# Patient Record
Sex: FEMALE | Race: ASIAN | NOT HISPANIC OR LATINO | URBAN - METROPOLITAN AREA
[De-identification: names, ages, dates, MRNs, and addresses within clinical notes are randomized per-mention and may not be internally consistent; named-entity substitution may affect disease eponyms.]

---

## 2018-11-26 ENCOUNTER — EMERGENCY (EMERGENCY)
Facility: HOSPITAL | Age: 48
LOS: 1 days | Discharge: HOME | End: 2018-11-26
Attending: EMERGENCY MEDICINE | Admitting: EMERGENCY MEDICINE
Payer: MEDICAID

## 2018-11-26 VITALS
HEART RATE: 92 BPM | RESPIRATION RATE: 16 BRPM | DIASTOLIC BLOOD PRESSURE: 87 MMHG | TEMPERATURE: 98 F | OXYGEN SATURATION: 100 % | SYSTOLIC BLOOD PRESSURE: 137 MMHG

## 2018-11-26 PROCEDURE — 99284 EMERGENCY DEPT VISIT MOD MDM: CPT

## 2018-11-26 PROCEDURE — 93970 EXTREMITY STUDY: CPT | Mod: 26

## 2018-11-26 RX ORDER — ACETAMINOPHEN 500 MG
650 TABLET ORAL ONCE
Qty: 0 | Refills: 0 | Status: COMPLETED | OUTPATIENT
Start: 2018-11-26 | End: 2018-11-26

## 2018-11-26 RX ADMIN — Medication 650 MILLIGRAM(S): at 09:11

## 2018-11-26 NOTE — ED ADULT TRIAGE NOTE - CHIEF COMPLAINT QUOTE
pt comes to ED for leg pain. 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 pt leg is black and blue and swollen. pt is a HD pt MWF with a R av fistula pt last HD was friday. pt appears comfortable NAD

## 2018-11-26 NOTE — ED PROVIDER NOTE - OBJECTIVE STATEMENT
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  co inc pain, swelling, ecchymoses  ESRD, r ue shunt, HD m/w/f evenings.  denies sob, no paresthesias.  pt states concerned about dvt

## 2018-11-26 NOTE — ED PROVIDER NOTE - PMH
Clotted Renal Dialysis AV Graft    Clotted Renal Dialysis AV Graft    ESRD on Dialysis    Fibroid Tumor    HTN - Hypertension    Infection of AV Graft for Dialysis    Infection of AV Graft for Dialysis

## 2018-11-26 NOTE — ED PROVIDER NOTE - PSH
History of Hysterectomy  patient states she is still "menstruating every few months " last one was 2 months ago.

## 2018-11-26 NOTE — ED PROVIDER NOTE - LOWER EXTREMITY EXAM, LEFT
left calf moderate swelling, ecchymoses, significant tender to palpation, mild pretibial edema, distal nvi, foot from, strength 5/5

## 2019-06-11 ENCOUNTER — APPOINTMENT (OUTPATIENT)
Dept: TRANSPLANT | Facility: CLINIC | Age: 49
End: 2019-06-11
Payer: COMMERCIAL

## 2019-06-11 ENCOUNTER — APPOINTMENT (OUTPATIENT)
Dept: NEPHROLOGY | Facility: CLINIC | Age: 49
End: 2019-06-11
Payer: COMMERCIAL

## 2019-06-11 ENCOUNTER — LABORATORY RESULT (OUTPATIENT)
Age: 49
End: 2019-06-11

## 2019-06-11 VITALS
WEIGHT: 153 LBS | BODY MASS INDEX: 27.1 KG/M2 | SYSTOLIC BLOOD PRESSURE: 120 MMHG | HEART RATE: 84 BPM | DIASTOLIC BLOOD PRESSURE: 78 MMHG | RESPIRATION RATE: 14 BRPM

## 2019-06-11 DIAGNOSIS — Z82.49 FAMILY HISTORY OF ISCHEMIC HEART DISEASE AND OTHER DISEASES OF THE CIRCULATORY SYSTEM: ICD-10-CM

## 2019-06-11 PROCEDURE — 99205 OFFICE O/P NEW HI 60 MIN: CPT

## 2019-06-11 PROCEDURE — 99204 OFFICE O/P NEW MOD 45 MIN: CPT

## 2019-06-11 NOTE — PHYSICAL EXAM
[General Appearance - Alert] : alert [General Appearance - In No Acute Distress] : in no acute distress [Sclera] : the sclera and conjunctiva were normal [PERRL With Normal Accommodation] : pupils were equal in size, round, and reactive to light [Extraocular Movements] : extraocular movements were intact [Outer Ear] : the ears and nose were normal in appearance [Neck Appearance] : the appearance of the neck was normal [Oropharynx] : the oropharynx was normal [Neck Cervical Mass (___cm)] : no neck mass was observed [Jugular Venous Distention Increased] : there was no jugular-venous distention [Thyroid Diffuse Enlargement] : the thyroid was not enlarged [Thyroid Nodule] : there were no palpable thyroid nodules [Auscultation Breath Sounds / Voice Sounds] : lungs were clear to auscultation bilaterally [Heart Rate And Rhythm] : heart rate was normal and rhythm regular [Heart Sounds] : normal S1 and S2 [Heart Sounds Gallop] : no gallops [Full Pulse] : the pedal pulses are present [Heart Sounds Pericardial Friction Rub] : no pericardial rub [Edema] : there was no peripheral edema [Bowel Sounds] : normal bowel sounds [Abdomen Soft] : soft [Abdomen Tenderness] : non-tender [Abdomen Mass (___ Cm)] : no abdominal mass palpated [Cervical Lymph Nodes Enlarged Posterior Bilaterally] : posterior cervical [Cervical Lymph Nodes Enlarged Anterior Bilaterally] : anterior cervical [Supraclavicular Lymph Nodes Enlarged Bilaterally] : supraclavicular [Axillary Lymph Nodes Enlarged Bilaterally] : axillary [Inguinal Lymph Nodes Enlarged Bilaterally] : inguinal [Involuntary Movements] : no involuntary movements were seen [Graft] : graft [FreeTextEntry1] : right arm [Bruit] : a bruit was present [] : no rash [No Focal Deficits] : no focal deficits [Impaired Insight] : insight and judgment were intact [Oriented To Time, Place, And Person] : oriented to person, place, and time [Affect] : the affect was normal

## 2019-06-11 NOTE — ASSESSMENT
[FreeTextEntry1] : .Ms. CORDERO 49 year She is evaluated for kidney transplantation.\par Pre transplant/ESRD: Patient will benefit from renal allotransplantation She is an acceptable  risk candidate, diabete.\par Special risks: she has had multiple failed AVF/grafts has had vascular grafts conecting left UE and LE vessels\par Medical risks: Cardiovascular, cancer screening.\par Hypertension: Discussed implications. Continue follow up with primary physicians.\par Cardiac risk:  will get further evaluation; echo, stress test; Reviewed cardiovascular risk reduction strategies\par Cancer screening: PAP/Mammo.  No known h/o neoplastic disease\par ID: Serology for acute and chronic viral infections. Screening for latent TB \par Imaging: Renal/abdominal /chest /Iliac  imaging\par Consults: Nutrition, social work, cardiology, Transplant surgery.\par Reviewed factors affecting survival and morbidity while on wait list and reviewed jose -operative and long-term risk factors affecting outcome in kidney transplantation.\par Details of transplant surgery, immunosuppression and its complications and benefits of live donor transplantation as well as variability in wait times across regions and multiple listing were discussed. KDPI >85% and PHS high risk criteria donors were discussed. Discussed factors affecting morbidity and mortality while on hemodialysis.\par Patient has potential live donor (Cousin, sister ) at present. \par Will proceed with completing/ updating work up and listing for transplant/ live donor transplant once work up is reviewed and found to be ok.\par \par

## 2019-06-11 NOTE — HISTORY OF PRESENT ILLNESS
[FreeTextEntry1] : 49 years old mariza, born in [facility], living in  since 2004, Patient has known ESRD (5/2006), on follow up with Dr. Jw Streeter is here for pre kidney transplant evaluation. \par Kidney disease from HTN. No kidney biopsy.\par She has no known DM; HTN (2006). H/o Hyperlipidemia/ Gout\par No known h/o kidney stone.\par No hematuria. Has had PRBC Transfusions 3 years ago.\par Urine out put:none for 5 years\par Has no h/o Pneumonia / UTI.\par No known h/o active CAD/CVA/PVD/DVT/neoplasia/active infections/bleeding.\par H/o clot in central vein after access, has been on Plavix and aspirin for 2 years.\par Reports no major allergies.  No known h/o tuberculosis or hepatitis.\par Most recent hospitalization/for: 2016 for AV graft placement.\par Past surgeries:\par Right arm  AVG, prior grafts on left upper extremity. (Lit Martin at [facility])\par No history of kidney/ bladder surgery.\par Non smoker.\par Fam: Parents are . Father -79 lives in [facility] Mother- 74, has HTN lives in [place] Siblings- has 1 brother and 3 sisters . 1 sister lives in [place].\par Children: One son, 28 years old, lives in [facility]; Healthy. \par She lives with her  (Joseph Jacobo, a ) and mother\par No family history of kidney disease\par Independent for ADL\par Able to walk ten blocks, can climb stairs without difficulty.\par ROS: Has no h/o shortness of breath on exertion. No h/o Sleep apnea. 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 Jw Streeter\par \par \par \par

## 2019-06-13 ENCOUNTER — LABORATORY RESULT (OUTPATIENT)
Age: 49
End: 2019-06-13

## 2019-06-13 LAB
ABO + RH PNL BLD: NORMAL
ABO + RH PNL BLD: NORMAL
ALBUMIN SERPL ELPH-MCNC: 4.8 G/DL
ALP BLD-CCNC: 287 U/L
ALT SERPL-CCNC: 8 U/L
ANION GAP SERPL CALC-SCNC: 20 MMOL/L
APTT BLD: 33.1 SEC
AST SERPL-CCNC: 14 U/L
AT III PPP CHRO-ACNC: 114 %
BILIRUB SERPL-MCNC: 0.4 MG/DL
BUN SERPL-MCNC: 32 MG/DL
C PEPTIDE SERPL-MCNC: 15.6 NG/ML
CALCIUM SERPL-MCNC: 8.9 MG/DL
CARDIOLIPIN AB SER IA-ACNC: NEGATIVE
CHLORIDE SERPL-SCNC: 93 MMOL/L
CMV IGG SERPL QL: >10 U/ML
CMV IGG SERPL-IMP: POSITIVE
CO2 SERPL-SCNC: 25 MMOL/L
CONFIRM: 28.6 SEC
CREAT SERPL-MCNC: 10.42 MG/DL
DRVVT IMM 1:2 NP PPP: NORMAL
DRVVT SCREEN TO CONFIRM RATIO: 1.04 RATIO
EBV DNA SERPL NAA+PROBE-ACNC: NOT DETECTED IU/ML
EBV EA AB SER IA-ACNC: <5 U/ML
EBV EA AB TITR SER IF: POSITIVE
EBV EA IGG SER QL IA: >600 U/ML
EBV EA IGG SER-ACNC: NEGATIVE
EBV EA IGM SER IA-ACNC: NEGATIVE
EBV PATRN SPEC IB-IMP: NORMAL
EBV VCA IGG SER IA-ACNC: >750 U/ML
EBV VCA IGM SER QL IA: 30.6 U/ML
EBVPCR LOG: NOT DETECTED LOGIU/ML
EPSTEIN-BARR VIRUS CAPSID ANTIGEN IGG: POSITIVE
FACT VIII ACT/NOR PPP: 218 %
FIBRINOGEN PPP COAG.DERIVED-MCNC: 418 MG/DL
GLUCOSE SERPL-MCNC: 101 MG/DL
HAV IGM SER QL: NONREACTIVE
HBV CORE IGG+IGM SER QL: REACTIVE
HBV SURFACE AB SER QL: REACTIVE
HBV SURFACE AG SER QL: NONREACTIVE
HCG SERPL-MCNC: 7 MIU/ML
HCV AB SER QL: NONREACTIVE
HCV S/CO RATIO: 0.16 S/CO
HIV1+2 AB SPEC QL IA.RAPID: NONREACTIVE
HSV 1+2 IGG SER IA-IMP: POSITIVE
HSV 1+2 IGG SER IA-IMP: POSITIVE
HSV1 IGG SER QL: 58.1 INDEX
HSV2 IGG SER QL: 21 INDEX
INR PPP: 0.92 RATIO
MAGNESIUM SERPL-MCNC: 2.5 MG/DL
PHOSPHATE SERPL-MCNC: 4.5 MG/DL
POTASSIUM SERPL-SCNC: 4.3 MMOL/L
PROT S AG ACT/NOR PPP IA: 86 %
PROT SERPL-MCNC: 7.6 G/DL
PT BLD: 10.5 SEC
RUBV IGG FLD-ACNC: 28.3 INDEX
RUBV IGG SER-IMP: POSITIVE
SCREEN DRVVT: 35.7 SEC
SODIUM SERPL-SCNC: 138 MMOL/L
T GONDII AB SER-IMP: POSITIVE
T GONDII IGG SER QL: 339 IU/ML
T PALLIDUM AB SER QL IA: NEGATIVE
TT CONT PPP: 20.5 SEC
URATE SERPL-MCNC: 4.2 MG/DL
VZV AB TITR SER: NEGATIVE
VZV IGG SER IF-ACNC: 15.2 INDEX

## 2019-06-18 ENCOUNTER — OUTPATIENT (OUTPATIENT)
Dept: OUTPATIENT SERVICES | Facility: HOSPITAL | Age: 49
LOS: 1 days | End: 2019-06-18
Payer: COMMERCIAL

## 2019-06-18 ENCOUNTER — APPOINTMENT (OUTPATIENT)
Dept: RADIOLOGY | Facility: CLINIC | Age: 49
End: 2019-06-18
Payer: COMMERCIAL

## 2019-06-18 DIAGNOSIS — Z01.818 ENCOUNTER FOR OTHER PREPROCEDURAL EXAMINATION: ICD-10-CM

## 2019-06-18 PROCEDURE — 71046 X-RAY EXAM CHEST 2 VIEWS: CPT | Mod: 26

## 2019-06-18 PROCEDURE — 71046 X-RAY EXAM CHEST 2 VIEWS: CPT

## 2019-06-19 ENCOUNTER — APPOINTMENT (OUTPATIENT)
Dept: CARDIOLOGY | Facility: CLINIC | Age: 49
End: 2019-06-19
Payer: COMMERCIAL

## 2019-06-19 ENCOUNTER — NON-APPOINTMENT (OUTPATIENT)
Age: 49
End: 2019-06-19

## 2019-06-19 VITALS
OXYGEN SATURATION: 96 % | BODY MASS INDEX: 27.11 KG/M2 | DIASTOLIC BLOOD PRESSURE: 83 MMHG | HEIGHT: 63 IN | WEIGHT: 153 LBS | SYSTOLIC BLOOD PRESSURE: 123 MMHG | HEART RATE: 85 BPM

## 2019-06-19 PROCEDURE — 93000 ELECTROCARDIOGRAM COMPLETE: CPT

## 2019-06-19 PROCEDURE — 99204 OFFICE O/P NEW MOD 45 MIN: CPT

## 2019-06-19 NOTE — PHYSICAL EXAM
[Normal Appearance] : normal appearance [General Appearance - Well Developed] : well developed [General Appearance - Well Nourished] : well nourished [Well Groomed] : well groomed [No Deformities] : no deformities [General Appearance - In No Acute Distress] : no acute distress [Normal] : the eyelids were normal bilaterally [PERRL] : pupils were equal in size, round, and reactive to light [Conjunctiva] : the conjunctiva were normal in both eyes [EOM Intact] : extraocular movements were intact [Normal Oral Mucosa] : normal oral mucosa [No Oral Cyanosis] : no oral cyanosis [No Oral Pallor] : no oral pallor [Normal Oropharynx] : normal oropharynx [Normal Rate] : normal [Rhythm Regular] : regular [Normal S1] : normal S1 [Normal S2] : normal S2 [II] : a grade 2 [No Gallop] : no gallop heard [III] : a grade 3 [2+] : left 2+ [] : no respiratory distress [1+] : left 1+ [Respiration, Rhythm And Depth] : normal respiratory rhythm and effort [Exaggerated Use Of Accessory Muscles For Inspiration] : no accessory muscle use [Auscultation Breath Sounds / Voice Sounds] : lungs were clear to auscultation bilaterally [Bowel Sounds] : normal bowel sounds [Abdomen Soft] : soft [Abdomen Tenderness] : non-tender [Abnormal Walk] : normal gait [Gait - Sufficient For Exercise Testing] : the gait was sufficient for exercise testing [Nail Clubbing] : no clubbing of the fingernails [Cyanosis, Localized] : no localized cyanosis [No Venous Stasis] : no venous stasis [Skin Color & Pigmentation] : normal skin color and pigmentation [Oriented To Time, Place, And Person] : oriented to person, place, and time [Impaired Insight] : insight and judgment were intact [No Xanthoma] : no  xanthoma was observed [Mood] : the mood was normal [Affect] : the affect was normal [No Anxiety] : not feeling anxious [Yellow Sclera (Icteric)] : no scleral icterus was seen [FreeTextEntry1] : no JVD [Right Carotid Bruit] : no bruit heard over the right carotid [Left Carotid Bruit] : no bruit heard over the left carotid

## 2019-06-19 NOTE — DISCUSSION/SUMMARY
[FreeTextEntry1] : Patient is a very pleasant 49 year-old with vascular history as above who does not know her cardiac history.\par Will check echocardiogram with strain imaging to evaluate patient with low voltage ECG - consider infiltrative myopathy.\par Will check exercise nuclear stress testing in patient with no symptoms but with known peripheral vascular disease.

## 2019-06-19 NOTE — HISTORY OF PRESENT ILLNESS
[FreeTextEntry1] : Patient is a 49 year-old woman with reported history of hypertensive kidney disease, ESRD on HD since 2006, currently via right brachial graft, presents today for evaluation for possible renal transplant.\par Patient is on dual antiplatelet therapy per her vascular surgeon. She is unaware if she also had any coronary bypasses at the time of her upper extremity bypasses (but she does have a sternotomy scar).\par \par At today's visit, patient denies chest pain, shortness of breath, syncope, or presyncope. \par She exercises regularly, including jumping rope and walking. She got into jumping rope because her uncles were boxers and they used to jump rope for fitness.\par \par PMD/Nephrologist: Jw Streeter MD [phone]\par Vascular: Lit Castellanos MD [phone]

## 2019-06-21 LAB
APCR PPP: 2.89 RATIO
BASOPHILS # BLD AUTO: 0.04 K/UL
BASOPHILS NFR BLD AUTO: 0.8 %
DNA PLOIDY SPEC FC-IMP: NORMAL
EOSINOPHIL # BLD AUTO: 0.91 K/UL
EOSINOPHIL NFR BLD AUTO: 17.2 %
ESTIMATED AVERAGE GLUCOSE: 105 MG/DL
FVL BLANK: 36.9
FVL TEST: 106.6
HBA1C MFR BLD HPLC: 5.3 %
HCT VFR BLD CALC: 47.5 %
HGB BLD-MCNC: 13.8 G/DL
IMM GRANULOCYTES NFR BLD AUTO: 0.2 %
LYMPHOCYTES # BLD AUTO: 1.14 K/UL
LYMPHOCYTES NFR BLD AUTO: 21.5 %
M TB IFN-G BLD-IMP: POSITIVE
MAN DIFF?: NORMAL
MCHC RBC-ENTMCNC: 25.9 PG
MCHC RBC-ENTMCNC: 29.1 GM/DL
MCV RBC AUTO: 89.3 FL
MONOCYTES # BLD AUTO: 0.43 K/UL
MONOCYTES NFR BLD AUTO: 8.1 %
NEUTROPHILS # BLD AUTO: 2.77 K/UL
NEUTROPHILS NFR BLD AUTO: 52.2 %
PLATELET # BLD AUTO: 234 K/UL
QUANTIFERON TB PLUS MITOGEN MINUS NIL: 7 IU/ML
QUANTIFERON TB PLUS NIL: 0.03 IU/ML
QUANTIFERON TB PLUS TB1 MINUS NIL: 0.65 IU/ML
QUANTIFERON TB PLUS TB2 MINUS NIL: 0.48 IU/ML
RBC # BLD: 5.32 M/UL
RBC # FLD: 19.1 %
WBC # FLD AUTO: 5.3 K/UL

## 2019-06-25 ENCOUNTER — APPOINTMENT (OUTPATIENT)
Dept: CARDIOLOGY | Facility: CLINIC | Age: 49
End: 2019-06-25
Payer: COMMERCIAL

## 2019-06-25 ENCOUNTER — APPOINTMENT (OUTPATIENT)
Dept: CT IMAGING | Facility: CLINIC | Age: 49
End: 2019-06-25
Payer: COMMERCIAL

## 2019-06-25 ENCOUNTER — OUTPATIENT (OUTPATIENT)
Dept: OUTPATIENT SERVICES | Facility: HOSPITAL | Age: 49
LOS: 1 days | End: 2019-06-25
Payer: COMMERCIAL

## 2019-06-25 DIAGNOSIS — Z00.8 ENCOUNTER FOR OTHER GENERAL EXAMINATION: ICD-10-CM

## 2019-06-25 PROCEDURE — 74177 CT ABD & PELVIS W/CONTRAST: CPT | Mod: 26

## 2019-06-25 PROCEDURE — 75635 CT ANGIO ABDOMINAL ARTERIES: CPT | Mod: 26

## 2019-06-25 PROCEDURE — 75635 CT ANGIO ABDOMINAL ARTERIES: CPT

## 2019-06-25 PROCEDURE — 93306 TTE W/DOPPLER COMPLETE: CPT

## 2019-06-25 PROCEDURE — 74177 CT ABD & PELVIS W/CONTRAST: CPT

## 2019-07-23 ENCOUNTER — APPOINTMENT (OUTPATIENT)
Dept: CARDIOLOGY | Facility: CLINIC | Age: 49
End: 2019-07-23
Payer: COMMERCIAL

## 2019-07-23 PROCEDURE — 78452 HT MUSCLE IMAGE SPECT MULT: CPT

## 2019-07-23 PROCEDURE — 93015 CV STRESS TEST SUPVJ I&R: CPT

## 2019-07-23 PROCEDURE — A9500: CPT

## 2019-07-26 ENCOUNTER — MOBILE ON CALL (OUTPATIENT)
Age: 49
End: 2019-07-26

## 2019-07-30 ENCOUNTER — APPOINTMENT (OUTPATIENT)
Dept: INFECTIOUS DISEASE | Facility: CLINIC | Age: 49
End: 2019-07-30
Payer: COMMERCIAL

## 2019-07-30 VITALS
TEMPERATURE: 97 F | DIASTOLIC BLOOD PRESSURE: 78 MMHG | BODY MASS INDEX: 26.75 KG/M2 | HEIGHT: 63 IN | HEART RATE: 78 BPM | WEIGHT: 151 LBS | OXYGEN SATURATION: 96 % | SYSTOLIC BLOOD PRESSURE: 115 MMHG

## 2019-07-30 PROCEDURE — 99203 OFFICE O/P NEW LOW 30 MIN: CPT

## 2019-07-30 NOTE — ASSESSMENT
[FreeTextEntry1] : This is a 48 yo F with pmh of ESRD on HD MWF, HTN, DM, HLD, Gout, RUE AVG, on plavix for h/o clot in central vein. \par \par Referred today for Positive Quantiferon Gold.\par \par Chest xray with 2 mm RUL nodule.\par \par CT chest pending.\par Once CT chest done, I will review.  \par Likely pt has latent TB and will need treatment with  mg po daily and Vit B6 daily x 9 months.\par \par Medication and side effects explained to pt. Side effects not limited to: GI upset, bone marrow suppression, hepatotoxicity, told to avoid alcohol, peripheral neuropathy.\par \par Informed pt once she has CT chest, to call me and I will review with her and start INH if appropriate.\par Asked pt to RTC in 6 weeks.  Hope to have her on INH in the next 2 weeks if CT chest normal.\par \par Reviewed serology for HIV, CMV, HSV, EBV, Hep B. Other care as per transplant team protocol. \par \par All questions answered.

## 2019-07-30 NOTE — CONSULT LETTER
[Dear  ___] : Dear  [unfilled], [Consult Letter:] : I had the pleasure of evaluating your patient, [unfilled]. [Consult Closing:] : Thank you very much for allowing me to participate in the care of this patient.  If you have any questions, please do not hesitate to contact me. [Sincerely,] : Sincerely, [Please see my note below.] : Please see my note below. [FreeTextEntry2] : Dr. Jose Joya [FreeTextEntry3] : \par Yazmin Olivarez MD\par  of Medicine\par Division of Infectious Diseases\par [facility] [facility]\par [address] [place] [address] DrAlec\[place] [address]\par Tel: [phone]\par Fax: [phone]\par  [DrAlec ___] : Dr. LINDO [DrAlec  ___] : Dr. LINDO

## 2019-07-30 NOTE — PHYSICAL EXAM
[General Appearance - Alert] : alert [General Appearance - In No Acute Distress] : in no acute distress [General Appearance - Well-Appearing] : healthy appearing [Sclera] : the sclera and conjunctiva were normal [PERRL With Normal Accommodation] : pupils were equal in size, round, reactive to light [Outer Ear] : the ears and nose were normal in appearance [Oropharynx] : the oropharynx was normal with no thrush [Neck Appearance] : the appearance of the neck was normal [Neck Cervical Mass (___cm)] : no neck mass was observed [Jugular Venous Distention Increased] : there was no jugular-venous distention [] : no respiratory distress [Auscultation Breath Sounds / Voice Sounds] : lungs were clear to auscultation bilaterally [Heart Rate And Rhythm] : heart rate was normal and rhythm regular [Heart Sounds] : normal S1 and S2 [Edema] : there was no peripheral edema [Bowel Sounds] : normal bowel sounds [Abdomen Soft] : soft [Cervical Lymph Nodes Enlarged Posterior Bilaterally] : posterior cervical [Cervical Lymph Nodes Enlarged Anterior Bilaterally] : anterior cervical [Supraclavicular Lymph Nodes Enlarged Bilaterally] : supraclavicular [Skin Lesions] : no skin lesions [No Focal Deficits] : no focal deficits [Oriented To Time, Place, And Person] : oriented to person, place, and time [Affect] : the affect was normal

## 2019-07-30 NOTE — HISTORY OF PRESENT ILLNESS
[FreeTextEntry1] : This is a 50 yo F with pmh of ESRD on HD MWF, HTN, DM, HLD, Gout, RUE AVG, on plavix for h/o clot in central vein. \par \par Referred today for Positive Quantiferon Gold.\par \par +Quant gold\par Had BCG as kid\par From [facility], [place]\par Lives in [place]\par Never treated for TB.\par \par No cough, hemoptysis, fevers, chills, weight loss, no LAD.\par No known exposures to TB\par \par Chest xray done 6/18/19: Revealed new RUL 2 mm nodule new since prior study compared in 2014.  \par Pt to have CT chest w/o contrast - results not available. Pt unsure if she had study b/c she did go for CT a/p and CT angiogram on 6/25/19.  Reached out to transplant team to clarify.

## 2019-08-08 ENCOUNTER — OUTPATIENT (OUTPATIENT)
Dept: OUTPATIENT SERVICES | Facility: HOSPITAL | Age: 49
LOS: 1 days | End: 2019-08-08
Payer: COMMERCIAL

## 2019-08-08 ENCOUNTER — APPOINTMENT (OUTPATIENT)
Dept: CT IMAGING | Facility: CLINIC | Age: 49
End: 2019-08-08
Payer: COMMERCIAL

## 2019-08-08 DIAGNOSIS — Z01.818 ENCOUNTER FOR OTHER PREPROCEDURAL EXAMINATION: ICD-10-CM

## 2019-08-08 PROCEDURE — 71250 CT THORAX DX C-: CPT

## 2019-08-08 PROCEDURE — 71250 CT THORAX DX C-: CPT | Mod: 26

## 2019-09-10 ENCOUNTER — APPOINTMENT (OUTPATIENT)
Dept: INFECTIOUS DISEASE | Facility: CLINIC | Age: 49
End: 2019-09-10

## 2019-11-07 ENCOUNTER — NON-APPOINTMENT (OUTPATIENT)
Age: 49
End: 2019-11-07

## 2019-11-07 ENCOUNTER — APPOINTMENT (OUTPATIENT)
Dept: INFECTIOUS DISEASE | Facility: CLINIC | Age: 49
End: 2019-11-07
Payer: COMMERCIAL

## 2019-11-07 ENCOUNTER — APPOINTMENT (OUTPATIENT)
Dept: CARDIOLOGY | Facility: CLINIC | Age: 49
End: 2019-11-07
Payer: COMMERCIAL

## 2019-11-07 VITALS
OXYGEN SATURATION: 97 % | DIASTOLIC BLOOD PRESSURE: 79 MMHG | BODY MASS INDEX: 27.11 KG/M2 | HEART RATE: 85 BPM | HEIGHT: 63 IN | SYSTOLIC BLOOD PRESSURE: 120 MMHG | WEIGHT: 153 LBS | TEMPERATURE: 97.5 F

## 2019-11-07 VITALS
HEIGHT: 63 IN | WEIGHT: 154.1 LBS | OXYGEN SATURATION: 97 % | BODY MASS INDEX: 27.3 KG/M2 | DIASTOLIC BLOOD PRESSURE: 78 MMHG | HEART RATE: 89 BPM | SYSTOLIC BLOOD PRESSURE: 104 MMHG

## 2019-11-07 DIAGNOSIS — R76.12 NONSPECIFIC REACTION TO CELL MEDIATED IMMUNITY MEASUREMENT OF GAMMA INTERFERON ANTIGEN RESPONSE W/OUT ACTIVE TUBERCULOSIS: ICD-10-CM

## 2019-11-07 LAB
ALBUMIN SERPL ELPH-MCNC: 4.7 G/DL
ALP BLD-CCNC: 170 U/L
ALT SERPL-CCNC: 6 U/L
ANION GAP SERPL CALC-SCNC: 17 MMOL/L
AST SERPL-CCNC: 15 U/L
BASOPHILS # BLD AUTO: 0.04 K/UL
BASOPHILS NFR BLD AUTO: 0.8 %
BILIRUB SERPL-MCNC: 0.3 MG/DL
BUN SERPL-MCNC: 24 MG/DL
CALCIUM SERPL-MCNC: 9.8 MG/DL
CHLORIDE SERPL-SCNC: 94 MMOL/L
CO2 SERPL-SCNC: 30 MMOL/L
CREAT SERPL-MCNC: 7.84 MG/DL
EOSINOPHIL # BLD AUTO: 0.49 K/UL
EOSINOPHIL NFR BLD AUTO: 9.3 %
GLUCOSE SERPL-MCNC: 86 MG/DL
HCT VFR BLD CALC: 47.5 %
HGB BLD-MCNC: 14.3 G/DL
IMM GRANULOCYTES NFR BLD AUTO: 0.2 %
LYMPHOCYTES # BLD AUTO: 1.13 K/UL
LYMPHOCYTES NFR BLD AUTO: 21.5 %
MAN DIFF?: NORMAL
MCHC RBC-ENTMCNC: 26.5 PG
MCHC RBC-ENTMCNC: 30.1 GM/DL
MCV RBC AUTO: 88 FL
MONOCYTES # BLD AUTO: 0.46 K/UL
MONOCYTES NFR BLD AUTO: 8.8 %
NEUTROPHILS # BLD AUTO: 3.12 K/UL
NEUTROPHILS NFR BLD AUTO: 59.4 %
PLATELET # BLD AUTO: 360 K/UL
POTASSIUM SERPL-SCNC: 4.7 MMOL/L
PROT SERPL-MCNC: 7.8 G/DL
RBC # BLD: 5.4 M/UL
RBC # FLD: 18.7 %
SODIUM SERPL-SCNC: 141 MMOL/L
WBC # FLD AUTO: 5.25 K/UL

## 2019-11-07 PROCEDURE — 93000 ELECTROCARDIOGRAM COMPLETE: CPT

## 2019-11-07 PROCEDURE — 99215 OFFICE O/P EST HI 40 MIN: CPT

## 2019-11-07 PROCEDURE — 99213 OFFICE O/P EST LOW 20 MIN: CPT

## 2019-11-07 NOTE — HISTORY OF PRESENT ILLNESS
[FreeTextEntry1] : This is a 50 yo F with pmh of ESRD on HD MWF, HTN, DM, HLD, Gout, RUE AVG, on plavix for h/o clot in central vein. \par \par Referred today for Positive Quantiferon Gold.\par \par +Quant gold\par Had BCG as kid\par From [facility], moved to  2004\par Lives in [place]\par Never treated for TB.\par \par No cough, hemoptysis, fevers, chills, weight loss, no LAD.\par No known exposures to TB\par \par Chest xray done 6/18/19: Revealed new RUL 2 mm nodule new since prior study compared in 2014.\par CT chest done.  With prior granulomatous disease. No evidence of active TB. \par Started INH/B6 8/13/19.  Due to end 5/13/2020.    \par \par Feels well today ROS negative. Has some numbness right hand with dialysis otherwise is okay. \par Not drinking alcohol.

## 2019-11-07 NOTE — CONSULT LETTER
[Dear  ___] : Dear  [unfilled], [Consult Letter:] : I had the pleasure of evaluating your patient, [unfilled]. [Please see my note below.] : Please see my note below. [Consult Closing:] : Thank you very much for allowing me to participate in the care of this patient.  If you have any questions, please do not hesitate to contact me. [Sincerely,] : Sincerely, [DrAlec  ___] : Dr. LINDO [DrAlec ___] : Dr. LINDO [FreeTextEntry2] : Dr. Jose Joya [FreeTextEntry3] : \par Yazmin Olivarez MD\par  of Medicine\par Division of Infectious Diseases\par [facility] [facility] at [facility]\par [address] [place] [address] DrAlec\[place] [address]\par Tel: [phone]\par Fax: [phone]\par

## 2019-11-07 NOTE — ASSESSMENT
[FreeTextEntry1] : This is a 50 yo F with pmh of ESRD on HD MWF, HTN, DM, HLD, Gout, RUE AVG, on plavix for h/o clot in central vein. \par \par Referred today for Positive Quantiferon Gold.\par \par Chest xray with 2 mm RUL nodule.\par \par CT chest without active TB.\par Continue  mg po daily and Vit B6 daily x 9 months.  End date planned 5/13/2020.\par \par Medication and side effects explained to pt. Side effects not limited to: GI upset, bone marrow suppression, hepatotoxicity, told to avoid alcohol, peripheral neuropathy.\par \par Reviewed serology for HIV, CMV, HSV, EBV, Hep B. Other care as per transplant team protocol. \par \par All questions answered.   RTC 2 months or sooner if needed. Also getting blood work checked at dialysis monthly.  \par Check CBC, CMP today.

## 2019-11-20 ENCOUNTER — OUTPATIENT (OUTPATIENT)
Dept: OUTPATIENT SERVICES | Facility: HOSPITAL | Age: 49
LOS: 1 days | End: 2019-11-20
Payer: COMMERCIAL

## 2019-11-20 VITALS
WEIGHT: 136.91 LBS | SYSTOLIC BLOOD PRESSURE: 130 MMHG | TEMPERATURE: 98 F | RESPIRATION RATE: 16 BRPM | HEART RATE: 87 BPM | HEIGHT: 63 IN | DIASTOLIC BLOOD PRESSURE: 78 MMHG | OXYGEN SATURATION: 97 %

## 2019-11-20 DIAGNOSIS — Z95.828 PRESENCE OF OTHER VASCULAR IMPLANTS AND GRAFTS: Chronic | ICD-10-CM

## 2019-11-20 DIAGNOSIS — R93.1 ABNORMAL FINDINGS ON DIAGNOSTIC IMAGING OF HEART AND CORONARY CIRCULATION: ICD-10-CM

## 2019-11-20 DIAGNOSIS — I77.0 ARTERIOVENOUS FISTULA, ACQUIRED: Chronic | ICD-10-CM

## 2019-11-20 LAB
ALBUMIN SERPL ELPH-MCNC: 4.8 G/DL — SIGNIFICANT CHANGE UP (ref 3.3–5)
ALP SERPL-CCNC: 154 U/L — HIGH (ref 40–120)
ALT FLD-CCNC: 10 U/L — SIGNIFICANT CHANGE UP (ref 10–45)
ANION GAP SERPL CALC-SCNC: 22 MMOL/L — HIGH (ref 5–17)
AST SERPL-CCNC: 27 U/L — SIGNIFICANT CHANGE UP (ref 10–40)
BILIRUB SERPL-MCNC: 0.4 MG/DL — SIGNIFICANT CHANGE UP (ref 0.2–1.2)
BUN SERPL-MCNC: 42 MG/DL — HIGH (ref 7–23)
CALCIUM SERPL-MCNC: 10.5 MG/DL — SIGNIFICANT CHANGE UP (ref 8.4–10.5)
CHLORIDE SERPL-SCNC: 89 MMOL/L — LOW (ref 96–108)
CO2 SERPL-SCNC: 21 MMOL/L — LOW (ref 22–31)
CREAT SERPL-MCNC: 12.45 MG/DL — HIGH (ref 0.5–1.3)
GLUCOSE SERPL-MCNC: 140 MG/DL — HIGH (ref 70–99)
HCT VFR BLD CALC: 47.2 % — HIGH (ref 34.5–45)
HGB BLD-MCNC: 14.6 G/DL — SIGNIFICANT CHANGE UP (ref 11.5–15.5)
MCHC RBC-ENTMCNC: 26.3 PG — LOW (ref 27–34)
MCHC RBC-ENTMCNC: 30.9 GM/DL — LOW (ref 32–36)
MCV RBC AUTO: 84.9 FL — SIGNIFICANT CHANGE UP (ref 80–100)
NRBC # BLD: 0 /100 WBCS — SIGNIFICANT CHANGE UP (ref 0–0)
PLATELET # BLD AUTO: 299 K/UL — SIGNIFICANT CHANGE UP (ref 150–400)
POTASSIUM SERPL-MCNC: 5.4 MMOL/L — HIGH (ref 3.5–5.3)
POTASSIUM SERPL-SCNC: 5.4 MMOL/L — HIGH (ref 3.5–5.3)
PROT SERPL-MCNC: 8.6 G/DL — HIGH (ref 6–8.3)
RBC # BLD: 5.56 M/UL — HIGH (ref 3.8–5.2)
RBC # FLD: 17.9 % — HIGH (ref 10.3–14.5)
SODIUM SERPL-SCNC: 132 MMOL/L — LOW (ref 135–145)
WBC # BLD: 4.75 K/UL — SIGNIFICANT CHANGE UP (ref 3.8–10.5)
WBC # FLD AUTO: 4.75 K/UL — SIGNIFICANT CHANGE UP (ref 3.8–10.5)

## 2019-11-20 PROCEDURE — C1769: CPT

## 2019-11-20 PROCEDURE — C1887: CPT

## 2019-11-20 PROCEDURE — 99152 MOD SED SAME PHYS/QHP 5/>YRS: CPT

## 2019-11-20 PROCEDURE — 93005 ELECTROCARDIOGRAM TRACING: CPT

## 2019-11-20 PROCEDURE — 99152 MOD SED SAME PHYS/QHP 5/>YRS: CPT | Mod: 59

## 2019-11-20 PROCEDURE — 93010 ELECTROCARDIOGRAM REPORT: CPT

## 2019-11-20 PROCEDURE — 93458 L HRT ARTERY/VENTRICLE ANGIO: CPT

## 2019-11-20 PROCEDURE — 85027 COMPLETE CBC AUTOMATED: CPT

## 2019-11-20 PROCEDURE — C1894: CPT

## 2019-11-20 PROCEDURE — 93458 L HRT ARTERY/VENTRICLE ANGIO: CPT | Mod: 26,59

## 2019-11-20 PROCEDURE — 80053 COMPREHEN METABOLIC PANEL: CPT

## 2019-11-20 NOTE — H&P CARDIOLOGY - PSH
AV fistula  B/L - failed  H/O extremity bypass graft  H/O RUE bypass and creation of right brachial graft  History of Hysterectomy  for benign disease

## 2019-11-20 NOTE — H&P CARDIOLOGY - PMH
CKD (chronic kidney disease) stage V requiring chronic dialysis    Clotted Renal Dialysis AV Graft    Fibroid Tumor    HTN - Hypertension    Infection of AV Graft for Dialysis

## 2019-11-20 NOTE — H&P CARDIOLOGY - HISTORY OF PRESENT ILLNESS
50 y/o [place] female (denies implanted cardiac devices) with PMHx of HTN and CKD V (2/2 hypertensive kidney disease) on HD M/W/F since 2006 via right brachial graft created via RUE bypass for which she is on DAPT as per Vascular surgery (s/p b/l AV fistula both failed) who is now under evaluation for Renal Transplant.  Patient is s/p attempted exercise NST which was converted to pharmacologic NST (2/2 HR suboptimal at 8 minutes of exercise) in 7/2019 with abnormal findings (comprehensive report below).  She presented to Cardiology-  Dr. Trent for evaluation on 11/7/2019 and decision was made to proceed with [facility] for which patient presents today.  Patient denies any CP/dyspnea/palpitations/syncope.  Stress Test: Date: 7/23/2019       Protocol: Pharmacologic NST, patient injected with Regadenoson       Symptoms: shortness of breath, abdominal discomfort, dizziness, vomiting       EKG Changes: None       Myocardial Imaging: The LV was a normal in size, there are small mild defects in the distal anterior and anteroapical walls that are reversible consistent with ischemia, LVEF 79%     Antianginal Therapies:          Calcium Channel Blockers: Amlodipine    **Of note patient with iodine based contrast allergy, premedicated as per [facility] policy 50 y/o [place] female (denies implanted cardiac devices) with PMHx of HTN and CKD V (2/2 hypertensive kidney disease) on HD M/W/F since 2006 via right brachial graft created via RUE bypass for which she is on DAPT as per Vascular surgery (s/p b/l AV fistula both failed) who is now under evaluation for Renal Transplant.  Patient is s/p attempted exercise NST which was converted to pharmacologic NST (2/2 HR suboptimal at 8 minutes of exercise) in 7/2019 with abnormal findings (comprehensive report below).  She presented to Cardiology-  Dr. Trent for evaluation on 11/7/2019 and decision was made to proceed with [facility] for which patient presents today.  Patient denies any CP/dyspnea/palpitations/syncope.    Stress Test: Date: 7/23/2019       Protocol: Pharmacologic NST, patient injected with Regadenoson       Symptoms: shortness of breath, abdominal discomfort, dizziness, vomiting       EKG Changes: None       Myocardial Imaging: The LV was a normal in size, there are small mild defects in the distal anterior and anteroapical walls that are reversible consistent with ischemia, LVEF 79%     Antianginal Therapies:          Calcium Channel Blockers: Amlodipine    **Of note patient with iodine based contrast allergy, premedicated as per [facility] policy

## 2019-12-02 NOTE — HISTORY OF PRESENT ILLNESS
[FreeTextEntry1] : Patient is a 49 year-old [place] woman, [place], with reported history of hypertensive kidney disease, ESRD on HD since 2006, currently [id number]-[id number] via right brachial graft, presents today for evaluation for possible renal transplant.\par Patient is on dual antiplatelet therapy per her vascular surgeon. She is unaware if she also had any coronary bypasses at the time of her upper extremity bypasses (but she does have a sternotomy scar).\par \par At today's visit, patient denies chest pain, shortness of breath, syncope, or presyncope. \par She exercises regularly, including jumping rope and walking. She got into jumping rope because her uncles were boxers and they used to jump rope for fitness.\par \par November 2019 - Patient presents for follow-up in her usual state of health. Since her initial visit ehre in June 2019, patient has had an echocardiogram and nuclear stress test. The stress test revealed mild distal anterior and apical ischemia. She has not yet had her left heart catheterization. She has no chest discomfort or shortness of breath.\par Patient tolerates dialysis well. She continues to have HD on Heasbn-Oczcohttz-Qtljvo via right brachial AV graft.\par Patient continues to take her TB medications after positive Quantiferon Gold test. She has no evidence of active TB. 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

## 2019-12-02 NOTE — ADDENDUM
[FreeTextEntry1] : On November 20, 2019, patient presented for left heart catheterization.\par She was seen to have normal coronary arteries.\par \par No further cardiovascular testing is necessary prior to consideration for renal transplant.\par Would reassess ischemic evaluation in one year or earlier if clinical status changes.\par \par Patient encouraged to diet and exercise, including both aerobic and resistance training.

## 2019-12-02 NOTE — DISCUSSION/SUMMARY
[FreeTextEntry1] : Patient is a very pleasant 49 year-old with vascular history as above who does not know her cardiac history.\par Echocardiogram (June 2019) revealed normal LV function, normal LA size, and borderline pulmonary pressures.\par Nuclear stress test showed adequate exercise capacity, but patient could not achieve target heart rate, so she was converted to pharmacologic nuclear stress testing which revealed a small area of distal anterior and apical ischemia.\par \par Patient referred for left heart catheterization.

## 2019-12-02 NOTE — PHYSICAL EXAM
[General Appearance - Well Developed] : well developed [Normal Appearance] : normal appearance [Well Groomed] : well groomed [General Appearance - Well Nourished] : well nourished [General Appearance - In No Acute Distress] : no acute distress [No Deformities] : no deformities [No Oral Pallor] : no oral pallor [Normal Oral Mucosa] : normal oral mucosa [No Oral Cyanosis] : no oral cyanosis [Normal Oropharynx] : normal oropharynx [Respiration, Rhythm And Depth] : normal respiratory rhythm and effort [] : no respiratory distress [Auscultation Breath Sounds / Voice Sounds] : lungs were clear to auscultation bilaterally [Exaggerated Use Of Accessory Muscles For Inspiration] : no accessory muscle use [Abdomen Tenderness] : non-tender [Abdomen Soft] : soft [Bowel Sounds] : normal bowel sounds [Gait - Sufficient For Exercise Testing] : the gait was sufficient for exercise testing [Abnormal Walk] : normal gait [Nail Clubbing] : no clubbing of the fingernails [Skin Color & Pigmentation] : normal skin color and pigmentation [Cyanosis, Localized] : no localized cyanosis [No Venous Stasis] : no venous stasis [Oriented To Time, Place, And Person] : oriented to person, place, and time [No Xanthoma] : no  xanthoma was observed [Impaired Insight] : insight and judgment were intact [Mood] : the mood was normal [Affect] : the affect was normal [Conjunctiva] : the conjunctiva were normal in both eyes [No Anxiety] : not feeling anxious [Normal] : the eyelids were normal bilaterally [PERRL] : pupils were equal in size, round, and reactive to light [EOM Intact] : extraocular movements were intact [Rhythm Regular] : regular [Normal Rate] : normal [Normal S1] : normal S1 [No Gallop] : no gallop heard [Normal S2] : normal S2 [II] : a grade 2 [III] : a grade 3 [2+] : Carotid: right 2+ [1+] : left 1+ [No Pitting Edema] : no pitting edema present [FreeTextEntry1] : no JVD [Yellow Sclera (Icteric)] : no scleral icterus was seen [Right Carotid Bruit] : no bruit heard over the right carotid [Left Carotid Bruit] : no bruit heard over the left carotid

## 2020-01-07 ENCOUNTER — APPOINTMENT (OUTPATIENT)
Dept: INFECTIOUS DISEASE | Facility: CLINIC | Age: 50
End: 2020-01-07

## 2020-02-25 PROBLEM — N18.6 END STAGE RENAL DISEASE: Chronic | Status: ACTIVE | Noted: 2019-11-20

## 2020-03-10 ENCOUNTER — NON-APPOINTMENT (OUTPATIENT)
Age: 50
End: 2020-03-10

## 2020-03-10 ENCOUNTER — APPOINTMENT (OUTPATIENT)
Dept: CARDIOLOGY | Facility: CLINIC | Age: 50
End: 2020-03-10
Payer: COMMERCIAL

## 2020-03-10 VITALS
SYSTOLIC BLOOD PRESSURE: 112 MMHG | DIASTOLIC BLOOD PRESSURE: 77 MMHG | TEMPERATURE: 98.1 F | HEART RATE: 88 BPM | OXYGEN SATURATION: 95 % | BODY MASS INDEX: 27.11 KG/M2 | HEIGHT: 63 IN | WEIGHT: 153 LBS | RESPIRATION RATE: 17 BRPM

## 2020-03-10 DIAGNOSIS — Z00.00 ENCOUNTER FOR GENERAL ADULT MEDICAL EXAMINATION W/OUT ABNORMAL FINDINGS: ICD-10-CM

## 2020-03-10 DIAGNOSIS — R94.39 ABNORMAL RESULT OF OTHER CARDIOVASCULAR FUNCTION STUDY: ICD-10-CM

## 2020-03-10 DIAGNOSIS — I73.9 PERIPHERAL VASCULAR DISEASE, UNSPECIFIED: ICD-10-CM

## 2020-03-10 PROCEDURE — 93000 ELECTROCARDIOGRAM COMPLETE: CPT | Mod: NC

## 2020-03-10 PROCEDURE — 99215 OFFICE O/P EST HI 40 MIN: CPT

## 2020-03-15 NOTE — REASON FOR VISIT
[Follow-Up - Clinic] : a clinic follow-up of [FreeTextEntry2] : pretransplant cardiac evaluation prior to possible renal transplant

## 2020-03-15 NOTE — PHYSICAL EXAM
[General Appearance - Well Developed] : well developed [Normal Appearance] : normal appearance [Well Groomed] : well groomed [General Appearance - Well Nourished] : well nourished [No Deformities] : no deformities [General Appearance - In No Acute Distress] : no acute distress [Normal Oral Mucosa] : normal oral mucosa [No Oral Pallor] : no oral pallor [No Oral Cyanosis] : no oral cyanosis [Normal Oropharynx] : normal oropharynx [] : no respiratory distress [Respiration, Rhythm And Depth] : normal respiratory rhythm and effort [Exaggerated Use Of Accessory Muscles For Inspiration] : no accessory muscle use [Auscultation Breath Sounds / Voice Sounds] : lungs were clear to auscultation bilaterally [Bowel Sounds] : normal bowel sounds [Abdomen Soft] : soft [Abdomen Tenderness] : non-tender [Abnormal Walk] : normal gait [Gait - Sufficient For Exercise Testing] : the gait was sufficient for exercise testing [Nail Clubbing] : no clubbing of the fingernails [Cyanosis, Localized] : no localized cyanosis [Skin Color & Pigmentation] : normal skin color and pigmentation [No Venous Stasis] : no venous stasis [No Xanthoma] : no  xanthoma was observed [Oriented To Time, Place, And Person] : oriented to person, place, and time [Impaired Insight] : insight and judgment were intact [Affect] : the affect was normal [Mood] : the mood was normal [No Anxiety] : not feeling anxious [Conjunctiva] : the conjunctiva were normal in both eyes [Normal] : the eyelids were normal bilaterally [PERRL] : pupils were equal in size, round, and reactive to light [EOM Intact] : extraocular movements were intact [Normal Rate] : normal [Rhythm Regular] : regular [Normal S1] : normal S1 [Normal S2] : normal S2 [No Gallop] : no gallop heard [II] : a grade 2 [III] : a grade 3 [2+] : left 2+ [1+] : left 1+ [No Pitting Edema] : no pitting edema present [FreeTextEntry1] : no JVD [Yellow Sclera (Icteric)] : no scleral icterus was seen [Right Carotid Bruit] : no bruit heard over the right carotid [Left Carotid Bruit] : no bruit heard over the left carotid

## 2020-03-15 NOTE — HISTORY OF PRESENT ILLNESS
[FreeTextEntry1] : Patient is a 49 year-old [place] woman, [place] the [place], with reported history of hypertensive kidney disease, ESRD on HD since 2006, currently [id number] via right brachial graft, presents today for evaluation for possible renal transplant.\par Patient is on dual antiplatelet therapy per her vascular surgeon. In September 2019, patient had revascularization of upper extremity circulation at [facility]. She reports that her breast and face were swelling, and a sternotomy was performed in order to revascularize her.\par \par At today's visit, patient denies chest pain, shortness of breath, syncope, or presyncope. \par She exercises regularly, including jumping rope and walking. She got into jumping rope because her uncles were boxers and they used to jump rope for fitness.\par \par November 2019 - Patient presents for follow-up in her usual state of health. Since her initial visit here in June 2019, patient has had an echocardiogram and nuclear stress test. The stress test revealed mild distal anterior and apical ischemia. She has not yet had her left heart catheterization. She has no chest discomfort or shortness of breath.\par Patient tolerates dialysis well. She continues to have HD on Euzatb-Gzwgswodk-Njrksl via right brachial AV graft.\par Patient continues to take her TB medications after positive Quantiferon Gold test. She has no evidence of active TB. 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  \par \par PMD/Nephrologist: Jw Streeter MD [phone]\par ID: Yazmin Olivarez MD [phone]\par Vascular: Lit Castellanos MD [phone]

## 2020-03-15 NOTE — DISCUSSION/SUMMARY
[FreeTextEntry1] : Patient is a very pleasant 49 year-old with vascular history as above who presents for cardiac evaluation prior to possible renal transplant.\par Echocardiogram (June 2019) revealed normal LV function, normal LA size, and borderline pulmonary pressures.\par Nuclear stress test showed adequate exercise capacity, but patient could not achieve target heart rate, so she was converted to pharmacologic nuclear stress testing which revealed a small area of distal anterior and apical ischemia.\par On November 20, 2019, patient presented for left heart catheterization. She was seen to have normal coronary arteries.\par Patient will send her the reports from [place] (where he had revascularization).\par \par No further cardiovascular testing is necessary prior to consideration for renal transplant.\par Would reassess ischemic evaluation in one year or earlier if clinical status changes.\par \par Patient encouraged to diet and exercise, including both aerobic and resistance training.

## 2020-06-16 ENCOUNTER — APPOINTMENT (OUTPATIENT)
Dept: TRANSPLANT | Facility: CLINIC | Age: 50
End: 2020-06-16
Payer: COMMERCIAL

## 2020-06-16 PROCEDURE — 99214 OFFICE O/P EST MOD 30 MIN: CPT | Mod: 95

## 2020-06-16 NOTE — PHYSICAL EXAM
[General Appearance - Alert] : alert [General Appearance - In No Acute Distress] : in no acute distress [Oropharynx] : the oropharynx was normal [Neck Appearance] : the appearance of the neck was normal [] : no respiratory distress [Heart Sounds Gallop] : no gallops [Involuntary Movements] : no involuntary movements were seen [Graft] : graft [No Focal Deficits] : no focal deficits [Oriented To Time, Place, And Person] : oriented to person, place, and time [Affect] : the affect was normal [Impaired Insight] : insight and judgment were intact [FreeTextEntry1] : right arm

## 2020-06-16 NOTE — REASON FOR VISIT
[Initial Evaluation] : an initial evaluation [Medical Office: ([facility])___] : at the medical office located in  [Home] : at home, [unfilled] , at the time of the visit. [Verbal consent obtained from patient] : the patient, [unfilled] [FreeTextEntry1] : Pre kidney transplant evaluation

## 2020-06-16 NOTE — HISTORY OF PRESENT ILLNESS
[FreeTextEntry1] : 1)50 years old mariza, born in [facility], living in  since , Patient has known ESRD (2006), on follow up with Dr. Jw Streeter is here for pre kidney transplant evaluation. \par Kidney disease from HTN. No kidney biopsy.\par She has no known DM; HTN (). pt reports good control. no longer on any medication. \par No H/o Hyperlipidemia/ Gout\par No known h/o kidney stone.\par No hematuria. no urine production in over 8 years.  Has had PRBC Transfusions 3 years ago.\par Urine out put:none for 5 years\par Has no h/o Pneumonia / UTI.\par No known h/o active CAD/CVA/PVD/DVT/neoplasia/active infections/bleeding.\par no leg swelling \par no rashes or open lesions anywhere on the body.\par no hospitalizations  \par No changes \par H/o clot in central vein after access, has been on Plavix and aspirin for 2 years.\par Reports no major allergies.  No known h/o tuberculosis or hepatitis.\par Most recent hospitalization/for: 2016 for AV graft placement.\par Past surgeries:\par Right arm  AVG, prior grafts on left upper extremity. (Lit Martin at [facility])\par No history of kidney/ bladder surgery.\par Non smoker.\par Fam: Parents are . Father -79 lives in [facility] Mother- 74, has HTN lives in [place] Siblings- has 1 brother and 3 sisters . 1 sister lives in [place].\par Children: One son, 28 years old, lives in [facility]; Healthy. \par She lives with her  (Joseph Jacobo, a ) and mother\par No family history of kidney disease\par Independent for ADL\par Able to walk ten blocks, can climb stairs without difficulty.\par ROS: Has no h/o shortness of breath on exertion. No h/o Sleep apnea. 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\par Dialysis history: [facility] in [facility]\par Potential Live donors: Cousin, sister\par  has had thyroid surgery and is prediabetic\par \par Last Seen 19\par Listed 12/20/19\par Dialysis 16\par ABO O\par PRA 24%\par \par Most recent testing\par Cardiac- Dr Trent 19\par EK2019, sinus 81 bpm, left atrial enlargement,low voltage ECG\par Stress Test: 2019, patient attempted to exercise, butat 8 minutes of Eliud protocol, heart rate was only 118 bpm and she was not able to proceed due to fatigue;patient converted to pharmacologic nuclear stress which revealed mild distal anterior and apical ischemia, LVEF\par 79%, LVEDV 52 mL\par Echo: 2019, borderline pulmonary pressures,minimal mitral regurgitation, normal LV function, normal\par LA size LVEF 60-65%.\par Cardiac Cath 2019  LM. LAD,CX, RCA - Normal. Medical Mgmt recommended- Ravin Charlton MD\par \par Radiology\par 2019 CT Angio of ABD Aorta with ru off Large venous collaterals along the torso likely indicating\par superior vena cava stricture or occlusion. Occluded left\par extra-anatomic bypass. No evidence of significant arterial disease from the abdominal aorta to the knees. Left posterior tibial artery occlusion as noted\par \par Cancer Screening\par Colposcopy 31-Oct-2019  assessment no lesions found. cervix not present s/p GIANFRANCO\par  Mammogram  2019 No mammographic evidence of malignancy\par Pap [id number]  + HPV

## 2020-06-16 NOTE — ASSESSMENT
[FreeTextEntry1] : 1)50 year continues to be a good candidate for transplant. no significant events since last visit\par 2)Hypertension: Discussed implications. Continue follow up with primary physicians.\par 3)Cardiac risk:  will get further evaluation; echo, stress test; Reviewed cardiovascular risk reduction strategies\par 4)Cancer screening: PAP/Mammo.  No known h/o neoplastic disease\par 5)ID: Serology for acute and chronic viral infections. Screening for latent TB \par \par Reported off to: Lolly Brink ASA\par 	\par Patient must complete: cardiac clearance, abd sono w/ dopplers, CTA abd and aorta with runoffs, mammo and PAP.\par Donor coordinator contact information given to patient	\par KDPI >85%: Yes\par CDC high risk: Yes\par Hep C Kidney: Yes Pt verbally consented to offers. WIll sign and send back hard copy of consent. in the meantime status changed on UNOS\par A2B NA\par \par \par I have personally discussed the risks and benefits of transplantation where the following was disclosed:\par  \par Reviewed factors affecting survival and morbidity while on dialysis, the transplant wait list and reviewed jose-operative and long-term risk factors affecting outcome in kidney transplantation. \par  \par One year SRTR outcomes for national and [facility] were discussed in regards to patient survival and graft survival after transplantation. \par  \par Details of transplant surgery, including complications were discussed.\par Immunosuppression and complications including infection including life threatening sepsis and opportunistic infections, malignancy and new onset diabetes were discussed. \par  \par Benefits of live donor transplantation as well as variability in wait times across regions and multiple listing were discussed. \par KDPI >85% and PHS high risk criteria donors were discussed. \par HCV kidney transplantation was discussed.\par  \par Will proceed with completing/ updating work up and listing for transplant/ live donor transplant once work up is reviewed and found to be acceptable by multidisciplinary listing committee.\par

## 2020-06-30 ENCOUNTER — LABORATORY RESULT (OUTPATIENT)
Age: 50
End: 2020-06-30

## 2020-07-07 ENCOUNTER — LABORATORY RESULT (OUTPATIENT)
Age: 50
End: 2020-07-07

## 2020-07-07 ENCOUNTER — APPOINTMENT (OUTPATIENT)
Dept: TRANSPLANT | Facility: CLINIC | Age: 50
End: 2020-07-07

## 2020-07-09 ENCOUNTER — APPOINTMENT (OUTPATIENT)
Dept: CT IMAGING | Facility: CLINIC | Age: 50
End: 2020-07-09
Payer: COMMERCIAL

## 2020-07-09 ENCOUNTER — OUTPATIENT (OUTPATIENT)
Dept: OUTPATIENT SERVICES | Facility: HOSPITAL | Age: 50
LOS: 1 days | End: 2020-07-09
Payer: COMMERCIAL

## 2020-07-09 ENCOUNTER — RESULT REVIEW (OUTPATIENT)
Age: 50
End: 2020-07-09

## 2020-07-09 DIAGNOSIS — Z95.828 PRESENCE OF OTHER VASCULAR IMPLANTS AND GRAFTS: Chronic | ICD-10-CM

## 2020-07-09 DIAGNOSIS — I77.0 ARTERIOVENOUS FISTULA, ACQUIRED: Chronic | ICD-10-CM

## 2020-07-09 DIAGNOSIS — Z01.818 ENCOUNTER FOR OTHER PREPROCEDURAL EXAMINATION: ICD-10-CM

## 2020-07-09 PROCEDURE — 75635 CT ANGIO ABDOMINAL ARTERIES: CPT | Mod: 26

## 2020-07-09 PROCEDURE — 71250 CT THORAX DX C-: CPT

## 2020-07-09 PROCEDURE — 71250 CT THORAX DX C-: CPT | Mod: 26

## 2020-07-09 PROCEDURE — 75635 CT ANGIO ABDOMINAL ARTERIES: CPT

## 2020-07-14 LAB
ABO + RH PNL BLD: NORMAL
ALBUMIN SERPL ELPH-MCNC: 4.9 G/DL
ALP BLD-CCNC: 125 U/L
ALT SERPL-CCNC: 8 U/L
ANION GAP SERPL CALC-SCNC: 20 MMOL/L
AST SERPL-CCNC: 11 U/L
BASOPHILS # BLD AUTO: 0.05 K/UL
BASOPHILS NFR BLD AUTO: 0.8 %
BILIRUB SERPL-MCNC: 0.4 MG/DL
BUN SERPL-MCNC: 35 MG/DL
C PEPTIDE SERPL-MCNC: 8.9 NG/ML
CALCIUM SERPL-MCNC: 10.3 MG/DL
CHLORIDE SERPL-SCNC: 95 MMOL/L
CHOLEST SERPL-MCNC: 210 MG/DL
CHOLEST/HDLC SERPL: 2.8 RATIO
CK SERPL-CCNC: 67 U/L
CMV DNA SPEC QL NAA+PROBE: NOT DETECTED
CMV IGG SERPL QL: >10 U/ML
CMV IGG SERPL-IMP: POSITIVE
CMV IGM SERPL QL: 9.9 AU/ML
CMV IGM SERPL QL: NEGATIVE
CMVPCR LOG: NOT DETECTED LOG10IU/ML
CO2 SERPL-SCNC: 24 MMOL/L
CREAT SERPL-MCNC: 12.22 MG/DL
CRP SERPL-MCNC: 0.67 MG/DL
EBV EA AB SER IA-ACNC: <5 U/ML
EBV EA AB TITR SER IF: POSITIVE
EBV EA IGG SER QL IA: 436 U/ML
EBV EA IGG SER-ACNC: NEGATIVE
EBV EA IGM SER IA-ACNC: POSITIVE
EBV PATRN SPEC IB-IMP: NORMAL
EBV VCA IGG SER IA-ACNC: >750 U/ML
EBV VCA IGM SER QL IA: 64.1 U/ML
EOSINOPHIL # BLD AUTO: 0.58 K/UL
EOSINOPHIL NFR BLD AUTO: 9.4 %
EPSTEIN-BARR VIRUS CAPSID ANTIGEN IGG: POSITIVE
ERYTHROCYTE [SEDIMENTATION RATE] IN BLOOD BY WESTERGREN METHOD: 107 MM/HR
GLUCOSE SERPL-MCNC: 84 MG/DL
HAV IGM SER QL: NONREACTIVE
HBV CORE IGG+IGM SER QL: REACTIVE
HBV SURFACE AB SER QL: REACTIVE
HBV SURFACE AB SERPL IA-ACNC: >1000 MIU/ML
HBV SURFACE AG SER QL: NONREACTIVE
HCG SERPL-MCNC: 4 MIU/ML
HCT VFR BLD CALC: 42 %
HCV AB SER QL: NONREACTIVE
HCV S/CO RATIO: 0.14 S/CO
HDLC SERPL-MCNC: 76 MG/DL
HEPATITIS A IGG ANTIBODY: REACTIVE
HGB BLD-MCNC: 12.8 G/DL
HIV1+2 AB SPEC QL IA.RAPID: NONREACTIVE
HSV 1+2 IGG SER IA-IMP: POSITIVE
HSV1 IGG SER QL: >62.2 INDEX
HSV1 IGG SER QL: >62.2 INDEX
HSV1 IGM SER QL: NORMAL TITER
HSV2 AB FLD-ACNC: NORMAL TITER
HSV2 IGG SER QL: >23.6 INDEX
IMM GRANULOCYTES NFR BLD AUTO: 0.2 %
LDLC SERPL CALC-MCNC: 113 MG/DL
LYMPHOCYTES # BLD AUTO: 1.38 K/UL
LYMPHOCYTES NFR BLD AUTO: 22.4 %
M TB IFN-G BLD-IMP: NEGATIVE
MAGNESIUM SERPL-MCNC: 2.5 MG/DL
MAN DIFF?: NORMAL
MCHC RBC-ENTMCNC: 26.9 PG
MCHC RBC-ENTMCNC: 30.5 GM/DL
MCV RBC AUTO: 88.2 FL
MONOCYTES # BLD AUTO: 0.46 K/UL
MONOCYTES NFR BLD AUTO: 7.5 %
NEUTROPHILS # BLD AUTO: 3.67 K/UL
NEUTROPHILS NFR BLD AUTO: 59.7 %
PHOSPHATE SERPL-MCNC: 5 MG/DL
PLATELET # BLD AUTO: 394 K/UL
POTASSIUM SERPL-SCNC: 5.2 MMOL/L
PROT SERPL-MCNC: 8.1 G/DL
QUANTIFERON TB PLUS MITOGEN MINUS NIL: 4.49 IU/ML
QUANTIFERON TB PLUS NIL: 0.02 IU/ML
QUANTIFERON TB PLUS TB1 MINUS NIL: 0.05 IU/ML
QUANTIFERON TB PLUS TB2 MINUS NIL: 0.07 IU/ML
RBC # BLD: 4.76 M/UL
RBC # FLD: 15.7 %
RUBV IGG FLD-ACNC: 29.8 INDEX
RUBV IGG SER-IMP: POSITIVE
SARS-COV-2 IGG SERPL IA-ACNC: <3.8 AU/ML
SARS-COV-2 IGG SERPL QL IA: NEGATIVE
SODIUM SERPL-SCNC: 139 MMOL/L
T GONDII AB SER-IMP: POSITIVE
T GONDII IGG SER QL: 119 IU/ML
T PALLIDUM AB SER QL IA: NEGATIVE
T3 SERPL-MCNC: 88 NG/DL
T4 FREE SERPL-MCNC: 1.2 NG/DL
TRIGL SERPL-MCNC: 107 MG/DL
TSH SERPL-ACNC: 0.53 UIU/ML
URATE SERPL-MCNC: 5.5 MG/DL
VZV AB TITR SER: NEGATIVE
VZV IGG SER IF-ACNC: 16.2 INDEX
WBC # FLD AUTO: 6.15 K/UL

## 2020-07-21 ENCOUNTER — APPOINTMENT (OUTPATIENT)
Dept: CARDIOLOGY | Facility: CLINIC | Age: 50
End: 2020-07-21
Payer: COMMERCIAL

## 2020-07-21 PROCEDURE — 93306 TTE W/DOPPLER COMPLETE: CPT

## 2020-08-13 ENCOUNTER — TRANSCRIPTION ENCOUNTER (OUTPATIENT)
Age: 50
End: 2020-08-13

## 2020-08-14 ENCOUNTER — INPATIENT (INPATIENT)
Facility: HOSPITAL | Age: 50
LOS: 4 days | Discharge: HOME | DRG: 652 | End: 2020-08-19
Attending: SURGERY | Admitting: SURGERY
Payer: MEDICARE

## 2020-08-14 VITALS
RESPIRATION RATE: 18 BRPM | OXYGEN SATURATION: 99 % | WEIGHT: 157.19 LBS | SYSTOLIC BLOOD PRESSURE: 102 MMHG | DIASTOLIC BLOOD PRESSURE: 66 MMHG | HEART RATE: 82 BPM | HEIGHT: 63 IN | TEMPERATURE: 98 F

## 2020-08-14 DIAGNOSIS — N19 UNSPECIFIED KIDNEY FAILURE: ICD-10-CM

## 2020-08-14 DIAGNOSIS — Z01.818 ENCOUNTER FOR OTHER PREPROCEDURAL EXAMINATION: ICD-10-CM

## 2020-08-14 DIAGNOSIS — I77.0 ARTERIOVENOUS FISTULA, ACQUIRED: Chronic | ICD-10-CM

## 2020-08-14 DIAGNOSIS — Z95.828 PRESENCE OF OTHER VASCULAR IMPLANTS AND GRAFTS: Chronic | ICD-10-CM

## 2020-08-14 LAB
ALBUMIN SERPL ELPH-MCNC: 4 G/DL — SIGNIFICANT CHANGE UP (ref 3.3–5)
ALBUMIN SERPL ELPH-MCNC: 5.4 G/DL — HIGH (ref 3.3–5)
ALP SERPL-CCNC: 86 U/L — SIGNIFICANT CHANGE UP (ref 40–120)
ALP SERPL-CCNC: 96 U/L — SIGNIFICANT CHANGE UP (ref 40–120)
ALT FLD-CCNC: 12 U/L — SIGNIFICANT CHANGE UP (ref 10–45)
ALT FLD-CCNC: 14 U/L — SIGNIFICANT CHANGE UP (ref 10–45)
ANION GAP SERPL CALC-SCNC: 15 MMOL/L — SIGNIFICANT CHANGE UP (ref 5–17)
ANION GAP SERPL CALC-SCNC: 21 MMOL/L — HIGH (ref 5–17)
APTT BLD: 33.5 SEC — SIGNIFICANT CHANGE UP (ref 27.5–35.5)
AST SERPL-CCNC: 25 U/L — SIGNIFICANT CHANGE UP (ref 10–40)
AST SERPL-CCNC: 27 U/L — SIGNIFICANT CHANGE UP (ref 10–40)
BASOPHILS # BLD AUTO: 0.01 K/UL — SIGNIFICANT CHANGE UP (ref 0–0.2)
BASOPHILS # BLD AUTO: 0.05 K/UL — SIGNIFICANT CHANGE UP (ref 0–0.2)
BASOPHILS NFR BLD AUTO: 0.1 % — SIGNIFICANT CHANGE UP (ref 0–2)
BASOPHILS NFR BLD AUTO: 0.8 % — SIGNIFICANT CHANGE UP (ref 0–2)
BILIRUB SERPL-MCNC: 0.7 MG/DL — SIGNIFICANT CHANGE UP (ref 0.2–1.2)
BILIRUB SERPL-MCNC: 0.9 MG/DL — SIGNIFICANT CHANGE UP (ref 0.2–1.2)
BLD GP AB SCN SERPL QL: NEGATIVE — SIGNIFICANT CHANGE UP
BUN SERPL-MCNC: 15 MG/DL — SIGNIFICANT CHANGE UP (ref 7–23)
BUN SERPL-MCNC: 17 MG/DL — SIGNIFICANT CHANGE UP (ref 7–23)
CALCIUM SERPL-MCNC: 10.1 MG/DL — SIGNIFICANT CHANGE UP (ref 8.4–10.5)
CALCIUM SERPL-MCNC: 10.4 MG/DL — SIGNIFICANT CHANGE UP (ref 8.4–10.5)
CHLORIDE SERPL-SCNC: 90 MMOL/L — LOW (ref 96–108)
CHLORIDE SERPL-SCNC: 91 MMOL/L — LOW (ref 96–108)
CO2 SERPL-SCNC: 21 MMOL/L — LOW (ref 22–31)
CO2 SERPL-SCNC: 30 MMOL/L — SIGNIFICANT CHANGE UP (ref 22–31)
CREAT SERPL-MCNC: 6.81 MG/DL — HIGH (ref 0.5–1.3)
CREAT SERPL-MCNC: 7.89 MG/DL — HIGH (ref 0.5–1.3)
EOSINOPHIL # BLD AUTO: 0 K/UL — SIGNIFICANT CHANGE UP (ref 0–0.5)
EOSINOPHIL # BLD AUTO: 0.39 K/UL — SIGNIFICANT CHANGE UP (ref 0–0.5)
EOSINOPHIL NFR BLD AUTO: 0 % — SIGNIFICANT CHANGE UP (ref 0–6)
EOSINOPHIL NFR BLD AUTO: 6 % — SIGNIFICANT CHANGE UP (ref 0–6)
GAS PNL BLDA: SIGNIFICANT CHANGE UP
GLUCOSE BLDC GLUCOMTR-MCNC: 85 MG/DL — SIGNIFICANT CHANGE UP (ref 70–99)
GLUCOSE SERPL-MCNC: 219 MG/DL — HIGH (ref 70–99)
GLUCOSE SERPL-MCNC: 98 MG/DL — SIGNIFICANT CHANGE UP (ref 70–99)
HCT VFR BLD CALC: 46.4 % — HIGH (ref 34.5–45)
HCT VFR BLD CALC: 52 % — HIGH (ref 34.5–45)
HGB BLD-MCNC: 14 G/DL — SIGNIFICANT CHANGE UP (ref 11.5–15.5)
HGB BLD-MCNC: 15.6 G/DL — HIGH (ref 11.5–15.5)
IMM GRANULOCYTES NFR BLD AUTO: 0.3 % — SIGNIFICANT CHANGE UP (ref 0–1.5)
IMM GRANULOCYTES NFR BLD AUTO: 0.3 % — SIGNIFICANT CHANGE UP (ref 0–1.5)
INR BLD: 0.95 RATIO — SIGNIFICANT CHANGE UP (ref 0.88–1.16)
LYMPHOCYTES # BLD AUTO: 0.47 K/UL — LOW (ref 1–3.3)
LYMPHOCYTES # BLD AUTO: 1.34 K/UL — SIGNIFICANT CHANGE UP (ref 1–3.3)
LYMPHOCYTES # BLD AUTO: 20.6 % — SIGNIFICANT CHANGE UP (ref 13–44)
LYMPHOCYTES # BLD AUTO: 4 % — LOW (ref 13–44)
MAGNESIUM SERPL-MCNC: 2.5 MG/DL — SIGNIFICANT CHANGE UP (ref 1.6–2.6)
MAGNESIUM SERPL-MCNC: 2.6 MG/DL — SIGNIFICANT CHANGE UP (ref 1.6–2.6)
MCHC RBC-ENTMCNC: 27.6 PG — SIGNIFICANT CHANGE UP (ref 27–34)
MCHC RBC-ENTMCNC: 27.8 PG — SIGNIFICANT CHANGE UP (ref 27–34)
MCHC RBC-ENTMCNC: 30 GM/DL — LOW (ref 32–36)
MCHC RBC-ENTMCNC: 30.2 GM/DL — LOW (ref 32–36)
MCV RBC AUTO: 92 FL — SIGNIFICANT CHANGE UP (ref 80–100)
MCV RBC AUTO: 92.2 FL — SIGNIFICANT CHANGE UP (ref 80–100)
MONOCYTES # BLD AUTO: 0.52 K/UL — SIGNIFICANT CHANGE UP (ref 0–0.9)
MONOCYTES # BLD AUTO: 0.6 K/UL — SIGNIFICANT CHANGE UP (ref 0–0.9)
MONOCYTES NFR BLD AUTO: 4.4 % — SIGNIFICANT CHANGE UP (ref 2–14)
MONOCYTES NFR BLD AUTO: 9.2 % — SIGNIFICANT CHANGE UP (ref 2–14)
NEUTROPHILS # BLD AUTO: 10.85 K/UL — HIGH (ref 1.8–7.4)
NEUTROPHILS # BLD AUTO: 4.1 K/UL — SIGNIFICANT CHANGE UP (ref 1.8–7.4)
NEUTROPHILS NFR BLD AUTO: 63.1 % — SIGNIFICANT CHANGE UP (ref 43–77)
NEUTROPHILS NFR BLD AUTO: 91.2 % — HIGH (ref 43–77)
NRBC # BLD: 0 /100 WBCS — SIGNIFICANT CHANGE UP (ref 0–0)
NRBC # BLD: 0 /100 WBCS — SIGNIFICANT CHANGE UP (ref 0–0)
PHOSPHATE SERPL-MCNC: 2.9 MG/DL — SIGNIFICANT CHANGE UP (ref 2.5–4.5)
PHOSPHATE SERPL-MCNC: 5.4 MG/DL — HIGH (ref 2.5–4.5)
PLATELET # BLD AUTO: 266 K/UL — SIGNIFICANT CHANGE UP (ref 150–400)
PLATELET # BLD AUTO: 281 K/UL — SIGNIFICANT CHANGE UP (ref 150–400)
POTASSIUM SERPL-MCNC: 4.9 MMOL/L — SIGNIFICANT CHANGE UP (ref 3.5–5.3)
POTASSIUM SERPL-MCNC: 5.1 MMOL/L — SIGNIFICANT CHANGE UP (ref 3.5–5.3)
POTASSIUM SERPL-SCNC: 4.9 MMOL/L — SIGNIFICANT CHANGE UP (ref 3.5–5.3)
POTASSIUM SERPL-SCNC: 5.1 MMOL/L — SIGNIFICANT CHANGE UP (ref 3.5–5.3)
PROT SERPL-MCNC: 6.6 G/DL — SIGNIFICANT CHANGE UP (ref 6–8.3)
PROT SERPL-MCNC: 8 G/DL — SIGNIFICANT CHANGE UP (ref 6–8.3)
PROTHROM AB SERPL-ACNC: 11.3 SEC — SIGNIFICANT CHANGE UP (ref 10.6–13.6)
RBC # BLD: 5.03 M/UL — SIGNIFICANT CHANGE UP (ref 3.8–5.2)
RBC # BLD: 5.65 M/UL — HIGH (ref 3.8–5.2)
RBC # FLD: 22.8 % — HIGH (ref 10.3–14.5)
RBC # FLD: 23 % — HIGH (ref 10.3–14.5)
RH IG SCN BLD-IMP: POSITIVE — SIGNIFICANT CHANGE UP
RH IG SCN BLD-IMP: POSITIVE — SIGNIFICANT CHANGE UP
SARS-COV-2 IGG SERPL QL IA: NEGATIVE — SIGNIFICANT CHANGE UP
SARS-COV-2 IGM SERPL IA-ACNC: <0.1 INDEX — SIGNIFICANT CHANGE UP
SARS-COV-2 RNA SPEC QL NAA+PROBE: SIGNIFICANT CHANGE UP
SODIUM SERPL-SCNC: 132 MMOL/L — LOW (ref 135–145)
SODIUM SERPL-SCNC: 136 MMOL/L — SIGNIFICANT CHANGE UP (ref 135–145)
WBC # BLD: 11.88 K/UL — HIGH (ref 3.8–10.5)
WBC # BLD: 6.5 K/UL — SIGNIFICANT CHANGE UP (ref 3.8–10.5)
WBC # FLD AUTO: 11.88 K/UL — HIGH (ref 3.8–10.5)
WBC # FLD AUTO: 6.5 K/UL — SIGNIFICANT CHANGE UP (ref 3.8–10.5)

## 2020-08-14 PROCEDURE — 50365 RNL ALTRNSPLJ W/RCP NFRCT: CPT

## 2020-08-14 PROCEDURE — 71045 X-RAY EXAM CHEST 1 VIEW: CPT | Mod: 26

## 2020-08-14 PROCEDURE — 93010 ELECTROCARDIOGRAM REPORT: CPT | Mod: 76

## 2020-08-14 PROCEDURE — 71045 X-RAY EXAM CHEST 1 VIEW: CPT | Mod: 26,77

## 2020-08-14 PROCEDURE — 76776 US EXAM K TRANSPL W/DOPPLER: CPT | Mod: 26,RT

## 2020-08-14 RX ORDER — PHENYLEPHRINE HYDROCHLORIDE 10 MG/ML
0.5 INJECTION INTRAVENOUS
Qty: 40 | Refills: 0 | Status: DISCONTINUED | OUTPATIENT
Start: 2020-08-14 | End: 2020-08-15

## 2020-08-14 RX ORDER — BASILIXIMAB 20 MG/5ML
20 INJECTION, POWDER, FOR SOLUTION INTRAVENOUS ONCE
Refills: 0 | Status: DISCONTINUED | OUTPATIENT
Start: 2020-08-14 | End: 2020-08-14

## 2020-08-14 RX ORDER — TACROLIMUS 5 MG/1
8 CAPSULE ORAL
Refills: 0 | Status: DISCONTINUED | OUTPATIENT
Start: 2020-08-14 | End: 2020-08-17

## 2020-08-14 RX ORDER — FENTANYL CITRATE 50 UG/ML
25 INJECTION INTRAVENOUS
Refills: 0 | Status: DISCONTINUED | OUTPATIENT
Start: 2020-08-14 | End: 2020-08-15

## 2020-08-14 RX ORDER — NYSTATIN 500MM UNIT
500000 POWDER (EA) MISCELLANEOUS
Refills: 0 | Status: DISCONTINUED | OUTPATIENT
Start: 2020-08-15 | End: 2020-08-19

## 2020-08-14 RX ORDER — SODIUM CHLORIDE 9 MG/ML
500 INJECTION, SOLUTION INTRAVENOUS
Refills: 0 | Status: DISCONTINUED | OUTPATIENT
Start: 2020-08-14 | End: 2020-08-16

## 2020-08-14 RX ORDER — VALGANCICLOVIR 450 MG/1
450 TABLET, FILM COATED ORAL DAILY
Refills: 0 | Status: DISCONTINUED | OUTPATIENT
Start: 2020-08-15 | End: 2020-08-19

## 2020-08-14 RX ORDER — ONDANSETRON 8 MG/1
4 TABLET, FILM COATED ORAL ONCE
Refills: 0 | Status: DISCONTINUED | OUTPATIENT
Start: 2020-08-14 | End: 2020-08-15

## 2020-08-14 RX ORDER — SODIUM CHLORIDE 9 MG/ML
1000 INJECTION INTRAMUSCULAR; INTRAVENOUS; SUBCUTANEOUS
Refills: 0 | Status: DISCONTINUED | OUTPATIENT
Start: 2020-08-14 | End: 2020-08-16

## 2020-08-14 RX ORDER — CEFAZOLIN SODIUM 1 G
2000 VIAL (EA) INJECTION ONCE
Refills: 0 | Status: DISCONTINUED | OUTPATIENT
Start: 2020-08-14 | End: 2020-08-14

## 2020-08-14 RX ORDER — BASILIXIMAB 20 MG/5ML
20 INJECTION, POWDER, FOR SOLUTION INTRAVENOUS ONCE
Refills: 0 | Status: COMPLETED | OUTPATIENT
Start: 2020-08-18 | End: 2020-08-18

## 2020-08-14 RX ORDER — CHLORHEXIDINE GLUCONATE 213 G/1000ML
1 SOLUTION TOPICAL
Refills: 0 | Status: DISCONTINUED | OUTPATIENT
Start: 2020-08-14 | End: 2020-08-15

## 2020-08-14 RX ORDER — MYCOPHENOLATE MOFETIL 250 MG/1
1 CAPSULE ORAL EVERY 12 HOURS
Refills: 0 | Status: DISCONTINUED | OUTPATIENT
Start: 2020-08-15 | End: 2020-08-15

## 2020-08-14 RX ORDER — MIDODRINE HYDROCHLORIDE 2.5 MG/1
10 TABLET ORAL ONCE
Refills: 0 | Status: COMPLETED | OUTPATIENT
Start: 2020-08-14 | End: 2020-08-14

## 2020-08-14 RX ORDER — FAMOTIDINE 10 MG/ML
20 INJECTION INTRAVENOUS DAILY
Refills: 0 | Status: DISCONTINUED | OUTPATIENT
Start: 2020-08-15 | End: 2020-08-19

## 2020-08-14 RX ADMIN — FENTANYL CITRATE 25 MICROGRAM(S): 50 INJECTION INTRAVENOUS at 21:25

## 2020-08-14 RX ADMIN — MIDODRINE HYDROCHLORIDE 10 MILLIGRAM(S): 2.5 TABLET ORAL at 23:12

## 2020-08-14 RX ADMIN — SODIUM CHLORIDE 70 MILLILITER(S): 9 INJECTION INTRAMUSCULAR; INTRAVENOUS; SUBCUTANEOUS at 23:00

## 2020-08-14 RX ADMIN — FENTANYL CITRATE 25 MICROGRAM(S): 50 INJECTION INTRAVENOUS at 21:40

## 2020-08-14 NOTE — H&P ADULT - NSHPREVIEWOFSYSTEMS_GEN_ALL_CORE
Gen: No weight changes, fatigue, fevers/chills, weakness  Skin: No rashes  Head/Eyes/Ears/Mouth: No headache; Normal hearing; Normal vision w/o blurriness; No sinus pain/discomfort, sore throat  Respiratory: No dyspnea, cough, wheezing, hemoptysis  CV: No chest pain, PND, orthopnea  GI: Mild abdominal pain at surgical incision site; denies diarrhea, constipation, nausea, vomiting, melena, hematochezia  : anuric  MSK: No joint pain/swelling; no back pain; no edema  Neuro: No dizziness/lightheadedness, weakness, seizures, numbness, tingling  Heme: No easy bruising or bleeding  Endo: No heat/cold intolerance  Psych: No significant nervousness, anxiety, stress, depression  All other systems were reviewed and are negative, except as noted.

## 2020-08-14 NOTE — H&P ADULT - NSHPSOCIALHISTORY_GEN_ALL_CORE
, lives with her  and her mother. Has one son 29 y/o, lives in [facility].   Denies ETOH use/tobacco use

## 2020-08-14 NOTE — PROGRESS NOTE ADULT - SUBJECTIVE AND OBJECTIVE BOX
Transplant Surgery Post-Op Note  --------------------------------------------------------------  DDRT   Date:   2020      POD# 0    HPI: 49 y/o F with PMHx of HTN, HLD, Gout, ESRD on HD MWF since , now via right brachial graft created via RUE bypass for which she is on Plavix/ASA - last dose  (UE vein thrombosis), s/p b/l AV fistula in the past, both failed. Anuric. Last HD this morning.  Admitted for possible DDRT. H/O Positive Quant Gold; had BCG vaccine as a child. CXR from 2019 showed RUL 2mm nodule (new since ). Treated with INH/B6 2019-2020. Is now s/p HCV+ DDRT with stent placement and simulect induction      Donor Info:  Donor (local or import? hospital ): Local  Donor ID:  [id number]  Match: [id number]  OPO: NYRT  Age:  43  ABO:  O  High Risk: Yes   KDPI: 39%  COD:  Anoxia, drug OD  X Clamp Time: 2020 08:32  Medical Hx:  Hx IVDA, crack and cocaine use, asthma,  Terminal Cr:  0.69  Covid Testin/13 Bronch lavage negative  CMV- negative EBV-positive  HepBcAb- negative  Hepatitis C-SUNITHA- POSITIVE  Hepatitis C ab-positive  MISMATCH:   Kidney Laterality: pending      Recipient Info:   Mariana Jacobo  /Age: 1970    Hx:  ESRD Dialysis since , HTN, Upper extremity vein thrombosis (on Plavix and aspirin), latent TB , previously treated  Nephrologist: Jw  CPRA:    23  ABO: O  CMV: Pos  EBV Status: Pos  Last HD:  2020, completed at 0900  NPO: 0530 2020  ALLERGIES: Contrast media  Covid Testing: Being done upon admission  Pt Location- En route to [facility]  Financially cleared- Yes    Surgeon-Dr. Monroy    OR:    Donor Renal Transplant 1A/1V/1U  Right Kidney placed in recipient right iliac fossa. Cold Ischemic time: 8.5 hours.   Ureteral anastomosis to bladder preformed over double-J stent. HEMANTH drain placed posterior to vascular anastomoses. Kim catheter in place.    -----------------------------  Interval Events:            ------------------------------  Vital Signs Last 24 Hrs  T(C): 36.2 (14 Aug 2020 19:45), Max: 37 (14 Aug 2020 12:10)  T(F): 97.2 (14 Aug 2020 19:45), Max: 98.6 (14 Aug 2020 12:10)  HR: 89 (14 Aug 2020 22:15) (69 - 107)  BP: 104/60 (14 Aug 2020 22:15) (102/66 - 136/62)  BP(mean): 78 (14 Aug 2020 22:15) (77 - 107)  RR: 14 (14 Aug 2020 22:15) (14 - 18)  SpO2: 100% (14 Aug 2020 22:15) (96% - 100%)    I&O's Summary    14 Aug 2020 07:01  -  14 Aug 2020 23:11  --------------------------------------------------------  IN: 145 mL / OUT: 225 mL / NET: -80 mL                              14.0   11.88 )-----------( 266      ( 14 Aug 2020 20:28 )             46.4     08-14    132<L>  |  90<L>  |  17  ----------------------------<  219<H>  4.9   |  21<L>  |  6.81<H>    Ca    10.1      14 Aug 2020 20:28  Phos  5.4     14  Mg     2.5     14    TPro  6.6  /  Alb  4.0  /  TBili  0.9  /  DBili  x   /  AST  25  /  ALT  14  /  AlkPhos  86  08-14        -------------------------------  PHYSICAL EXAM:  Constitutional: Well developed / well nourished  Eyes: Anicteric, PERRLA  ENMT: nc/at  Neck: central line *****************  Respiratory: CTA B/L  Cardiovascular: RRR  Gastrointestinal: Soft abdomen, mild tender to touch at surgical site, ND  Genitourinary: Urinary catheter in place*****Voiding spontaneously  Extremities: SCD's in place and working bilaterally  Vascular: Palpable dp pulses bilaterally  Neurological: A&O x3  Skin: Mild serosanguinous richie on wound dressing*******Wound open to air no erythema and evidence of infection noted  Musculoskeletal: Moving all extremities  Psychiatric: Responsive      A/P: Transplant Surgery Post-Op Note  --------------------------------------------------------------  DDRT   Date:   2020      POD# 0    HPI: 51 y/o F with PMHx of HTN, HLD, Gout, ESRD on HD MWF since , now via right brachial graft created via RUE bypass for which she is on Plavix/ASA - last dose  (UE vein thrombosis), s/p b/l AV fistula in the past, both failed. Anuric. Last HD this morning.  Admitted for possible DDRT. H/O Positive Quant Gold; had BCG vaccine as a child. CXR from 2019 showed RUL 2mm nodule (new since ). Treated with INH/B6 2019-2020. Is now s/p HCV+ DDRT with stent placement and Simulect induction      Donor Info:  Donor (local or import? hospital ): Local  Donor ID:  [id number]  Match: [id number]  OPO: NYRT  Age:  43  ABO:  O  High Risk: Yes   KDPI: 39%  COD:  Anoxia, drug OD  X Clamp Time: 2020 08:32  Medical Hx:  Hx IVDA, crack and cocaine use, asthma,  Terminal Cr:  0.69  Covid Testin/13 Bronch lavage negative  CMV- negative EBV-positive  HepBcAb- negative  Hepatitis C-SUNITHA- POSITIVE  Hepatitis C ab-positive  MISMATCH:   Kidney Laterality: pending      Recipient Info:   Mariana Jacobo  /Age: 1970    Hx:  ESRD Dialysis since , HTN, Upper extremity vein thrombosis (on Plavix and aspirin), latent TB , previously treated  Nephrologist: Jw  CPRA:    23  ABO: O  CMV: Pos  EBV Status: Pos  Last HD:  2020, completed at 0900  NPO: 0530 2020  ALLERGIES: Contrast media  Covid Testing: Being done upon admission  Pt Location- En route to [facility]  Financially cleared- Yes    Surgeon-Dr. Monroy    OR:    Donor Renal Transplant 1A/1V/1U  Right Kidney placed in recipient right iliac fossa. Cold Ischemic time: 8.5 hours.   Ureteral anastomosis to bladder preformed over double-J stent. HEMANTH drain placed posterior to vascular anastomoses. Kim catheter in place.    -----------------------------  Interval Events: Pt seen and examined in PACU, she is resting comfortably alert and orientated and hemodynamically stable. Incision w/ honeycomb dressing and mild sanguinous drainage, HEMANTH w/ sanguinous drainage. H/H is stable and she is making blood tinged Urine, denies N/V, c/o mild pain at incision site.           ------------------------------  Vital Signs Last 24 Hrs  T(C): 36.2 (14 Aug 2020 19:45), Max: 37 (14 Aug 2020 12:10)  T(F): 97.2 (14 Aug 2020 19:45), Max: 98.6 (14 Aug 2020 12:10)  HR: 89 (14 Aug 2020 22:15) (69 - 107)  BP: 104/60 (14 Aug 2020 22:15) (102/66 - 136/62)  BP(mean): 78 (14 Aug 2020 22:15) (77 - 107)  RR: 14 (14 Aug 2020 22:15) (14 - 18)  SpO2: 100% (14 Aug 2020 22:15) (96% - 100%)    I&O's Summary    14 Aug 2020 07:01  -  14 Aug 2020 23:11  --------------------------------------------------------  IN: 145 mL / OUT: 225 mL / NET: -80 mL                              14.0   11.88 )-----------( 266      ( 14 Aug 2020 20:28 )             46.4     08-14    132<L>  |  90<L>  |  17  ----------------------------<  219<H>  4.9   |  21<L>  |  6.81<H>    Ca    10.1      14 Aug 2020 20:28  Phos  5.4     -  Mg     2.5     -    TPro  6.6  /  Alb  4.0  /  TBili  0.9  /  DBili  x   /  AST  25  /  ALT  14  /  AlkPhos  86  -        -------------------------------  PHYSICAL EXAM:  Constitutional: Well developed / well nourished  Eyes: Anicteric, PERRLA  ENMT: nc/at  Neck: supple  Respiratory: CTA B/L  Cardiovascular: RRR  Gastrointestinal: Soft abdomen, mild tender to touch at surgical site, ND  Genitourinary: Urinary catheter in place w/ blood tinged urine   Extremities: SCD's in place and working bilaterally  Vascular: Palpable dp pulses bilaterally  Neurological: A&O x3, following commands and responding appropriately   Skin: Mild sanguinous richie on wound dressing  Musculoskeletal: Moving all extremities  Psychiatric: Responsive Transplant Surgery Post-Op Note  --------------------------------------------------------------  DDRT   Date:   2020      POD# 0    HPI: 49 y/o F with PMHx of HTN, HLD, Gout, ESRD on HD MWF since , now via right brachial graft created via RUE bypass for which she is on Plavix/ASA - last dose  (UE vein thrombosis), s/p b/l AV fistula in the past, both failed. Anuric. Last HD this morning.  Admitted for possible DDRT. H/O Positive Quant Gold; had BCG vaccine as a child. CXR from 2019 showed RUL 2mm nodule (new since ). Treated with INH/B6 2019-2020. Is now s/p HCV+ DDRT with stent placement and Simulect induction      Donor Info:  Donor (local or import? hospital ): Local  Donor ID:  [id number]  Match: [id number]  OPO: NYRT  Age:  43  ABO:  O  High Risk: Yes   KDPI: 39%  COD:  Anoxia, drug OD  X Clamp Time: 2020 08:32  Medical Hx:  Hx IVDA, crack and cocaine use, asthma,  Terminal Cr:  0.69  Covid Testin/13 Bronch lavage negative  CMV- negative EBV-positive  HepBcAb- negative  Hepatitis C-SUNITHA- POSITIVE  Hepatitis C ab-positive  MISMATCH:   Kidney Laterality: pending      Recipient Info:   Mariana Jacobo  /Age: 1970    Hx:  ESRD Dialysis since , HTN, Upper extremity vein thrombosis (on Plavix and aspirin), latent TB , previously treated  Nephrologist: Jw  CPRA:    23  ABO: O  CMV: Pos  EBV Status: Pos  Last HD:  2020, completed at 0900  NPO: 0530 2020  ALLERGIES: Contrast media  Covid Testing: Being done upon admission  Pt Location- En route to [facility]  Financially cleared- Yes    Surgeon-Dr. Monroy    OR:    Donor Renal Transplant 1A/1V/1U  Right Kidney placed in recipient right iliac fossa. Cold Ischemic time: 8.5 hours.   Ureteral anastomosis to bladder preformed over double-J stent. HEMANTH drain placed posterior to vascular anastomoses. Kim catheter in place.    -----------------------------  Interval Events: Pt seen and examined in PACU, she is resting comfortably alert and orientated and hemodynamically stable. Incision w/ honeycomb dressing and mild sanguinous drainage, HEMANTH w/ sanguinous drainage. H/H is stable and she is making blood tinged Urine, denies N/V, c/o mild pain at incision site.           ------------------------------  Vital Signs Last 24 Hrs  T(C): 36.2 (14 Aug 2020 19:45), Max: 37 (14 Aug 2020 12:10)  T(F): 97.2 (14 Aug 2020 19:45), Max: 98.6 (14 Aug 2020 12:10)  HR: 89 (14 Aug 2020 22:15) (69 - 107)  BP: 104/60 (14 Aug 2020 22:15) (102/66 - 136/62)  BP(mean): 78 (14 Aug 2020 22:15) (77 - 107)  RR: 14 (14 Aug 2020 22:15) (14 - 18)  SpO2: 100% (14 Aug 2020 22:15) (96% - 100%)    I&O's Summary    14 Aug 2020 07:01  -  14 Aug 2020 23:11  --------------------------------------------------------  IN: 145 mL / OUT: 225 mL / NET: -80 mL                              14.0   11.88 )-----------( 266      ( 14 Aug 2020 20:28 )             46.4     08-14    132<L>  |  90<L>  |  17  ----------------------------<  219<H>  4.9   |  21<L>  |  6.81<H>    Ca    10.1      14 Aug 2020 20:28  Phos  5.4     -  Mg     2.5     -    TPro  6.6  /  Alb  4.0  /  TBili  0.9  /  DBili  x   /  AST  25  /  ALT  14  /  AlkPhos  86  -14        -------------------------------  PHYSICAL EXAM:  Constitutional: Well developed / well nourished  Eyes: Anicteric, PERRLA  ENMT: nc/at  Neck: supple  Respiratory: CTA B/L  Cardiovascular: RRR  Gastrointestinal: Soft abdomen, mild tender to touch at surgical site, ND, HEMANTH x1 w/ sanguinous drainage   Genitourinary: Urinary catheter in place w/ blood tinged urine   Extremities: SCD's in place and working bilaterally  Vascular: Palpable dp pulses bilaterally  Neurological: A&O x3, following commands and responding appropriately   Skin: Mild sanguinous richie on wound dressing  Musculoskeletal: Moving all extremities  Psychiatric: Responsive

## 2020-08-14 NOTE — H&P ADULT - HISTORY OF PRESENT ILLNESS
51 y/o F with PMHx of HNT, HLD, Gout, ESRD on HD MWF since , now via right brachial graft created via RUE bypass for which she is on Plavix/ASA - last dose  (UE vein thrombosis), s/p b/l AV fistula in the past, both failed. Anuric. Last HD this morning.  Admitted for possible DDRT. Able to walk 10 blocks and climb stairs without difficulty. Denies SOB/CP/fevers. Cardiac testing: s/p LHC 2019: normal  H/O Positive Quant Gold; had BCG vaccine as a child. CXR from 2019 showed RUL 2mm nodule (new since ). Treated with INH/B6 2019-2020    Recipient info:   Nephrologist: Jw  CPRA:    23  ABO: O  CMV: Pos  EBV Status: Pos  ALLERGIES: Contrast media    Donor (local or import? hospital ): Local  Donor ID:  [id number]  Match: [id number]  OPO: NYRT  Age:  43  ABO:  O  High Risk: Yes   KDPI: 39%  COD:  Anoxia, drug OD  X Clamp Time: 2020 08:32  Medical Hx:  Hx IVDA, crack and cocaine use, asthma,  Terminal Cr:  0.69  Covid Testin/13 Bronch lavage negative  CMV- negative EBV-positive  HepBcAb- negative  Hepatitis C-SUNITHA- POSITIVE  Hepatitis C ab-positive  MISMATCH:   Kidney ETA to [facility]:  pending approx. 1100  Kidney Laterality: pending  X-match: negative (attached)  Organ Verification –pending

## 2020-08-14 NOTE — PRE-ANESTHESIA EVALUATION ADULT - NSANTHOSAYNRD_GEN_A_CORE
No. PAVITHRA screening performed.  STOP BANG Legend: 0-2 = LOW Risk; 3-4 = INTERMEDIATE Risk; 5-8 = HIGH Risk

## 2020-08-14 NOTE — H&P ADULT - NSICDXPASTMEDICALHX_GEN_ALL_CORE_FT
PAST MEDICAL HISTORY:  CKD (chronic kidney disease) stage V requiring chronic dialysis     Clotted Renal Dialysis AV Graft     Fibroid Tumor     HTN - Hypertension     Infection of AV Graft for Dialysis

## 2020-08-14 NOTE — BRIEF OPERATIVE NOTE - OPERATION/FINDINGS
Donor Renal Transplant    Donor Information:  Donor ID: [id number]  ABO: O  CMV: negative, EBV: Postitive  Hep C: Positive* *High Risk    Recipient:  ABO: O  CMV: Positive, EBV: Positive     Right Kidney placed in recipient right iliac fossa. Cold Ischemic time: 8.5 hours. Ureteal anastomosis to bladder preformed over double-J stent. HEMANTH drain placed posterior to vascular anastomoses. Kim catheter in place.  Donor Renal Transplant    Donor Information:  Donor ID: [id number]  ABO: O  CMV: negative, EBV: Postitive  Hep C: Positive* *High Risk    Recipient:  ABO: O  CMV: Positive, EBV: Positive     Right Kidney placed in recipient right iliac fossa. Cold Ischemic time: 8.5 hours. Ureteral anastomosis to bladder preformed over double-J stent. HEMANTH drain placed posterior to vascular anastomoses. Kim catheter in place.

## 2020-08-14 NOTE — H&P ADULT - ATTENDING COMMENTS
50F with ESRD due to HTN, on HD (MWF) for past 14 years. complicated access history, currently on ASA/plavix for repeated thrombosis, admitted for DDRT.  PE documented above. Patient has palpable DP pulses b/l; diminished PT on right, not palpable on left    UNOS ID # [id number]  Donor was 43M who  of drug overdose. +HCV  Donor ABO O  Recipient ABO O  Donor CMV (-), EBV (+)  Recipient CMV (+), EBV (+)  KDPI 39%  Terminal Cr 0.69  COVID negative  Crossmatch negative, 1,2,1 mismatch    Details of surgery discussed, including that this is a PHS high risk kidney due to hepatitis C, and that the HCV will be transmitted to the patient and she will require treatment in a few weeks. Risks of rejection, graft nonfunction, delayed graft function, bleeding, anastomotic leak, infection, and need for dialysis.

## 2020-08-14 NOTE — H&P ADULT - NSICDXPASTSURGICALHX_GEN_ALL_CORE_FT
PAST SURGICAL HISTORY:  AV fistula B/L - failed    H/O extremity bypass graft H/O RUE bypass and creation of right brachial graft    History of Hysterectomy for benign disease

## 2020-08-14 NOTE — PROGRESS NOTE ADULT - ASSESSMENT
51 y/o F with PMHx of HTN, HLD, Gout, ESRD on HD MWF since 2006, now via right brachial graft created via RUE bypass for which she is on Plavix/ASA - last dose 8/13 (UE vein thrombosis), s/p b/l AV fistula in the past, both failed. Anuric. Last HD this morning.  Admitted for possible DDRT. H/O Positive Quant Gold; had BCG vaccine as a child. CXR from 6/18/2019 showed RUL 2mm nodule (new since 2014). Treated with INH/B6 8/2019-5/13/2020. Is now s/p HCV+ DDRT with stent placement and Simulect induction          Plan:  Kidney replaced by transplant.  POD#  0   -NS @ 70cc/hr with 1:1 NS replacements  -CXR  -CBC/CMP/Mg/Phos/Coags  -Tylenol and tramadol for pain, Monitor and manage pain  -Strict I/Os   -Kim   -Clear diet advance as tollerated   -Bed rest tonight  -Renal US in PACU   -Encourage ISS  -SCD    Immuno:   -Envarsis 8 mg start tomorrow, cellcept, steroid taper,   -ppx: Nystatin S&S, bactrim, and Valcyte   -F/U Tacrolimus level daily, tacro goal 8-10  -Simulect induction next dose on day 4  -Monitor closely. 51 y/o F with PMHx of HTN, HLD, Gout, ESRD on HD MWF since 2006, now via right brachial graft created via RUE bypass for which she is on Plavix/ASA - last dose 8/13 (UE vein thrombosis), s/p b/l AV fistula in the past, both failed. Anuric. Last HD this morning.  Admitted for possible DDRT. H/O Positive Quant Gold; had BCG vaccine as a child. CXR from 6/18/2019 showed RUL 2mm nodule (new since 2014). Treated with INH/B6 8/2019-5/13/2020. Is now s/p HCV+ DDRT with stent placement and Simulect induction    Plan:  Kidney replaced by transplant.  POD#  0   -NS @ 70cc/hr with 1:1 NS replacements  -CXR  -CBC/CMP/Mg/Phos/Coags  -Tylenol and tramadol for pain, Monitor and manage pain  -Strict I/Os   -Kim   -Clear diet advance as tollerated   -Bed rest tonight  -Renal US in PACU   -Encourage ISS  -SCD    Immuno:   -Envarsis 8 mg start tomorrow, cellcept, steroid taper,   -ppx: Nystatin S&S, bactrim, and Valcyte   -F/U Tacrolimus level daily, tacro goal 8-10  -Simulect induction next dose on day 4  -Monitor closely.

## 2020-08-14 NOTE — H&P ADULT - NSHPPHYSICALEXAM_GEN_ALL_CORE
Vital Signs Last 24 Hrs  T(C): 36.8 (14 Aug 2020 09:54), Max: 36.8 (14 Aug 2020 09:54)  T(F): 98.3 (14 Aug 2020 09:54), Max: 98.3 (14 Aug 2020 09:54)  HR: 82 (14 Aug 2020 09:54) (82 - 82)  BP: 102/66 (14 Aug 2020 09:54) (102/66 - 102/66)  BP(mean): --  RR: 18 (14 Aug 2020 09:54) (18 - 18)  SpO2: 99% (14 Aug 2020 09:54) (99% - 99%)      PHYSICAL EXAM:  Constitutional: Well developed / well nourished  Eyes: Anicteric, PERRLA  ENMT: nc/at  Neck: supple  Respiratory: CTA B/L  Cardiovascular: RRR  Gastrointestinal: Soft, non distended, non distended..   Genitourinary: anuric   Extremities: R AVG   Vascular: Palpable dp pulses bilaterally - faint on the left  Neurological: A&O x3  Skin: no rashes, ulcerations or lesions;  Musculoskeletal: Moving all extremities  Psychiatric: Responsive

## 2020-08-14 NOTE — H&P ADULT - ASSESSMENT
49 y/o F with PMHx of HNT, HLD, Gout, ESRD on HD MWF since 2006, now via right brachial graft created via RUE bypass for which she is on Plavix/ASA (s/p b/l AV fistula both failed). Anuric. Last HD this morning. Admitted for possible DDRT.     Plan:   - Admit to Transplant surgery  - Keep NPO  - Pre-op labs/EKG/CXR  - COVID Swab sent   - hold asa/plavix for now   - Simulect induction

## 2020-08-15 LAB
ALBUMIN SERPL ELPH-MCNC: 3.8 G/DL — SIGNIFICANT CHANGE UP (ref 3.3–5)
ALBUMIN SERPL ELPH-MCNC: 4 G/DL — SIGNIFICANT CHANGE UP (ref 3.3–5)
ALP SERPL-CCNC: 75 U/L — SIGNIFICANT CHANGE UP (ref 40–120)
ALP SERPL-CCNC: 80 U/L — SIGNIFICANT CHANGE UP (ref 40–120)
ALT FLD-CCNC: 11 U/L — SIGNIFICANT CHANGE UP (ref 10–45)
ALT FLD-CCNC: 11 U/L — SIGNIFICANT CHANGE UP (ref 10–45)
ANION GAP SERPL CALC-SCNC: 16 MMOL/L — SIGNIFICANT CHANGE UP (ref 5–17)
ANION GAP SERPL CALC-SCNC: 19 MMOL/L — HIGH (ref 5–17)
APTT BLD: 27.7 SEC — SIGNIFICANT CHANGE UP (ref 27.5–35.5)
APTT BLD: 28.4 SEC — SIGNIFICANT CHANGE UP (ref 27.5–35.5)
AST SERPL-CCNC: 19 U/L — SIGNIFICANT CHANGE UP (ref 10–40)
AST SERPL-CCNC: 23 U/L — SIGNIFICANT CHANGE UP (ref 10–40)
BILIRUB SERPL-MCNC: 0.8 MG/DL — SIGNIFICANT CHANGE UP (ref 0.2–1.2)
BILIRUB SERPL-MCNC: 0.9 MG/DL — SIGNIFICANT CHANGE UP (ref 0.2–1.2)
BUN SERPL-MCNC: 20 MG/DL — SIGNIFICANT CHANGE UP (ref 7–23)
BUN SERPL-MCNC: 21 MG/DL — SIGNIFICANT CHANGE UP (ref 7–23)
CALCIUM SERPL-MCNC: 10.3 MG/DL — SIGNIFICANT CHANGE UP (ref 8.4–10.5)
CALCIUM SERPL-MCNC: 10.6 MG/DL — HIGH (ref 8.4–10.5)
CHLORIDE SERPL-SCNC: 94 MMOL/L — LOW (ref 96–108)
CHLORIDE SERPL-SCNC: 95 MMOL/L — LOW (ref 96–108)
CO2 SERPL-SCNC: 22 MMOL/L — SIGNIFICANT CHANGE UP (ref 22–31)
CO2 SERPL-SCNC: 23 MMOL/L — SIGNIFICANT CHANGE UP (ref 22–31)
CREAT SERPL-MCNC: 5.79 MG/DL — HIGH (ref 0.5–1.3)
CREAT SERPL-MCNC: 6.15 MG/DL — HIGH (ref 0.5–1.3)
GLUCOSE BLDC GLUCOMTR-MCNC: 125 MG/DL — HIGH (ref 70–99)
GLUCOSE BLDC GLUCOMTR-MCNC: 132 MG/DL — HIGH (ref 70–99)
GLUCOSE BLDC GLUCOMTR-MCNC: 136 MG/DL — HIGH (ref 70–99)
GLUCOSE BLDC GLUCOMTR-MCNC: 153 MG/DL — HIGH (ref 70–99)
GLUCOSE SERPL-MCNC: 148 MG/DL — HIGH (ref 70–99)
GLUCOSE SERPL-MCNC: 183 MG/DL — HIGH (ref 70–99)
HCT VFR BLD CALC: 44.7 % — SIGNIFICANT CHANGE UP (ref 34.5–45)
HCT VFR BLD CALC: 46.2 % — HIGH (ref 34.5–45)
HGB BLD-MCNC: 13.5 G/DL — SIGNIFICANT CHANGE UP (ref 11.5–15.5)
HGB BLD-MCNC: 14.1 G/DL — SIGNIFICANT CHANGE UP (ref 11.5–15.5)
INR BLD: 0.96 RATIO — SIGNIFICANT CHANGE UP (ref 0.88–1.16)
INR BLD: 1.01 RATIO — SIGNIFICANT CHANGE UP (ref 0.88–1.16)
LACTATE SERPL-SCNC: 3.6 MMOL/L — HIGH (ref 0.7–2)
LDH SERPL L TO P-CCNC: 250 U/L — HIGH (ref 50–242)
MAGNESIUM SERPL-MCNC: 2.5 MG/DL — SIGNIFICANT CHANGE UP (ref 1.6–2.6)
MAGNESIUM SERPL-MCNC: 2.7 MG/DL — HIGH (ref 1.6–2.6)
MCHC RBC-ENTMCNC: 28 PG — SIGNIFICANT CHANGE UP (ref 27–34)
MCHC RBC-ENTMCNC: 28.2 PG — SIGNIFICANT CHANGE UP (ref 27–34)
MCHC RBC-ENTMCNC: 30.2 GM/DL — LOW (ref 32–36)
MCHC RBC-ENTMCNC: 30.5 GM/DL — LOW (ref 32–36)
MCV RBC AUTO: 92.4 FL — SIGNIFICANT CHANGE UP (ref 80–100)
MCV RBC AUTO: 92.5 FL — SIGNIFICANT CHANGE UP (ref 80–100)
NRBC # BLD: 0 /100 WBCS — SIGNIFICANT CHANGE UP (ref 0–0)
NRBC # BLD: 0 /100 WBCS — SIGNIFICANT CHANGE UP (ref 0–0)
PHOSPHATE SERPL-MCNC: 5.6 MG/DL — HIGH (ref 2.5–4.5)
PHOSPHATE SERPL-MCNC: 6.5 MG/DL — HIGH (ref 2.5–4.5)
PLATELET # BLD AUTO: 236 K/UL — SIGNIFICANT CHANGE UP (ref 150–400)
PLATELET # BLD AUTO: 251 K/UL — SIGNIFICANT CHANGE UP (ref 150–400)
POTASSIUM SERPL-MCNC: 4.7 MMOL/L — SIGNIFICANT CHANGE UP (ref 3.5–5.3)
POTASSIUM SERPL-MCNC: 5.5 MMOL/L — HIGH (ref 3.5–5.3)
POTASSIUM SERPL-SCNC: 4.7 MMOL/L — SIGNIFICANT CHANGE UP (ref 3.5–5.3)
POTASSIUM SERPL-SCNC: 5.5 MMOL/L — HIGH (ref 3.5–5.3)
PROT SERPL-MCNC: 6 G/DL — SIGNIFICANT CHANGE UP (ref 6–8.3)
PROT SERPL-MCNC: 6.4 G/DL — SIGNIFICANT CHANGE UP (ref 6–8.3)
PROTHROM AB SERPL-ACNC: 11.5 SEC — SIGNIFICANT CHANGE UP (ref 10.6–13.6)
PROTHROM AB SERPL-ACNC: 12 SEC — SIGNIFICANT CHANGE UP (ref 10.6–13.6)
RBC # BLD: 4.83 M/UL — SIGNIFICANT CHANGE UP (ref 3.8–5.2)
RBC # BLD: 5 M/UL — SIGNIFICANT CHANGE UP (ref 3.8–5.2)
RBC # FLD: 23.1 % — HIGH (ref 10.3–14.5)
RBC # FLD: 23.2 % — HIGH (ref 10.3–14.5)
SODIUM SERPL-SCNC: 134 MMOL/L — LOW (ref 135–145)
SODIUM SERPL-SCNC: 135 MMOL/L — SIGNIFICANT CHANGE UP (ref 135–145)
TACROLIMUS SERPL-MCNC: <2 NG/ML — SIGNIFICANT CHANGE UP
WBC # BLD: 12.9 K/UL — HIGH (ref 3.8–10.5)
WBC # BLD: 12.95 K/UL — HIGH (ref 3.8–10.5)
WBC # FLD AUTO: 12.9 K/UL — HIGH (ref 3.8–10.5)
WBC # FLD AUTO: 12.95 K/UL — HIGH (ref 3.8–10.5)

## 2020-08-15 PROCEDURE — 99223 1ST HOSP IP/OBS HIGH 75: CPT

## 2020-08-15 RX ORDER — MYCOPHENOLATE MOFETIL 250 MG/1
1000 CAPSULE ORAL
Refills: 0 | Status: DISCONTINUED | OUTPATIENT
Start: 2020-08-15 | End: 2020-08-19

## 2020-08-15 RX ORDER — TRAMADOL HYDROCHLORIDE 50 MG/1
25 TABLET ORAL EVERY 6 HOURS
Refills: 0 | Status: DISCONTINUED | OUTPATIENT
Start: 2020-08-15 | End: 2020-08-19

## 2020-08-15 RX ORDER — INSULIN HUMAN 100 [IU]/ML
10 INJECTION, SOLUTION SUBCUTANEOUS ONCE
Refills: 0 | Status: COMPLETED | OUTPATIENT
Start: 2020-08-15 | End: 2020-08-15

## 2020-08-15 RX ORDER — SODIUM CHLORIDE 9 MG/ML
500 INJECTION INTRAMUSCULAR; INTRAVENOUS; SUBCUTANEOUS ONCE
Refills: 0 | Status: COMPLETED | OUTPATIENT
Start: 2020-08-15 | End: 2020-08-15

## 2020-08-15 RX ORDER — ASPIRIN/CALCIUM CARB/MAGNESIUM 324 MG
81 TABLET ORAL DAILY
Refills: 0 | Status: DISCONTINUED | OUTPATIENT
Start: 2020-08-15 | End: 2020-08-19

## 2020-08-15 RX ORDER — FUROSEMIDE 40 MG
80 TABLET ORAL ONCE
Refills: 0 | Status: COMPLETED | OUTPATIENT
Start: 2020-08-15 | End: 2020-08-15

## 2020-08-15 RX ORDER — GLUCAGON INJECTION, SOLUTION 0.5 MG/.1ML
1 INJECTION, SOLUTION SUBCUTANEOUS ONCE
Refills: 0 | Status: DISCONTINUED | OUTPATIENT
Start: 2020-08-15 | End: 2020-08-19

## 2020-08-15 RX ORDER — INSULIN LISPRO 100/ML
VIAL (ML) SUBCUTANEOUS EVERY 6 HOURS
Refills: 0 | Status: DISCONTINUED | OUTPATIENT
Start: 2020-08-15 | End: 2020-08-15

## 2020-08-15 RX ORDER — SODIUM CHLORIDE 9 MG/ML
1000 INJECTION, SOLUTION INTRAVENOUS
Refills: 0 | Status: DISCONTINUED | OUTPATIENT
Start: 2020-08-15 | End: 2020-08-19

## 2020-08-15 RX ORDER — LIDOCAINE 4 G/100G
1 CREAM TOPICAL THREE TIMES A DAY
Refills: 0 | Status: DISCONTINUED | OUTPATIENT
Start: 2020-08-15 | End: 2020-08-19

## 2020-08-15 RX ORDER — DEXTROSE 50 % IN WATER 50 %
12.5 SYRINGE (ML) INTRAVENOUS ONCE
Refills: 0 | Status: DISCONTINUED | OUTPATIENT
Start: 2020-08-15 | End: 2020-08-19

## 2020-08-15 RX ORDER — DEXTROSE 50 % IN WATER 50 %
25 SYRINGE (ML) INTRAVENOUS ONCE
Refills: 0 | Status: DISCONTINUED | OUTPATIENT
Start: 2020-08-15 | End: 2020-08-19

## 2020-08-15 RX ORDER — DEXTROSE 50 % IN WATER 50 %
15 SYRINGE (ML) INTRAVENOUS ONCE
Refills: 0 | Status: DISCONTINUED | OUTPATIENT
Start: 2020-08-15 | End: 2020-08-19

## 2020-08-15 RX ORDER — ACETAMINOPHEN 500 MG
650 TABLET ORAL EVERY 6 HOURS
Refills: 0 | Status: DISCONTINUED | OUTPATIENT
Start: 2020-08-15 | End: 2020-08-16

## 2020-08-15 RX ORDER — TRAMADOL HYDROCHLORIDE 50 MG/1
50 TABLET ORAL EVERY 6 HOURS
Refills: 0 | Status: DISCONTINUED | OUTPATIENT
Start: 2020-08-15 | End: 2020-08-19

## 2020-08-15 RX ORDER — DEXTROSE 50 % IN WATER 50 %
50 SYRINGE (ML) INTRAVENOUS ONCE
Refills: 0 | Status: COMPLETED | OUTPATIENT
Start: 2020-08-15 | End: 2020-08-15

## 2020-08-15 RX ORDER — OXYCODONE HYDROCHLORIDE 5 MG/1
5 TABLET ORAL ONCE
Refills: 0 | Status: DISCONTINUED | OUTPATIENT
Start: 2020-08-15 | End: 2020-08-15

## 2020-08-15 RX ORDER — CHLORHEXIDINE GLUCONATE 213 G/1000ML
1 SOLUTION TOPICAL DAILY
Refills: 0 | Status: DISCONTINUED | OUTPATIENT
Start: 2020-08-15 | End: 2020-08-19

## 2020-08-15 RX ORDER — MIDODRINE HYDROCHLORIDE 2.5 MG/1
10 TABLET ORAL
Refills: 0 | Status: DISCONTINUED | OUTPATIENT
Start: 2020-08-15 | End: 2020-08-16

## 2020-08-15 RX ORDER — INSULIN LISPRO 100/ML
VIAL (ML) SUBCUTANEOUS
Refills: 0 | Status: DISCONTINUED | OUTPATIENT
Start: 2020-08-15 | End: 2020-08-19

## 2020-08-15 RX ADMIN — TRAMADOL HYDROCHLORIDE 25 MILLIGRAM(S): 50 TABLET ORAL at 15:50

## 2020-08-15 RX ADMIN — TRAMADOL HYDROCHLORIDE 50 MILLIGRAM(S): 50 TABLET ORAL at 22:45

## 2020-08-15 RX ADMIN — LIDOCAINE 1 APPLICATION(S): 4 CREAM TOPICAL at 21:28

## 2020-08-15 RX ADMIN — MYCOPHENOLATE MOFETIL 1 GRAM(S): 250 CAPSULE ORAL at 07:29

## 2020-08-15 RX ADMIN — SODIUM CHLORIDE 500 MILLILITER(S): 9 INJECTION INTRAMUSCULAR; INTRAVENOUS; SUBCUTANEOUS at 23:30

## 2020-08-15 RX ADMIN — OXYCODONE HYDROCHLORIDE 5 MILLIGRAM(S): 5 TABLET ORAL at 16:32

## 2020-08-15 RX ADMIN — FAMOTIDINE 20 MILLIGRAM(S): 10 INJECTION INTRAVENOUS at 11:32

## 2020-08-15 RX ADMIN — VALGANCICLOVIR 450 MILLIGRAM(S): 450 TABLET, FILM COATED ORAL at 11:32

## 2020-08-15 RX ADMIN — Medication 1 TABLET(S): at 11:32

## 2020-08-15 RX ADMIN — FENTANYL CITRATE 25 MICROGRAM(S): 50 INJECTION INTRAVENOUS at 06:00

## 2020-08-15 RX ADMIN — TRAMADOL HYDROCHLORIDE 50 MILLIGRAM(S): 50 TABLET ORAL at 12:03

## 2020-08-15 RX ADMIN — Medication 500000 UNIT(S): at 00:05

## 2020-08-15 RX ADMIN — MIDODRINE HYDROCHLORIDE 10 MILLIGRAM(S): 2.5 TABLET ORAL at 17:14

## 2020-08-15 RX ADMIN — FENTANYL CITRATE 25 MICROGRAM(S): 50 INJECTION INTRAVENOUS at 05:45

## 2020-08-15 RX ADMIN — TRAMADOL HYDROCHLORIDE 50 MILLIGRAM(S): 50 TABLET ORAL at 22:07

## 2020-08-15 RX ADMIN — OXYCODONE HYDROCHLORIDE 5 MILLIGRAM(S): 5 TABLET ORAL at 17:02

## 2020-08-15 RX ADMIN — TRAMADOL HYDROCHLORIDE 25 MILLIGRAM(S): 50 TABLET ORAL at 15:20

## 2020-08-15 RX ADMIN — MYCOPHENOLATE MOFETIL 1000 MILLIGRAM(S): 250 CAPSULE ORAL at 20:05

## 2020-08-15 RX ADMIN — Medication 125 MILLIGRAM(S): at 06:00

## 2020-08-15 RX ADMIN — Medication 80 MILLIGRAM(S): at 07:28

## 2020-08-15 RX ADMIN — Medication 500000 UNIT(S): at 17:14

## 2020-08-15 RX ADMIN — CHLORHEXIDINE GLUCONATE 1 APPLICATION(S): 213 SOLUTION TOPICAL at 11:33

## 2020-08-15 RX ADMIN — TRAMADOL HYDROCHLORIDE 50 MILLIGRAM(S): 50 TABLET ORAL at 11:33

## 2020-08-15 RX ADMIN — FENTANYL CITRATE 25 MICROGRAM(S): 50 INJECTION INTRAVENOUS at 05:30

## 2020-08-15 RX ADMIN — Medication 125 MILLIGRAM(S): at 17:14

## 2020-08-15 RX ADMIN — Medication 81 MILLIGRAM(S): at 11:32

## 2020-08-15 RX ADMIN — Medication 1: at 13:17

## 2020-08-15 RX ADMIN — MIDODRINE HYDROCHLORIDE 10 MILLIGRAM(S): 2.5 TABLET ORAL at 05:52

## 2020-08-15 RX ADMIN — INSULIN HUMAN 10 UNIT(S): 100 INJECTION, SOLUTION SUBCUTANEOUS at 03:04

## 2020-08-15 RX ADMIN — TACROLIMUS 8 MILLIGRAM(S): 5 CAPSULE ORAL at 07:29

## 2020-08-15 RX ADMIN — Medication 50 MILLILITER(S): at 03:03

## 2020-08-15 RX ADMIN — Medication 500000 UNIT(S): at 05:52

## 2020-08-15 RX ADMIN — Medication 500000 UNIT(S): at 11:33

## 2020-08-15 NOTE — CONSULT NOTE ADULT - SUBJECTIVE AND OBJECTIVE BOX
Dr. Berg  Office [phone]  Cell [phone]  Ghislaine CHIN  Cell [phone]    HPI:  49 y/o F with PMHx of HNT, HLD, Gout, ESRD on HD MWF since , now via right brachial graft created via RUE bypass for which she is on Plavix/ASA - last dose  (UE vein thrombosis), s/p b/l AV fistula in the past, both failed. Last HD yesterday morning.  Admitted for DDRT. Able to walk 10 blocks and climb stairs without difficulty. Denies SOB/CP/fevers. Cardiac testing: s/p LHC 2019: normal  H/O Positive Quant Gold; had BCG vaccine as a child. CXR from 2019 showed RUL 2mm nodule (new since ). Treated with INH/B6 2019-2020    Donor (local or import? hospital ): Local  Donor ID:  [id number]  Match: [id number]  OPO: NYRT  Age:  43  ABO:  O  High Risk: Yes   KDPI: 39%  COD:  Anoxia, drug OD  X Clamp Time: 2020 08:32  Medical Hx:  Hx IVDA, crack and cocaine use, asthma,  Terminal Cr:  0.69  Covid Testin/13 Bronch lavage negative  CMV- negative EBV-positive  HepBcAb- negative  Hepatitis C-SUNITHA- POSITIVE  Hepatitis C ab-positive  MISMATCH:   Kidney ETA to [facility]:  pending approx. 1100  Kidney Laterality: pending  X-match: negative (attached)  Organ Verification –pending (14 Aug 2020 10:28)      Allergies:  contrast media (iodine-based) (Urticaria)  ibuprofen (Hives)  ibuprofen/morphine (Unknown)  latex (Swelling)  morphine (Urticaria)  shellfish (Urticaria)      PAST MEDICAL & SURGICAL HISTORY:  CKD (chronic kidney disease) stage V requiring chronic dialysis  Fibroid Tumor  Infection of AV Graft for Dialysis  Clotted Renal Dialysis AV Graft  HTN - Hypertension  H/O extremity bypass graft: H/O RUE bypass and creation of right brachial graft  AV fistula: B/L - failed  History of Hysterectomy: for benign disease      Home Medications Reviewed    Hospital Medications:   MEDICATIONS  (STANDING):  aspirin enteric coated 81 milliGRAM(s) Oral daily  chlorhexidine 2% Cloths 1 Application(s) Topical daily  dextrose 5%. 1000 milliLiter(s) (50 mL/Hr) IV Continuous <Continuous>  dextrose 50% Injectable 12.5 Gram(s) IV Push once  dextrose 50% Injectable 25 Gram(s) IV Push once  dextrose 50% Injectable 25 Gram(s) IV Push once  famotidine    Tablet 20 milliGRAM(s) Oral daily  insulin lispro (HumaLOG) corrective regimen sliding scale   SubCutaneous Before meals and at bedtime  methylPREDNISolone sodium succinate Injectable 125 milliGRAM(s) IV Push two times a day  midodrine. 10 milliGRAM(s) Oral <User Schedule>  mycophenolate mofetil 1000 milliGRAM(s) Oral <User Schedule>  nystatin    Suspension [id number] Unit(s) Swish and Swallow four times a day  sodium chloride 0.45%. 500 milliLiter(s) (50 mL/Hr) IV Continuous <Continuous>  sodium chloride 0.9%. 1000 milliLiter(s) (70 mL/Hr) IV Continuous <Continuous>  tacrolimus ER Tablet (ENVARSUS XR) 8 milliGRAM(s) Oral <User Schedule>  trimethoprim   80 mG/sulfamethoxazole 400 mG 1 Tablet(s) Oral daily  valGANciclovir 450 milliGRAM(s) Oral daily      SOCIAL HISTORY:  Denies ETOh, Smoking,     FAMILY HISTORY:  No pertinent family history in first degree relatives      REVIEW OF SYSTEMS:  CONSTITUTIONAL: No weakness, fevers or chills  EYES/ENT: No visual changes;  No vertigo or throat pain   NECK: No pain or stiffness  RESPIRATORY: No cough, wheezing, hemoptysis; No shortness of breath  CARDIOVASCULAR: No chest pain or palpitations.  GASTROINTESTINAL: No abdominal or epigastric pain. No nausea, vomiting, or hematemesis; No diarrhea or constipation. No melena or hematochezia.  GENITOURINARY: No dysuria, frequency, foamy urine, urinary urgency, incontinence or hematuria  NEUROLOGICAL: No numbness or weakness  SKIN: No itching, burning, rashes, or lesions   VASCULAR: No bilateral lower extremity edema.   All other review of systems is negative unless indicated above.    VITALS:  T(F): 98.2 (08-15-20 @ 12:02), Max: 98.5 (08-15-20 @ 06:30)  HR: 102 (08-15-20 @ 12:02)  BP: 117/71 (08-15-20 @ 12:02)  RR: 18 (08-15-20 @ 12:02)  SpO2: 94% (08-15-20 @ 12:02)  Wt(kg): --     @ 07:01  -  08-15 @ 07:00  --------------------------------------------------------  IN: 915 mL / OUT: 1085 mL / NET: -170 mL    08-15 @ 07:01  -  08-15 @ 12:52  --------------------------------------------------------  IN: 1010 mL / OUT: 285 mL / NET: 725 mL          PHYSICAL EXAM:  Constitutional: NAD  HEENT: anicteric sclera, oropharynx clear, MMM  Neck: No JVD  Respiratory: CTAB, no wheezes, rales or rhonchi  Cardiovascular: S1, S2, RRR  Gastrointestinal: BS+, soft, ND, Right LQ transplanted kidney  Extremities: No cyanosis or clubbing. No peripheral edema  Neurological: A/O x 3, no focal deficits  Psychiatric: Normal mood, normal affect  : No CVA tenderness. + ndiaye.   Skin: No rashes  Vascular Access: right UE AVF    LABS:  08-15    134<L>  |  95<L>  |  21  ----------------------------<  148<H>  4.7   |  23  |  5.79<H>    Ca    10.6<H>      15 Aug 2020 05:13  Phos  5.6     08-15  Mg     2.7     08-15    TPro  6.0  /  Alb  3.8  /  TBili  0.9  /  DBili      /  AST  19  /  ALT  11  /  AlkPhos  75  08-15    Creatinine Trend: 5.79 <--, 6.15 <--, 6.81 <--, 7.89 <--                        13.5   12.95 )-----------( 251      ( 15 Aug 2020 05:13 )             44.7     Urine Studies:        RADIOLOGY & ADDITIONAL STUDIES:

## 2020-08-15 NOTE — CONSULT NOTE ADULT - ASSESSMENT
50 year old female with ESRD was on HD , now s/p HCV+ DDRT on 8/14/2020 with stent placement and Simulect induction    1.  s/p HCV+ DDRT on 8/14/2020 - has good UOP and Cr trending down.  2. IS meds- Simulect Induction . Dosing tac by level. goal level 8-10. Continue  MMF 1gm po bid  and Steroid taper. Ppx with Bactrim, Nystatin and Valcyte  3. HTN - SBP was ~ 110, patient was on midodrine pre op which has been restarted. goal to keep SBP > 120/80 to ensure adequate perfusion to the graft.  aim to titrate off Midodrine when BP improved.   4. HCV - will check VL and start Epclusa accordingly.

## 2020-08-15 NOTE — CONSULT NOTE ADULT - ASSESSMENT
51 y/o F with PMHx of HTN, HLD, Gout, ESRD on HD MWF since 2006, admitted for DDRT.    A/P:  S/P Kidney transplant:  Transplanted on 08/14  S/P DDRT Hep C positive  Having good urine output, Scr trending down  Last HD 08/14  No current indication for HD  Consent for HD in the chart if needed  Monitor I/O  Follow up transplant Nephrologist for immunosuppression    HTN:  Taking Midodrine at present  Monitor BP    Hyperphosphatemia:  Low PO4 diet  Monitor PO4 level daily

## 2020-08-15 NOTE — PROGRESS NOTE ADULT - ASSESSMENT
49 y/o F with PMHx of HTN, HLD, Gout, ESRD on HD MWF since 2006, now via right brachial graft created via RUE bypass for which she is on Plavix/ASA - last dose 8/13 (UE vein thrombosis), s/p b/l AV fistula in the past, both failed. Anuric. H/O Positive Quant Gold; had BCG vaccine as a child. CXR from 6/18/2019 showed RUL 2mm nodule (new since 2014). Treated with INH/B6 8/2019-5/13/2020.  s/p HCV+ DDRT with stent placement and Simulect induction    [] s/p DDRT (ureter stented) - POD 1  - monitor u/o and renal function, Lasix 80mg IVP x 1 this am  - Hyperkalemia overnight, tx with I/D50  - Immuno: Envarsus 8mg daily starting today; MMF 1g bid, Pred taper, Simulect induction in OR, next dose on POD 4 (8/18)  - PPx: bactrim/valcyte/nystatin  - Diet: Start clears/advance as tolerated  - Pain: tylenol/tramadol prn  - Drains: Kim/HEMANTH  - IVF/Replacments: continue  - IS/SCD/OOB  - Start ASA 81mg daily

## 2020-08-15 NOTE — PROGRESS NOTE ADULT - SUBJECTIVE AND OBJECTIVE BOX
Transplant Surgery Multidisciplinary Progress Note  --------------------------------------------------------------  DDRT   Date:   2020      POD# 1    HPI: 49 y/o F with PMHx of HTN, HLD, Gout, ESRD on HD MWF since , now via right brachial graft created via RUE bypass for which she is on Plavix/ASA - last dose  (UE vein thrombosis), s/p b/l AV fistula in the past, both failed. Anuric. Last HD . H/O Positive Quant Gold; had BCG vaccine as a child. CXR from 2019 showed RUL 2mm nodule (new since ). Treated with INH/B6 2019-2020. Now s/p HCV+ DDRT with stent placement and Simulect induction    Donor Info:  Donor (local or import? hospital ): Local  Donor ID:  [id number]  Match: [id number]  OPO: NYRT  Age:  43  ABO:  O  High Risk: Yes   KDPI: 39%  COD:  Anoxia, drug OD  Medical Hx:  Hx IVDA, crack and cocaine use, asthma,  Terminal Cr:  0.69  Covid Testin/13 Bronch lavage negative  CMV- negative EBV-positive  HepBcAb- negative  Hepatitis C-SUNITHA- POSITIVE  Hepatitis C ab-positive  MISMATCH:     Recipient Info:   CPRA:    23  ABO: O  CMV: Pos EBV Status: Pos    OR: R kidney to Right iliac fossa; 1A/1V/1U. CIT: 8.5 hours.   Ureteral anastomosis to bladder preformed over double-J stent. HEMANTH drain placed posterior to vascular anastomoses. Kim catheter in place.    Interval Events:   - POD 1 s/p DDRT; post op with hyperkalemia (K5.5) tx with I/D50; repeat K this am 4.7.   - u/o ~50-70cc/hr; Received Lasix 80mg IVP this am, HEMANTH with 190cc SS drainage; SCr 5.79  - post op renal doppler with patent vessels; no hydro  - pain controlled, denies N/V   - restarted ASA 81g daily   - Envarsus 8mg daily starting this am      Discharge planning: pending clinical improvement    Education: Medication teaching    Plan of Care: see below    MEDICATIONS  (STANDING):  aspirin enteric coated 81 milliGRAM(s) Oral daily  chlorhexidine 2% Cloths 1 Application(s) Topical daily  dextrose 5%. 1000 milliLiter(s) (50 mL/Hr) IV Continuous <Continuous>  dextrose 50% Injectable 12.5 Gram(s) IV Push once  dextrose 50% Injectable 25 Gram(s) IV Push once  dextrose 50% Injectable 25 Gram(s) IV Push once  famotidine    Tablet 20 milliGRAM(s) Oral daily  insulin lispro (HumaLOG) corrective regimen sliding scale   SubCutaneous every 6 hours  methylPREDNISolone sodium succinate Injectable 125 milliGRAM(s) IV Push two times a day  midodrine. 10 milliGRAM(s) Oral <User Schedule>  mycophenolate mofetil 1 Gram(s) Oral every 12 hours  nystatin    Suspension 850297 Unit(s) Swish and Swallow four times a day  phenylephrine    Infusion 0.5 MICROgram(s)/kG/Min (13.4 mL/Hr) IV Continuous <Continuous>  sodium chloride 0.45%. 500 milliLiter(s) (50 mL/Hr) IV Continuous <Continuous>  sodium chloride 0.9%. 1000 milliLiter(s) (70 mL/Hr) IV Continuous <Continuous>  tacrolimus ER Tablet (ENVARSUS XR) 8 milliGRAM(s) Oral <User Schedule>  trimethoprim   80 mG/sulfamethoxazole 400 mG 1 Tablet(s) Oral daily  valGANciclovir 450 milliGRAM(s) Oral daily    MEDICATIONS  (PRN):  dextrose 40% Gel 15 Gram(s) Oral once PRN Blood Glucose LESS THAN 70 milliGRAM(s)/deciliter  glucagon  Injectable 1 milliGRAM(s) IntraMuscular once PRN Glucose LESS THAN 70 milligrams/deciliter      PAST MEDICAL & SURGICAL HISTORY:  CKD (chronic kidney disease) stage V requiring chronic dialysis  Fibroid Tumor  Infection of AV Graft for Dialysis  Clotted Renal Dialysis AV Graft  HTN - Hypertension  H/O extremity bypass graft: H/O RUE bypass and creation of right brachial graft  AV fistula: B/L - failed  History of Hysterectomy: for benign disease      Vital Signs Last 24 Hrs  T(C): 36.8 (15 Aug 2020 08:00), Max: 37 (14 Aug 2020 12:10)  T(F): 98.2 (15 Aug 2020 08:00), Max: 98.6 (14 Aug 2020 12:10)  HR: 94 (15 Aug 2020 08:00) (69 - 107)  BP: 126/70 (15 Aug 2020 08:00) (99/69 - 136/62)  BP(mean): 83 (15 Aug 2020 06:30) (75 - 107)  RR: 18 (15 Aug 2020 08:00) (14 - 18)  SpO2: 94% (15 Aug 2020 08:00) (94% - 100%)    I&O's Summary    14 Aug 2020 07:01  -  15 Aug 2020 07:00  --------------------------------------------------------  IN: 915 mL / OUT: 1085 mL / NET: -170 mL    15 Aug 2020 07:01  -  15 Aug 2020 08:26  --------------------------------------------------------  IN: 70 mL / OUT: 50 mL / NET: 20 mL                         13.5   12.95 )-----------( 251      ( 15 Aug 2020 05:13 )             44.7     08-15    134<L>  |  95<L>  |  21  ----------------------------<  148<H>  4.7   |  23  |  5.79<H>    Ca    10.6<H>      15 Aug 2020 05:13  Phos  5.6     08-15  Mg     2.7     08-15    TPro  6.0  /  Alb  3.8  /  TBili  0.9  /  DBili  x   /  AST  19  /  ALT  11  /  AlkPhos  75  08-15      Review of Symptoms:               PHYSICAL EXAM:  Constitutional: Well developed / well nourished  Eyes: Anicteric, PERRLA  ENMT: nc/at  Neck: supple  Respiratory: CTA B/L  Cardiovascular: RRR  Gastrointestinal: Soft abdomen, mild tender to touch at surgical site, ND, HEMANTH x1 w/ sanguinous drainage   Genitourinary: Urinary catheter in place w/ blood tinged urine   Extremities: SCD's in place and working bilaterally  Vascular: Palpable dp pulses bilaterally  Neurological: A&O x3, following commands and responding appropriately   Skin: Mild sanguinous richie on wound dressing  Musculoskeletal: Moving all extremities  Psychiatric: Responsive Transplant Surgery Multidisciplinary Progress Note  --------------------------------------------------------------  DDRT   Date:   2020      POD# 1    Present:  Patient seen with multidisciplinary team including Transplant Surgeon: , Dr. Monroy, Transplant Nephrologist: Dr. YANETH Zamora, Pharmacist: Domingo Blancas, , PGY 3 Dr. Mcqueen, NP LARRY Lincoln, GIUSEPPE Rider, bedside RN in am rounds and examined with Dr. Monroy. Disciplines not in attendance will be notified of the plan.     HPI: 51 y/o F with PMHx of HTN, HLD, Gout, ESRD on HD MWF since , now via right brachial graft created via RUE bypass for which she is on Plavix/ASA - last dose  (UE vein thrombosis), s/p b/l AV fistula in the past, both failed. Anuric. Last HD . H/O Positive Quant Gold; had BCG vaccine as a child. CXR from 2019 showed RUL 2mm nodule (new since ). Treated with INH/B6 2019-2020. Now s/p HCV+ DDRT with stent placement and Simulect induction    Donor Info:  Donor (local or import? hospital ): Local  Donor ID:  [id number]  Match: [id number]  OPO: NYRT  Age:  43  ABO:  O  High Risk: Yes   KDPI: 39%  COD:  Anoxia, drug OD  Medical Hx:  Hx IVDA, crack and cocaine use, asthma,  Terminal Cr:  0.69  Covid Testin/13 Bronch lavage negative  CMV- negative EBV-positive  HepBcAb- negative  Hepatitis C-SUNITHA- POSITIVE  Hepatitis C ab-positive  MISMATCH:     Recipient Info:   CPRA:    23  ABO: O  CMV: Pos EBV Status: Pos    OR: R kidney to Right iliac fossa; 1A/1V/1U. CIT: 8.5 hours.   Ureteral anastomosis to bladder preformed over double-J stent. HEMANTH drain placed posterior to vascular anastomoses. Kim catheter in place.    Interval Events:   - POD 1 s/p DDRT; post op with hyperkalemia (K5.5) tx with I/D50; repeat K this am 4.7.   - u/o ~50-70cc/hr; Received Lasix 80mg IVP this am, HEMANTH with 190cc SS drainage; SCr 5.79  - post op renal doppler with patent vessels; no hydro  - pain controlled, denies N/V   - restarted ASA 81g daily   - Envarsus 8mg daily starting this am      Discharge planning: pending clinical improvement    Education: Medication teaching    Plan of Care: see below    MEDICATIONS  (STANDING):  aspirin enteric coated 81 milliGRAM(s) Oral daily  chlorhexidine 2% Cloths 1 Application(s) Topical daily  dextrose 5%. 1000 milliLiter(s) (50 mL/Hr) IV Continuous <Continuous>  dextrose 50% Injectable 12.5 Gram(s) IV Push once  dextrose 50% Injectable 25 Gram(s) IV Push once  dextrose 50% Injectable 25 Gram(s) IV Push once  famotidine    Tablet 20 milliGRAM(s) Oral daily  insulin lispro (HumaLOG) corrective regimen sliding scale   SubCutaneous every 6 hours  methylPREDNISolone sodium succinate Injectable 125 milliGRAM(s) IV Push two times a day  midodrine. 10 milliGRAM(s) Oral <User Schedule>  mycophenolate mofetil 1 Gram(s) Oral every 12 hours  nystatin    Suspension 734410 Unit(s) Swish and Swallow four times a day  phenylephrine    Infusion 0.5 MICROgram(s)/kG/Min (13.4 mL/Hr) IV Continuous <Continuous>  sodium chloride 0.45%. 500 milliLiter(s) (50 mL/Hr) IV Continuous <Continuous>  sodium chloride 0.9%. 1000 milliLiter(s) (70 mL/Hr) IV Continuous <Continuous>  tacrolimus ER Tablet (ENVARSUS XR) 8 milliGRAM(s) Oral <User Schedule>  trimethoprim   80 mG/sulfamethoxazole 400 mG 1 Tablet(s) Oral daily  valGANciclovir 450 milliGRAM(s) Oral daily    MEDICATIONS  (PRN):  dextrose 40% Gel 15 Gram(s) Oral once PRN Blood Glucose LESS THAN 70 milliGRAM(s)/deciliter  glucagon  Injectable 1 milliGRAM(s) IntraMuscular once PRN Glucose LESS THAN 70 milligrams/deciliter      PAST MEDICAL & SURGICAL HISTORY:  CKD (chronic kidney disease) stage V requiring chronic dialysis  Fibroid Tumor  Infection of AV Graft for Dialysis  Clotted Renal Dialysis AV Graft  HTN - Hypertension  H/O extremity bypass graft: H/O RUE bypass and creation of right brachial graft  AV fistula: B/L - failed  History of Hysterectomy: for benign disease      Vital Signs Last 24 Hrs  T(C): 36.8 (15 Aug 2020 08:00), Max: 37 (14 Aug 2020 12:10)  T(F): 98.2 (15 Aug 2020 08:00), Max: 98.6 (14 Aug 2020 12:10)  HR: 94 (15 Aug 2020 08:00) (69 - 107)  BP: 126/70 (15 Aug 2020 08:00) (99/69 - 136/62)  BP(mean): 83 (15 Aug 2020 06:30) (75 - 107)  RR: 18 (15 Aug 2020 08:00) (14 - 18)  SpO2: 94% (15 Aug 2020 08:00) (94% - 100%)    I&O's Summary    14 Aug 2020 07:01  -  15 Aug 2020 07:00  --------------------------------------------------------  IN: 915 mL / OUT: 1085 mL / NET: -170 mL    15 Aug 2020 07:01  -  15 Aug 2020 08:26  --------------------------------------------------------  IN: 70 mL / OUT: 50 mL / NET: 20 mL                         13.5   12.95 )-----------( 251      ( 15 Aug 2020 05:13 )             44.7     08-15    134<L>  |  95<L>  |  21  ----------------------------<  148<H>  4.7   |  23  |  5.79<H>    Ca    10.6<H>      15 Aug 2020 05:13  Phos  5.6     08-15  Mg     2.7     08-15    TPro  6.0  /  Alb  3.8  /  TBili  0.9  /  DBili  x   /  AST  19  /  ALT  11  /  AlkPhos  75  08-15      Review of Symptoms:               PHYSICAL EXAM:  Constitutional: Well developed / well nourished  Eyes: Anicteric, PERRLA  ENMT: nc/at  Neck: supple  Respiratory: CTA B/L  Cardiovascular: RRR  Gastrointestinal: Soft abdomen, mild tender to touch at surgical site, ND, HEMANTH x1 w/ sanguinous drainage   Genitourinary: Urinary catheter in place w/ blood tinged urine   Extremities: SCD's in place and working bilaterally  Vascular: Palpable dp pulses bilaterally  Neurological: A&O x3, following commands and responding appropriately   Skin: Mild sanguinous richie on wound dressing  Musculoskeletal: Moving all extremities  Psychiatric: Responsive Transplant Surgery Multidisciplinary Progress Note  --------------------------------------------------------------  DDRT   Date:   2020      POD# 1    Present:  Patient seen with multidisciplinary team including Transplant Surgeon: , Dr. Monroy, Transplant Nephrologist: Dr. YANETH Zamora, Pharmacist: Domingo Blancas, , PGY 3 Dr. Mcqueen, NP LARRY Lincoln, GIUSEPPE Rider, bedside RN in am rounds and examined with Dr. Monroy. Disciplines not in attendance will be notified of the plan.     HPI: 49 y/o F with PMHx of HTN, HLD, Gout, ESRD on HD MWF since , now via right brachial graft created via RUE bypass for which she is on Plavix/ASA - last dose  (UE vein thrombosis), s/p b/l AV fistula in the past, both failed. Anuric. Last HD . H/O Positive Quant Gold; had BCG vaccine as a child. CXR from 2019 showed RUL 2mm nodule (new since ). Treated with INH/B6 2019-2020. Now s/p HCV+ DDRT with stent placement and Simulect induction    Donor Info:  Donor (local or import? hospital ): Local  Donor ID:  [id number]  Match: [id number]  OPO: NYRT  Age:  43  ABO:  O  High Risk: Yes   KDPI: 39%  COD:  Anoxia, drug OD  Medical Hx:  Hx IVDA, crack and cocaine use, asthma,  Terminal Cr:  0.69  Covid Testin/13 Bronch lavage negative  CMV- negative EBV-positive  HepBcAb- negative  Hepatitis C-SUNITHA- POSITIVE  Hepatitis C ab-positive  MISMATCH:     Recipient Info:   CPRA:    23  ABO: O  CMV: Pos EBV Status: Pos    OR: R kidney to Right iliac fossa; 1A/1V/1U. CIT: 8.5 hours.   Ureteral anastomosis to bladder preformed over double-J stent. HEMANTH drain placed posterior to vascular anastomoses. Kim catheter in place.    Interval Events:   - POD 1 s/p DDRT; post op with hyperkalemia (K5.5) tx with I/D50; repeat K this am 4.7.   - u/o ~50-70cc/hr; Received Lasix 80mg IVP this am, HEMANTH with 190cc SS drainage; SCr 5.79  - post op renal doppler with patent vessels; no hydro  - pain controlled, denies N/V   - Restarted Midodrine 10mg bid (SBP ~110s)  - Restarted ASA 81g daily   - Envarsus 8mg daily starting this am      Discharge planning: pending clinical improvement    Education: Medication teaching    Plan of Care: see below    MEDICATIONS  (STANDING):  aspirin enteric coated 81 milliGRAM(s) Oral daily  chlorhexidine 2% Cloths 1 Application(s) Topical daily  dextrose 5%. 1000 milliLiter(s) (50 mL/Hr) IV Continuous <Continuous>  dextrose 50% Injectable 12.5 Gram(s) IV Push once  dextrose 50% Injectable 25 Gram(s) IV Push once  dextrose 50% Injectable 25 Gram(s) IV Push once  famotidine    Tablet 20 milliGRAM(s) Oral daily  insulin lispro (HumaLOG) corrective regimen sliding scale   SubCutaneous every 6 hours  methylPREDNISolone sodium succinate Injectable 125 milliGRAM(s) IV Push two times a day  midodrine. 10 milliGRAM(s) Oral <User Schedule>  mycophenolate mofetil 1 Gram(s) Oral every 12 hours  nystatin    Suspension 816239 Unit(s) Swish and Swallow four times a day  phenylephrine    Infusion 0.5 MICROgram(s)/kG/Min (13.4 mL/Hr) IV Continuous <Continuous>  sodium chloride 0.45%. 500 milliLiter(s) (50 mL/Hr) IV Continuous <Continuous>  sodium chloride 0.9%. 1000 milliLiter(s) (70 mL/Hr) IV Continuous <Continuous>  tacrolimus ER Tablet (ENVARSUS XR) 8 milliGRAM(s) Oral <User Schedule>  trimethoprim   80 mG/sulfamethoxazole 400 mG 1 Tablet(s) Oral daily  valGANciclovir 450 milliGRAM(s) Oral daily    MEDICATIONS  (PRN):  dextrose 40% Gel 15 Gram(s) Oral once PRN Blood Glucose LESS THAN 70 milliGRAM(s)/deciliter  glucagon  Injectable 1 milliGRAM(s) IntraMuscular once PRN Glucose LESS THAN 70 milligrams/deciliter      PAST MEDICAL & SURGICAL HISTORY:  CKD (chronic kidney disease) stage V requiring chronic dialysis  Fibroid Tumor  Infection of AV Graft for Dialysis  Clotted Renal Dialysis AV Graft  HTN - Hypertension  H/O extremity bypass graft: H/O RUE bypass and creation of right brachial graft  AV fistula: B/L - failed  History of Hysterectomy: for benign disease      Vital Signs Last 24 Hrs  T(C): 36.8 (15 Aug 2020 08:00), Max: 37 (14 Aug 2020 12:10)  T(F): 98.2 (15 Aug 2020 08:00), Max: 98.6 (14 Aug 2020 12:10)  HR: 94 (15 Aug 2020 08:00) (69 - 107)  BP: 126/70 (15 Aug 2020 08:00) (99/69 - 136/62)  BP(mean): 83 (15 Aug 2020 06:30) (75 - 107)  RR: 18 (15 Aug 2020 08:00) (14 - 18)  SpO2: 94% (15 Aug 2020 08:00) (94% - 100%)    I&O's Summary    14 Aug 2020 07:01  -  15 Aug 2020 07:00  --------------------------------------------------------  IN: 915 mL / OUT: 1085 mL / NET: -170 mL    15 Aug 2020 07:01  -  15 Aug 2020 08:26  --------------------------------------------------------  IN: 70 mL / OUT: 50 mL / NET: 20 mL                         13.5   12.95 )-----------( 251      ( 15 Aug 2020 05:13 )             44.7     08-15    134<L>  |  95<L>  |  21  ----------------------------<  148<H>  4.7   |  23  |  5.79<H>    Ca    10.6<H>      15 Aug 2020 05:13  Phos  5.6     08-15  Mg     2.7     -15    TPro  6.0  /  Alb  3.8  /  TBili  0.9  /  DBili  x   /  AST  19  /  ALT  11  /  AlkPhos  75  08-15      Review of Symptoms:               PHYSICAL EXAM:  Constitutional: Well developed / well nourished  Eyes: Anicteric, PERRLA  ENMT: nc/at  Neck: supple  Respiratory: CTA B/L  Cardiovascular: RRR  Gastrointestinal: Soft abdomen, mild tender to touch at surgical site, ND, HEMANTH x1 w/ sanguinous drainage   Genitourinary: Urinary catheter in place w/ blood tinged urine   Extremities: SCD's in place and working bilaterally  Vascular: Palpable dp pulses bilaterally  Neurological: A&O x3, following commands and responding appropriately   Skin: Mild sanguinous richie on wound dressing  Musculoskeletal: Moving all extremities  Psychiatric: Responsive

## 2020-08-15 NOTE — CONSULT NOTE ADULT - SUBJECTIVE AND OBJECTIVE BOX
[facility] DIVISION OF KIDNEY DISEASES AND HYPERTENSION -- INITIAL CONSULT NOTE  --------------------------------------------------------------------------------  HPI:  50 year old F with PMHx of HTN, HLD, Gout, ESRD on HD MWF since , now via right brachial graft created via RUE bypass for which she is on Plavix/ASA - last dose  (UE vein thrombosis), s/p b/l AV fistula in the past, both failed. Anuric. Last HD . H/O Positive Quant Gold; had BCG vaccine as a child. CXR from 2019 showed RUL 2mm nodule (new since ). Treated with INH/B6 2019-2020. Now s/p HCV+ DDRT with stent placement and Simulect induction    Donor Info:  Donor (local or import? hospital ): Local  Donor ID:  [id number]  Match: [id number]  OPO: [facility]  Age:  43  ABO:  O  High Risk: Yes   KDPI: 39%  COD:  Anoxia, drug OD  Medical Hx:  Hx IVDA, crack and cocaine use, asthma,  Terminal Cr:  0.69  Covid Testin/13 Bronch lavage negative  CMV- negative EBV-positive  HepBcAb- negative  Hepatitis C-SUNITHA- POSITIVE  Hepatitis C ab-positive  MISMATCH:     Recipient Info:   CPRA:    23  ABO: O    CMV: Pos EBV Status: Pos    OR: R kidney to Right iliac fossa; V/1U. CIT: 8.5 hours.   Ureteral anastomosis to bladder preformed over double-J stent. HEMANTH drain placed posterior to vascular anastomoses. Kim catheter in place.      PAST HISTORY  --------------------------------------------------------------------------------  PAST MEDICAL & SURGICAL HISTORY:  CKD (chronic kidney disease) stage V requiring chronic dialysis  Fibroid Tumor  Infection of AV Graft for Dialysis  Clotted Renal Dialysis AV Graft  HTN - Hypertension  H/O extremity bypass graft: H/O RUE bypass and creation of right brachial graft  AV fistula: B/L - failed  History of Hysterectomy: for benign disease    FAMILY HISTORY:  No pertinent family history in first degree relatives    PAST SOCIAL HISTORY:    ALLERGIES & MEDICATIONS  --------------------------------------------------------------------------------  Allergies    contrast media (iodine-based) (Urticaria)  ibuprofen (Hives)  ibuprofen/morphine (Unknown)  latex (Swelling)  morphine (Urticaria)  shellfish (Urticaria)    Intolerances      Standing Inpatient Medications  aspirin enteric coated 81 milliGRAM(s) Oral daily  chlorhexidine 2% Cloths 1 Application(s) Topical daily  dextrose 5%. 1000 milliLiter(s) IV Continuous <Continuous>  dextrose 50% Injectable 12.5 Gram(s) IV Push once  dextrose 50% Injectable 25 Gram(s) IV Push once  dextrose 50% Injectable 25 Gram(s) IV Push once  famotidine    Tablet 20 milliGRAM(s) Oral daily  insulin lispro (HumaLOG) corrective regimen sliding scale   SubCutaneous every 6 hours  methylPREDNISolone sodium succinate Injectable 125 milliGRAM(s) IV Push two times a day  midodrine. 10 milliGRAM(s) Oral <User Schedule>  mycophenolate mofetil 1000 milliGRAM(s) Oral <User Schedule>  nystatin    Suspension 819610 Unit(s) Swish and Swallow four times a day  sodium chloride 0.45%. 500 milliLiter(s) IV Continuous <Continuous>  sodium chloride 0.9%. 1000 milliLiter(s) IV Continuous <Continuous>  tacrolimus ER Tablet (ENVARSUS XR) 8 milliGRAM(s) Oral <User Schedule>  trimethoprim   80 mG/sulfamethoxazole 400 mG 1 Tablet(s) Oral daily  valGANciclovir 450 milliGRAM(s) Oral daily    PRN Inpatient Medications  dextrose 40% Gel 15 Gram(s) Oral once PRN  glucagon  Injectable 1 milliGRAM(s) IntraMuscular once PRN      REVIEW OF SYSTEMS  --------------------------------------------------------------------------------  Gen: No weight changes, fatigue, fevers/chills, weakness  Skin: No rashes  Head/Eyes/Ears/Mouth: No headache; Normal hearing; Normal vision w/o blurriness; No sinus pain/discomfort, sore throat  Respiratory: No dyspnea, cough, wheezing, hemoptysis  CV: No chest pain, PND, orthopnea  GI: No abdominal pain, diarrhea, constipation, nausea, vomiting, melena, hematochezia  : No increased frequency, dysuria, hematuria, nocturia  MSK: No joint pain/swelling; no back pain; no edema  Neuro: No dizziness/lightheadedness, weakness, seizures, numbness, tingling  Heme: No easy bruising or bleeding  Endo: No heat/cold intolerance  Psych: No significant nervousness, anxiety, stress, depression    All other systems were reviewed and are negative, except as noted.    VITALS/PHYSICAL EXAM  --------------------------------------------------------------------------------  T(C): 36.8 (08-15-20 @ 08:00), Max: 37 (20 @ 12:10)  HR: 94 (08-15-20 @ 08:00) (69 - 107)  BP: 126/70 (08-15-20 @ 08:00) (99/69 - 136/62)  RR: 18 (08-15-20 @ 08:00) (14 - 18)  SpO2: 94% (08-15-20 @ 08:00) (94% - 100%)  Wt(kg): --  Height (cm): 160.02 (20 @ 12:38)  Weight (kg): 71.3 (20 @ 12:38)  BMI (kg/m2): 27.8 (20 @ 12:38)  BSA (m2): 1.75 (20 @ 12:38)      20 @ 07:01  -  08-15-20 @ 07:00  --------------------------------------------------------  IN: 915 mL / OUT: 1085 mL / NET: -170 mL    08-15-20 @ 07:01  -  08-15-20 @ 09:48  --------------------------------------------------------  IN: 380 mL / OUT: 110 mL / NET: 270 mL      Physical Exam:  	Gen: NAD, well-appearing  	HEENT: PERRL, supple neck, clear oropharynx  	Pulm: CTA B/L  	CV: RRR, S1S2; no rub  	Back: No spinal or CVA tenderness; no sacral edema  	Abd: +BS, soft, nontender/nondistended                      Transplant:  	: No suprapubic tenderness  	UE: Warm, FROM, no clubbing, intact strength; no edema; no asterixis  	LE: Warm, FROM, no clubbing, intact strength; no edema  	Neuro: No focal deficits, intact gait  	Psych: Normal affect and mood  	Skin: Warm, without rashes  	Vascular access:    LABS/STUDIES  --------------------------------------------------------------------------------              13.5   12.95 >-----------<  251      [08-15-20 @ 05:13]              44.7     134  |  95  |  21  ----------------------------<  148      [08-15-20 @ 05:13]  4.7   |  23  |  5.79        Ca     10.6     [08-15-20 @ 05:13]      Mg     2.7     [08-15-20 @ 05:13]      Phos  5.6     [08-15-20 @ 05:13]    TPro  6.0  /  Alb  3.8  /  TBili  0.9  /  DBili  x   /  AST  19  /  ALT  11  /  AlkPhos  75  [08-15-20 @ 05:13]    PT/INR: PT 12.0 , INR 1.01       [08-15-20 @ 05:13]  PTT: 28.4       [08-15-20 @ 05:13]          [08-15-20 @ 01:46]    Creatinine Trend:  SCr 5.79 [08-15 @ 05:13]  SCr 6.15 [08-15 @ 01:46]  SCr 6.81 [ 20:28]  SCr 7.89 [ 10:31]              TacrolimusTacrolimus (), Serum: <2.0 ng/mL (08-15 @ 07:53)    Cyclosporine  Sirolimus  Mycophenolate  BK PCR  CMV PCR  Parvo PCR  EBV PCR

## 2020-08-16 ENCOUNTER — TRANSCRIPTION ENCOUNTER (OUTPATIENT)
Age: 50
End: 2020-08-16

## 2020-08-16 DIAGNOSIS — Z94.0 KIDNEY TRANSPLANT STATUS: ICD-10-CM

## 2020-08-16 DIAGNOSIS — D89.9 DISORDER INVOLVING THE IMMUNE MECHANISM, UNSPECIFIED: ICD-10-CM

## 2020-08-16 DIAGNOSIS — I10 ESSENTIAL (PRIMARY) HYPERTENSION: ICD-10-CM

## 2020-08-16 DIAGNOSIS — K75.9 INFLAMMATORY LIVER DISEASE, UNSPECIFIED: ICD-10-CM

## 2020-08-16 LAB
A1C WITH ESTIMATED AVERAGE GLUCOSE RESULT: 4.7 % — SIGNIFICANT CHANGE UP (ref 4–5.6)
ALBUMIN SERPL ELPH-MCNC: 3.7 G/DL — SIGNIFICANT CHANGE UP (ref 3.3–5)
ALP SERPL-CCNC: 64 U/L — SIGNIFICANT CHANGE UP (ref 40–120)
ALT FLD-CCNC: 24 U/L — SIGNIFICANT CHANGE UP (ref 10–45)
ANION GAP SERPL CALC-SCNC: 12 MMOL/L — SIGNIFICANT CHANGE UP (ref 5–17)
ANION GAP SERPL CALC-SCNC: 13 MMOL/L — SIGNIFICANT CHANGE UP (ref 5–17)
APTT BLD: 25.7 SEC — LOW (ref 27.5–35.5)
AST SERPL-CCNC: 37 U/L — SIGNIFICANT CHANGE UP (ref 10–40)
BASOPHILS # BLD AUTO: 0.04 K/UL — SIGNIFICANT CHANGE UP (ref 0–0.2)
BASOPHILS NFR BLD AUTO: 0.3 % — SIGNIFICANT CHANGE UP (ref 0–2)
BILIRUB SERPL-MCNC: 0.8 MG/DL — SIGNIFICANT CHANGE UP (ref 0.2–1.2)
BUN SERPL-MCNC: 28 MG/DL — HIGH (ref 7–23)
BUN SERPL-MCNC: 33 MG/DL — HIGH (ref 7–23)
CALCIUM SERPL-MCNC: 10.7 MG/DL — HIGH (ref 8.4–10.5)
CALCIUM SERPL-MCNC: 10.9 MG/DL — HIGH (ref 8.4–10.5)
CHLORIDE SERPL-SCNC: 93 MMOL/L — LOW (ref 96–108)
CHLORIDE SERPL-SCNC: 96 MMOL/L — SIGNIFICANT CHANGE UP (ref 96–108)
CO2 SERPL-SCNC: 23 MMOL/L — SIGNIFICANT CHANGE UP (ref 22–31)
CO2 SERPL-SCNC: 24 MMOL/L — SIGNIFICANT CHANGE UP (ref 22–31)
CREAT SERPL-MCNC: 2.74 MG/DL — HIGH (ref 0.5–1.3)
CREAT SERPL-MCNC: 2.86 MG/DL — HIGH (ref 0.5–1.3)
EOSINOPHIL # BLD AUTO: 0.01 K/UL — SIGNIFICANT CHANGE UP (ref 0–0.5)
EOSINOPHIL NFR BLD AUTO: 0.1 % — SIGNIFICANT CHANGE UP (ref 0–6)
ESTIMATED AVERAGE GLUCOSE: 88 MG/DL — SIGNIFICANT CHANGE UP (ref 68–114)
GLUCOSE BLDC GLUCOMTR-MCNC: 129 MG/DL — HIGH (ref 70–99)
GLUCOSE BLDC GLUCOMTR-MCNC: 132 MG/DL — HIGH (ref 70–99)
GLUCOSE BLDC GLUCOMTR-MCNC: 136 MG/DL — HIGH (ref 70–99)
GLUCOSE BLDC GLUCOMTR-MCNC: 140 MG/DL — HIGH (ref 70–99)
GLUCOSE SERPL-MCNC: 121 MG/DL — HIGH (ref 70–99)
GLUCOSE SERPL-MCNC: 146 MG/DL — HIGH (ref 70–99)
HCT VFR BLD CALC: 41.2 % — SIGNIFICANT CHANGE UP (ref 34.5–45)
HGB BLD-MCNC: 12.5 G/DL — SIGNIFICANT CHANGE UP (ref 11.5–15.5)
IMM GRANULOCYTES NFR BLD AUTO: 0.6 % — SIGNIFICANT CHANGE UP (ref 0–1.5)
INR BLD: 1.02 RATIO — SIGNIFICANT CHANGE UP (ref 0.88–1.16)
LDH SERPL L TO P-CCNC: 485 U/L — HIGH (ref 50–242)
LYMPHOCYTES # BLD AUTO: 0.97 K/UL — LOW (ref 1–3.3)
LYMPHOCYTES # BLD AUTO: 7.7 % — LOW (ref 13–44)
MAGNESIUM SERPL-MCNC: 2.4 MG/DL — SIGNIFICANT CHANGE UP (ref 1.6–2.6)
MCHC RBC-ENTMCNC: 28.1 PG — SIGNIFICANT CHANGE UP (ref 27–34)
MCHC RBC-ENTMCNC: 30.3 GM/DL — LOW (ref 32–36)
MCV RBC AUTO: 92.6 FL — SIGNIFICANT CHANGE UP (ref 80–100)
MONOCYTES # BLD AUTO: 1.11 K/UL — HIGH (ref 0–0.9)
MONOCYTES NFR BLD AUTO: 8.8 % — SIGNIFICANT CHANGE UP (ref 2–14)
NEUTROPHILS # BLD AUTO: 10.42 K/UL — HIGH (ref 1.8–7.4)
NEUTROPHILS NFR BLD AUTO: 82.5 % — HIGH (ref 43–77)
NRBC # BLD: 0 /100 WBCS — SIGNIFICANT CHANGE UP (ref 0–0)
PHOSPHATE SERPL-MCNC: 4.2 MG/DL — SIGNIFICANT CHANGE UP (ref 2.5–4.5)
PLATELET # BLD AUTO: 225 K/UL — SIGNIFICANT CHANGE UP (ref 150–400)
POTASSIUM SERPL-MCNC: 4.4 MMOL/L — SIGNIFICANT CHANGE UP (ref 3.5–5.3)
POTASSIUM SERPL-MCNC: 4.9 MMOL/L — SIGNIFICANT CHANGE UP (ref 3.5–5.3)
POTASSIUM SERPL-SCNC: 4.4 MMOL/L — SIGNIFICANT CHANGE UP (ref 3.5–5.3)
POTASSIUM SERPL-SCNC: 4.9 MMOL/L — SIGNIFICANT CHANGE UP (ref 3.5–5.3)
PROT SERPL-MCNC: 6.3 G/DL — SIGNIFICANT CHANGE UP (ref 6–8.3)
PROTHROM AB SERPL-ACNC: 12.1 SEC — SIGNIFICANT CHANGE UP (ref 10.6–13.6)
RBC # BLD: 4.45 M/UL — SIGNIFICANT CHANGE UP (ref 3.8–5.2)
RBC # FLD: 22.5 % — HIGH (ref 10.3–14.5)
SODIUM SERPL-SCNC: 129 MMOL/L — LOW (ref 135–145)
SODIUM SERPL-SCNC: 132 MMOL/L — LOW (ref 135–145)
TACROLIMUS SERPL-MCNC: 2.9 NG/ML — SIGNIFICANT CHANGE UP
WBC # BLD: 12.62 K/UL — HIGH (ref 3.8–10.5)
WBC # FLD AUTO: 12.62 K/UL — HIGH (ref 3.8–10.5)

## 2020-08-16 PROCEDURE — 99233 SBSQ HOSP IP/OBS HIGH 50: CPT

## 2020-08-16 RX ORDER — FUROSEMIDE 40 MG
40 TABLET ORAL
Refills: 0 | Status: DISCONTINUED | OUTPATIENT
Start: 2020-08-16 | End: 2020-08-19

## 2020-08-16 RX ORDER — MIDODRINE HYDROCHLORIDE 2.5 MG/1
5 TABLET ORAL
Refills: 0 | Status: DISCONTINUED | OUTPATIENT
Start: 2020-08-16 | End: 2020-08-16

## 2020-08-16 RX ORDER — ACETAMINOPHEN 500 MG
1000 TABLET ORAL ONCE
Refills: 0 | Status: COMPLETED | OUTPATIENT
Start: 2020-08-16 | End: 2020-08-16

## 2020-08-16 RX ORDER — FUROSEMIDE 40 MG
60 TABLET ORAL ONCE
Refills: 0 | Status: COMPLETED | OUTPATIENT
Start: 2020-08-16 | End: 2020-08-16

## 2020-08-16 RX ORDER — CLOPIDOGREL BISULFATE 75 MG/1
75 TABLET, FILM COATED ORAL DAILY
Refills: 0 | Status: DISCONTINUED | OUTPATIENT
Start: 2020-08-16 | End: 2020-08-17

## 2020-08-16 RX ORDER — OXYCODONE AND ACETAMINOPHEN 5; 325 MG/1; MG/1
1 TABLET ORAL ONCE
Refills: 0 | Status: DISCONTINUED | OUTPATIENT
Start: 2020-08-16 | End: 2020-08-16

## 2020-08-16 RX ORDER — SODIUM CHLORIDE 9 MG/ML
1000 INJECTION INTRAMUSCULAR; INTRAVENOUS; SUBCUTANEOUS
Refills: 0 | Status: DISCONTINUED | OUTPATIENT
Start: 2020-08-16 | End: 2020-08-17

## 2020-08-16 RX ORDER — FUROSEMIDE 40 MG
40 TABLET ORAL ONCE
Refills: 0 | Status: COMPLETED | OUTPATIENT
Start: 2020-08-16 | End: 2020-08-16

## 2020-08-16 RX ORDER — POLYETHYLENE GLYCOL 3350 17 G/17G
17 POWDER, FOR SOLUTION ORAL EVERY 24 HOURS
Refills: 0 | Status: DISCONTINUED | OUTPATIENT
Start: 2020-08-16 | End: 2020-08-19

## 2020-08-16 RX ORDER — MIDODRINE HYDROCHLORIDE 2.5 MG/1
5 TABLET ORAL ONCE
Refills: 0 | Status: COMPLETED | OUTPATIENT
Start: 2020-08-16 | End: 2020-08-16

## 2020-08-16 RX ADMIN — TRAMADOL HYDROCHLORIDE 50 MILLIGRAM(S): 50 TABLET ORAL at 12:14

## 2020-08-16 RX ADMIN — TRAMADOL HYDROCHLORIDE 50 MILLIGRAM(S): 50 TABLET ORAL at 12:44

## 2020-08-16 RX ADMIN — Medication 60 MILLIGRAM(S): at 05:03

## 2020-08-16 RX ADMIN — MYCOPHENOLATE MOFETIL 1000 MILLIGRAM(S): 250 CAPSULE ORAL at 07:16

## 2020-08-16 RX ADMIN — Medication 500000 UNIT(S): at 11:22

## 2020-08-16 RX ADMIN — Medication 60 MILLIGRAM(S): at 17:02

## 2020-08-16 RX ADMIN — CLOPIDOGREL BISULFATE 75 MILLIGRAM(S): 75 TABLET, FILM COATED ORAL at 11:22

## 2020-08-16 RX ADMIN — Medication 60 MILLIGRAM(S): at 01:45

## 2020-08-16 RX ADMIN — OXYCODONE AND ACETAMINOPHEN 1 TABLET(S): 5; 325 TABLET ORAL at 01:45

## 2020-08-16 RX ADMIN — FAMOTIDINE 20 MILLIGRAM(S): 10 INJECTION INTRAVENOUS at 11:22

## 2020-08-16 RX ADMIN — SODIUM CHLORIDE 50 MILLILITER(S): 9 INJECTION INTRAMUSCULAR; INTRAVENOUS; SUBCUTANEOUS at 18:03

## 2020-08-16 RX ADMIN — Medication 40 MILLIGRAM(S): at 08:04

## 2020-08-16 RX ADMIN — Medication 1 TABLET(S): at 11:22

## 2020-08-16 RX ADMIN — LIDOCAINE 1 APPLICATION(S): 4 CREAM TOPICAL at 05:03

## 2020-08-16 RX ADMIN — Medication 81 MILLIGRAM(S): at 11:22

## 2020-08-16 RX ADMIN — Medication 400 MILLIGRAM(S): at 13:00

## 2020-08-16 RX ADMIN — VALGANCICLOVIR 450 MILLIGRAM(S): 450 TABLET, FILM COATED ORAL at 11:22

## 2020-08-16 RX ADMIN — MYCOPHENOLATE MOFETIL 1000 MILLIGRAM(S): 250 CAPSULE ORAL at 19:31

## 2020-08-16 RX ADMIN — Medication 500000 UNIT(S): at 00:07

## 2020-08-16 RX ADMIN — TACROLIMUS 8 MILLIGRAM(S): 5 CAPSULE ORAL at 07:16

## 2020-08-16 RX ADMIN — OXYCODONE AND ACETAMINOPHEN 1 TABLET(S): 5; 325 TABLET ORAL at 02:20

## 2020-08-16 RX ADMIN — Medication 500000 UNIT(S): at 21:16

## 2020-08-16 RX ADMIN — Medication 1000 MILLIGRAM(S): at 13:30

## 2020-08-16 RX ADMIN — CHLORHEXIDINE GLUCONATE 1 APPLICATION(S): 213 SOLUTION TOPICAL at 11:22

## 2020-08-16 RX ADMIN — MIDODRINE HYDROCHLORIDE 5 MILLIGRAM(S): 2.5 TABLET ORAL at 08:04

## 2020-08-16 RX ADMIN — Medication 500000 UNIT(S): at 17:01

## 2020-08-16 RX ADMIN — POLYETHYLENE GLYCOL 3350 17 GRAM(S): 17 POWDER, FOR SOLUTION ORAL at 09:56

## 2020-08-16 RX ADMIN — Medication 500000 UNIT(S): at 05:03

## 2020-08-16 RX ADMIN — Medication 40 MILLIGRAM(S): at 17:02

## 2020-08-16 NOTE — DISCHARGE NOTE PROVIDER - NSDCFUADDINST_GEN_ALL_CORE_FT
Showering: You are allowed to shower. Allow warm water to run over abdomen. Apply gentle soap and allow warm water to run over abdomen to wash away the soap. Do not scrub/rub wound area. Do not soak in tub as this can cause the wound to become infected. Use a clean towel to gently pat dry.

## 2020-08-16 NOTE — DISCHARGE NOTE PROVIDER - NSDCACTIVITY_GEN_ALL_CORE
13-Dec-2019 12:00 Showering allowed/Walking - Indoors allowed/No heavy lifting/straining/Walking - Outdoors allowed

## 2020-08-16 NOTE — DISCHARGE NOTE PROVIDER - CARE PROVIDER_API CALL
Leonardo Monroy  [address] [place] [address]  Phone: [phone]  Fax: [phone]  Follow Up Time:

## 2020-08-16 NOTE — PROGRESS NOTE ADULT - SUBJECTIVE AND OBJECTIVE BOX
Dr. Berg  Office [phone]  Cell [phone]  Ghislaine CHIN  Cell [phone]      Patient is a 50y old  Female who presents with a chief complaint of DDRT (16 Aug 2020 10:25)      Patient seen and examined at bedside. No chest pain/sob    VITALS:  T(F): 98.2 (08-16-20 @ 13:24), Max: 98.5 (08-15-20 @ 16:00)  HR: 94 (08-16-20 @ 13:24)  BP: 130/75 (08-16-20 @ 13:24)  RR: 18 (08-16-20 @ 13:24)  SpO2: 91% (08-16-20 @ 13:24)  Wt(kg): --    08-15 @ 07:01  -  08-16 @ 07:00  --------------------------------------------------------  IN: 3620 mL / OUT: 1325 mL / NET: 2295 mL    08-16 @ 07:01  -  08-16 @ 13:42  --------------------------------------------------------  IN: 900 mL / OUT: 215 mL / NET: 685 mL          PHYSICAL EXAM:  Constitutional: NAD  Neck: No JVD  Respiratory: CTAB, no wheezes, rales or rhonchi  Cardiovascular: S1, S2, RRR  Gastrointestinal: BS+, soft, NT/ND  Extremities: No peripheral edema    Hospital Medications:   MEDICATIONS  (STANDING):  aspirin enteric coated 81 milliGRAM(s) Oral daily  chlorhexidine 2% Cloths 1 Application(s) Topical daily  clopidogrel Tablet 75 milliGRAM(s) Oral daily  dextrose 5%. 1000 milliLiter(s) (50 mL/Hr) IV Continuous <Continuous>  dextrose 50% Injectable 12.5 Gram(s) IV Push once  dextrose 50% Injectable 25 Gram(s) IV Push once  dextrose 50% Injectable 25 Gram(s) IV Push once  famotidine    Tablet 20 milliGRAM(s) Oral daily  furosemide    Tablet 40 milliGRAM(s) Oral two times a day  insulin lispro (HumaLOG) corrective regimen sliding scale   SubCutaneous Before meals and at bedtime  methylPREDNISolone sodium succinate Injectable 60 milliGRAM(s) IV Push two times a day  methylPREDNISolone sodium succinate Injectable   IV Push   mycophenolate mofetil 1000 milliGRAM(s) Oral <User Schedule>  nystatin    Suspension 810668 Unit(s) Swish and Swallow four times a day  tacrolimus ER Tablet (ENVARSUS XR) 8 milliGRAM(s) Oral <User Schedule>  trimethoprim   80 mG/sulfamethoxazole 400 mG 1 Tablet(s) Oral daily  valGANciclovir 450 milliGRAM(s) Oral daily    Tacrolimus (), Serum: 2.9 ng/mL (08-16 @ 09:24)    LABS:  08-16    132<L>  |  96  |  28<H>  ----------------------------<  121<H>  4.9   |  23  |  2.86<H>    Ca    10.7<H>      16 Aug 2020 06:05  Phos  4.2     08-16  Mg     2.4     08-16    TPro  6.3  /  Alb  3.7  /  TBili  0.8  /  DBili      /  AST  37  /  ALT  24  /  AlkPhos  64  08-16    Creatinine Trend: 2.86 <--, 5.79 <--, 6.15 <--, 6.81 <--, 7.89 <--    Albumin, Serum: 3.7 g/dL (08-16 @ 06:05)  Phosphorus Level, Serum: 4.2 mg/dL (08-16 @ 06:05)                              12.5   12.62 )-----------( 225      ( 16 Aug 2020 06:05 )             41.2     Urine Studies:            RADIOLOGY & ADDITIONAL STUDIES:

## 2020-08-16 NOTE — PROGRESS NOTE ADULT - ASSESSMENT
50 year old female with ESRD was on HD , now s/p HCV+ DDRT on 8/14/2020 with stent placement and Simulect induction    1. s/p HCV+ DDRT on 8/14/2020 - has good allograft function. Cr trending down.   2. IS meds- Simulect Induction . Dosing tac by level. goal level 8-10. Continue  MMF 1gm po bid  and Steroid taper. Ppx with Bactrim, Nystatin and Valcyte  3. HTN - BP is more stable today. will d/c midodrine.    4. HCV - will check VL per protocol  and start Epclusa accordingly.

## 2020-08-16 NOTE — PROGRESS NOTE ADULT - SUBJECTIVE AND OBJECTIVE BOX
Transplant Surgery Multidisciplinary Progress Note  --------------------------------------------------------------  DDRT   Date:   2020      POD# 2    Present:  Patient seen with multidisciplinary team including Transplant Surgeon: , Dr. Monroy, Transplant Nephrologist: Dr. YANETH Zamora, GIUSEPPE Ray, bedside RN in am rounds and examined with Dr. Monroy. Disciplines not in attendance will be notified of the plan.     HPI: 51 y/o F with PMHx of HTN, HLD, Gout, ESRD on HD MWF since , now via right brachial graft created via RUE bypass for which she is on Plavix/ASA - last dose  (UE vein thrombosis), s/p b/l AV fistula in the past, both failed. Anuric. Last HD . H/O Positive Quant Gold; had BCG vaccine as a child. CXR from 2019 showed RUL 2mm nodule (new since ). Treated with INH/B6 2019-2020. Now s/p HCV+ DDRT with stent placement and Simulect induction    Donor Info:  Donor (local or import? hospital ): Local  Donor ID:  [id number]  Match: [id number]  OPO: [facility]  Age:  43  ABO:  O  High Risk: Yes   KDPI: 39%  COD:  Anoxia, drug OD  Medical Hx:  Hx IVDA, crack and cocaine use, asthma,  Terminal Cr:  0.69  Covid Testin/13 Bronch lavage negative  CMV- negative EBV-positive  HepBcAb- negative  Hepatitis C-SUNITHA- POSITIVE  Hepatitis C ab-positive  MISMATCH:     Recipient Info:   CPRA:    23  ABO: O  CMV: Pos EBV Status: Pos    OR: R kidney to Right iliac fossa; 1A/1V/1U. CIT: 8.5 hours.   Ureteral anastomosis to bladder preformed over double-J stent. HEMANTH drain placed posterior to vascular anastomoses. Ndiaye catheter in place.    Interval Events:   -POD 2 s/p DDRT  -afebrile, vitals stable.  -Started Env 8 yesterday.  -Started home ASA81   -Overnight UOP dropped to 25-35cc/hr; given 500 cc NS bolus w/ no response. Given lasix 60IV, UOP improved to ~50cc/hr. UOP again down to 25cc/hr this AM.   -K stable at 4.9 today.  -Cr down to 2.86 from 5.79. UOP 1.2 L. HEMANTH output 115 cc SS.    Discharge planning: pending clinical improvement    Education: Medication teaching    Plan of Care: see below      MEDICATIONS  (STANDING):  aspirin enteric coated 81 milliGRAM(s) Oral daily  chlorhexidine 2% Cloths 1 Application(s) Topical daily  clopidogrel Tablet 75 milliGRAM(s) Oral daily  dextrose 5%. 1000 milliLiter(s) (50 mL/Hr) IV Continuous <Continuous>  dextrose 50% Injectable 12.5 Gram(s) IV Push once  dextrose 50% Injectable 25 Gram(s) IV Push once  dextrose 50% Injectable 25 Gram(s) IV Push once  famotidine    Tablet 20 milliGRAM(s) Oral daily  furosemide    Tablet 40 milliGRAM(s) Oral two times a day  insulin lispro (HumaLOG) corrective regimen sliding scale   SubCutaneous Before meals and at bedtime  methylPREDNISolone sodium succinate Injectable 60 milliGRAM(s) IV Push two times a day  methylPREDNISolone sodium succinate Injectable   IV Push   midodrine. 5 milliGRAM(s) Oral <User Schedule>  mycophenolate mofetil 1000 milliGRAM(s) Oral <User Schedule>  nystatin    Suspension 630486 Unit(s) Swish and Swallow four times a day  tacrolimus ER Tablet (ENVARSUS XR) 8 milliGRAM(s) Oral <User Schedule>  trimethoprim   80 mG/sulfamethoxazole 400 mG 1 Tablet(s) Oral daily  valGANciclovir 450 milliGRAM(s) Oral daily    MEDICATIONS  (PRN):  acetaminophen   Tablet .. 650 milliGRAM(s) Oral every 6 hours PRN Mild Pain (1 - 3)  dextrose 40% Gel 15 Gram(s) Oral once PRN Blood Glucose LESS THAN 70 milliGRAM(s)/deciliter  glucagon  Injectable 1 milliGRAM(s) IntraMuscular once PRN Glucose LESS THAN 70 milligrams/deciliter  lidocaine 2% Gel 1 Application(s) Topical three times a day PRN ndiaye cath irritation  polyethylene glycol 3350 17 Gram(s) Oral every 24 hours PRN Constipation  traMADol 25 milliGRAM(s) Oral every 6 hours PRN Moderate Pain (4 - 6)  traMADol 50 milliGRAM(s) Oral every 6 hours PRN Severe Pain (7 - 10)      PAST MEDICAL & SURGICAL HISTORY:  CKD (chronic kidney disease) stage V requiring chronic dialysis  Fibroid Tumor  Infection of AV Graft for Dialysis  Clotted Renal Dialysis AV Graft  HTN - Hypertension  H/O extremity bypass graft: H/O RUE bypass and creation of right brachial graft  AV fistula: B/L - failed  History of Hysterectomy: for benign disease      Vital Signs Last 24 Hrs  T(C): 36.6 (16 Aug 2020 04:00), Max: 36.9 (15 Aug 2020 16:00)  T(F): 97.9 (16 Aug 2020 04:00), Max: 98.5 (15 Aug 2020 16:)  HR: 90 (16 Aug 2020 04:) (87 - 104)  BP: 139/81 (16 Aug 2020 04:00) (117/71 - 139/81)  BP(mean): 104 (16 Aug 2020 04:) (89 - 104)  RR: 18 (16 Aug 2020 04:) (18 - 18)  SpO2: 90% (16 Aug 2020 04:00) (90% - 96%)    I&O's Summary    15 Aug 2020 07:  -  16 Aug 2020 07:00  --------------------------------------------------------  IN: 3620 mL / OUT: 1325 mL / NET: 2295 mL    16 Aug 2020 07:  -  16 Aug 2020 08:27  --------------------------------------------------------  IN: 180 mL / OUT: 25 mL / NET: 155 mL                              12.5   12.62 )-----------( 225      ( 16 Aug 2020 06:05 )             41.2     08-16    132<L>  |  96  |  28<H>  ----------------------------<  121<H>  4.9   |  23  |  2.86<H>    Ca    10.7<H>      16 Aug 2020 06:05  Phos  4.2     08-  Mg     2.4     -    TPro  6.3  /  Alb  3.7  /  TBili  0.8  /  DBili  x   /  AST  37  /  ALT  24  /  AlkPhos  64  08-    Tacrolimus (), Serum: <2.0 ng/mL (08-15 @ 07:53)      Review of systems  Gen: No weight changes, fatigue, fevers/chills, weakness  Skin: No rashes  Head/Eyes/Ears/Mouth: No headache; Normal hearing; Normal vision w/o blurriness; No sinus pain/discomfort, sore throat  Respiratory: No dyspnea, cough, wheezing, hemoptysis  CV: No chest pain, PND, orthopnea  GI: Mild abdominal pain at surgical incision site; denies diarrhea, constipation, nausea, vomiting, melena, hematochezia  : No increased frequency, dysuria, hematuria, nocturia  MSK: No joint pain/swelling; no back pain; no edema  Neuro: No dizziness/lightheadedness, weakness, seizures, numbness, tingling  Heme: No easy bruising or bleeding  Endo: No heat/cold intolerance  Psych: No significant nervousness, anxiety, stress, depression  All other systems were reviewed and are negative, except as noted.      PHYSICAL EXAM:  Constitutional: Well developed / well nourished  Eyes: Anicteric, PERRLA  ENMT: nc/at  Neck: supple  Respiratory: CTA B/L  Cardiovascular: RRR  Gastrointestinal: Soft abdomen, mild tender to touch at surgical site, ND, HEMANTH x1 w/ sanguinous drainage   Genitourinary: Urinary catheter in place w/ blood tinged urine   Extremities: SCD's in place and working bilaterally  Vascular: Palpable dp pulses bilaterally  Neurological: A&O x3, following commands and responding appropriately   Skin: Mild sanguinous richie on wound dressing  Musculoskeletal: Moving all extremities  Psychiatric: Responsive

## 2020-08-16 NOTE — DISCHARGE NOTE PROVIDER - NSDCFUADDAPPT_GEN_ALL_CORE_FT
1. Please call to make a follow-up appointment with Dr. Monroy on ****  2. Please follow up with your primary care physician in one week regarding your hospitalization. 1. Please call to make a follow-up appointment with Dr. Monroy on  Monday 8/24  2. Please follow up with your primary care physician in one week regarding your hospitalization. 1. Please call to make a follow-up appointment with Dr. Monroy on  Monday 8/24  2. Please follow up with your primary care physician in one week regarding your hospitalization.  3- Please come to the transplant clinic this FRIDAY AM 8/21/20 FOR BLOOD WORK

## 2020-08-16 NOTE — DISCHARGE NOTE PROVIDER - NSDCMRMEDTOKEN_GEN_ALL_CORE_FT
Aleve 220 mg oral tablet: 1 tab(s) orally every 8 hours, As Needed  Aspirin Enteric Coated 81 mg oral delayed release tablet: 1 tab(s) orally once a day  clopidogrel 75 mg oral tablet: 1 tab(s) orally once a day  folic acid 1 mg oral tablet: 1 tab(s) orally once a day  loratadine 10 mg oral tablet: 1 tab(s) orally once a day, As Needed  Norvasc 10 mg oral tablet: 1 tab(s) orally once a day   pyridoxine 50 mg oral tablet: 1 tab(s) orally once a day  Renagel 800 mg oral tablet: 3 tab(s) orally 3 times a day  Sensipar 60 mg oral tablet: 1 tab(s) orally 2 times a day aspirin 81 mg oral delayed release tablet: 1 tab(s) orally once a day  clopidogrel 75 mg oral tablet: 1 tab(s) orally once a day  famotidine 20 mg oral tablet: 1 tab(s) orally once a day  mycophenolate mofetil 250 mg oral capsule: 1 gram(s) orally 2 times a day  Norvasc 10 mg oral tablet: 1 tab(s) orally once a day   nystatin 100,000 units/mL oral suspension: 5 milliliter(s) orally 4 times a day  sulfamethoxazole-trimethoprim 400 mg-80 mg oral tablet: 1 tab(s) orally once a day  valGANciclovir 450 mg oral tablet: 1 tab(s) orally once a day aspirin 81 mg oral delayed release tablet: 1 tab(s) orally once a day  famotidine 20 mg oral tablet: 1 tab(s) orally once a day  furosemide 40 mg oral tablet: 1 tab(s) orally once a day  mycophenolate mofetil 250 mg oral capsule: 1 gram(s) orally 2 times a day  nystatin 100,000 units/mL oral suspension: 5 milliliter(s) orally 4 times a day  polyethylene glycol 3350 oral powder for reconstitution: 17 gram(s) orally every 24 hours, As needed, Constipation  predniSONE 5 mg oral tablet: 1 tab(s) orally once a day  Follow prednisone taper from [facility] pro med list  senna oral tablet: 2 tab(s) orally once a day (at bedtime)  sulfamethoxazole-trimethoprim 400 mg-80 mg oral tablet: 1 tab(s) orally once a day  tacrolimus 1 mg oral tablet, extended release: 13 tab(s) orally   valGANciclovir 450 mg oral tablet: 1 tab(s) orally once a day

## 2020-08-16 NOTE — DISCHARGE NOTE PROVIDER - HOSPITAL COURSE
51 y/o F with PMHx of HTN, HLD, Gout, ESRD on HD MWF since , now via right brachial graft created via RUE bypass for which she is on Plavix/ASA - last dose  (UE vein thrombosis), s/p b/l AV fistula in the past, both failed, was anuric. H/O Positive Quant Gold; had BCG vaccine as a child. CXR from 2019 showed RUL 2mm nodule (new since ). Treated with INH/B6 2019-2020. Now s/p HCV+ DDRT with stent placement and Simulect induction on 20. She was restarted on her home ASA81/Plavix. Kim was dced and she passed TOV. Post operatively, she was hyperkalemic on POD0, requiring insulin/dex and lasix for low urine output. Cr has been downtrending and she is being discharged with a Cr of********        She is ambulating, tolerating a regular diet, has bowel function, and is voiding freely. She was evaluated by the multi-disciplinary team including surgeon, nephrologist, ACP, pharmacist, nutrition, social work, and nursing and deemed stable for discharge with the following plan:         Discharge meds:    Env**    MMF 1g BID    Pred taper    ppx: nystatin/bactrum QD/valcyte 450mg QD        Donor Info:    Donor (local or import? hospital ): Local    Donor ID:  [id number]    Match: [id number]    OPO: NYRT    Age:  43    ABO:  O    High Risk: Yes     KDPI: 39%    COD:  Anoxia, drug OD    Medical Hx:  Hx IVDA, crack and cocaine use, asthma,    Terminal Cr:  0.69    Covid Testin/13 Bronch lavage negative    CMV- negative EBV-positive    HepBcAb- negative    Hepatitis C-SUNITHA- POSITIVE    Hepatitis C ab-positive    MISMATCH:         Recipient Info:     CPRA:    23    ABO: O    CMV: Pos EBV Status: Pos        OR: R kidney to Right iliac fossa; /1V/1U. CIT: 8.5 hours.     Ureteral anastomosis to bladder preformed over double-J stent. HEMANTH drain placed posterior to vascular 51 y/o F with PMHx of HTN, HLD, Gout, ESRD on HD MWF since , now via right brachial graft created via RUE bypass for which she is on Plavix/ASA - last dose  (UE vein thrombosis), s/p b/l AV fistula in the past, both failed, was anuric. H/O Positive Quant Gold; had BCG vaccine as a child. CXR from 2019 showed RUL 2mm nodule (new since ). Treated with INH/B6 2019-2020.         Underwent HCV+ DDRT with stent placement and Simulect induction on 20, renal doppler with patent vessels. Post op course c/b hyperkalemia (tx with Insulin/dextrose) and low urine output (responded to Lasix).  Kim removed on POD 4, passed void trial, HEMANTH >>>>>>>>>>>>. Aspirin and plavix restarted post operatively; Plavix later held for hematuria. Pain well controlled, tolerated diet, ambulating with no difficulty, no PT needs.     Maintained good renal function; serum creatinine continued to downtrend; discharged with serum Cr of >>>>>>>>>>>>>>>. Hepatitis C PCR and genotype checked on POD 4 and was >>>>>>>, will be followed closely outpatient.         She was evaluated by the multi-disciplinary team including surgeon, nephrologist, ACP, pharmacist, nutrition, social work, and nursing and deemed stable for discharge with the following plan:         Discharge meds:    Env>>>>    MMF 1g BID    Pred taper    ppx: nystatin/bactrum QD/valcyte 450mg QD    ASA 81mg daily     Plavix >>>>>>>    Lasix >>>>>>>>>>        Donor Info:    Donor (local or import? hospital ): Local    Donor ID:  [id number]    Match: [id number]    OPO: NYRT    Age:  43    ABO:  O    High Risk: Yes     KDPI: 39%    COD:  Anoxia, drug OD    Medical Hx:  Hx IVDA, crack and cocaine use, asthma,    Terminal Cr:  0.69    Covid Testin/13 Bronch lavage negative    CMV- negative EBV-positive    HepBcAb- negative    Hepatitis C-SUNITHA- POSITIVE    Hepatitis C ab-positive    MISMATCH: 1/2/1        Recipient Info:     CPRA:    23    ABO: O    CMV: Pos EBV Status: Pos        OR: R kidney to Right iliac fossa; 1A/1V/1U. CIT: 8.5 hours.     Ureteral anastomosis to bladder preformed over double-J stent. 51 y/o F with PMHx of HTN, HLD, Gout, ESRD on HD MWF since , now via right brachial graft created via RUE bypass for which she is on Plavix/ASA - last dose  (UE vein thrombosis), s/p b/l AV fistula in the past, both failed, was anuric. H/O Positive Quant Gold; had BCG vaccine as a child. CXR from 2019 showed RUL 2mm nodule (new since ). Treated with INH/B6 2019-2020.         Underwent HCV+ DDRT with stent placement and Simulect induction on 20, renal doppler with patent vessels. Post op course c/b hyperkalemia (tx with Insulin/dextrose) and low urine output (responded to Lasix).  Kim removed on POD 4, passed void trial, HEMANTH with mod amount of out pt will keep the HEMANTH and DC in the clinic. Aspirin restarted post operatively; Plavix held and will continue to hold and f/u on restart in the clinic. Pain well controlled, tolerated diet, ambulating with no difficulty, no PT needs. Maintained good renal function; serum creatinine continued to downtrend; discharged with serum Cr of 1.79 on lasix 40mg PO QD. Hepatitis C PCR and genotype checked on POD 4, will be followed closely outpatient. She was evaluated by the multi-disciplinary team including surgeon, nephrologist, ACP, pharmacist, nutrition, social work, and nursing and deemed stable for discharge with the following plan:         Discharge meds:    Envarsus 13mg QD    MMF 1g BID    Pred taper    ppx: nystatin/bactrum QD/valcyte 450mg QD    ASA 81mg daily     Plavix on hold    Lasix 40mg QD        Donor Info:    Donor (local or import? hospital ): Local    Donor ID:  [id number]    Match: [id number]    OPO: NYRT    Age:  43    ABO:  O    High Risk: Yes     KDPI: 39%    COD:  Anoxia, drug OD    Medical Hx:  Hx IVDA, crack and cocaine use, asthma,    Terminal Cr:  0.69    Covid Testin/13 Bronch lavage negative    CMV- negative EBV-positive    HepBcAb- negative    Hepatitis C-SUNITHA- POSITIVE    Hepatitis C ab-positive    MISMATCH:         Recipient Info:     CPRA:    23    ABO: O    CMV: Pos EBV Status: Pos        OR: R kidney to Right iliac fossa; 1A/1V/1U. CIT: 8.5 hours.     Ureteral anastomosis to bladder preformed over double-J stent.

## 2020-08-16 NOTE — DISCHARGE NOTE PROVIDER - NSDCCPCAREPLAN_GEN_ALL_CORE_FT
PRINCIPAL DISCHARGE DIAGNOSIS  Diagnosis: Renal transplant recipient  Assessment and Plan of Treatment: - Avoid heavy lifting aything over 5lbs for six weeks following your surgery date. Do NOT drive a car or operate machinery unless cleared by your transplant surgeon during your follow up visit. Avoid straining, use stool softners as needed. Stop stool softners if diarrhea develops.   - Call transplant clinic if you develop fever, increased abdominal pain, redness/swelling or bleeding around your incision site  - Call transplant clinic if you have difficulty urinating, notice decrease in urine output or blood in urine.   - Follow FOOD SAFETY instructions provided to you in your patient education guide booklet   - Bathing: Shower is allowed, you can let soap and water flow over your incision; do NOT rub the area. Bath is NOT allowed until your incision is healed and you have been cleared by your transplant surgeon.         SECONDARY DISCHARGE DIAGNOSES  Diagnosis: Immunosuppressed status  Assessment and Plan of Treatment: - Transplant recipients are at risk for developing infection because immune system is lowered due to transplant medications.   - Avoid contact with sick people; practice good hand hygiene;   - Take medications as directed by your translant team. Never stop taking medications unless instructed by your transplant physician.  - Do NOT double up medication dose if you missed your dose; call the transplant office for instructions.

## 2020-08-16 NOTE — PROGRESS NOTE ADULT - SUBJECTIVE AND OBJECTIVE BOX
[facility] DIVISION OF KIDNEY DISEASES AND HYPERTENSION -- FOLLOW UP NOTE  --------------------------------------------------------------------------------  Chief Complaint: s/p DDRT on 8/14/20    24 hour events/subjective:  Patient was seen and examined at bedside  Cr has improved     PAST HISTORY  --------------------------------------------------------------------------------  No significant changes to PMH, PSH, FHx, SHx, unless otherwise noted    ALLERGIES & MEDICATIONS  --------------------------------------------------------------------------------  Allergies    contrast media (iodine-based) (Urticaria)  ibuprofen (Hives)  ibuprofen/morphine (Unknown)  latex (Swelling)  morphine (Urticaria)  shellfish (Urticaria)    Intolerances      Standing Inpatient Medications  aspirin enteric coated 81 milliGRAM(s) Oral daily  chlorhexidine 2% Cloths 1 Application(s) Topical daily  clopidogrel Tablet 75 milliGRAM(s) Oral daily  dextrose 5%. 1000 milliLiter(s) IV Continuous <Continuous>  dextrose 50% Injectable 12.5 Gram(s) IV Push once  dextrose 50% Injectable 25 Gram(s) IV Push once  dextrose 50% Injectable 25 Gram(s) IV Push once  famotidine    Tablet 20 milliGRAM(s) Oral daily  furosemide    Tablet 40 milliGRAM(s) Oral two times a day  insulin lispro (HumaLOG) corrective regimen sliding scale   SubCutaneous Before meals and at bedtime  methylPREDNISolone sodium succinate Injectable 60 milliGRAM(s) IV Push two times a day  methylPREDNISolone sodium succinate Injectable   IV Push   mycophenolate mofetil 1000 milliGRAM(s) Oral <User Schedule>  nystatin    Suspension 159909 Unit(s) Swish and Swallow four times a day  tacrolimus ER Tablet (ENVARSUS XR) 8 milliGRAM(s) Oral <User Schedule>  trimethoprim   80 mG/sulfamethoxazole 400 mG 1 Tablet(s) Oral daily  valGANciclovir 450 milliGRAM(s) Oral daily    PRN Inpatient Medications  acetaminophen   Tablet .. 650 milliGRAM(s) Oral every 6 hours PRN  dextrose 40% Gel 15 Gram(s) Oral once PRN  glucagon  Injectable 1 milliGRAM(s) IntraMuscular once PRN  lidocaine 2% Gel 1 Application(s) Topical three times a day PRN  polyethylene glycol 3350 17 Gram(s) Oral every 24 hours PRN  traMADol 25 milliGRAM(s) Oral every 6 hours PRN  traMADol 50 milliGRAM(s) Oral every 6 hours PRN      REVIEW OF SYSTEMS  --------------------------------------------------------------------------------  Gen: No fatigue, fevers/chills, weakness  Skin: No rashes  Head/Eyes/Ears/Mouth: No headache;No sore throat  Respiratory: No dyspnea, cough,   CV: No chest pain, PND, orthopnea  GI: No abdominal pain, diarrhea, constipation, nausea, vomiting  Transplant: No pain  : No increased frequency, dysuria, hematuria, nocturia  MSK: No joint pain/swelling; no back pain; no edema  Neuro: No dizziness/lightheadedness, weakness, seizures, numbness, tingling  Psych: No significant nervousness, anxiety, stress, depression    All other systems were reviewed and are negative, except as noted.    VITALS/PHYSICAL EXAM  --------------------------------------------------------------------------------  T(C): 36.7 (08-16-20 @ 09:00), Max: 36.9 (08-15-20 @ 16:00)  HR: 91 (08-16-20 @ 09:02) (87 - 104)  BP: 153/83 (08-16-20 @ 09:02) (117/71 - 153/83)  RR: 18 (08-16-20 @ 09:00) (18 - 18)  SpO2: 92% (08-16-20 @ 09:02) (90% - 96%)  Wt(kg): --  Height (cm): 160.02 (08-14-20 @ 12:38)  Weight (kg): 71.3 (08-14-20 @ 12:38)  BMI (kg/m2): 27.8 (08-14-20 @ 12:38)  BSA (m2): 1.75 (08-14-20 @ 12:38)      08-15-20 @ 07:01  -  08-16-20 @ 07:00  --------------------------------------------------------  IN: 3620 mL / OUT: 1325 mL / NET: 2295 mL    08-16-20 @ 07:01  -  08-16-20 @ 09:28  --------------------------------------------------------  IN: 180 mL / OUT: 75 mL / NET: 105 mL      Physical Exam:  	Gen: NAD  	HEENT: PERRL, supple neck, clear oropharynx  	Pulm: CTA B/L  	CV: RRR, S1S2; no rub  	Back: No spinal or CVA tenderness; no sacral edema  	Abd: +BS, soft, nontender/nondistended                      Transplant: No tenderness, swelling  	: No suprapubic tenderness  	UE: Warm, FROM; no edema; no asterixis  	LE: Warm, FROM; no edema  	Neuro: No focal deficits  	Psych: Normal affect and mood  	Skin: Warm, without rashes      LABS/STUDIES  --------------------------------------------------------------------------------              12.5   12.62 >-----------<  225      [08-16-20 @ 06:05]              41.2     132  |  96  |  28  ----------------------------<  121      [08-16-20 @ 06:05]  4.9   |  23  |  2.86        Ca     10.7     [08-16-20 @ 06:05]      Mg     2.4     [08-16-20 @ 06:05]      Phos  4.2     [08-16-20 @ 06:05]    TPro  6.3  /  Alb  3.7  /  TBili  0.8  /  DBili  x   /  AST  37  /  ALT  24  /  AlkPhos  64  [08-16-20 @ 06:05]    PT/INR: PT 12.1 , INR 1.02       [08-16-20 @ 06:05]  PTT: 25.7       [08-16-20 @ 06:05]          [08-16-20 @ 06:05]    Creatinine Trend:  SCr 2.86 [08-16 @ 06:05]  SCr 5.79 [08-15 @ 05:13]  SCr 6.15 [08-15 @ 01:46]  SCr 6.81 [08-14 @ 20:28]  SCr 7.89 [08-14 @ 10:31]    Tacrolimus (), Serum: <2.0 ng/mL (08-15 @ 07:53)

## 2020-08-16 NOTE — PROGRESS NOTE ADULT - ASSESSMENT
49 y/o F with PMHx of HTN, HLD, Gout, ESRD on HD MWF since 2006, admitted for DDRT.    A/P:  S/P Kidney transplant:  Transplanted on 08/14  S/P DDRT Hep C positive  Having good urine output, Scr trending down  Last HD 08/14  No current indication for HD  Consent for HD in the chart if needed  Monitor I/O  Follow up transplant Nephrologist for immunosuppression    HTN:  Better controlled, off Midodrine  Monitor BP    Hyperphosphatemia:  Improving  Low PO4 diet  Monitor PO4 level daily

## 2020-08-16 NOTE — PROGRESS NOTE ADULT - ASSESSMENT
51 y/o F with PMHx of HTN, HLD, Gout, ESRD on HD MWF since 2006, now via right brachial graft created via RUE bypass for which she is on Plavix/ASA - last dose 8/13 (UE vein thrombosis), s/p b/l AV fistula in the past, both failed. Anuric. H/O Positive Quant Gold; had BCG vaccine as a child. CXR from 6/18/2019 showed RUL 2mm nodule (new since 2014). Treated with INH/B6 8/2019-5/13/2020.  s/p HCV+ DDRT with stent placement and Simulect induction    [] s/p DDRT (ureter stented) - POD 1  - monitor u/o and renal function, Given 500cc NS bolus and Lasix 60mg IVP x 1 for low UOP. Start lasix 40 BID  - Hyperkalemia oon POD0; K now stable. Cont to monitor.  - Immuno: Envarsus 8mg; MMF 1g bid, Pred taper, Simulect induction in OR, next dose on POD 4 (8/18). F/u daily tac levels.   - PPx: bactrim/valcyte/nystatin  - Diet: Reg  - Pain: tylenol/tramadol prn  - Drains: Kim/HEMANTH  - IS/SCD/OOB  -Resume Senna, start miralax   -Decrease midodrine to 5mg BID.  - Cont ASA81, start home plavix 75mg 51 y/o F with PMHx of HTN, HLD, Gout, ESRD on HD MWF since 2006, now via right brachial graft created via RUE bypass for which she is on Plavix/ASA - last dose 8/13 (UE vein thrombosis), s/p b/l AV fistula in the past, both failed. Anuric. H/O Positive Quant Gold; had BCG vaccine as a child. CXR from 6/18/2019 showed RUL 2mm nodule (new since 2014). Treated with INH/B6 8/2019-5/13/2020.  s/p HCV+ DDRT with stent placement and Simulect induction    [] s/p DDRT (ureter stented) - POD 1  - monitor u/o and renal function, Given 500cc NS bolus and Lasix 60mg IVP x 1 for low UOP. Start lasix 40 BID  - Hyperkalemia oon POD0; K now stable. Cont to monitor.  - Immuno: Envarsus 8mg; MMF 1g bid, Pred taper, Simulect induction in OR, next dose on POD 4 (8/18). F/u daily tac levels.   - PPx: bactrim/valcyte/nystatin  - Diet: Reg  - Pain: tylenol/tramadol prn  - Drains: Kim/HEMANTH  - IS/SCD/OOB  -Resume Senna, start miralax   -Discontinue Midodrine.  - Cont ASA81, start home plavix 75mg

## 2020-08-17 LAB
ALBUMIN SERPL ELPH-MCNC: 4.1 G/DL — SIGNIFICANT CHANGE UP (ref 3.3–5)
ALP SERPL-CCNC: 64 U/L — SIGNIFICANT CHANGE UP (ref 40–120)
ALT FLD-CCNC: 22 U/L — SIGNIFICANT CHANGE UP (ref 10–45)
ANION GAP SERPL CALC-SCNC: 16 MMOL/L — SIGNIFICANT CHANGE UP (ref 5–17)
APTT BLD: 24.8 SEC — LOW (ref 27.5–35.5)
AST SERPL-CCNC: 29 U/L — SIGNIFICANT CHANGE UP (ref 10–40)
BASOPHILS # BLD AUTO: 0.02 K/UL — SIGNIFICANT CHANGE UP (ref 0–0.2)
BASOPHILS NFR BLD AUTO: 0.2 % — SIGNIFICANT CHANGE UP (ref 0–2)
BILIRUB SERPL-MCNC: 0.8 MG/DL — SIGNIFICANT CHANGE UP (ref 0.2–1.2)
BUN SERPL-MCNC: 36 MG/DL — HIGH (ref 7–23)
CALCIUM SERPL-MCNC: 11.4 MG/DL — HIGH (ref 8.4–10.5)
CHLORIDE SERPL-SCNC: 92 MMOL/L — LOW (ref 96–108)
CO2 SERPL-SCNC: 24 MMOL/L — SIGNIFICANT CHANGE UP (ref 22–31)
CREAT SERPL-MCNC: 2.63 MG/DL — HIGH (ref 0.5–1.3)
EOSINOPHIL # BLD AUTO: 0.02 K/UL — SIGNIFICANT CHANGE UP (ref 0–0.5)
EOSINOPHIL NFR BLD AUTO: 0.2 % — SIGNIFICANT CHANGE UP (ref 0–6)
GLUCOSE BLDC GLUCOMTR-MCNC: 118 MG/DL — HIGH (ref 70–99)
GLUCOSE BLDC GLUCOMTR-MCNC: 140 MG/DL — HIGH (ref 70–99)
GLUCOSE BLDC GLUCOMTR-MCNC: 143 MG/DL — HIGH (ref 70–99)
GLUCOSE BLDC GLUCOMTR-MCNC: 143 MG/DL — HIGH (ref 70–99)
GLUCOSE SERPL-MCNC: 91 MG/DL — SIGNIFICANT CHANGE UP (ref 70–99)
HCT VFR BLD CALC: 39.3 % — SIGNIFICANT CHANGE UP (ref 34.5–45)
HGB BLD-MCNC: 12 G/DL — SIGNIFICANT CHANGE UP (ref 11.5–15.5)
IMM GRANULOCYTES NFR BLD AUTO: 0.6 % — SIGNIFICANT CHANGE UP (ref 0–1.5)
INR BLD: 0.95 RATIO — SIGNIFICANT CHANGE UP (ref 0.88–1.16)
LDH SERPL L TO P-CCNC: 522 U/L — HIGH (ref 50–242)
LYMPHOCYTES # BLD AUTO: 1.01 K/UL — SIGNIFICANT CHANGE UP (ref 1–3.3)
LYMPHOCYTES # BLD AUTO: 8.3 % — LOW (ref 13–44)
MAGNESIUM SERPL-MCNC: 2.4 MG/DL — SIGNIFICANT CHANGE UP (ref 1.6–2.6)
MCHC RBC-ENTMCNC: 28.2 PG — SIGNIFICANT CHANGE UP (ref 27–34)
MCHC RBC-ENTMCNC: 30.5 GM/DL — LOW (ref 32–36)
MCV RBC AUTO: 92.3 FL — SIGNIFICANT CHANGE UP (ref 80–100)
MONOCYTES # BLD AUTO: 0.9 K/UL — SIGNIFICANT CHANGE UP (ref 0–0.9)
MONOCYTES NFR BLD AUTO: 7.4 % — SIGNIFICANT CHANGE UP (ref 2–14)
NEUTROPHILS # BLD AUTO: 10.21 K/UL — HIGH (ref 1.8–7.4)
NEUTROPHILS NFR BLD AUTO: 83.3 % — HIGH (ref 43–77)
NRBC # BLD: 0 /100 WBCS — SIGNIFICANT CHANGE UP (ref 0–0)
PHOSPHATE SERPL-MCNC: 3.5 MG/DL — SIGNIFICANT CHANGE UP (ref 2.5–4.5)
PLATELET # BLD AUTO: 227 K/UL — SIGNIFICANT CHANGE UP (ref 150–400)
POTASSIUM SERPL-MCNC: 4.5 MMOL/L — SIGNIFICANT CHANGE UP (ref 3.5–5.3)
POTASSIUM SERPL-SCNC: 4.5 MMOL/L — SIGNIFICANT CHANGE UP (ref 3.5–5.3)
PROT SERPL-MCNC: 6.8 G/DL — SIGNIFICANT CHANGE UP (ref 6–8.3)
PROTHROM AB SERPL-ACNC: 11.3 SEC — SIGNIFICANT CHANGE UP (ref 10.6–13.6)
RBC # BLD: 4.26 M/UL — SIGNIFICANT CHANGE UP (ref 3.8–5.2)
RBC # FLD: 22.3 % — HIGH (ref 10.3–14.5)
SODIUM SERPL-SCNC: 132 MMOL/L — LOW (ref 135–145)
TACROLIMUS SERPL-MCNC: 3.7 NG/ML — SIGNIFICANT CHANGE UP
WBC # BLD: 12.23 K/UL — HIGH (ref 3.8–10.5)
WBC # FLD AUTO: 12.23 K/UL — HIGH (ref 3.8–10.5)

## 2020-08-17 PROCEDURE — 99232 SBSQ HOSP IP/OBS MODERATE 35: CPT | Mod: GC

## 2020-08-17 RX ORDER — ACETAMINOPHEN 500 MG
1000 TABLET ORAL ONCE
Refills: 0 | Status: COMPLETED | OUTPATIENT
Start: 2020-08-17 | End: 2020-08-17

## 2020-08-17 RX ORDER — TACROLIMUS 5 MG/1
10 CAPSULE ORAL
Refills: 0 | Status: DISCONTINUED | OUTPATIENT
Start: 2020-08-18 | End: 2020-08-18

## 2020-08-17 RX ORDER — LIDOCAINE HCL 20 MG/ML
5 VIAL (ML) INJECTION EVERY 6 HOURS
Refills: 0 | Status: DISCONTINUED | OUTPATIENT
Start: 2020-08-17 | End: 2020-08-19

## 2020-08-17 RX ORDER — UREA 10 %
50 LOTION (ML) TOPICAL
Qty: 30 | Refills: 3 | Status: DISCONTINUED | COMMUNITY
Start: 2019-08-13 | End: 2020-08-17

## 2020-08-17 RX ORDER — ISONIAZID 300 MG/1
300 TABLET ORAL
Qty: 30 | Refills: 3 | Status: DISCONTINUED | COMMUNITY
Start: 2019-08-13 | End: 2020-08-17

## 2020-08-17 RX ORDER — OXYCODONE HYDROCHLORIDE 5 MG/1
5 TABLET ORAL ONCE
Refills: 0 | Status: DISCONTINUED | OUTPATIENT
Start: 2020-08-17 | End: 2020-08-17

## 2020-08-17 RX ORDER — TACROLIMUS 5 MG/1
2 CAPSULE ORAL ONCE
Refills: 0 | Status: COMPLETED | OUTPATIENT
Start: 2020-08-17 | End: 2020-08-17

## 2020-08-17 RX ORDER — CAMPHOR 0.45 %
25 GEL (GRAM) TOPICAL
Qty: 2 | Refills: 0 | Status: DISCONTINUED | COMMUNITY
Start: 2019-11-15 | End: 2020-08-17

## 2020-08-17 RX ORDER — CLOPIDOGREL BISULFATE 75 MG/1
75 TABLET, FILM COATED ORAL
Qty: 90 | Refills: 2 | Status: DISCONTINUED | COMMUNITY
Start: 2019-06-19 | End: 2020-08-17

## 2020-08-17 RX ORDER — PREDNISONE 20 MG/1
20 TABLET ORAL
Qty: 4 | Refills: 0 | Status: DISCONTINUED | COMMUNITY
Start: 2019-11-15 | End: 2020-08-17

## 2020-08-17 RX ADMIN — Medication 30 MILLIGRAM(S): at 05:27

## 2020-08-17 RX ADMIN — Medication 500000 UNIT(S): at 17:31

## 2020-08-17 RX ADMIN — Medication 400 MILLIGRAM(S): at 02:12

## 2020-08-17 RX ADMIN — CLOPIDOGREL BISULFATE 75 MILLIGRAM(S): 75 TABLET, FILM COATED ORAL at 11:24

## 2020-08-17 RX ADMIN — Medication 500000 UNIT(S): at 11:23

## 2020-08-17 RX ADMIN — Medication 500000 UNIT(S): at 05:26

## 2020-08-17 RX ADMIN — POLYETHYLENE GLYCOL 3350 17 GRAM(S): 17 POWDER, FOR SOLUTION ORAL at 10:34

## 2020-08-17 RX ADMIN — TACROLIMUS 8 MILLIGRAM(S): 5 CAPSULE ORAL at 07:40

## 2020-08-17 RX ADMIN — Medication 1 TABLET(S): at 11:22

## 2020-08-17 RX ADMIN — Medication 400 MILLIGRAM(S): at 14:54

## 2020-08-17 RX ADMIN — Medication 1000 MILLIGRAM(S): at 20:25

## 2020-08-17 RX ADMIN — Medication 40 MILLIGRAM(S): at 05:26

## 2020-08-17 RX ADMIN — Medication 400 MILLIGRAM(S): at 20:02

## 2020-08-17 RX ADMIN — TRAMADOL HYDROCHLORIDE 50 MILLIGRAM(S): 50 TABLET ORAL at 02:12

## 2020-08-17 RX ADMIN — Medication 30 MILLIGRAM(S): at 17:31

## 2020-08-17 RX ADMIN — TACROLIMUS 2 MILLIGRAM(S): 5 CAPSULE ORAL at 10:31

## 2020-08-17 RX ADMIN — VALGANCICLOVIR 450 MILLIGRAM(S): 450 TABLET, FILM COATED ORAL at 11:23

## 2020-08-17 RX ADMIN — MYCOPHENOLATE MOFETIL 1000 MILLIGRAM(S): 250 CAPSULE ORAL at 20:03

## 2020-08-17 RX ADMIN — FAMOTIDINE 20 MILLIGRAM(S): 10 INJECTION INTRAVENOUS at 11:24

## 2020-08-17 RX ADMIN — Medication 40 MILLIGRAM(S): at 17:31

## 2020-08-17 RX ADMIN — Medication 81 MILLIGRAM(S): at 11:24

## 2020-08-17 RX ADMIN — OXYCODONE HYDROCHLORIDE 5 MILLIGRAM(S): 5 TABLET ORAL at 23:00

## 2020-08-17 RX ADMIN — TRAMADOL HYDROCHLORIDE 50 MILLIGRAM(S): 50 TABLET ORAL at 01:34

## 2020-08-17 RX ADMIN — Medication 1000 MILLIGRAM(S): at 15:09

## 2020-08-17 RX ADMIN — MYCOPHENOLATE MOFETIL 1000 MILLIGRAM(S): 250 CAPSULE ORAL at 07:40

## 2020-08-17 RX ADMIN — Medication 500000 UNIT(S): at 22:54

## 2020-08-17 RX ADMIN — Medication 1000 MILLIGRAM(S): at 02:43

## 2020-08-17 RX ADMIN — CHLORHEXIDINE GLUCONATE 1 APPLICATION(S): 213 SOLUTION TOPICAL at 11:24

## 2020-08-17 RX ADMIN — OXYCODONE HYDROCHLORIDE 5 MILLIGRAM(S): 5 TABLET ORAL at 22:38

## 2020-08-17 NOTE — PROGRESS NOTE ADULT - PROBLEM SELECTOR PLAN 1
s/p HCV+ DDRT with stent placement and Simulect induction, good allograft function    - check HCV PCR and genotype   - continue envarsus, MMF, steroid  - continue ppx bactrim, valcyte, nystatin  - discontinue IVF, continue lasix  - continue ndiaye and HEMANTH drain for now  - monitor BMP, strict I/O  - check daily Tacrolimus trough 30 minutes before am dose (goal 8-10)

## 2020-08-17 NOTE — PROGRESS NOTE ADULT - SUBJECTIVE AND OBJECTIVE BOX
[facility] DIVISION OF KIDNEY DISEASES AND HYPERTENSION   -- FOLLOW UP NOTE --   Randall Salazar  Nephrology Fellow  Pager NS: [phone]   /  Pager LIGALI: [phone]  (after 5pm or weekend please page the on-call fellow)  --------------------------------------------------------------------------------  24 hour events/subjective:  - overnight no events reported, vitals afebrile no hypotensive episode, total UOP ndiaye 995 cc in the past 24hr  - patient seen and examined at bedside this morning without complaints  - vitals/lab/medications reviewed, noted for downtrending sCr     PAST HISTORY  --------------------------------------------------------------------------------  No significant changes to PMH, PSH, FHx, SHx, unless otherwise noted    ALLERGIES & MEDICATIONS  --------------------------------------------------------------------------------  Allergies    contrast media (iodine-based) (Urticaria)  ibuprofen (Hives)  ibuprofen/morphine (Unknown)  latex (Swelling)  morphine (Urticaria)  shellfish (Urticaria)    Intolerances    Standing Inpatient Medications  aspirin enteric coated 81 milliGRAM(s) Oral daily  chlorhexidine 2% Cloths 1 Application(s) Topical daily  clopidogrel Tablet 75 milliGRAM(s) Oral daily  dextrose 5%. 1000 milliLiter(s) IV Continuous <Continuous>  dextrose 50% Injectable 12.5 Gram(s) IV Push once  dextrose 50% Injectable 25 Gram(s) IV Push once  dextrose 50% Injectable 25 Gram(s) IV Push once  famotidine    Tablet 20 milliGRAM(s) Oral daily  furosemide    Tablet 40 milliGRAM(s) Oral two times a day  insulin lispro (HumaLOG) corrective regimen sliding scale   SubCutaneous Before meals and at bedtime  methylPREDNISolone sodium succinate Injectable 30 milliGRAM(s) IV Push two times a day  methylPREDNISolone sodium succinate Injectable   IV Push   mycophenolate mofetil 1000 milliGRAM(s) Oral <User Schedule>  nystatin    Suspension 136020 Unit(s) Swish and Swallow four times a day  trimethoprim   80 mG/sulfamethoxazole 400 mG 1 Tablet(s) Oral daily  valGANciclovir 450 milliGRAM(s) Oral daily    PRN Inpatient Medications  dextrose 40% Gel 15 Gram(s) Oral once PRN  glucagon  Injectable 1 milliGRAM(s) IntraMuscular once PRN  lidocaine 2% Gel 1 Application(s) Topical three times a day PRN  polyethylene glycol 3350 17 Gram(s) Oral every 24 hours PRN  traMADol 25 milliGRAM(s) Oral every 6 hours PRN  traMADol 50 milliGRAM(s) Oral every 6 hours PRN    REVIEW OF SYSTEMS  --------------------------------------------------------------------------------  Gen: no fever, chills, weakness  Respiratory: No dyspnea, cough  CV: No chest pain, orthopnea  GI: No abdominal pain, nausea, vomiting, diarrhea  MSK: no edema  Neuro: No dizziness, lightheadedness  Heme: No bleeding  All other systems were reviewed and are negative, except as noted.    VITALS/PHYSICAL EXAM  --------------------------------------------------------------------------------  T(C): 36.5 (08-17-20 @ 05:00), Max: 36.8 (08-16-20 @ 13:24)  HR: 111 (08-17-20 @ 09:44) (83 - 111)  BP: 150/92 (08-17-20 @ 09:44) (122/70 - 162/83)  RR: 20 (08-17-20 @ 09:44) (16 - 20)  SpO2: 94% (08-17-20 @ 09:44) (91% - 97%)  Wt(kg): --    08-16-20 @ 07:01  -  08-17-20 @ 07:00  --------------------------------------------------------  IN: 2260 mL / OUT: 1125 mL / NET: 1135 mL    08-17-20 @ 07:01  -  08-17-20 @ 11:37  --------------------------------------------------------  IN: 530 mL / OUT: 305 mL / NET: 225 mL    Physical Exam:              Gen: NAD, well-appearing on room air  	HEENT: moist mucous membrane  	Pulm: CTA B/L, no crackles  	CV: RRR, S1S2; no rub/murmur  	GI: +BS, soft, ND, HEMANTH drain c/d/i  	: zelda in place  	MSK: Warm, no edema              Neuro: AAOx3  	Psych: Normal affect and mood  	Skin: Warm    LABS/STUDIES  --------------------------------------------------------------------------------              12.0   12.23 >-----------<  227      [08-17-20 @ 05:40]              39.3     132  |  92  |  36  ----------------------------<  91      [08-17-20 @ 05:40]  4.5   |  24  |  2.63        Ca     11.4     [08-17-20 @ 05:40]      Mg     2.4     [08-17-20 @ 05:40]      Phos  3.5     [08-17-20 @ 05:40]    TPro  6.8  /  Alb  4.1  /  TBili  0.8  /  DBili  x   /  AST  29  /  ALT  22  /  AlkPhos  64  [08-17-20 @ 05:40]    PT/INR: PT 11.3 , INR 0.95       [08-17-20 @ 05:40]  PTT: 24.8       [08-17-20 @ 05:40]          [08-17-20 @ 05:40]    Creatinine Trend:  SCr 2.63 [08-17 @ 05:40]  SCr 2.74 [08-16 @ 17:25]  SCr 2.86 [08-16 @ 06:05]  SCr 5.79 [08-15 @ 05:13]  SCr 6.15 [08-15 @ 01:46]

## 2020-08-17 NOTE — PROGRESS NOTE ADULT - SUBJECTIVE AND OBJECTIVE BOX
ESTEFANIA CORDERO is a 50y Female s/p DDRT on 8/14/20. PMH is significant for HTN. Patient received Hepatitis C positive kidney    Allergies: latex, ibuprofen, morphine, contrast dye  CMV -/+    Transplant Medications  Induction  -Basiliximab 20 mg POD 0 (given in OR) and POD 4  -Methyprednisolone taper (switch to PO prednisone on POD 4)            POD 0: 500 mg IV in OR            POD 1: 125 mg IV Q12H            POD 2: 60 mg IV Q12H            POD 3: 30 mg IV Q12H        Maintenance Immunosuppression  -Tacrolimus 0.14 mg/kg daily (Adjust for goal trough: 8-10)  -Mycophenolate 1,000 mg PO Q12H  -Prednisone             POD 4: 20 mg PO Q12H            POD 5: 10 mg PO Q12H            POD 6: 5 mg PO Q12H            POD 7-: 5 mg daily     Anti-infection   -Bactrim SS tablet (frequency based on renal function)  -Valganciclovir (dose based on CMV serostatus and frequency based on renal function)  -Nystatin swish and swallow 5 mL four times daily    Surgical prophylaxis pre- and intra-operative dosing  -Cefazolin    Prophylaxis  -GI ppx: famotidine 20 mg daily  -Bowel ppx: senna  -DVT: sequential compression device  -Pain:            Mild: Acetaminophen 650 mg every 6 hours PRN           Moderate: Tramadol 25 mg every 4 hours PRN (adjust for renal function)           Severe: Tramadol 50 mg every 4 hours PRN (adjust for renal function)    Home medications  ASA 81mg daily  Plavix 75 mg daily  Amlodipine 10 mg daily  Pyridoxine 50 mg daily  Folic Acid 1 mg daily    Outpatient medication reconciliation reviewed and will be re-started appropriately.  Plan discussed with multidisciplinary team.

## 2020-08-17 NOTE — DIETITIAN INITIAL EVALUATION ADULT. - REASON INDICATOR FOR ASSESSMENT
Pt seen for post kidney transplant recipient nutrition evaluation per department protocol.   Information obtained from: medical record and pt.

## 2020-08-17 NOTE — PROGRESS NOTE ADULT - ASSESSMENT
50 year old female with ESRD was on HD , now s/p HCV+ DDRT on 8/14/2020 with stent placement and Simulect induction

## 2020-08-17 NOTE — DIETITIAN INITIAL EVALUATION ADULT. - PHYSICAL APPEARANCE
No visual signs of muscle/fat loss/other (specify)/well nourished Ht: 63 inches UBW: 140 pounds BMI: 24.8 kg/m2 IBW: 115 (+/-10%) 121.7 %IBW  Noted +1 generalized and +2 usman. arm and hand edema as per flow sheets.   Skin: no noted pressure injuries as per documentation.

## 2020-08-17 NOTE — DIETITIAN INITIAL EVALUATION ADULT. - ENERGY NEEDS
Pertinent information as per chart: Pt 51 y/o F with PMH: HTN, HLD, gout, ESRD on HD since (2006), admitted for kidney transplant, S/P DDRT (08/14) with good allograft function.

## 2020-08-17 NOTE — PROGRESS NOTE ADULT - SUBJECTIVE AND OBJECTIVE BOX
Transplant Surgery Multidisciplinary Progress Note  --------------------------------------------------------------  DDRT   Date:   8/14/2020      POD# 3    Present:  Patient seen and examined with multidisciplinary team including Transplant Surgeon: Dr. Monroy, Dr. Pennington, Transplant Nephrologist: Dr. Lake, Pharmacist: Domingo Blancas, GIUSEPPE Rider, and unit RN during am rounds. Disciplines not in attendance will be notified of the plan.     HPI: 51 y/o F with PMHx of HTN, HLD, Gout, ESRD on HD MWF since 2006, now via right brachial graft created via RUE bypass for which she is on Plavix/ASA - last dose 8/13 (UE vein thrombosis), s/p b/l AV fistula in the past, both failed. Anuric. Last HD 8/14. H/O Positive Quant Gold; had BCG vaccine as a child. CXR from 6/18/2019 showed RUL 2mm nodule (new since 2014). Treated with INH/B6 8/2019-5/13/2020. Now s/p HCV+ DDRT with stent placement and Simulect induction    Interval Events:   -POD 3 s/p DDRT; creatinine trending down 2.6 (2.7), u/o ~1L, HEMANTH with 130cc SS drainage  - On Lasix 40mg bid  - Afebrile; stable VSS. SBP ~150-160s, off midodrine   - H/H stable 12/39 (on ASA/Plavix)  - Tolerating PO intake; + flauts, no BM    Discharge planning: pending clinical improvement    Education: Medication teaching    Plan of Care: see below    MEDICATIONS  (STANDING):  aspirin enteric coated 81 milliGRAM(s) Oral daily  chlorhexidine 2% Cloths 1 Application(s) Topical daily  clopidogrel Tablet 75 milliGRAM(s) Oral daily  dextrose 5%. 1000 milliLiter(s) (50 mL/Hr) IV Continuous <Continuous>  dextrose 50% Injectable 12.5 Gram(s) IV Push once  dextrose 50% Injectable 25 Gram(s) IV Push once  dextrose 50% Injectable 25 Gram(s) IV Push once  famotidine    Tablet 20 milliGRAM(s) Oral daily  furosemide    Tablet 40 milliGRAM(s) Oral two times a day  insulin lispro (HumaLOG) corrective regimen sliding scale   SubCutaneous Before meals and at bedtime  methylPREDNISolone sodium succinate Injectable 30 milliGRAM(s) IV Push two times a day  methylPREDNISolone sodium succinate Injectable   IV Push   mycophenolate mofetil 1000 milliGRAM(s) Oral <User Schedule>  nystatin    Suspension 749813 Unit(s) Swish and Swallow four times a day  tacrolimus ER Tablet (ENVARSUS XR) 8 milliGRAM(s) Oral <User Schedule>  trimethoprim   80 mG/sulfamethoxazole 400 mG 1 Tablet(s) Oral daily  valGANciclovir 450 milliGRAM(s) Oral daily    MEDICATIONS  (PRN):  dextrose 40% Gel 15 Gram(s) Oral once PRN Blood Glucose LESS THAN 70 milliGRAM(s)/deciliter  glucagon  Injectable 1 milliGRAM(s) IntraMuscular once PRN Glucose LESS THAN 70 milligrams/deciliter  lidocaine 2% Gel 1 Application(s) Topical three times a day PRN ndiaye cath irritation  polyethylene glycol 3350 17 Gram(s) Oral every 24 hours PRN Constipation  traMADol 25 milliGRAM(s) Oral every 6 hours PRN Moderate Pain (4 - 6)  traMADol 50 milliGRAM(s) Oral every 6 hours PRN Severe Pain (7 - 10)      PAST MEDICAL & SURGICAL HISTORY:  CKD (chronic kidney disease) stage V requiring chronic dialysis  Fibroid Tumor  Infection of AV Graft for Dialysis  Clotted Renal Dialysis AV Graft  HTN - Hypertension  H/O extremity bypass graft: H/O RUE bypass and creation of right brachial graft  AV fistula: B/L - failed  History of Hysterectomy: for benign disease      Vital Signs Last 24 Hrs  T(C): 36.5 (17 Aug 2020 05:00), Max: 36.8 (16 Aug 2020 13:24)  T(F): 97.7 (17 Aug 2020 05:00), Max: 98.2 (16 Aug 2020 13:24)  HR: 83 (17 Aug 2020 07:45) (83 - 94)  BP: 154/86 (17 Aug 2020 07:45) (122/70 - 162/83)  BP(mean): 112 (17 Aug 2020 07:45) (91 - 112)  RR: 16 (17 Aug 2020 05:00) (16 - 18)  SpO2: 94% (17 Aug 2020 07:45) (91% - 97%)    I&O's Summary    16 Aug 2020 07:01  -  17 Aug 2020 07:00  --------------------------------------------------------  IN: 2260 mL / OUT: 1125 mL / NET: 1135 mL    17 Aug 2020 07:01  -  17 Aug 2020 09:30  --------------------------------------------------------  IN: 50 mL / OUT: 360 mL / NET: -310 mL                          12.0   12.23 )-----------( 227      ( 17 Aug 2020 05:40 )             39.3     08-17    132<L>  |  92<L>  |  36<H>  ----------------------------<  91  4.5   |  24  |  2.63<H>    Ca    11.4<H>      17 Aug 2020 05:40  Phos  3.5     08-17  Mg     2.4     08-17    TPro  6.8  /  Alb  4.1  /  TBili  0.8  /  DBili  x   /  AST  29  /  ALT  22  /  AlkPhos  64  08-17    Tacrolimus (), Serum: 2.9 ng/mL (08-16 @ 09:24)    Review of systems  Gen: No weight changes, fatigue, fevers/chills, weakness  Skin: No rashes  Head/Eyes/Ears/Mouth: No headache; Normal hearing; Normal vision w/o blurriness; No sinus pain/discomfort, sore throat  Respiratory: No dyspnea, cough, wheezing, hemoptysis  CV: No chest pain, PND, orthopnea  GI: Mild abdominal pain at surgical incision site; denies diarrhea, constipation, nausea, vomiting, melena, hematochezia  : No increased frequency, dysuria, hematuria, nocturia  MSK: No joint pain/swelling; no back pain; no edema  Neuro: No dizziness/lightheadedness, weakness, seizures, numbness, tingling  Heme: No easy bruising or bleeding  Endo: No heat/cold intolerance  Psych: No significant nervousness, anxiety, stress, depression  All other systems were reviewed and are negative, except as noted.    PHYSICAL EXAM:  Constitutional: Well developed / well nourished  Eyes: Anicteric, PERRLA  ENMT: nc/at  Neck: supple  Respiratory: CTA B/L  Cardiovascular: RRR  Gastrointestinal: Soft abdomen, mild tender to touch at surgical site, ND, HEMANTH x1 w/ sanguinous drainage   Genitourinary: Urinary catheter in place w/ blood tinged urine   Extremities: SCD's in place and working bilaterally  Vascular: Palpable dp pulses bilaterally  Neurological: A&O x3, following commands and responding appropriately   Skin: incision c/d/i  Musculoskeletal: Moving all extremities  Psychiatric: Responsive

## 2020-08-17 NOTE — DIETITIAN INITIAL EVALUATION ADULT. - OTHER INFO
Pt reports in general good appetite and PO intake at home, denies any changes in PO intake. Reports allergy to shellfish, states they cause itchy tongue, and lactose intolerance. Noted pt with latex allergy as per chart - pt denies having allergy to foods from the list of "latex-reactive foods". Pt reports following a diet low in potassium at home. Reports taking Vitamin B12, Ferrous Sulfate, and Folic Acid PTA; denies drinking any nutritional supplement.     Pt S/P kidney transplant recipient (08/14) - reports tolerating regular diet. Denies difficulty chewing/swallowing. Reports nausea "sometimes" without vomiting. Denies diarrhea or constipation, last BM 3 days ago (08/14). Urine output as per flow sheets (08/15) 1210 ml -> (08/16) 995 ml -> (08/17) 155 ml.    Pt denies weight changes PTA, states UBW fluctuates between 130 - 140 pounds. Weight as per flow sheets (08/14) 157 pounds -> (08/17) 170.4  pounds -?accuracy of weight fluctuations likely due to fluid shifts as pt with edema and S/P DDRT, will continue to monitor.     Provided education on post transplant nutrition therapy and food safety guidelines for transplant recipients before discharge. Discussed importance of thoroughly washing all fresh fruits/vegetables, importance of avoiding uncooked/raw/unpasteurized foods, avoiding pre-made deli/buffet/salad bar meals. Foods recommended as healthy well balanced diet and importance of adequate protein intakes for proper post-surgical healing discussed. Reviewed recommendations to avoid grapefruit, pomegranate and star fruit while taking immunosuppressant medication. Reviewed recommendations for moderate intake of sodium and carbohydrates with transplant medications. Reviewed effect of steroids on BG levels and importance of limiting concentrated sweets. Pt was receptive and expressed understanding. All questions answered. Provided nutrition handout: USDA Food Safety for Transplant Recipients booklet.

## 2020-08-17 NOTE — DIETITIAN INITIAL EVALUATION ADULT. - ETIOLOGY
Increased physiological demands for nutrients limited prior education on post-transplant nutrition therapy and food safety guidelines

## 2020-08-17 NOTE — PROGRESS NOTE ADULT - SUBJECTIVE AND OBJECTIVE BOX
[facility]   MD MEL DOBBS MD RUORU WONG, PA    TEL:  OFFICE: [phone]  DR MANCINI CELL: [phone]  TAMRA REYES CELL: [phone]  DR. ZHANG CELL: [phone]  DR. CEE CELL: [phone]    FROM 5 PM - 7 AM PLEASE CALL ANSWERING SERVICE: [phone]    RENAL FOLLOW UP NOTE  --------------------------------------------------------------------------------  HPI:      Pt seen and examined at bedside.       PAST HISTORY  --------------------------------------------------------------------------------  No significant changes to PMH, PSH, FHx, SHx, unless otherwise noted    ALLERGIES & MEDICATIONS  --------------------------------------------------------------------------------  Allergies    contrast media (iodine-based) (Urticaria)  ibuprofen (Hives)  ibuprofen/morphine (Unknown)  latex (Swelling)  morphine (Urticaria)  shellfish (Urticaria)    Intolerances      Standing Inpatient Medications  aspirin enteric coated 81 milliGRAM(s) Oral daily  chlorhexidine 2% Cloths 1 Application(s) Topical daily  clopidogrel Tablet 75 milliGRAM(s) Oral daily  dextrose 5%. 1000 milliLiter(s) IV Continuous <Continuous>  dextrose 50% Injectable 12.5 Gram(s) IV Push once  dextrose 50% Injectable 25 Gram(s) IV Push once  dextrose 50% Injectable 25 Gram(s) IV Push once  famotidine    Tablet 20 milliGRAM(s) Oral daily  furosemide    Tablet 40 milliGRAM(s) Oral two times a day  insulin lispro (HumaLOG) corrective regimen sliding scale   SubCutaneous Before meals and at bedtime  methylPREDNISolone sodium succinate Injectable 30 milliGRAM(s) IV Push two times a day  methylPREDNISolone sodium succinate Injectable   IV Push   mycophenolate mofetil 1000 milliGRAM(s) Oral <User Schedule>  nystatin    Suspension 897763 Unit(s) Swish and Swallow four times a day  sodium chloride 0.9%. 1000 milliLiter(s) IV Continuous <Continuous>  tacrolimus ER Tablet (ENVARSUS XR) 8 milliGRAM(s) Oral <User Schedule>  trimethoprim   80 mG/sulfamethoxazole 400 mG 1 Tablet(s) Oral daily  valGANciclovir 450 milliGRAM(s) Oral daily    PRN Inpatient Medications  dextrose 40% Gel 15 Gram(s) Oral once PRN  glucagon  Injectable 1 milliGRAM(s) IntraMuscular once PRN  lidocaine 2% Gel 1 Application(s) Topical three times a day PRN  polyethylene glycol 3350 17 Gram(s) Oral every 24 hours PRN  traMADol 25 milliGRAM(s) Oral every 6 hours PRN  traMADol 50 milliGRAM(s) Oral every 6 hours PRN      REVIEW OF SYSTEMS  --------------------------------------------------------------------------------  General: no fever    MSK: no edema     VITALS/PHYSICAL EXAM  --------------------------------------------------------------------------------  T(C): 36.5 (08-17-20 @ 05:00), Max: 36.8 (08-16-20 @ 13:24)  HR: 83 (08-17-20 @ 07:45) (83 - 94)  BP: 154/86 (08-17-20 @ 07:45) (122/70 - 162/83)  RR: 16 (08-17-20 @ 05:00) (16 - 18)  SpO2: 94% (08-17-20 @ 07:45) (91% - 97%)  Wt(kg): --        08-16-20 @ 07:01  -  08-17-20 @ 07:00  --------------------------------------------------------  IN: 2260 mL / OUT: 1125 mL / NET: 1135 mL      Physical Exam:  	Gen: NAD  	HEENT: MMM  	Pulm: CTA B/L  	CV: S1S2  	Abd: Soft, +BS  	Ext: No LE edema B/L                      Neuro: Awake   	Skin: Warm and Dry   	Vascular access: no hd catheter          [facility]  LABS/STUDIES  --------------------------------------------------------------------------------              12.0   12.23 >-----------<  227      [08-17-20 @ 05:40]              39.3     132  |  92  |  36  ----------------------------<  91      [08-17-20 @ 05:40]  4.5   |  24  |  2.63        Ca     11.4     [08-17-20 @ 05:40]      Mg     2.4     [08-17-20 @ 05:40]      Phos  3.5     [08-17-20 @ 05:40]    TPro  6.8  /  Alb  4.1  /  TBili  0.8  /  DBili  x   /  AST  29  /  ALT  22  /  AlkPhos  64  [08-17-20 @ 05:40]    PT/INR: PT 11.3 , INR 0.95       [08-17-20 @ 05:40]  PTT: 24.8       [08-17-20 @ 05:40]          [08-17-20 @ 05:40]    Creatinine Trend:  SCr 2.63 [08-17 @ 05:40]  SCr 2.74 [08-16 @ 17:25]  SCr 2.86 [08-16 @ 06:05]  SCr 5.79 [08-15 @ 05:13]  SCr 6.15 [08-15 @ 01:46]

## 2020-08-17 NOTE — PROGRESS NOTE ADULT - ASSESSMENT
49 y/o F with PMHx of HTN, HLD, Gout, ESRD on HD MWF since 2006, admitted for DDRT.    A/P:  S/P Kidney transplant:  Transplanted on 08/14  S/P DDRT Hep C positive  Having good urine output, Scr trending down  Last HD 08/14  No current indication for HD  Consent for HD in the chart if needed  Monitor I/O  Follow up transplant Nephrologist for immunosuppression    HTN:  BP fluctuating   Continue current meds  Monitor BP    Hypercalcemia  pt was on sensipar 30mg BID at home  Check PTH, consider starting sensipr 30mg QD    Hyperphosphatemia:  Improving  Low PO4 diet  Monitor PO4 level daily

## 2020-08-17 NOTE — DIETITIAN INITIAL EVALUATION ADULT. - ADD RECOMMEND
1. Will continue to monitor PO intake, weight, labs, skin, GI status, diet, urine output. 2. Encourage PO intake and provide food preferences. 3. Provided education on post transplant nutrition therapy and food safety guidelines for transplant recipients with handout before discharge - made aware RD remains available.

## 2020-08-17 NOTE — PROGRESS NOTE ADULT - ASSESSMENT
49 y/o F with PMHx of HTN, HLD, Gout, ESRD on HD MWF since 2006, now via right brachial graft created via RUE bypass for which she is on Plavix/ASA - last dose 8/13 (UE vein thrombosis), s/p b/l AV fistula in the past, both failed. Anuric. H/O Positive Quant Gold; had BCG vaccine as a child. CXR from 6/18/2019 showed RUL 2mm nodule (new since 2014). Treated with INH/B6 8/2019-5/13/2020.  s/p HCV+ DDRT with stent placement and Simulect induction    [] s/p DDRT (ureter stented) - POD 3  - monitor u/o and renal function  - Cont Lasix 40mg bid; d/c IVF  - Immuno: Envarsus 8mg; MMF 1g bid, Pred taper, Simulect induction; next dose 8/18  - PPx: bactrim/valcyte/nystatin  - Diet: Regurar  - Pain: tylenol/tramadol prn  - Drains: Ndiaye/HEMANTH, plan to dc ndiaye tomorrow  - IS/SCD/OOB  - Bowel regimen: senna/miralax   - Cont ASA81, plavix 75mg  - Check HCV PCR and Genotype with am labs (ordered)  - Watch BP; may need to start Nifedipine XL 30mg daily

## 2020-08-18 LAB
ALBUMIN SERPL ELPH-MCNC: 3.6 G/DL — SIGNIFICANT CHANGE UP (ref 3.3–5)
ALP SERPL-CCNC: 54 U/L — SIGNIFICANT CHANGE UP (ref 40–120)
ALT FLD-CCNC: 16 U/L — SIGNIFICANT CHANGE UP (ref 10–45)
ANION GAP SERPL CALC-SCNC: 12 MMOL/L — SIGNIFICANT CHANGE UP (ref 5–17)
ANION GAP SERPL CALC-SCNC: 13 MMOL/L — SIGNIFICANT CHANGE UP (ref 5–17)
APPEARANCE UR: ABNORMAL
APTT BLD: 25.6 SEC — LOW (ref 27.5–35.5)
AST SERPL-CCNC: 21 U/L — SIGNIFICANT CHANGE UP (ref 10–40)
BASOPHILS # BLD AUTO: 0.01 K/UL — SIGNIFICANT CHANGE UP (ref 0–0.2)
BASOPHILS NFR BLD AUTO: 0.1 % — SIGNIFICANT CHANGE UP (ref 0–2)
BILIRUB SERPL-MCNC: 0.7 MG/DL — SIGNIFICANT CHANGE UP (ref 0.2–1.2)
BUN SERPL-MCNC: 38 MG/DL — HIGH (ref 7–23)
BUN SERPL-MCNC: 38 MG/DL — HIGH (ref 7–23)
CALCIUM SERPL-MCNC: 10.9 MG/DL — HIGH (ref 8.4–10.5)
CALCIUM SERPL-MCNC: 10.9 MG/DL — HIGH (ref 8.4–10.5)
CHLORIDE SERPL-SCNC: 93 MMOL/L — LOW (ref 96–108)
CHLORIDE SERPL-SCNC: 95 MMOL/L — LOW (ref 96–108)
CO2 SERPL-SCNC: 23 MMOL/L — SIGNIFICANT CHANGE UP (ref 22–31)
CO2 SERPL-SCNC: 23 MMOL/L — SIGNIFICANT CHANGE UP (ref 22–31)
COLOR SPEC: SIGNIFICANT CHANGE UP
CREAT FLD-MCNC: 2.19 MG/DL — SIGNIFICANT CHANGE UP
CREAT SERPL-MCNC: 2.02 MG/DL — HIGH (ref 0.5–1.3)
CREAT SERPL-MCNC: 2.12 MG/DL — HIGH (ref 0.5–1.3)
EOSINOPHIL # BLD AUTO: 0.02 K/UL — SIGNIFICANT CHANGE UP (ref 0–0.5)
EOSINOPHIL NFR BLD AUTO: 0.2 % — SIGNIFICANT CHANGE UP (ref 0–6)
GLUCOSE BLDC GLUCOMTR-MCNC: 132 MG/DL — HIGH (ref 70–99)
GLUCOSE BLDC GLUCOMTR-MCNC: 141 MG/DL — HIGH (ref 70–99)
GLUCOSE BLDC GLUCOMTR-MCNC: 155 MG/DL — HIGH (ref 70–99)
GLUCOSE BLDC GLUCOMTR-MCNC: 95 MG/DL — SIGNIFICANT CHANGE UP (ref 70–99)
GLUCOSE SERPL-MCNC: 131 MG/DL — HIGH (ref 70–99)
GLUCOSE SERPL-MCNC: 95 MG/DL — SIGNIFICANT CHANGE UP (ref 70–99)
HCT VFR BLD CALC: 34.1 % — LOW (ref 34.5–45)
HGB BLD-MCNC: 10.7 G/DL — LOW (ref 11.5–15.5)
IMM GRANULOCYTES NFR BLD AUTO: 0.6 % — SIGNIFICANT CHANGE UP (ref 0–1.5)
INR BLD: 0.93 RATIO — SIGNIFICANT CHANGE UP (ref 0.88–1.16)
LDH SERPL L TO P-CCNC: 439 U/L — HIGH (ref 50–242)
LYMPHOCYTES # BLD AUTO: 0.67 K/UL — LOW (ref 1–3.3)
LYMPHOCYTES # BLD AUTO: 7.8 % — LOW (ref 13–44)
MAGNESIUM SERPL-MCNC: 2.2 MG/DL — SIGNIFICANT CHANGE UP (ref 1.6–2.6)
MCHC RBC-ENTMCNC: 28.2 PG — SIGNIFICANT CHANGE UP (ref 27–34)
MCHC RBC-ENTMCNC: 31.4 GM/DL — LOW (ref 32–36)
MCV RBC AUTO: 89.7 FL — SIGNIFICANT CHANGE UP (ref 80–100)
MONOCYTES # BLD AUTO: 0.7 K/UL — SIGNIFICANT CHANGE UP (ref 0–0.9)
MONOCYTES NFR BLD AUTO: 8.2 % — SIGNIFICANT CHANGE UP (ref 2–14)
NEUTROPHILS # BLD AUTO: 7.11 K/UL — SIGNIFICANT CHANGE UP (ref 1.8–7.4)
NEUTROPHILS NFR BLD AUTO: 83.1 % — HIGH (ref 43–77)
NRBC # BLD: 0 /100 WBCS — SIGNIFICANT CHANGE UP (ref 0–0)
PHOSPHATE SERPL-MCNC: 2.9 MG/DL — SIGNIFICANT CHANGE UP (ref 2.5–4.5)
PLATELET # BLD AUTO: 190 K/UL — SIGNIFICANT CHANGE UP (ref 150–400)
POTASSIUM SERPL-MCNC: 4.2 MMOL/L — SIGNIFICANT CHANGE UP (ref 3.5–5.3)
POTASSIUM SERPL-MCNC: 4.3 MMOL/L — SIGNIFICANT CHANGE UP (ref 3.5–5.3)
POTASSIUM SERPL-SCNC: 4.2 MMOL/L — SIGNIFICANT CHANGE UP (ref 3.5–5.3)
POTASSIUM SERPL-SCNC: 4.3 MMOL/L — SIGNIFICANT CHANGE UP (ref 3.5–5.3)
PROT SERPL-MCNC: 5.8 G/DL — LOW (ref 6–8.3)
PROTHROM AB SERPL-ACNC: 11.1 SEC — SIGNIFICANT CHANGE UP (ref 10.6–13.6)
RBC # BLD: 3.8 M/UL — SIGNIFICANT CHANGE UP (ref 3.8–5.2)
RBC # FLD: 21.9 % — HIGH (ref 10.3–14.5)
SODIUM SERPL-SCNC: 129 MMOL/L — LOW (ref 135–145)
SODIUM SERPL-SCNC: 130 MMOL/L — LOW (ref 135–145)
SP GR SPEC: 1.01 — SIGNIFICANT CHANGE UP (ref 1.01–1.02)
TACROLIMUS SERPL-MCNC: 4 NG/ML — SIGNIFICANT CHANGE UP
WBC # BLD: 8.56 K/UL — SIGNIFICANT CHANGE UP (ref 3.8–10.5)
WBC # FLD AUTO: 8.56 K/UL — SIGNIFICANT CHANGE UP (ref 3.8–10.5)

## 2020-08-18 PROCEDURE — 99232 SBSQ HOSP IP/OBS MODERATE 35: CPT | Mod: GC

## 2020-08-18 RX ORDER — ASPIRIN/CALCIUM CARB/MAGNESIUM 324 MG
1 TABLET ORAL
Qty: 0 | Refills: 0 | DISCHARGE
Start: 2020-08-18

## 2020-08-18 RX ORDER — TACROLIMUS 5 MG/1
3 CAPSULE ORAL ONCE
Refills: 0 | Status: COMPLETED | OUTPATIENT
Start: 2020-08-18 | End: 2020-08-18

## 2020-08-18 RX ORDER — ACETAMINOPHEN 500 MG
650 TABLET ORAL ONCE
Refills: 0 | Status: COMPLETED | OUTPATIENT
Start: 2020-08-18 | End: 2020-08-18

## 2020-08-18 RX ORDER — DIPHENHYDRAMINE HCL 50 MG
25 CAPSULE ORAL ONCE
Refills: 0 | Status: COMPLETED | OUTPATIENT
Start: 2020-08-18 | End: 2020-08-18

## 2020-08-18 RX ORDER — SENNA PLUS 8.6 MG/1
2 TABLET ORAL ONCE
Refills: 0 | Status: COMPLETED | OUTPATIENT
Start: 2020-08-18 | End: 2020-08-18

## 2020-08-18 RX ORDER — ASPIRIN/CALCIUM CARB/MAGNESIUM 324 MG
1 TABLET ORAL
Qty: 0 | Refills: 0 | DISCHARGE

## 2020-08-18 RX ORDER — SENNA PLUS 8.6 MG/1
2 TABLET ORAL AT BEDTIME
Refills: 0 | Status: DISCONTINUED | OUTPATIENT
Start: 2020-08-19 | End: 2020-08-19

## 2020-08-18 RX ORDER — SEVELAMER CARBONATE 2400 MG/1
3 POWDER, FOR SUSPENSION ORAL
Qty: 0 | Refills: 0 | DISCHARGE

## 2020-08-18 RX ORDER — LORATADINE 10 MG/1
1 TABLET ORAL
Qty: 0 | Refills: 0 | DISCHARGE

## 2020-08-18 RX ORDER — VALGANCICLOVIR 450 MG/1
1 TABLET, FILM COATED ORAL
Qty: 0 | Refills: 0 | DISCHARGE
Start: 2020-08-18

## 2020-08-18 RX ORDER — NYSTATIN 500MM UNIT
5 POWDER (EA) MISCELLANEOUS
Qty: 0 | Refills: 0 | DISCHARGE
Start: 2020-08-18

## 2020-08-18 RX ORDER — ACETAMINOPHEN 500 MG
1000 TABLET ORAL ONCE
Refills: 0 | Status: COMPLETED | OUTPATIENT
Start: 2020-08-18 | End: 2020-08-18

## 2020-08-18 RX ORDER — PYRIDOXINE HCL (VITAMIN B6) 100 MG
1 TABLET ORAL
Qty: 0 | Refills: 0 | DISCHARGE

## 2020-08-18 RX ORDER — MYCOPHENOLATE MOFETIL 250 MG/1
1 CAPSULE ORAL
Qty: 0 | Refills: 0 | DISCHARGE
Start: 2020-08-18

## 2020-08-18 RX ORDER — FAMOTIDINE 10 MG/ML
1 INJECTION INTRAVENOUS
Qty: 0 | Refills: 0 | DISCHARGE
Start: 2020-08-18

## 2020-08-18 RX ORDER — TACROLIMUS 5 MG/1
13 CAPSULE ORAL
Refills: 0 | Status: DISCONTINUED | OUTPATIENT
Start: 2020-08-19 | End: 2020-08-19

## 2020-08-18 RX ORDER — SENNA PLUS 8.6 MG/1
2 TABLET ORAL AT BEDTIME
Refills: 0 | Status: DISCONTINUED | OUTPATIENT
Start: 2020-08-18 | End: 2020-08-18

## 2020-08-18 RX ORDER — CINACALCET 30 MG/1
1 TABLET, FILM COATED ORAL
Qty: 0 | Refills: 0 | DISCHARGE

## 2020-08-18 RX ADMIN — SENNA PLUS 2 TABLET(S): 8.6 TABLET ORAL at 23:30

## 2020-08-18 RX ADMIN — Medication 500000 UNIT(S): at 23:30

## 2020-08-18 RX ADMIN — MYCOPHENOLATE MOFETIL 1000 MILLIGRAM(S): 250 CAPSULE ORAL at 19:58

## 2020-08-18 RX ADMIN — Medication 500000 UNIT(S): at 18:18

## 2020-08-18 RX ADMIN — Medication 20 MILLIGRAM(S): at 18:18

## 2020-08-18 RX ADMIN — Medication 20 MILLIGRAM(S): at 06:18

## 2020-08-18 RX ADMIN — VALGANCICLOVIR 450 MILLIGRAM(S): 450 TABLET, FILM COATED ORAL at 11:44

## 2020-08-18 RX ADMIN — FAMOTIDINE 20 MILLIGRAM(S): 10 INJECTION INTRAVENOUS at 11:44

## 2020-08-18 RX ADMIN — Medication 500000 UNIT(S): at 11:44

## 2020-08-18 RX ADMIN — Medication 81 MILLIGRAM(S): at 11:44

## 2020-08-18 RX ADMIN — Medication 5 MILLILITER(S): at 01:32

## 2020-08-18 RX ADMIN — Medication 500000 UNIT(S): at 06:18

## 2020-08-18 RX ADMIN — Medication 25 MILLIGRAM(S): at 03:05

## 2020-08-18 RX ADMIN — Medication 1: at 22:04

## 2020-08-18 RX ADMIN — MYCOPHENOLATE MOFETIL 1000 MILLIGRAM(S): 250 CAPSULE ORAL at 09:07

## 2020-08-18 RX ADMIN — TRAMADOL HYDROCHLORIDE 50 MILLIGRAM(S): 50 TABLET ORAL at 20:24

## 2020-08-18 RX ADMIN — Medication 1 TABLET(S): at 11:44

## 2020-08-18 RX ADMIN — Medication 40 MILLIGRAM(S): at 18:18

## 2020-08-18 RX ADMIN — TRAMADOL HYDROCHLORIDE 50 MILLIGRAM(S): 50 TABLET ORAL at 20:54

## 2020-08-18 RX ADMIN — TACROLIMUS 10 MILLIGRAM(S): 5 CAPSULE ORAL at 09:07

## 2020-08-18 RX ADMIN — Medication 40 MILLIGRAM(S): at 06:18

## 2020-08-18 RX ADMIN — TACROLIMUS 3 MILLIGRAM(S): 5 CAPSULE ORAL at 09:10

## 2020-08-18 RX ADMIN — TRAMADOL HYDROCHLORIDE 50 MILLIGRAM(S): 50 TABLET ORAL at 06:45

## 2020-08-18 RX ADMIN — BASILIXIMAB 100 MILLIGRAM(S): 20 INJECTION, POWDER, FOR SOLUTION INTRAVENOUS at 12:12

## 2020-08-18 RX ADMIN — TRAMADOL HYDROCHLORIDE 50 MILLIGRAM(S): 50 TABLET ORAL at 06:16

## 2020-08-18 RX ADMIN — POLYETHYLENE GLYCOL 3350 17 GRAM(S): 17 POWDER, FOR SOLUTION ORAL at 13:42

## 2020-08-18 RX ADMIN — Medication 400 MILLIGRAM(S): at 23:30

## 2020-08-18 NOTE — PROGRESS NOTE ADULT - SUBJECTIVE AND OBJECTIVE BOX
[facility] DIVISION OF KIDNEY DISEASES AND HYPERTENSION   -- FOLLOW UP NOTE --   Randall Salazar  Nephrology Fellow  Pager [facility]: [phone]   /  Pager [facility]: [phone]  (after 5pm or weekend please page the on-call fellow)  --------------------------------------------------------------------------------  24 hour events/subjective:  - overnight no events reported, vitals afebrile no hypotensive episode, total UOP ndiaye 1.8 liter in the past 24hr  - patient seen and examined at bedside this morning  - vitals/lab/medications reviewed, noted for downtrending sCr 2.6 to 2.1, sNa 129    PAST HISTORY  --------------------------------------------------------------------------------  No significant changes to PMH, PSH, FHx, SHx, unless otherwise noted    ALLERGIES & MEDICATIONS  --------------------------------------------------------------------------------  Allergies    contrast media (iodine-based) (Urticaria)  ibuprofen (Hives)  ibuprofen/morphine (Unknown)  latex (Swelling)  morphine (Urticaria)  shellfish (Urticaria)    Intolerances    Standing Inpatient Medications  acetaminophen   Tablet .. 650 milliGRAM(s) Oral once  aspirin enteric coated 81 milliGRAM(s) Oral daily  basiliximab  IVPB 20 milliGRAM(s) IV Intermittent once  chlorhexidine 2% Cloths 1 Application(s) Topical daily  dextrose 5%. 1000 milliLiter(s) IV Continuous <Continuous>  dextrose 50% Injectable 12.5 Gram(s) IV Push once  dextrose 50% Injectable 25 Gram(s) IV Push once  dextrose 50% Injectable 25 Gram(s) IV Push once  famotidine    Tablet 20 milliGRAM(s) Oral daily  furosemide    Tablet 40 milliGRAM(s) Oral two times a day  insulin lispro (HumaLOG) corrective regimen sliding scale   SubCutaneous Before meals and at bedtime  mycophenolate mofetil 1000 milliGRAM(s) Oral <User Schedule>  nystatin    Suspension 315370 Unit(s) Swish and Swallow four times a day  predniSONE   Tablet 20 milliGRAM(s) Oral two times a day  tacrolimus ER Tablet (ENVARSUS XR) 10 milliGRAM(s) Oral <User Schedule>  tacrolimus ER Tablet (ENVARSUS XR) 3 milliGRAM(s) Oral once  trimethoprim   80 mG/sulfamethoxazole 400 mG 1 Tablet(s) Oral daily  valGANciclovir 450 milliGRAM(s) Oral daily    PRN Inpatient Medications  dextrose 40% Gel 15 Gram(s) Oral once PRN  glucagon  Injectable 1 milliGRAM(s) IntraMuscular once PRN  lidocaine 2% Gel 1 Application(s) Topical three times a day PRN  lidocaine 2% Jelly 5 milliLiter(s) IntraUrethral every 6 hours PRN  polyethylene glycol 3350 17 Gram(s) Oral every 24 hours PRN  traMADol 25 milliGRAM(s) Oral every 6 hours PRN  traMADol 50 milliGRAM(s) Oral every 6 hours PRN    REVIEW OF SYSTEMS  --------------------------------------------------------------------------------  Gen: no fever, chills, weakness  Respiratory: No dyspnea, cough  CV: No chest pain, orthopnea  GI: No abdominal pain, nausea, vomiting, diarrhea  MSK: no edema  Neuro: No dizziness, lightheadedness  All other systems were reviewed and are negative, except as noted.    VITALS/PHYSICAL EXAM  --------------------------------------------------------------------------------  T(C): 36.4 (08-18-20 @ 09:00), Max: 36.7 (08-17-20 @ 17:00)  HR: 80 (08-18-20 @ 09:00) (78 - 97)  BP: 143/88 (08-18-20 @ 09:00) (128/80 - 145/81)  RR: 18 (08-18-20 @ 09:00) (18 - 18)  SpO2: 96% (08-18-20 @ 09:00) (96% - 98%)  Wt(kg): --    08-17-20 @ 07:01  -  08-18-20 @ 07:00  --------------------------------------------------------  IN: 1590 mL / OUT: 1930 mL / NET: -340 mL    08-18-20 @ 07:01  -  08-18-20 @ 10:07  --------------------------------------------------------  IN: 180 mL / OUT: 200 mL / NET: -20 mL    Physical Exam:              Gen: NAD, well-appearing on room air  	HEENT: moist mucous membrane  	Pulm: CTA B/L, no crackles  	CV: RRR, S1S2; no rub/murmur  	GI: +BS, soft, ND, HEMANTH drain c/d/i  	: zelda in place  	MSK: Warm, no edema              Neuro: AAOx3  	Psych: Normal affect and mood  	Skin: Warm    LABS/STUDIES  --------------------------------------------------------------------------------              10.7   8.56  >-----------<  190      [08-18-20 @ 06:23]              34.1     129  |  93  |  38  ----------------------------<  95      [08-18-20 @ 06:23]  4.2   |  23  |  2.12        Ca     10.9     [08-18-20 @ 06:23]      Mg     2.2     [08-18-20 @ 06:23]      Phos  2.9     [08-18-20 @ 06:23]    TPro  5.8  /  Alb  3.6  /  TBili  0.7  /  DBili  x   /  AST  21  /  ALT  16  /  AlkPhos  54  [08-18-20 @ 06:23]    PT/INR: PT 11.1 , INR 0.93       [08-18-20 @ 06:23]  PTT: 25.6       [08-18-20 @ 06:23]          [08-18-20 @ 06:23]    Creatinine Trend:  SCr 2.12 [08-18 @ 06:23]  SCr 2.63 [08-17 @ 05:40]  SCr 2.74 [08-16 @ 17:25]  SCr 2.86 [08-16 @ 06:05]  SCr 5.79 [08-15 @ 05:13]

## 2020-08-18 NOTE — PROGRESS NOTE ADULT - ASSESSMENT
51 y/o F with PMHx of HTN, HLD, Gout, ESRD on HD MWF since 2006, admitted for DDRT.    A/P:  S/P Kidney transplant:  Transplanted on 08/14  S/P DDRT Hep C positive  Having good urine output, Scr trending down  Last HD 08/14  No current indication for HD  Consent for HD in the chart if needed  Monitor I/O  Follow up transplant Nephrologist for immunosuppression    HTN:  BP fluctuating   Continue current meds  Monitor BP    Hypercalcemia  pt was on sensipar 30mg BID at home  Check PTH, consider starting sensipr 30mg QD    Hyperphosphatemia:  Improving  Low PO4 diet  Monitor PO4 level daily

## 2020-08-18 NOTE — PROGRESS NOTE ADULT - SUBJECTIVE AND OBJECTIVE BOX
Transplant Surgery Multidisciplinary Progress Note  --------------------------------------------------------------  DDRT   Date:   8/14/2020      POD# 3    Present:  Patient seen and examined with multidisciplinary team including Transplant Surgeon: Dr. Monroy, Dr. Pennington, Transplant Nephrologist: Dr. Lake, Pharmacist: Domingo Blancas, GIUSEPPE Rider, and unit RN during am rounds. Disciplines not in attendance will be notified of the plan.     HPI: 49 y/o F with PMHx of HTN, HLD, Gout, ESRD on HD MWF since 2006, now via right brachial graft created via RUE bypass for which she is on Plavix/ASA - last dose 8/13 (UE vein thrombosis), s/p b/l AV fistula in the past, both failed. Anuric. Last HD 8/14. H/O Positive Quant Gold; had BCG vaccine as a child. CXR from 6/18/2019 showed RUL 2mm nodule (new since 2014). Treated with INH/B6 8/2019-5/13/2020. Now s/p HCV+ DDRT with stent placement and Simulect induction    Interval Events:   -POD 3 s/p DDRT; creatinine trending down 2.6 (2.7), u/o ~1L, HEMANTH with 130cc SS drainage  - On Lasix 40mg bid  - Afebrile; stable VSS. SBP ~150-160s, off midodrine   - H/H stable 12/39 (on ASA/Plavix)  - Tolerating PO intake; + flauts, no BM    Discharge planning: pending clinical improvement    Education: Medication teaching    Plan of Care: see below    MEDICATIONS  (STANDING):  aspirin enteric coated 81 milliGRAM(s) Oral daily  chlorhexidine 2% Cloths 1 Application(s) Topical daily  clopidogrel Tablet 75 milliGRAM(s) Oral daily  dextrose 5%. 1000 milliLiter(s) (50 mL/Hr) IV Continuous <Continuous>  dextrose 50% Injectable 12.5 Gram(s) IV Push once  dextrose 50% Injectable 25 Gram(s) IV Push once  dextrose 50% Injectable 25 Gram(s) IV Push once  famotidine    Tablet 20 milliGRAM(s) Oral daily  furosemide    Tablet 40 milliGRAM(s) Oral two times a day  insulin lispro (HumaLOG) corrective regimen sliding scale   SubCutaneous Before meals and at bedtime  methylPREDNISolone sodium succinate Injectable 30 milliGRAM(s) IV Push two times a day  methylPREDNISolone sodium succinate Injectable   IV Push   mycophenolate mofetil 1000 milliGRAM(s) Oral <User Schedule>  nystatin    Suspension 886050 Unit(s) Swish and Swallow four times a day  tacrolimus ER Tablet (ENVARSUS XR) 8 milliGRAM(s) Oral <User Schedule>  trimethoprim   80 mG/sulfamethoxazole 400 mG 1 Tablet(s) Oral daily  valGANciclovir 450 milliGRAM(s) Oral daily    MEDICATIONS  (PRN):  dextrose 40% Gel 15 Gram(s) Oral once PRN Blood Glucose LESS THAN 70 milliGRAM(s)/deciliter  glucagon  Injectable 1 milliGRAM(s) IntraMuscular once PRN Glucose LESS THAN 70 milligrams/deciliter  lidocaine 2% Gel 1 Application(s) Topical three times a day PRN ndiaye cath irritation  polyethylene glycol 3350 17 Gram(s) Oral every 24 hours PRN Constipation  traMADol 25 milliGRAM(s) Oral every 6 hours PRN Moderate Pain (4 - 6)  traMADol 50 milliGRAM(s) Oral every 6 hours PRN Severe Pain (7 - 10)      PAST MEDICAL & SURGICAL HISTORY:  CKD (chronic kidney disease) stage V requiring chronic dialysis  Fibroid Tumor  Infection of AV Graft for Dialysis  Clotted Renal Dialysis AV Graft  HTN - Hypertension  H/O extremity bypass graft: H/O RUE bypass and creation of right brachial graft  AV fistula: B/L - failed  History of Hysterectomy: for benign disease      Vital Signs Last 24 Hrs  T(C): 36.5 (17 Aug 2020 05:00), Max: 36.8 (16 Aug 2020 13:24)  T(F): 97.7 (17 Aug 2020 05:00), Max: 98.2 (16 Aug 2020 13:24)  HR: 83 (17 Aug 2020 07:45) (83 - 94)  BP: 154/86 (17 Aug 2020 07:45) (122/70 - 162/83)  BP(mean): 112 (17 Aug 2020 07:45) (91 - 112)  RR: 16 (17 Aug 2020 05:00) (16 - 18)  SpO2: 94% (17 Aug 2020 07:45) (91% - 97%)    I&O's Summary    16 Aug 2020 07:01  -  17 Aug 2020 07:00  --------------------------------------------------------  IN: 2260 mL / OUT: 1125 mL / NET: 1135 mL    17 Aug 2020 07:01  -  17 Aug 2020 09:30  --------------------------------------------------------  IN: 50 mL / OUT: 360 mL / NET: -310 mL                          12.0   12.23 )-----------( 227      ( 17 Aug 2020 05:40 )             39.3     08-17    132<L>  |  92<L>  |  36<H>  ----------------------------<  91  4.5   |  24  |  2.63<H>    Ca    11.4<H>      17 Aug 2020 05:40  Phos  3.5     08-17  Mg     2.4     08-17    TPro  6.8  /  Alb  4.1  /  TBili  0.8  /  DBili  x   /  AST  29  /  ALT  22  /  AlkPhos  64  08-17    Tacrolimus (), Serum: 2.9 ng/mL (08-16 @ 09:24)    Review of systems  Gen: No weight changes, fatigue, fevers/chills, weakness  Skin: No rashes  Head/Eyes/Ears/Mouth: No headache; Normal hearing; Normal vision w/o blurriness; No sinus pain/discomfort, sore throat  Respiratory: No dyspnea, cough, wheezing, hemoptysis  CV: No chest pain, PND, orthopnea  GI: Mild abdominal pain at surgical incision site; denies diarrhea, constipation, nausea, vomiting, melena, hematochezia  : No increased frequency, dysuria, hematuria, nocturia  MSK: No joint pain/swelling; no back pain; no edema  Neuro: No dizziness/lightheadedness, weakness, seizures, numbness, tingling  Heme: No easy bruising or bleeding  Endo: No heat/cold intolerance  Psych: No significant nervousness, anxiety, stress, depression  All other systems were reviewed and are negative, except as noted.    PHYSICAL EXAM:  Constitutional: Well developed / well nourished  Eyes: Anicteric, PERRLA  ENMT: nc/at  Neck: supple  Respiratory: CTA B/L  Cardiovascular: RRR  Gastrointestinal: Soft abdomen, mild tender to touch at surgical site, ND, HEMANTH x1 w/ sanguinous drainage   Genitourinary: Urinary catheter in place w/ blood tinged urine   Extremities: SCD's in place and working bilaterally  Vascular: Palpable dp pulses bilaterally  Neurological: A&O x3, following commands and responding appropriately   Skin: incision c/d/i  Musculoskeletal: Moving all extremities  Psychiatric: Responsive Transplant Surgery Multidisciplinary Progress Note  --------------------------------------------------------------  DDRT   Date:   8/14/2020      POD# 4    Present:  Patient seen and examined with multidisciplinary team including Transplant Surgeon: Dr. li, Dr. Pennington, Transplant Nephrologist: Dr. Lake, Pharmacist: Domingo Blancas, GIUSEPPE Rider, NP Eneida Rosa and unit RN during am rounds. Disciplines not in attendance will be notified of the plan.     HPI: 51 y/o F with PMHx of HTN, HLD, Gout, ESRD on HD MWF since 2006, now via right brachial graft created via RUE bypass for which she is on Plavix/ASA - last dose 8/13 (UE vein thrombosis), s/p b/l AV fistula in the past, both failed. Anuric. Last HD 8/14. H/O Positive Quant Gold; had BCG vaccine as a child. CXR from 6/18/2019 showed RUL 2mm nodule (new since 2014). Treated with INH/B6 8/2019-5/13/2020. Now s/p HCV+ DDRT with stent placement and Simulect induction    Interval Events:   - Creatinine trending down 2.1 from 2.6    BUN 38 from 36    UOP 1.8L   on Lasix 40mg BID     Na 129      HEMANTH with 120cc SS drainage  - Plavix held for hematuria       - TMax 36.7     - Tolerating regular diet     IVF DCd      + flatus    Small BM      Discharge planning: pending clinical improvement    Education: Medication teaching    Plan of Care: see below      MEDICATIONS  (STANDING):  acetaminophen   Tablet .. 650 milliGRAM(s) Oral once  aspirin enteric coated 81 milliGRAM(s) Oral daily  basiliximab  IVPB 20 milliGRAM(s) IV Intermittent once  chlorhexidine 2% Cloths 1 Application(s) Topical daily  dextrose 5%. 1000 milliLiter(s) (50 mL/Hr) IV Continuous <Continuous>  dextrose 50% Injectable 12.5 Gram(s) IV Push once  dextrose 50% Injectable 25 Gram(s) IV Push once  dextrose 50% Injectable 25 Gram(s) IV Push once  famotidine    Tablet 20 milliGRAM(s) Oral daily  furosemide    Tablet 40 milliGRAM(s) Oral two times a day  insulin lispro (HumaLOG) corrective regimen sliding scale   SubCutaneous Before meals and at bedtime  mycophenolate mofetil 1000 milliGRAM(s) Oral <User Schedule>  nystatin    Suspension 050102 Unit(s) Swish and Swallow four times a day  predniSONE   Tablet 20 milliGRAM(s) Oral two times a day  tacrolimus ER Tablet (ENVARSUS XR) 3 milliGRAM(s) Oral once  trimethoprim   80 mG/sulfamethoxazole 400 mG 1 Tablet(s) Oral daily  valGANciclovir 450 milliGRAM(s) Oral daily    MEDICATIONS  (PRN):  dextrose 40% Gel 15 Gram(s) Oral once PRN Blood Glucose LESS THAN 70 milliGRAM(s)/deciliter  glucagon  Injectable 1 milliGRAM(s) IntraMuscular once PRN Glucose LESS THAN 70 milligrams/deciliter  lidocaine 2% Gel 1 Application(s) Topical three times a day PRN ndiaye cath irritation  lidocaine 2% Jelly 5 milliLiter(s) IntraUrethral every 6 hours PRN ndiaye discomfort  polyethylene glycol 3350 17 Gram(s) Oral every 24 hours PRN Constipation  traMADol 25 milliGRAM(s) Oral every 6 hours PRN Moderate Pain (4 - 6)  traMADol 50 milliGRAM(s) Oral every 6 hours PRN Severe Pain (7 - 10)      PAST MEDICAL & SURGICAL HISTORY:  CKD (chronic kidney disease) stage V requiring chronic dialysis  Fibroid Tumor  Infection of AV Graft for Dialysis  Clotted Renal Dialysis AV Graft  HTN - Hypertension  H/O extremity bypass graft: H/O RUE bypass and creation of right brachial graft  AV fistula: B/L - failed  History of Hysterectomy: for benign disease      Vital Signs Last 24 Hrs  T(C): 36.4 (18 Aug 2020 09:00), Max: 36.7 (17 Aug 2020 17:00)  T(F): 97.6 (18 Aug 2020 09:00), Max: 98 (17 Aug 2020 17:00)  HR: 80 (18 Aug 2020 09:00) (78 - 97)  BP: 143/88 (18 Aug 2020 09:00) (128/80 - 145/81)  BP(mean): --  RR: 18 (18 Aug 2020 09:00) (18 - 18)  SpO2: 96% (18 Aug 2020 09:00) (96% - 98%)    I&O's Summary    17 Aug 2020 07:01  -  18 Aug 2020 07:00  --------------------------------------------------------  IN: 1590 mL / OUT: 1930 mL / NET: -340 mL    18 Aug 2020 07:01  -  18 Aug 2020 11:32  --------------------------------------------------------  IN: 300 mL / OUT: 360 mL / NET: -60 mL                              10.7   8.56  )-----------( 190      ( 18 Aug 2020 06:23 )             34.1     08-18    129<L>  |  93<L>  |  38<H>  ----------------------------<  95  4.2   |  23  |  2.12<H>    Ca    10.9<H>      18 Aug 2020 06:23  Phos  2.9     08-18  Mg     2.2     08-18    TPro  5.8<L>  /  Alb  3.6  /  TBili  0.7  /  DBili  x   /  AST  21  /  ALT  16  /  AlkPhos  54  08-18    Tacrolimus (), Serum: 4.0 ng/mL (08-18 @ 07:20)      Review of systems  Gen: No weight changes, fatigue, fevers/chills, weakness  Skin: No rashes  Head/Eyes/Ears/Mouth: No headache; Normal hearing; Normal vision w/o blurriness; No sinus pain/discomfort, sore throat  Respiratory: No dyspnea, cough, wheezing, hemoptysis  CV: No chest pain, PND, orthopnea  GI: Mild abdominal pain at surgical incision site; denies diarrhea, constipation, nausea, vomiting, melena, hematochezia  : No increased frequency, dysuria, hematuria, nocturia  MSK: No joint pain/swelling; no back pain; no edema  Neuro: No dizziness/lightheadedness, weakness, seizures, numbness, tingling  Heme: No easy bruising or bleeding  Endo: No heat/cold intolerance  Psych: No significant nervousness, anxiety, stress, depression  All other systems were reviewed and are negative, except as noted.    PHYSICAL EXAM:  Constitutional: Well developed / well nourished  Eyes: Anicteric, PERRLA  ENMT: nc/at  Neck: supple  Respiratory: CTA B/L  Cardiovascular: RRR  Gastrointestinal: Soft abdomen, mild tender to touch at surgical site, ND, HEMANTH x1 w/ sanguinous drainage   Genitourinary: Urinary catheter in place w/ blood tinged urine   Extremities: SCD's in place and working bilaterally  Vascular: Palpable dp pulses bilaterally  Neurological: A&O x3, following commands and responding appropriately   Skin: incision c/d/i  Musculoskeletal: Moving all extremities  Psychiatric: Responsive

## 2020-08-18 NOTE — PROGRESS NOTE ADULT - ASSESSMENT
51 y/o F with PMHx of HTN, HLD, Gout, ESRD on HD MWF since 2006, now via right brachial graft created via RUE bypass for which she is on Plavix/ASA - last dose 8/13 (UE vein thrombosis), s/p b/l AV fistula in the past, both failed. Anuric. H/O Positive Quant Gold; had BCG vaccine as a child. CXR from 6/18/2019 showed RUL 2mm nodule (new since 2014). Treated with INH/B6 8/2019-5/13/2020.  s/p HCV+ DDRT with stent placement and Simulect induction    [] s/p DDRT (ureter stented) - POD 3  - monitor u/o and renal function  - Cont Lasix 40mg bid; d/c IVF  - Immuno: Envarsus 8mg; MMF 1g bid, Pred taper, Simulect induction; next dose 8/18  - PPx: bactrim/valcyte/nystatin  - Diet: Regurar  - Pain: tylenol/tramadol prn  - Drains: Ndiaye/HEMANTH, plan to dc ndiaye tomorrow  - IS/SCD/OOB  - Bowel regimen: senna/miralax   - Cont ASA81, plavix 75mg  - Check HCV PCR and Genotype with am labs (ordered)  - Watch BP; may need to start Nifedipine XL 30mg daily 49 y/o F with PMHx of HTN, HLD, Gout, ESRD on HD MWF since 2006, now via right brachial graft created via RUE bypass for which she is on Plavix/ASA - last dose 8/13 (UE vein thrombosis), s/p b/l AV fistula in the past, both failed. Anuric. H/O Positive Quant Gold; had BCG vaccine as a child. CXR from 6/18/2019 showed RUL 2mm nodule (new since 2014). Treated with INH/B6 8/2019-5/13/2020.  s/p HCV+ DDRT with stent placement and Simulect induction    [] s/p DDRT (ureter stented)   - Downtrending creatinine  - Hyponatremia, 129.  Reports taking in large amounts of water yesterday. Will trend  - monitor u/o and renal function  - Cont Lasix 40mg bid  - Immuno: Envarsus 10mg; MMF 1g bid, Pred taper, Simulect induction dose due today  - PPx: bactrim/valcyte/nystatin  - Diet: Regular  - Pain: tylenol/tramadol prn  - Drains:  Check HEMANTH Creatinine.  If OK, DC ndiaye today.  Continue HEMANTH to bulb suction.  - IS/SCD/OOB  - Bowel regimen: senna/miralax   - Cont [id number].  plavix 75mg on Hold for hematuria  - FU HCV PCR and Genotype sent today  - Monitor BP; Start Nifedipine XL 30mg daily

## 2020-08-18 NOTE — PROGRESS NOTE ADULT - PROBLEM SELECTOR PLAN 1
s/p HCV+ DDRT with stent placement and Simulect induction, good allograft function, last HD 8/14, on admission sCr 7.8, improving sCr 2.1    - f/u HCV PCR and genotype   - continue envarsus, MMF, steroid  - continue ppx bactrim, valcyte, nystatin  - continue lasix PO  - discontinue ndiaye, continue HEMANTH drain for now  - monitor BMP, strict I/O  - check daily Tacrolimus trough 30 minutes before am dose (goal 8-10)

## 2020-08-18 NOTE — PROGRESS NOTE ADULT - SUBJECTIVE AND OBJECTIVE BOX
[facility]   MD MEL DOBBS MD RUORU WONG, PA    TEL:  OFFICE: [phone]  DR MANCINI CELL: [phone]  TAMRA REYES CELL: [phone]  DR. ZHANG CELL: [phone]  DR. CEE CELL: [phone]    FROM 5 PM - 7 AM PLEASE CALL ANSWERING SERVICE: [phone]    RENAL FOLLOW UP NOTE  --------------------------------------------------------------------------------  HPI:      Pt seen and examined at bedside.       PAST HISTORY  --------------------------------------------------------------------------------  No significant changes to PMH, PSH, FHx, SHx, unless otherwise noted    ALLERGIES & MEDICATIONS  --------------------------------------------------------------------------------  Allergies    contrast media (iodine-based) (Urticaria)  ibuprofen (Hives)  ibuprofen/morphine (Unknown)  latex (Swelling)  morphine (Urticaria)  shellfish (Urticaria)    Intolerances      Standing Inpatient Medications  acetaminophen   Tablet .. 650 milliGRAM(s) Oral once  aspirin enteric coated 81 milliGRAM(s) Oral daily  basiliximab  IVPB 20 milliGRAM(s) IV Intermittent once  chlorhexidine 2% Cloths 1 Application(s) Topical daily  dextrose 5%. 1000 milliLiter(s) IV Continuous <Continuous>  dextrose 50% Injectable 12.5 Gram(s) IV Push once  dextrose 50% Injectable 25 Gram(s) IV Push once  dextrose 50% Injectable 25 Gram(s) IV Push once  famotidine    Tablet 20 milliGRAM(s) Oral daily  furosemide    Tablet 40 milliGRAM(s) Oral two times a day  insulin lispro (HumaLOG) corrective regimen sliding scale   SubCutaneous Before meals and at bedtime  mycophenolate mofetil 1000 milliGRAM(s) Oral <User Schedule>  nystatin    Suspension 561834 Unit(s) Swish and Swallow four times a day  predniSONE   Tablet 20 milliGRAM(s) Oral two times a day  tacrolimus ER Tablet (ENVARSUS XR) 10 milliGRAM(s) Oral <User Schedule>  trimethoprim   80 mG/sulfamethoxazole 400 mG 1 Tablet(s) Oral daily  valGANciclovir 450 milliGRAM(s) Oral daily    PRN Inpatient Medications  dextrose 40% Gel 15 Gram(s) Oral once PRN  glucagon  Injectable 1 milliGRAM(s) IntraMuscular once PRN  lidocaine 2% Gel 1 Application(s) Topical three times a day PRN  lidocaine 2% Jelly 5 milliLiter(s) IntraUrethral every 6 hours PRN  polyethylene glycol 3350 17 Gram(s) Oral every 24 hours PRN  traMADol 25 milliGRAM(s) Oral every 6 hours PRN  traMADol 50 milliGRAM(s) Oral every 6 hours PRN      REVIEW OF SYSTEMS  --------------------------------------------------------------------------------  General: no fever    MSK: no edema     VITALS/PHYSICAL EXAM  --------------------------------------------------------------------------------  T(C): 36.4 (08-18-20 @ 05:00), Max: 36.7 (08-17-20 @ 17:00)  HR: 78 (08-18-20 @ 05:00) (78 - 111)  BP: 139/92 (08-18-20 @ 05:00) (128/80 - 150/92)  RR: 18 (08-18-20 @ 05:00) (18 - 20)  SpO2: 98% (08-18-20 @ 05:00) (94% - 98%)  Wt(kg): --        08-17-20 @ 07:01  -  08-18-20 @ 07:00  --------------------------------------------------------  IN: 1590 mL / OUT: 1930 mL / NET: -340 mL      Physical Exam:  	Gen: NAD  	HEENT: MMM  	Pulm: CTA B/L  	CV: S1S2  	Abd: Soft, +BS  	Ext: No LE edema B/L                      Neuro: Awake alert  	Skin: Warm and Dry   	Vascular access: no hd catheter          [facility]  LABS/STUDIES  --------------------------------------------------------------------------------              10.7   8.56  >-----------<  190      [08-18-20 @ 06:23]              34.1     129  |  93  |  38  ----------------------------<  95      [08-18-20 @ 06:23]  4.2   |  23  |  2.12        Ca     10.9     [08-18-20 @ 06:23]      Mg     2.2     [08-18-20 @ 06:23]      Phos  2.9     [08-18-20 @ 06:23]    TPro  5.8  /  Alb  3.6  /  TBili  0.7  /  DBili  x   /  AST  21  /  ALT  16  /  AlkPhos  54  [08-18-20 @ 06:23]    PT/INR: PT 11.1 , INR 0.93       [08-18-20 @ 06:23]  PTT: 25.6       [08-18-20 @ 06:23]          [08-18-20 @ 06:23]    Creatinine Trend:  SCr 2.12 [08-18 @ 06:23]  SCr 2.63 [08-17 @ 05:40]  SCr 2.74 [08-16 @ 17:25]  SCr 2.86 [08-16 @ 06:05]  SCr 5.79 [08-15 @ 05:13]

## 2020-08-19 ENCOUNTER — TRANSCRIPTION ENCOUNTER (OUTPATIENT)
Age: 50
End: 2020-08-19

## 2020-08-19 VITALS — OXYGEN SATURATION: 99 % | HEART RATE: 107 BPM

## 2020-08-19 LAB
ALBUMIN SERPL ELPH-MCNC: 3.7 G/DL — SIGNIFICANT CHANGE UP (ref 3.3–5)
ALP SERPL-CCNC: 51 U/L — SIGNIFICANT CHANGE UP (ref 40–120)
ALT FLD-CCNC: 16 U/L — SIGNIFICANT CHANGE UP (ref 10–45)
ANION GAP SERPL CALC-SCNC: 10 MMOL/L — SIGNIFICANT CHANGE UP (ref 5–17)
APTT BLD: 24.3 SEC — LOW (ref 27.5–35.5)
AST SERPL-CCNC: 19 U/L — SIGNIFICANT CHANGE UP (ref 10–40)
BACTERIA # UR AUTO: NEGATIVE — SIGNIFICANT CHANGE UP
BASOPHILS # BLD AUTO: 0.01 K/UL — SIGNIFICANT CHANGE UP (ref 0–0.2)
BASOPHILS NFR BLD AUTO: 0.1 % — SIGNIFICANT CHANGE UP (ref 0–2)
BILIRUB SERPL-MCNC: 0.8 MG/DL — SIGNIFICANT CHANGE UP (ref 0.2–1.2)
BILIRUB UR-MCNC: NEGATIVE — SIGNIFICANT CHANGE UP
BUN SERPL-MCNC: 40 MG/DL — HIGH (ref 7–23)
CALCIUM SERPL-MCNC: 11 MG/DL — HIGH (ref 8.4–10.5)
CHLORIDE SERPL-SCNC: 97 MMOL/L — SIGNIFICANT CHANGE UP (ref 96–108)
CO2 SERPL-SCNC: 24 MMOL/L — SIGNIFICANT CHANGE UP (ref 22–31)
CREAT SERPL-MCNC: 1.79 MG/DL — HIGH (ref 0.5–1.3)
DIFF PNL FLD: ABNORMAL
EOSINOPHIL # BLD AUTO: 0.02 K/UL — SIGNIFICANT CHANGE UP (ref 0–0.5)
EOSINOPHIL NFR BLD AUTO: 0.3 % — SIGNIFICANT CHANGE UP (ref 0–6)
EPI CELLS # UR: 4 — SIGNIFICANT CHANGE UP
GLUCOSE BLDC GLUCOMTR-MCNC: 100 MG/DL — HIGH (ref 70–99)
GLUCOSE BLDC GLUCOMTR-MCNC: 111 MG/DL — HIGH (ref 70–99)
GLUCOSE SERPL-MCNC: 101 MG/DL — HIGH (ref 70–99)
GLUCOSE UR QL: NEGATIVE — SIGNIFICANT CHANGE UP
HCT VFR BLD CALC: 33.5 % — LOW (ref 34.5–45)
HCV RNA SERPL NAA DL=5-ACNC: HIGH IU/ML
HCV RNA SPEC NAA+PROBE-LOG IU: 4.68 LOG10IU/ML — HIGH
HGB BLD-MCNC: 10.5 G/DL — LOW (ref 11.5–15.5)
HYALINE CASTS # UR AUTO: 0 /LPF — SIGNIFICANT CHANGE UP (ref 0–7)
IMM GRANULOCYTES NFR BLD AUTO: 0.6 % — SIGNIFICANT CHANGE UP (ref 0–1.5)
INR BLD: 0.92 RATIO — SIGNIFICANT CHANGE UP (ref 0.88–1.16)
KETONES UR-MCNC: NEGATIVE — SIGNIFICANT CHANGE UP
LDH SERPL L TO P-CCNC: 424 U/L — HIGH (ref 50–242)
LEUKOCYTE ESTERASE UR-ACNC: ABNORMAL
LYMPHOCYTES # BLD AUTO: 0.6 K/UL — LOW (ref 1–3.3)
LYMPHOCYTES # BLD AUTO: 8.8 % — LOW (ref 13–44)
MAGNESIUM SERPL-MCNC: 2 MG/DL — SIGNIFICANT CHANGE UP (ref 1.6–2.6)
MCHC RBC-ENTMCNC: 28.3 PG — SIGNIFICANT CHANGE UP (ref 27–34)
MCHC RBC-ENTMCNC: 31.3 GM/DL — LOW (ref 32–36)
MCV RBC AUTO: 90.3 FL — SIGNIFICANT CHANGE UP (ref 80–100)
MONOCYTES # BLD AUTO: 0.5 K/UL — SIGNIFICANT CHANGE UP (ref 0–0.9)
MONOCYTES NFR BLD AUTO: 7.4 % — SIGNIFICANT CHANGE UP (ref 2–14)
NEUTROPHILS # BLD AUTO: 5.63 K/UL — SIGNIFICANT CHANGE UP (ref 1.8–7.4)
NEUTROPHILS NFR BLD AUTO: 82.8 % — HIGH (ref 43–77)
NITRITE UR-MCNC: NEGATIVE — SIGNIFICANT CHANGE UP
NRBC # BLD: 0 /100 WBCS — SIGNIFICANT CHANGE UP (ref 0–0)
PH UR: 6 — SIGNIFICANT CHANGE UP (ref 5–8)
PHOSPHATE SERPL-MCNC: 3 MG/DL — SIGNIFICANT CHANGE UP (ref 2.5–4.5)
PLATELET # BLD AUTO: 218 K/UL — SIGNIFICANT CHANGE UP (ref 150–400)
POTASSIUM SERPL-MCNC: 4.3 MMOL/L — SIGNIFICANT CHANGE UP (ref 3.5–5.3)
POTASSIUM SERPL-SCNC: 4.3 MMOL/L — SIGNIFICANT CHANGE UP (ref 3.5–5.3)
PROT SERPL-MCNC: 5.9 G/DL — LOW (ref 6–8.3)
PROT UR-MCNC: ABNORMAL
PROTHROM AB SERPL-ACNC: 11 SEC — SIGNIFICANT CHANGE UP (ref 10.6–13.6)
RBC # BLD: 3.71 M/UL — LOW (ref 3.8–5.2)
RBC # FLD: 21.9 % — HIGH (ref 10.3–14.5)
RBC CASTS # UR COMP ASSIST: >50 /HPF — HIGH (ref 0–4)
SODIUM SERPL-SCNC: 131 MMOL/L — LOW (ref 135–145)
TACROLIMUS SERPL-MCNC: 6.1 NG/ML — SIGNIFICANT CHANGE UP
UROBILINOGEN FLD QL: NEGATIVE — SIGNIFICANT CHANGE UP
WBC # BLD: 6.8 K/UL — SIGNIFICANT CHANGE UP (ref 3.8–10.5)
WBC # FLD AUTO: 6.8 K/UL — SIGNIFICANT CHANGE UP (ref 3.8–10.5)
WBC UR QL: ABNORMAL

## 2020-08-19 PROCEDURE — 80053 COMPREHEN METABOLIC PANEL: CPT

## 2020-08-19 PROCEDURE — 84295 ASSAY OF SERUM SODIUM: CPT

## 2020-08-19 PROCEDURE — 87522 HEPATITIS C REVRS TRNSCRPJ: CPT

## 2020-08-19 PROCEDURE — 93005 ELECTROCARDIOGRAM TRACING: CPT

## 2020-08-19 PROCEDURE — 87635 SARS-COV-2 COVID-19 AMP PRB: CPT

## 2020-08-19 PROCEDURE — C1751: CPT

## 2020-08-19 PROCEDURE — 76776 US EXAM K TRANSPL W/DOPPLER: CPT

## 2020-08-19 PROCEDURE — 85730 THROMBOPLASTIN TIME PARTIAL: CPT

## 2020-08-19 PROCEDURE — 80048 BASIC METABOLIC PNL TOTAL CA: CPT

## 2020-08-19 PROCEDURE — 85014 HEMATOCRIT: CPT

## 2020-08-19 PROCEDURE — 82962 GLUCOSE BLOOD TEST: CPT

## 2020-08-19 PROCEDURE — 84100 ASSAY OF PHOSPHORUS: CPT

## 2020-08-19 PROCEDURE — 87902 NFCT AGT GNTYP ALYS HEP C: CPT

## 2020-08-19 PROCEDURE — 82330 ASSAY OF CALCIUM: CPT

## 2020-08-19 PROCEDURE — 82803 BLOOD GASES ANY COMBINATION: CPT

## 2020-08-19 PROCEDURE — 71045 X-RAY EXAM CHEST 1 VIEW: CPT

## 2020-08-19 PROCEDURE — 84132 ASSAY OF SERUM POTASSIUM: CPT

## 2020-08-19 PROCEDURE — C2617: CPT

## 2020-08-19 PROCEDURE — 86901 BLOOD TYPING SEROLOGIC RH(D): CPT

## 2020-08-19 PROCEDURE — 83615 LACTATE (LD) (LDH) ENZYME: CPT

## 2020-08-19 PROCEDURE — C1894: CPT

## 2020-08-19 PROCEDURE — 86769 SARS-COV-2 COVID-19 ANTIBODY: CPT

## 2020-08-19 PROCEDURE — C1889: CPT

## 2020-08-19 PROCEDURE — 82947 ASSAY GLUCOSE BLOOD QUANT: CPT

## 2020-08-19 PROCEDURE — 82435 ASSAY OF BLOOD CHLORIDE: CPT

## 2020-08-19 PROCEDURE — 82565 ASSAY OF CREATININE: CPT

## 2020-08-19 PROCEDURE — 86900 BLOOD TYPING SEROLOGIC ABO: CPT

## 2020-08-19 PROCEDURE — 81001 URINALYSIS AUTO W/SCOPE: CPT

## 2020-08-19 PROCEDURE — 83036 HEMOGLOBIN GLYCOSYLATED A1C: CPT

## 2020-08-19 PROCEDURE — 99232 SBSQ HOSP IP/OBS MODERATE 35: CPT | Mod: GC

## 2020-08-19 PROCEDURE — 85610 PROTHROMBIN TIME: CPT

## 2020-08-19 PROCEDURE — 83605 ASSAY OF LACTIC ACID: CPT

## 2020-08-19 PROCEDURE — 85027 COMPLETE CBC AUTOMATED: CPT

## 2020-08-19 PROCEDURE — 83735 ASSAY OF MAGNESIUM: CPT

## 2020-08-19 PROCEDURE — 82570 ASSAY OF URINE CREATININE: CPT

## 2020-08-19 PROCEDURE — 80197 ASSAY OF TACROLIMUS: CPT

## 2020-08-19 PROCEDURE — 86850 RBC ANTIBODY SCREEN: CPT

## 2020-08-19 RX ORDER — PHENAZOPYRIDINE HCL 100 MG
1 TABLET ORAL
Qty: 6 | Refills: 0
Start: 2020-08-19 | End: 2020-08-20

## 2020-08-19 RX ORDER — POLYETHYLENE GLYCOL 3350 17 G/17G
17 POWDER, FOR SOLUTION ORAL
Qty: 0 | Refills: 0 | DISCHARGE
Start: 2020-08-19

## 2020-08-19 RX ORDER — CIPROFLOXACIN LACTATE 400MG/40ML
500 VIAL (ML) INTRAVENOUS EVERY 12 HOURS
Refills: 0 | Status: DISCONTINUED | OUTPATIENT
Start: 2020-08-19 | End: 2020-08-19

## 2020-08-19 RX ORDER — SENNA PLUS 8.6 MG/1
2 TABLET ORAL
Qty: 0 | Refills: 0 | DISCHARGE
Start: 2020-08-19

## 2020-08-19 RX ORDER — FUROSEMIDE 40 MG
40 TABLET ORAL DAILY
Refills: 0 | Status: DISCONTINUED | OUTPATIENT
Start: 2020-08-19 | End: 2020-08-19

## 2020-08-19 RX ORDER — CLOPIDOGREL BISULFATE 75 MG/1
1 TABLET, FILM COATED ORAL
Qty: 0 | Refills: 0 | DISCHARGE

## 2020-08-19 RX ORDER — TACROLIMUS 5 MG/1
13 CAPSULE ORAL
Qty: 0 | Refills: 0 | DISCHARGE
Start: 2020-08-19

## 2020-08-19 RX ORDER — FUROSEMIDE 40 MG
1 TABLET ORAL
Qty: 0 | Refills: 0 | DISCHARGE
Start: 2020-08-19

## 2020-08-19 RX ORDER — PHENAZOPYRIDINE HCL 100 MG
200 TABLET ORAL EVERY 8 HOURS
Refills: 0 | Status: DISCONTINUED | OUTPATIENT
Start: 2020-08-19 | End: 2020-08-19

## 2020-08-19 RX ADMIN — FAMOTIDINE 20 MILLIGRAM(S): 10 INJECTION INTRAVENOUS at 11:25

## 2020-08-19 RX ADMIN — Medication 500000 UNIT(S): at 05:52

## 2020-08-19 RX ADMIN — MYCOPHENOLATE MOFETIL 1000 MILLIGRAM(S): 250 CAPSULE ORAL at 07:34

## 2020-08-19 RX ADMIN — Medication 10 MILLIGRAM(S): at 05:52

## 2020-08-19 RX ADMIN — TRAMADOL HYDROCHLORIDE 50 MILLIGRAM(S): 50 TABLET ORAL at 06:22

## 2020-08-19 RX ADMIN — Medication 1 TABLET(S): at 11:25

## 2020-08-19 RX ADMIN — TACROLIMUS 13 MILLIGRAM(S): 5 CAPSULE ORAL at 07:34

## 2020-08-19 RX ADMIN — Medication 40 MILLIGRAM(S): at 05:51

## 2020-08-19 RX ADMIN — TRAMADOL HYDROCHLORIDE 50 MILLIGRAM(S): 50 TABLET ORAL at 05:52

## 2020-08-19 RX ADMIN — VALGANCICLOVIR 450 MILLIGRAM(S): 450 TABLET, FILM COATED ORAL at 11:25

## 2020-08-19 RX ADMIN — CHLORHEXIDINE GLUCONATE 1 APPLICATION(S): 213 SOLUTION TOPICAL at 11:27

## 2020-08-19 RX ADMIN — Medication 1000 MILLIGRAM(S): at 00:00

## 2020-08-19 RX ADMIN — Medication 81 MILLIGRAM(S): at 11:25

## 2020-08-19 RX ADMIN — Medication 500000 UNIT(S): at 11:26

## 2020-08-19 NOTE — DISCHARGE NOTE NURSING/CASE MANAGEMENT/SOCIAL WORK - PATIENT PORTAL LINK FT
You can access the FollowMyHealth Patient Portal offered by [facility] by registering at the following website: [email or web address]. By joining [employer]’s FollowMyHealth portal, you will also be able to view your health information using other applications (apps) compatible with our system.

## 2020-08-19 NOTE — PROGRESS NOTE ADULT - NSHPATTENDINGPLANDISCUSS_GEN_ALL_CORE
Patient seen on multidisciplinary rounds with Transplant surgeons, nephrologist, PA, pharmacist, and nurse.
PA/NP, nurse, nephrologist
PA/NP, nephrologist, nurse
Transplant team

## 2020-08-19 NOTE — PROGRESS NOTE ADULT - ATTENDING COMMENTS
POD#3 s/p DDRT, improving renal function  Cr down to 2.6  Immunosuppression increase Envarsus to 10mg daily, cont steroid taper, Cellcept 1gm bid. Due for second dose simulect tomorrow  Will check tacrolimus level and adjust dose accordingly.  Transplant Prophylaxis with nystatin, bactrim, valcyte  cont lasix bid  GI prophylaxis due to steroids  monitor glucose levels and adjust sliding scare as necessary   OOB, ambulate, medication teaching. regular diet as tolerated  plan to remove ndiaye tomorrow
POD#2 s/p DDRT, improving renal function  Cr down to 2.8  Cont current immunosuppression with Envarsus 8mg daily, steroid taper, Cellcept 1gm bid.   Will check tacrolimus level and adjust dose accordingly.  Transplant Prophylaxis with nystatin, bactrim, valcyte  start lasix bid  GI prophylaxis due to steroids  monitor glucose levels and adjust sliding scare as necessary   OOB, ambulate, medication teaching. regular diet as tolerated
POD#1 s/p DDRT  slowly improving renal function, with urine output ~70mL/hr and creatinine decreasing  Cont current immunosuppression with envarsus 8mg daily, steroid taper, Cellcept 1gm bid.   Will check tacrolimus level and adjust dose accordingly.  Transplant Prophylaxis with nystatin, bactrim, valcyte  GI prophylaxis due to steroids  monitor glucose levels and adjust sliding scare as necessary
POD4 DDRT  mobilizing well, tolerating diet  good pain control    Downtrending Cr  Na 129  Ur 38     abdomen soft nontender  drain 125ml h/s  mild hematuria yesterday - plavix held    Plan  d/c ndiaye once drain Cr is checked and normal (unlikely urine leak causing decreased Na - likely from increased oral fluids yesterday)  plan for d/c tomorrow morning once labs are back
 donor renal allograft recipient  DM, HTN  Non oliguric on diuretic regimen  Improving creatinine  Hyponatremia  reviewed immunosuppression, creatinine trend, regimen fr managing comorbidities  Plan:  Liberalize Na intake  If needing 20 units/day or more coverage insulin will need to be on insulin regimen at discharge  Monitor Na  Will follow
Kidney Transplant recipient with functioning renal allograft, improving creatinine  HCV pos donor organ  Improving creatinine  DM, HTN, Anemia  Reviewed immunosuppression, allograft function, creatinine trend, comorbidities and medication regimen.  Plan:  Target Tac level 8-10 ng/ml  Will follow blood pressure and decide regarding need for antihypertensives  I was present during and reviewed clinical and lab data as well as assessment and plan as documented by the house staff as noted. Please contact if any additional questions with any change in clinical condition or on availability of any additional information or reports.
Kidney recipient with functioning renal allograft  HCV pos donor kidney  Hypertension  Non oliguric, decreasing creatinine  Reviewed immunosuppression and allograft function  Plan:  Target Tacrolimus level 8-10 ng/ml  On discharge to follow up in transplant clinic  Will follow  I was present during and reviewed clinical and lab data as well as assessment and plan as documented by the house staff as noted. Please contact if any additional questions with any change in clinical condition or on availability of any additional information or reports.

## 2020-08-19 NOTE — PHARMACY EDUCATION NOTE - MEDICATION SAFETY
Adherence/Medication precautions/Signs and symptoms to report/Storage and handling/Interactions/Side effects/Herbals/Miss dose instruction/Purpose/Allergies

## 2020-08-19 NOTE — PROGRESS NOTE ADULT - PROVIDER SPECIALTY LIST ADULT
Nephrology
Nephrology
Pharmacy
Transplant Nephrology
Transplant Surgery
Nephrology
Nephrology
Transplant Surgery
Transplant Surgery

## 2020-08-19 NOTE — PROGRESS NOTE ADULT - SUBJECTIVE AND OBJECTIVE BOX
[facility] [place] [facility]   MD Te Contreras MD, D.O. Ruoru Wong, PA    From 7 AM - 5 PM:  OFFICE: [phone]  Dr. Berg cell: [phone]  Dr. Mackenzie cell: [phone]  Dr. Mars cell: [phone]  [facility] cell: [phone]    From 5 PM - 7 AM: Answering Service: [phone]      RENAL FOLLOW UP NOTE  --------------------------------------------------------------------------------  HPI:  Pt seen and examined at bedside. Admits to abdominal pain. Urinating well   Denies SOB, chest pain or LE edema     PAST HISTORY  --------------------------------------------------------------------------------  No significant changes to PMH, PSH, FHx, SHx, unless otherwise noted    ALLERGIES & MEDICATIONS  --------------------------------------------------------------------------------  Allergies    contrast media (iodine-based) (Urticaria)  ibuprofen (Hives)  ibuprofen/morphine (Unknown)  latex (Swelling)  morphine (Urticaria)  shellfish (Urticaria)    Intolerances      Standing Inpatient Medications  aspirin enteric coated 81 milliGRAM(s) Oral daily  chlorhexidine 2% Cloths 1 Application(s) Topical daily  dextrose 5%. 1000 milliLiter(s) IV Continuous <Continuous>  dextrose 50% Injectable 12.5 Gram(s) IV Push once  dextrose 50% Injectable 25 Gram(s) IV Push once  dextrose 50% Injectable 25 Gram(s) IV Push once  famotidine    Tablet 20 milliGRAM(s) Oral daily  furosemide    Tablet 40 milliGRAM(s) Oral two times a day  insulin lispro (HumaLOG) corrective regimen sliding scale   SubCutaneous Before meals and at bedtime  mycophenolate mofetil 1000 milliGRAM(s) Oral <User Schedule>  nystatin    Suspension 516253 Unit(s) Swish and Swallow four times a day  predniSONE   Tablet 10 milliGRAM(s) Oral two times a day  senna 2 Tablet(s) Oral at bedtime  tacrolimus ER Tablet (ENVARSUS XR) 13 milliGRAM(s) Oral <User Schedule>  trimethoprim   80 mG/sulfamethoxazole 400 mG 1 Tablet(s) Oral daily  valGANciclovir 450 milliGRAM(s) Oral daily    PRN Inpatient Medications  dextrose 40% Gel 15 Gram(s) Oral once PRN  glucagon  Injectable 1 milliGRAM(s) IntraMuscular once PRN  lidocaine 2% Jelly 5 milliLiter(s) IntraUrethral every 6 hours PRN  polyethylene glycol 3350 17 Gram(s) Oral every 24 hours PRN  traMADol 25 milliGRAM(s) Oral every 6 hours PRN  traMADol 50 milliGRAM(s) Oral every 6 hours PRN      REVIEW OF SYSTEMS  --------------------------------------------------------------------------------  General: no fever  CVS: no chest pain  RESP: no sob, no cough  ABD: no abdominal pain  : no dysuria  MSK: no edema     VITALS/PHYSICAL EXAM  --------------------------------------------------------------------------------  T(C): 36.6 (08-19-20 @ 05:00), Max: 36.6 (08-18-20 @ 13:00)  HR: 74 (08-19-20 @ 05:00) (74 - 88)  BP: 135/83 (08-19-20 @ 05:00) (132/83 - 160/95)  RR: 18 (08-19-20 @ 05:00) (18 - 18)  SpO2: 96% (08-19-20 @ 05:00) (96% - 100%)  Wt(kg): --        08-18-20 @ 07:01  -  08-19-20 @ 07:00  --------------------------------------------------------  IN: 1310 mL / OUT: 2755 mL / NET: -1445 mL    08-19-20 @ 07:01  -  08-19-20 @ 09:39  --------------------------------------------------------  IN: 240 mL / OUT: 300 mL / NET: -60 mL      Physical Exam:  	Gen: NAD  	HEENT: MMM  	Pulm: CTA B/L  	CV: S1S2  	Abd: Soft, +BS, incision clean, healing well. +HEMANTH  	Ext: No LE edema B/L                      Neuro: Awake   	Skin: Warm and Dry   	    LABS/STUDIES  --------------------------------------------------------------------------------              10.5   6.80  >-----------<  218      [08-19-20 @ 05:47]              33.5     131  |  97  |  40  ----------------------------<  101      [08-19-20 @ 05:47]  4.3   |  24  |  1.79        Ca     11.0     [08-19-20 @ 05:47]      Mg     2.0     [08-19-20 @ 05:47]      Phos  3.0     [08-19-20 @ 05:47]    TPro  5.9  /  Alb  3.7  /  TBili  0.8  /  DBili  x   /  AST  19  /  ALT  16  /  AlkPhos  51  [08-19-20 @ 05:47]    PT/INR: PT 11.0 , INR 0.92       [08-19-20 @ 05:47]  PTT: 24.3       [08-19-20 @ 05:47]          [08-19-20 @ 05:47]    Creatinine Trend:  SCr 1.79 [08-19 @ 05:47]  SCr 2.02 [08-18 @ 16:56]  SCr 2.12 [08-18 @ 06:23]  SCr 2.63 [08-17 @ 05:40]  SCr 2.74 [08-16 @ 17:25]    Urinalysis - [08-18-20 @ 23:10]      Color Pink / Appearance Turbid / SG 1.011 / pH 6.0      Gluc Negative / Ketone Negative  / Bili Negative / Urobili Negative       Blood Large / Protein 30 mg/dL / Leuk Est Large / Nitrite Negative      RBC >50 / WBC 11-25 / Hyaline 0 / Gran  / Sq Epi  / Non Sq Epi 4 / Bacteria Negative

## 2020-08-19 NOTE — PROGRESS NOTE ADULT - PROBLEM SELECTOR PLAN 3
controlled, currently off BP medication    - hold home med hydralazine upon discharge, can be reevaluated outpatient if need BP med

## 2020-08-19 NOTE — PROGRESS NOTE ADULT - PROBLEM SELECTOR PLAN 1
s/p HCV+ DDRT with stent placement and Simulect induction, good allograft function, last HD 8/14, on admission sCr 7.8, improving sCr 1.79     - change lasix 40 PO daily upon discharge, check daily weight outpatient  - continue envarsus (goal tacro trough 8-10), MMF, steroid  - continue ppx bactrim, valcyte, nystatin  - discharge with HEMANTH drain, follow outpatient transplant surgery clinic  - monitor BMP, strict I/O

## 2020-08-19 NOTE — PROGRESS NOTE ADULT - ASSESSMENT
49 y/o F with PMHx of HTN, HLD, Gout, ESRD on HD MWF since 2006, now via right brachial graft created via RUE bypass for which she is on Plavix/ASA - last dose 8/13 (UE vein thrombosis), s/p b/l AV fistula in the past, both failed. Anuric. H/O Positive Quant Gold; had BCG vaccine as a child. CXR from 6/18/2019 showed RUL 2mm nodule (new since 2014). Treated with INH/B6 8/2019-5/13/2020.  s/p HCV+ DDRT with stent placement and Simulect induction    [] s/p DDRT (ureter stented)   - Downtrending creatinine  - monitor u/o and renal function  - Change Lasix 40mg to QD  - Immuno: Envarsus 13mg; MMF 1g bid, Pred taper, received second dose of Simulect yesterday  - PPx: bactrim/valcyte/nystatin  - Diet: Regular  - Pain: tylenol/tramadol prn  - Drains: HEMANTH Creatinine done normal, keep HEMANTH to bulb suction and will DC as outpt in the clinic.  - IS/SCD/OOB  - Bowel regimen: senna/miralax   - Cont [id number].  keep plavix 75mg on hold will reassess in outpt at clinic  - FU HCV PCR and Genotype sent today  - Monitor BP; Nifedipine XL 30mg daily     DISPO:  Plan for DC home today with close outpt F/U

## 2020-08-19 NOTE — PROGRESS NOTE ADULT - ASSESSMENT
49 y/o F with PMHx of HTN, HLD, Gout, ESRD on HD MWF since 2006, admitted for DDRT.    A/P:  S/P Kidney transplant:  Transplanted on 08/14  S/P DDRT Hep C positive  Pt with good urine output overnight, Scr trending down  Last HD 08/14  Consent for HD in the chart if needed  Monitor I/O  Follow up transplant Nephrologist for immunosuppression: envarsus, MMF, steroid taper with ppx bactrim, valcyte, nystatin    HTN:  BP fluctuating   Continue current meds  Monitor BP    Hypercalcemia  pt was on sensipar 30mg BID at home  Check PTH, consider starting sensipar 30mg QD    Hyperphosphatemia:  Improving  Low PO4 diet  Monitor PO4 level daily

## 2020-08-19 NOTE — DISCHARGE NOTE NURSING/CASE MANAGEMENT/SOCIAL WORK - NSDCFUADDAPPT_GEN_ALL_CORE_FT
1. Please call to make a follow-up appointment with Dr. Monroy on  Monday 8/24  2. Please follow up with your primary care physician in one week regarding your hospitalization.  3- Please come to the transplant clinic this FRIDAY AM 8/21/20 FOR BLOOD WORK

## 2020-08-19 NOTE — PROGRESS NOTE ADULT - SUBJECTIVE AND OBJECTIVE BOX
Transplant Surgery Multidisciplinary Progress Note  --------------------------------------------------------------  DDRT   Date:   8/14/2020      POD# 5    Present:  Patient seen and examined with multidisciplinary team including Transplant Surgeon: Dr. li, Transplant Nephrologist: Dr. Lake, Pharmacist: Domingo Blancas, Eneida Robins, PGY 3 Dr. Mcqueen, and unit RN during am rounds. Disciplines not in attendance will be notified of the plan.     HPI: 49 y/o F with PMHx of HTN, HLD, Gout, ESRD on HD MWF since 2006, now via right brachial graft created via RUE bypass for which she is on Plavix/ASA - last dose 8/13 (UE vein thrombosis), s/p b/l AV fistula in the past, both failed. Anuric. Last HD 8/14. H/O Positive Quant Gold; had BCG vaccine as a child. CXR from 6/18/2019 showed RUL 2mm nodule (new since 2014). Treated with INH/B6 8/2019-5/13/2020. Now s/p HCV+ DDRT with stent placement and Simulect induction    Interval Events:   - Creatinine trending down to 1.79 from 2.1, good UOP 2.5L on Lasix 40mg BID, HEMANTH with 1255ml SS drainage  - Plavix held for hematuria       - TMax 36.6     - Tolerating regular diet + flatus + BM  - Ndiaye OH ed continue to have dysurea, UA send + leuks negative nitrates, C&S pending reuslts  - Tac level 4 increased to 13 mg Envarsus      Discharge planning: pending clinical improvement    Education: Medication teaching    Plan of Care: see below         MEDICATIONS  (STANDING):  aspirin enteric coated 81 milliGRAM(s) Oral daily  chlorhexidine 2% Cloths 1 Application(s) Topical daily  dextrose 5%. 1000 milliLiter(s) (50 mL/Hr) IV Continuous <Continuous>  dextrose 50% Injectable 12.5 Gram(s) IV Push once  dextrose 50% Injectable 25 Gram(s) IV Push once  dextrose 50% Injectable 25 Gram(s) IV Push once  famotidine    Tablet 20 milliGRAM(s) Oral daily  furosemide    Tablet 40 milliGRAM(s) Oral daily  insulin lispro (HumaLOG) corrective regimen sliding scale   SubCutaneous Before meals and at bedtime  mycophenolate mofetil 1000 milliGRAM(s) Oral <User Schedule>  nystatin    Suspension 759859 Unit(s) Swish and Swallow four times a day  predniSONE   Tablet 10 milliGRAM(s) Oral two times a day  senna 2 Tablet(s) Oral at bedtime  tacrolimus ER Tablet (ENVARSUS XR) 13 milliGRAM(s) Oral <User Schedule>  trimethoprim   80 mG/sulfamethoxazole 400 mG 1 Tablet(s) Oral daily  valGANciclovir 450 milliGRAM(s) Oral daily    MEDICATIONS  (PRN):  dextrose 40% Gel 15 Gram(s) Oral once PRN Blood Glucose LESS THAN 70 milliGRAM(s)/deciliter  glucagon  Injectable 1 milliGRAM(s) IntraMuscular once PRN Glucose LESS THAN 70 milligrams/deciliter  lidocaine 2% Jelly 5 milliLiter(s) IntraUrethral every 6 hours PRN ndiaye discomfort  polyethylene glycol 3350 17 Gram(s) Oral every 24 hours PRN Constipation  traMADol 25 milliGRAM(s) Oral every 6 hours PRN Moderate Pain (4 - 6)  traMADol 50 milliGRAM(s) Oral every 6 hours PRN Severe Pain (7 - 10)      PAST MEDICAL & SURGICAL HISTORY:  CKD (chronic kidney disease) stage V requiring chronic dialysis  Fibroid Tumor  Infection of AV Graft for Dialysis  Clotted Renal Dialysis AV Graft  HTN - Hypertension  H/O extremity bypass graft: H/O RUE bypass and creation of right brachial graft  AV fistula: B/L - failed  History of Hysterectomy: for benign disease      Vital Signs Last 24 Hrs  T(C): 36.6 (19 Aug 2020 09:00), Max: 36.6 (18 Aug 2020 13:00)  T(F): 97.8 (19 Aug 2020 09:00), Max: 97.9 (18 Aug 2020 17:00)  HR: 84 (19 Aug 2020 09:00) (74 - 88)  BP: 134/76 (19 Aug 2020 09:00) (132/83 - 160/95)  BP(mean): --  RR: 18 (19 Aug 2020 09:00) (18 - 18)  SpO2: 96% (19 Aug 2020 09:00) (96% - 100%)    I&O's Summary    18 Aug 2020 07:01  -  19 Aug 2020 07:00  --------------------------------------------------------  IN: 1310 mL / OUT: 2755 mL / NET: -1445 mL    19 Aug 2020 07:01  -  19 Aug 2020 10:52  --------------------------------------------------------  IN: 480 mL / OUT: 300 mL / NET: 180 mL                              10.5   6.80  )-----------( 218      ( 19 Aug 2020 05:47 )             33.5     08-19    131<L>  |  97  |  40<H>  ----------------------------<  101<H>  4.3   |  24  |  1.79<H>    Ca    11.0<H>      19 Aug 2020 05:47  Phos  3.0     08-19  Mg     2.0     08-19    TPro  5.9<L>  /  Alb  3.7  /  TBili  0.8  /  DBili  x   /  AST  19  /  ALT  16  /  AlkPhos  51  08-19    Tacrolimus (), Serum: 6.1 ng/mL (08-19 @ 07:27)      Review of systems  Gen: No weight changes, fatigue, fevers/chills, weakness  Skin: No rashes  Head/Eyes/Ears/Mouth: No headache; Normal hearing; Normal vision w/o blurriness; No sinus pain/discomfort, sore throat  Respiratory: No dyspnea, cough, wheezing, hemoptysis  CV: No chest pain, PND, orthopnea  GI: Mild abdominal pain at surgical incision site; denies diarrhea, constipation, nausea, vomiting, melena, hematochezia  : No increased frequency, dysuria, hematuria, nocturia  MSK: No joint pain/swelling; no back pain; no edema  Neuro: No dizziness/lightheadedness, weakness, seizures, numbness, tingling  Heme: No easy bruising or bleeding  Endo: No heat/cold intolerance  Psych: No significant nervousness, anxiety, stress, depression  All other systems were reviewed and are negative, except as noted.    PHYSICAL EXAM:  Constitutional: Well developed / well nourished  Eyes: Anicteric, PERRLA  ENMT: nc/at  Neck: supple  Respiratory: CTA B/L  Cardiovascular: RRR  Gastrointestinal: Soft abdomen, mild tender to touch at surgical site, ND, HEMANTH x1 w/ sanguinous drainage   Genitourinary: Voiding spontaneously   Extremities: SCD's in place and working bilaterally  Vascular: Palpable dp pulses bilaterally  Neurological: A&O x3, following commands and responding appropriately   Skin: incision c/d/i  Musculoskeletal: Moving all extremities  Psychiatric: Responsive

## 2020-08-19 NOTE — PROGRESS NOTE ADULT - SUBJECTIVE AND OBJECTIVE BOX
[facility] DIVISION OF KIDNEY DISEASES AND HYPERTENSION   -- FOLLOW UP NOTE --   Randall Salazar  Nephrology Fellow  Pager NS: [phone]   /  Pager LIGALI: [phone]  (after 5pm or weekend please page the on-call fellow)  --------------------------------------------------------------------------------  24 hour events/subjective:  - overnight no events reported, vitals afebrile, hypertensive sBP 160s, total UOP 2.5 liters in the past 24hr (on lasix)  - patient seen and examined at bedside this morning sitting comfortably in chair on room air who endorse mild dysuria  - vitals/lab/medications reviewed, noted for downtrending sCr 2.1 to 1.7    PAST HISTORY  --------------------------------------------------------------------------------  No significant changes to PMH, PSH, FHx, SHx, unless otherwise noted    ALLERGIES & MEDICATIONS  --------------------------------------------------------------------------------  Allergies    contrast media (iodine-based) (Urticaria)  ibuprofen (Hives)  ibuprofen/morphine (Unknown)  latex (Swelling)  morphine (Urticaria)  shellfish (Urticaria)    Intolerances    Standing Inpatient Medications  aspirin enteric coated 81 milliGRAM(s) Oral daily  chlorhexidine 2% Cloths 1 Application(s) Topical daily  dextrose 5%. 1000 milliLiter(s) IV Continuous <Continuous>  dextrose 50% Injectable 12.5 Gram(s) IV Push once  dextrose 50% Injectable 25 Gram(s) IV Push once  dextrose 50% Injectable 25 Gram(s) IV Push once  famotidine    Tablet 20 milliGRAM(s) Oral daily  furosemide    Tablet 40 milliGRAM(s) Oral daily  insulin lispro (HumaLOG) corrective regimen sliding scale   SubCutaneous Before meals and at bedtime  mycophenolate mofetil 1000 milliGRAM(s) Oral <User Schedule>  nystatin    Suspension 010174 Unit(s) Swish and Swallow four times a day  predniSONE   Tablet 10 milliGRAM(s) Oral two times a day  senna 2 Tablet(s) Oral at bedtime  tacrolimus ER Tablet (ENVARSUS XR) 13 milliGRAM(s) Oral <User Schedule>  trimethoprim   80 mG/sulfamethoxazole 400 mG 1 Tablet(s) Oral daily  valGANciclovir 450 milliGRAM(s) Oral daily    PRN Inpatient Medications  dextrose 40% Gel 15 Gram(s) Oral once PRN  glucagon  Injectable 1 milliGRAM(s) IntraMuscular once PRN  lidocaine 2% Jelly 5 milliLiter(s) IntraUrethral every 6 hours PRN  polyethylene glycol 3350 17 Gram(s) Oral every 24 hours PRN  traMADol 25 milliGRAM(s) Oral every 6 hours PRN  traMADol 50 milliGRAM(s) Oral every 6 hours PRN    REVIEW OF SYSTEMS  --------------------------------------------------------------------------------  Gen: no fever, chills, weakness  Respiratory: No dyspnea, cough  CV: No chest pain, orthopnea  GI: No abdominal pain, nausea, vomiting, diarrhea  : + mild dysuria  MSK: no edema  Neuro: No dizziness, lightheadedness  All other systems were reviewed and are negative, except as noted.    VITALS/PHYSICAL EXAM  --------------------------------------------------------------------------------  T(C): 36.6 (08-19-20 @ 09:00), Max: 36.6 (08-18-20 @ 13:00)  HR: 84 (08-19-20 @ 09:00) (74 - 88)  BP: 134/76 (08-19-20 @ 09:00) (132/83 - 160/95)  RR: 18 (08-19-20 @ 09:00) (18 - 18)  SpO2: 96% (08-19-20 @ 09:00) (96% - 100%)  Wt(kg): --    08-18-20 @ 07:01  -  08-19-20 @ 07:00  --------------------------------------------------------  IN: 1310 mL / OUT: 2755 mL / NET: -1445 mL    08-19-20 @ 07:01  -  08-19-20 @ 09:58  --------------------------------------------------------  IN: 240 mL / OUT: 300 mL / NET: -60 mL    Physical Exam:              Gen: NAD, well-appearing on room air  	HEENT: moist mucous membrane  	Pulm: CTA B/L, no crackles  	CV: RRR, S1S2; no rub/murmur  	GI: +BS, soft, ND, HEMANTH drain c/d/i  	: ndiaye in place  	MSK: Warm, no edema              Neuro: AAOx3  	Psych: Normal affect and mood  	Skin: Warm    LABS/STUDIES  --------------------------------------------------------------------------------              10.5   6.80  >-----------<  218      [08-19-20 @ 05:47]              33.5     131  |  97  |  40  ----------------------------<  101      [08-19-20 @ 05:47]  4.3   |  24  |  1.79        Ca     11.0     [08-19-20 @ 05:47]      Mg     2.0     [08-19-20 @ 05:47]      Phos  3.0     [08-19-20 @ 05:47]    TPro  5.9  /  Alb  3.7  /  TBili  0.8  /  DBili  x   /  AST  19  /  ALT  16  /  AlkPhos  51  [08-19-20 @ 05:47]    PT/INR: PT 11.0 , INR 0.92       [08-19-20 @ 05:47]  PTT: 24.3       [08-19-20 @ 05:47]          [08-19-20 @ 05:47]    Creatinine Trend:  SCr 1.79 [08-19 @ 05:47]  SCr 2.02 [08-18 @ 16:56]  SCr 2.12 [08-18 @ 06:23]  SCr 2.63 [08-17 @ 05:40]  SCr 2.74 [08-16 @ 17:25]    Urinalysis - [08-18-20 @ 23:10]      Color Pink / Appearance Turbid / SG 1.011 / pH 6.0      Gluc Negative / Ketone Negative  / Bili Negative / Urobili Negative       Blood Large / Protein 30 mg/dL / Leuk Est Large / Nitrite Negative      RBC >50 / WBC 11-25 / Hyaline 0 / Gran  / Sq Epi  / Non Sq Epi 4 / Bacteria Negative

## 2020-08-21 ENCOUNTER — LABORATORY RESULT (OUTPATIENT)
Age: 50
End: 2020-08-21

## 2020-08-21 ENCOUNTER — APPOINTMENT (OUTPATIENT)
Dept: TRANSPLANT | Facility: CLINIC | Age: 50
End: 2020-08-21

## 2020-08-21 LAB
ALBUMIN SERPL ELPH-MCNC: 4.1 G/DL
ALP BLD-CCNC: 52 U/L
ALT SERPL-CCNC: 21 U/L
ANION GAP SERPL CALC-SCNC: 12 MMOL/L
APPEARANCE: ABNORMAL
AST SERPL-CCNC: 19 U/L
BACTERIA: NEGATIVE
BASOPHILS # BLD AUTO: 0.05 K/UL
BASOPHILS NFR BLD AUTO: 0.7 %
BILIRUB SERPL-MCNC: 1 MG/DL
BILIRUBIN URINE: NEGATIVE
BLOOD URINE: ABNORMAL
BUN SERPL-MCNC: 29 MG/DL
CALCIUM SERPL-MCNC: 10.7 MG/DL
CALCIUM SERPL-MCNC: 10.7 MG/DL
CHLORIDE SERPL-SCNC: 102 MMOL/L
CO2 SERPL-SCNC: 26 MMOL/L
COLOR: ABNORMAL
CREAT SERPL-MCNC: 1.49 MG/DL
CREAT SPEC-SCNC: 128 MG/DL
CREAT/PROT UR: 2 RATIO
EOSINOPHIL # BLD AUTO: 0.29 K/UL
EOSINOPHIL NFR BLD AUTO: 4 %
GLUCOSE QUALITATIVE U: NEGATIVE
GLUCOSE SERPL-MCNC: 89 MG/DL
HCT VFR BLD CALC: 36.6 %
HGB BLD-MCNC: 11.3 G/DL
HYALINE CASTS: 2 /LPF
IMM GRANULOCYTES NFR BLD AUTO: 1.7 %
KETONES URINE: NORMAL
LDH SERPL-CCNC: 421 U/L
LEUKOCYTE ESTERASE URINE: ABNORMAL
LYMPHOCYTES # BLD AUTO: 0.99 K/UL
LYMPHOCYTES NFR BLD AUTO: 13.7 %
MAGNESIUM SERPL-MCNC: 1.9 MG/DL
MAN DIFF?: NORMAL
MCHC RBC-ENTMCNC: 28.3 PG
MCHC RBC-ENTMCNC: 30.9 GM/DL
MCV RBC AUTO: 91.7 FL
MICROSCOPIC-UA: NORMAL
MONOCYTES # BLD AUTO: 0.63 K/UL
MONOCYTES NFR BLD AUTO: 8.7 %
NEUTROPHILS # BLD AUTO: 5.15 K/UL
NEUTROPHILS NFR BLD AUTO: 71.2 %
NITRITE URINE: NEGATIVE
PARATHYROID HORMONE INTACT: 279 PG/ML
PH URINE: 6
PHOSPHATE SERPL-MCNC: 2.5 MG/DL
PLATELET # BLD AUTO: 285 K/UL
POTASSIUM SERPL-SCNC: 4.5 MMOL/L
PROT SERPL-MCNC: 6 G/DL
PROT UR-MCNC: 260 MG/DL
PROTEIN URINE: ABNORMAL
RBC # BLD: 3.99 M/UL
RBC # FLD: 22.7 %
RED BLOOD CELLS URINE: >720 /HPF
SODIUM SERPL-SCNC: 140 MMOL/L
SPECIFIC GRAVITY URINE: 1.02
SQUAMOUS EPITHELIAL CELLS: 21 /HPF
TACROLIMUS SERPL-MCNC: 9.7 NG/ML
URATE SERPL-MCNC: 7.5 MG/DL
UROBILINOGEN URINE: NORMAL
WBC # FLD AUTO: 7.23 K/UL
WHITE BLOOD CELLS URINE: 89 /HPF

## 2020-08-24 ENCOUNTER — APPOINTMENT (OUTPATIENT)
Dept: TRANSPLANT | Facility: CLINIC | Age: 50
End: 2020-08-24
Payer: MEDICAID

## 2020-08-24 ENCOUNTER — LABORATORY RESULT (OUTPATIENT)
Age: 50
End: 2020-08-24

## 2020-08-24 VITALS
SYSTOLIC BLOOD PRESSURE: 114 MMHG | WEIGHT: 166 LBS | HEIGHT: 63 IN | HEART RATE: 88 BPM | BODY MASS INDEX: 29.41 KG/M2 | OXYGEN SATURATION: 100 % | RESPIRATION RATE: 12 BRPM | DIASTOLIC BLOOD PRESSURE: 76 MMHG | TEMPERATURE: 97 F

## 2020-08-24 LAB
ALBUMIN SERPL ELPH-MCNC: 4.1 G/DL
ALP BLD-CCNC: 52 U/L
ALT SERPL-CCNC: 28 U/L
ANION GAP SERPL CALC-SCNC: 12 MMOL/L
APPEARANCE: CLEAR
AST SERPL-CCNC: 23 U/L
BACTERIA: NEGATIVE
BASOPHILS # BLD AUTO: 0.05 K/UL
BASOPHILS NFR BLD AUTO: 0.8 %
BILIRUB SERPL-MCNC: 0.6 MG/DL
BILIRUBIN URINE: NEGATIVE
BLOOD URINE: ABNORMAL
BUN SERPL-MCNC: 10 MG/DL
CALCIUM SERPL-MCNC: 10.7 MG/DL
CALCIUM SERPL-MCNC: 10.7 MG/DL
CHLORIDE SERPL-SCNC: 103 MMOL/L
CO2 SERPL-SCNC: 23 MMOL/L
COLOR: NORMAL
CREAT SERPL-MCNC: 1.2 MG/DL
CREAT SPEC-SCNC: 45 MG/DL
CREAT/PROT UR: 0.5 RATIO
EOSINOPHIL # BLD AUTO: 0.3 K/UL
EOSINOPHIL NFR BLD AUTO: 5 %
GLUCOSE QUALITATIVE U: NEGATIVE
GLUCOSE SERPL-MCNC: 86 MG/DL
HCT VFR BLD CALC: 36.5 %
HGB BLD-MCNC: 11.1 G/DL
HYALINE CASTS: 1 /LPF
IMM GRANULOCYTES NFR BLD AUTO: 0.7 %
KETONES URINE: NEGATIVE
LDH SERPL-CCNC: 367 U/L
LEUKOCYTE ESTERASE URINE: ABNORMAL
LYMPHOCYTES # BLD AUTO: 0.91 K/UL
LYMPHOCYTES NFR BLD AUTO: 15.2 %
MAGNESIUM SERPL-MCNC: 1.6 MG/DL
MAN DIFF?: NORMAL
MCHC RBC-ENTMCNC: 27.8 PG
MCHC RBC-ENTMCNC: 30.4 GM/DL
MCV RBC AUTO: 91.5 FL
MICROSCOPIC-UA: NORMAL
MONOCYTES # BLD AUTO: 0.4 K/UL
MONOCYTES NFR BLD AUTO: 6.7 %
NEUTROPHILS # BLD AUTO: 4.27 K/UL
NEUTROPHILS NFR BLD AUTO: 71.6 %
NITRITE URINE: NEGATIVE
PARATHYROID HORMONE INTACT: 259 PG/ML
PH URINE: 6.5
PHOSPHATE SERPL-MCNC: 2.6 MG/DL
PLATELET # BLD AUTO: 363 K/UL
POTASSIUM SERPL-SCNC: 4.5 MMOL/L
PROT SERPL-MCNC: 6.1 G/DL
PROT UR-MCNC: 21 MG/DL
PROTEIN URINE: ABNORMAL
RBC # BLD: 3.99 M/UL
RBC # FLD: 21.3 %
RED BLOOD CELLS URINE: 24 /HPF
SODIUM SERPL-SCNC: 137 MMOL/L
SPECIFIC GRAVITY URINE: 1.01
SQUAMOUS EPITHELIAL CELLS: 3 /HPF
TACROLIMUS SERPL-MCNC: 10 NG/ML
URATE SERPL-MCNC: 6.1 MG/DL
UROBILINOGEN URINE: NORMAL
WBC # FLD AUTO: 5.97 K/UL
WHITE BLOOD CELLS URINE: 11 /HPF

## 2020-08-24 PROCEDURE — 99024 POSTOP FOLLOW-UP VISIT: CPT

## 2020-08-24 NOTE — END OF VISIT
[Time Spent: ___ minutes] : I have spent [unfilled] minutes of time on the encounter. [>50% of the face to face encounter time was spent on counseling and/or coordination of care for ___] : Greater than 50% of the face to face encounter time was spent on counseling and/or coordination of care for [unfilled] [] : I personally saw and examined this patient.  My history and physical is based on my own examination and interaction with the patient and those present with ~him/her~, on available medical records, as well as on the reports and observations of other members of the team.

## 2020-08-24 NOTE — HISTORY OF PRESENT ILLNESS
[ Donor] :  donor [Basiliximab] : basiliximab [Negative/Positive] : Donor Negative/Recipient Positive [Steroids] : steroids [] : Yes [PHS Increased Risk] : [facility] increased risk [Hepatitis C] : hepatitis c [Terminal Creatinine: ____] : Terminal Creatinine: [unfilled] [KDPI: ____] : Kidney Donor Profile Index: [unfilled] [Cold Ischemic Time: ____] : Cold Ischemic Time: [unfilled] [de-identified] : 50F on HD x14 years, ESRD reportedly due to malignant HTN, s/p DDRT 10 days ago\par Donor was 41M who  of drug overdose. +HCV\par CIT 8.5 hrs, kidney made urine immediately and she did not require any dialysis post-op.\par Cr 3 days ago 1.49\par \par Patient had diarrhea beginning Friday. Cellcept has been held.\par She denies any fevers, nausea, vomiting, dysuria, hematuria, or abdominal pain.\par HEMANTH has been draining ~150-200ml daily\par \par She is currently taking Envarsus 13mg daily [TextBox_7] : 8/14/2020

## 2020-08-24 NOTE — PHYSICAL EXAM
[No Acute Distress] : no acute distress [Sclera Anicteric] : sclera anicteric [Clear to Auscultation] : clear to auscultation [Breathing Comfortably on RA] : breathing comfortably on room air [Normal Rate] : normal rate [In Right Arm] : fistula/graft in right arm [] : left dorsalis pedis palpable [Appropriate] : appropriate [Oriented] : oriented [Normal] : normal [Site: ___] : Site: [unfilled] [Clean] : clean [Dry] : dry [Healing Well] : healing well [TextBox_25] : Soft, nondistended, appropriately tender at RLQ incision. HEMANTH with serous fluid [FreeTextEntry1] : No oral thrush [Bleeding] : no active bleeding [Foul Odor] : no foul smell [Erythema] : not erythematous [Serosanguinous Drainage] : no serosanguinous drainage [Purulent Drainage] : no purulent drainage [Warm] : not warm [Tender] : not tender

## 2020-08-24 NOTE — PLAN
[FreeTextEntry1] : 50F POD#10 s/p DDRT with improving renal function\par +HCV donor - will check serologies and start antiviral therapy over next few weeks\par Immunosuppression - Cont Envarsus 13mg daily, prednisone 5mg daily, Cont to hold cellcept due to diarrhea.\par Will check tacrolimus level and adjust dose accordingly.\par Cont HEMANTH to bulb suction\par Prophylactic valcyte, bactrim, nystatin\par Patient to f/u with nephrology and surgery, alternating weekly visits\par Resume plavix, which was held pre-op\par Will plan for ureteral stent removal in a few weeks

## 2020-08-25 LAB
BACTERIA UR CULT: NORMAL
BKV DNA SPEC QL NAA+PROBE: NOT DETECTED COPIES/ML

## 2020-09-01 ENCOUNTER — APPOINTMENT (OUTPATIENT)
Dept: NEPHROLOGY | Facility: CLINIC | Age: 50
End: 2020-09-01
Payer: MEDICARE

## 2020-09-01 ENCOUNTER — LABORATORY RESULT (OUTPATIENT)
Age: 50
End: 2020-09-01

## 2020-09-01 VITALS
SYSTOLIC BLOOD PRESSURE: 110 MMHG | HEART RATE: 82 BPM | OXYGEN SATURATION: 100 % | RESPIRATION RATE: 12 BRPM | HEIGHT: 63 IN | BODY MASS INDEX: 28 KG/M2 | TEMPERATURE: 97.2 F | WEIGHT: 158 LBS | DIASTOLIC BLOOD PRESSURE: 74 MMHG

## 2020-09-01 DIAGNOSIS — R19.7 DIARRHEA, UNSPECIFIED: ICD-10-CM

## 2020-09-01 LAB
ALBUMIN SERPL ELPH-MCNC: 4.5 G/DL
ALP BLD-CCNC: 70 U/L
ALT SERPL-CCNC: 82 U/L
ANION GAP SERPL CALC-SCNC: 12 MMOL/L
APPEARANCE: CLEAR
AST SERPL-CCNC: 55 U/L
BACTERIA: ABNORMAL
BASOPHILS # BLD AUTO: 0.15 K/UL
BASOPHILS NFR BLD AUTO: 3.4 %
BILIRUB SERPL-MCNC: 0.5 MG/DL
BILIRUBIN URINE: NEGATIVE
BLOOD URINE: NEGATIVE
BUN SERPL-MCNC: 9 MG/DL
CALCIUM SERPL-MCNC: 11.5 MG/DL
CALCIUM SERPL-MCNC: 11.5 MG/DL
CHLORIDE SERPL-SCNC: 104 MMOL/L
CO2 SERPL-SCNC: 24 MMOL/L
COLOR: NORMAL
CREAT SERPL-MCNC: 1.06 MG/DL
EOSINOPHIL # BLD AUTO: 0.04 K/UL
EOSINOPHIL NFR BLD AUTO: 0.9 %
GLUCOSE QUALITATIVE U: NEGATIVE
GLUCOSE SERPL-MCNC: 93 MG/DL
HCT VFR BLD CALC: 40.8 %
HGB BLD-MCNC: 12.1 G/DL
HYALINE CASTS: 0 /LPF
KETONES URINE: NEGATIVE
LDH SERPL-CCNC: 308 U/L
LEUKOCYTE ESTERASE URINE: ABNORMAL
LYMPHOCYTES # BLD AUTO: 0.58 K/UL
LYMPHOCYTES NFR BLD AUTO: 12.9 %
MAGNESIUM SERPL-MCNC: 1.7 MG/DL
MAN DIFF?: NORMAL
MCHC RBC-ENTMCNC: 27.7 PG
MCHC RBC-ENTMCNC: 29.7 GM/DL
MCV RBC AUTO: 93.4 FL
MICROSCOPIC-UA: NORMAL
MONOCYTES # BLD AUTO: 0.19 K/UL
MONOCYTES NFR BLD AUTO: 4.3 %
NEUTROPHILS # BLD AUTO: 3.48 K/UL
NEUTROPHILS NFR BLD AUTO: 77.6 %
NITRITE URINE: NEGATIVE
PARATHYROID HORMONE INTACT: 202 PG/ML
PH URINE: 6
PHOSPHATE SERPL-MCNC: 2.9 MG/DL
PLATELET # BLD AUTO: 406 K/UL
POTASSIUM SERPL-SCNC: 4.7 MMOL/L
PROT SERPL-MCNC: 6.5 G/DL
PROTEIN URINE: NORMAL
RBC # BLD: 4.37 M/UL
RBC # FLD: 21.2 %
RED BLOOD CELLS URINE: 3 /HPF
SODIUM SERPL-SCNC: 139 MMOL/L
SPECIFIC GRAVITY URINE: 1.02
SQUAMOUS EPITHELIAL CELLS: 4 /HPF
TACROLIMUS SERPL-MCNC: 4.6 NG/ML
URATE SERPL-MCNC: 6.2 MG/DL
UROBILINOGEN URINE: NORMAL
WBC # FLD AUTO: 4.49 K/UL
WHITE BLOOD CELLS URINE: 21 /HPF

## 2020-09-01 PROCEDURE — 99215 OFFICE O/P EST HI 40 MIN: CPT

## 2020-09-01 NOTE — HISTORY OF PRESENT ILLNESS
[FreeTextEntry1] : 51 y/o F with PMHx of HTN, HLD, Gout, ESRD on HD MWF since , now via right\par brachial graft created via RUE bypass for which she is on Plavix/ASA - last\par dose  (UE vein thrombosis), s/p b/l AV fistula in the past, both failed,\par was anuric. H/O Positive Quant Gold; had BCG vaccine as a child. CXR from\par 2019 showed RUL 2mm nodule (new since ). Treated with INH/B6\par 2019-2020.\par \par Underwent HCV+ DDRT with stent placement and Simulect induction on 20,\par renal doppler with patent vessels. Post op course c/b hyperkalemia (tx with\par Insulin/dextrose) and low urine output (responded to Lasix). Kim removed on\par POD 4, passed void trial, HEMANTH with mod amount of out pt will keep the HEMANTH and DC\par in the clinic. Aspirin restarted post operatively; Plavix held and will\par continue to hold and f/u on restart in the clinic. Pain well controlled,\par tolerated diet, ambulating with no difficulty, no PT needs. Maintained good\par renal function; serum creatinine continued to downtrend; discharged with serum\par Cr of 1.79 on lasix 40mg PO QD. Hepatitis C PCR and genotype checked on POD 4,\par will be followed closely outpatient. \par \par Discharge meds:\par Envarsus 13mg QD\par MMF 1g BID\par Pred taper\par ppx: nystatin/bactrum QD/valcyte 450mg QD\par ASA 81mg daily\par Plavix on hold\par Lasix 40mg QD\par \par Sicne discharge lasix and MMF (severe diarrhea, resolved now) have been stopped.\par \par Donor Info:\par Donor (local or import? hospital ): Local\par Donor ID: [id number]\par Match: [id number]\par OPO: NYRT\par Age: 43\par ABO: O\par High Risk: Yes\par KDPI: 39%\par COD: Anoxia, drug OD\par Medical Hx: Hx IVDA, crack and cocaine use, asthma,\par Terminal Cr: 0.69\par Covid Testin/13 Bronch lavage negative\par CMV- negative EBV-positive\par HepBcAb- negative\par Hepatitis C-SUNITHA- POSITIVE\par Hepatitis C ab-positive\par MISMATCH: \par \par Recipient Info:\par CPRA: 23\par ABO: O\par CMV: Pos EBV Status: Pos\par \par OR: R kidney to Right iliac fossa; 1A/1V/1U. CIT: 8.5 hours.\par Ureteral anastomosis to bladder preformed over double-J stent.\par \par \par Patient is here today for post discharge follow up. She has been off MMF for severe diarrhea and diarrhea has completely resolved. Has no acute symptoms. Still has drain, draining clear fluid.\par She is having nocturia, multiple times. Pain is controlled. She says she does not mind the urine since she did not for many years (14 years on dialysis).\par Lives with her  who is a .

## 2020-09-01 NOTE — ASSESSMENT
[FreeTextEntry1] : Renal Transplant recipient: Had immediate allograft function, improved creatinine at discharge. Has urinary frequency and nocturia. No dysuria/hematuria. No fever/chills. Tolerating medications.\par Immunosuppression: reviewed; simulect induction, on tac/MMF/prednisone, last Tac level noted; will recheck. Currently on Envarsus 13 mg daily.; Not on MMF and prednisone at 5 mg daily. Will restart on MMF at 500 mg twice daily.\par Diarrhea: resolved, will restart MMF at 500 mg twice daily. If diarrhea recurs will reduce to 250 mg/dose.\par Hep C pos donor kidney: Will follow up with antiviral regimen, will discuss with team.\par Hypertension: controlled; Not on medications. \par Hyperlipidemia: Will follow. \par Cardiovascular risk reduction discussed. On aspirin.\par Infection prophylaxis: On Bactrim, Valcyte and nystatin (twice daily) as well as GI prophylaxis.\par Discussed ambulation, using incentive spirometer, optimal  blood pressure readings, adherence with medications and follow ups, follow up clinic visit schedule, avoiding dehydration, mosquito bites; prevention of DVT as well as food safety.\par She has met with transplant surgeon; post op wound care, staples removal, drain removal  and ureteral stent removal were discussed.\par

## 2020-09-01 NOTE — PHYSICAL EXAM
[General Appearance - Alert] : alert [General Appearance - In No Acute Distress] : in no acute distress [Sclera] : the sclera and conjunctiva were normal [PERRL With Normal Accommodation] : pupils were equal in size, round, and reactive to light [Extraocular Movements] : extraocular movements were intact [Outer Ear] : the ears and nose were normal in appearance [Oropharynx] : the oropharynx was normal [Neck Appearance] : the appearance of the neck was normal [Neck Cervical Mass (___cm)] : no neck mass was observed [Jugular Venous Distention Increased] : there was no jugular-venous distention [Thyroid Diffuse Enlargement] : the thyroid was not enlarged [Thyroid Nodule] : there were no palpable thyroid nodules [Auscultation Breath Sounds / Voice Sounds] : lungs were clear to auscultation bilaterally [Heart Rate And Rhythm] : heart rate was normal and rhythm regular [Heart Sounds] : normal S1 and S2 [Heart Sounds Gallop] : no gallops [Heart Sounds Pericardial Friction Rub] : no pericardial rub [Edema] : there was no peripheral edema [Abdomen Soft] : soft [Abdomen Tenderness] : non-tender [Cervical Lymph Nodes Enlarged Posterior Bilaterally] : posterior cervical [Cervical Lymph Nodes Enlarged Anterior Bilaterally] : anterior cervical [FreeTextEntry1] : right UE [] : no rash [No Focal Deficits] : no focal deficits [Oriented To Time, Place, And Person] : oriented to person, place, and time [Impaired Insight] : insight and judgment were intact [Affect] : the affect was normal

## 2020-09-01 NOTE — REASON FOR VISIT
[Consultation] : a consultation visit [FreeTextEntry1] : Post kidney transplant follow up- No new acute symptoms

## 2020-09-02 LAB — HCV GENTYP BLD NAA+PROBE: 3 — SIGNIFICANT CHANGE UP

## 2020-09-03 LAB
BKV DNA SPEC QL NAA+PROBE: NOT DETECTED COPIES/ML
CREAT SPEC-SCNC: 164 MG/DL
CREAT/PROT UR: 0.1 RATIO
PROT UR-MCNC: 21 MG/DL

## 2020-09-08 ENCOUNTER — APPOINTMENT (OUTPATIENT)
Dept: TRANSPLANT | Facility: CLINIC | Age: 50
End: 2020-09-08

## 2020-09-08 RX ORDER — PREDNISONE 5 MG/1
5 TABLET ORAL
Qty: 15 | Refills: 0 | Status: DISCONTINUED | COMMUNITY
Start: 2020-08-17 | End: 2020-09-08

## 2020-09-09 ENCOUNTER — APPOINTMENT (OUTPATIENT)
Dept: NEPHROLOGY | Facility: CLINIC | Age: 50
End: 2020-09-09
Payer: MEDICARE

## 2020-09-09 VITALS
RESPIRATION RATE: 12 BRPM | HEIGHT: 62 IN | BODY MASS INDEX: 29.08 KG/M2 | TEMPERATURE: 98 F | OXYGEN SATURATION: 99 % | WEIGHT: 158 LBS | DIASTOLIC BLOOD PRESSURE: 79 MMHG | SYSTOLIC BLOOD PRESSURE: 109 MMHG | HEART RATE: 80 BPM

## 2020-09-09 PROCEDURE — 99214 OFFICE O/P EST MOD 30 MIN: CPT

## 2020-09-09 NOTE — ASSESSMENT
[FreeTextEntry1] : Renal Transplant recipient: Had immediate allograft function, improved creatinine at discharge. Has urinary frequency and nocturia. No dysuria/hematuria. No fever/chills. Tolerating medications.\par Immunosuppression: reviewed; simulect induction, on tac/MMF/prednisone, last Tac level noted; will recheck. Currently taking Envarsus 13 mg daily.; Not on MMF and prednisone at 5 mg daily. Will restart on MMF at 500 mg twice daily.\par Diarrhea: resolved, will increase  MMF to 500 mg three times daily after checking labs. If diarrhea recurs will reduce to 500 mg/dose.\par Hep C pos donor kidney: Will follow up with antiviral regimen, will discuss with team. She is awaiting Epclusa.\par Hypertension: controlled; Not on medications. On \par Hyperlipidemia: Will follow. \par Cardiovascular risk reduction discussed. On aspirin.\par Infection prophylaxis: On Bactrim, Valcyte and nystatin (twice daily) as well as GI prophylaxis.\par Discussed ambulation, using incentive spirometer, optimal  blood pressure readings, adherence with medications and follow ups, follow up clinic visit schedule, avoiding dehydration, mosquito bites; prevention of DVT as well as food safety.\par She has already met with transplant surgeon; post op wound care, staples removal, drain removal  and ureteral stent removal were discussed.\par

## 2020-09-09 NOTE — PHYSICAL EXAM
[General Appearance - Alert] : alert [General Appearance - In No Acute Distress] : in no acute distress [PERRL With Normal Accommodation] : pupils were equal in size, round, and reactive to light [Sclera] : the sclera and conjunctiva were normal [Oropharynx] : the oropharynx was normal [Extraocular Movements] : extraocular movements were intact [Outer Ear] : the ears and nose were normal in appearance [Neck Appearance] : the appearance of the neck was normal [Neck Cervical Mass (___cm)] : no neck mass was observed [Thyroid Diffuse Enlargement] : the thyroid was not enlarged [Jugular Venous Distention Increased] : there was no jugular-venous distention [Thyroid Nodule] : there were no palpable thyroid nodules [Auscultation Breath Sounds / Voice Sounds] : lungs were clear to auscultation bilaterally [Heart Rate And Rhythm] : heart rate was normal and rhythm regular [Heart Sounds Pericardial Friction Rub] : no pericardial rub [Heart Sounds] : normal S1 and S2 [Heart Sounds Gallop] : no gallops [Abdomen Soft] : soft [Edema] : there was no peripheral edema [Abdomen Tenderness] : non-tender [Cervical Lymph Nodes Enlarged Anterior Bilaterally] : anterior cervical [Cervical Lymph Nodes Enlarged Posterior Bilaterally] : posterior cervical [] : no rash [No Focal Deficits] : no focal deficits [Oriented To Time, Place, And Person] : oriented to person, place, and time [Impaired Insight] : insight and judgment were intact [Affect] : the affect was normal [FreeTextEntry1] : right UE

## 2020-09-09 NOTE — HISTORY OF PRESENT ILLNESS
[FreeTextEntry1] : 49 y/o F with PMHx of HTN, HLD, Gout, ESRD on HD MWF since , now via right\par brachial graft created via RUE bypass for which she is on Plavix/ASA - last\par dose  (UE vein thrombosis), s/p b/l AV fistula in the past, both failed,\par was anuric. H/O Positive Quant Gold; had BCG vaccine as a child. CXR from\par 2019 showed RUL 2mm nodule (new since ). Treated with INH/B6\par 2019-2020.\par \par Underwent HCV+ DDRT with stent placement and Simulect induction on 20,\par renal doppler with patent vessels. Post op course c/b hyperkalemia (tx with\par Insulin/dextrose) and low urine output (responded to Lasix). Kim removed on\par POD 4, passed void trial, HEMANTH with mod amount of out pt will keep the HEMANTH and DC\par in the clinic. Aspirin restarted post operatively; Plavix held and will\par continue to hold and f/u on restart in the clinic. Pain well controlled,\par tolerated diet, ambulating with no difficulty, no PT needs. Maintained good\par renal function; serum creatinine continued to downtrend; discharged with serum\par Cr of 1.79 on lasix 40mg PO QD. Hepatitis C PCR and genotype checked on POD 4,\par will be followed closely outpatient. \par \par Discharge meds:\par Envarsus 13mg QD\par MMF 1g BID\par Pred taper\par ppx: nystatin/bactrum QD/valcyte 450mg QD\par ASA 81mg daily\par Plavix on hold\par Lasix 40mg QD\par \par Sicne discharge lasix and MMF (severe diarrhea, resolved now) have been stopped.\par \par Donor Info:\par Donor (local or import? hospital ): Local\par Donor ID: [id number]\par Match: [id number]\par OPO: NYRT\par Age: 43\par ABO: O\par High Risk: Yes\par KDPI: 39%\par COD: Anoxia, drug OD\par Medical Hx: Hx IVDA, crack and cocaine use, asthma,\par Terminal Cr: 0.69\par Covid Testin/13 Bronch lavage negative\par CMV- negative EBV-positive\par HepBcAb- negative\par Hepatitis C-SUNITHA- POSITIVE\par Hepatitis C ab-positive\par MISMATCH: \par \par Recipient Info:\par CPRA: 23\par ABO: O\par CMV: Pos EBV Status: Pos\par \par OR: R kidney to Right iliac fossa; 1A/1V/1U. CIT: 8.5 hours.\par Ureteral anastomosis to bladder preformed over double-J stent.\par \par \par Patient is here today for post discharge follow up. She has restarted  MMF  and diarrhea has completely resolved. Has no acute symptoms. Still has drain, draining clear fluid. Approx 30 ml ion the bulb today\par She is having nocturia, multiple times. Pain is controlled. She says she does not mind the urine since she did not for many years (14 years on dialysis).\par Lives with her  who is a .\par Sang has not increased Envarsus to 15 as per advise since last visit. Still taking 13 mg.

## 2020-09-09 NOTE — REASON FOR VISIT
[Follow-Up] : a follow-up visit [FreeTextEntry1] : Post kidney transplant follow up- No new acute symptoms. now tolerating low dose cellcept without diarrhea

## 2020-09-10 LAB
ALBUMIN SERPL ELPH-MCNC: 4.3 G/DL
ALP BLD-CCNC: 76 U/L
ALT SERPL-CCNC: 52 U/L
ANION GAP SERPL CALC-SCNC: 12 MMOL/L
APPEARANCE: CLEAR
AST SERPL-CCNC: 25 U/L
BACTERIA: NEGATIVE
BASOPHILS # BLD AUTO: 0.04 K/UL
BASOPHILS NFR BLD AUTO: 0.8 %
BILIRUB SERPL-MCNC: 0.5 MG/DL
BILIRUBIN URINE: NEGATIVE
BLOOD URINE: NORMAL
BUN SERPL-MCNC: 13 MG/DL
CALCIUM SERPL-MCNC: 11.3 MG/DL
CHLORIDE SERPL-SCNC: 101 MMOL/L
CO2 SERPL-SCNC: 25 MMOL/L
COLOR: NORMAL
CREAT SERPL-MCNC: 1.15 MG/DL
CREAT SPEC-SCNC: 46 MG/DL
CREAT/PROT UR: 0.1 RATIO
EOSINOPHIL # BLD AUTO: 0.18 K/UL
EOSINOPHIL NFR BLD AUTO: 3.7 %
GLUCOSE QUALITATIVE U: NEGATIVE
GLUCOSE SERPL-MCNC: 89 MG/DL
HCT VFR BLD CALC: 41.2 %
HGB BLD-MCNC: 12.6 G/DL
HYALINE CASTS: 0 /LPF
IMM GRANULOCYTES NFR BLD AUTO: 0.4 %
KETONES URINE: NEGATIVE
LDH SERPL-CCNC: 196 U/L
LEUKOCYTE ESTERASE URINE: ABNORMAL
LYMPHOCYTES # BLD AUTO: 0.91 K/UL
LYMPHOCYTES NFR BLD AUTO: 18.7 %
MAGNESIUM SERPL-MCNC: 1.6 MG/DL
MAN DIFF?: NORMAL
MCHC RBC-ENTMCNC: 27.8 PG
MCHC RBC-ENTMCNC: 30.6 GM/DL
MCV RBC AUTO: 90.9 FL
MICROSCOPIC-UA: NORMAL
MONOCYTES # BLD AUTO: 0.3 K/UL
MONOCYTES NFR BLD AUTO: 6.2 %
NEUTROPHILS # BLD AUTO: 3.41 K/UL
NEUTROPHILS NFR BLD AUTO: 70.2 %
NITRITE URINE: NEGATIVE
PH URINE: 6
PHOSPHATE SERPL-MCNC: 2.9 MG/DL
PLATELET # BLD AUTO: 297 K/UL
POTASSIUM SERPL-SCNC: 4.5 MMOL/L
PROT SERPL-MCNC: 6.2 G/DL
PROT UR-MCNC: 5 MG/DL
PROTEIN URINE: NEGATIVE
RBC # BLD: 4.53 M/UL
RBC # FLD: 19.7 %
RED BLOOD CELLS URINE: 6 /HPF
SODIUM SERPL-SCNC: 137 MMOL/L
SPECIFIC GRAVITY URINE: 1.01
SQUAMOUS EPITHELIAL CELLS: 2 /HPF
TACROLIMUS SERPL-MCNC: 9.5 NG/ML
URATE SERPL-MCNC: 6 MG/DL
UROBILINOGEN URINE: NORMAL
WBC # FLD AUTO: 4.86 K/UL
WHITE BLOOD CELLS URINE: 27 /HPF

## 2020-09-11 LAB
HCV RNA SERPL NAA DL=5-ACNC: ABNORMAL IU/ML
HCV RNA SERPL NAA+PROBE-LOG IU: 7.27 LOG10IU/ML

## 2020-09-13 LAB — BKV DNA SPEC QL NAA+PROBE: NOT DETECTED COPIES/ML

## 2020-09-14 ENCOUNTER — LABORATORY RESULT (OUTPATIENT)
Age: 50
End: 2020-09-14

## 2020-09-14 ENCOUNTER — APPOINTMENT (OUTPATIENT)
Dept: NEPHROLOGY | Facility: CLINIC | Age: 50
End: 2020-09-14

## 2020-09-14 ENCOUNTER — APPOINTMENT (OUTPATIENT)
Dept: TRANSPLANT | Facility: CLINIC | Age: 50
End: 2020-09-14
Payer: MEDICAID

## 2020-09-14 VITALS
SYSTOLIC BLOOD PRESSURE: 130 MMHG | BODY MASS INDEX: 28.71 KG/M2 | HEIGHT: 62 IN | WEIGHT: 156 LBS | OXYGEN SATURATION: 100 % | TEMPERATURE: 98.1 F | HEART RATE: 80 BPM | DIASTOLIC BLOOD PRESSURE: 82 MMHG | RESPIRATION RATE: 12 BRPM

## 2020-09-14 PROCEDURE — 99024 POSTOP FOLLOW-UP VISIT: CPT

## 2020-09-14 RX ORDER — FUROSEMIDE 40 MG/1
40 TABLET ORAL
Qty: 270 | Refills: 0 | Status: DISCONTINUED | COMMUNITY
Start: 2020-08-17 | End: 2020-09-14

## 2020-09-14 NOTE — PHYSICAL EXAM
[No Acute Distress] : no acute distress [Sclera Anicteric] : sclera anicteric [Clear to Auscultation] : clear to auscultation [Breathing Comfortably on RA] : breathing comfortably on room air [Normal Rate] : normal rate [In Right Arm] : fistula/graft in right arm [] : right dorsalis pedis palpable [Normal] : normal [Oriented] : oriented [Appropriate] : appropriate [Site: ___] : Site: [unfilled] [Clean] : clean [Dry] : dry [Healing Well] : healing well [FreeTextEntry1] : No oral thrush [TextBox_25] : Soft, nondistended, appropriately tender at RLQ incision. HEMANTH with serous fluid. Staples removed and steristrips placed. HEMANTH removed [Bleeding] : no active bleeding [Foul Odor] : no foul smell [Purulent Drainage] : no purulent drainage [Serosanguinous Drainage] : no serosanguinous drainage [Warm] : not warm [Erythema] : not erythematous [Tender] : not tender

## 2020-09-14 NOTE — HISTORY OF PRESENT ILLNESS
[Basiliximab] : basiliximab [ Donor] :  donor [Steroids] : steroids [Negative/Positive] : Donor Negative/Recipient Positive [] : Yes [PHS Increased Risk] : Public Health Service increased risk [Hepatitis C] : hepatitis c [KDPI: ____] : Kidney Donor Profile Index: [unfilled] [Terminal Creatinine: ____] : Terminal Creatinine: [unfilled] [Cold Ischemic Time: ____] : Cold Ischemic Time: [unfilled] [de-identified] : 50F on HD x14 years, ESRD reportedly due to malignant HTN, s/p DDRT 1 month ago. Excellent renal function, last Cr 1.15\par Donor was 41M who  of drug overdose. +HCV\par CIT 8.5 hrs, kidney made urine immediately and she did not require any dialysis post-op.\par \par Patient had diarrhea couple of weeks ago, which has resolved. She is now on cellcept 500mg tid\par She denies any fevers, nausea, vomiting, dysuria, hematuria, or abdominal pain.\par HEMANTH has been draining ~30ml daily\par \par She is currently taking Envarsus 15mg daily. \par She started Epclusa last week for HCV donor\par

## 2020-09-14 NOTE — PLAN
[FreeTextEntry1] : 50F 1 month s/p DDRT with excellent renal function\par +HCV donor - started Epclusa antiviral therapy last week, doing well.\par Immunosuppression - Cont Envarsus 15mg daily, prednisone 5mg daily, Cellcept 500mg tid\par Will check tacrolimus level and adjust dose accordingly.\par Prophylactic valcyte, bactrim, nystatin\par Patient to f/u with nephrology every other week\par Will schedule for ureteral stent removal, with ASA/plavix to be held 2 days prior

## 2020-09-14 NOTE — END OF VISIT
[>50% of the face to face encounter time was spent on counseling and/or coordination of care for ___] : Greater than 50% of the face to face encounter time was spent on counseling and/or coordination of care for [unfilled] [Time Spent: ___ minutes] : I have spent [unfilled] minutes of time on the encounter. [] : I personally saw and examined this patient.  My history and physical is based on my own examination and interaction with the patient and those present with ~him/her~, on available medical records, as well as on the reports and observations of other members of the team.

## 2020-09-15 LAB
HBV SURFACE AG SER QL: NONREACTIVE
HCV AB SER QL: ABNORMAL
HCV RNA SERPL NAA DL=5-ACNC: ABNORMAL IU/ML
HCV RNA SERPL NAA+PROBE-LOG IU: 5 LOG10IU/ML
HCV S/CO RATIO: 4.7 S/CO
HIV1+2 AB SPEC QL IA.RAPID: NONREACTIVE

## 2020-09-16 LAB
HBV DNA # SERPL NAA+PROBE: NOT DETECTED
HCV GENTYP BLD NAA+PROBE: 3
HEPB DNA PCR LOG: NOT DETECTED LOG10IU/ML
HIV1 RNA # SERPL NAA+PROBE: NORMAL
HIV1 RNA # SERPL NAA+PROBE: NORMAL COPIES/ML
VIRAL LOAD INTERP: NORMAL
VIRAL LOAD LOG: NORMAL LG COP/ML

## 2020-09-21 ENCOUNTER — APPOINTMENT (OUTPATIENT)
Dept: TRANSPLANT | Facility: CLINIC | Age: 50
End: 2020-09-21

## 2020-09-21 DIAGNOSIS — N19 UNSPECIFIED KIDNEY FAILURE: ICD-10-CM

## 2020-09-22 ENCOUNTER — APPOINTMENT (OUTPATIENT)
Dept: NEPHROLOGY | Facility: CLINIC | Age: 50
End: 2020-09-22
Payer: MEDICARE

## 2020-09-22 VITALS
HEIGHT: 62 IN | WEIGHT: 154 LBS | RESPIRATION RATE: 16 BRPM | DIASTOLIC BLOOD PRESSURE: 87 MMHG | OXYGEN SATURATION: 98 % | BODY MASS INDEX: 28.34 KG/M2 | SYSTOLIC BLOOD PRESSURE: 131 MMHG | HEART RATE: 80 BPM | TEMPERATURE: 97.1 F

## 2020-09-22 PROCEDURE — 99214 OFFICE O/P EST MOD 30 MIN: CPT

## 2020-09-22 NOTE — PHYSICAL EXAM
[General Appearance - Alert] : alert [General Appearance - In No Acute Distress] : in no acute distress [Sclera] : the sclera and conjunctiva were normal [PERRL With Normal Accommodation] : pupils were equal in size, round, and reactive to light [Extraocular Movements] : extraocular movements were intact [Outer Ear] : the ears and nose were normal in appearance [Oropharynx] : the oropharynx was normal [Neck Appearance] : the appearance of the neck was normal [Neck Cervical Mass (___cm)] : no neck mass was observed [Jugular Venous Distention Increased] : there was no jugular-venous distention [Thyroid Diffuse Enlargement] : the thyroid was not enlarged [Thyroid Nodule] : there were no palpable thyroid nodules [Auscultation Breath Sounds / Voice Sounds] : lungs were clear to auscultation bilaterally [Heart Rate And Rhythm] : heart rate was normal and rhythm regular [Heart Sounds] : normal S1 and S2 [Heart Sounds Gallop] : no gallops [Heart Sounds Pericardial Friction Rub] : no pericardial rub [Edema] : there was no peripheral edema [Abdomen Soft] : soft [Abdomen Tenderness] : non-tender [Cervical Lymph Nodes Enlarged Posterior Bilaterally] : posterior cervical [Cervical Lymph Nodes Enlarged Anterior Bilaterally] : anterior cervical [Involuntary Movements] : no involuntary movements were seen [FreeTextEntry1] : right UE [] : no rash [No Focal Deficits] : no focal deficits [Oriented To Time, Place, And Person] : oriented to person, place, and time [Impaired Insight] : insight and judgment were intact [Affect] : the affect was normal

## 2020-09-22 NOTE — REASON FOR VISIT
[Follow-Up] : a follow-up visit [FreeTextEntry1] : Post kidney transplant follow up- No new acute symptoms.

## 2020-09-22 NOTE — HISTORY OF PRESENT ILLNESS
[FreeTextEntry1] : 49 y/o F with PMHx of HTN, HLD, Gout, ESRD on HD MWF since , now via right\par brachial graft created via RUE bypass for which she is on Plavix/ASA - last\par dose  (UE vein thrombosis), s/p b/l AV fistula in the past, both failed,\par was anuric. H/O Positive Quant Gold; had BCG vaccine as a child. CXR from\par 2019 showed RUL 2mm nodule (new since ). Treated with INH/B6\par 2019-2020.\par \par Underwent HCV+ DDRT with stent placement and Simulect induction on 20,\par renal doppler with patent vessels. Post op course c/b hyperkalemia (tx with\par Insulin/dextrose) and low urine output (responded to Lasix). Kim removed on\par POD 4, passed void trial, HEMANTH with mod amount of out pt will keep the HEMANTH and DC\par in the clinic. Aspirin restarted post operatively; Plavix held and will\par continue to hold and f/u on restart in the clinic. Pain well controlled,\par tolerated diet, ambulating with no difficulty, no PT needs. Maintained good\par renal function; serum creatinine continued to downtrend; discharged with serum\par Cr of 1.79 on lasix 40mg PO QD. Hepatitis C PCR and genotype checked on POD 4,\par will be followed closely outpatient. \par \par Discharge meds:\par Envarsus 13mg QD\par MMF 1g BID\par Pred taper\par ppx: nystatin/bactrum QD/valcyte 450mg QD\par ASA 81mg daily\par Plavix on hold\par Lasix 40mg QD\par \par Sicne discharge lasix and MMF (severe diarrhea, resolved now) have been stopped.\par \par Donor Info:\par Donor (local or import? hospital ): Local\par Donor ID: [id number]\par Match: [id number]\par OPO: NYRT\par Age: 43\par ABO: O\par High Risk: Yes\par KDPI: 39%\par COD: Anoxia, drug OD\par Medical Hx: Hx IVDA, crack and cocaine use, asthma,\par Terminal Cr: 0.69\par Covid Testin/13 Bronch lavage negative\par CMV- negative EBV-positive\par HepBcAb- negative\par Hepatitis C-SUNITHA- POSITIVE\par Hepatitis C ab-positive\par MISMATCH: \par \par Recipient Info:\par CPRA: 23\par ABO: O\par CMV: Pos EBV Status: Pos\par \par OR: R kidney to Right iliac fossa; 1A/1V/1U. CIT: 8.5 hours.\par Ureteral anastomosis to bladder preformed over double-J stent.\par \par \par Patient is here today for follow up. \par She had vomiting with sensipar which improved after each dose adjusted to 30 mg

## 2020-09-22 NOTE — ASSESSMENT
[FreeTextEntry1] : Renal Transplant recipient: Had immediate allograft function, improved creatinine at discharge. Has urinary frequency and nocturia. No dysuria/hematuria. No fever/chills. Tolerating medications.\par Immunosuppression: reviewed; simulect induction, on tac/MMF/prednisone, last Tac level noted; will recheck. Currently taking Envarsus 15 mg daily.; full dose MMF and prednisone at 5 mg daily. Will restart on MMF at 500 mg twice daily.\par Diarrhea: resolved,On  MMF to 1000 mg three times daily after checking labs. If diarrhea recurs will reduce to 500 mg/dose.\par Hep C pos donor kidney: Will follow up with antiviral regimen, will discuss with team. She is awaiting Epclusa.\par Hypertension: controlled; Not on medications. On \par Hyperlipidemia: Will follow. \par Cardiovascular risk reduction discussed. On aspirin.\par Infection prophylaxis: On Bactrim, Valcyte and nystatin (twice daily) as well as GI prophylaxis.\par Discussed ambulation, using incentive spirometer, optimal  blood pressure readings, adherence with medications and follow ups, follow up clinic visit schedule, avoiding dehydration, mosquito bites; prevention of DVT as well as food safety.\par She has already met with transplant surgeon;and ureteral stent removal was discussed.\par

## 2020-09-23 LAB
ALBUMIN SERPL ELPH-MCNC: 4.7 G/DL
ALP BLD-CCNC: 87 U/L
ALT SERPL-CCNC: 30 U/L
ANION GAP SERPL CALC-SCNC: 11 MMOL/L
APPEARANCE: CLEAR
AST SERPL-CCNC: 17 U/L
BACTERIA: ABNORMAL
BASOPHILS # BLD AUTO: 0.05 K/UL
BASOPHILS NFR BLD AUTO: 0.9 %
BILIRUB SERPL-MCNC: 0.4 MG/DL
BILIRUBIN URINE: NEGATIVE
BLOOD URINE: ABNORMAL
BUN SERPL-MCNC: 12 MG/DL
CALCIUM SERPL-MCNC: 11 MG/DL
CHLORIDE SERPL-SCNC: 103 MMOL/L
CO2 SERPL-SCNC: 25 MMOL/L
COLOR: NORMAL
CREAT SERPL-MCNC: 1.46 MG/DL
CREAT SPEC-SCNC: 106 MG/DL
CREAT/PROT UR: 0.1 RATIO
EOSINOPHIL # BLD AUTO: 0.07 K/UL
EOSINOPHIL NFR BLD AUTO: 1.3 %
GLUCOSE QUALITATIVE U: NEGATIVE
GLUCOSE SERPL-MCNC: 87 MG/DL
HCT VFR BLD CALC: 44.1 %
HGB BLD-MCNC: 13.6 G/DL
HYALINE CASTS: 2 /LPF
IMM GRANULOCYTES NFR BLD AUTO: 0.2 %
KETONES URINE: NEGATIVE
LDH SERPL-CCNC: 195 U/L
LEUKOCYTE ESTERASE URINE: ABNORMAL
LYMPHOCYTES # BLD AUTO: 1.53 K/UL
LYMPHOCYTES NFR BLD AUTO: 28.4 %
MAGNESIUM SERPL-MCNC: 1.5 MG/DL
MAN DIFF?: NORMAL
MCHC RBC-ENTMCNC: 27.9 PG
MCHC RBC-ENTMCNC: 30.8 GM/DL
MCV RBC AUTO: 90.4 FL
MICROSCOPIC-UA: NORMAL
MONOCYTES # BLD AUTO: 0.33 K/UL
MONOCYTES NFR BLD AUTO: 6.1 %
NEUTROPHILS # BLD AUTO: 3.4 K/UL
NEUTROPHILS NFR BLD AUTO: 63.1 %
NITRITE URINE: NEGATIVE
PH URINE: 6
PHOSPHATE SERPL-MCNC: 3.1 MG/DL
PLATELET # BLD AUTO: 319 K/UL
POTASSIUM SERPL-SCNC: 4.9 MMOL/L
PROT SERPL-MCNC: 6.7 G/DL
PROT UR-MCNC: 12 MG/DL
PROTEIN URINE: NORMAL
RBC # BLD: 4.88 M/UL
RBC # FLD: 17.9 %
RED BLOOD CELLS URINE: 47 /HPF
SODIUM SERPL-SCNC: 139 MMOL/L
SPECIFIC GRAVITY URINE: 1.01
SQUAMOUS EPITHELIAL CELLS: 27 /HPF
TACROLIMUS SERPL-MCNC: 10.4 NG/ML
URATE SERPL-MCNC: 7 MG/DL
UROBILINOGEN URINE: NORMAL
WBC # FLD AUTO: 5.39 K/UL
WHITE BLOOD CELLS URINE: 13 /HPF

## 2020-09-24 LAB — BKV DNA SPEC QL NAA+PROBE: NOT DETECTED COPIES/ML

## 2020-09-27 DIAGNOSIS — Z01.818 ENCOUNTER FOR OTHER PREPROCEDURAL EXAMINATION: ICD-10-CM

## 2020-10-02 ENCOUNTER — APPOINTMENT (OUTPATIENT)
Dept: DISASTER EMERGENCY | Facility: CLINIC | Age: 50
End: 2020-10-02

## 2020-10-03 LAB — SARS-COV-2 N GENE NPH QL NAA+PROBE: NOT DETECTED

## 2020-10-04 ENCOUNTER — TRANSCRIPTION ENCOUNTER (OUTPATIENT)
Age: 50
End: 2020-10-04

## 2020-10-05 ENCOUNTER — APPOINTMENT (OUTPATIENT)
Dept: NEPHROLOGY | Facility: CLINIC | Age: 50
End: 2020-10-05

## 2020-10-05 ENCOUNTER — OUTPATIENT (OUTPATIENT)
Dept: OUTPATIENT SERVICES | Facility: HOSPITAL | Age: 50
LOS: 1 days | End: 2020-10-05
Payer: MEDICARE

## 2020-10-05 ENCOUNTER — APPOINTMENT (OUTPATIENT)
Dept: TRANSPLANT | Facility: HOSPITAL | Age: 50
End: 2020-10-05

## 2020-10-05 VITALS
RESPIRATION RATE: 16 BRPM | SYSTOLIC BLOOD PRESSURE: 131 MMHG | HEART RATE: 69 BPM | OXYGEN SATURATION: 98 % | DIASTOLIC BLOOD PRESSURE: 69 MMHG

## 2020-10-05 VITALS
SYSTOLIC BLOOD PRESSURE: 137 MMHG | DIASTOLIC BLOOD PRESSURE: 99 MMHG | HEART RATE: 84 BPM | TEMPERATURE: 98 F | OXYGEN SATURATION: 99 % | RESPIRATION RATE: 16 BRPM

## 2020-10-05 DIAGNOSIS — I77.0 ARTERIOVENOUS FISTULA, ACQUIRED: Chronic | ICD-10-CM

## 2020-10-05 DIAGNOSIS — Z94.0 KIDNEY TRANSPLANT STATUS: ICD-10-CM

## 2020-10-05 DIAGNOSIS — Z94.0 KIDNEY TRANSPLANT STATUS: Chronic | ICD-10-CM

## 2020-10-05 DIAGNOSIS — Z95.828 PRESENCE OF OTHER VASCULAR IMPLANTS AND GRAFTS: Chronic | ICD-10-CM

## 2020-10-05 PROCEDURE — 52310 CYSTOSCOPY AND TREATMENT: CPT

## 2020-10-05 PROCEDURE — 52310 CYSTOSCOPY AND TREATMENT: CPT | Mod: 58

## 2020-10-05 RX ORDER — ONDANSETRON 8 MG/1
4 TABLET, FILM COATED ORAL ONCE
Refills: 0 | Status: DISCONTINUED | OUTPATIENT
Start: 2020-10-05 | End: 2020-10-05

## 2020-10-05 RX ORDER — ACETAMINOPHEN 500 MG
650 TABLET ORAL ONCE
Refills: 0 | Status: DISCONTINUED | OUTPATIENT
Start: 2020-10-05 | End: 2020-10-19

## 2020-10-05 RX ORDER — FENTANYL CITRATE 50 UG/ML
50 INJECTION INTRAVENOUS
Refills: 0 | Status: DISCONTINUED | OUTPATIENT
Start: 2020-10-05 | End: 2020-10-05

## 2020-10-05 NOTE — H&P ADULT - ASSESSMENT
51 y/o F with PMHx of HTN, HLD, Gout, ESRD on HD MWF since 2006, via right brachial graft s/p b/l AV fistula in the past, both failed, was anuric. H/O Positive Quant Gold; had BCG vaccine as a child. CXR from 6/18/2019 showed RUL 2mm nodule (new since 2014). Treated with INH/B6 8/2019-5/13/2020.  Underwent HCV+ DDRT with stent placement and Simulect induction on 8/14/20, Presents today for transplanted ureter stent removal.

## 2020-10-05 NOTE — H&P ADULT - HISTORY OF PRESENT ILLNESS
51 y/o F with PMHx of HTN, HLD, Gout, ESRD on HD MWF since 2006, now via right brachial graft created via RUE bypass for which she is on Plavix/ASA - last dose 8/13 (UE vein thrombosis), s/p b/l AV fistula in the past, both failed, was anuric. H/O Positive Quant Gold; had BCG vaccine as a child. CXR from 6/18/2019 showed RUL 2mm nodule (new since 2014). Treated with INH/B6 8/2019-5/13/2020.   Underwent HCV+ DDRT with stent placement and Simulect induction on 8/14/20, Presents today for transplanted ureter stent removal.  Feels well denies fever, chills, SOB/CP/ palpitations, recent travel or recent sick contacts 51 y/o F with PMHx of HTN, HLD, Gout, ESRD on HD MWF since 2006, now via right brachial graft created via RUE bypass for which she is on Plavix/ASA - last dose 10/3/20, s/p b/l AV fistula in the past, both failed, H/O Positive Quant Gold; had BCG vaccine as a child. CXR from 6/18/2019 showed RUL 2mm nodule (new since 2014). Treated with INH/B6 8/2019-5/13/2020. Underwent HCV+ DDRT with stent placement and Simulect induction on 8/14/20, Presents today for transplanted ureter stent removal.  Feels well denies fever, chills, SOB/CP/ palpitations, recent travel or recent sick contacts 49 y/o F with PMHx of HTN, HLD, Gout, ESRD on HD MWF since 2006, now via right brachial graft created via RUE bypass for which she is on Plavix/ASA - last dose 10/3/20, s/p b/l AV fistula in the past, both failed, H/O Positive Quant Gold; had BCG vaccine as a child. CXR from 6/18/2019 showed RUL 2mm nodule (new since 2014). Treated with INH/B6 8/2019-5/13/2020. Underwent HCV+ DDRT with stent placement and Simulect induction on 8/14/20, Presents today for transplanted ureter stent removal.  Feels well denies fever, chills, SOB/CP/ palpitations, recent travel or  sick contacts. Neg Covid swab 10/3/20

## 2020-10-05 NOTE — ASU DISCHARGE PLAN (ADULT/PEDIATRIC) - CARE PROVIDER_API CALL
[place] [address]  Phone: [phone]  Fax: [phone]  Follow Up Time: 2 weeks

## 2020-10-05 NOTE — H&P ADULT - NSHPREVIEWOFSYSTEMS_GEN_ALL_CORE
Review of systems  Gen: No weight changes, fatigue, fevers/chills, weakness  Skin: No rashes  Head/Eyes/Ears/Mouth: No headache; Normal hearing; Normal vision w/o blurriness; No sinus pain/discomfort, sore throat  Respiratory: No dyspnea, cough, wheezing, hemoptysis  CV: No chest pain, PND, orthopnea  GI: no  abdominal pain, No diarrhea, constipation, nausea, vomiting, melena, hematochezia  : No increased frequency, dysuria, hematuria, nocturia  MSK: No joint pain/swelling; no back pain; no edema  Neuro: No dizziness/lightheadedness, weakness, seizures, numbness, tingling  Heme: No easy bruising or bleeding  Endo: No heat/cold intolerance  Psych: No significant nervousness, anxiety, stress, depression  All other systems were reviewed and are negative, except as noted.

## 2020-10-05 NOTE — BRIEF OPERATIVE NOTE - NSICDXBRIEFPROCEDURE_GEN_ALL_CORE_FT
PROCEDURES:  Cystoscopic removal of ureteral stent 05-Oct-2020 08:17:04 for transplanted kidney Agustín Barnes

## 2020-10-05 NOTE — H&P ADULT - ATTENDING COMMENTS
s/p DDRT with good renal function, for cystoscopy and removal of ureteral stent from right transplant kidney.  Details of surgery discussed, including risks of dysuria, hematuria, UTI, SAM, and rejection

## 2020-10-05 NOTE — H&P ADULT - NSICDXPASTSURGICALHX_GEN_ALL_CORE_FT
PAST SURGICAL HISTORY:  AV fistula B/L - failed    H/O extremity bypass graft H/O RUE bypass and creation of right brachial graft    History of Hysterectomy for benign disease    Kidney transplant recipient

## 2020-10-19 ENCOUNTER — APPOINTMENT (OUTPATIENT)
Dept: TRANSPLANT | Facility: CLINIC | Age: 50
End: 2020-10-19
Payer: MEDICAID

## 2020-10-19 VITALS
BODY MASS INDEX: 28.71 KG/M2 | HEIGHT: 62 IN | OXYGEN SATURATION: 95 % | WEIGHT: 156 LBS | SYSTOLIC BLOOD PRESSURE: 130 MMHG | DIASTOLIC BLOOD PRESSURE: 88 MMHG | RESPIRATION RATE: 17 BRPM | TEMPERATURE: 97.8 F | HEART RATE: 79 BPM

## 2020-10-19 PROCEDURE — 99024 POSTOP FOLLOW-UP VISIT: CPT

## 2020-10-19 NOTE — PHYSICAL EXAM
[Sclera Anicteric] : sclera anicteric [No Acute Distress] : no acute distress [Clear to Auscultation] : clear to auscultation [Normal Rate] : normal rate [Breathing Comfortably on RA] : breathing comfortably on room air [In Right Arm] : fistula/graft in right arm [] : right dorsalis pedis palpable [Normal] : normal [Oriented] : oriented [Appropriate] : appropriate [Site: ___] : Site: [unfilled] [Clean] : clean [Dry] : dry [Healing Well] : healing well [TextBox_25] : Soft, nondistended, nontender at RLQ incision. bedside u/s showing good flow in kidney, no fluid collection [FreeTextEntry1] : No oral thrush [Bleeding] : no active bleeding [Foul Odor] : no foul smell [Purulent Drainage] : no purulent drainage [Serosanguinous Drainage] : no serosanguinous drainage [Erythema] : not erythematous [Tender] : not tender [Warm] : not warm

## 2020-10-19 NOTE — PLAN
[FreeTextEntry1] : 50F 2 months s/p DDRT with good renal function\par +HCV donor - started Epclusa antiviral therapy, doing well.\par Immunosuppression - Cont Envarsus 13mg daily, prednisone 5mg daily, Cellcept 500mg tid\par Will check tacrolimus level and adjust dose accordingly.\par Prophylactic valcyte, bactrim, nystatin\par Patient to f/u with nephrology every other week\par

## 2020-10-19 NOTE — REASON FOR VISIT
[Follow-Up] : a follow-up visit  [FreeTextEntry3] : DDRT, cystoscopy with stent removal [FreeTextEntry5] : 8/14/2020

## 2020-10-19 NOTE — HISTORY OF PRESENT ILLNESS
[ Donor] :  donor [Basiliximab] : basiliximab [Negative/Positive] : Donor Negative/Recipient Positive [Steroids] : steroids [Hepatitis C] : hepatitis c [PHS Increased Risk] : Public Health Service increased risk [Terminal Creatinine: ____] : Terminal Creatinine: [unfilled] [KDPI: ____] : Kidney Donor Profile Index: [unfilled] [Cold Ischemic Time: ____] : Cold Ischemic Time: [unfilled] [de-identified] : 50F on HD x14 years, ESRD reportedly due to malignant HTN, s/p DDRT 2 months ago. Good renal function, Cr between 1.1-1.5\par Donor was 41M who  of drug overdose. +HCV\par CIT 8.5 hrs, kidney made urine immediately and she did not require any dialysis post-op.\par \par Patient s/p cystoscopy with ureteral stent removal 2 weeks ago. \par She denies any fevers, nausea, vomiting, dysuria, hematuria, or abdominal pain.\par \par She is currently taking Envarsus 13mg daily. \par She is taking Epclusa for HCV donor

## 2020-10-20 LAB
ALBUMIN SERPL ELPH-MCNC: 4.8 G/DL
ALP BLD-CCNC: 73 U/L
ALT SERPL-CCNC: 22 U/L
ANION GAP SERPL CALC-SCNC: 15 MMOL/L
APPEARANCE: CLEAR
AST SERPL-CCNC: 14 U/L
BACTERIA: NEGATIVE
BASOPHILS # BLD AUTO: 0.03 K/UL
BASOPHILS NFR BLD AUTO: 0.5 %
BILIRUB SERPL-MCNC: 0.7 MG/DL
BILIRUBIN URINE: NEGATIVE
BLOOD URINE: NEGATIVE
BUN SERPL-MCNC: 13 MG/DL
CALCIUM SERPL-MCNC: 10.9 MG/DL
CALCIUM SERPL-MCNC: 10.9 MG/DL
CHLORIDE SERPL-SCNC: 103 MMOL/L
CO2 SERPL-SCNC: 21 MMOL/L
COLOR: NORMAL
CREAT SERPL-MCNC: 1.52 MG/DL
CREAT SPEC-SCNC: 120 MG/DL
CREAT/PROT UR: 0.2 RATIO
EOSINOPHIL # BLD AUTO: 0.06 K/UL
EOSINOPHIL NFR BLD AUTO: 1.1 %
GLUCOSE QUALITATIVE U: NEGATIVE
GLUCOSE SERPL-MCNC: 94 MG/DL
HCT VFR BLD CALC: 45.8 %
HGB BLD-MCNC: 14.7 G/DL
HYALINE CASTS: 1 /LPF
IMM GRANULOCYTES NFR BLD AUTO: 0.4 %
KETONES URINE: NEGATIVE
LDH SERPL-CCNC: 219 U/L
LEUKOCYTE ESTERASE URINE: NEGATIVE
LYMPHOCYTES # BLD AUTO: 1.73 K/UL
LYMPHOCYTES NFR BLD AUTO: 30.6 %
MAGNESIUM SERPL-MCNC: 1.3 MG/DL
MAN DIFF?: NORMAL
MCHC RBC-ENTMCNC: 28.5 PG
MCHC RBC-ENTMCNC: 32.1 GM/DL
MCV RBC AUTO: 88.8 FL
MICROSCOPIC-UA: NORMAL
MONOCYTES # BLD AUTO: 0.32 K/UL
MONOCYTES NFR BLD AUTO: 5.7 %
NEUTROPHILS # BLD AUTO: 3.5 K/UL
NEUTROPHILS NFR BLD AUTO: 61.7 %
NITRITE URINE: NEGATIVE
PARATHYROID HORMONE INTACT: 246 PG/ML
PH URINE: 6
PHOSPHATE SERPL-MCNC: 3.9 MG/DL
PLATELET # BLD AUTO: 248 K/UL
POTASSIUM SERPL-SCNC: 4.5 MMOL/L
PROT SERPL-MCNC: 6.8 G/DL
PROT UR-MCNC: 18 MG/DL
PROTEIN URINE: NORMAL
RBC # BLD: 5.16 M/UL
RBC # FLD: 14.8 %
RED BLOOD CELLS URINE: 2 /HPF
SODIUM SERPL-SCNC: 139 MMOL/L
SPECIFIC GRAVITY URINE: 1.01
SQUAMOUS EPITHELIAL CELLS: 3 /HPF
TACROLIMUS SERPL-MCNC: 12.2 NG/ML
URATE SERPL-MCNC: 7 MG/DL
UROBILINOGEN URINE: NORMAL
WBC # FLD AUTO: 5.66 K/UL
WHITE BLOOD CELLS URINE: 5 /HPF

## 2020-10-20 RX ORDER — TACROLIMUS 1 MG/1
1 TABLET, EXTENDED RELEASE ORAL
Qty: 90 | Refills: 11 | Status: DISCONTINUED | COMMUNITY
Start: 2020-08-17 | End: 2020-10-20

## 2020-10-22 ENCOUNTER — APPOINTMENT (OUTPATIENT)
Dept: ULTRASOUND IMAGING | Facility: IMAGING CENTER | Age: 50
End: 2020-10-22
Payer: MEDICARE

## 2020-10-22 ENCOUNTER — OUTPATIENT (OUTPATIENT)
Dept: OUTPATIENT SERVICES | Facility: HOSPITAL | Age: 50
LOS: 1 days | End: 2020-10-22
Payer: MEDICARE

## 2020-10-22 DIAGNOSIS — I77.0 ARTERIOVENOUS FISTULA, ACQUIRED: Chronic | ICD-10-CM

## 2020-10-22 DIAGNOSIS — Z94.0 KIDNEY TRANSPLANT STATUS: ICD-10-CM

## 2020-10-22 DIAGNOSIS — Z94.0 KIDNEY TRANSPLANT STATUS: Chronic | ICD-10-CM

## 2020-10-22 DIAGNOSIS — Z95.828 PRESENCE OF OTHER VASCULAR IMPLANTS AND GRAFTS: Chronic | ICD-10-CM

## 2020-10-22 LAB — BKV DNA SPEC QL NAA+PROBE: NOT DETECTED COPIES/ML

## 2020-10-22 PROCEDURE — 76776 US EXAM K TRANSPL W/DOPPLER: CPT | Mod: 26,RT

## 2020-10-22 PROCEDURE — 76776 US EXAM K TRANSPL W/DOPPLER: CPT

## 2020-10-27 ENCOUNTER — APPOINTMENT (OUTPATIENT)
Dept: TRANSPLANT | Facility: CLINIC | Age: 50
End: 2020-10-27

## 2020-10-27 ENCOUNTER — NON-APPOINTMENT (OUTPATIENT)
Age: 50
End: 2020-10-27

## 2020-10-27 LAB
ALBUMIN SERPL ELPH-MCNC: 4.8 G/DL
ALP BLD-CCNC: 74 U/L
ALT SERPL-CCNC: 17 U/L
ANION GAP SERPL CALC-SCNC: 11 MMOL/L
APPEARANCE: CLEAR
AST SERPL-CCNC: 13 U/L
BACTERIA: NEGATIVE
BASOPHILS # BLD AUTO: 0.02 K/UL
BASOPHILS NFR BLD AUTO: 0.3 %
BILIRUB SERPL-MCNC: 0.5 MG/DL
BILIRUBIN URINE: NEGATIVE
BLOOD URINE: NEGATIVE
BUN SERPL-MCNC: 12 MG/DL
CALCIUM SERPL-MCNC: 10.7 MG/DL
CALCIUM SERPL-MCNC: 10.7 MG/DL
CHLORIDE SERPL-SCNC: 98 MMOL/L
CO2 SERPL-SCNC: 27 MMOL/L
COLOR: NORMAL
CREAT SERPL-MCNC: 1.34 MG/DL
CREAT SPEC-SCNC: 76 MG/DL
CREAT/PROT UR: 0.1 RATIO
EOSINOPHIL # BLD AUTO: 0.1 K/UL
EOSINOPHIL NFR BLD AUTO: 1.7 %
GLUCOSE QUALITATIVE U: NEGATIVE
GLUCOSE SERPL-MCNC: 87 MG/DL
HCT VFR BLD CALC: 49 %
HGB BLD-MCNC: 15.1 G/DL
HYALINE CASTS: 0 /LPF
IMM GRANULOCYTES NFR BLD AUTO: 0.5 %
KETONES URINE: NEGATIVE
LDH SERPL-CCNC: 221 U/L
LEUKOCYTE ESTERASE URINE: NEGATIVE
LYMPHOCYTES # BLD AUTO: 1.41 K/UL
LYMPHOCYTES NFR BLD AUTO: 24.1 %
MAGNESIUM SERPL-MCNC: 1.4 MG/DL
MAN DIFF?: NORMAL
MCHC RBC-ENTMCNC: 28 PG
MCHC RBC-ENTMCNC: 30.8 GM/DL
MCV RBC AUTO: 90.7 FL
MICROSCOPIC-UA: NORMAL
MONOCYTES # BLD AUTO: 0.37 K/UL
MONOCYTES NFR BLD AUTO: 6.3 %
NEUTROPHILS # BLD AUTO: 3.93 K/UL
NEUTROPHILS NFR BLD AUTO: 67.1 %
NITRITE URINE: NEGATIVE
PARATHYROID HORMONE INTACT: 228 PG/ML
PH URINE: 6.5
PHOSPHATE SERPL-MCNC: 3.9 MG/DL
PLATELET # BLD AUTO: 248 K/UL
POTASSIUM SERPL-SCNC: 4.5 MMOL/L
PROT SERPL-MCNC: 6.8 G/DL
PROT UR-MCNC: 11 MG/DL
PROTEIN URINE: NEGATIVE
RBC # BLD: 5.4 M/UL
RBC # FLD: 14.3 %
RED BLOOD CELLS URINE: 1 /HPF
SODIUM SERPL-SCNC: 136 MMOL/L
SPECIFIC GRAVITY URINE: 1.01
SQUAMOUS EPITHELIAL CELLS: 1 /HPF
TACROLIMUS SERPL-MCNC: 9.7 NG/ML
URATE SERPL-MCNC: 6.4 MG/DL
UROBILINOGEN URINE: NORMAL
WBC # FLD AUTO: 5.86 K/UL
WHITE BLOOD CELLS URINE: 1 /HPF

## 2020-10-29 LAB
HCV RNA SERPL NAA DL=5-ACNC: 38 IU/ML
HCV RNA SERPL NAA+PROBE-LOG IU: 1.58 LOG10IU/ML

## 2020-10-30 LAB — BKV DNA SPEC QL NAA+PROBE: ABNORMAL COPIES/ML

## 2020-11-02 ENCOUNTER — APPOINTMENT (OUTPATIENT)
Dept: NEPHROLOGY | Facility: CLINIC | Age: 50
End: 2020-11-02
Payer: MEDICARE

## 2020-11-02 PROCEDURE — 99214 OFFICE O/P EST MOD 30 MIN: CPT | Mod: 95

## 2020-11-02 NOTE — HISTORY OF PRESENT ILLNESS
[Home] : at home, [unfilled] , at the time of the visit. [Medical Office: ([facility])___] : at the medical office located in  [Verbal consent obtained from patient] : the patient, [unfilled] [FreeTextEntry1] : This visit is provided by telehealth using real time 2 way audio visual technology. This patient is located at home and the provider is located at the [facility] medical office at [address] [place] [address]. Patient and family member participated in this visit.\par Verbal consent for this visit was given by patient family member\par Total duration of this visit which included conversation, lab review, medication review and clinical assessment and advice lasted approximately 25-30 minutes.\par \par \par \par Currently:\par \par Patient feels well\par Reviewed for acute symptoms-currently has none.\par Taking precautions to prevent COVID exposure\par I reviewed current home  blood pressure  l and medications.\par \par Weight: 152 Lbs Blood Pressure: 125-118/81 HR 85/min Temp 97.5 deg F\par On Telehealth visit:\par Patient appears comfortable\par No distress, not dyspneic\par Conscious, alert, oriented X 3\par Speech: Normal\par Other observations as noted\par Reviewed last lab data \par

## 2020-11-02 NOTE — ASSESSMENT
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 On reduced dose of  mg twice daily\par HTN controlled\par Plan:\par Immunosuppression reviewed, on modified regimen due to BK viremia\par Continue current medications\par additional changes as noted\par Labs and follow up visit to be scheduled as discussed.\par Discussed COVID infection prevention strategies including handwashing, social distancing and monitoring for any signs or symptoms.\par \par Follow up: 2 weeks, albs and follow up in view of BK viremia\par

## 2020-11-02 NOTE — REASON FOR VISIT
[Follow-Up] : a follow-up visit [FreeTextEntry1] : Post kidney transplant follow up- No acute symptoms

## 2020-11-16 ENCOUNTER — APPOINTMENT (OUTPATIENT)
Dept: TRANSPLANT | Facility: CLINIC | Age: 50
End: 2020-11-16

## 2020-11-17 LAB
ALBUMIN SERPL ELPH-MCNC: 5.2 G/DL
ALP BLD-CCNC: 124 U/L
ALT SERPL-CCNC: 23 U/L
ANION GAP SERPL CALC-SCNC: 16 MMOL/L
APPEARANCE: CLEAR
AST SERPL-CCNC: 19 U/L
BACTERIA: NEGATIVE
BASOPHILS # BLD AUTO: 0.04 K/UL
BASOPHILS NFR BLD AUTO: 0.7 %
BILIRUB SERPL-MCNC: 0.6 MG/DL
BILIRUBIN URINE: NEGATIVE
BLOOD URINE: NEGATIVE
BUN SERPL-MCNC: 15 MG/DL
CALCIUM SERPL-MCNC: 11 MG/DL
CALCIUM SERPL-MCNC: 11 MG/DL
CHLORIDE SERPL-SCNC: 97 MMOL/L
CO2 SERPL-SCNC: 24 MMOL/L
COLOR: COLORLESS
CREAT SERPL-MCNC: 1.46 MG/DL
CREAT SPEC-SCNC: 43 MG/DL
CREAT/PROT UR: 0.1 RATIO
EOSINOPHIL # BLD AUTO: 0.16 K/UL
EOSINOPHIL NFR BLD AUTO: 2.7 %
GLUCOSE QUALITATIVE U: NEGATIVE
GLUCOSE SERPL-MCNC: 90 MG/DL
HCT VFR BLD CALC: 49.7 %
HGB BLD-MCNC: 15.2 G/DL
HYALINE CASTS: 0 /LPF
IMM GRANULOCYTES NFR BLD AUTO: 1.4 %
KETONES URINE: NEGATIVE
LDH SERPL-CCNC: 273 U/L
LEUKOCYTE ESTERASE URINE: NEGATIVE
LYMPHOCYTES # BLD AUTO: 1.57 K/UL
LYMPHOCYTES NFR BLD AUTO: 27 %
MAGNESIUM SERPL-MCNC: 1.6 MG/DL
MAN DIFF?: NORMAL
MCHC RBC-ENTMCNC: 27.2 PG
MCHC RBC-ENTMCNC: 30.6 GM/DL
MCV RBC AUTO: 89.1 FL
MICROSCOPIC-UA: NORMAL
MONOCYTES # BLD AUTO: 0.38 K/UL
MONOCYTES NFR BLD AUTO: 6.5 %
NEUTROPHILS # BLD AUTO: 3.59 K/UL
NEUTROPHILS NFR BLD AUTO: 61.7 %
NITRITE URINE: NEGATIVE
PARATHYROID HORMONE INTACT: 208 PG/ML
PH URINE: 6
PHOSPHATE SERPL-MCNC: 3.8 MG/DL
PLATELET # BLD AUTO: 273 K/UL
POTASSIUM SERPL-SCNC: 4.5 MMOL/L
PROT SERPL-MCNC: 7.2 G/DL
PROT UR-MCNC: 5 MG/DL
PROTEIN URINE: NEGATIVE
RBC # BLD: 5.58 M/UL
RBC # FLD: 14 %
RED BLOOD CELLS URINE: 3 /HPF
SODIUM SERPL-SCNC: 137 MMOL/L
SPECIFIC GRAVITY URINE: 1.01
SQUAMOUS EPITHELIAL CELLS: 0 /HPF
TACROLIMUS SERPL-MCNC: 10.2 NG/ML
URATE SERPL-MCNC: 6.4 MG/DL
UROBILINOGEN URINE: NORMAL
WBC # FLD AUTO: 5.82 K/UL
WHITE BLOOD CELLS URINE: 1 /HPF

## 2020-11-18 LAB — BKV DNA SPEC QL NAA+PROBE: ABNORMAL COPIES/ML

## 2020-11-18 RX ORDER — MYCOPHENOLATE MOFETIL 500 MG/1
500 TABLET ORAL
Qty: 120 | Refills: 11 | Status: DISCONTINUED | COMMUNITY
Start: 2020-08-17 | End: 2020-11-18

## 2020-11-19 ENCOUNTER — LABORATORY RESULT (OUTPATIENT)
Age: 50
End: 2020-11-19

## 2020-11-19 ENCOUNTER — APPOINTMENT (OUTPATIENT)
Dept: TRANSPLANT | Facility: CLINIC | Age: 50
End: 2020-11-19

## 2020-11-19 LAB
HBV CORE IGG+IGM SER QL: REACTIVE
HBV SURFACE AG SER QL: NONREACTIVE
HCV AB SER QL: ABNORMAL
HCV S/CO RATIO: 1.08 S/CO
HIV1+2 AB SPEC QL IA.RAPID: NONREACTIVE

## 2020-11-20 LAB
HIV1 RNA # SERPL NAA+PROBE: NORMAL
HIV1 RNA # SERPL NAA+PROBE: NORMAL COPIES/ML
VIRAL LOAD INTERP: NORMAL
VIRAL LOAD LOG: NORMAL LG COP/ML

## 2020-11-22 LAB
HCV RNA SERPL NAA DL=5-ACNC: NOT DETECTED IU/ML
HCV RNA SERPL NAA+PROBE-LOG IU: NOT DETECTED LOG10IU/ML

## 2020-11-23 LAB
HBV DNA # SERPL NAA+PROBE: NOT DETECTED
HEPB DNA PCR LOG: NOT DETECTED LOG10IU/ML

## 2020-11-30 ENCOUNTER — APPOINTMENT (OUTPATIENT)
Dept: NEPHROLOGY | Facility: CLINIC | Age: 50
End: 2020-11-30
Payer: MEDICARE

## 2020-11-30 ENCOUNTER — APPOINTMENT (OUTPATIENT)
Dept: TRANSPLANT | Facility: CLINIC | Age: 50
End: 2020-11-30

## 2020-11-30 ENCOUNTER — NON-APPOINTMENT (OUTPATIENT)
Age: 50
End: 2020-11-30

## 2020-11-30 ENCOUNTER — LABORATORY RESULT (OUTPATIENT)
Age: 50
End: 2020-11-30

## 2020-11-30 VITALS
HEART RATE: 82 BPM | HEIGHT: 62 IN | WEIGHT: 159 LBS | TEMPERATURE: 97.2 F | OXYGEN SATURATION: 98 % | SYSTOLIC BLOOD PRESSURE: 146 MMHG | DIASTOLIC BLOOD PRESSURE: 82 MMHG | BODY MASS INDEX: 29.26 KG/M2 | RESPIRATION RATE: 16 BRPM

## 2020-11-30 LAB
ALBUMIN SERPL ELPH-MCNC: 4.7 G/DL
ALP BLD-CCNC: 242 U/L
ALT SERPL-CCNC: 38 U/L
ANION GAP SERPL CALC-SCNC: 13 MMOL/L
APPEARANCE: CLEAR
AST SERPL-CCNC: 23 U/L
BACTERIA: NEGATIVE
BILIRUB SERPL-MCNC: 0.9 MG/DL
BILIRUBIN URINE: NEGATIVE
BLOOD URINE: NEGATIVE
BUN SERPL-MCNC: 14 MG/DL
CALCIUM SERPL-MCNC: 10.5 MG/DL
CALCIUM SERPL-MCNC: 10.5 MG/DL
CHLORIDE SERPL-SCNC: 101 MMOL/L
CO2 SERPL-SCNC: 22 MMOL/L
COLOR: NORMAL
CREAT SERPL-MCNC: 1.09 MG/DL
CREAT SPEC-SCNC: 55 MG/DL
CREAT/PROT UR: 0.1 RATIO
GLUCOSE QUALITATIVE U: NEGATIVE
GLUCOSE SERPL-MCNC: 81 MG/DL
HYALINE CASTS: 0 /LPF
KETONES URINE: NEGATIVE
LDH SERPL-CCNC: 299 U/L
LEUKOCYTE ESTERASE URINE: NEGATIVE
MAGNESIUM SERPL-MCNC: 1.5 MG/DL
MICROSCOPIC-UA: NORMAL
NITRITE URINE: NEGATIVE
PARATHYROID HORMONE INTACT: 384 PG/ML
PH URINE: 6
PHOSPHATE SERPL-MCNC: 3.1 MG/DL
POTASSIUM SERPL-SCNC: 4.2 MMOL/L
PROT SERPL-MCNC: 7.2 G/DL
PROT UR-MCNC: 6 MG/DL
PROTEIN URINE: NEGATIVE
RED BLOOD CELLS URINE: 1 /HPF
SODIUM SERPL-SCNC: 135 MMOL/L
SPECIFIC GRAVITY URINE: 1.01
SQUAMOUS EPITHELIAL CELLS: 1 /HPF
TACROLIMUS SERPL-MCNC: 5.5 NG/ML
URATE SERPL-MCNC: 4.1 MG/DL
UROBILINOGEN URINE: NORMAL
WHITE BLOOD CELLS URINE: 0 /HPF

## 2020-11-30 PROCEDURE — 99215 OFFICE O/P EST HI 40 MIN: CPT

## 2020-11-30 NOTE — PHYSICAL EXAM
[General Appearance - Alert] : alert [General Appearance - In No Acute Distress] : in no acute distress [Sclera] : the sclera and conjunctiva were normal [PERRL With Normal Accommodation] : pupils were equal in size, round, and reactive to light [Extraocular Movements] : extraocular movements were intact [Outer Ear] : the ears and nose were normal in appearance [Oropharynx] : the oropharynx was normal [Neck Appearance] : the appearance of the neck was normal [Neck Cervical Mass (___cm)] : no neck mass was observed [Jugular Venous Distention Increased] : there was no jugular-venous distention [Thyroid Diffuse Enlargement] : the thyroid was not enlarged [Thyroid Nodule] : there were no palpable thyroid nodules [Auscultation Breath Sounds / Voice Sounds] : lungs were clear to auscultation bilaterally [Heart Rate And Rhythm] : heart rate was normal and rhythm regular [Heart Sounds] : normal S1 and S2 [Heart Sounds Gallop] : no gallops [Heart Sounds Pericardial Friction Rub] : no pericardial rub [Edema] : there was no peripheral edema [Abdomen Soft] : soft [Abdomen Tenderness] : non-tender [Cervical Lymph Nodes Enlarged Posterior Bilaterally] : posterior cervical [Cervical Lymph Nodes Enlarged Anterior Bilaterally] : anterior cervical [Involuntary Movements] : no involuntary movements were seen [] : no rash [No Focal Deficits] : no focal deficits [Oriented To Time, Place, And Person] : oriented to person, place, and time [Impaired Insight] : insight and judgment were intact [Affect] : the affect was normal [FreeTextEntry1] : right UE

## 2020-11-30 NOTE — HISTORY OF PRESENT ILLNESS
[FreeTextEntry1] : 51 y/o F (Previous name Mariana Kumar) with PMHx of HTN, HLD, Gout, ESRD was on HD MWF since 2006, now via right\par brachial graft created via RUE bypass for which she is on Plavix/ASA. (Done by cardiac surgeon Dr. lopez  and Dr. Trujillo at [facility], [facility])  H/O Positive Quant Gold; had BCG vaccine as a child. CXR from\par 6/18/2019 showed RUL 2mm nodule (new since 2014). Treated with INH/B6\par 8/2019-5/13/2020.\par \par Underwent HCV+ DDRT with stent placement and Simulect induction on 8/14/20,\par renal doppler with patent vessels. Post op course c/b hyperkalemia (tx with\par Insulin/dextrose) and low urine output (responded to Lasix). Kim removed on\par POD 4, passed void trial, HEMANTH with mod amount of out pt will keep the HEMANTH and DC\par in the clinic. Aspirin restarted post operatively; Plavix held and will\par continue to hold and f/u on restart in the clinic. Pain well controlled,\par tolerated diet, ambulating with no difficulty, no PT needs. Maintained good\par renal function; serum creatinine continued to downtrend; discharged with serum\par Cr of 1.79. \par POst discharge issues:\par Hypercalcemia, elevated PTH- on sensipar\par BK viremia: Switched to Lefluonomide and is off MMF now.\par Hypomagnesemia, now normalized.\par Has no acute symptoms\par HCV PCR has become negative\par Not requiring antihypertensives\par Ureteral Stent has been removed.\par \par \par Patient is here today for follow up. \par Has no acute symptoms.\par Feels she gets hot flushes at times. Plans to see Gyn. She has been amenorrheic since hysterectomy. Ovaries still in place.

## 2020-11-30 NOTE — ASSESSMENT
[FreeTextEntry1] : Renal Transplant recipient: Had immediate allograft function, improved creatinine at discharge. Has urinary frequency and nocturia. No dysuria/hematuria. No fever/chills. Tolerating medications.\par Immunosuppression: reviewed; simulect induction, on tac/Lefluonomide/prednisone, last Tac level noted; will recheck. Currently taking Envarsus 10 mg daily.; Off MMF and prednisone at 5 mg daily. \par Hep C pos donor kidney: PCR negative. 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 Will follow.\par Hypertension: controlled; Not on medications.  \par Cardiovascular risk reduction discussed. On aspirin. On plavix. Has venous bypass by thoracic surgeon done at [facility] by Ronnie and Dr. Torres\par Infection prophylaxis: On Bactrim, Valcyte and nystatin (twice daily) as well as GI prophylaxis.\par Discussed ambulation, using incentive spirometer, optimal  blood pressure readings, adherence with medications and follow ups, follow up clinic visit schedule, avoiding dehydration, mosquito bites; prevention of DVT as well as food safety.\par She has already met with transplant surgeon;and ureteral stent has been removed.\par \par Labs every 2 weeks including BKV PCR.\par

## 2020-12-01 LAB
BASOPHILS # BLD AUTO: 0.04 K/UL
BASOPHILS NFR BLD AUTO: 1 %
EOSINOPHIL # BLD AUTO: 0.14 K/UL
EOSINOPHIL NFR BLD AUTO: 3.5 %
HCT VFR BLD CALC: 49.8 %
HGB BLD-MCNC: 15.1 G/DL
IMM GRANULOCYTES NFR BLD AUTO: 5.5 %
LYMPHOCYTES # BLD AUTO: 1.2 K/UL
LYMPHOCYTES NFR BLD AUTO: 29.9 %
MAN DIFF?: NORMAL
MCHC RBC-ENTMCNC: 27.1 PG
MCHC RBC-ENTMCNC: 30.3 GM/DL
MCV RBC AUTO: 89.4 FL
MONOCYTES # BLD AUTO: 0.25 K/UL
MONOCYTES NFR BLD AUTO: 6.2 %
NEUTROPHILS # BLD AUTO: 2.17 K/UL
NEUTROPHILS NFR BLD AUTO: 53.9 %
PLATELET # BLD AUTO: 245 K/UL
RBC # BLD: 5.57 M/UL
RBC # FLD: 14.2 %
WBC # FLD AUTO: 4.02 K/UL

## 2020-12-03 ENCOUNTER — NON-APPOINTMENT (OUTPATIENT)
Age: 50
End: 2020-12-03

## 2020-12-03 LAB
BKV DNA SPEC QL NAA+PROBE: ABNORMAL COPIES/ML
HCV RNA SERPL NAA DL=5-ACNC: NOT DETECTED IU/ML
HCV RNA SERPL NAA+PROBE-LOG IU: NOT DETECTED LOG10IU/ML

## 2020-12-03 NOTE — ED PROVIDER NOTE - PRINCIPAL DIAGNOSIS
Pain of left calf Bakers cyst, left Double O-Z Plasty Text: The defect edges were debeveled with a #15 scalpel blade.  Given the location of the defect, shape of the defect and the proximity to free margins a Double O-Z plasty (double transposition flap) was deemed most appropriate.  Using a sterile surgical marker, the appropriate transposition flaps were drawn incorporating the defect and placing the expected incisions within the relaxed skin tension lines where possible. The area thus outlined was incised deep to adipose tissue with a #15 scalpel blade.  The skin margins were undermined to an appropriate distance in all directions utilizing iris scissors.  Hemostasis was achieved with electrocautery.  The flaps were then transposed into place, one clockwise and the other counterclockwise, and anchored with interrupted buried subcutaneous sutures.

## 2020-12-14 ENCOUNTER — LABORATORY RESULT (OUTPATIENT)
Age: 50
End: 2020-12-14

## 2020-12-14 ENCOUNTER — APPOINTMENT (OUTPATIENT)
Dept: NEPHROLOGY | Facility: CLINIC | Age: 50
End: 2020-12-14

## 2020-12-14 ENCOUNTER — APPOINTMENT (OUTPATIENT)
Dept: TRANSPLANT | Facility: CLINIC | Age: 50
End: 2020-12-14

## 2020-12-15 LAB
ALBUMIN SERPL ELPH-MCNC: 4.8 G/DL
ALP BLD-CCNC: 298 U/L
ALT SERPL-CCNC: 62 U/L
ANION GAP SERPL CALC-SCNC: 12 MMOL/L
APPEARANCE: CLEAR
AST SERPL-CCNC: 31 U/L
BACTERIA: NEGATIVE
BASOPHILS # BLD AUTO: 0.06 K/UL
BASOPHILS NFR BLD AUTO: 2.2 %
BILIRUB SERPL-MCNC: 1.2 MG/DL
BILIRUBIN URINE: NEGATIVE
BLOOD URINE: NEGATIVE
BUN SERPL-MCNC: 10 MG/DL
CALCIUM SERPL-MCNC: 10.6 MG/DL
CHLORIDE SERPL-SCNC: 102 MMOL/L
CO2 SERPL-SCNC: 26 MMOL/L
COLOR: NORMAL
CREAT SERPL-MCNC: 1.42 MG/DL
CREAT SPEC-SCNC: 113 MG/DL
CREAT/PROT UR: 0.2 RATIO
EOSINOPHIL # BLD AUTO: 0.13 K/UL
EOSINOPHIL NFR BLD AUTO: 4.8 %
GLUCOSE QUALITATIVE U: NEGATIVE
GLUCOSE SERPL-MCNC: 92 MG/DL
HCT VFR BLD CALC: 53.8 %
HGB BLD-MCNC: 16.1 G/DL
HYALINE CASTS: 0 /LPF
IMM GRANULOCYTES NFR BLD AUTO: 1.5 %
KETONES URINE: NEGATIVE
LDH SERPL-CCNC: 311 U/L
LEUKOCYTE ESTERASE URINE: NEGATIVE
LYMPHOCYTES # BLD AUTO: 1.13 K/UL
LYMPHOCYTES NFR BLD AUTO: 41.5 %
MAGNESIUM SERPL-MCNC: 1.6 MG/DL
MAN DIFF?: NORMAL
MCHC RBC-ENTMCNC: 26.9 PG
MCHC RBC-ENTMCNC: 29.9 GM/DL
MCV RBC AUTO: 90 FL
MICROSCOPIC-UA: NORMAL
MONOCYTES # BLD AUTO: 0.27 K/UL
MONOCYTES NFR BLD AUTO: 9.9 %
NEUTROPHILS # BLD AUTO: 1.09 K/UL
NEUTROPHILS NFR BLD AUTO: 40.1 %
NITRITE URINE: NEGATIVE
PH URINE: 6.5
PHOSPHATE SERPL-MCNC: 2.6 MG/DL
PLATELET # BLD AUTO: 237 K/UL
POTASSIUM SERPL-SCNC: 4.2 MMOL/L
PROT SERPL-MCNC: 7.2 G/DL
PROT UR-MCNC: 23 MG/DL
PROTEIN URINE: NORMAL
RBC # BLD: 5.98 M/UL
RBC # FLD: 14.8 %
RED BLOOD CELLS URINE: 1 /HPF
SODIUM SERPL-SCNC: 140 MMOL/L
SPECIFIC GRAVITY URINE: 1.01
SQUAMOUS EPITHELIAL CELLS: 1 /HPF
TACROLIMUS SERPL-MCNC: 8.4 NG/ML
URATE SERPL-MCNC: 4.9 MG/DL
UROBILINOGEN URINE: NORMAL
WBC # FLD AUTO: 2.72 K/UL
WHITE BLOOD CELLS URINE: 1 /HPF

## 2020-12-18 LAB
BKV DNA SPEC QL NAA+PROBE: ABNORMAL COPIES/ML
CMV DNA SPEC QL NAA+PROBE: NOT DETECTED IU/ML

## 2020-12-23 ENCOUNTER — APPOINTMENT (OUTPATIENT)
Dept: TRANSPLANT | Facility: CLINIC | Age: 50
End: 2020-12-23

## 2020-12-24 LAB
25(OH)D3 SERPL-MCNC: 17.2 NG/ML
ALBUMIN SERPL ELPH-MCNC: 4.5 G/DL
ALP BLD-CCNC: 285 U/L
ALT SERPL-CCNC: 64 U/L
ANION GAP SERPL CALC-SCNC: 13 MMOL/L
APPEARANCE: CLEAR
AST SERPL-CCNC: 28 U/L
BACTERIA: NEGATIVE
BASOPHILS # BLD AUTO: 0.04 K/UL
BASOPHILS NFR BLD AUTO: 1.6 %
BILIRUB SERPL-MCNC: 0.9 MG/DL
BILIRUBIN URINE: NEGATIVE
BLOOD URINE: NEGATIVE
BUN SERPL-MCNC: 11 MG/DL
CALCIUM SERPL-MCNC: 10.8 MG/DL
CALCIUM SERPL-MCNC: 10.8 MG/DL
CHLORIDE SERPL-SCNC: 103 MMOL/L
CHOLEST SERPL-MCNC: 216 MG/DL
CO2 SERPL-SCNC: 21 MMOL/L
COLOR: NORMAL
CREAT SERPL-MCNC: 1.45 MG/DL
CREAT SPEC-SCNC: 87 MG/DL
CREAT/PROT UR: 0.2 RATIO
EOSINOPHIL # BLD AUTO: 0.11 K/UL
EOSINOPHIL NFR BLD AUTO: 4.5 %
ESTIMATED AVERAGE GLUCOSE: 120 MG/DL
GLUCOSE QUALITATIVE U: NEGATIVE
GLUCOSE SERPL-MCNC: 87 MG/DL
HBA1C MFR BLD HPLC: 5.8 %
HCT VFR BLD CALC: 52 %
HDLC SERPL-MCNC: 95 MG/DL
HGB BLD-MCNC: 15.3 G/DL
HYALINE CASTS: 0 /LPF
IMM GRANULOCYTES NFR BLD AUTO: 1.6 %
KETONES URINE: NEGATIVE
LDH SERPL-CCNC: 320 U/L
LDLC SERPL CALC-MCNC: 107 MG/DL
LEUKOCYTE ESTERASE URINE: NEGATIVE
LYMPHOCYTES # BLD AUTO: 1.04 K/UL
LYMPHOCYTES NFR BLD AUTO: 42.1 %
MAGNESIUM SERPL-MCNC: 1.6 MG/DL
MAN DIFF?: NORMAL
MCHC RBC-ENTMCNC: 26.4 PG
MCHC RBC-ENTMCNC: 29.4 GM/DL
MCV RBC AUTO: 89.8 FL
MICROSCOPIC-UA: NORMAL
MONOCYTES # BLD AUTO: 0.22 K/UL
MONOCYTES NFR BLD AUTO: 8.9 %
NEUTROPHILS # BLD AUTO: 1.02 K/UL
NEUTROPHILS NFR BLD AUTO: 41.3 %
NITRITE URINE: NEGATIVE
NONHDLC SERPL-MCNC: 121 MG/DL
PARATHYROID HORMONE INTACT: 362 PG/ML
PH URINE: 6.5
PHOSPHATE SERPL-MCNC: 2.9 MG/DL
PLATELET # BLD AUTO: 232 K/UL
POTASSIUM SERPL-SCNC: 4.4 MMOL/L
PROT SERPL-MCNC: 7 G/DL
PROT UR-MCNC: 19 MG/DL
PROTEIN URINE: NORMAL
RBC # BLD: 5.79 M/UL
RBC # FLD: 15.7 %
RED BLOOD CELLS URINE: 4 /HPF
SARS-COV-2 IGG SERPL IA-ACNC: 0.01 INDEX
SARS-COV-2 IGG SERPL QL IA: NEGATIVE
SODIUM SERPL-SCNC: 138 MMOL/L
SPECIFIC GRAVITY URINE: 1.01
SQUAMOUS EPITHELIAL CELLS: 1 /HPF
TACROLIMUS SERPL-MCNC: 8 NG/ML
TRIGL SERPL-MCNC: 71 MG/DL
URATE SERPL-MCNC: 5.2 MG/DL
UROBILINOGEN URINE: NORMAL
WBC # FLD AUTO: 2.47 K/UL
WHITE BLOOD CELLS URINE: 1 /HPF

## 2020-12-28 ENCOUNTER — APPOINTMENT (OUTPATIENT)
Dept: TRANSPLANT | Facility: CLINIC | Age: 50
End: 2020-12-28

## 2020-12-31 LAB — BKV DNA SPEC QL NAA+PROBE: ABNORMAL COPIES/ML

## 2021-01-05 LAB — CMV DNA SPEC QL NAA+PROBE: ABNORMAL IU/ML

## 2021-01-07 ENCOUNTER — LABORATORY RESULT (OUTPATIENT)
Age: 51
End: 2021-01-07

## 2021-01-07 ENCOUNTER — APPOINTMENT (OUTPATIENT)
Dept: TRANSPLANT | Facility: CLINIC | Age: 51
End: 2021-01-07

## 2021-01-08 LAB
ALBUMIN SERPL ELPH-MCNC: 4.7 G/DL
ALP BLD-CCNC: 284 U/L
ALT SERPL-CCNC: 82 U/L
ANION GAP SERPL CALC-SCNC: 14 MMOL/L
APPEARANCE: CLEAR
AST SERPL-CCNC: 41 U/L
BACTERIA: NEGATIVE
BASOPHILS # BLD AUTO: 0.06 K/UL
BASOPHILS NFR BLD AUTO: 2 %
BILIRUB SERPL-MCNC: 0.7 MG/DL
BILIRUBIN URINE: NEGATIVE
BLOOD URINE: NEGATIVE
BUN SERPL-MCNC: 10 MG/DL
CALCIUM OXALATE CRYSTALS: ABNORMAL
CALCIUM SERPL-MCNC: 10.8 MG/DL
CHLORIDE SERPL-SCNC: 104 MMOL/L
CO2 SERPL-SCNC: 22 MMOL/L
COLOR: YELLOW
CREAT SERPL-MCNC: 1.25 MG/DL
CREAT SPEC-SCNC: 137 MG/DL
CREAT/PROT UR: 0.2 RATIO
EOSINOPHIL # BLD AUTO: 0.2 K/UL
EOSINOPHIL NFR BLD AUTO: 6.8 %
GLUCOSE QUALITATIVE U: NEGATIVE
GLUCOSE SERPL-MCNC: 77 MG/DL
HCT VFR BLD CALC: 53.2 %
HGB BLD-MCNC: 15.8 G/DL
HYALINE CASTS: 0 /LPF
IMM GRANULOCYTES NFR BLD AUTO: 2.4 %
KETONES URINE: NEGATIVE
LDH SERPL-CCNC: 343 U/L
LEUKOCYTE ESTERASE URINE: NEGATIVE
LYMPHOCYTES # BLD AUTO: 1.16 K/UL
LYMPHOCYTES NFR BLD AUTO: 39.2 %
MAGNESIUM SERPL-MCNC: 1.7 MG/DL
MAN DIFF?: NORMAL
MCHC RBC-ENTMCNC: 27 PG
MCHC RBC-ENTMCNC: 29.7 GM/DL
MCV RBC AUTO: 90.9 FL
MICROSCOPIC-UA: NORMAL
MONOCYTES # BLD AUTO: 0.27 K/UL
MONOCYTES NFR BLD AUTO: 9.1 %
NEUTROPHILS # BLD AUTO: 1.2 K/UL
NEUTROPHILS NFR BLD AUTO: 40.5 %
NITRITE URINE: NEGATIVE
PH URINE: 6
PHOSPHATE SERPL-MCNC: 3.2 MG/DL
PLATELET # BLD AUTO: 193 K/UL
POTASSIUM SERPL-SCNC: 4.4 MMOL/L
PROT SERPL-MCNC: 6.9 G/DL
PROT UR-MCNC: 23 MG/DL
PROTEIN URINE: NORMAL
RBC # BLD: 5.85 M/UL
RBC # FLD: 17.6 %
RED BLOOD CELLS URINE: 10 /HPF
SODIUM SERPL-SCNC: 140 MMOL/L
SPECIFIC GRAVITY URINE: 1.01
SQUAMOUS EPITHELIAL CELLS: 3 /HPF
TACROLIMUS SERPL-MCNC: 4.9 NG/ML
URATE SERPL-MCNC: 3.9 MG/DL
UROBILINOGEN URINE: NORMAL
WBC # FLD AUTO: 2.96 K/UL
WHITE BLOOD CELLS URINE: 1 /HPF

## 2021-01-11 ENCOUNTER — APPOINTMENT (OUTPATIENT)
Dept: NEPHROLOGY | Facility: CLINIC | Age: 51
End: 2021-01-11
Payer: MEDICARE

## 2021-01-11 PROCEDURE — 99214 OFFICE O/P EST MOD 30 MIN: CPT | Mod: 95

## 2021-01-11 RX ORDER — VELPATASVIR AND SOFOSBUVIR 100; 400 MG/1; MG/1
400-100 TABLET, FILM COATED ORAL DAILY
Qty: 28 | Refills: 2 | Status: DISCONTINUED | COMMUNITY
Start: 2020-09-02 | End: 2021-01-11

## 2021-01-11 RX ORDER — LEFLUNOMIDE 20 MG/1
20 TABLET, FILM COATED ORAL
Qty: 60 | Refills: 0 | Status: DISCONTINUED | COMMUNITY
Start: 2020-11-18 | End: 2021-01-11

## 2021-01-11 NOTE — HISTORY OF PRESENT ILLNESS
[Home] : at home, [unfilled] , at the time of the visit. [Medical Office: ([facility])___] : at the medical office located in  [Verbal consent obtained from patient] : the patient, [unfilled] [FreeTextEntry1] : This visit is provided by telehealth using real time 2 way audio visual technology. This patient is located at home and the provider is located at the [facility] medical office at [address] [place] [address]. Patient  participated in this visit.\par Verbal consent for this visit was given by patient/accompanying family member\par Total duration of this visit which included conversation, lab review, medication review and clinical assessment and advice lasted approximately 25-30 minutes.\par \par \par Currently:\par \par Patient feels well. No acute symptoms\par Reviewed for acute symptoms-currently has none.\par Taking precautions to prevent COVID exposure\par I reviewed current home  blood pressure and  medications.\par VS: Blood Pr 134/86 mm Hg. Pulse 77/min Weight 162 Lbs Temp 97 deg F\par \par On Telehealth visit:\par Patient appears comfortable\par No distress, not dyspneic\par Conscious, alert, oriented X 3\par Speech: Normal\par Other observations as noted\par Reviewed last lab data . BKV pending.\par

## 2021-01-11 NOTE — ASSESSMENT
[FreeTextEntry1] : Clinical Impression:\par Kidney Transplant recipient, functioning allograft, normal creatinine\par Immunosuppression- Reviewed.\par BK Viremia  Controlled.\par HCV controlled, completed Epclusa in December 2020. Last PCR negative.\par Plan:\par Immunosuppression reviewed. On low therapeutic Tac level due to BK viremia\par Continue current medications\par additional changes as noted\par Labs and follow up visit to be scheduled as discussed.\par Discussed COVID infection prevention strategies including handwashing, social distancing and monitoring for any signs or symptoms.\par \par

## 2021-01-15 LAB — BKV DNA SPEC QL NAA+PROBE: ABNORMAL COPIES/ML

## 2021-01-25 ENCOUNTER — APPOINTMENT (OUTPATIENT)
Dept: TRANSPLANT | Facility: CLINIC | Age: 51
End: 2021-01-25

## 2021-02-05 ENCOUNTER — APPOINTMENT (OUTPATIENT)
Dept: TRANSPLANT | Facility: CLINIC | Age: 51
End: 2021-02-05

## 2021-02-08 ENCOUNTER — RX RENEWAL (OUTPATIENT)
Age: 51
End: 2021-02-08

## 2021-02-08 ENCOUNTER — APPOINTMENT (OUTPATIENT)
Dept: NEPHROLOGY | Facility: CLINIC | Age: 51
End: 2021-02-08
Payer: MEDICARE

## 2021-02-08 VITALS
HEART RATE: 72 BPM | BODY MASS INDEX: 30.69 KG/M2 | DIASTOLIC BLOOD PRESSURE: 80 MMHG | RESPIRATION RATE: 16 BRPM | SYSTOLIC BLOOD PRESSURE: 120 MMHG | WEIGHT: 167.8 LBS

## 2021-02-08 DIAGNOSIS — B19.20 UNSPECIFIED VIRAL HEPATITIS C W/OUT HEPATIC COMA: ICD-10-CM

## 2021-02-08 LAB
ALBUMIN SERPL ELPH-MCNC: 4.7 G/DL
ALP BLD-CCNC: 386 U/L
ALT SERPL-CCNC: 120 U/L
ANION GAP SERPL CALC-SCNC: 16 MMOL/L
APPEARANCE: CLEAR
AST SERPL-CCNC: 56 U/L
BACTERIA: NEGATIVE
BASOPHILS # BLD AUTO: 0.04 K/UL
BASOPHILS NFR BLD AUTO: 1.2 %
BILIRUB SERPL-MCNC: 0.8 MG/DL
BILIRUBIN URINE: NEGATIVE
BKV DNA SPEC QL NAA+PROBE: ABNORMAL COPIES/ML
BLOOD URINE: NEGATIVE
BUN SERPL-MCNC: 11 MG/DL
CALCIUM SERPL-MCNC: 10.7 MG/DL
CALCIUM SERPL-MCNC: 10.7 MG/DL
CHLORIDE SERPL-SCNC: 101 MMOL/L
CO2 SERPL-SCNC: 21 MMOL/L
COLOR: NORMAL
CREAT SERPL-MCNC: 1.17 MG/DL
CREAT SPEC-SCNC: 44 MG/DL
CREAT/PROT UR: 0.1 RATIO
EOSINOPHIL # BLD AUTO: 0.15 K/UL
EOSINOPHIL NFR BLD AUTO: 4.4 %
GLUCOSE QUALITATIVE U: NEGATIVE
GLUCOSE SERPL-MCNC: 79 MG/DL
HCT VFR BLD CALC: 53.3 %
HCV RNA SERPL NAA DL=5-ACNC: NOT DETECTED IU/ML
HCV RNA SERPL NAA+PROBE-LOG IU: NOT DETECTED LOG10IU/ML
HGB BLD-MCNC: 16.7 G/DL
HYALINE CASTS: 0 /LPF
IMM GRANULOCYTES NFR BLD AUTO: 1.8 %
KETONES URINE: NEGATIVE
LDH SERPL-CCNC: 365 U/L
LEUKOCYTE ESTERASE URINE: NEGATIVE
LYMPHOCYTES # BLD AUTO: 1.39 K/UL
LYMPHOCYTES NFR BLD AUTO: 40.8 %
MAGNESIUM SERPL-MCNC: 1.7 MG/DL
MAN DIFF?: NORMAL
MCHC RBC-ENTMCNC: 27.6 PG
MCHC RBC-ENTMCNC: 31.3 GM/DL
MCV RBC AUTO: 88.2 FL
MICROSCOPIC-UA: NORMAL
MONOCYTES # BLD AUTO: 0.35 K/UL
MONOCYTES NFR BLD AUTO: 10.3 %
NEUTROPHILS # BLD AUTO: 1.42 K/UL
NEUTROPHILS NFR BLD AUTO: 41.5 %
NITRITE URINE: NEGATIVE
PARATHYROID HORMONE INTACT: 251 PG/ML
PH URINE: 7
PHOSPHATE SERPL-MCNC: 2.8 MG/DL
PLATELET # BLD AUTO: 206 K/UL
POTASSIUM SERPL-SCNC: 4.4 MMOL/L
PROT SERPL-MCNC: 7.1 G/DL
PROT UR-MCNC: 6 MG/DL
PROTEIN URINE: NEGATIVE
RBC # BLD: 6.04 M/UL
RBC # FLD: 17.4 %
RED BLOOD CELLS URINE: 0 /HPF
SODIUM SERPL-SCNC: 137 MMOL/L
SPECIFIC GRAVITY URINE: 1.01
SQUAMOUS EPITHELIAL CELLS: 0 /HPF
TACROLIMUS SERPL-MCNC: 4.7 NG/ML
URATE SERPL-MCNC: 4.1 MG/DL
UROBILINOGEN URINE: NORMAL
WBC # FLD AUTO: 3.41 K/UL
WHITE BLOOD CELLS URINE: 0 /HPF

## 2021-02-08 PROCEDURE — 99214 OFFICE O/P EST MOD 30 MIN: CPT

## 2021-02-08 RX ORDER — NYSTATIN 100000 [USP'U]/ML
100000 SUSPENSION ORAL 4 TIMES DAILY
Qty: 2 | Refills: 1 | Status: DISCONTINUED | COMMUNITY
Start: 2020-08-17 | End: 2021-02-08

## 2021-02-08 NOTE — HISTORY OF PRESENT ILLNESS
[FreeTextEntry1] : 49 y/o F (Previous name Mariana Kumar) with PMHx of HTN, HLD, Gout, ESRD was on HD MWF since 2006, now via right\par brachial graft created via RUE bypass for which she is on Plavix/ASA. (Done by cardiac surgeon Dr. lopez  and Dr. Trujillo at [facility], [facility])  H/O Positive Quant Gold; had BCG vaccine as a child. CXR from\par 6/18/2019 showed RUL 2mm nodule (new since 2014). Treated with INH/B6\par 8/2019-5/13/2020.\par \par Underwent HCV+ DDRT with stent placement and Simulect induction on 8/14/20,\par renal doppler with patent vessels. Post op course c/b hyperkalemia (tx with\par Insulin/dextrose) and low urine output (responded to Lasix). Kim removed on\par POD 4, passed void trial, HEMANTH with mod amount of out pt will keep the HEMANTH and DC\par in the clinic. Aspirin restarted post operatively; Plavix held and will\par continue to hold and f/u on restart in the clinic. Pain well controlled,\par tolerated diet, ambulating with no difficulty, no PT needs. Maintained good\par renal function; serum creatinine continued to downtrend; discharged with serum\par Cr of 1.79. \par POst discharge issues:\par Hypercalcemia, elevated PTH- on sensipar\par BK viremia: Switched to Lefluonomide and is off MMF now.\par Hypomagnesemia, now normalized.\par Has no acute symptoms\par HCV PCR has become negative\par Not requiring antihypertensives\par Ureteral Stent has been removed.\par \par \par Patient is here today for follow up. \par Has no acute symptoms.\par Gaining weight. Starting to exercise.

## 2021-02-08 NOTE — PHYSICAL EXAM
[General Appearance - Alert] : alert [General Appearance - In No Acute Distress] : in no acute distress [PERRL With Normal Accommodation] : pupils were equal in size, round, and reactive to light [Sclera] : the sclera and conjunctiva were normal [Extraocular Movements] : extraocular movements were intact [Outer Ear] : the ears and nose were normal in appearance [Oropharynx] : the oropharynx was normal [Neck Appearance] : the appearance of the neck was normal [Neck Cervical Mass (___cm)] : no neck mass was observed [Jugular Venous Distention Increased] : there was no jugular-venous distention [Thyroid Diffuse Enlargement] : the thyroid was not enlarged [Thyroid Nodule] : there were no palpable thyroid nodules [Auscultation Breath Sounds / Voice Sounds] : lungs were clear to auscultation bilaterally [Heart Rate And Rhythm] : heart rate was normal and rhythm regular [Heart Sounds] : normal S1 and S2 [Heart Sounds Gallop] : no gallops [Heart Sounds Pericardial Friction Rub] : no pericardial rub [Edema] : there was no peripheral edema [Abdomen Soft] : soft [Abdomen Tenderness] : non-tender [Cervical Lymph Nodes Enlarged Posterior Bilaterally] : posterior cervical [Cervical Lymph Nodes Enlarged Anterior Bilaterally] : anterior cervical [Involuntary Movements] : no involuntary movements were seen [FreeTextEntry1] : right UE [] : no rash [No Focal Deficits] : no focal deficits [Oriented To Time, Place, And Person] : oriented to person, place, and time [Impaired Insight] : insight and judgment were intact [Affect] : the affect was normal

## 2021-02-08 NOTE — ASSESSMENT
[FreeTextEntry1] : Renal Transplant recipient: Had immediate allograft function, improved creatinine at discharge. Has urinary frequency and nocturia. No dysuria/hematuria. No fever/chills. Tolerating medications.\par Immunosuppression: reviewed; simulect induction, on tac/Lefluonomide/prednisone, last Tac level noted; will recheck. Currently taking Envarsus 10 mg daily.; Off MMF and prednisone at 5 mg daily. \par Hep C pos donor kidney: PCR negative. 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 Will follow.\par Hypertension: controlled; Not on medications.  \par Cardiovascular risk reduction discussed. On aspirin. On plavix. Has venous bypass by thoracic surgeon done at [facility] by Ronnie and Dr. Torres\par Infection prophylaxis: On Bactrim, Valcyte and nystatin (twice daily) as well as GI prophylaxis.\par Discussed ambulation, using incentive spirometer, optimal  blood pressure readings, adherence with medications and follow ups, follow up clinic visit schedule, avoiding dehydration, mosquito bites; prevention of DVT as well as food safety.\par She has   met with transplant surgeon today again;and ureteral stent has been removed.\par \par Labs every 2 weeks including BKV PCR.\par

## 2021-02-10 ENCOUNTER — RX RENEWAL (OUTPATIENT)
Age: 51
End: 2021-02-10

## 2021-02-17 RX ORDER — CHOLECALCIFEROL (VITAMIN D3) 1250 MCG
1.25 MG CAPSULE ORAL
Qty: 8 | Refills: 0 | Status: ACTIVE | COMMUNITY
Start: 2020-12-24 | End: 1900-01-01

## 2021-03-01 ENCOUNTER — APPOINTMENT (OUTPATIENT)
Dept: NEPHROLOGY | Facility: CLINIC | Age: 51
End: 2021-03-01
Payer: MEDICARE

## 2021-03-01 ENCOUNTER — APPOINTMENT (OUTPATIENT)
Dept: TRANSPLANT | Facility: CLINIC | Age: 51
End: 2021-03-01

## 2021-03-01 ENCOUNTER — LABORATORY RESULT (OUTPATIENT)
Age: 51
End: 2021-03-01

## 2021-03-01 PROCEDURE — 99214 OFFICE O/P EST MOD 30 MIN: CPT | Mod: 95

## 2021-03-01 NOTE — HISTORY OF PRESENT ILLNESS
[FreeTextEntry1] : This visit is provided by telehealth using real time 2 way audio visual technology. This patient is located at home and the provider is located at the [facility] medical office at [address] [place] [address]. Patient  participated in this visit.\par Verbal consent for this visit was given by patient/accompanying family member\par Total duration of this visit which included conversation, lab review, medication review and clinical assessment and advice lasted approximately 25-30 minutes.\par \par \par Currently:She reports her  had a surgery last week and she had elevated blood pressure, from anxiety. Now is back to normal. Lab data from this AM reviewed.\par Patient feels well. No acute symptoms\par Reviewed for acute symptoms-currently has none.\par Taking precautions to prevent COVID exposure\par I reviewed current home  blood pressure and  medications.\par VS: Blood Pr 120/76 mm Hg. Pulse 77/min Weight 165 Lbs \par \par On Telehealth visit:\par Patient appears comfortable\par No distress, not dyspneic\par Conscious, alert, oriented X 3\par Speech: Normal\par Other observations as noted\par Reviewed last lab data . No ankle swelling\par  [Home] : at home, [unfilled] , at the time of the visit. [Medical Office: ([facility])___] : at the medical office located in  [Verbal consent obtained from patient] : the patient, [unfilled]

## 2021-03-01 NOTE — ASSESSMENT
[FreeTextEntry1] : Clinical Impression:\par Kidney Transplant recipient, functioning allograft, normal creatinine\par Immunosuppression- Reviewed.\par BK Viremia  Improving\par HCV controlled, completed Epclusa in December 2020. Last PCR negative.\par Plan:\par Immunosuppression reviewed. On low therapeutic Tac level due to BK viremia\par Continue current medications\par additional changes as noted\par Labs and follow up visit to be scheduled as discussed.\par Discussed COVID infection prevention strategies including handwashing, social distancing and monitoring for any signs or symptoms.\par \par

## 2021-03-02 LAB
ALBUMIN SERPL ELPH-MCNC: 4.7 G/DL
ALP BLD-CCNC: 439 U/L
ALT SERPL-CCNC: 71 U/L
ANION GAP SERPL CALC-SCNC: 15 MMOL/L
APPEARANCE: CLEAR
AST SERPL-CCNC: 38 U/L
BACTERIA: NEGATIVE
BASOPHILS # BLD AUTO: 0.07 K/UL
BASOPHILS NFR BLD AUTO: 1.3 %
BILIRUB SERPL-MCNC: 0.8 MG/DL
BILIRUBIN URINE: NEGATIVE
BLOOD URINE: NEGATIVE
BUN SERPL-MCNC: 12 MG/DL
CALCIUM SERPL-MCNC: 11.2 MG/DL
CALCIUM SERPL-MCNC: 11.2 MG/DL
CHLORIDE SERPL-SCNC: 106 MMOL/L
CO2 SERPL-SCNC: 20 MMOL/L
COLOR: NORMAL
CREAT SERPL-MCNC: 1.32 MG/DL
CREAT SPEC-SCNC: 97 MG/DL
CREAT/PROT UR: 0.2 RATIO
EOSINOPHIL # BLD AUTO: 0.15 K/UL
EOSINOPHIL NFR BLD AUTO: 2.8 %
GLUCOSE QUALITATIVE U: NEGATIVE
GLUCOSE SERPL-MCNC: 86 MG/DL
HCT VFR BLD CALC: 54.9 %
HGB BLD-MCNC: 16.9 G/DL
HYALINE CASTS: 0 /LPF
IMM GRANULOCYTES NFR BLD AUTO: 1.5 %
KETONES URINE: NEGATIVE
LDH SERPL-CCNC: 373 U/L
LEUKOCYTE ESTERASE URINE: ABNORMAL
LYMPHOCYTES # BLD AUTO: 1.77 K/UL
LYMPHOCYTES NFR BLD AUTO: 32.9 %
MAGNESIUM SERPL-MCNC: 1.4 MG/DL
MAN DIFF?: NORMAL
MCHC RBC-ENTMCNC: 27.2 PG
MCHC RBC-ENTMCNC: 30.8 GM/DL
MCV RBC AUTO: 88.4 FL
MICROSCOPIC-UA: NORMAL
MONOCYTES # BLD AUTO: 0.91 K/UL
MONOCYTES NFR BLD AUTO: 16.9 %
NEUTROPHILS # BLD AUTO: 2.4 K/UL
NEUTROPHILS NFR BLD AUTO: 44.6 %
NITRITE URINE: NEGATIVE
PARATHYROID HORMONE INTACT: 220 PG/ML
PH URINE: 6.5
PHOSPHATE SERPL-MCNC: 3 MG/DL
PLATELET # BLD AUTO: 203 K/UL
POTASSIUM SERPL-SCNC: 4.6 MMOL/L
PROT SERPL-MCNC: 6.9 G/DL
PROT UR-MCNC: 17 MG/DL
PROTEIN URINE: NORMAL
RBC # BLD: 6.21 M/UL
RBC # FLD: 17.2 %
RED BLOOD CELLS URINE: 2 /HPF
SODIUM SERPL-SCNC: 141 MMOL/L
SPECIFIC GRAVITY URINE: 1.01
SQUAMOUS EPITHELIAL CELLS: 3 /HPF
TACROLIMUS SERPL-MCNC: 8.4 NG/ML
URATE SERPL-MCNC: 4.6 MG/DL
UROBILINOGEN URINE: NORMAL
WBC # FLD AUTO: 5.38 K/UL
WHITE BLOOD CELLS URINE: 11 /HPF

## 2021-03-03 LAB — BKV DNA SPEC QL NAA+PROBE: ABNORMAL COPIES/ML

## 2021-03-16 ENCOUNTER — RX RENEWAL (OUTPATIENT)
Age: 51
End: 2021-03-16

## 2021-03-24 ENCOUNTER — APPOINTMENT (OUTPATIENT)
Dept: TRANSPLANT | Facility: CLINIC | Age: 51
End: 2021-03-24

## 2021-03-26 LAB
25(OH)D3 SERPL-MCNC: 50 NG/ML
ALBUMIN SERPL ELPH-MCNC: 4.6 G/DL
ALP BLD-CCNC: 314 U/L
ALT SERPL-CCNC: 41 U/L
ANION GAP SERPL CALC-SCNC: 12 MMOL/L
APPEARANCE: CLEAR
AST SERPL-CCNC: 33 U/L
BACTERIA: NEGATIVE
BASOPHILS # BLD AUTO: 0.06 K/UL
BASOPHILS NFR BLD AUTO: 1 %
BILIRUB SERPL-MCNC: 1.1 MG/DL
BILIRUBIN URINE: NEGATIVE
BLOOD URINE: NEGATIVE
BUN SERPL-MCNC: 11 MG/DL
CALCIUM OXALATE CRYSTALS: ABNORMAL
CALCIUM SERPL-MCNC: 10.3 MG/DL
CALCIUM SERPL-MCNC: 10.3 MG/DL
CHLORIDE SERPL-SCNC: 103 MMOL/L
CHOLEST SERPL-MCNC: 212 MG/DL
CO2 SERPL-SCNC: 24 MMOL/L
COLOR: NORMAL
CREAT SERPL-MCNC: 1.12 MG/DL
EOSINOPHIL # BLD AUTO: 0.1 K/UL
EOSINOPHIL NFR BLD AUTO: 1.7 %
GLUCOSE QUALITATIVE U: NEGATIVE
GLUCOSE SERPL-MCNC: 82 MG/DL
HCT VFR BLD CALC: 53.7 %
HDLC SERPL-MCNC: 86 MG/DL
HGB BLD-MCNC: 16.4 G/DL
HYALINE CASTS: 1 /LPF
IMM GRANULOCYTES NFR BLD AUTO: 0.5 %
KETONES URINE: NEGATIVE
LDH SERPL-CCNC: 507 U/L
LDLC SERPL CALC-MCNC: 116 MG/DL
LEUKOCYTE ESTERASE URINE: NEGATIVE
LYMPHOCYTES # BLD AUTO: 1.37 K/UL
LYMPHOCYTES NFR BLD AUTO: 23.6 %
MAGNESIUM SERPL-MCNC: 1.6 MG/DL
MAN DIFF?: NORMAL
MCHC RBC-ENTMCNC: 27.7 PG
MCHC RBC-ENTMCNC: 30.5 GM/DL
MCV RBC AUTO: 90.6 FL
MICROSCOPIC-UA: NORMAL
MONOCYTES # BLD AUTO: 0.88 K/UL
MONOCYTES NFR BLD AUTO: 15.2 %
NEUTROPHILS # BLD AUTO: 3.36 K/UL
NEUTROPHILS NFR BLD AUTO: 58 %
NITRITE URINE: NEGATIVE
NONHDLC SERPL-MCNC: 127 MG/DL
PARATHYROID HORMONE INTACT: 293 PG/ML
PH URINE: 6.5
PHOSPHATE SERPL-MCNC: 2.7 MG/DL
PLATELET # BLD AUTO: 167 K/UL
POTASSIUM SERPL-SCNC: 4.7 MMOL/L
PROT SERPL-MCNC: 7 G/DL
PROTEIN URINE: NORMAL
RBC # BLD: 5.93 M/UL
RBC # FLD: 18 %
RED BLOOD CELLS URINE: 2 /HPF
SODIUM SERPL-SCNC: 139 MMOL/L
SPECIFIC GRAVITY URINE: 1.02
SQUAMOUS EPITHELIAL CELLS: 3 /HPF
TACROLIMUS SERPL-MCNC: 6.1 NG/ML
TRIGL SERPL-MCNC: 55 MG/DL
URATE SERPL-MCNC: 4.7 MG/DL
UROBILINOGEN URINE: NORMAL
WBC # FLD AUTO: 5.8 K/UL
WHITE BLOOD CELLS URINE: 1 /HPF

## 2021-03-28 LAB
CMV DNA SPEC QL NAA+PROBE: NOT DETECTED IU/ML
CREAT SPEC-SCNC: 116 MG/DL
CREAT/PROT UR: 0.2 RATIO
ESTIMATED AVERAGE GLUCOSE: 117 MG/DL
HBA1C MFR BLD HPLC: 5.7 %
PROT UR-MCNC: 21 MG/DL

## 2021-03-30 LAB
BKV DNA SPEC QL NAA+PROBE: 1450 COPIES/ML
LOG 10 BK QUANTITATION PCR: 3.16

## 2021-04-09 ENCOUNTER — RX RENEWAL (OUTPATIENT)
Age: 51
End: 2021-04-09

## 2021-04-21 ENCOUNTER — RX RENEWAL (OUTPATIENT)
Age: 51
End: 2021-04-21

## 2021-04-21 ENCOUNTER — APPOINTMENT (OUTPATIENT)
Dept: TRANSPLANT | Facility: CLINIC | Age: 51
End: 2021-04-21

## 2021-04-22 ENCOUNTER — NON-APPOINTMENT (OUTPATIENT)
Age: 51
End: 2021-04-22

## 2021-04-22 LAB
ALBUMIN SERPL ELPH-MCNC: 4.6 G/DL
ALP BLD-CCNC: 99 U/L
ALT SERPL-CCNC: 32 U/L
ANION GAP SERPL CALC-SCNC: 15 MMOL/L
APPEARANCE: ABNORMAL
AST SERPL-CCNC: 27 U/L
BACTERIA: NEGATIVE
BASOPHILS # BLD AUTO: 0.04 K/UL
BASOPHILS NFR BLD AUTO: 0.8 %
BILIRUB SERPL-MCNC: 0.9 MG/DL
BILIRUBIN URINE: NEGATIVE
BLOOD URINE: NEGATIVE
BUN SERPL-MCNC: 20 MG/DL
CALCIUM OXALATE CRYSTALS: ABNORMAL
CALCIUM SERPL-MCNC: 10.8 MG/DL
CHLORIDE SERPL-SCNC: 105 MMOL/L
CO2 SERPL-SCNC: 22 MMOL/L
COLOR: NORMAL
CREAT SERPL-MCNC: 1.55 MG/DL
CREAT SPEC-SCNC: 178 MG/DL
CREAT/PROT UR: 0.3 RATIO
EOSINOPHIL # BLD AUTO: 0.14 K/UL
EOSINOPHIL NFR BLD AUTO: 2.6 %
GLUCOSE QUALITATIVE U: NEGATIVE
GLUCOSE SERPL-MCNC: 67 MG/DL
HCT VFR BLD CALC: 55.1 %
HGB BLD-MCNC: 16.8 G/DL
HYALINE CASTS: 0 /LPF
IMM GRANULOCYTES NFR BLD AUTO: 0.2 %
KETONES URINE: NEGATIVE
LDH SERPL-CCNC: 416 U/L
LEUKOCYTE ESTERASE URINE: NEGATIVE
LYMPHOCYTES # BLD AUTO: 1.18 K/UL
LYMPHOCYTES NFR BLD AUTO: 22.2 %
MAGNESIUM SERPL-MCNC: 1.6 MG/DL
MAN DIFF?: NORMAL
MCHC RBC-ENTMCNC: 27.8 PG
MCHC RBC-ENTMCNC: 30.5 GM/DL
MCV RBC AUTO: 91.1 FL
MICROSCOPIC-UA: NORMAL
MONOCYTES # BLD AUTO: 0.75 K/UL
MONOCYTES NFR BLD AUTO: 14.1 %
NEUTROPHILS # BLD AUTO: 3.19 K/UL
NEUTROPHILS NFR BLD AUTO: 60.1 %
NITRITE URINE: NEGATIVE
PH URINE: 6.5
PHOSPHATE SERPL-MCNC: 2.9 MG/DL
PLATELET # BLD AUTO: 138 K/UL
POTASSIUM SERPL-SCNC: 4.7 MMOL/L
PROT SERPL-MCNC: 7.1 G/DL
PROT UR-MCNC: 48 MG/DL
PROTEIN URINE: ABNORMAL
RBC # BLD: 6.05 M/UL
RBC # FLD: 17.4 %
RED BLOOD CELLS URINE: 1 /HPF
SODIUM SERPL-SCNC: 142 MMOL/L
SPECIFIC GRAVITY URINE: 1.02
SQUAMOUS EPITHELIAL CELLS: 1 /HPF
TACROLIMUS SERPL-MCNC: 10.9 NG/ML
URATE SERPL-MCNC: 5.7 MG/DL
URINE COMMENTS: NORMAL
UROBILINOGEN URINE: NORMAL
WBC # FLD AUTO: 5.31 K/UL
WHITE BLOOD CELLS URINE: 1 /HPF

## 2021-04-23 LAB
BKV DNA SPEC QL NAA+PROBE: 1380 COPIES/ML
CMV DNA SPEC QL NAA+PROBE: NOT DETECTED IU/ML
LOG 10 BK QUANTITATION PCR: 3.14

## 2021-05-12 ENCOUNTER — APPOINTMENT (OUTPATIENT)
Dept: TRANSPLANT | Facility: CLINIC | Age: 51
End: 2021-05-12

## 2021-05-12 ENCOUNTER — NON-APPOINTMENT (OUTPATIENT)
Age: 51
End: 2021-05-12

## 2021-05-13 ENCOUNTER — APPOINTMENT (OUTPATIENT)
Dept: TRANSPLANT | Facility: CLINIC | Age: 51
End: 2021-05-13

## 2021-05-13 LAB
ALBUMIN SERPL ELPH-MCNC: 4.7 G/DL
ALP BLD-CCNC: 82 U/L
ALT SERPL-CCNC: 40 U/L
ANION GAP SERPL CALC-SCNC: 15 MMOL/L
APPEARANCE: ABNORMAL
AST SERPL-CCNC: 50 U/L
BACTERIA: NEGATIVE
BASOPHILS # BLD AUTO: 0.04 K/UL
BASOPHILS NFR BLD AUTO: 0.8 %
BILIRUB SERPL-MCNC: 1.1 MG/DL
BILIRUBIN URINE: NEGATIVE
BLOOD URINE: NEGATIVE
BUN SERPL-MCNC: 14 MG/DL
CALCIUM OXALATE CRYSTALS: ABNORMAL
CALCIUM SERPL-MCNC: 10.8 MG/DL
CALCIUM SERPL-MCNC: 10.8 MG/DL
CHLORIDE SERPL-SCNC: 103 MMOL/L
CO2 SERPL-SCNC: 20 MMOL/L
COLOR: ABNORMAL
CREAT SERPL-MCNC: 1.17 MG/DL
CREAT SPEC-SCNC: 186 MG/DL
CREAT/PROT UR: 0.2 RATIO
EOSINOPHIL # BLD AUTO: 0.17 K/UL
EOSINOPHIL NFR BLD AUTO: 3.5 %
GLUCOSE QUALITATIVE U: NEGATIVE
GLUCOSE SERPL-MCNC: 76 MG/DL
HCT VFR BLD CALC: 56.9 %
HGB BLD-MCNC: 17.4 G/DL
IMM GRANULOCYTES NFR BLD AUTO: 0.2 %
KETONES URINE: NEGATIVE
LDH SERPL-CCNC: 639 U/L
LEUKOCYTE ESTERASE URINE: NEGATIVE
LYMPHOCYTES # BLD AUTO: 0.89 K/UL
LYMPHOCYTES NFR BLD AUTO: 18.3 %
MAGNESIUM SERPL-MCNC: 1.7 MG/DL
MAN DIFF?: NORMAL
MCHC RBC-ENTMCNC: 27.4 PG
MCHC RBC-ENTMCNC: 30.6 GM/DL
MCV RBC AUTO: 89.6 FL
MICROSCOPIC-UA: NORMAL
MONOCYTES # BLD AUTO: 0.55 K/UL
MONOCYTES NFR BLD AUTO: 11.3 %
NEUTROPHILS # BLD AUTO: 3.21 K/UL
NEUTROPHILS NFR BLD AUTO: 65.9 %
NITRITE URINE: NEGATIVE
PARATHYROID HORMONE INTACT: 273 PG/ML
PH URINE: 6
PHOSPHATE SERPL-MCNC: 2.8 MG/DL
PLATELET # BLD AUTO: 126 K/UL
POTASSIUM SERPL-SCNC: 7 MMOL/L
PROT SERPL-MCNC: 7.3 G/DL
PROT UR-MCNC: 45 MG/DL
PROTEIN URINE: ABNORMAL
RBC # BLD: 6.35 M/UL
RBC # FLD: 18 %
RED BLOOD CELLS URINE: 0 /HPF
SODIUM SERPL-SCNC: 138 MMOL/L
SPECIFIC GRAVITY URINE: 1.02
SQUAMOUS EPITHELIAL CELLS: 6 /HPF
TACROLIMUS SERPL-MCNC: 4.6 NG/ML
URATE SERPL-MCNC: 5.2 MG/DL
URINE COMMENTS: NORMAL
UROBILINOGEN URINE: NORMAL
WBC # FLD AUTO: 4.87 K/UL
WHITE BLOOD CELLS URINE: 8 /HPF

## 2021-05-14 ENCOUNTER — NON-APPOINTMENT (OUTPATIENT)
Age: 51
End: 2021-05-14

## 2021-05-14 LAB
ALBUMIN SERPL ELPH-MCNC: 4.7 G/DL
ALP BLD-CCNC: 83 U/L
ALT SERPL-CCNC: 36 U/L
ANION GAP SERPL CALC-SCNC: 14 MMOL/L
AST SERPL-CCNC: 28 U/L
BILIRUB SERPL-MCNC: 1.2 MG/DL
BUN SERPL-MCNC: 14 MG/DL
CALCIUM SERPL-MCNC: 10.8 MG/DL
CHLORIDE SERPL-SCNC: 104 MMOL/L
CO2 SERPL-SCNC: 22 MMOL/L
CREAT SERPL-MCNC: 1.31 MG/DL
GLUCOSE SERPL-MCNC: 84 MG/DL
POTASSIUM SERPL-SCNC: 4.8 MMOL/L
PROT SERPL-MCNC: 7.2 G/DL
SODIUM SERPL-SCNC: 141 MMOL/L
TACROLIMUS SERPL-MCNC: 4.8 NG/ML

## 2021-05-21 LAB
BKV DNA SPEC QL NAA+PROBE: 550 COPIES/ML
LOG 10 BK QUANTITATION PCR: 2.74

## 2021-06-10 ENCOUNTER — RX RENEWAL (OUTPATIENT)
Age: 51
End: 2021-06-10

## 2021-06-12 ENCOUNTER — RX RENEWAL (OUTPATIENT)
Age: 51
End: 2021-06-12

## 2021-06-14 ENCOUNTER — APPOINTMENT (OUTPATIENT)
Dept: NEPHROLOGY | Facility: CLINIC | Age: 51
End: 2021-06-14

## 2021-06-22 ENCOUNTER — APPOINTMENT (OUTPATIENT)
Dept: NEPHROLOGY | Facility: CLINIC | Age: 51
End: 2021-06-22

## 2021-06-24 ENCOUNTER — APPOINTMENT (OUTPATIENT)
Dept: TRANSPLANT | Facility: CLINIC | Age: 51
End: 2021-06-24

## 2021-06-24 LAB
25(OH)D3 SERPL-MCNC: 53.8 NG/ML
ALBUMIN SERPL ELPH-MCNC: 4.9 G/DL
ALP BLD-CCNC: 82 U/L
ALT SERPL-CCNC: 38 U/L
ANION GAP SERPL CALC-SCNC: 16 MMOL/L
APPEARANCE: CLEAR
AST SERPL-CCNC: 27 U/L
BACTERIA: NEGATIVE
BASOPHILS # BLD AUTO: 0.04 K/UL
BASOPHILS NFR BLD AUTO: 0.6 %
BILIRUB SERPL-MCNC: 0.9 MG/DL
BILIRUBIN URINE: NEGATIVE
BLOOD URINE: NEGATIVE
BUN SERPL-MCNC: 27 MG/DL
CALCIUM OXALATE CRYSTALS: ABNORMAL
CALCIUM SERPL-MCNC: 11 MG/DL
CALCIUM SERPL-MCNC: 11 MG/DL
CHLORIDE SERPL-SCNC: 106 MMOL/L
CHOLEST SERPL-MCNC: 204 MG/DL
CO2 SERPL-SCNC: 20 MMOL/L
COLOR: YELLOW
CREAT SERPL-MCNC: 1.51 MG/DL
CREAT SPEC-SCNC: 290 MG/DL
CREAT/PROT UR: 0.2 RATIO
EOSINOPHIL # BLD AUTO: 0.14 K/UL
EOSINOPHIL NFR BLD AUTO: 2.1 %
ESTIMATED AVERAGE GLUCOSE: 114 MG/DL
GLUCOSE QUALITATIVE U: NEGATIVE
GLUCOSE SERPL-MCNC: 72 MG/DL
HBA1C MFR BLD HPLC: 5.6 %
HCT VFR BLD CALC: 53.4 %
HDLC SERPL-MCNC: 82 MG/DL
HGB BLD-MCNC: 16.4 G/DL
HYALINE CASTS: 1 /LPF
IMM GRANULOCYTES NFR BLD AUTO: 0.3 %
KETONES URINE: NORMAL
LDH SERPL-CCNC: 380 U/L
LDLC SERPL CALC-MCNC: 105 MG/DL
LEUKOCYTE ESTERASE URINE: ABNORMAL
LYMPHOCYTES # BLD AUTO: 1.38 K/UL
LYMPHOCYTES NFR BLD AUTO: 21 %
MAGNESIUM SERPL-MCNC: 1.5 MG/DL
MAN DIFF?: NORMAL
MCHC RBC-ENTMCNC: 27 PG
MCHC RBC-ENTMCNC: 30.7 GM/DL
MCV RBC AUTO: 88 FL
MICROSCOPIC-UA: NORMAL
MONOCYTES # BLD AUTO: 0.77 K/UL
MONOCYTES NFR BLD AUTO: 11.7 %
NEUTROPHILS # BLD AUTO: 4.22 K/UL
NEUTROPHILS NFR BLD AUTO: 64.3 %
NITRITE URINE: NEGATIVE
NONHDLC SERPL-MCNC: 122 MG/DL
PARATHYROID HORMONE INTACT: 278 PG/ML
PH URINE: 6
PHOSPHATE SERPL-MCNC: 2.6 MG/DL
PLATELET # BLD AUTO: 181 K/UL
POTASSIUM SERPL-SCNC: 4.4 MMOL/L
PROT SERPL-MCNC: 7.1 G/DL
PROT UR-MCNC: 51 MG/DL
PROTEIN URINE: ABNORMAL
RBC # BLD: 6.07 M/UL
RBC # FLD: 18.1 %
RED BLOOD CELLS URINE: 1 /HPF
SODIUM SERPL-SCNC: 141 MMOL/L
SPECIFIC GRAVITY URINE: 1.03
SQUAMOUS EPITHELIAL CELLS: 9 /HPF
TACROLIMUS SERPL-MCNC: 5.3 NG/ML
TRIGL SERPL-MCNC: 84 MG/DL
URATE SERPL-MCNC: 6.3 MG/DL
UROBILINOGEN URINE: NORMAL
WBC # FLD AUTO: 6.57 K/UL
WHITE BLOOD CELLS URINE: 5 /HPF

## 2021-06-28 ENCOUNTER — APPOINTMENT (OUTPATIENT)
Dept: NEPHROLOGY | Facility: CLINIC | Age: 51
End: 2021-06-28
Payer: MEDICARE

## 2021-06-28 VITALS — SYSTOLIC BLOOD PRESSURE: 112 MMHG | DIASTOLIC BLOOD PRESSURE: 78 MMHG

## 2021-06-28 VITALS
TEMPERATURE: 97.6 F | OXYGEN SATURATION: 97 % | WEIGHT: 158 LBS | RESPIRATION RATE: 16 BRPM | SYSTOLIC BLOOD PRESSURE: 149 MMHG | DIASTOLIC BLOOD PRESSURE: 90 MMHG | HEART RATE: 76 BPM | HEIGHT: 62 IN | BODY MASS INDEX: 29.08 KG/M2

## 2021-06-28 LAB
BKV DNA SPEC QL NAA+PROBE: 930 COPIES/ML
CMV DNA SPEC QL NAA+PROBE: NOT DETECTED IU/ML
LOG 10 BK QUANTITATION PCR: 2.97

## 2021-06-28 PROCEDURE — 99214 OFFICE O/P EST MOD 30 MIN: CPT

## 2021-06-28 RX ORDER — AZITHROMYCIN 250 MG/1
250 TABLET, FILM COATED ORAL
Qty: 1 | Refills: 0 | Status: DISCONTINUED | COMMUNITY
Start: 2021-06-07 | End: 2021-06-28

## 2021-06-28 RX ORDER — VALGANCICLOVIR HYDROCHLORIDE 450 MG/1
450 TABLET ORAL
Qty: 30 | Refills: 0 | Status: DISCONTINUED | COMMUNITY
Start: 2020-08-17 | End: 2021-06-28

## 2021-06-28 NOTE — ASSESSMENT
[FreeTextEntry1] : Clinical Impression:\par Kidney Transplant recipient, functioning allograft, noted creatinine 1.5\par Immunosuppression- Reviewed.n On Tac/Leflunomide and prednisone\par BK Viremia  low grade, follow up BKV PVR. Once resolved will restart MMF\par HCV treated., completed Epclusa in December 2020. Last PCR negative.\par HTN: Controlled, not taking amlodipine.\par Hypercholesterolemia: Discussed levels, will follow. Has been eating fried foods and will adjust diet.\par Plan:\par Immunosuppression reviewed. On low therapeutic Tac level due to BK viremia\par Continue current medications\par additional changes as noted\par Labs and follow up visit to be scheduled as discussed.\par Discussed COVID infection prevention strategies including handwashing, social distancing and monitoring for any signs or symptoms.\par She has completed vaccination, Pfizer. Completed on 5th of  May.\par \par Discussed monthly follow up schedule.\par \par

## 2021-06-28 NOTE — HISTORY OF PRESENT ILLNESS
[FreeTextEntry1] : Patient had missed appointments. 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 No acute symptoms\par Reviewed for acute symptoms-currently has none.\par Taking precautions to prevent COVID exposure\par I reviewed current home  blood pressure and  medications.\par \par Reviewed labs, creatinine 1.5 fluctuating. Noted elevated cholesterol, therapeutic Tacrolimus.

## 2021-07-27 ENCOUNTER — APPOINTMENT (OUTPATIENT)
Dept: NEPHROLOGY | Facility: CLINIC | Age: 51
End: 2021-07-27
Payer: MEDICARE

## 2021-07-27 VITALS
HEART RATE: 70 BPM | WEIGHT: 150 LBS | DIASTOLIC BLOOD PRESSURE: 86 MMHG | RESPIRATION RATE: 12 BRPM | SYSTOLIC BLOOD PRESSURE: 153 MMHG | HEIGHT: 62 IN | TEMPERATURE: 97 F | OXYGEN SATURATION: 100 % | BODY MASS INDEX: 27.6 KG/M2

## 2021-07-27 VITALS — DIASTOLIC BLOOD PRESSURE: 80 MMHG | SYSTOLIC BLOOD PRESSURE: 128 MMHG

## 2021-07-27 PROCEDURE — 99214 OFFICE O/P EST MOD 30 MIN: CPT

## 2021-07-27 NOTE — HISTORY OF PRESENT ILLNESS
[FreeTextEntry1] : Patient had missed appointments. 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 No acute symptoms\par Reviewed for acute symptoms-currently has none.\par Taking precautions to prevent COVID exposure\par Had Pfizer vaccines\par I reviewed current home  blood pressure and  medications.\par \par Reviewed labs, creatinine 1.5 fluctuating. Noted elevated cholesterol, therapeutic Tacrolimus.

## 2021-07-27 NOTE — PHYSICAL EXAM
[General Appearance - Alert] : alert [General Appearance - In No Acute Distress] : in no acute distress [Sclera] : the sclera and conjunctiva were normal [PERRL With Normal Accommodation] : pupils were equal in size, round, and reactive to light [Extraocular Movements] : extraocular movements were intact [Outer Ear] : the ears and nose were normal in appearance [Oropharynx] : the oropharynx was normal [Neck Appearance] : the appearance of the neck was normal [Neck Cervical Mass (___cm)] : no neck mass was observed [Jugular Venous Distention Increased] : there was no jugular-venous distention [Thyroid Diffuse Enlargement] : the thyroid was not enlarged [Thyroid Nodule] : there were no palpable thyroid nodules [Auscultation Breath Sounds / Voice Sounds] : lungs were clear to auscultation bilaterally [Heart Rate And Rhythm] : heart rate was normal and rhythm regular [Heart Sounds] : normal S1 and S2 [Heart Sounds Gallop] : no gallops [Heart Sounds Pericardial Friction Rub] : no pericardial rub [Edema] : there was no peripheral edema [Abdomen Soft] : soft [Abdomen Tenderness] : non-tender [Cervical Lymph Nodes Enlarged Posterior Bilaterally] : posterior cervical [Cervical Lymph Nodes Enlarged Anterior Bilaterally] : anterior cervical [Involuntary Movements] : no involuntary movements were seen [] : no rash [FreeTextEntry1] : Nail discolorations noted. [No Focal Deficits] : no focal deficits [Oriented To Time, Place, And Person] : oriented to person, place, and time [Impaired Insight] : insight and judgment were intact [Affect] : the affect was normal

## 2021-07-27 NOTE — ASSESSMENT
[FreeTextEntry1] : Clinical Impression:\par Kidney Transplant recipient, functioning allograft, noted creatinine 1.5\par Immunosuppression- Reviewed.n On Tac/Leflunomide and prednisone\par BK Viremia  low grade, follow up BKV PVR. Once resolved will restart MMF\par HCV treated., completed Epclusa in December 2020. Last PCR negative.\par HTN: Controlled, not on meds.\par Nail lesions- Referred to Dermatology for eye check\par Also advised eye check\par \par Plan:\par Immunosuppression reviewed. On low therapeutic Tac level due to BK viremia\par Continue current medications\par additional changes as noted\par Labs and follow up visit to be scheduled as discussed.\par Discussed COVID infection prevention strategies including handwashing, social distancing and monitoring for any signs or symptoms.\par She has completed vaccination, Pfizer. Completed on 5th of  May.\par \par Discussed monthly follow up schedule.\par \par

## 2021-07-29 LAB
25(OH)D3 SERPL-MCNC: 39.2 NG/ML
ALBUMIN SERPL ELPH-MCNC: 4.5 G/DL
ALP BLD-CCNC: 80 U/L
ALT SERPL-CCNC: 33 U/L
ANION GAP SERPL CALC-SCNC: 12 MMOL/L
APPEARANCE: CLEAR
AST SERPL-CCNC: 27 U/L
BACTERIA: NEGATIVE
BASOPHILS # BLD AUTO: 0.04 K/UL
BASOPHILS NFR BLD AUTO: 0.9 %
BILIRUB SERPL-MCNC: 1 MG/DL
BILIRUBIN URINE: NEGATIVE
BLOOD URINE: NEGATIVE
BUN SERPL-MCNC: 15 MG/DL
CALCIUM SERPL-MCNC: 11 MG/DL
CALCIUM SERPL-MCNC: 11 MG/DL
CHLORIDE SERPL-SCNC: 102 MMOL/L
CHOLEST SERPL-MCNC: 215 MG/DL
CO2 SERPL-SCNC: 23 MMOL/L
COLOR: NORMAL
COVID-19 SPIKE DOMAIN ANTIBODY INTERPRETATION: NEGATIVE
CREAT SERPL-MCNC: 1.28 MG/DL
CREAT SPEC-SCNC: 105 MG/DL
CREAT/PROT UR: 0.1 RATIO
EOSINOPHIL # BLD AUTO: 0.14 K/UL
EOSINOPHIL NFR BLD AUTO: 3.3 %
ESTIMATED AVERAGE GLUCOSE: 111 MG/DL
GLUCOSE QUALITATIVE U: NEGATIVE
GLUCOSE SERPL-MCNC: 73 MG/DL
HBA1C MFR BLD HPLC: 5.5 %
HCT VFR BLD CALC: 53.3 %
HDLC SERPL-MCNC: 87 MG/DL
HGB BLD-MCNC: 16.3 G/DL
HYALINE CASTS: 0 /LPF
IMM GRANULOCYTES NFR BLD AUTO: 0.2 %
KETONES URINE: NEGATIVE
LDH SERPL-CCNC: 315 U/L
LDLC SERPL CALC-MCNC: 112 MG/DL
LEUKOCYTE ESTERASE URINE: NEGATIVE
LYMPHOCYTES # BLD AUTO: 0.86 K/UL
LYMPHOCYTES NFR BLD AUTO: 20.3 %
MAGNESIUM SERPL-MCNC: 1.7 MG/DL
MAN DIFF?: NORMAL
MCHC RBC-ENTMCNC: 27.3 PG
MCHC RBC-ENTMCNC: 30.6 GM/DL
MCV RBC AUTO: 89.4 FL
MICROSCOPIC-UA: NORMAL
MONOCYTES # BLD AUTO: 0.72 K/UL
MONOCYTES NFR BLD AUTO: 17 %
NEUTROPHILS # BLD AUTO: 2.47 K/UL
NEUTROPHILS NFR BLD AUTO: 58.3 %
NITRITE URINE: NEGATIVE
NONHDLC SERPL-MCNC: 128 MG/DL
PARATHYROID HORMONE INTACT: 170 PG/ML
PH URINE: 6
PHOSPHATE SERPL-MCNC: 2.8 MG/DL
PLATELET # BLD AUTO: 144 K/UL
POTASSIUM SERPL-SCNC: 4.1 MMOL/L
PROT SERPL-MCNC: 6.5 G/DL
PROT UR-MCNC: 14 MG/DL
PROTEIN URINE: NORMAL
RBC # BLD: 5.96 M/UL
RBC # FLD: 17.4 %
RED BLOOD CELLS URINE: 3 /HPF
SARS-COV-2 AB SERPL IA-ACNC: 0.4 U/ML
SODIUM SERPL-SCNC: 137 MMOL/L
SPECIFIC GRAVITY URINE: 1.01
SQUAMOUS EPITHELIAL CELLS: 0 /HPF
TACROLIMUS SERPL-MCNC: 2.5 NG/ML
TRIGL SERPL-MCNC: 81 MG/DL
URATE SERPL-MCNC: 5.1 MG/DL
UROBILINOGEN URINE: NORMAL
WBC # FLD AUTO: 4.24 K/UL
WHITE BLOOD CELLS URINE: 0 /HPF

## 2021-07-30 LAB
BKV DNA SPEC QL NAA+PROBE: 250 COPIES/ML
CMV DNA SPEC QL NAA+PROBE: NOT DETECTED IU/ML
LOG 10 BK QUANTITATION PCR: 2.4

## 2021-08-26 ENCOUNTER — APPOINTMENT (OUTPATIENT)
Dept: DERMATOLOGY | Facility: CLINIC | Age: 51
End: 2021-08-26
Payer: MEDICARE

## 2021-08-26 VITALS — WEIGHT: 145 LBS | HEIGHT: 63 IN | BODY MASS INDEX: 25.69 KG/M2

## 2021-08-26 DIAGNOSIS — Z78.9 OTHER SPECIFIED HEALTH STATUS: ICD-10-CM

## 2021-08-26 DIAGNOSIS — L91.0 HYPERTROPHIC SCAR: ICD-10-CM

## 2021-08-26 DIAGNOSIS — Z84.0 FAMILY HISTORY OF DISEASES OF THE SKIN AND SUBCUTANEOUS TISSUE: ICD-10-CM

## 2021-08-26 DIAGNOSIS — Z87.2 PERSONAL HISTORY OF DISEASES OF THE SKIN AND SUBCUTANEOUS TISSUE: ICD-10-CM

## 2021-08-26 DIAGNOSIS — L84 CORNS AND CALLOSITIES: ICD-10-CM

## 2021-08-26 PROCEDURE — 99204 OFFICE O/P NEW MOD 45 MIN: CPT

## 2021-08-26 NOTE — PHYSICAL EXAM
[FreeTextEntry3] : hyperpigmented macules, papules on face\par hypertrophic scars arms\par feet brown plaques lateral

## 2021-08-26 NOTE — HISTORY OF PRESENT ILLNESS
[FreeTextEntry1] : acne [de-identified] : acne whiteheads squeezes them material comes out\par scars on arms\par hyperpig and thickening on feet

## 2021-08-26 NOTE — ASSESSMENT
[FreeTextEntry1] : acne\par partially due to steroid use?\par tretinoin and BP wash\par \par callus rx amlactin\par \par hypertrophic scars no intervention

## 2021-09-22 ENCOUNTER — APPOINTMENT (OUTPATIENT)
Dept: NEPHROLOGY | Facility: CLINIC | Age: 51
End: 2021-09-22
Payer: MEDICARE

## 2021-09-22 VITALS
SYSTOLIC BLOOD PRESSURE: 120 MMHG | RESPIRATION RATE: 14 BRPM | BODY MASS INDEX: 26.57 KG/M2 | DIASTOLIC BLOOD PRESSURE: 78 MMHG | HEART RATE: 72 BPM | WEIGHT: 150 LBS

## 2021-09-22 PROCEDURE — 99215 OFFICE O/P EST HI 40 MIN: CPT

## 2021-09-22 NOTE — PHYSICAL EXAM
[General Appearance - Alert] : alert [General Appearance - In No Acute Distress] : in no acute distress [Sclera] : the sclera and conjunctiva were normal [PERRL With Normal Accommodation] : pupils were equal in size, round, and reactive to light [Extraocular Movements] : extraocular movements were intact [Outer Ear] : the ears and nose were normal in appearance [Oropharynx] : the oropharynx was normal [Neck Appearance] : the appearance of the neck was normal [Neck Cervical Mass (___cm)] : no neck mass was observed [Jugular Venous Distention Increased] : there was no jugular-venous distention [Thyroid Diffuse Enlargement] : the thyroid was not enlarged [Thyroid Nodule] : there were no palpable thyroid nodules [Auscultation Breath Sounds / Voice Sounds] : lungs were clear to auscultation bilaterally [Heart Rate And Rhythm] : heart rate was normal and rhythm regular [Heart Sounds] : normal S1 and S2 [Heart Sounds Gallop] : no gallops [Heart Sounds Pericardial Friction Rub] : no pericardial rub [Edema] : there was no peripheral edema [Abdomen Soft] : soft [Abdomen Tenderness] : non-tender [Cervical Lymph Nodes Enlarged Posterior Bilaterally] : posterior cervical [Cervical Lymph Nodes Enlarged Anterior Bilaterally] : anterior cervical [Involuntary Movements] : no involuntary movements were seen [] : no rash [No Focal Deficits] : no focal deficits [Oriented To Time, Place, And Person] : oriented to person, place, and time [Impaired Insight] : insight and judgment were intact [Affect] : the affect was normal [FreeTextEntry1] : Nail discolorations noted.

## 2021-09-22 NOTE — HISTORY OF PRESENT ILLNESS
[FreeTextEntry1] : Patient has completed one year post transplant. 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 No acute symptoms\par Reviewed for acute symptoms-currently has none.\par Taking precautions to prevent COVID exposure\par Had Pfizer vaccines\par I reviewed current home  blood pressure and  medications.\par \par Reviewed last labs

## 2021-09-22 NOTE — ASSESSMENT
[FreeTextEntry1] : Clinical Impression:\par Kidney Transplant recipient, functioning allograft, noted creatinine 1.5\par Immunosuppression- Reviewed.n On Tac/Leflunomide and prednisone\par BK Viremia  low grade, follow up BKV PVR. Once resolved will restart MMF\par HCV treated., completed Epclusa in December 2020. 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 On low therapeutic Tac level due to BK viremia\par Continue current medications\par additional changes- started statin\par Labs and follow up visit to be scheduled as discussed.\par Discussed COVID infection prevention strategies including handwashing, social distancing and monitoring for any signs or symptoms.\par She has completed vaccination, Pfizer. Completed on 5th of  May.Advised 3rd dose booster.\par \par Discussed monthly follow up schedule.\par \par

## 2021-09-23 LAB
ALBUMIN SERPL ELPH-MCNC: 4.8 G/DL
ALP BLD-CCNC: 85 U/L
ALT SERPL-CCNC: 44 U/L
ANION GAP SERPL CALC-SCNC: 11 MMOL/L
APPEARANCE: CLEAR
AST SERPL-CCNC: 31 U/L
BACTERIA: NEGATIVE
BASOPHILS # BLD AUTO: 0.04 K/UL
BASOPHILS NFR BLD AUTO: 0.8 %
BILIRUB SERPL-MCNC: 1 MG/DL
BILIRUBIN URINE: NEGATIVE
BLOOD URINE: NEGATIVE
BUN SERPL-MCNC: 9 MG/DL
CALCIUM SERPL-MCNC: 11.3 MG/DL
CALCIUM SERPL-MCNC: 11.3 MG/DL
CHLORIDE SERPL-SCNC: 102 MMOL/L
CO2 SERPL-SCNC: 27 MMOL/L
COLOR: NORMAL
CREAT SERPL-MCNC: 1.15 MG/DL
CREAT SPEC-SCNC: 53 MG/DL
CREAT/PROT UR: 0.2 RATIO
EOSINOPHIL # BLD AUTO: 0.21 K/UL
EOSINOPHIL NFR BLD AUTO: 4.2 %
GLUCOSE QUALITATIVE U: NEGATIVE
GLUCOSE SERPL-MCNC: 69 MG/DL
HCT VFR BLD CALC: 54.6 %
HGB BLD-MCNC: 17.3 G/DL
HYALINE CASTS: 0 /LPF
IMM GRANULOCYTES NFR BLD AUTO: 0.4 %
KETONES URINE: NEGATIVE
LDH SERPL-CCNC: 350 U/L
LEUKOCYTE ESTERASE URINE: NEGATIVE
LYMPHOCYTES # BLD AUTO: 1.04 K/UL
LYMPHOCYTES NFR BLD AUTO: 20.7 %
MAGNESIUM SERPL-MCNC: 1.8 MG/DL
MAN DIFF?: NORMAL
MCHC RBC-ENTMCNC: 27.8 PG
MCHC RBC-ENTMCNC: 31.7 GM/DL
MCV RBC AUTO: 87.6 FL
MICROSCOPIC-UA: NORMAL
MONOCYTES # BLD AUTO: 0.69 K/UL
MONOCYTES NFR BLD AUTO: 13.7 %
NEUTROPHILS # BLD AUTO: 3.03 K/UL
NEUTROPHILS NFR BLD AUTO: 60.2 %
NITRITE URINE: NEGATIVE
PARATHYROID HORMONE INTACT: 231 PG/ML
PH URINE: 6.5
PHOSPHATE SERPL-MCNC: 2.9 MG/DL
PLATELET # BLD AUTO: 148 K/UL
POTASSIUM SERPL-SCNC: 4.3 MMOL/L
PROT SERPL-MCNC: 7.1 G/DL
PROT UR-MCNC: 8 MG/DL
PROTEIN URINE: NEGATIVE
RBC # BLD: 6.23 M/UL
RBC # FLD: 18 %
RED BLOOD CELLS URINE: 0 /HPF
SODIUM SERPL-SCNC: 140 MMOL/L
SPECIFIC GRAVITY URINE: 1.01
SQUAMOUS EPITHELIAL CELLS: 0 /HPF
TACROLIMUS SERPL-MCNC: 4 NG/ML
URATE SERPL-MCNC: 5 MG/DL
UROBILINOGEN URINE: NORMAL
WBC # FLD AUTO: 5.03 K/UL
WHITE BLOOD CELLS URINE: 1 /HPF

## 2021-10-07 LAB
BKV DNA SPEC QL NAA+PROBE: NEGATIVE COPIES/ML
CMV DNA SPEC QL NAA+PROBE: NOT DETECTED IU/ML
LOG 10 BK QUANTITATION PCR: NORMAL

## 2021-10-27 ENCOUNTER — APPOINTMENT (OUTPATIENT)
Dept: NEPHROLOGY | Facility: CLINIC | Age: 51
End: 2021-10-27
Payer: MEDICARE

## 2021-10-27 VITALS
OXYGEN SATURATION: 96 % | WEIGHT: 150 LBS | HEART RATE: 72 BPM | HEIGHT: 63 IN | DIASTOLIC BLOOD PRESSURE: 94 MMHG | RESPIRATION RATE: 14 BRPM | SYSTOLIC BLOOD PRESSURE: 150 MMHG | BODY MASS INDEX: 26.58 KG/M2 | TEMPERATURE: 98 F

## 2021-10-27 PROCEDURE — 99215 OFFICE O/P EST HI 40 MIN: CPT

## 2021-10-27 NOTE — HISTORY OF PRESENT ILLNESS
[FreeTextEntry1] : Patient has completed one year post transplant. 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 No acute symptoms\par Reviewed for acute symptoms-currently has none.\par Taking precautions to prevent COVID exposure\par Had Pfizer vaccines\par I reviewed current home  blood pressure and  medications.\par \par Reviewed last labs

## 2021-10-27 NOTE — ASSESSMENT
[FreeTextEntry1] : Clinical Impression:\par Kidney Transplant recipient, functioning allograft, noted last creatinine normal.\par Immunosuppression- Reviewed.n On Tac/Leflunomide and prednisone\par BK Viremia  low grade, last one negative; follow up BKV PVR. If remains negative will restart MMF\par HCV treated., completed Epclusa in December 2020. Last PCR negative.\par HTN: Suboptimally Controlled, not on meds. 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 On low therapeutic Tac level due to BK viremia\par Continue current medications\par additional changes- started statin\par Labs and follow up visit to be scheduled as discussed.\par Discussed COVID infection prevention strategies including handwashing, social distancing and monitoring for any signs or symptoms.\par She has completed vaccination, Pfizer. Completed on 5th of  May.Already took 3rd dose booster.\par She will do Flu vaccine at her work.\par Discussed   monthly follow up schedule. Home blood pressure check, target 130/80 or below.\par Once restarted MMf will f/u in 1 month.\par Suggest to see Dr. Streeter, primary nephrologist.\par \par

## 2021-10-28 LAB
25(OH)D3 SERPL-MCNC: 30.2 NG/ML
ALBUMIN SERPL ELPH-MCNC: 4.8 G/DL
ALP BLD-CCNC: 82 U/L
ALT SERPL-CCNC: 40 U/L
ANION GAP SERPL CALC-SCNC: 14 MMOL/L
APPEARANCE: CLEAR
AST SERPL-CCNC: 25 U/L
BACTERIA: NEGATIVE
BASOPHILS # BLD AUTO: 0.04 K/UL
BASOPHILS NFR BLD AUTO: 0.8 %
BILIRUB SERPL-MCNC: 1.1 MG/DL
BILIRUBIN URINE: NEGATIVE
BLOOD URINE: NEGATIVE
BUN SERPL-MCNC: 14 MG/DL
CALCIUM SERPL-MCNC: 10.9 MG/DL
CALCIUM SERPL-MCNC: 10.9 MG/DL
CHLORIDE SERPL-SCNC: 104 MMOL/L
CHOLEST SERPL-MCNC: 205 MG/DL
CMV DNA SPEC QL NAA+PROBE: NOT DETECTED IU/ML
CO2 SERPL-SCNC: 22 MMOL/L
COLOR: YELLOW
CREAT SERPL-MCNC: 1.16 MG/DL
CREAT SPEC-SCNC: 201 MG/DL
CREAT/PROT UR: 0.1 RATIO
EOSINOPHIL # BLD AUTO: 0.22 K/UL
EOSINOPHIL NFR BLD AUTO: 4.2 %
ESTIMATED AVERAGE GLUCOSE: 108 MG/DL
GLUCOSE QUALITATIVE U: NEGATIVE
GLUCOSE SERPL-MCNC: 79 MG/DL
HBA1C MFR BLD HPLC: 5.4 %
HCT VFR BLD CALC: 52.4 %
HDLC SERPL-MCNC: 87 MG/DL
HGB BLD-MCNC: 16.5 G/DL
HYALINE CASTS: 1 /LPF
IMM GRANULOCYTES NFR BLD AUTO: 0.2 %
KETONES URINE: NEGATIVE
LDH SERPL-CCNC: 316 U/L
LDLC SERPL CALC-MCNC: 98 MG/DL
LEUKOCYTE ESTERASE URINE: ABNORMAL
LYMPHOCYTES # BLD AUTO: 1.18 K/UL
LYMPHOCYTES NFR BLD AUTO: 22.6 %
MAGNESIUM SERPL-MCNC: 1.7 MG/DL
MAN DIFF?: NORMAL
MCHC RBC-ENTMCNC: 27.6 PG
MCHC RBC-ENTMCNC: 31.5 GM/DL
MCV RBC AUTO: 87.8 FL
MICROSCOPIC-UA: NORMAL
MONOCYTES # BLD AUTO: 0.62 K/UL
MONOCYTES NFR BLD AUTO: 11.9 %
NEUTROPHILS # BLD AUTO: 3.16 K/UL
NEUTROPHILS NFR BLD AUTO: 60.3 %
NITRITE URINE: NEGATIVE
NONHDLC SERPL-MCNC: 119 MG/DL
PARATHYROID HORMONE INTACT: 252 PG/ML
PH URINE: 6
PHOSPHATE SERPL-MCNC: 2.6 MG/DL
PLATELET # BLD AUTO: 159 K/UL
POTASSIUM SERPL-SCNC: 4.3 MMOL/L
PROT SERPL-MCNC: 7 G/DL
PROT UR-MCNC: 24 MG/DL
PROTEIN URINE: ABNORMAL
RBC # BLD: 5.97 M/UL
RBC # FLD: 17 %
RED BLOOD CELLS URINE: 1 /HPF
SODIUM SERPL-SCNC: 139 MMOL/L
SPECIFIC GRAVITY URINE: 1.02
SQUAMOUS EPITHELIAL CELLS: 2 /HPF
TACROLIMUS SERPL-MCNC: 3.9 NG/ML
TRIGL SERPL-MCNC: 105 MG/DL
URATE SERPL-MCNC: 5.2 MG/DL
UROBILINOGEN URINE: NORMAL
WBC # FLD AUTO: 5.23 K/UL
WHITE BLOOD CELLS URINE: 1 /HPF

## 2021-11-08 LAB
BKV DNA SPEC QL NAA+PROBE: NEGATIVE COPIES/ML
LOG 10 BK QUANTITATION PCR: NORMAL

## 2021-11-10 NOTE — PATIENT PROFILE ADULT - MEDICATIONS/VISITS
[AB Spont ___] : miscarriages: [unfilled]  [Total Preg ___] : G[unfilled] [Live Births ___] : P[unfilled]  [Postmenopausal] : postmenopausal no

## 2021-12-16 ENCOUNTER — RX RENEWAL (OUTPATIENT)
Age: 51
End: 2021-12-16

## 2021-12-17 ENCOUNTER — RX RENEWAL (OUTPATIENT)
Age: 51
End: 2021-12-17

## 2021-12-28 ENCOUNTER — NON-APPOINTMENT (OUTPATIENT)
Age: 51
End: 2021-12-28

## 2021-12-28 NOTE — H&P ADULT - NSHPPHYSICALEXAM_GEN_ALL_CORE
PHYSICAL EXAM:  Constitutional: Well developed / well nourished  Eyes: Anicteric, PERRLA  ENMT: nc/at  Neck: supple  Respiratory: CTA B/L  Cardiovascular: RRR  Gastrointestinal: Soft abdomen, mild tender to touch at surgical site, ND  Genitourinary: Voiding spontaneously  Extremities: no periph edema   Vascular: Palpable dp pulses bilaterally  Neurological: A&O x3  Skin: intact  Musculoskeletal: Moving all extremities  Psychiatric: Responsive PHYSICAL EXAM:  Constitutional: Well developed / well nourished  Eyes: Anicteric, PERRLA  ENMT: nc/at  Neck: supple  Respiratory: CTA B/L  Cardiovascular: RRR  Gastrointestinal: Soft abdomen, non tender non distended  Genitourinary: Voiding spontaneously  Extremities: no periph edema   Vascular: Palpable dp pulses bilaterally  Neurological: A&O x3  Skin: intact  Musculoskeletal: Moving all extremities  Psychiatric: Responsive

## 2021-12-28 NOTE — ASU PATIENT PROFILE, ADULT - SURGICAL SITE INCISION
"no

## 2022-01-01 ENCOUNTER — TRANSCRIPTION ENCOUNTER (OUTPATIENT)
Age: 52
End: 2022-01-01

## 2022-01-01 LAB — SARS-COV-2 N GENE NPH QL NAA+PROBE: DETECTED

## 2022-01-03 ENCOUNTER — TRANSCRIPTION ENCOUNTER (OUTPATIENT)
Age: 52
End: 2022-01-03

## 2022-01-04 ENCOUNTER — APPOINTMENT (OUTPATIENT)
Age: 52
End: 2022-01-04

## 2022-01-04 ENCOUNTER — APPOINTMENT (OUTPATIENT)
Dept: NEPHROLOGY | Facility: CLINIC | Age: 52
End: 2022-01-04

## 2022-01-04 ENCOUNTER — OUTPATIENT (OUTPATIENT)
Dept: INPATIENT UNIT | Facility: HOSPITAL | Age: 52
LOS: 1 days | Discharge: HOME | End: 2022-01-04

## 2022-01-04 VITALS
OXYGEN SATURATION: 95 % | RESPIRATION RATE: 16 BRPM | DIASTOLIC BLOOD PRESSURE: 87 MMHG | HEART RATE: 70 BPM | TEMPERATURE: 97 F | SYSTOLIC BLOOD PRESSURE: 147 MMHG

## 2022-01-04 VITALS
DIASTOLIC BLOOD PRESSURE: 83 MMHG | TEMPERATURE: 98 F | SYSTOLIC BLOOD PRESSURE: 117 MMHG | RESPIRATION RATE: 16 BRPM | HEART RATE: 67 BPM | OXYGEN SATURATION: 98 %

## 2022-01-04 DIAGNOSIS — I77.0 ARTERIOVENOUS FISTULA, ACQUIRED: Chronic | ICD-10-CM

## 2022-01-04 DIAGNOSIS — Z94.0 KIDNEY TRANSPLANT STATUS: Chronic | ICD-10-CM

## 2022-01-04 DIAGNOSIS — U07.1 COVID-19: ICD-10-CM

## 2022-01-04 DIAGNOSIS — Z95.828 PRESENCE OF OTHER VASCULAR IMPLANTS AND GRAFTS: Chronic | ICD-10-CM

## 2022-01-04 RX ORDER — SOTROVIMAB 62.5 MG/ML
500 INJECTION, SOLUTION, CONCENTRATE INTRAVENOUS ONCE
Refills: 0 | Status: COMPLETED | OUTPATIENT
Start: 2022-01-04 | End: 2022-01-04

## 2022-01-04 RX ORDER — SODIUM CHLORIDE 9 MG/ML
250 INJECTION INTRAMUSCULAR; INTRAVENOUS; SUBCUTANEOUS
Refills: 0 | Status: DISCONTINUED | OUTPATIENT
Start: 2022-01-04 | End: 2022-01-04

## 2022-01-04 RX ADMIN — SODIUM CHLORIDE 25 MILLILITER(S): 9 INJECTION INTRAMUSCULAR; INTRAVENOUS; SUBCUTANEOUS at 13:55

## 2022-01-04 RX ADMIN — SOTROVIMAB 116 MILLIGRAM(S): 62.5 INJECTION, SOLUTION, CONCENTRATE INTRAVENOUS at 12:25

## 2022-01-25 ENCOUNTER — APPOINTMENT (OUTPATIENT)
Dept: NEPHROLOGY | Facility: CLINIC | Age: 52
End: 2022-01-25
Payer: MEDICARE

## 2022-01-25 ENCOUNTER — NON-APPOINTMENT (OUTPATIENT)
Age: 52
End: 2022-01-25

## 2022-01-25 VITALS
BODY MASS INDEX: 29.45 KG/M2 | OXYGEN SATURATION: 96 % | HEART RATE: 76 BPM | SYSTOLIC BLOOD PRESSURE: 143 MMHG | TEMPERATURE: 98 F | DIASTOLIC BLOOD PRESSURE: 87 MMHG | HEIGHT: 60 IN | RESPIRATION RATE: 14 BRPM | WEIGHT: 150 LBS

## 2022-01-25 PROCEDURE — 99214 OFFICE O/P EST MOD 30 MIN: CPT

## 2022-01-25 NOTE — HISTORY OF PRESENT ILLNESS
[FreeTextEntry1] : Patient has completed one year post transplant. 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 No acute symptoms\par Reviewed for acute symptoms-currently has none.\par Had Pfizer vaccines, 3 doses\par Had labs today.\par

## 2022-01-25 NOTE — PHYSICAL EXAM
[General Appearance - Alert] : alert [General Appearance - In No Acute Distress] : in no acute distress [Sclera] : the sclera and conjunctiva were normal [PERRL With Normal Accommodation] : pupils were equal in size, round, and reactive to light [Extraocular Movements] : extraocular movements were intact [Outer Ear] : the ears and nose were normal in appearance [Oropharynx] : the oropharynx was normal [Neck Appearance] : the appearance of the neck was normal [Neck Cervical Mass (___cm)] : no neck mass was observed [Jugular Venous Distention Increased] : there was no jugular-venous distention [Thyroid Diffuse Enlargement] : the thyroid was not enlarged [Thyroid Nodule] : there were no palpable thyroid nodules [Auscultation Breath Sounds / Voice Sounds] : lungs were clear to auscultation bilaterally [Heart Rate And Rhythm] : heart rate was normal and rhythm regular [Heart Sounds] : normal S1 and S2 [Heart Sounds Gallop] : no gallops [Heart Sounds Pericardial Friction Rub] : no pericardial rub [Edema] : there was no peripheral edema [Abdomen Soft] : soft [Abdomen Tenderness] : non-tender [Cervical Lymph Nodes Enlarged Posterior Bilaterally] : posterior cervical [Cervical Lymph Nodes Enlarged Anterior Bilaterally] : anterior cervical [Involuntary Movements] : no involuntary movements were seen [] : no rash [No Focal Deficits] : no focal deficits [Oriented To Time, Place, And Person] : oriented to person, place, and time [Impaired Insight] : insight and judgment were intact [Affect] : the affect was normal [FreeTextEntry1] : Nail discolorations noted.Hyperpigmentation on both ankles

## 2022-01-25 NOTE — ASSESSMENT
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 Completed on 5th of  May.Already took 3rd dose booster.\par Discussed   monthly follow up schedule. Home blood pressure check, target 130/80 or below.\par Once restarted on MMF will f/u in 1 month.\par Suggested to see Dr. Streeter, primary nephrologist.\par \par

## 2022-01-26 LAB
25(OH)D3 SERPL-MCNC: 22.7 NG/ML
ALBUMIN SERPL ELPH-MCNC: 4.8 G/DL
ALP BLD-CCNC: 77 U/L
ALT SERPL-CCNC: 28 U/L
ANION GAP SERPL CALC-SCNC: 14 MMOL/L
APPEARANCE: CLEAR
AST SERPL-CCNC: 23 U/L
BACTERIA: NEGATIVE
BASOPHILS # BLD AUTO: 0.03 K/UL
BASOPHILS NFR BLD AUTO: 0.6 %
BILIRUB SERPL-MCNC: 1.6 MG/DL
BILIRUBIN URINE: NEGATIVE
BLOOD URINE: NEGATIVE
BUN SERPL-MCNC: 15 MG/DL
CALCIUM SERPL-MCNC: 10.8 MG/DL
CALCIUM SERPL-MCNC: 10.8 MG/DL
CHLORIDE SERPL-SCNC: 101 MMOL/L
CHOLEST SERPL-MCNC: 196 MG/DL
CO2 SERPL-SCNC: 24 MMOL/L
COLOR: YELLOW
COVID-19 SPIKE DOMAIN ANTIBODY INTERPRETATION: POSITIVE
CREAT SERPL-MCNC: 1.01 MG/DL
CREAT SPEC-SCNC: 118 MG/DL
CREAT/PROT UR: 0.1 RATIO
EOSINOPHIL # BLD AUTO: 0.18 K/UL
EOSINOPHIL NFR BLD AUTO: 3.6 %
ESTIMATED AVERAGE GLUCOSE: 114 MG/DL
GLUCOSE QUALITATIVE U: NEGATIVE
GLUCOSE SERPL-MCNC: 87 MG/DL
HBA1C MFR BLD HPLC: 5.6 %
HCT VFR BLD CALC: 57.9 %
HDLC SERPL-MCNC: 99 MG/DL
HGB BLD-MCNC: 17.7 G/DL
HYALINE CASTS: 0 /LPF
IMM GRANULOCYTES NFR BLD AUTO: 0.2 %
KETONES URINE: NEGATIVE
LDLC SERPL CALC-MCNC: 78 MG/DL
LEUKOCYTE ESTERASE URINE: ABNORMAL
LYMPHOCYTES # BLD AUTO: 1.03 K/UL
LYMPHOCYTES NFR BLD AUTO: 20.8 %
MAGNESIUM SERPL-MCNC: 1.5 MG/DL
MAN DIFF?: NORMAL
MCHC RBC-ENTMCNC: 27.3 PG
MCHC RBC-ENTMCNC: 30.6 GM/DL
MCV RBC AUTO: 89.4 FL
MICROSCOPIC-UA: NORMAL
MONOCYTES # BLD AUTO: 0.72 K/UL
MONOCYTES NFR BLD AUTO: 14.5 %
NEUTROPHILS # BLD AUTO: 2.98 K/UL
NEUTROPHILS NFR BLD AUTO: 60.3 %
NITRITE URINE: NEGATIVE
NONHDLC SERPL-MCNC: 97 MG/DL
PARATHYROID HORMONE INTACT: 277 PG/ML
PH URINE: 6
PHOSPHATE SERPL-MCNC: 2.3 MG/DL
PLATELET # BLD AUTO: 118 K/UL
POTASSIUM SERPL-SCNC: 4.1 MMOL/L
PROT SERPL-MCNC: 6.9 G/DL
PROT UR-MCNC: 13 MG/DL
PROTEIN URINE: NEGATIVE
RBC # BLD: 6.48 M/UL
RBC # FLD: 17.2 %
RED BLOOD CELLS URINE: 1 /HPF
SARS-COV-2 AB SERPL IA-ACNC: >250 U/ML
SODIUM SERPL-SCNC: 139 MMOL/L
SPECIFIC GRAVITY URINE: 1.01
SQUAMOUS EPITHELIAL CELLS: 1 /HPF
TACROLIMUS SERPL-MCNC: 3.4 NG/ML
TRIGL SERPL-MCNC: 93 MG/DL
URATE SERPL-MCNC: 4.5 MG/DL
UROBILINOGEN URINE: NORMAL
WBC # FLD AUTO: 4.95 K/UL
WHITE BLOOD CELLS URINE: 0 /HPF

## 2022-01-26 RX ORDER — TACROLIMUS 1 MG/1
1 TABLET, EXTENDED RELEASE ORAL DAILY
Qty: 60 | Refills: 11 | Status: DISCONTINUED | COMMUNITY
Start: 2020-11-17 | End: 2022-01-26

## 2022-01-28 LAB
BKV DNA SPEC QL NAA+PROBE: NOT DETECTED COPIES/ML
CMV DNA SPEC QL NAA+PROBE: NOT DETECTED IU/ML

## 2022-02-09 ENCOUNTER — APPOINTMENT (OUTPATIENT)
Dept: TRANSPLANT | Facility: CLINIC | Age: 52
End: 2022-02-09

## 2022-02-09 ENCOUNTER — NON-APPOINTMENT (OUTPATIENT)
Age: 52
End: 2022-02-09

## 2022-02-15 ENCOUNTER — OUTPATIENT (OUTPATIENT)
Dept: OUTPATIENT SERVICES | Facility: HOSPITAL | Age: 52
LOS: 1 days | End: 2022-02-15
Payer: MEDICARE

## 2022-02-15 DIAGNOSIS — D75.0 FAMILIAL ERYTHROCYTOSIS: ICD-10-CM

## 2022-02-15 DIAGNOSIS — Z94.0 KIDNEY TRANSPLANT STATUS: Chronic | ICD-10-CM

## 2022-02-15 DIAGNOSIS — Z95.828 PRESENCE OF OTHER VASCULAR IMPLANTS AND GRAFTS: Chronic | ICD-10-CM

## 2022-02-15 DIAGNOSIS — I77.0 ARTERIOVENOUS FISTULA, ACQUIRED: Chronic | ICD-10-CM

## 2022-02-15 LAB
25(OH)D3 SERPL-MCNC: 25.2 NG/ML
ALBUMIN SERPL ELPH-MCNC: 4.5 G/DL
ALP BLD-CCNC: 73 U/L
ALT SERPL-CCNC: 23 U/L
ANION GAP SERPL CALC-SCNC: 12 MMOL/L
APPEARANCE: CLEAR
AST SERPL-CCNC: 20 U/L
BACTERIA: NEGATIVE
BASOPHILS # BLD AUTO: 0.02 K/UL
BASOPHILS NFR BLD AUTO: 0.4 %
BILIRUB SERPL-MCNC: 1 MG/DL
BILIRUBIN URINE: NEGATIVE
BKV DNA SPEC QL NAA+PROBE: 44 COPIES/ML
BLOOD URINE: NEGATIVE
BUN SERPL-MCNC: 12 MG/DL
CALCIUM SERPL-MCNC: 11 MG/DL
CALCIUM SERPL-MCNC: 11 MG/DL
CHLORIDE SERPL-SCNC: 102 MMOL/L
CHOLEST SERPL-MCNC: 205 MG/DL
CMV DNA SPEC QL NAA+PROBE: NOT DETECTED IU/ML
CO2 SERPL-SCNC: 26 MMOL/L
COLOR: COLORLESS
COVID-19 SPIKE DOMAIN ANTIBODY INTERPRETATION: POSITIVE
CREAT SERPL-MCNC: 0.96 MG/DL
CREAT SPEC-SCNC: 29 MG/DL
CREAT/PROT UR: NORMAL RATIO
EOSINOPHIL # BLD AUTO: 0.16 K/UL
EOSINOPHIL NFR BLD AUTO: 3.5 %
ESTIMATED AVERAGE GLUCOSE: 117 MG/DL
GLUCOSE QUALITATIVE U: NEGATIVE
GLUCOSE SERPL-MCNC: 76 MG/DL
HBA1C MFR BLD HPLC: 5.7 %
HCT VFR BLD CALC: 54.5 %
HDLC SERPL-MCNC: 92 MG/DL
HGB BLD-MCNC: 16.4 G/DL
HYALINE CASTS: 0 /LPF
IMM GRANULOCYTES NFR BLD AUTO: 0.2 %
KETONES URINE: NEGATIVE
LDLC SERPL CALC-MCNC: 98 MG/DL
LEUKOCYTE ESTERASE URINE: NEGATIVE
LYMPHOCYTES # BLD AUTO: 1.04 K/UL
LYMPHOCYTES NFR BLD AUTO: 22.7 %
MAGNESIUM SERPL-MCNC: 1.5 MG/DL
MAN DIFF?: NORMAL
MCHC RBC-ENTMCNC: 27.7 PG
MCHC RBC-ENTMCNC: 30.1 GM/DL
MCV RBC AUTO: 92.1 FL
MICROSCOPIC-UA: NORMAL
MONOCYTES # BLD AUTO: 0.57 K/UL
MONOCYTES NFR BLD AUTO: 12.4 %
NEUTROPHILS # BLD AUTO: 2.78 K/UL
NEUTROPHILS NFR BLD AUTO: 60.8 %
NITRITE URINE: NEGATIVE
NONHDLC SERPL-MCNC: 112 MG/DL
PARATHYROID HORMONE INTACT: 202 PG/ML
PH URINE: 6.5
PHOSPHATE SERPL-MCNC: 2.8 MG/DL
PLATELET # BLD AUTO: 201 K/UL
POTASSIUM SERPL-SCNC: 4.3 MMOL/L
PROT SERPL-MCNC: 6.6 G/DL
PROT UR-MCNC: <4 MG/DL
PROTEIN URINE: NEGATIVE
RBC # BLD: 5.92 M/UL
RBC # FLD: 16.3 %
RED BLOOD CELLS URINE: 0 /HPF
SARS-COV-2 AB SERPL IA-ACNC: >250 U/ML
SODIUM SERPL-SCNC: 140 MMOL/L
SPECIFIC GRAVITY URINE: 1
SQUAMOUS EPITHELIAL CELLS: 0 /HPF
TACROLIMUS SERPL-MCNC: 6.2 NG/ML
TRIGL SERPL-MCNC: 73 MG/DL
URATE SERPL-MCNC: 4.2 MG/DL
UROBILINOGEN URINE: NORMAL
WBC # FLD AUTO: 4.58 K/UL
WHITE BLOOD CELLS URINE: 0 /HPF

## 2022-02-15 PROCEDURE — 99195 PHLEBOTOMY: CPT

## 2022-03-10 ENCOUNTER — APPOINTMENT (OUTPATIENT)
Dept: NEPHROLOGY | Facility: CLINIC | Age: 52
End: 2022-03-10
Payer: MEDICARE

## 2022-03-10 VITALS
TEMPERATURE: 97.35 F | BODY MASS INDEX: 29.84 KG/M2 | HEART RATE: 73 BPM | WEIGHT: 152 LBS | RESPIRATION RATE: 14 BRPM | OXYGEN SATURATION: 96 % | SYSTOLIC BLOOD PRESSURE: 130 MMHG | HEIGHT: 60 IN | DIASTOLIC BLOOD PRESSURE: 81 MMHG

## 2022-03-10 PROCEDURE — 99214 OFFICE O/P EST MOD 30 MIN: CPT

## 2022-03-10 NOTE — PHYSICAL EXAM
[General Appearance - Alert] : alert [General Appearance - In No Acute Distress] : in no acute distress [Sclera] : the sclera and conjunctiva were normal [PERRL With Normal Accommodation] : pupils were equal in size, round, and reactive to light [Extraocular Movements] : extraocular movements were intact [Outer Ear] : the ears and nose were normal in appearance [Oropharynx] : the oropharynx was normal [Neck Appearance] : the appearance of the neck was normal [Neck Cervical Mass (___cm)] : no neck mass was observed [Jugular Venous Distention Increased] : there was no jugular-venous distention [Thyroid Diffuse Enlargement] : the thyroid was not enlarged [Thyroid Nodule] : there were no palpable thyroid nodules [Auscultation Breath Sounds / Voice Sounds] : lungs were clear to auscultation bilaterally [Heart Rate And Rhythm] : heart rate was normal and rhythm regular [Heart Sounds] : normal S1 and S2 [Heart Sounds Gallop] : no gallops [Heart Sounds Pericardial Friction Rub] : no pericardial rub [Edema] : there was no peripheral edema [Abdomen Soft] : soft [Abdomen Tenderness] : non-tender [Cervical Lymph Nodes Enlarged Posterior Bilaterally] : posterior cervical [Cervical Lymph Nodes Enlarged Anterior Bilaterally] : anterior cervical [Involuntary Movements] : no involuntary movements were seen [] : no rash [FreeTextEntry1] : Nail discolorations noted.Hyperpigmentation on both ankles [No Focal Deficits] : no focal deficits [Oriented To Time, Place, And Person] : oriented to person, place, and time [Impaired Insight] : insight and judgment were intact [Affect] : the affect was normal

## 2022-03-10 NOTE — ASSESSMENT
[FreeTextEntry1] : Clinical Impression:\par Kidney Transplant recipient, functioning allograft, noted last creatinine normal.\par Immunosuppression- Reviewed.n On Tac/Leflunomide and prednisone\par BK Viremia  ;  Low grade; follow up BKV PCR. 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 Oliva, had blood donation last month.\par COVID: recovered, back at work now.No symptoms\par Skin hyperpigmentation both ankles- will see Dermatologist\par Plan:\par Continue current medications\par additional changes- none made, will restart low dose MMF if BKV remains negative\par Labs and follow up visit to be scheduled as discussed.\par Discussed continued COVID infection prevention strategies including handwashing, social distancing and monitoring for any signs or symptoms.\par She has completed vaccination, Pfizer. Completed on 5th of  May.Already took 3rd dose booster.\par Discussed   monthly follow up schedule. Home blood pressure check, target 130/80 or below.\par Will  restart  on MMF if BK negative and will f/u in 1 month.\par Suggested to see Dr. Streeter, primary nephrologist.\par Follow up monthly.\par

## 2022-03-10 NOTE — HISTORY OF PRESENT ILLNESS
[FreeTextEntry1] : Patient has completed one year post transplant. 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 No acute symptoms\par Reviewed for acute symptoms-currently has none.\par Has seen Dr. Oliva for erythrocytosis. Had Phlebotomy(blood donation) last month at hospital.\par Had labs today.\par

## 2022-03-14 ENCOUNTER — OUTPATIENT (OUTPATIENT)
Dept: OUTPATIENT SERVICES | Facility: HOSPITAL | Age: 52
LOS: 1 days | End: 2022-03-14
Payer: MEDICARE

## 2022-03-14 ENCOUNTER — NON-APPOINTMENT (OUTPATIENT)
Age: 52
End: 2022-03-14

## 2022-03-14 DIAGNOSIS — Z94.0 KIDNEY TRANSPLANT STATUS: Chronic | ICD-10-CM

## 2022-03-14 DIAGNOSIS — Z00.00 ENCOUNTER FOR GENERAL ADULT MEDICAL EXAMINATION WITHOUT ABNORMAL FINDINGS: ICD-10-CM

## 2022-03-14 DIAGNOSIS — I77.0 ARTERIOVENOUS FISTULA, ACQUIRED: Chronic | ICD-10-CM

## 2022-03-14 DIAGNOSIS — Z95.828 PRESENCE OF OTHER VASCULAR IMPLANTS AND GRAFTS: Chronic | ICD-10-CM

## 2022-03-14 DIAGNOSIS — D75.0 FAMILIAL ERYTHROCYTOSIS: ICD-10-CM

## 2022-03-14 LAB
ALBUMIN SERPL ELPH-MCNC: 4.6 G/DL
ALP BLD-CCNC: 73 U/L
ALT SERPL-CCNC: 23 U/L
ANION GAP SERPL CALC-SCNC: 12 MMOL/L
APPEARANCE: CLEAR
AST SERPL-CCNC: 24 U/L
BACTERIA: NEGATIVE
BASOPHILS # BLD AUTO: 0.04 K/UL
BASOPHILS NFR BLD AUTO: 0.9 %
BILIRUB SERPL-MCNC: 0.8 MG/DL
BILIRUBIN URINE: NEGATIVE
BLOOD URINE: NEGATIVE
BUN SERPL-MCNC: 15 MG/DL
CALCIUM SERPL-MCNC: 10.5 MG/DL
CALCIUM SERPL-MCNC: 10.5 MG/DL
CHLORIDE SERPL-SCNC: 103 MMOL/L
CO2 SERPL-SCNC: 25 MMOL/L
COLOR: COLORLESS
CREAT SERPL-MCNC: 1.05 MG/DL
CREAT SPEC-SCNC: 19 MG/DL
CREAT/PROT UR: NORMAL RATIO
EGFR: 65 ML/MIN/1.73M2
EOSINOPHIL # BLD AUTO: 0.17 K/UL
EOSINOPHIL NFR BLD AUTO: 3.8 %
GLUCOSE QUALITATIVE U: NEGATIVE
GLUCOSE SERPL-MCNC: 61 MG/DL
HCT VFR BLD CALC: 49.5 %
HGB BLD-MCNC: 15.2 G/DL
HYALINE CASTS: 0 /LPF
IMM GRANULOCYTES NFR BLD AUTO: 0 %
KETONES URINE: NEGATIVE
LDH SERPL-CCNC: 284 U/L
LEUKOCYTE ESTERASE URINE: NEGATIVE
LYMPHOCYTES # BLD AUTO: 0.96 K/UL
LYMPHOCYTES NFR BLD AUTO: 21.6 %
MAGNESIUM SERPL-MCNC: 1.7 MG/DL
MAN DIFF?: NORMAL
MCHC RBC-ENTMCNC: 27.9 PG
MCHC RBC-ENTMCNC: 30.7 GM/DL
MCV RBC AUTO: 90.8 FL
MICROSCOPIC-UA: NORMAL
MONOCYTES # BLD AUTO: 0.56 K/UL
MONOCYTES NFR BLD AUTO: 12.6 %
NEUTROPHILS # BLD AUTO: 2.72 K/UL
NEUTROPHILS NFR BLD AUTO: 61.1 %
NITRITE URINE: NEGATIVE
PARATHYROID HORMONE INTACT: 184 PG/ML
PH URINE: 6.5
PHOSPHATE SERPL-MCNC: 3.2 MG/DL
PLATELET # BLD AUTO: 196 K/UL
POTASSIUM SERPL-SCNC: 4.1 MMOL/L
PROT SERPL-MCNC: 6.6 G/DL
PROT UR-MCNC: <4 MG/DL
PROTEIN URINE: NEGATIVE
RBC # BLD: 5.45 M/UL
RBC # FLD: 16.3 %
RED BLOOD CELLS URINE: 0 /HPF
SODIUM SERPL-SCNC: 141 MMOL/L
SPECIFIC GRAVITY URINE: 1
SQUAMOUS EPITHELIAL CELLS: 0 /HPF
TACROLIMUS SERPL-MCNC: 4.2 NG/ML
URATE SERPL-MCNC: 4.8 MG/DL
UROBILINOGEN URINE: NORMAL
WBC # FLD AUTO: 4.45 K/UL
WHITE BLOOD CELLS URINE: 0 /HPF

## 2022-03-14 PROCEDURE — 99195 PHLEBOTOMY: CPT

## 2022-03-15 LAB
BKV DNA SPEC QL NAA+PROBE: 69 COPIES/ML
CMV DNA SPEC QL NAA+PROBE: NOT DETECTED IU/ML

## 2022-04-11 ENCOUNTER — NON-APPOINTMENT (OUTPATIENT)
Age: 52
End: 2022-04-11

## 2022-04-11 ENCOUNTER — APPOINTMENT (OUTPATIENT)
Dept: NEPHROLOGY | Facility: CLINIC | Age: 52
End: 2022-04-11
Payer: MEDICARE

## 2022-04-11 VITALS
HEIGHT: 60 IN | HEART RATE: 74 BPM | RESPIRATION RATE: 14 BRPM | DIASTOLIC BLOOD PRESSURE: 91 MMHG | SYSTOLIC BLOOD PRESSURE: 153 MMHG | TEMPERATURE: 97.1 F | BODY MASS INDEX: 29.25 KG/M2 | OXYGEN SATURATION: 97 % | WEIGHT: 149 LBS

## 2022-04-11 VITALS — DIASTOLIC BLOOD PRESSURE: 80 MMHG | SYSTOLIC BLOOD PRESSURE: 140 MMHG

## 2022-04-11 PROCEDURE — 99214 OFFICE O/P EST MOD 30 MIN: CPT

## 2022-04-11 RX ORDER — CIPROFLOXACIN HYDROCHLORIDE 500 MG/1
500 TABLET, FILM COATED ORAL
Qty: 14 | Refills: 0 | Status: DISCONTINUED | COMMUNITY
Start: 2021-12-01 | End: 2022-04-11

## 2022-04-11 NOTE — ASSESSMENT
[FreeTextEntry1] : Kidney Transplant recipient, functioning allograft, noted last creatinine normal. Labs done today. Will follow.\par Immunosuppression- Reviewed.n On Tac/Leflunomide and prednisone\par BK Viremia  ;  Very low grade; follow up BKV PCR. If remains negative X 2 will restart MMF\par HTN: Improving Control. 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 Oliva, had blood donation twice.\par Skin hyperpigmentation both ankles- will see Dermatologist\par Plan:\par Continue current medications\par additional changes- none made\par Labs and follow up visit to be scheduled as discussed.\par Discussed continued COVID infection prevention strategies including handwashing, social distancing and monitoring for any signs or symptoms.\par She has completed vaccination, Pfizer. Completed on 5th of  May.Already took 3rd dose booster.\par Discussed   monthly follow up schedule. Home blood pressure check, target 130/80 or below.\par  \par Follow up monthly.\par

## 2022-04-11 NOTE — PHYSICAL EXAM
[General Appearance - Alert] : alert [General Appearance - In No Acute Distress] : in no acute distress [Sclera] : the sclera and conjunctiva were normal [PERRL With Normal Accommodation] : pupils were equal in size, round, and reactive to light [Extraocular Movements] : extraocular movements were intact [Outer Ear] : the ears and nose were normal in appearance [Oropharynx] : the oropharynx was normal [Neck Appearance] : the appearance of the neck was normal [Neck Cervical Mass (___cm)] : no neck mass was observed [Jugular Venous Distention Increased] : there was no jugular-venous distention [Thyroid Diffuse Enlargement] : the thyroid was not enlarged [Thyroid Nodule] : there were no palpable thyroid nodules [Auscultation Breath Sounds / Voice Sounds] : lungs were clear to auscultation bilaterally [Heart Rate And Rhythm] : heart rate was normal and rhythm regular [Heart Sounds] : normal S1 and S2 [Heart Sounds Gallop] : no gallops [Heart Sounds Pericardial Friction Rub] : no pericardial rub [Edema] : there was no peripheral edema [Abdomen Soft] : soft [Abdomen Tenderness] : non-tender [Cervical Lymph Nodes Enlarged Posterior Bilaterally] : posterior cervical [Cervical Lymph Nodes Enlarged Anterior Bilaterally] : anterior cervical [Involuntary Movements] : no involuntary movements were seen [] : no rash [No Focal Deficits] : no focal deficits [FreeTextEntry1] : Nail discolorations noted.Hyperpigmentation on both ankles [Oriented To Time, Place, And Person] : oriented to person, place, and time [Impaired Insight] : insight and judgment were intact [Affect] : the affect was normal

## 2022-04-11 NOTE — HISTORY OF PRESENT ILLNESS
[FreeTextEntry1] : Patient has completed one year post transplant. 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 Her mother stays with her.\par  is doing well after bladder cancer treatment. 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 No acute symptoms\par Reviewed for acute symptoms-currently has none.\par Has seen Dr. Oliva for erythrocytosis. Had Phlebotomy(blood donation) last month at hospital.\par Had labs today.\par

## 2022-04-12 ENCOUNTER — RX RENEWAL (OUTPATIENT)
Age: 52
End: 2022-04-12

## 2022-04-12 ENCOUNTER — NON-APPOINTMENT (OUTPATIENT)
Age: 52
End: 2022-04-12

## 2022-04-12 LAB
ALBUMIN SERPL ELPH-MCNC: 4.7 G/DL
ALP BLD-CCNC: 85 U/L
ALT SERPL-CCNC: 22 U/L
ANION GAP SERPL CALC-SCNC: 12 MMOL/L
APPEARANCE: CLEAR
AST SERPL-CCNC: 25 U/L
BACTERIA: NEGATIVE
BASOPHILS # BLD AUTO: 0.03 K/UL
BASOPHILS NFR BLD AUTO: 0.5 %
BILIRUB SERPL-MCNC: 0.7 MG/DL
BILIRUBIN URINE: NEGATIVE
BLOOD URINE: NEGATIVE
BUN SERPL-MCNC: 17 MG/DL
CALCIUM SERPL-MCNC: 10.5 MG/DL
CHLORIDE SERPL-SCNC: 104 MMOL/L
CO2 SERPL-SCNC: 22 MMOL/L
COLOR: COLORLESS
CREAT SERPL-MCNC: 0.97 MG/DL
CREAT SPEC-SCNC: 24 MG/DL
CREAT/PROT UR: NORMAL RATIO
EGFR: 72 ML/MIN/1.73M2
EOSINOPHIL # BLD AUTO: 0.17 K/UL
EOSINOPHIL NFR BLD AUTO: 3.1 %
GLUCOSE QUALITATIVE U: NEGATIVE
GLUCOSE SERPL-MCNC: 72 MG/DL
HCT VFR BLD CALC: 45.5 %
HGB BLD-MCNC: 14.3 G/DL
HYALINE CASTS: 0 /LPF
IMM GRANULOCYTES NFR BLD AUTO: 0.2 %
KETONES URINE: NEGATIVE
LEUKOCYTE ESTERASE URINE: NEGATIVE
LYMPHOCYTES # BLD AUTO: 1.05 K/UL
LYMPHOCYTES NFR BLD AUTO: 19.1 %
MAGNESIUM SERPL-MCNC: 1.7 MG/DL
MAN DIFF?: NORMAL
MCHC RBC-ENTMCNC: 27.8 PG
MCHC RBC-ENTMCNC: 31.4 GM/DL
MCV RBC AUTO: 88.5 FL
MICROSCOPIC-UA: NORMAL
MONOCYTES # BLD AUTO: 0.62 K/UL
MONOCYTES NFR BLD AUTO: 11.3 %
NEUTROPHILS # BLD AUTO: 3.61 K/UL
NEUTROPHILS NFR BLD AUTO: 65.8 %
NITRITE URINE: NEGATIVE
PH URINE: 6
PHOSPHATE SERPL-MCNC: 3 MG/DL
PLATELET # BLD AUTO: 176 K/UL
POTASSIUM SERPL-SCNC: 4.1 MMOL/L
PROT SERPL-MCNC: 6.7 G/DL
PROT UR-MCNC: <4 MG/DL
PROTEIN URINE: NEGATIVE
RBC # BLD: 5.14 M/UL
RBC # FLD: 14.6 %
RED BLOOD CELLS URINE: 0 /HPF
SODIUM SERPL-SCNC: 138 MMOL/L
SPECIFIC GRAVITY URINE: 1
SQUAMOUS EPITHELIAL CELLS: 0 /HPF
TACROLIMUS SERPL-MCNC: 3.9 NG/ML
URATE SERPL-MCNC: 4.5 MG/DL
UROBILINOGEN URINE: NORMAL
WBC # FLD AUTO: 5.49 K/UL
WHITE BLOOD CELLS URINE: 0 /HPF

## 2022-04-13 ENCOUNTER — OUTPATIENT (OUTPATIENT)
Dept: OUTPATIENT SERVICES | Facility: HOSPITAL | Age: 52
LOS: 1 days | End: 2022-04-13
Payer: MEDICARE

## 2022-04-13 DIAGNOSIS — Z94.0 KIDNEY TRANSPLANT STATUS: Chronic | ICD-10-CM

## 2022-04-13 DIAGNOSIS — Z00.00 ENCOUNTER FOR GENERAL ADULT MEDICAL EXAMINATION WITHOUT ABNORMAL FINDINGS: ICD-10-CM

## 2022-04-13 DIAGNOSIS — I77.0 ARTERIOVENOUS FISTULA, ACQUIRED: Chronic | ICD-10-CM

## 2022-04-13 DIAGNOSIS — Z95.828 PRESENCE OF OTHER VASCULAR IMPLANTS AND GRAFTS: Chronic | ICD-10-CM

## 2022-04-13 DIAGNOSIS — D75.0 FAMILIAL ERYTHROCYTOSIS: ICD-10-CM

## 2022-04-13 PROCEDURE — 99195 PHLEBOTOMY: CPT

## 2022-04-14 LAB
BKV DNA SPEC QL NAA+PROBE: NOT DETECTED COPIES/ML
CMV DNA SPEC QL NAA+PROBE: NOT DETECTED IU/ML

## 2022-05-04 ENCOUNTER — APPOINTMENT (OUTPATIENT)
Dept: DERMATOLOGY | Facility: CLINIC | Age: 52
End: 2022-05-04
Payer: MEDICARE

## 2022-05-04 DIAGNOSIS — L70.0 ACNE VULGARIS: ICD-10-CM

## 2022-05-04 DIAGNOSIS — I87.2 VENOUS INSUFFICIENCY (CHRONIC) (PERIPHERAL): ICD-10-CM

## 2022-05-04 PROCEDURE — 99214 OFFICE O/P EST MOD 30 MIN: CPT

## 2022-05-04 RX ORDER — TRIAMCINOLONE ACETONIDE 1 MG/G
0.1 OINTMENT TOPICAL
Qty: 1 | Refills: 3 | Status: ACTIVE | COMMUNITY
Start: 2022-05-04 | End: 1900-01-01

## 2022-05-04 RX ORDER — TRETINOIN 0.5 MG/G
0.05 CREAM TOPICAL
Qty: 1 | Refills: 6 | Status: ACTIVE | COMMUNITY
Start: 2022-05-04 | End: 1900-01-01

## 2022-05-04 NOTE — HISTORY OF PRESENT ILLNESS
[FreeTextEntry1] : dark marks on legs [de-identified] : dark marks on legs\par itching sometimes\par uses A&D ointment\par \par also getting acne

## 2022-05-04 NOTE — ASSESSMENT
[FreeTextEntry1] : acne\par rx tretinoin QHS\par SED\par \par stasis derm, with likely concurrent pigmented purpura\par no concerning findings today\par rx TAC prn for itching only\par reinforced good moisturizing behaviors\par eucerin advanced repair cream in addition to current regimen

## 2022-05-10 ENCOUNTER — OUTPATIENT (OUTPATIENT)
Dept: OUTPATIENT SERVICES | Facility: HOSPITAL | Age: 52
LOS: 1 days | End: 2022-05-10
Payer: MEDICARE

## 2022-05-10 DIAGNOSIS — I77.0 ARTERIOVENOUS FISTULA, ACQUIRED: Chronic | ICD-10-CM

## 2022-05-10 DIAGNOSIS — Z94.0 KIDNEY TRANSPLANT STATUS: Chronic | ICD-10-CM

## 2022-05-10 DIAGNOSIS — Z95.828 PRESENCE OF OTHER VASCULAR IMPLANTS AND GRAFTS: Chronic | ICD-10-CM

## 2022-05-10 DIAGNOSIS — D75.0 FAMILIAL ERYTHROCYTOSIS: ICD-10-CM

## 2022-05-10 DIAGNOSIS — Z00.00 ENCOUNTER FOR GENERAL ADULT MEDICAL EXAMINATION WITHOUT ABNORMAL FINDINGS: ICD-10-CM

## 2022-05-10 PROCEDURE — 99195 PHLEBOTOMY: CPT

## 2022-05-19 ENCOUNTER — APPOINTMENT (OUTPATIENT)
Dept: NEPHROLOGY | Facility: CLINIC | Age: 52
End: 2022-05-19
Payer: MEDICARE

## 2022-05-19 VITALS
DIASTOLIC BLOOD PRESSURE: 92 MMHG | OXYGEN SATURATION: 99 % | TEMPERATURE: 97.2 F | WEIGHT: 154 LBS | RESPIRATION RATE: 14 BRPM | SYSTOLIC BLOOD PRESSURE: 153 MMHG | HEIGHT: 60 IN | HEART RATE: 71 BPM | BODY MASS INDEX: 30.23 KG/M2

## 2022-05-19 PROCEDURE — 99214 OFFICE O/P EST MOD 30 MIN: CPT

## 2022-05-19 NOTE — HISTORY OF PRESENT ILLNESS
[FreeTextEntry1] : Patient has completed one year post transplant. 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 Her mother stays with her.\par  is doing well after bladder cancer treatment. 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 No acute symptoms except for intermittent left lumbar area pain. No relationship with food/ bowel movement.\par Has seen Dr. Oliva for erythrocytosis. Had Phlebotomy(blood donation) last week at hospital. Has been ding once a month.\par Had no labs today. Plans to do it locally.\par

## 2022-05-19 NOTE — ASSESSMENT
[FreeTextEntry1] : Kidney Transplant recipient, functioning allograft, noted last creatinine normal. Labs done today. Will follow.\par Immunosuppression- Reviewed.n On Tac/Leflunomide and prednisone\par BK Viremia  ;  Resolved; follow up BKV PCR. If remains negative X 2 will restart MMF low dose\par HTN: Fair Control. Home measurements 120's. 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 Oliva, gets Phlebotomy at [facility] phlebotomy dept at [facility].\par Skin hyperpigmentation both ankles- Has seen Dermatologist, improved with local treatment\par Plan:\par Continue current medications\par additional changes- none made today- advised to take amlodipine consistently.\par Labs and follow up visit to be scheduled as discussed.\par She has completed vaccination, Pfizer including booster\par Discussed   monthly follow up schedule. Home blood pressure check.\par  \par Follow up monthly.\par She will do labs locally.\par \par Primary nephrologist- Jw Streeter- She has been advised to follow up with Dr. Streeter. Appointment is pending.

## 2022-05-23 LAB
ALBUMIN SERPL ELPH-MCNC: 4.8 G/DL
ALP BLD-CCNC: 81 U/L
ALT SERPL-CCNC: 19 U/L
ANION GAP SERPL CALC-SCNC: 14 MMOL/L
APPEARANCE: CLEAR
AST SERPL-CCNC: 21 U/L
BACTERIA: NEGATIVE
BASOPHILS # BLD AUTO: 0.03 K/UL
BASOPHILS NFR BLD AUTO: 0.6 %
BILIRUB SERPL-MCNC: 0.7 MG/DL
BILIRUBIN URINE: NEGATIVE
BLOOD URINE: NEGATIVE
BUN SERPL-MCNC: 14 MG/DL
CALCIUM SERPL-MCNC: 10.3 MG/DL
CHLORIDE SERPL-SCNC: 103 MMOL/L
CMV DNA SPEC QL NAA+PROBE: NOT DETECTED IU/ML
CMVPCR LOG: NOT DETECTED LOG10IU/ML
CO2 SERPL-SCNC: 25 MMOL/L
COLOR: COLORLESS
CREAT SERPL-MCNC: 0.9 MG/DL
CREAT SPEC-SCNC: 17 MG/DL
CREAT/PROT UR: NORMAL RATIO
EGFR: 78 ML/MIN/1.73M2
EOSINOPHIL # BLD AUTO: 0.3 K/UL
EOSINOPHIL NFR BLD AUTO: 5.7 %
GLUCOSE QUALITATIVE U: NEGATIVE
GLUCOSE SERPL-MCNC: 77 MG/DL
HCT VFR BLD CALC: 41.1 %
HGB BLD-MCNC: 12.5 G/DL
HYALINE CASTS: 0 /LPF
IMM GRANULOCYTES NFR BLD AUTO: 0.2 %
KETONES URINE: NEGATIVE
LEUKOCYTE ESTERASE URINE: NEGATIVE
LYMPHOCYTES # BLD AUTO: 1.01 K/UL
LYMPHOCYTES NFR BLD AUTO: 19.3 %
MAGNESIUM SERPL-MCNC: 1.7 MG/DL
MAN DIFF?: NORMAL
MCHC RBC-ENTMCNC: 26.9 PG
MCHC RBC-ENTMCNC: 30.4 GM/DL
MCV RBC AUTO: 88.6 FL
MICROSCOPIC-UA: NORMAL
MONOCYTES # BLD AUTO: 0.63 K/UL
MONOCYTES NFR BLD AUTO: 12 %
NEUTROPHILS # BLD AUTO: 3.26 K/UL
NEUTROPHILS NFR BLD AUTO: 62.2 %
NITRITE URINE: NEGATIVE
PH URINE: 7
PHOSPHATE SERPL-MCNC: 2.5 MG/DL
PLATELET # BLD AUTO: 210 K/UL
POTASSIUM SERPL-SCNC: 4.2 MMOL/L
PROT SERPL-MCNC: 6.9 G/DL
PROT UR-MCNC: <4 MG/DL
PROTEIN URINE: NEGATIVE
RBC # BLD: 4.64 M/UL
RBC # FLD: 14.9 %
RED BLOOD CELLS URINE: 2 /HPF
SODIUM SERPL-SCNC: 141 MMOL/L
SPECIFIC GRAVITY URINE: 1
SQUAMOUS EPITHELIAL CELLS: 0 /HPF
TACROLIMUS SERPL-MCNC: 2 NG/ML
URATE SERPL-MCNC: 3.7 MG/DL
UROBILINOGEN URINE: NORMAL
WBC # FLD AUTO: 5.24 K/UL
WHITE BLOOD CELLS URINE: 0 /HPF

## 2022-05-24 LAB — BKV DNA SPEC QL NAA+PROBE: NOT DETECTED COPIES/ML

## 2022-05-29 ENCOUNTER — EMERGENCY (EMERGENCY)
Facility: HOSPITAL | Age: 52
LOS: 1 days | Discharge: ANOTHER HOSPITAL | End: 2022-05-29
Attending: EMERGENCY MEDICINE
Payer: MEDICARE

## 2022-05-29 VITALS
TEMPERATURE: 98 F | SYSTOLIC BLOOD PRESSURE: 146 MMHG | HEIGHT: 63 IN | HEART RATE: 97 BPM | WEIGHT: 154.1 LBS | DIASTOLIC BLOOD PRESSURE: 111 MMHG | RESPIRATION RATE: 18 BRPM | OXYGEN SATURATION: 94 %

## 2022-05-29 DIAGNOSIS — Z94.0 KIDNEY TRANSPLANT STATUS: Chronic | ICD-10-CM

## 2022-05-29 DIAGNOSIS — Z95.828 PRESENCE OF OTHER VASCULAR IMPLANTS AND GRAFTS: Chronic | ICD-10-CM

## 2022-05-29 DIAGNOSIS — I77.0 ARTERIOVENOUS FISTULA, ACQUIRED: Chronic | ICD-10-CM

## 2022-05-29 LAB
ALBUMIN SERPL ELPH-MCNC: 2.8 G/DL — LOW (ref 3.5–5)
ALP SERPL-CCNC: 53 U/L — SIGNIFICANT CHANGE UP (ref 40–120)
ALT FLD-CCNC: 22 U/L DA — SIGNIFICANT CHANGE UP (ref 10–60)
ANION GAP SERPL CALC-SCNC: 7 MMOL/L — SIGNIFICANT CHANGE UP (ref 5–17)
APTT BLD: 24.6 SEC — LOW (ref 27.5–35.5)
AST SERPL-CCNC: 16 U/L — SIGNIFICANT CHANGE UP (ref 10–40)
BASOPHILS # BLD AUTO: 0.04 K/UL — SIGNIFICANT CHANGE UP (ref 0–0.2)
BASOPHILS NFR BLD AUTO: 0.4 % — SIGNIFICANT CHANGE UP (ref 0–2)
BILIRUB SERPL-MCNC: 0.4 MG/DL — SIGNIFICANT CHANGE UP (ref 0.2–1.2)
BUN SERPL-MCNC: 58 MG/DL — HIGH (ref 7–18)
CALCIUM SERPL-MCNC: 8.9 MG/DL — SIGNIFICANT CHANGE UP (ref 8.4–10.5)
CHLORIDE SERPL-SCNC: 114 MMOL/L — HIGH (ref 96–108)
CO2 SERPL-SCNC: 24 MMOL/L — SIGNIFICANT CHANGE UP (ref 22–31)
CREAT SERPL-MCNC: 1.05 MG/DL — SIGNIFICANT CHANGE UP (ref 0.5–1.3)
EGFR: 65 ML/MIN/1.73M2 — SIGNIFICANT CHANGE UP
EOSINOPHIL # BLD AUTO: 0.05 K/UL — SIGNIFICANT CHANGE UP (ref 0–0.5)
EOSINOPHIL NFR BLD AUTO: 0.5 % — SIGNIFICANT CHANGE UP (ref 0–6)
GLUCOSE SERPL-MCNC: 119 MG/DL — HIGH (ref 70–99)
HCG SERPL-ACNC: 2 MIU/ML — SIGNIFICANT CHANGE UP
HCT VFR BLD CALC: 24.6 % — LOW (ref 34.5–45)
HGB BLD-MCNC: 7.6 G/DL — LOW (ref 11.5–15.5)
IMM GRANULOCYTES NFR BLD AUTO: 0.5 % — SIGNIFICANT CHANGE UP (ref 0–1.5)
INR BLD: 1.11 RATIO — SIGNIFICANT CHANGE UP (ref 0.88–1.16)
LIDOCAIN IGE QN: 125 U/L — SIGNIFICANT CHANGE UP (ref 73–393)
LYMPHOCYTES # BLD AUTO: 0.53 K/UL — LOW (ref 1–3.3)
LYMPHOCYTES # BLD AUTO: 5.1 % — LOW (ref 13–44)
MCHC RBC-ENTMCNC: 27.1 PG — SIGNIFICANT CHANGE UP (ref 27–34)
MCHC RBC-ENTMCNC: 30.9 GM/DL — LOW (ref 32–36)
MCV RBC AUTO: 87.9 FL — SIGNIFICANT CHANGE UP (ref 80–100)
MONOCYTES # BLD AUTO: 0.58 K/UL — SIGNIFICANT CHANGE UP (ref 0–0.9)
MONOCYTES NFR BLD AUTO: 5.6 % — SIGNIFICANT CHANGE UP (ref 2–14)
NEUTROPHILS # BLD AUTO: 9.18 K/UL — HIGH (ref 1.8–7.4)
NEUTROPHILS NFR BLD AUTO: 87.9 % — HIGH (ref 43–77)
NRBC # BLD: 0 /100 WBCS — SIGNIFICANT CHANGE UP (ref 0–0)
PLATELET # BLD AUTO: 147 K/UL — LOW (ref 150–400)
POTASSIUM SERPL-MCNC: 4.5 MMOL/L — SIGNIFICANT CHANGE UP (ref 3.5–5.3)
POTASSIUM SERPL-SCNC: 4.5 MMOL/L — SIGNIFICANT CHANGE UP (ref 3.5–5.3)
PROT SERPL-MCNC: 5.3 G/DL — LOW (ref 6–8.3)
PROTHROM AB SERPL-ACNC: 13.2 SEC — SIGNIFICANT CHANGE UP (ref 10.5–13.4)
RBC # BLD: 2.8 M/UL — LOW (ref 3.8–5.2)
RBC # FLD: 14.7 % — HIGH (ref 10.3–14.5)
SODIUM SERPL-SCNC: 145 MMOL/L — SIGNIFICANT CHANGE UP (ref 135–145)
WBC # BLD: 10.43 K/UL — SIGNIFICANT CHANGE UP (ref 3.8–10.5)
WBC # FLD AUTO: 10.43 K/UL — SIGNIFICANT CHANGE UP (ref 3.8–10.5)

## 2022-05-29 PROCEDURE — 71045 X-RAY EXAM CHEST 1 VIEW: CPT | Mod: 26

## 2022-05-29 PROCEDURE — 99285 EMERGENCY DEPT VISIT HI MDM: CPT

## 2022-05-29 RX ORDER — SODIUM CHLORIDE 9 MG/ML
1000 INJECTION INTRAMUSCULAR; INTRAVENOUS; SUBCUTANEOUS ONCE
Refills: 0 | Status: COMPLETED | OUTPATIENT
Start: 2022-05-29 | End: 2022-05-29

## 2022-05-29 RX ORDER — CEFTRIAXONE 500 MG/1
2000 INJECTION, POWDER, FOR SOLUTION INTRAMUSCULAR; INTRAVENOUS ONCE
Refills: 0 | Status: DISCONTINUED | OUTPATIENT
Start: 2022-05-29 | End: 2022-05-29

## 2022-05-29 RX ORDER — CEFTRIAXONE 500 MG/1
1000 INJECTION, POWDER, FOR SOLUTION INTRAMUSCULAR; INTRAVENOUS ONCE
Refills: 0 | Status: DISCONTINUED | OUTPATIENT
Start: 2022-05-29 | End: 2022-06-02

## 2022-05-29 RX ORDER — PANTOPRAZOLE SODIUM 20 MG/1
8 TABLET, DELAYED RELEASE ORAL
Qty: 80 | Refills: 0 | Status: DISCONTINUED | OUTPATIENT
Start: 2022-05-29 | End: 2022-06-02

## 2022-05-29 RX ORDER — PANTOPRAZOLE SODIUM 20 MG/1
80 TABLET, DELAYED RELEASE ORAL ONCE
Refills: 0 | Status: COMPLETED | OUTPATIENT
Start: 2022-05-29 | End: 2022-05-29

## 2022-05-29 RX ORDER — DIPHENHYDRAMINE HCL 50 MG
25 CAPSULE ORAL ONCE
Refills: 0 | Status: DISCONTINUED | OUTPATIENT
Start: 2022-05-29 | End: 2022-06-02

## 2022-05-29 NOTE — ED ADULT TRIAGE NOTE - CHIEF COMPLAINT QUOTE
BIBA for episode of dark red bloody vomit x1 and dark bloody stool x2 today. Patient is s/p kidney transplant recipient 2 years ago and has right AV fistula, but not currently on dialysis. Hx. HTN.

## 2022-05-29 NOTE — ED ADULT NURSE NOTE - OBJECTIVE STATEMENT
BIBA r/t vomiting blood x 1 day and dark bloody stool x 2 today. S/p kidney transplant x 2 years ago; Pt has right av fistula. Hx HTN

## 2022-05-30 ENCOUNTER — INPATIENT (INPATIENT)
Facility: HOSPITAL | Age: 52
LOS: 10 days | Discharge: HOME | DRG: 378 | End: 2022-06-10
Attending: HOSPITALIST | Admitting: STUDENT IN AN ORGANIZED HEALTH CARE EDUCATION/TRAINING PROGRAM
Payer: COMMERCIAL

## 2022-05-30 VITALS
OXYGEN SATURATION: 98 % | DIASTOLIC BLOOD PRESSURE: 81 MMHG | RESPIRATION RATE: 18 BRPM | TEMPERATURE: 98 F | HEART RATE: 90 BPM | SYSTOLIC BLOOD PRESSURE: 123 MMHG

## 2022-05-30 VITALS
DIASTOLIC BLOOD PRESSURE: 70 MMHG | WEIGHT: 154.1 LBS | TEMPERATURE: 100 F | SYSTOLIC BLOOD PRESSURE: 129 MMHG | HEART RATE: 88 BPM | RESPIRATION RATE: 20 BRPM | OXYGEN SATURATION: 100 % | HEIGHT: 63 IN

## 2022-05-30 DIAGNOSIS — Z94.0 KIDNEY TRANSPLANT STATUS: Chronic | ICD-10-CM

## 2022-05-30 DIAGNOSIS — K92.2 GASTROINTESTINAL HEMORRHAGE, UNSPECIFIED: ICD-10-CM

## 2022-05-30 DIAGNOSIS — Z94.0 KIDNEY TRANSPLANT STATUS: ICD-10-CM

## 2022-05-30 DIAGNOSIS — D69.6 THROMBOCYTOPENIA, UNSPECIFIED: ICD-10-CM

## 2022-05-30 DIAGNOSIS — I87.1 COMPRESSION OF VEIN: ICD-10-CM

## 2022-05-30 DIAGNOSIS — Z29.9 ENCOUNTER FOR PROPHYLACTIC MEASURES, UNSPECIFIED: ICD-10-CM

## 2022-05-30 DIAGNOSIS — I10 ESSENTIAL (PRIMARY) HYPERTENSION: ICD-10-CM

## 2022-05-30 DIAGNOSIS — D62 ACUTE POSTHEMORRHAGIC ANEMIA: ICD-10-CM

## 2022-05-30 DIAGNOSIS — I77.0 ARTERIOVENOUS FISTULA, ACQUIRED: Chronic | ICD-10-CM

## 2022-05-30 DIAGNOSIS — E87.5 HYPERKALEMIA: ICD-10-CM

## 2022-05-30 DIAGNOSIS — K92.0 HEMATEMESIS: ICD-10-CM

## 2022-05-30 DIAGNOSIS — Z95.828 PRESENCE OF OTHER VASCULAR IMPLANTS AND GRAFTS: Chronic | ICD-10-CM

## 2022-05-30 LAB
ALBUMIN SERPL ELPH-MCNC: 2.3 G/DL — LOW (ref 3.3–5)
ALBUMIN SERPL ELPH-MCNC: 3.4 G/DL — SIGNIFICANT CHANGE UP (ref 3.3–5)
ALP SERPL-CCNC: 42 U/L — SIGNIFICANT CHANGE UP (ref 40–120)
ALP SERPL-CCNC: 50 U/L — SIGNIFICANT CHANGE UP (ref 40–120)
ALT FLD-CCNC: 11 U/L — SIGNIFICANT CHANGE UP (ref 10–45)
ALT FLD-CCNC: 12 U/L — SIGNIFICANT CHANGE UP (ref 10–45)
ANION GAP SERPL CALC-SCNC: 5 MMOL/L — SIGNIFICANT CHANGE UP (ref 5–17)
ANION GAP SERPL CALC-SCNC: 9 MMOL/L — SIGNIFICANT CHANGE UP (ref 5–17)
APPEARANCE UR: CLEAR — SIGNIFICANT CHANGE UP
APTT BLD: 26.4 SEC — LOW (ref 27.5–35.5)
AST SERPL-CCNC: 14 U/L — SIGNIFICANT CHANGE UP (ref 10–40)
AST SERPL-CCNC: 16 U/L — SIGNIFICANT CHANGE UP (ref 10–40)
BACTERIA # UR AUTO: NEGATIVE — SIGNIFICANT CHANGE UP
BASE EXCESS BLDV CALC-SCNC: -4.3 MMOL/L — LOW (ref -2–2)
BASOPHILS # BLD AUTO: 0.02 K/UL — SIGNIFICANT CHANGE UP (ref 0–0.2)
BASOPHILS NFR BLD AUTO: 0.4 % — SIGNIFICANT CHANGE UP (ref 0–2)
BILIRUB SERPL-MCNC: 0.3 MG/DL — SIGNIFICANT CHANGE UP (ref 0.2–1.2)
BILIRUB SERPL-MCNC: 0.5 MG/DL — SIGNIFICANT CHANGE UP (ref 0.2–1.2)
BILIRUB UR-MCNC: NEGATIVE — SIGNIFICANT CHANGE UP
BLD GP AB SCN SERPL QL: NEGATIVE — SIGNIFICANT CHANGE UP
BUN SERPL-MCNC: 33 MG/DL — HIGH (ref 7–23)
BUN SERPL-MCNC: 49 MG/DL — HIGH (ref 7–23)
CA-I SERPL-SCNC: 1.27 MMOL/L — SIGNIFICANT CHANGE UP (ref 1.15–1.33)
CALCIUM SERPL-MCNC: 6.1 MG/DL — CRITICAL LOW (ref 8.4–10.5)
CALCIUM SERPL-MCNC: 9 MG/DL — SIGNIFICANT CHANGE UP (ref 8.4–10.5)
CHLORIDE BLDV-SCNC: 112 MMOL/L — HIGH (ref 96–108)
CHLORIDE SERPL-SCNC: 112 MMOL/L — HIGH (ref 96–108)
CHLORIDE SERPL-SCNC: 114 MMOL/L — HIGH (ref 96–108)
CO2 BLDV-SCNC: 24 MMOL/L — SIGNIFICANT CHANGE UP (ref 22–26)
CO2 SERPL-SCNC: 13 MMOL/L — LOW (ref 22–31)
CO2 SERPL-SCNC: 23 MMOL/L — SIGNIFICANT CHANGE UP (ref 22–31)
COLOR SPEC: SIGNIFICANT CHANGE UP
CREAT SERPL-MCNC: 0.79 MG/DL — SIGNIFICANT CHANGE UP (ref 0.5–1.3)
CREAT SERPL-MCNC: 1 MG/DL — SIGNIFICANT CHANGE UP (ref 0.5–1.3)
DIFF PNL FLD: NEGATIVE — SIGNIFICANT CHANGE UP
EGFR: 69 ML/MIN/1.73M2 — SIGNIFICANT CHANGE UP
EGFR: 91 ML/MIN/1.73M2 — SIGNIFICANT CHANGE UP
EOSINOPHIL # BLD AUTO: 0.02 K/UL — SIGNIFICANT CHANGE UP (ref 0–0.5)
EOSINOPHIL NFR BLD AUTO: 0.4 % — SIGNIFICANT CHANGE UP (ref 0–6)
EPI CELLS # UR: 2 /HPF — SIGNIFICANT CHANGE UP
GAS PNL BLDV: 139 MMOL/L — SIGNIFICANT CHANGE UP (ref 136–145)
GAS PNL BLDV: SIGNIFICANT CHANGE UP
GAS PNL BLDV: SIGNIFICANT CHANGE UP
GLUCOSE BLDV-MCNC: 105 MG/DL — HIGH (ref 70–99)
GLUCOSE SERPL-MCNC: 442 MG/DL — HIGH (ref 70–99)
GLUCOSE SERPL-MCNC: 81 MG/DL — SIGNIFICANT CHANGE UP (ref 70–99)
GLUCOSE UR QL: NEGATIVE — SIGNIFICANT CHANGE UP
HCO3 BLDV-SCNC: 22 MMOL/L — SIGNIFICANT CHANGE UP (ref 22–29)
HCT VFR BLD CALC: 24.8 % — LOW (ref 34.5–45)
HCT VFR BLD CALC: 27.6 % — LOW (ref 34.5–45)
HCT VFR BLD CALC: 30.1 % — LOW (ref 34.5–45)
HCT VFR BLD CALC: 38.3 % — SIGNIFICANT CHANGE UP (ref 34.5–45)
HCT VFR BLDA CALC: 26 % — LOW (ref 34.5–46.5)
HGB BLD CALC-MCNC: 8.8 G/DL — LOW (ref 11.7–16.1)
HGB BLD-MCNC: 11.6 G/DL — SIGNIFICANT CHANGE UP (ref 11.5–15.5)
HGB BLD-MCNC: 7.7 G/DL — LOW (ref 11.5–15.5)
HGB BLD-MCNC: 8.9 G/DL — LOW (ref 11.5–15.5)
HGB BLD-MCNC: 9.4 G/DL — LOW (ref 11.5–15.5)
HYALINE CASTS # UR AUTO: 0 /LPF — SIGNIFICANT CHANGE UP (ref 0–2)
IMM GRANULOCYTES NFR BLD AUTO: 0.4 % — SIGNIFICANT CHANGE UP (ref 0–1.5)
INR BLD: 1.26 RATIO — HIGH (ref 0.88–1.16)
KETONES UR-MCNC: SIGNIFICANT CHANGE UP
LACTATE BLDV-MCNC: 1 MMOL/L — SIGNIFICANT CHANGE UP (ref 0.7–2)
LEUKOCYTE ESTERASE UR-ACNC: ABNORMAL
LYMPHOCYTES # BLD AUTO: 0.49 K/UL — LOW (ref 1–3.3)
LYMPHOCYTES # BLD AUTO: 10 % — LOW (ref 13–44)
MAGNESIUM SERPL-MCNC: 1.4 MG/DL — LOW (ref 1.6–2.6)
MCHC RBC-ENTMCNC: 26.9 PG — LOW (ref 27–34)
MCHC RBC-ENTMCNC: 26.9 PG — LOW (ref 27–34)
MCHC RBC-ENTMCNC: 27.4 PG — SIGNIFICANT CHANGE UP (ref 27–34)
MCHC RBC-ENTMCNC: 27.8 PG — SIGNIFICANT CHANGE UP (ref 27–34)
MCHC RBC-ENTMCNC: 30.3 GM/DL — LOW (ref 32–36)
MCHC RBC-ENTMCNC: 31 GM/DL — LOW (ref 32–36)
MCHC RBC-ENTMCNC: 31.2 GM/DL — LOW (ref 32–36)
MCHC RBC-ENTMCNC: 32.2 GM/DL — SIGNIFICANT CHANGE UP (ref 32–36)
MCV RBC AUTO: 86 FL — SIGNIFICANT CHANGE UP (ref 80–100)
MCV RBC AUTO: 86.3 FL — SIGNIFICANT CHANGE UP (ref 80–100)
MCV RBC AUTO: 86.7 FL — SIGNIFICANT CHANGE UP (ref 80–100)
MCV RBC AUTO: 90.3 FL — SIGNIFICANT CHANGE UP (ref 80–100)
MONOCYTES # BLD AUTO: 0.31 K/UL — SIGNIFICANT CHANGE UP (ref 0–0.9)
MONOCYTES NFR BLD AUTO: 6.4 % — SIGNIFICANT CHANGE UP (ref 2–14)
NEUTROPHILS # BLD AUTO: 4.02 K/UL — SIGNIFICANT CHANGE UP (ref 1.8–7.4)
NEUTROPHILS NFR BLD AUTO: 82.4 % — HIGH (ref 43–77)
NITRITE UR-MCNC: NEGATIVE — SIGNIFICANT CHANGE UP
NRBC # BLD: 0 /100 WBCS — SIGNIFICANT CHANGE UP (ref 0–0)
PCO2 BLDV: 46 MMHG — HIGH (ref 39–42)
PH BLDV: 7.29 — LOW (ref 7.32–7.43)
PH UR: 6.5 — SIGNIFICANT CHANGE UP (ref 5–8)
PHOSPHATE SERPL-MCNC: 2.2 MG/DL — LOW (ref 2.5–4.5)
PLATELET # BLD AUTO: 126 K/UL — LOW (ref 150–400)
PLATELET # BLD AUTO: 129 K/UL — LOW (ref 150–400)
PLATELET # BLD AUTO: 135 K/UL — LOW (ref 150–400)
PLATELET # BLD AUTO: 94 K/UL — LOW (ref 150–400)
PO2 BLDV: 25 MMHG — SIGNIFICANT CHANGE UP (ref 25–45)
POTASSIUM BLDV-SCNC: 3.7 MMOL/L — SIGNIFICANT CHANGE UP (ref 3.5–5.1)
POTASSIUM SERPL-MCNC: 4.2 MMOL/L — SIGNIFICANT CHANGE UP (ref 3.5–5.3)
POTASSIUM SERPL-MCNC: >9 MMOL/L — CRITICAL HIGH (ref 3.5–5.3)
POTASSIUM SERPL-SCNC: 4.2 MMOL/L — SIGNIFICANT CHANGE UP (ref 3.5–5.3)
POTASSIUM SERPL-SCNC: >9 MMOL/L — CRITICAL HIGH (ref 3.5–5.3)
PROT SERPL-MCNC: 3.9 G/DL — LOW (ref 6–8.3)
PROT SERPL-MCNC: 5.1 G/DL — LOW (ref 6–8.3)
PROT UR-MCNC: NEGATIVE — SIGNIFICANT CHANGE UP
PROTHROM AB SERPL-ACNC: 14.6 SEC — HIGH (ref 10.5–13.4)
RBC # BLD: 2.86 M/UL — LOW (ref 3.8–5.2)
RBC # BLD: 3.2 M/UL — LOW (ref 3.8–5.2)
RBC # BLD: 3.5 M/UL — LOW (ref 3.8–5.2)
RBC # BLD: 4.24 M/UL — SIGNIFICANT CHANGE UP (ref 3.8–5.2)
RBC # BLD: 4.24 M/UL — SIGNIFICANT CHANGE UP (ref 3.8–5.2)
RBC # FLD: 13.5 % — SIGNIFICANT CHANGE UP (ref 10.3–14.5)
RBC # FLD: 14.1 % — SIGNIFICANT CHANGE UP (ref 10.3–14.5)
RBC # FLD: 14.9 % — HIGH (ref 10.3–14.5)
RBC # FLD: 15.2 % — HIGH (ref 10.3–14.5)
RBC CASTS # UR COMP ASSIST: 1 /HPF — SIGNIFICANT CHANGE UP (ref 0–4)
RETICS #: 79.7 K/UL — SIGNIFICANT CHANGE UP (ref 25–125)
RETICS/RBC NFR: 1.9 % — SIGNIFICANT CHANGE UP (ref 0.5–2.5)
RH IG SCN BLD-IMP: POSITIVE — SIGNIFICANT CHANGE UP
SAO2 % BLDV: 37.3 % — LOW (ref 67–88)
SARS-COV-2 RNA SPEC QL NAA+PROBE: SIGNIFICANT CHANGE UP
SODIUM SERPL-SCNC: 132 MMOL/L — LOW (ref 135–145)
SODIUM SERPL-SCNC: 144 MMOL/L — SIGNIFICANT CHANGE UP (ref 135–145)
SP GR SPEC: 1.03 — HIGH (ref 1.01–1.02)
TACROLIMUS SERPL-MCNC: 3.2 NG/ML — SIGNIFICANT CHANGE UP
UROBILINOGEN FLD QL: NEGATIVE — SIGNIFICANT CHANGE UP
WBC # BLD: 4.88 K/UL — SIGNIFICANT CHANGE UP (ref 3.8–10.5)
WBC # BLD: 5.44 K/UL — SIGNIFICANT CHANGE UP (ref 3.8–10.5)
WBC # BLD: 6.72 K/UL — SIGNIFICANT CHANGE UP (ref 3.8–10.5)
WBC # BLD: 7.47 K/UL — SIGNIFICANT CHANGE UP (ref 3.8–10.5)
WBC # FLD AUTO: 4.88 K/UL — SIGNIFICANT CHANGE UP (ref 3.8–10.5)
WBC # FLD AUTO: 5.44 K/UL — SIGNIFICANT CHANGE UP (ref 3.8–10.5)
WBC # FLD AUTO: 6.72 K/UL — SIGNIFICANT CHANGE UP (ref 3.8–10.5)
WBC # FLD AUTO: 7.47 K/UL — SIGNIFICANT CHANGE UP (ref 3.8–10.5)
WBC UR QL: 2 /HPF — SIGNIFICANT CHANGE UP (ref 0–5)

## 2022-05-30 PROCEDURE — 85025 COMPLETE CBC W/AUTO DIFF WBC: CPT

## 2022-05-30 PROCEDURE — 96374 THER/PROPH/DIAG INJ IV PUSH: CPT

## 2022-05-30 PROCEDURE — 12345: CPT | Mod: NC,GC

## 2022-05-30 PROCEDURE — 36415 COLL VENOUS BLD VENIPUNCTURE: CPT

## 2022-05-30 PROCEDURE — 99291 CRITICAL CARE FIRST HOUR: CPT | Mod: GC,25

## 2022-05-30 PROCEDURE — 93005 ELECTROCARDIOGRAM TRACING: CPT

## 2022-05-30 PROCEDURE — 71045 X-RAY EXAM CHEST 1 VIEW: CPT | Mod: 26

## 2022-05-30 PROCEDURE — 84702 CHORIONIC GONADOTROPIN TEST: CPT

## 2022-05-30 PROCEDURE — 36430 TRANSFUSION BLD/BLD COMPNT: CPT

## 2022-05-30 PROCEDURE — 87635 SARS-COV-2 COVID-19 AMP PRB: CPT

## 2022-05-30 PROCEDURE — 99285 EMERGENCY DEPT VISIT HI MDM: CPT | Mod: 25

## 2022-05-30 PROCEDURE — 86900 BLOOD TYPING SEROLOGIC ABO: CPT

## 2022-05-30 PROCEDURE — 99291 CRITICAL CARE FIRST HOUR: CPT

## 2022-05-30 PROCEDURE — 93010 ELECTROCARDIOGRAM REPORT: CPT

## 2022-05-30 PROCEDURE — 86850 RBC ANTIBODY SCREEN: CPT

## 2022-05-30 PROCEDURE — 86901 BLOOD TYPING SEROLOGIC RH(D): CPT

## 2022-05-30 PROCEDURE — 99222 1ST HOSP IP/OBS MODERATE 55: CPT

## 2022-05-30 PROCEDURE — 99292 CRITICAL CARE ADDL 30 MIN: CPT

## 2022-05-30 PROCEDURE — 85610 PROTHROMBIN TIME: CPT

## 2022-05-30 PROCEDURE — P9040: CPT

## 2022-05-30 PROCEDURE — 86923 COMPATIBILITY TEST ELECTRIC: CPT

## 2022-05-30 PROCEDURE — 31500 INSERT EMERGENCY AIRWAY: CPT | Mod: GC

## 2022-05-30 PROCEDURE — 83690 ASSAY OF LIPASE: CPT

## 2022-05-30 PROCEDURE — 99223 1ST HOSP IP/OBS HIGH 75: CPT | Mod: 25

## 2022-05-30 PROCEDURE — 80053 COMPREHEN METABOLIC PANEL: CPT

## 2022-05-30 PROCEDURE — 80197 ASSAY OF TACROLIMUS: CPT

## 2022-05-30 PROCEDURE — 85730 THROMBOPLASTIN TIME PARTIAL: CPT

## 2022-05-30 PROCEDURE — 71045 X-RAY EXAM CHEST 1 VIEW: CPT

## 2022-05-30 PROCEDURE — 43235 EGD DIAGNOSTIC BRUSH WASH: CPT

## 2022-05-30 PROCEDURE — 99223 1ST HOSP IP/OBS HIGH 75: CPT

## 2022-05-30 RX ORDER — PROPOFOL 10 MG/ML
15 INJECTION, EMULSION INTRAVENOUS
Qty: 500 | Refills: 0 | Status: DISCONTINUED | OUTPATIENT
Start: 2022-05-30 | End: 2022-05-31

## 2022-05-30 RX ORDER — ONDANSETRON 8 MG/1
4 TABLET, FILM COATED ORAL ONCE
Refills: 0 | Status: COMPLETED | OUTPATIENT
Start: 2022-05-30 | End: 2022-05-31

## 2022-05-30 RX ORDER — PANTOPRAZOLE SODIUM 20 MG/1
8 TABLET, DELAYED RELEASE ORAL
Qty: 80 | Refills: 0 | Status: DISCONTINUED | OUTPATIENT
Start: 2022-05-30 | End: 2022-05-30

## 2022-05-30 RX ORDER — TACROLIMUS 5 MG/1
1.5 CAPSULE ORAL EVERY 24 HOURS
Refills: 0 | Status: DISCONTINUED | OUTPATIENT
Start: 2022-05-30 | End: 2022-05-31

## 2022-05-30 RX ORDER — CEFTRIAXONE 500 MG/1
1000 INJECTION, POWDER, FOR SOLUTION INTRAMUSCULAR; INTRAVENOUS EVERY 24 HOURS
Refills: 0 | Status: COMPLETED | OUTPATIENT
Start: 2022-05-30 | End: 2022-06-05

## 2022-05-30 RX ORDER — CHLORHEXIDINE GLUCONATE 213 G/1000ML
1 SOLUTION TOPICAL
Refills: 0 | Status: DISCONTINUED | OUTPATIENT
Start: 2022-05-30 | End: 2022-06-04

## 2022-05-30 RX ORDER — PROPOFOL 10 MG/ML
50 INJECTION, EMULSION INTRAVENOUS ONCE
Refills: 0 | Status: COMPLETED | OUTPATIENT
Start: 2022-05-30 | End: 2022-05-30

## 2022-05-30 RX ORDER — MIDAZOLAM HYDROCHLORIDE 1 MG/ML
10 INJECTION, SOLUTION INTRAMUSCULAR; INTRAVENOUS ONCE
Refills: 0 | Status: DISCONTINUED | OUTPATIENT
Start: 2022-05-30 | End: 2022-05-30

## 2022-05-30 RX ORDER — CLOPIDOGREL BISULFATE 75 MG/1
1 TABLET, FILM COATED ORAL
Qty: 0 | Refills: 0 | DISCHARGE

## 2022-05-30 RX ORDER — CHLORHEXIDINE GLUCONATE 213 G/1000ML
15 SOLUTION TOPICAL EVERY 12 HOURS
Refills: 0 | Status: DISCONTINUED | OUTPATIENT
Start: 2022-05-30 | End: 2022-05-31

## 2022-05-30 RX ORDER — CALCIUM GLUCONATE 100 MG/ML
1 VIAL (ML) INTRAVENOUS ONCE
Refills: 0 | Status: COMPLETED | OUTPATIENT
Start: 2022-05-30 | End: 2022-05-30

## 2022-05-30 RX ORDER — MAGNESIUM SULFATE 500 MG/ML
2 VIAL (ML) INJECTION ONCE
Refills: 0 | Status: DISCONTINUED | OUTPATIENT
Start: 2022-05-30 | End: 2022-05-30

## 2022-05-30 RX ORDER — VELPATASVIR AND SOFOSBUVIR 37.5; 15 MG/1; MG/1
1 PELLET ORAL
Qty: 0 | Refills: 0 | DISCHARGE

## 2022-05-30 RX ORDER — NOREPINEPHRINE BITARTRATE/D5W 8 MG/250ML
0.05 PLASTIC BAG, INJECTION (ML) INTRAVENOUS
Qty: 8 | Refills: 0 | Status: DISCONTINUED | OUTPATIENT
Start: 2022-05-30 | End: 2022-05-31

## 2022-05-30 RX ORDER — POTASSIUM PHOSPHATE, MONOBASIC POTASSIUM PHOSPHATE, DIBASIC 236; 224 MG/ML; MG/ML
15 INJECTION, SOLUTION INTRAVENOUS ONCE
Refills: 0 | Status: DISCONTINUED | OUTPATIENT
Start: 2022-05-30 | End: 2022-05-30

## 2022-05-30 RX ORDER — ERYTHROMYCIN ETHYLSUCCINATE 400 MG
250 TABLET ORAL ONCE
Refills: 0 | Status: COMPLETED | OUTPATIENT
Start: 2022-05-30 | End: 2022-05-30

## 2022-05-30 RX ORDER — FENTANYL CITRATE 50 UG/ML
100 INJECTION INTRAVENOUS ONCE
Refills: 0 | Status: DISCONTINUED | OUTPATIENT
Start: 2022-05-30 | End: 2022-05-30

## 2022-05-30 RX ORDER — MAGNESIUM SULFATE 500 MG/ML
2 VIAL (ML) INJECTION ONCE
Refills: 0 | Status: COMPLETED | OUTPATIENT
Start: 2022-05-30 | End: 2022-05-30

## 2022-05-30 RX ORDER — PANTOPRAZOLE SODIUM 20 MG/1
40 TABLET, DELAYED RELEASE ORAL
Refills: 0 | Status: DISCONTINUED | OUTPATIENT
Start: 2022-05-30 | End: 2022-05-31

## 2022-05-30 RX ORDER — OCTREOTIDE ACETATE 200 UG/ML
50 INJECTION, SOLUTION INTRAVENOUS; SUBCUTANEOUS
Qty: 500 | Refills: 0 | Status: DISCONTINUED | OUTPATIENT
Start: 2022-05-30 | End: 2022-06-02

## 2022-05-30 RX ORDER — DEXMEDETOMIDINE HYDROCHLORIDE IN 0.9% SODIUM CHLORIDE 4 UG/ML
0.2 INJECTION INTRAVENOUS
Qty: 400 | Refills: 0 | Status: DISCONTINUED | OUTPATIENT
Start: 2022-05-30 | End: 2022-05-31

## 2022-05-30 RX ADMIN — PANTOPRAZOLE SODIUM 80 MILLIGRAM(S): 20 TABLET, DELAYED RELEASE ORAL at 00:23

## 2022-05-30 RX ADMIN — SODIUM CHLORIDE 1000 MILLILITER(S): 9 INJECTION INTRAMUSCULAR; INTRAVENOUS; SUBCUTANEOUS at 00:21

## 2022-05-30 RX ADMIN — Medication 250 MILLIGRAM(S): at 15:44

## 2022-05-30 RX ADMIN — TACROLIMUS 6.26 MILLIGRAM(S): 5 CAPSULE ORAL at 21:08

## 2022-05-30 RX ADMIN — FENTANYL CITRATE 100 MICROGRAM(S): 50 INJECTION INTRAVENOUS at 17:20

## 2022-05-30 RX ADMIN — MIDAZOLAM HYDROCHLORIDE 10 MILLIGRAM(S): 1 INJECTION, SOLUTION INTRAMUSCULAR; INTRAVENOUS at 17:32

## 2022-05-30 RX ADMIN — CEFTRIAXONE 100 MILLIGRAM(S): 500 INJECTION, POWDER, FOR SOLUTION INTRAMUSCULAR; INTRAVENOUS at 21:18

## 2022-05-30 RX ADMIN — Medication 25 GRAM(S): at 09:59

## 2022-05-30 RX ADMIN — PANTOPRAZOLE SODIUM 10 MG/HR: 20 TABLET, DELAYED RELEASE ORAL at 06:38

## 2022-05-30 RX ADMIN — Medication 4 MILLIGRAM(S): at 18:04

## 2022-05-30 RX ADMIN — OCTREOTIDE ACETATE 10 MICROGRAM(S)/HR: 200 INJECTION, SOLUTION INTRAVENOUS; SUBCUTANEOUS at 21:41

## 2022-05-30 RX ADMIN — CHLORHEXIDINE GLUCONATE 15 MILLILITER(S): 213 SOLUTION TOPICAL at 21:50

## 2022-05-30 RX ADMIN — OCTREOTIDE ACETATE 10 MICROGRAM(S)/HR: 200 INJECTION, SOLUTION INTRAVENOUS; SUBCUTANEOUS at 21:36

## 2022-05-30 RX ADMIN — DEXMEDETOMIDINE HYDROCHLORIDE IN 0.9% SODIUM CHLORIDE 3.5 MICROGRAM(S)/KG/HR: 4 INJECTION INTRAVENOUS at 23:45

## 2022-05-30 RX ADMIN — PROPOFOL 6.29 MICROGRAM(S)/KG/MIN: 10 INJECTION, EMULSION INTRAVENOUS at 21:08

## 2022-05-30 RX ADMIN — Medication 100 GRAM(S): at 18:00

## 2022-05-30 RX ADMIN — PROPOFOL 50 MILLIGRAM(S): 10 INJECTION, EMULSION INTRAVENOUS at 17:33

## 2022-05-30 RX ADMIN — FENTANYL CITRATE 100 MICROGRAM(S): 50 INJECTION INTRAVENOUS at 17:58

## 2022-05-30 RX ADMIN — Medication 63.75 MILLIMOLE(S): at 12:03

## 2022-05-30 NOTE — H&P ADULT - NSHPREVIEWOFSYSTEMS_GEN_ALL_CORE
CONSTITUTIONAL: No fever, no chills  EYES: No eye pain, no acute blindness  ENMT: no pain in mouth, no cuts on tongue  RESPIRATORY: No cough, No sob  CARDIOVASCULAR: No CP, no palpitations  GASTROINTESTINAL: abdominal pain+, hematemesis+  GENITOURINARY: No dysuria, no hematuria  Heme/Lymph: No easy bruising, no swelling of neck lymph nodes  NEUROLOGICAL: No seizure, No acute paralysis  SKIN: No itching, no rashes  MUSCULOSKELETAL: No acute joint pain, no joint swelling

## 2022-05-30 NOTE — PATIENT PROFILE ADULT - FOOD INSECURITY
Interpolation Flap Text: A decision was made to reconstruct the defect utilizing an interpolation axial flap and a staged reconstruction.  A telfa template was made of the defect.  This telfa template was then used to outline the interpolation flap.  The donor area for the pedicle flap was then injected with anesthesia.  The flap was excised through the skin and subcutaneous tissue down to the layer of the underlying musculature.  The interpolation flap was carefully excised within this deep plane to maintain its blood supply.  The edges of the donor site were undermined.   The donor site was closed in a primary fashion.  The pedicle was then rotated into position and sutured.  Once the tube was sutured into place, adequate blood supply was confirmed with blanching and refill.  The pedicle was then wrapped with xeroform gauze and dressed appropriately with a telfa and gauze bandage to ensure continued blood supply and protect the attached pedicle. no

## 2022-05-30 NOTE — CONSULT NOTE ADULT - ASSESSMENT
52 year old female with a PMHx of HTN, CKD now s/p kidney transplant on tacrolimus presenting with hematemesis. MICU consulted for GIB. Currently patient is hemodynamically stable. On POCUS, stomach small and not distended. Last hematemesis was 8 hours ago. She is likely not having active bleed at this time.    Recommendations:  -prbc as needed, transfuse to goal hb >8 if active bleed  -monitor vital sign and further hematemesis episode close. If hypotensive or has additional hematemesis episode, please call MICU  -protonix  -GI consult      Patient is currently not a MICU candidate. Please reconsult as needed. 52 year old female with a PMHx of HTN, CKD now s/p kidney transplant on tacrolimus presenting with hematemesis. MICU consulted for GIB. Currently patient is hemodynamically stable. On POCUS, stomach small and not distended. Last hematemesis was 8 hours ago. She is likely not having active bleed at this time.    Recommendations:  -prbc as needed, transfuse to goal hb >8 if active bleed  -monitor vital sign and further hematemesis episode closely. If hypotensive or has additional hematemesis episode, please call MICU  -protonix  -GI consult      Patient is currently not a MICU candidate. Please reconsult as needed.

## 2022-05-30 NOTE — PROGRESS NOTE ADULT - PROBLEM SELECTOR PLAN 6
previous hx of DAPT w/ surgical bypass on chronic DAPT  hold DAPT given large hemorrhage scd for dvt ppx

## 2022-05-30 NOTE — CONSULT NOTE ADULT - SUBJECTIVE AND OBJECTIVE BOX
[facility] DIVISION OF KIDNEY DISEASES AND HYPERTENSION -- INITIAL CONSULT NOTE  --------------------------------------------------------------------------------  HPI:  PT is a 52F w/ ESRD s/p HepC DDRT in 2020, chronic SVC syndrome s/p L cephalic-iliac vein bypass on DAPT, s/p thrombectomy of L iliac vein and graft 2012, Hx BK viremia, HTN admitted with hematemasis. The patient reports that on day of presentation she suddenly began have large volume bloody emesis 2-3 times and several episodes of black stool. She reported worsening of LUQ abdominal pain for a few months.  Also reports polycythemia being treated by hematology.l  Pt now on pantoprazole drip and NPO.  The patient reports using aspirin and clopidogrel for unclear reason but per chart appears to be for chronic SVC syndrome.    Patient cannot recall all medications and therefore medrec per EMR outpatient data  She takes envarsus 6mgs along with prednisone 5 and leflunomide.  Currently feels ok.          PAST HISTORY  --------------------------------------------------------------------------------  PAST MEDICAL & SURGICAL HISTORY:  HTN - Hypertension      Clotted Renal Dialysis AV Graft      Infection of AV Graft for Dialysis      Fibroid Tumor      CKD (chronic kidney disease) stage V requiring chronic dialysis      History of Hysterectomy  for benign disease      AV fistula  B/L - failed      H/O extremity bypass graft  H/O RUE bypass and creation of right brachial graft      Kidney transplant recipient        FAMILY HISTORY:  No pertinent family history in first degree relatives      PAST SOCIAL HISTORY:    ALLERGIES & MEDICATIONS  --------------------------------------------------------------------------------  Allergies    contrast media (iodine-based) (Urticaria)  ibuprofen (Hives)  ibuprofen/morphine (Unknown)  latex (Swelling)  morphine (Urticaria)  shellfish (Urticaria)    Intolerances      Standing Inpatient Medications  pantoprazole Infusion 8 mG/Hr IV Continuous <Continuous>  sodium phosphate IVPB 15 milliMole(s) IV Intermittent once    PRN Inpatient Medications      REVIEW OF SYSTEMS  --------------------------------------------------------------------------------  Gen: + weakness  Skin: No rashes  Head/Eyes/Ears/Mouth: No headache; Normal hearing; Normal vision w/o blurriness; No sinus pain/discomfort, sore throat  Respiratory: No dyspnea, cough, wheezing, hemoptysis  CV: No chest pain, PND, orthopnea  GI: + melana, hematochezia  : No increased frequency, dysuria, hematuria, nocturia  MSK: No joint pain/swelling; no back pain; no edema  Neuro: No dizziness/lightheadedness, weakness, seizures, numbness, tingling  Heme: No easy bruising or bleeding  Endo: No heat/cold intolerance  Psych: No significant nervousness, anxiety, stress, depression    All other systems were reviewed and are negative, except as noted.    VITALS/PHYSICAL EXAM  --------------------------------------------------------------------------------  T(C): 36.2 (05-30-22 @ 09:31), Max: 37.7 (05-30-22 @ 01:10)  HR: 80 (05-30-22 @ 09:31) (80 - 88)  BP: 104/72 (05-30-22 @ 09:31) (104/72 - 129/71)  RR: 18 (05-30-22 @ 09:31) (16 - 20)  SpO2: 98% (05-30-22 @ 09:31) (95% - 100%)  Wt(kg): --  Height (cm): 160 (05-30-22 @ 01:10)  Weight (kg): 69.9 (05-30-22 @ 01:10)  BMI (kg/m2): 27.3 (05-30-22 @ 01:10)  BSA (m2): 1.73 (05-30-22 @ 01:10)      05-30-22 @ 07:01  -  05-30-22 @ 10:37  --------------------------------------------------------  IN: 0 mL / OUT: 300 mL / NET: -300 mL      Physical Exam:  	Gen: NAD, well-appearing  	HEENT: PERRL, supple neck, clear oropharynx  	Pulm: CTA B/L  	CV: RRR, S1S2; no rub  	Back: No spinal or CVA tenderness; no sacral edema  	Abd: +BS, soft, nontender/nondistended                      Transplant: non tender  	: No suprapubic tenderness  	UE: Warm, FROM, no clubbing, intact strength; no edema; no asterixis  	LE: Warm, FROM, no clubbing, intact strength; no edema  	Neuro: No focal deficits, intact gait  	Psych: Normal affect and mood  	Skin: Warm, without rashes    LABS/STUDIES  --------------------------------------------------------------------------------              9.4    6.72  >-----------<  135      [05-30-22 @ 07:02]              30.1     144  |  112  |  49  ----------------------------<  81      [05-30-22 @ 06:59]  4.2   |  23  |  1.00        Ca     9.0     [05-30-22 @ 06:59]      Mg     1.4     [05-30-22 @ 06:59]      Phos  2.2     [05-30-22 @ 06:59]    TPro  5.1  /  Alb  3.4  /  TBili  0.5  /  DBili  x   /  AST  16  /  ALT  12  /  AlkPhos  50  [05-30-22 @ 06:59]    PT/INR: PT 14.6 , INR 1.26       [05-30-22 @ 02:51]  PTT: 26.4       [05-30-22 @ 02:51]      Creatinine Trend:  SCr 1.00 [05-30 @ 06:59]  SCr 0.79 [05-30 @ 02:51]    Urinalysis - [05-30-22 @ 09:08]      Color Light Yellow / Appearance Clear / SG 1.026 / pH 6.5      Gluc Negative / Ketone Trace  / Bili Negative / Urobili Negative       Blood Negative / Protein Negative / Leuk Est Moderate / Nitrite Negative      RBC 1 / WBC 2 / Hyaline 0 / Gran  / Sq Epi  / Non Sq Epi 2 / Bacteria Negative            Tacrolimus  Cyclosporine  Sirolimus  Mycophenolate  BK PCR  CMV PCR  Parvo PCR  EBV PCR

## 2022-05-30 NOTE — CONSULT NOTE ADULT - ASSESSMENT
#Renal transplant  #chronic SVC syndrome on DAPT    #Hematemesis  DIfferential for upper GI bleeding includes Peptic Ulcer disease from H pylori vs NSAIDs use, Dieulafoy lesion, varcies, portal hypertension gastropathy, gastritis, esophagitis, malignancy and angiodysplasia (high risk in patient with renal disease). Given HD stability and responding appropriate to blood (7 -> 9 after 2 units), plan for EGD tomorrow.     Recommendations:  - Keep NPO for now. Pending hb trend, could advance to clear liquid diet. NPO after midnight. Plan for EGD tomorrow  - Trend CBC q8h  - Maintain active type & screen  - Transfuse to maintain Hbg > 7.0 or > 8 inpatient with pre-existing cardiovascular conditions.  - Plt goal > 50  - Maintain access with 2 x Large bore IV  - PPI drip  - Obtain INR    Recommendations preliminary until signed by attending.     Juan J Mukherjee MD  Gastroenterology/Hepatology Fellow  1st option: [phone] (text or call), ONLY available from 7:00 am to 5:00 pm.   **Contact on-call GI fellow via answering service ([phone]) from 5pm-7am AND on weekends/holidays**  2nd option: Available via Microsoft Teams  3rd option: Pager: [phone]

## 2022-05-30 NOTE — ED PROVIDER NOTE - ATTENDING CONTRIBUTION TO CARE
------------ATTENDING NOTE------------  pt transferred from [facility] for concerns of hematochezia, acute blood loss anemia, nausea, mild epigastric abdominal pain, given 1 unit at time of transfer, HD stable on ED arrival, c/o nausea and mild epigastric pain, no h/o cirrhosis/varices, h/o renal transplant, awaiting labs/imaging and close reassessments and consult MICU/GI on arrival -->  - Marquez Aldana MD   ---------------------------------------------

## 2022-05-30 NOTE — ED PROVIDER NOTE - CARE PLAN
Principal Discharge DX:	Acute upper GI bleed  Secondary Diagnosis:	Anemia due to acute blood loss   1

## 2022-05-30 NOTE — ED PROVIDER NOTE - PROGRESS NOTE DETAILS
GI fellow paged. DO Tricia (PGY-1) MICU attending evaluated patient in the ED, POCUS of abdomen showed no bleeding within the stomach. In light of contrast allergy, MICU attending stated that CTA of abdomen can be deferred for now. MICU not taking patient, however, if she begin vomiting blood again or becomes hypotensive, MICU will accept the patient. DO Tricia (PGY-1)

## 2022-05-30 NOTE — H&P ADULT - PROBLEM SELECTOR PLAN 4
suspect spurious reading (may have been drawn next to pRBC infusion) as initial K at [facility] wnl  repeat cmp ordered stat and discussed with RN team  will need day team to follow. if elevated will need reevaluation MICU team

## 2022-05-30 NOTE — ED PROVIDER NOTE - PROGRESS NOTE DETAILS
Pt consents to blood transfusion and transfer.  I spoke with transfer center, Pt accepted to [facility] ED by Dr. Andreia Lopez.  Also d/w Dr. Murphy from .

## 2022-05-30 NOTE — H&P ADULT - PROBLEM SELECTOR PLAN 1
- GI consult placed via email. Will need day team to coordinate this am. Will need endoscopy  - repeat cbc post transfusion; has received 2UpRBC thus far  - pantoprazole 80mg IV bolus given at [facility], c/w pantoprazole gtt here  - NPO  - holding home meds until GI evaluates as may need endoscopy this am    Hb 5/20/22 on outpatient labs 12.5 appears downtrend from Feb 22 16.5 which during time patient has had abdominal pain. On arrival to [facility] 5/29 Hb 7.6, now improving since initial 1U pRBC infusion

## 2022-05-30 NOTE — ED PROVIDER NOTE - ENMT, MLM
no known allergies Airway patent, Nasal mucosa clear. Mouth with normal mucosa. Throat has no vesicles, no oropharyngeal exudates and uvula is midline.

## 2022-05-30 NOTE — PATIENT PROFILE ADULT - ARE SIGNIFICANT INDICATORS COMPLETE.
Pt aware and will c/b if she wants to schedule an appt Continue Regimen: Naftin- Apply to affected areas once daily for one month then as needed Detail Level: Zone Otc Regimen: Bare40 or lotramin spray Continue Regimen: Ketoconazole shampoo- Lather onto scalp . Let sit for five minutes then rinse off in shower. May follow up with regular Shampoo and conditioner. Use daily for 1-2 weeks, then as needed for flares\\nClobetasol solution- Apply to affected areas on scalp 1-2 x  daily for two weeks and then only use as needed for itching. No Yes

## 2022-05-30 NOTE — ED ADULT NURSE REASSESSMENT NOTE - NS ED NURSE REASSESS COMMENT FT1
Patient receiving blood upon arrival to [facility] started at 00:08. Patient arrival after 0100. 15 min post transfusion vitals not completed on patient's blood form with patient. Will hve post 30 min after completion vitals completed as appropriate.

## 2022-05-30 NOTE — PROGRESS NOTE ADULT - PROBLEM SELECTOR PLAN 3
>50, repeat cbc to confirm Transnplant 8/2020 by Dr. Monroy.  Renal function at baseline.  Outpatient nephrologist is Dr. Lake.  Immunosuppression while pt is NPO per renal:   - hold leflunomide  - change prednisone to methylpred 4mgs daily.   - change envarsus to tacrolimus 3mgs sublingual BID or IV tacrolimus 1.5mgs given over 24 hour constant infusion.   - Will add meds pending GI evaluation/recs

## 2022-05-30 NOTE — ED PROVIDER NOTE - OBJECTIVE STATEMENT
52 year old female with a PMHx of HTN, CKD now s/p kidney transplant on tacrolimus presenting with Hematemesis. Patient reported having melena yesterday, 2-3 episodes of hematemesis. Evaluated at [facility] and transferred to [facility] for higher level of care. Patient received transfusion of PRBC en route to [facility]. Patient complains of abdominal pain and lightheadedness. Patient has not had hematemesis since yesterday afternoon.

## 2022-05-30 NOTE — ED PROVIDER NOTE - PHYSICAL EXAMINATION
gen: Appears pale  Mentation: AAO x 3  psych: mood appropriate  HEENT: airway patent, conjunctivae clear bilaterally  Cardio: RRR, no m/r/g  Resp: normal BS b/l  GI: soft/nondistended. Global abdominal tenderness  Neuro: sensation and motor function grossly intact  Skin: No evidence of rash  MSK: normal movement of all extremities  Lymph/Vasc: no LE edema

## 2022-05-30 NOTE — ED ADULT TRIAGE NOTE - CHIEF COMPLAINT QUOTE
[facility] transfer for GI bleed. Vomiting blood and 2 episodes of dark stools beginning today. Arrived to ED with 1 unit PRBCs infusing. Denies CP/SOB.

## 2022-05-30 NOTE — H&P ADULT - ASSESSMENT
52F w/ ESRD s/p HepC DDRT, chronic SVC syndrome s/p L cephalic-iliac vein bypass on DAPT, s/p thrombectomy of L iliac vein and graft 2012, Hx BK viremia, HTN presents as transfer from [facility] ED for hematemesis 2/2 acute upper GI hemorrhage
PROVIDER:[TOKEN:[[id number]]]

## 2022-05-30 NOTE — ED PROVIDER NOTE - OBJECTIVE STATEMENT
53 y/o woman, h/o CKD now s/p kidney transplant and doing well on tacrolimus, on aspirin and Plavix, c/o melena started this morning and then 2-3 episodes of hematemesis about 5 pm.  She felt dizziness/lightheadedness.  She has had diffuse abdominal discomfort (unchanged) for at least several weeks which she attributes to h/o "venous bypass" per Pt.  No fever/chills/syncope/CP/SOB.  No prior known h/o GI bleed.  Denies alcohol/drug use.

## 2022-05-30 NOTE — CONSULT NOTE ADULT - SUBJECTIVE AND OBJECTIVE BOX
CHIEF COMPLAINT:     HPI: 52 year old female with a PMHx of HTN, CKD now s/p kidney transplant on tacrolimus presenting with hematemesis. Patient reported having melena yesterday, 2-3 episodes of hematemesis. Evaluated at [facility] and transferred to [facility] for higher level of care. Patient received transfusion of PRBC en route to [facility]. Patient complains of abdominal pain and lightheadedness. Patient has not had hematemesis for the past 8 hours.    MICU      FAMILY HISTORY:      SOCIAL HISTORY:  Smoking: __ packs x ___ years  EtOH Use:  Marital Status:  Occupation:  Recent Travel:  Country of Birth:  Advance Directives:    Allergies    contrast media (iodine-based) (Urticaria)  ibuprofen (Hives)  ibuprofen/morphine (Unknown)  latex (Swelling)  morphine (Urticaria)  shellfish (Urticaria)    Intolerances        HOME MEDICATIONS:    REVIEW OF SYSTEMS:  Constitutional:   Eyes:  ENT:  CV:  Resp:  GI:  :  MSK:  Integumentary:  Neurological:  Psychiatric:  Endocrine:  Hematologic/Lymphatic:  Allergic/Immunologic:  [ ] All other systems negative  [ ] Unable to assess ROS because ________    OBJECTIVE:  ICU Vital Signs Last 24 Hrs  T(C): 36.9 (30 May 2022 03:00), Max: 37.7 (30 May 2022 01:10)  T(F): 98.5 (30 May 2022 03:00), Max: 99.9 (30 May 2022 01:10)  HR: 87 (30 May 2022 03:00) (87 - 88)  BP: 122/63 (30 May 2022 03:00) (115/67 - 129/70)  BP(mean): --  ABP: --  ABP(mean): --  RR: 16 (30 May 2022 03:00) (16 - 20)  SpO2: 99% (30 May 2022 03:00) (97% - 100%)        CAPILLARY BLOOD GLUCOSE          PHYSICAL EXAM:  GENERAL: NAD, well-developed  HEAD:  Atraumatic, Normocephalic  EYES: EOMI, PERRLA, conjunctiva and sclera clear  NECK: Supple, No JVD  CHEST/LUNG: Clear to auscultation bilaterally; No wheeze  HEART: Regular rate and rhythm; No murmurs, rubs, or gallops  ABDOMEN: Soft, Nontender, Nondistended; Bowel sounds present  EXTREMITIES:  2+ Peripheral Pulses, No clubbing, cyanosis, or edema  PSYCH: AAOx3  NEUROLOGY: non-focal  SKIN: No rashes or lesions      HOSPITAL MEDICATIONS:  MEDICATIONS  (STANDING):    MEDICATIONS  (PRN):      LABS:                        11.6   4.88  )-----------( 94       ( 30 May 2022 02:51 )             38.3     05-30    132<L>  |  114<H>  |  33<H>  ----------------------------<  442<H>  >9.0<HH>   |  13<L>  |  0.79    Ca    6.1<LL>      30 May 2022 02:51    TPro  3.9<L>  /  Alb  2.3<L>  /  TBili  0.3  /  DBili  x   /  AST  14  /  ALT  11  /  AlkPhos  42  05-30    PT/INR - ( 30 May 2022 02:51 )   PT: 14.6 sec;   INR: 1.26 ratio         PTT - ( 30 May 2022 02:51 )  PTT:26.4 sec          MICROBIOLOGY:     RADIOLOGY:  [ ] Reviewed and interpreted by me    EKG:   CHIEF COMPLAINT:     HPI: 52 year old female with a PMHx of HTN, CKD now s/p kidney transplant on tacrolimus presenting with hematemesis. MICU consulted for GIB. Patient reported having melena yesterday, 2-3 episodes of hematemesis. Evaluated at [facility] and transferred to [facility] for higher level of care. Patient received transfusion of PRBC en route to [facility]. Patient complains of abdominal pain and lightheadedness at home. Denies prior history of vomiting blood, GI issue. Patient has not had hematemesis for the past 8 hours. Currently feeling okay except for groin pain. Denies fever, chills, dizziness.      FAMILY HISTORY:      SOCIAL HISTORY:  Denies tobacco, alcohol and drug use.    Allergies    contrast media (iodine-based) (Urticaria)  ibuprofen (Hives)  ibuprofen/morphine (Unknown)  latex (Swelling)  morphine (Urticaria)  shellfish (Urticaria)    Intolerances        HOME MEDICATIONS:    REVIEW OF SYSTEMS:  Constitutional:   Eyes:  ENT:  CV:  Resp:  GI:  :  MSK:  Integumentary:  Neurological:  Psychiatric:  Endocrine:  Hematologic/Lymphatic:  Allergic/Immunologic:  [X] Please see hpi  [ ] Unable to assess ROS because ________    OBJECTIVE:  ICU Vital Signs Last 24 Hrs  T(C): 36.9 (30 May 2022 03:00), Max: 37.7 (30 May 2022 01:10)  T(F): 98.5 (30 May 2022 03:00), Max: 99.9 (30 May 2022 01:10)  HR: 87 (30 May 2022 03:00) (87 - 88)  BP: 122/63 (30 May 2022 03:00) (115/67 - 129/70)  BP(mean): --  ABP: --  ABP(mean): --  RR: 16 (30 May 2022 03:00) (16 - 20)  SpO2: 99% (30 May 2022 03:00) (97% - 100%)        CAPILLARY BLOOD GLUCOSE          PHYSICAL EXAM:  GENERAL: NAD, well-developed  HEAD:  Atraumatic, Normocephalic  EYES: conjunctiva and sclera clear  NECK: Supple, No JVD  CHEST/LUNG: nonlabored breathing, no accessory muscle use  HEART: Regular rate and rhythm. On POCUS, heart looks hyperdynamic   ABDOMEN: Soft, Nontender, Nondistended. On POCUS, stomach looks small and not distended  EXTREMITIES:  2+ Peripheral Pulses, No clubbing, cyanosis, or edema  PSYCH: AAOx3  NEUROLOGY: non-focal        HOSPITAL MEDICATIONS:  MEDICATIONS  (STANDING):    MEDICATIONS  (PRN):      LABS:                        11.6   4.88  )-----------( 94       ( 30 May 2022 02:51 )             38.3     05-30    132<L>  |  114<H>  |  33<H>  ----------------------------<  442<H>  >9.0<HH>   |  13<L>  |  0.79    Ca    6.1<LL>      30 May 2022 02:51    TPro  3.9<L>  /  Alb  2.3<L>  /  TBili  0.3  /  DBili  x   /  AST  14  /  ALT  11  /  AlkPhos  42  05-30    PT/INR - ( 30 May 2022 02:51 )   PT: 14.6 sec;   INR: 1.26 ratio         PTT - ( 30 May 2022 02:51 )  PTT:26.4 sec

## 2022-05-30 NOTE — PATIENT PROFILE ADULT - FALL HARM RISK - HARM RISK INTERVENTIONS

## 2022-05-30 NOTE — ED ADULT NURSE NOTE - CHIEF COMPLAINT QUOTE
[facility] transfer for GI bleed. Vomiting blood and 2 episodes of dark stools beginning today. Arrived to ED with 1 unit PRBCs infusing. Denies CP/SOB.

## 2022-05-30 NOTE — CONSULT NOTE ADULT - SUBJECTIVE AND OBJECTIVE BOX
HPI:  52F w/ ESRD s/p HepC DDRT, chronic SVC syndrome s/p L cephalic-iliac vein bypass on DAPT, s/p thrombectomy of L iliac vein and graft , Hx BK viremia, HTN presents as transfer from [facility] ED for hematemesis. The patient reports that on day of presentation she suddenly began have large volume bloody emesis 2-3 times and several episodes of black stool. She reported worsening of chronic LUQ abdominal pain, moderate severity, vague in character, no reported radiation, not worsened with eating, no clear intervention to improve. The patient denies history of stomach ulcer or NSAID use. Chronic abdominal pain reported for approximately 2mo but not as severe. The patient at [facility] received pantoprazole 80mg IVx1 and was transported via ambulance which during that time received 1uPRBC. The patient reports using aspirin and clopidogrel for unclear reason but per chart appears to be for chronic SVC syndrome.    GI called for above findings     Allergies:  contrast media (iodine-based) (Urticaria)  ibuprofen (Hives)  ibuprofen/morphine (Unknown)  latex (Swelling)  morphine (Urticaria)  shellfish (Urticaria)        Hospital Medications:  methylPREDNISolone sodium succinate Injectable 4 milliGRAM(s) IV Push daily  pantoprazole Infusion 8 mG/Hr IV Continuous <Continuous>      PMHX/PSHX:  ESRD on Dialysis    HTN - Hypertension    Clotted Renal Dialysis AV Graft    Infection of AV Graft for Dialysis    Clotted Renal Dialysis AV Graft    Infection of AV Graft for Dialysis    Fibroid Tumor    CKD (chronic kidney disease) stage V requiring chronic dialysis    History of Hysterectomy    AV fistula    H/O extremity bypass graft    Kidney transplant recipient        Family history:  No pertinent family history in first degree relatives    No pertinent family history in first degree relatives        Social History: no smoking    ROS:   General:  No fevers, chills or night sweats  ENT:  No sore throat or dysphagia  CV:  No pain or palpitations  Resp:  No dyspnea, cough or  wheezing  GI:  as above  Skin:  No rash or edema  Neuro: no weakness   Hematologic: no bleeding  Musculoskeletal: no muscle pain or join pain  Psych: no agitation     : no dysuria      PHYSICAL EXAM:   GENERAL:  NAD, Appears stated age  HEENT:  NC/AT,  conjunctivae clear and pink, sclera -anicteric  CHEST:  CTA B/L, Normal effort  HEART:  RRR S1/S2,  ABDOMEN:  Soft, non-tender, non-distended,  no masses, LISSET with black stool  EXTREMITIES:  No cyanosis or Edema  SKIN:  Warm & Dry. No rash or erythema  NEURO:  Alert, oriented, no focal deficit    Vital Signs:  Vital Signs Last 24 Hrs  T(C): 37 (30 May 2022 13:23), Max: 37.7 (30 May 2022 01:10)  T(F): 98.6 (30 May 2022 13:23), Max: 99.9 (30 May 2022 01:10)  HR: 85 (30 May 2022 13:23) (80 - 88)  BP: 99/57 (30 May 2022 13:23) (99/57 - 129/71)  BP(mean): --  RR: 18 (30 May 2022 13:23) (16 - 20)  SpO2: 98% (30 May 2022 13:23) (95% - 100%)  Daily Height in cm: 160.02 (30 May 2022 01:10)    Daily     LABS:                        8.9    5.44  )-----------( 129      ( 30 May 2022 12:35 )             27.6     Mean Cell Volume: 86.3 fl (-22 @ 12:35)    05-    144  |  112<H>  |  49<H>  ----------------------------<  81  4.2   |  23  |  1.00    Ca    9.0      30 May 2022 06:59  Phos  2.2     05-30  Mg     1.4     05-30    TPro  5.1<L>  /  Alb  3.4  /  TBili  0.5  /  DBili  x   /  AST  16  /  ALT  12  /  AlkPhos  50  05-30    LIVER FUNCTIONS - ( 30 May 2022 06:59 )  Alb: 3.4 g/dL / Pro: 5.1 g/dL / ALK PHOS: 50 U/L / ALT: 12 U/L / AST: 16 U/L / GGT: x           PT/INR - ( 30 May 2022 02:51 )   PT: 14.6 sec;   INR: 1.26 ratio         PTT - ( 30 May 2022 02:51 )  PTT:26.4 sec  Urinalysis Basic - ( 30 May 2022 09:08 )    Color: Light Yellow / Appearance: Clear / S.026 / pH: x  Gluc: x / Ketone: Trace  / Bili: Negative / Urobili: Negative   Blood: x / Protein: Negative / Nitrite: Negative   Leuk Esterase: Moderate / RBC: 1 /hpf / WBC 2 /HPF   Sq Epi: x / Non Sq Epi: 2 /hpf / Bacteria: Negative      Amylase Serum--      Lipase serum16       Ammonia--                          8.9    5.44  )-----------( 129      ( 30 May 2022 12:35 )             27.6                         9.4    6.72  )-----------( 135      ( 30 May 2022 07:02 )             30.1                         11.6   4.88  )-----------( 94       ( 30 May 2022 02:51 )             38.3     Imaging:  < from: Xray Chest 1 View- PORTABLE-Urgent (Xray Chest 1 View- PORTABLE-Urgent .) (22 @ 01:47) >    IMPRESSION:  Clear lungs.    < end of copied text >

## 2022-05-30 NOTE — PROGRESS NOTE ADULT - PROBLEM SELECTOR PLAN 5
nephrology consult in the am  hold transplant medication this am until nephrology evaluates hold antihypertensives given report of large volume hematemesis

## 2022-05-30 NOTE — PROGRESS NOTE ADULT - ATTENDING COMMENTS
above plans discussed with Dr. Lerma    # acute blood loss anemia   # hematemesis  # SVC syndrome on DAPT  # s/p renal transplant  # immunocompromised    - episodes of hematemesis resulting in acute blood loss anemia s/p 3 units of pRBC  - pt currently remains stable without further episode at high risk  - continue IV PPI for now  - appreciate GI consult: would need EGD for source control; tentatively scheduled for 5/31 unless pt acute decompensates  - will need to discuss with vascular re: further need for DAPT   - appreciate transplant nephrology: continue immunosuppression drugs in IV form for now; monitor BMP closely  - SCDs for DVT ppx    Raisa Poole MD  Division of Hospital Medicine  Contact via Microsoft Teams  Office: [phone]

## 2022-05-30 NOTE — CONSULT NOTE ADULT - ASSESSMENT
52F w/ ESRD s/p HepC DDRT in 2020, chronic SVC syndrome s/p L cephalic-iliac vein bypass on DAPT, s/p thrombectomy of L iliac vein and graft 2012, Hx BK viremia, HTN admitted with hematemasis.      1.  REnal transplant - performed in 2020 by Dr. Monroy.  REnal function at baseline.  Outpatient nephrologist is Dr. Lake.  2.  Immunosuppression - PT is NPO.  Can hold leflunomide, can change prednisone to methylpred 4mgs daily.  Best option for envarsus is to change to tacrolimus 3mgs sublingual BID, next best option is IV tacrolimus 1.5mgs given over 24 hour constant infusion.   3.  GI bleed - To be seen by GI.  History suggests upper GI bleed, pt on protonix drip.

## 2022-05-30 NOTE — ED ADULT NURSE NOTE - OBJECTIVE STATEMENT
52 year old Female PMH: HTN, kidney transplant 8/2020. Patient transferred from [facility] for x2 episodes of bloody stools and vomiting today.  Patient transferred with one unit of packed red blood cells running- blood sheet in chart, 15 min vitals not completed as appropriate on sheet. Patient reports abdominal pain, dizziness and tired. Patient A&Ox4, breathing spontaneous and unlabored. Denies chest pain, shortness of breath, numbness, tingling. Bed locked and in lowest position for safety. 52 year old Female PMH: HTN, kidney transplant 8/2020. Patient transferred from [facility] for x2 episodes of bloody stools and vomiting today.  Patient transferred with one unit of packed red blood cells running- blood sheet in chart, 15 min vitals not completed as appropriate on sheet. Patient reports abdominal pain, dizziness and tired. Patient A&Ox4, breathing spontaneous and unlabored. Denies chest pain, shortness of breath, numbness, tingling. Bed locked and in lowest position for safety.  *unable to use right arm due to fistula in place- pink band intact

## 2022-05-30 NOTE — PROGRESS NOTE ADULT - ASSESSMENT
52F w/ ESRD s/p HepC DDRT, chronic SVC syndrome s/p L cephalic-iliac vein bypass on DAPT, s/p thrombectomy of L iliac vein and graft 2012, Hx BK viremia, HTN presents as transfer from [facility] ED for hematemesis 2/2 acute upper GI hemorrhage

## 2022-05-30 NOTE — PROGRESS NOTE ADULT - PROBLEM SELECTOR PLAN 1
- GI consult placed via email. Will need day team to coordinate this am. Will need endoscopy  - repeat cbc post transfusion; has received 2UpRBC thus far  - pantoprazole 80mg IV bolus given at [facility], c/w pantoprazole gtt here  - NPO  - holding home meds until GI evaluates as may need endoscopy this am    Hb 5/20/22 on outpatient labs 12.5 appears downtrend from Feb 22 16.5 which during time patient has had abdominal pain. On arrival to [facility] 5/29 Hb 7.6, now improving since initial 1U pRBC infusion Likely secondary to active upper GI Hemorrhage given pts episodes of hematemesis and melena  - May be secondary to SVC syndrome (If there is compression of vessels causing varices)  Pt received 2 units PRBCs outside ED, 1 unit at NS  - H/H dropping 11.6 --> 9.4  - CBC q8  - pantoprazole 80 bolus given at [facility], c/w gtt here  - NPO, pending potential endoscopic evaluation by GI (will see what their recs are)_  - holding home meds for now Likely secondary to active upper GI Hemorrhage given pts episodes of hematemesis and melena  - May be secondary to SVC syndrome (If there is compression of vessels causing varices)  Pt received 2 units PRBCs  - H/H dropping 11.6 --> 9.4  - CBC q8  - pantoprazole 80 bolus given at [facility], c/w gtt here  - NPO, pending repeat CBC, if stable will get scope 5/31, if not then today  - holding home meds for now

## 2022-05-30 NOTE — CHART NOTE - NSCHARTNOTEFT_GEN_A_CORE
MICU Accept Note    CHIEF COMPLAINT: hematemesis     HPI / INTERVAL HISTORY:    52F w/ ESRD s/p HepC DDRT, chronic SVC syndrome s/p L cephalic-iliac vein bypass on DAPT (s/p thrombectomy of L iliac vein and graft , Hx BK viremia), HTN [facility]. The patient reports that on day of presentation she suddenly began have large volume bloody emesis 2-3 times and several episodes of black stool. She reported worsening of chronic LUQ abdominal pain x2 month, moderate severity, vague in character, no reported radiation, not worsened with eating, no clear intervention to improve. The patient denies history of stomach ulcer or NSAID use. [facility] 80mg IVx1 and was transported via ambulance which during that time received 1uPRBC. The patient reports using aspirin and clopidogrel for unclear reason but per chart appears to be for chronic SVC syndrome. s/p 1 UPRBC     On  pt had 2 further episodes of hematemesis with diann blood (~2L) with borderline BP (99/57). Admitted to MICU for urgent endoscopy with GI       PAST MEDICAL & SURGICAL HISTORY:  HTN - Hypertension      Clotted Renal Dialysis AV Graft      Infection of AV Graft for Dialysis      Fibroid Tumor      CKD (chronic kidney disease) stage V requiring chronic dialysis      History of Hysterectomy  for benign disease      AV fistula  B/L - failed      H/O extremity bypass graft  H/O RUE bypass and creation of right brachial graft      Kidney transplant recipient          FAMILY HISTORY:  No pertinent family history in first degree relatives        SOCIAL HISTORY: No tobacco use     HOME MEDICATIONS:      Allergies    contrast media (iodine-based) (Urticaria)  ibuprofen (Hives)  ibuprofen/morphine (Unknown)  latex (Swelling)  morphine (Urticaria)  shellfish (Urticaria)    Intolerances          REVIEW OF SYSTEMS:  CONSTITUTIONAL: No weakness, fevers or chills  EYES/ENT: No visual changes;  No vertigo or throat pain   NECK: No pain or stiffness  RESPIRATORY: No cough, wheezing, hemoptysis; No shortness of breath  CARDIOVASCULAR: No chest pain or palpitations  GASTROINTESTINAL: No abdominal or epigastric pain. No nausea, vomiting, or hematemesis; No diarrhea or constipation. No melena or hematochezia.  GENITOURINARY: No dysuria, frequency or hematuria  NEUROLOGICAL: No numbness or weakness  SKIN: No itching, burning, rashes, or lesions   PSYCH: no hx depression, no hx anxiety      OBJECTIVE:  ICU Vital Signs Last 24 Hrs  T(C): 37 (30 May 2022 13:23), Max: 37.7 (30 May 2022 01:10)  T(F): 98.6 (30 May 2022 13:23), Max: 99.9 (30 May 2022 01:10)  HR: 85 (30 May 2022 13:23) (80 - 88)  BP: 99/57 (30 May 2022 13:23) (99/57 - 129/71)  BP(mean): --  ABP: --  ABP(mean): --  RR: 18 (30 May 2022 13:23) (16 - 20)  SpO2: 98% (30 May 2022 13:23) (95% - 100%)         @ 07:01  -   @ 15:02  --------------------------------------------------------  IN: 0 mL / OUT: 300 mL / NET: -300 mL      CAPILLARY BLOOD GLUCOSE          PHYSICAL EXAM:     GENERAL APPEARANCE: Somnolent, lying in bed  HEENT:  EOMI. hearing grossly intact. icteric sclera  NECK: Neck supple, non-tender no lymphadenopathy, masses or thyromegaly.  CARDIAC: Normal S1 and S2. flow murmur in Aortic and pulmonic region   LUNGS: Clear to auscultation B/L, no rales, rhonchi, or wheezing  ABDOMEN: Diffuse mild tenderness to palpation, no distension  EXTREMITIES: No edema. Peripheral pulses intact, R. brachial fistula with + thrills   NEUROLOGICAL: Non focal. Strength and sensation symmetric and intact throughout.   SKIN: Warm and dry , Well perfused  PSYCHIATRIC: AOx3 , Normal mood and affect      HOSPITAL MEDICATIONS:  MEDICATIONS  (STANDING):  calcium gluconate IVPB 1 Gram(s) IV Intermittent once  methylPREDNISolone sodium succinate Injectable 4 milliGRAM(s) IV Push daily  pantoprazole Infusion 8 mG/Hr (10 mL/Hr) IV Continuous <Continuous>  tacrolimus  IVPB 1.5 milliGRAM(s) IV Intermittent every 24 hours    MEDICATIONS  (PRN):  ondansetron Injectable 4 milliGRAM(s) IV Push once PRN Nausea and/or Vomiting      LABS:                        7.7    7.47  )-----------( 126      ( 30 May 2022 14:38 )             24.8     Hgb Trend: 7.7<--, 8.9<--, 9.4<--, 11.6<--  05-30    144  |  112<H>  |  49<H>  ----------------------------<  81  4.2   |  23  |  1.00    Ca    9.0      30 May 2022 06:59  Phos  2.2     05-30  Mg     1.4     05-30    TPro  5.1<L>  /  Alb  3.4  /  TBili  0.5  /  DBili  x   /  AST  16  /  ALT  12  /  AlkPhos  50  05-30    Creatinine Trend: 1.00<--, 0.79<--  PT/INR - ( 30 May 2022 02:51 )   PT: 14.6 sec;   INR: 1.26 ratio         PTT - ( 30 May 2022 02:51 )  PTT:26.4 sec  Urinalysis Basic - ( 30 May 2022 09:08 )    Color: Light Yellow / Appearance: Clear / S.026 / pH: x  Gluc: x / Ketone: Trace  / Bili: Negative / Urobili: Negative   Blood: x / Protein: Negative / Nitrite: Negative   Leuk Esterase: Moderate / RBC: 1 /hpf / WBC 2 /HPF   Sq Epi: x / Non Sq Epi: 2 /hpf / Bacteria: Negative            MICROBIOLOGY:     RADIOLOGY & ADDITIONAL TESTS:    Assessment and Plan:    52F w/ ESRD s/p HepC DDRT, chronic SVC syndrome s/p L cephalic-iliac vein bypass on DAPT, s/p thrombectomy of L iliac vein and graft , Hx BK viremia, HTN [facility] 2/2 acute upper GI hemorrhage    Neuro:   AAOx3  No acute issues     Resp:     # Intubation for endoscopy       CV:     #Borderline low BP   Monitor BP   Transfuse to >7U       #HTN (hypertension)  hold antihypertensives given large volume hematemesis  Monitor BP       GI:     #Hematemesis   s/p 3 units PRBCs    - Monitor CBC q8  - c/w pantoprazole 80 bolus gtt  - Scope with GI   -     Renal/:     #Kidney transplant recipient.   ·  Plan: Transnplant 2020 by Dr. Monroy.  Renal function at baseline.  Outpatient nephrologist is Dr. Lake.  Immunosuppression while pt is NPO per renal:   - hold leflunomide  - change prednisone to methylpred 4mgs daily.   - change envarsus to tacrolimus 3mgs sublingual BID or IV tacrolimus 1.5mgs given over 24 hour constant infusion.       ID:     No acute issues  Monitor fever curve, WBC       Endo:    No acute issues      Heme:     #SVC syndrome.   Previous hx of DAPT w/ surgical bypass on chronic DAPT  hold DAPT given large hemorrhage.    # Anemia due to acute blood loss.   Likely secondary to active upper GI Hemorrhage given pts episodes of hematemesis and melena  May be secondary to SVC syndrome (If there is compression of vessels causing varices)  s/p 3 units PRBCs    - Monitor CBC q8  - c/w pantoprazole 80 bolus gtt  - Scope with GI   - holding home meds for now.     # Thrombocytopenia.   Will transfuse for <10, <15 w/ fever, <50 iso of active bleeding (hematemesis)  CTM plts     Ethics: to be discussed    Diet: NPO pending scope MICU Accept Note    CHIEF COMPLAINT: hematemesis     HPI / INTERVAL HISTORY:    52F w/ ESRD s/p HepC DDRT, chronic SVC syndrome s/p L cephalic-iliac vein bypass on DAPT (s/p thrombectomy of L iliac vein and graft , Hx BK viremia), HTN presents as transfer from [facility] ED for hematemesis. The patient reports that on day of presentation she suddenly began have large volume bloody emesis 2-3 times and several episodes of black stool. She reported worsening of chronic LUQ abdominal pain x2 month, moderate severity, vague in character, no reported radiation, not worsened with eating, no clear intervention to improve. The patient denies history of stomach ulcer or NSAID use. The patient at [facility] received pantoprazole 80mg IVx1 and was transported via ambulance which during that time received 1uPRBC. The patient reports using aspirin and clopidogrel for unclear reason but per chart appears to be for chronic SVC syndrome. s/p 1 UPRBC     On  pt had 2 further episodes of hematemesis with diann blood (~2L) with borderline BP (99/57). Admitted to MICU for urgent endoscopy with GI       PAST MEDICAL & SURGICAL HISTORY:  HTN - Hypertension      Clotted Renal Dialysis AV Graft      Infection of AV Graft for Dialysis      Fibroid Tumor      CKD (chronic kidney disease) stage V requiring chronic dialysis      History of Hysterectomy  for benign disease      AV fistula  B/L - failed      H/O extremity bypass graft  H/O RUE bypass and creation of right brachial graft      Kidney transplant recipient          FAMILY HISTORY:  No pertinent family history in first degree relatives        SOCIAL HISTORY: No tobacco use     HOME MEDICATIONS:      Allergies    contrast media (iodine-based) (Urticaria)  ibuprofen (Hives)  ibuprofen/morphine (Unknown)  latex (Swelling)  morphine (Urticaria)  shellfish (Urticaria)    Intolerances          REVIEW OF SYSTEMS:  CONSTITUTIONAL: No weakness, fevers or chills  EYES/ENT: No visual changes;  No vertigo or throat pain   NECK: No pain or stiffness  RESPIRATORY: No cough, wheezing, hemoptysis; No shortness of breath  CARDIOVASCULAR: No chest pain or palpitations  GASTROINTESTINAL: No abdominal or epigastric pain. No nausea, vomiting, or hematemesis; No diarrhea or constipation. No melena or hematochezia.  GENITOURINARY: No dysuria, frequency or hematuria  NEUROLOGICAL: No numbness or weakness  SKIN: No itching, burning, rashes, or lesions   PSYCH: no hx depression, no hx anxiety      OBJECTIVE:  ICU Vital Signs Last 24 Hrs  T(C): 37 (30 May 2022 13:23), Max: 37.7 (30 May 2022 01:10)  T(F): 98.6 (30 May 2022 13:23), Max: 99.9 (30 May 2022 01:10)  HR: 85 (30 May 2022 13:23) (80 - 88)  BP: 99/57 (30 May 2022 13:23) (99/57 - 129/71)  BP(mean): --  ABP: --  ABP(mean): --  RR: 18 (30 May 2022 13:23) (16 - 20)  SpO2: 98% (30 May 2022 13:23) (95% - 100%)         @ 07:01  -   @ 15:02  --------------------------------------------------------  IN: 0 mL / OUT: 300 mL / NET: -300 mL      CAPILLARY BLOOD GLUCOSE          PHYSICAL EXAM:     GENERAL APPEARANCE: Somnolent, lying in bed  HEENT:  EOMI. hearing grossly intact. icteric sclera  NECK: Neck supple, non-tender no lymphadenopathy, masses or thyromegaly.  CARDIAC: Normal S1 and S2. flow murmur in Aortic and pulmonic region   LUNGS: Clear to auscultation B/L, no rales, rhonchi, or wheezing  ABDOMEN: Diffuse mild tenderness to palpation, no distension  EXTREMITIES: No edema. Peripheral pulses intact, R. brachial fistula with + thrills   NEUROLOGICAL: Non focal. Strength and sensation symmetric and intact throughout.   SKIN: Warm and dry , Well perfused  PSYCHIATRIC: AOx3 , Normal mood and affect      HOSPITAL MEDICATIONS:  MEDICATIONS  (STANDING):  calcium gluconate IVPB 1 Gram(s) IV Intermittent once  methylPREDNISolone sodium succinate Injectable 4 milliGRAM(s) IV Push daily  pantoprazole Infusion 8 mG/Hr (10 mL/Hr) IV Continuous <Continuous>  tacrolimus  IVPB 1.5 milliGRAM(s) IV Intermittent every 24 hours    MEDICATIONS  (PRN):  ondansetron Injectable 4 milliGRAM(s) IV Push once PRN Nausea and/or Vomiting      LABS:                        7.7    7.47  )-----------( 126      ( 30 May 2022 14:38 )             24.8     Hgb Trend: 7.7<--, 8.9<--, 9.4<--, 11.6<--  05-30    144  |  112<H>  |  49<H>  ----------------------------<  81  4.2   |  23  |  1.00    Ca    9.0      30 May 2022 06:59  Phos  2.2     05-30  Mg     1.4     05-30    TPro  5.1<L>  /  Alb  3.4  /  TBili  0.5  /  DBili  x   /  AST  16  /  ALT  12  /  AlkPhos  50  05-30    Creatinine Trend: 1.00<--, 0.79<--  PT/INR - ( 30 May 2022 02:51 )   PT: 14.6 sec;   INR: 1.26 ratio         PTT - ( 30 May 2022 02:51 )  PTT:26.4 sec  Urinalysis Basic - ( 30 May 2022 09:08 )    Color: Light Yellow / Appearance: Clear / S.026 / pH: x  Gluc: x / Ketone: Trace  / Bili: Negative / Urobili: Negative   Blood: x / Protein: Negative / Nitrite: Negative   Leuk Esterase: Moderate / RBC: 1 /hpf / WBC 2 /HPF   Sq Epi: x / Non Sq Epi: 2 /hpf / Bacteria: Negative            MICROBIOLOGY:     RADIOLOGY & ADDITIONAL TESTS:    Assessment and Plan:    52F w/ ESRD s/p HepC DDRT, chronic SVC syndrome s/p L cephalic-iliac vein bypass on DAPT, s/p thrombectomy of L iliac vein and graft , Hx BK viremia, HTN presents as transfer from [facility] ED for hematemesis 2/2 acute upper GI hemorrhage    Neuro:   AAOx3  No acute issues     Resp:   # Intubation for endoscopy       CV:   #Borderline low BP   Monitor BP   Transfuse to >7U     #HTN (hypertension)  hold antihypertensives given large volume hematemesis  Monitor BP     GI:   #Hematemesis   s/p 3 units PRBCs but recent hgb: 7.8 <-8.9   - Monitor CBC q8  - c/w pantoprazole 80 bolus gtt  - Scope with GI     Renal/:   #Kidney transplant recipient.   ·  Plan: Transplant 2020 by Dr. Monroy.  Renal function at baseline.  Outpatient nephrologist is Dr. Lake.  Immunosuppression while pt is NPO per renal:   - hold leflunomide  - change prednisone to methylpred 4mgs daily.   - change envarsus to tacrolimus 3mgs sublingual BID or IV tacrolimus 1.5mgs given over 24 hour constant infusion, check tacrolimus level qd       ID:   No acute issues  Monitor fever curve, WBC       Endo:  No acute issues      Heme:   #SVC syndrome.   Previous hx of DAPT w/ surgical bypass on chronic DAPT  hold DAPT given large hemorrhage.    # Anemia due to acute blood loss.   Likely secondary to active upper GI Hemorrhage given pts episodes of hematemesis and melena  May be secondary to SVC syndrome (If there is compression of vessels causing varices), no noted recent endoscopy   s/p 3 units PRBCs  - Monitor CBC q8  - c/w pantoprazole 80 bolus gtt  - Scope with GI   - holding home meds for now.     # Thrombocytopenia.   Will transfuse for <10, <15 w/ fever, <50 iso of active bleeding (hematemesis)  CTM plts     Ethics: to be discussed    Diet: NPO pending scope MICU Accept Note    CHIEF COMPLAINT: hematemesis     HPI / INTERVAL HISTORY:    52F w/ ESRD s/p HepC DDRT, chronic SVC syndrome s/p L cephalic-iliac vein bypass on DAPT (s/p thrombectomy of L iliac vein and graft , Hx BK viremia, HTN presents as transfer from [facility] ED for hematemesis. The patient reports that on day of presentation she suddenly began have large volume bloody emesis 2-3 times and several episodes of black stool. She reported worsening of chronic LUQ abdominal pain x2 month, moderate severity, vague in character, no reported radiation, not worsened with eating, no clear intervention to improve. The patient denies history of stomach ulcer or NSAID use. The patient at [facility] received pantoprazole 80mg IVx1 and was transported via ambulance which during that time received 1uPRBC. The patient reports using aspirin and clopidogrel for unclear reason but per chart appears to be for chronic SVC syndrome. s/p 1 UPRBC     On  pt had 2 further episodes of hematemesis with diann blood (~2L) with borderline BP (99/57). Admitted to MICU for urgent endoscopy with GI       PAST MEDICAL & SURGICAL HISTORY:  HTN - Hypertension      Clotted Renal Dialysis AV Graft      Infection of AV Graft for Dialysis      Fibroid Tumor      CKD (chronic kidney disease) stage V requiring chronic dialysis      History of Hysterectomy  for benign disease      AV fistula  B/L - failed      H/O extremity bypass graft  H/O RUE bypass and creation of right brachial graft      Kidney transplant recipient          FAMILY HISTORY:  No pertinent family history in first degree relatives        SOCIAL HISTORY: No tobacco use     HOME MEDICATIONS:      Allergies    contrast media (iodine-based) (Urticaria)  ibuprofen (Hives)  ibuprofen/morphine (Unknown)  latex (Swelling)  morphine (Urticaria)  shellfish (Urticaria)    Intolerances          REVIEW OF SYSTEMS:  CONSTITUTIONAL: No weakness, fevers or chills  EYES/ENT: No visual changes;  No vertigo or throat pain   NECK: No pain or stiffness  RESPIRATORY: No cough, wheezing, hemoptysis; No shortness of breath  CARDIOVASCULAR: No chest pain or palpitations  GASTROINTESTINAL: + chronic abdominal pain. + hematemesis   GENITOURINARY: No dysuria, frequency or hematuria  NEUROLOGICAL: No numbness or weakness  SKIN: No itching, burning, rashes, or lesions   PSYCH: no hx depression, no hx anxiety      OBJECTIVE:  ICU Vital Signs Last 24 Hrs  T(C): 37 (30 May 2022 13:23), Max: 37.7 (30 May 2022 01:10)  T(F): 98.6 (30 May 2022 13:23), Max: 99.9 (30 May 2022 01:10)  HR: 85 (30 May 2022 13:23) (80 - 88)  BP: 99/57 (30 May 2022 13:23) (99/57 - 129/71)  BP(mean): --  ABP: --  ABP(mean): --  RR: 18 (30 May 2022 13:23) (16 - 20)  SpO2: 98% (30 May 2022 13:23) (95% - 100%)         @ 07:01  -   @ 15:02  --------------------------------------------------------  IN: 0 mL / OUT: 300 mL / NET: -300 mL      CAPILLARY BLOOD GLUCOSE          PHYSICAL EXAM:     GENERAL APPEARANCE: Somnolent, lying in bed  HEENT:  EOMI. hearing grossly intact. icteric sclera  NECK: Neck supple, non-tender no lymphadenopathy, masses or thyromegaly.  CARDIAC: Normal S1 and S2. flow murmur in Aortic and pulmonic region   LUNGS: Clear to auscultation B/L, no rales, rhonchi, or wheezing  ABDOMEN: Diffuse mild tenderness to palpation, no distension  EXTREMITIES: No edema. Peripheral pulses intact, R. brachial fistula with + thrills   NEUROLOGICAL: Non focal. Strength and sensation symmetric and intact throughout.   SKIN: Warm and dry , Well perfused  PSYCHIATRIC: AOx3 , Normal mood and affect      HOSPITAL MEDICATIONS:  MEDICATIONS  (STANDING):  calcium gluconate IVPB 1 Gram(s) IV Intermittent once  methylPREDNISolone sodium succinate Injectable 4 milliGRAM(s) IV Push daily  pantoprazole Infusion 8 mG/Hr (10 mL/Hr) IV Continuous <Continuous>  tacrolimus  IVPB 1.5 milliGRAM(s) IV Intermittent every 24 hours    MEDICATIONS  (PRN):  ondansetron Injectable 4 milliGRAM(s) IV Push once PRN Nausea and/or Vomiting      LABS:                        7.7    7.47  )-----------( 126      ( 30 May 2022 14:38 )             24.8     Hgb Trend: 7.7<--, 8.9<--, 9.4<--, 11.6<--  05-30    144  |  112<H>  |  49<H>  ----------------------------<  81  4.2   |  23  |  1.00    Ca    9.0      30 May 2022 06:59  Phos  2.2     05-30  Mg     1.4     05-30    TPro  5.1<L>  /  Alb  3.4  /  TBili  0.5  /  DBili  x   /  AST  16  /  ALT  12  /  AlkPhos  50  05-30    Creatinine Trend: 1.00<--, 0.79<--  PT/INR - ( 30 May 2022 02:51 )   PT: 14.6 sec;   INR: 1.26 ratio         PTT - ( 30 May 2022 02:51 )  PTT:26.4 sec  Urinalysis Basic - ( 30 May 2022 09:08 )    Color: Light Yellow / Appearance: Clear / S.026 / pH: x  Gluc: x / Ketone: Trace  / Bili: Negative / Urobili: Negative   Blood: x / Protein: Negative / Nitrite: Negative   Leuk Esterase: Moderate / RBC: 1 /hpf / WBC 2 /HPF   Sq Epi: x / Non Sq Epi: 2 /hpf / Bacteria: Negative            MICROBIOLOGY:     RADIOLOGY & ADDITIONAL TESTS:    Assessment and Plan:    52F w/ ESRD s/p HepC DDRT, chronic SVC syndrome s/p L cephalic-iliac vein bypass on DAPT, s/p thrombectomy of L iliac vein and graft , Hx BK viremia, HTN presents as transfer from [facility] ED for hematemesis 2/2 acute upper GI hemorrhage    Neuro:   AAOx3 currently  No acute issues     Resp:     # Intubation for endoscopy   Monitor resp status       CV:   #Borderline low BP   Likely i/s/o volume loss from hematemesis   Monitor BP   Transfuse to >7U     #HTN (hypertension)  hold antihypertensives given large volume hematemesis  Monitor BP     GI:   #Hematemesis   s/p 2 units PRBCs but recent hgb: 7.8 <-8.9   - Monitor CBC q8 and transfuse to >7  - maintain active type and screen   - c/w pantoprazole 80 bolus gtt  - GI for urgent endoscopy  - erythromycin for GI motility   - 2 large bore IV's     Renal/:   #Kidney transplant recipient.   s/p DDRT 2020 by Dr. Monroy.  Renal function at baseline.  Outpatient nephrologist is Dr. Lake.  Immunosuppression while pt is NPO per renal:   - hold leflunomide  - c/w methylpred 4mgs daily   - c/w IV tacrolimus 1.5mgs given over 24 hour constant infusion  - monitor tacrolimus level qd     ID:   No acute issues  Monitor fever curve, WBC       Endo:  No acute issues      Heme:   #SVC syndrome.   Previous hx of DAPT w/ surgical bypass on chronic DAPT  hold DAPT given large hemorrhage.    # Anemia due to acute blood loss.   Likely secondary to active upper GI Hemorrhage given pts episodes of hematemesis and melena  May be secondary to SVC syndrome (If there is compression of vessels causing varices), no noted recent endoscopy   s/p 2 units PRBCs  - Monitor CBC q8  - Transfuse to >7   - 2 large bore IV   - c/w pantoprazole 80 bolus gtt  - Urgent endoscopy with GI   - Maintain active type and screen    # Thrombocytopenia.   Will transfuse for <10, <15 w/ fever, <50 iso of active bleeding (hematemesis)  CTM plts     Ethics: to be discussed    Diet: NPO pending scope MICU Accept Note    CHIEF COMPLAINT: hematemesis     HPI / INTERVAL HISTORY:    52F w/ ESRD s/p HepC DDRT, chronic SVC syndrome s/p L cephalic-iliac vein bypass on DAPT (s/p thrombectomy of L iliac vein and graft , Hx BK viremia, HTN presents as transfer from [facility] ED for hematemesis. The patient reports that on day of presentation she suddenly began have large volume bloody emesis 2-3 times and several episodes of black stool. She reported worsening of chronic LUQ abdominal pain x2 month, moderate severity, vague in character, no reported radiation, not worsened with eating, no clear intervention to improve. The patient denies history of stomach ulcer or NSAID use. The patient at [facility] received pantoprazole 80mg IVx1 and was transported via ambulance which during that time received 1uPRBC. The patient reports using aspirin and clopidogrel for unclear reason but per chart appears to be for chronic SVC syndrome. s/p 2 total U PRBC     On  pt had 2 further episodes of hematemesis with diann blood (~2L) with borderline BP (99/57). Admitted to MICU for urgent endoscopy with GI       PAST MEDICAL & SURGICAL HISTORY:  HTN - Hypertension      Clotted Renal Dialysis AV Graft      Infection of AV Graft for Dialysis      Fibroid Tumor      CKD (chronic kidney disease) stage V requiring chronic dialysis      History of Hysterectomy  for benign disease      AV fistula  B/L - failed      H/O extremity bypass graft  H/O RUE bypass and creation of right brachial graft      Kidney transplant recipient          FAMILY HISTORY:  No pertinent family history in first degree relatives        SOCIAL HISTORY: No tobacco use     HOME MEDICATIONS:      Allergies    contrast media (iodine-based) (Urticaria)  ibuprofen (Hives)  ibuprofen/morphine (Unknown)  latex (Swelling)  morphine (Urticaria)  shellfish (Urticaria)    Intolerances          REVIEW OF SYSTEMS:  CONSTITUTIONAL: No weakness, fevers or chills  EYES/ENT: No visual changes;  No vertigo or throat pain   NECK: No pain or stiffness  RESPIRATORY: No cough, wheezing, hemoptysis; No shortness of breath  CARDIOVASCULAR: No chest pain or palpitations  GASTROINTESTINAL: + chronic abdominal pain. + hematemesis   GENITOURINARY: No dysuria, frequency or hematuria  NEUROLOGICAL: No numbness or weakness  SKIN: No itching, burning, rashes, or lesions   PSYCH: no hx depression, no hx anxiety      OBJECTIVE:  ICU Vital Signs Last 24 Hrs  T(C): 37 (30 May 2022 13:23), Max: 37.7 (30 May 2022 01:10)  T(F): 98.6 (30 May 2022 13:23), Max: 99.9 (30 May 2022 01:10)  HR: 85 (30 May 2022 13:23) (80 - 88)  BP: 99/57 (30 May 2022 13:23) (99/57 - 129/71)  BP(mean): --  ABP: --  ABP(mean): --  RR: 18 (30 May 2022 13:23) (16 - 20)  SpO2: 98% (30 May 2022 13:23) (95% - 100%)         @ 07:01  -   @ 15:02  --------------------------------------------------------  IN: 0 mL / OUT: 300 mL / NET: -300 mL      CAPILLARY BLOOD GLUCOSE          PHYSICAL EXAM:     GENERAL APPEARANCE: Somnolent, lying in bed  HEENT:  EOMI. hearing grossly intact. icteric sclera  NECK: Neck supple, non-tender no lymphadenopathy, masses or thyromegaly.  CARDIAC: Normal S1 and S2. flow murmur in Aortic and pulmonic region   LUNGS: Clear to auscultation B/L, no rales, rhonchi, or wheezing  ABDOMEN: Diffuse mild tenderness to palpation, no distension  EXTREMITIES: No edema. Peripheral pulses intact, R. brachial fistula with + thrills   NEUROLOGICAL: Non focal. Strength and sensation symmetric and intact throughout.   SKIN: Warm and dry , Well perfused  PSYCHIATRIC: AOx3 , Normal mood and affect      HOSPITAL MEDICATIONS:  MEDICATIONS  (STANDING):  calcium gluconate IVPB 1 Gram(s) IV Intermittent once  methylPREDNISolone sodium succinate Injectable 4 milliGRAM(s) IV Push daily  pantoprazole Infusion 8 mG/Hr (10 mL/Hr) IV Continuous <Continuous>  tacrolimus  IVPB 1.5 milliGRAM(s) IV Intermittent every 24 hours    MEDICATIONS  (PRN):  ondansetron Injectable 4 milliGRAM(s) IV Push once PRN Nausea and/or Vomiting      LABS:                        7.7    7.47  )-----------( 126      ( 30 May 2022 14:38 )             24.8     Hgb Trend: 7.7<--, 8.9<--, 9.4<--, 11.6<--  05-30    144  |  112<H>  |  49<H>  ----------------------------<  81  4.2   |  23  |  1.00    Ca    9.0      30 May 2022 06:59  Phos  2.2     05-30  Mg     1.4     05-30    TPro  5.1<L>  /  Alb  3.4  /  TBili  0.5  /  DBili  x   /  AST  16  /  ALT  12  /  AlkPhos  50  05-30    Creatinine Trend: 1.00<--, 0.79<--  PT/INR - ( 30 May 2022 02:51 )   PT: 14.6 sec;   INR: 1.26 ratio         PTT - ( 30 May 2022 02:51 )  PTT:26.4 sec  Urinalysis Basic - ( 30 May 2022 09:08 )    Color: Light Yellow / Appearance: Clear / S.026 / pH: x  Gluc: x / Ketone: Trace  / Bili: Negative / Urobili: Negative   Blood: x / Protein: Negative / Nitrite: Negative   Leuk Esterase: Moderate / RBC: 1 /hpf / WBC 2 /HPF   Sq Epi: x / Non Sq Epi: 2 /hpf / Bacteria: Negative            MICROBIOLOGY:     RADIOLOGY & ADDITIONAL TESTS:    Assessment and Plan:    52F w/ ESRD s/p HepC DDRT, chronic SVC syndrome s/p L cephalic-iliac vein bypass on DAPT, s/p thrombectomy of L iliac vein and graft , Hx BK viremia, HTN presents as transfer from [facility] ED for hematemesis 2/2 acute upper GI hemorrhage admitted to MICU for urgent endoscopy with GI     Neuro:   AAOx3 currently  No acute issues     Resp:     # Intubation for endoscopy   Monitor resp status       CV:   #Borderline low BP   Likely i/s/o volume loss from hematemesis   Monitor BP   Transfuse to >7U     #HTN (hypertension)  hold antihypertensives given large volume hematemesis  Monitor BP     GI:   #Hematemesis   s/p 2 units PRBCs but recent hgb: 7.8 <-8.9   - Monitor CBC q8 and transfuse to >7  - maintain active type and screen   - c/w pantoprazole 80 bolus gtt  - GI for urgent endoscopy  - erythromycin for GI motility   - 2 large bore IV's     Renal/:   #Kidney transplant recipient.   s/p DDRT 2020 by Dr. Monroy.  Renal function at baseline.  Outpatient nephrologist is Dr. Lake.  Immunosuppression while pt is NPO per renal:   - hold leflunomide  - c/w methylpred 4mgs daily   - c/w IV tacrolimus 1.5mgs given over 24 hour constant infusion  - monitor tacrolimus level qd     ID:   No acute issues  Monitor fever curve, WBC       Endo:  No acute issues      Heme:   #SVC syndrome.   Previous hx of DAPT w/ surgical bypass on chronic DAPT  hold DAPT given large hemorrhage.    # Anemia due to acute blood loss.   Likely secondary to active upper GI Hemorrhage given pts episodes of hematemesis and melena  May be secondary to SVC syndrome (If there is compression of vessels causing varices), no noted recent endoscopy   s/p 2 units PRBCs  - Monitor CBC q8  - Transfuse to >7   - 2 large bore IV   - c/w pantoprazole 80 bolus gtt  - Urgent endoscopy with GI   - Maintain active type and screen    # Thrombocytopenia.   Will transfuse for <10, <15 w/ fever, <50 iso of active bleeding (hematemesis)  CTM plts     Ethics: to be discussed    Diet: NPO pending scope MICU Accept Note    CHIEF COMPLAINT: hematemesis     HPI / INTERVAL HISTORY:    52F w/ ESRD s/p HepC DDRT, chronic SVC syndrome s/p L cephalic-iliac vein bypass on DAPT (s/p thrombectomy of L iliac vein and graft , Hx BK viremia, HTN [facility]. The patient reports that on day of presentation she suddenly began have large volume bloody emesis 2-3 times and several episodes of black stool. She reported worsening of chronic LUQ abdominal pain x2 month, moderate severity, vague in character, no reported radiation, not worsened with eating, no clear intervention to improve. The patient denies history of stomach ulcer or NSAID use. [facility] pantoprazole 80mg IVx1 and was transported via ambulance which during that time received 1uPRBC. The patient reports using aspirin and clopidogrel for unclear reason but per chart appears to be for chronic SVC syndrome. s/p 2 total U PRBC     On  pt had 2 further episodes of hematemesis with diann blood (~2L) with borderline BP (99/57). Admitted to MICU for urgent endoscopy with GI       PAST MEDICAL & SURGICAL HISTORY:  HTN - Hypertension      Clotted Renal Dialysis AV Graft      Infection of AV Graft for Dialysis      Fibroid Tumor      CKD (chronic kidney disease) stage V requiring chronic dialysis      History of Hysterectomy  for benign disease      AV fistula  B/L - failed      H/O extremity bypass graft  H/O RUE bypass and creation of right brachial graft      Kidney transplant recipient          FAMILY HISTORY:  No pertinent family history in first degree relatives        SOCIAL HISTORY: No tobacco use     HOME MEDICATIONS:      Allergies    contrast media (iodine-based) (Urticaria)  ibuprofen (Hives)  ibuprofen/morphine (Unknown)  latex (Swelling)  morphine (Urticaria)  shellfish (Urticaria)    Intolerances          REVIEW OF SYSTEMS:  CONSTITUTIONAL: No weakness, fevers or chills  EYES/ENT: No visual changes;  No vertigo or throat pain   NECK: No pain or stiffness  RESPIRATORY: No cough, wheezing, hemoptysis; No shortness of breath  CARDIOVASCULAR: No chest pain or palpitations  GASTROINTESTINAL: + chronic abdominal pain. + hematemesis   GENITOURINARY: No dysuria, frequency or hematuria  NEUROLOGICAL: No numbness or weakness  SKIN: No itching, burning, rashes, or lesions   PSYCH: no hx depression, no hx anxiety      OBJECTIVE:  ICU Vital Signs Last 24 Hrs  T(C): 37 (30 May 2022 13:23), Max: 37.7 (30 May 2022 01:10)  T(F): 98.6 (30 May 2022 13:23), Max: 99.9 (30 May 2022 01:10)  HR: 85 (30 May 2022 13:23) (80 - 88)  BP: 99/57 (30 May 2022 13:23) (99/57 - 129/71)  BP(mean): --  ABP: --  ABP(mean): --  RR: 18 (30 May 2022 13:23) (16 - 20)  SpO2: 98% (30 May 2022 13:23) (95% - 100%)         @ 07:01  -   @ 15:02  --------------------------------------------------------  IN: 0 mL / OUT: 300 mL / NET: -300 mL      CAPILLARY BLOOD GLUCOSE          PHYSICAL EXAM:     GENERAL APPEARANCE: Somnolent, lying in bed  HEENT:  EOMI. hearing grossly intact. icteric sclera  NECK: Neck supple, non-tender no lymphadenopathy, masses or thyromegaly.  CARDIAC: Normal S1 and S2. flow murmur in Aortic and pulmonic region   LUNGS: Clear to auscultation B/L, no rales, rhonchi, or wheezing  ABDOMEN: Diffuse mild tenderness to palpation, no distension  EXTREMITIES: No edema. Peripheral pulses intact, R. brachial fistula with + thrills   NEUROLOGICAL: Non focal. Strength and sensation symmetric and intact throughout.   SKIN: Warm and dry , Well perfused  PSYCHIATRIC: AOx3 , Normal mood and affect      HOSPITAL MEDICATIONS:  MEDICATIONS  (STANDING):  calcium gluconate IVPB 1 Gram(s) IV Intermittent once  methylPREDNISolone sodium succinate Injectable 4 milliGRAM(s) IV Push daily  pantoprazole Infusion 8 mG/Hr (10 mL/Hr) IV Continuous <Continuous>  tacrolimus  IVPB 1.5 milliGRAM(s) IV Intermittent every 24 hours    MEDICATIONS  (PRN):  ondansetron Injectable 4 milliGRAM(s) IV Push once PRN Nausea and/or Vomiting      LABS:                        7.7    7.47  )-----------( 126      ( 30 May 2022 14:38 )             24.8     Hgb Trend: 7.7<--, 8.9<--, 9.4<--, 11.6<--  05-30    144  |  112<H>  |  49<H>  ----------------------------<  81  4.2   |  23  |  1.00    Ca    9.0      30 May 2022 06:59  Phos  2.2     05-30  Mg     1.4     05-30    TPro  5.1<L>  /  Alb  3.4  /  TBili  0.5  /  DBili  x   /  AST  16  /  ALT  12  /  AlkPhos  50  05-30    Creatinine Trend: 1.00<--, 0.79<--  PT/INR - ( 30 May 2022 02:51 )   PT: 14.6 sec;   INR: 1.26 ratio         PTT - ( 30 May 2022 02:51 )  PTT:26.4 sec  Urinalysis Basic - ( 30 May 2022 09:08 )    Color: Light Yellow / Appearance: Clear / S.026 / pH: x  Gluc: x / Ketone: Trace  / Bili: Negative / Urobili: Negative   Blood: x / Protein: Negative / Nitrite: Negative   Leuk Esterase: Moderate / RBC: 1 /hpf / WBC 2 /HPF   Sq Epi: x / Non Sq Epi: 2 /hpf / Bacteria: Negative            MICROBIOLOGY:     RADIOLOGY & ADDITIONAL TESTS:    Assessment and Plan:    52F w/ ESRD s/p HepC DDRT, chronic SVC syndrome s/p L cephalic-iliac vein bypass on DAPT, s/p thrombectomy of L iliac vein and graft , Hx BK viremia, HTN [facility] 2/2 acute upper GI hemorrhage admitted to MICU for urgent endoscopy with GI     Neuro:   AAOx3 currently  No acute issues     Resp:     # Intubation for endoscopy   Monitor resp status       CV:   #Borderline low BP   Likely i/s/o volume loss from hematemesis   Monitor BP   Transfuse to >7U     #HTN (hypertension)  hold antihypertensives given large volume hematemesis  Monitor BP     GI:   #Hematemesis   s/p 2 units PRBCs but recent hgb: 7.8 <-8.9   - Monitor CBC q8 and transfuse to >7  - maintain active type and screen   - c/w pantoprazole 80 bolus gtt  - GI for urgent endoscopy  - erythromycin for GI motility   - 2 large bore IV's     Renal/:   #Kidney transplant recipient.   s/p DDRT 2020 by Dr. Monroy.  Renal function at baseline.  Outpatient nephrologist is Dr. Lake.  Immunosuppression while pt is NPO per renal:   - hold leflunomide  - c/w methylpred 4mgs daily   - c/w IV tacrolimus 1.5mgs given over 24 hour constant infusion  - monitor tacrolimus level qd     ID:   No acute issues  Monitor fever curve, WBC       Endo:  No acute issues      Heme:   #SVC syndrome.   Previous hx of DAPT w/ surgical bypass on chronic DAPT  hold DAPT given large hemorrhage.    # Anemia due to acute blood loss.   Likely secondary to active upper GI Hemorrhage given pts episodes of hematemesis and melena  May be secondary to SVC syndrome (If there is compression of vessels causing varices), no noted recent endoscopy   s/p 2 units PRBCs  - Monitor CBC q8  - Transfuse to >7   - 2 large bore IV   - c/w pantoprazole 80 bolus gtt  - Urgent endoscopy with GI   - Maintain active type and screen    # Thrombocytopenia.   Will transfuse for <10, <15 w/ fever, <50 iso of active bleeding (hematemesis)  CTM plts     Ethics: to be discussed    Diet: NPO pending scope           Critical Care Attending Attestation:   52F Hx ESRD s/p HepC DDRT, Chronic SVC Syndrome s/p L Cephalic-Iliac Vein Bypass on DAPT, Thrombectomy of L Iliac Vein and Graft , Hx BK Viremia transferred [facility] for Active UGI Bleed with Hematemesis pending Urgent Endoscopy with GI.  - Electively intubation for Upper GI Endoscopy upon arrival to MICU   - Hemodynamically stable without pressor support   - NPO, Type and Cross, Serial CBCs, CMPs, PT/INR and IV Protonix Gtt   - Transfusion PRBC, FFP, IV Erythromycin   - ESRD s/p DDRT on Transplant Medications MICU Accept Note    CHIEF COMPLAINT: hematemesis     HPI / INTERVAL HISTORY:    52F w/ ESRD s/p HepC DDRT, chronic SVC syndrome s/p L cephalic-iliac vein bypass on DAPT (s/p thrombectomy of L iliac vein and graft , Hx BK viremia, HTN presents as transfer from [facility] ED for hematemesis. The patient reports that on day of presentation she suddenly began have large volume bloody emesis 2-3 times and several episodes of black stool. She reported worsening of chronic LUQ abdominal pain x2 month, moderate severity, vague in character, no reported radiation, not worsened with eating, no clear intervention to improve. The patient denies history of stomach ulcer or NSAID use. The patient at [facility] received pantoprazole 80mg IVx1 and was transported via ambulance which during that time received 1uPRBC. The patient reports using aspirin and clopidogrel for unclear reason but per chart appears to be for chronic SVC syndrome. s/p 2 total U PRBC     On  pt had 2 further episodes of hematemesis with diann blood (~2L) with borderline BP (99/57). Admitted to MICU for urgent endoscopy with GI       PAST MEDICAL & SURGICAL HISTORY:  HTN - Hypertension      Clotted Renal Dialysis AV Graft      Infection of AV Graft for Dialysis      Fibroid Tumor      CKD (chronic kidney disease) stage V requiring chronic dialysis      History of Hysterectomy  for benign disease      AV fistula  B/L - failed      H/O extremity bypass graft  H/O RUE bypass and creation of right brachial graft      Kidney transplant recipient          FAMILY HISTORY:  No pertinent family history in first degree relatives        SOCIAL HISTORY: No tobacco use     HOME MEDICATIONS:      Allergies    contrast media (iodine-based) (Urticaria)  ibuprofen (Hives)  ibuprofen/morphine (Unknown)  latex (Swelling)  morphine (Urticaria)  shellfish (Urticaria)    Intolerances          REVIEW OF SYSTEMS:  CONSTITUTIONAL: No weakness, fevers or chills  EYES/ENT: No visual changes;  No vertigo or throat pain   NECK: No pain or stiffness  RESPIRATORY: No cough, wheezing, hemoptysis; No shortness of breath  CARDIOVASCULAR: No chest pain or palpitations  GASTROINTESTINAL: + chronic abdominal pain. + hematemesis   GENITOURINARY: No dysuria, frequency or hematuria  NEUROLOGICAL: No numbness or weakness  SKIN: No itching, burning, rashes, or lesions   PSYCH: no hx depression, no hx anxiety      OBJECTIVE:  ICU Vital Signs Last 24 Hrs  T(C): 37 (30 May 2022 13:23), Max: 37.7 (30 May 2022 01:10)  T(F): 98.6 (30 May 2022 13:23), Max: 99.9 (30 May 2022 01:10)  HR: 85 (30 May 2022 13:23) (80 - 88)  BP: 99/57 (30 May 2022 13:23) (99/57 - 129/71)  BP(mean): --  ABP: --  ABP(mean): --  RR: 18 (30 May 2022 13:23) (16 - 20)  SpO2: 98% (30 May 2022 13:23) (95% - 100%)         @ 07:01  -   @ 15:02  --------------------------------------------------------  IN: 0 mL / OUT: 300 mL / NET: -300 mL      CAPILLARY BLOOD GLUCOSE          PHYSICAL EXAM:     GENERAL APPEARANCE: Somnolent, lying in bed  HEENT:  EOMI. hearing grossly intact. icteric sclera  NECK: Neck supple, non-tender no lymphadenopathy, masses or thyromegaly.  CARDIAC: Normal S1 and S2. flow murmur in Aortic and pulmonic region   LUNGS: Clear to auscultation B/L, no rales, rhonchi, or wheezing  ABDOMEN: Diffuse mild tenderness to palpation, no distension  EXTREMITIES: No edema. Peripheral pulses intact, R. brachial fistula with + thrills   NEUROLOGICAL: Non focal. Strength and sensation symmetric and intact throughout.   SKIN: Warm and dry , Well perfused  PSYCHIATRIC: AOx3 , Normal mood and affect      HOSPITAL MEDICATIONS:  MEDICATIONS  (STANDING):  calcium gluconate IVPB 1 Gram(s) IV Intermittent once  methylPREDNISolone sodium succinate Injectable 4 milliGRAM(s) IV Push daily  pantoprazole Infusion 8 mG/Hr (10 mL/Hr) IV Continuous <Continuous>  tacrolimus  IVPB 1.5 milliGRAM(s) IV Intermittent every 24 hours    MEDICATIONS  (PRN):  ondansetron Injectable 4 milliGRAM(s) IV Push once PRN Nausea and/or Vomiting      LABS:                        7.7    7.47  )-----------( 126      ( 30 May 2022 14:38 )             24.8     Hgb Trend: 7.7<--, 8.9<--, 9.4<--, 11.6<--  05-30    144  |  112<H>  |  49<H>  ----------------------------<  81  4.2   |  23  |  1.00    Ca    9.0      30 May 2022 06:59  Phos  2.2     05-30  Mg     1.4     05-30    TPro  5.1<L>  /  Alb  3.4  /  TBili  0.5  /  DBili  x   /  AST  16  /  ALT  12  /  AlkPhos  50  05-30    Creatinine Trend: 1.00<--, 0.79<--  PT/INR - ( 30 May 2022 02:51 )   PT: 14.6 sec;   INR: 1.26 ratio         PTT - ( 30 May 2022 02:51 )  PTT:26.4 sec  Urinalysis Basic - ( 30 May 2022 09:08 )    Color: Light Yellow / Appearance: Clear / S.026 / pH: x  Gluc: x / Ketone: Trace  / Bili: Negative / Urobili: Negative   Blood: x / Protein: Negative / Nitrite: Negative   Leuk Esterase: Moderate / RBC: 1 /hpf / WBC 2 /HPF   Sq Epi: x / Non Sq Epi: 2 /hpf / Bacteria: Negative            MICROBIOLOGY:     RADIOLOGY & ADDITIONAL TESTS:    Assessment and Plan:    52F w/ ESRD s/p HepC DDRT, chronic SVC syndrome s/p L cephalic-iliac vein bypass on DAPT, s/p thrombectomy of L iliac vein and graft , Hx BK viremia, HTN presents as transfer from [facility] ED for hematemesis 2/2 acute upper GI hemorrhage admitted to MICU for urgent endoscopy with GI     Neuro:   AAOx3 currently  No acute issues     Resp:     # Intubation for endoscopy   Monitor resp status       CV:   #Borderline low BP   Likely i/s/o volume loss from hematemesis   Monitor BP   Transfuse to >7U     #HTN (hypertension)  hold antihypertensives given large volume hematemesis  Monitor BP     GI:   #Hematemesis   s/p 2 units PRBCs but recent hgb: 7.8 <-8.9   - Monitor CBC q8 and transfuse to >7  - maintain active type and screen   - c/w pantoprazole 80 bolus gtt  - GI for urgent endoscopy  - erythromycin for GI motility   - 2 large bore IV's     Renal/:   #Kidney transplant recipient.   s/p DDRT 2020 by Dr. Monroy.  Renal function at baseline.  Outpatient nephrologist is Dr. Lake.  Immunosuppression while pt is NPO per renal:   - hold leflunomide  - c/w methylpred 4mgs daily   - c/w IV tacrolimus 1.5mgs given over 24 hour constant infusion  - monitor tacrolimus level qd     ID:   No acute issues  Monitor fever curve, WBC       Endo:  No acute issues      Heme:   #SVC syndrome.   Previous hx of DAPT w/ surgical bypass on chronic DAPT  hold DAPT given large hemorrhage.    # Anemia due to acute blood loss.   Likely secondary to active upper GI Hemorrhage given pts episodes of hematemesis and melena  May be secondary to SVC syndrome (If there is compression of vessels causing varices), no noted recent endoscopy   s/p 2 units PRBCs  - Monitor CBC q8  - Transfuse to >7   - 2 large bore IV   - c/w pantoprazole 80 bolus gtt  - Urgent endoscopy with GI   - Maintain active type and screen    # Thrombocytopenia.   Will transfuse for <10, <15 w/ fever, <50 iso of active bleeding (hematemesis)  CTM plts     Ethics: to be discussed    Diet: NPO pending scope           Critical Care Attending Attestation:   52F Hx ESRD s/p HepC DDRT, Chronic SVC Syndrome s/p L Cephalic-Iliac Vein Bypass on DAPT, Thrombectomy of L Iliac Vein and Graft , Hx BK Viremia transferred to [facility]-MICU from [facility]-ED for Active UGI Bleed with Hematemesis pending Urgent Endoscopy with GI.  - Electively intubation for Upper GI Endoscopy upon arrival to MICU   - Hemodynamically stable without pressor support   - NPO, Type and Cross, Serial CBCs, CMPs, PT/INR and IV Protonix Gtt   - Hold DAPT tonight and monitor Thrombocytopenia   - Transfusion PRBC, FFP, IV Erythromycin   - ESRD s/p DDRT on Transplant Medications   - DVT Prophylaxis with SCD given active GI Bleed   - GOC - Full Code       Patient seen and examined with ICU Resident/Fellow at bedside after lab data, medical records and radiology reports reviewed. I have read and agreeable in general with resident's Documented Note, Assessment and Management Plan which reflected my opinions from bedside round and discussion.   Total Critical Care Time = 45 Min excluding teaching and procedure activity.

## 2022-05-30 NOTE — PROGRESS NOTE ADULT - PROBLEM SELECTOR PLAN 4
suspect spurious reading (may have been drawn next to pRBC infusion) as initial K at [facility] wnl  repeat cmp ordered stat and discussed with RN team  will need day team to follow. if elevated will need reevaluation MICU team Previous hx of DAPT w/ surgical bypass on chronic DAPT  hold DAPT given large hemorrhage

## 2022-05-30 NOTE — ED PROVIDER NOTE - CLINICAL SUMMARY MEDICAL DECISION MAKING FREE TEXT BOX
51 y/o woman, h/o CKD now s/p kidney transplant and doing well on tacrolimus, on aspirin and Plavix, c/o melena started this morning and then 2-3 episodes of hematemesis about 5 pm--labs, IVF, reassess.

## 2022-05-30 NOTE — H&P ADULT - NSHPLABSRESULTS_GEN_ALL_CORE
Personally reviewed available labs, imaging and ekg  [1]  CBC Full  -  ( 30 May 2022 02:51 )  WBC Count : 4.88 K/uL  RBC Count : 4.24 M/uL  Hemoglobin : 11.6 g/dL  Hematocrit : 38.3 %  Platelet Count - Automated : 94 K/uL  Mean Cell Volume : 90.3 fl  Mean Cell Hemoglobin : 27.4 pg  Mean Cell Hemoglobin Concentration : 30.3 gm/dL  Auto Neutrophil # : 4.02 K/uL  Auto Lymphocyte # : 0.49 K/uL  Auto Monocyte # : 0.31 K/uL  Auto Eosinophil # : 0.02 K/uL  Auto Basophil # : 0.02 K/uL  Auto Neutrophil % : 82.4 %  Auto Lymphocyte % : 10.0 %  Auto Monocyte % : 6.4 %  Auto Eosinophil % : 0.4 %  Auto Basophil % : 0.4 %    05-30    132<L>  |  114<H>  |  33<H>  ----------------------------<  442<H>  >9.0<HH>   |  13<L>  |  0.79    Ca    6.1<LL>      30 May 2022 02:51    TPro  3.9<L>  /  Alb  2.3<L>  /  TBili  0.3  /  DBili  x   /  AST  14  /  ALT  11  /  AlkPhos  42  05-30    PT/INR - ( 30 May 2022 02:51 )   PT: 14.6 sec;   INR: 1.26 ratio         PTT - ( 30 May 2022 02:51 )  PTT:26.4 sec  Imaging:  [ 2] I independently reviewed CXR and no lobar opacification appreciated, sternotomy wire present  EKG:  [2] I independently reviewed EKG/cardiac tracing and sinus rhythm present, no peaked t wave    Review of old records:  [2] I personally reviewed previous records which identified history of kidney transplant

## 2022-05-30 NOTE — PROGRESS NOTE ADULT - SUBJECTIVE AND OBJECTIVE BOX
Patient is a 52y old  Female who presents with a chief complaint of 52F transferred from Replaced by [facility] for hematemesis (30 May 2022 06:02)      SUBJECTIVE / OVERNIGHT EVENTS: Patient seen and examined at bedside.    MEDICATIONS  (STANDING):  pantoprazole Infusion 8 mG/Hr (10 mL/Hr) IV Continuous <Continuous>    MEDICATIONS  (PRN):      Vital Signs Last 24 Hrs  T(C): 36.7 (30 May 2022 06:18), Max: 37.7 (30 May 2022 01:10)  T(F): 98.1 (30 May 2022 06:18), Max: 99.9 (30 May 2022 01:10)  HR: 85 (30 May 2022 06:18) (85 - 88)  BP: 114/77 (30 May 2022 06:18) (114/77 - 129/71)  BP(mean): --  RR: 18 (30 May 2022 06:18) (16 - 20)  SpO2: 100% (30 May 2022 06:18) (95% - 100%)  CAPILLARY BLOOD GLUCOSE        I&O's Summary      PHYSICAL EXAM:  GENERAL APPEARANCE: Well developed, NAD  HEENT:  PERRL, EOMI. hearing grossly intact.  NECK: Neck supple, non-tender no lymphadenopathy, masses or thyromegaly.  CARDIAC: Normal S1 and S2. no mrg. RRR  LUNGS: Clear to auscultation B/L, no rales, rhonchi, or wheezing  ABDOMEN: Soft , NTND, bowel sounds normal. No guarding or rebound.   MUSCULOSKELETAL: ROM intact.  No joint erythema or tenderness.   EXTREMITIES: No edema. Peripheral pulses intact.   NEUROLOGICAL: Non focal. Strength and sensation symmetric and intact throughout.   SKIN: Warm and dry , Well perfused  PSYCHIATRIC: AOx3 , Normal mood and affect      LABS:                        11.6   4.88  )-----------( 94       ( 30 May 2022 02:51 )             38.3     05-30    132<L>  |  114<H>  |  33<H>  ----------------------------<  442<H>  >9.0<HH>   |  13<L>  |  0.79    Ca    6.1<LL>      30 May 2022 02:51    TPro  3.9<L>  /  Alb  2.3<L>  /  TBili  0.3  /  DBili  x   /  AST  14  /  ALT  11  /  AlkPhos  42  05-30    PT/INR - ( 30 May 2022 02:51 )   PT: 14.6 sec;   INR: 1.26 ratio         PTT - ( 30 May 2022 02:51 )  PTT:26.4 sec              Deb Lerma, PGY-1; [facility] Pager: [phone]; [facility] Pager: [phone]   Patient is a 52y old  Female who presents with a chief complaint of 52F transferred from [facility] for hematemesis (30 May 2022 06:02)      SUBJECTIVE / OVERNIGHT EVENTS: Patient seen and examined at bedside. Patient resting in bed, states she is exhuasted    MEDICATIONS  (STANDING):  pantoprazole Infusion 8 mG/Hr (10 mL/Hr) IV Continuous <Continuous>    MEDICATIONS  (PRN):      Vital Signs Last 24 Hrs  T(C): 36.7 (30 May 2022 06:18), Max: 37.7 (30 May 2022 01:10)  T(F): 98.1 (30 May 2022 06:18), Max: 99.9 (30 May 2022 01:10)  HR: 85 (30 May 2022 06:18) (85 - 88)  BP: 114/77 (30 May 2022 06:18) (114/77 - 129/71)  BP(mean): --  RR: 18 (30 May 2022 06:18) (16 - 20)  SpO2: 100% (30 May 2022 06:18) (95% - 100%)  CAPILLARY BLOOD GLUCOSE        I&O's Summary      PHYSICAL EXAM:  GENERAL APPEARANCE: Well developed, NAD  HEENT:  PERRL, EOMI. hearing grossly intact.  NECK: Neck supple, non-tender no lymphadenopathy, masses or thyromegaly.  CARDIAC: Normal S1 and S2. no mrg. RRR  LUNGS: Clear to auscultation B/L, no rales, rhonchi, or wheezing  ABDOMEN: Soft , NTND, bowel sounds normal. No guarding or rebound.   MUSCULOSKELETAL: ROM intact.  No joint erythema or tenderness.   EXTREMITIES: No edema. Peripheral pulses intact.   NEUROLOGICAL: Non focal. Strength and sensation symmetric and intact throughout.   SKIN: Warm and dry , Well perfused  PSYCHIATRIC: AOx3 , Normal mood and affect      LABS:                        11.6   4.88  )-----------( 94       ( 30 May 2022 02:51 )             38.3     05-30    132<L>  |  114<H>  |  33<H>  ----------------------------<  442<H>  >9.0<HH>   |  13<L>  |  0.79    Ca    6.1<LL>      30 May 2022 02:51    TPro  3.9<L>  /  Alb  2.3<L>  /  TBili  0.3  /  DBili  x   /  AST  14  /  ALT  11  /  AlkPhos  42  05-30    PT/INR - ( 30 May 2022 02:51 )   PT: 14.6 sec;   INR: 1.26 ratio         PTT - ( 30 May 2022 02:51 )  PTT:26.4 sec              Deb Lerma, PGY-1; [facility] Pager: [phone]; [facility] Pager: [phone]   Patient is a 52y old  Female who presents with a chief complaint of 52F transferred from [facility] for hematemesis (30 May 2022 06:02)      SUBJECTIVE / OVERNIGHT EVENTS: Patient seen and examined at bedside. Patient resting in bed, states she is exhuasted. She denies any CP, SOB, dysuria/hematuria at this time. She states she has diffuse abdominal pain.     MEDICATIONS  (STANDING):  pantoprazole Infusion 8 mG/Hr (10 mL/Hr) IV Continuous <Continuous>    MEDICATIONS  (PRN):      Vital Signs Last 24 Hrs  T(C): 36.7 (30 May 2022 06:18), Max: 37.7 (30 May 2022 01:10)  T(F): 98.1 (30 May 2022 06:18), Max: 99.9 (30 May 2022 01:10)  HR: 85 (30 May 2022 06:18) (85 - 88)  BP: 114/77 (30 May 2022 06:18) (114/77 - 129/71)  BP(mean): --  RR: 18 (30 May 2022 06:18) (16 - 20)  SpO2: 100% (30 May 2022 06:18) (95% - 100%)  CAPILLARY BLOOD GLUCOSE        I&O's Summary      PHYSICAL EXAM:  GENERAL APPEARANCE: Somnolent, laying in bed  HEENT:  PERRL, EOMI. hearing grossly intact.  NECK: Neck supple, non-tender no lymphadenopathy, masses or thyromegaly.  CARDIAC: Normal S1 and S2. no mrg. RRR  LUNGS: Clear to auscultation B/L, no rales, rhonchi, or wheezing  ABDOMEN: Diffuse mild tenderness to palpation, no distension  EXTREMITIES: No edema. Peripheral pulses intact.   NEUROLOGICAL: Non focal. Strength and sensation symmetric and intact throughout.   SKIN: Warm and dry , Well perfused  PSYCHIATRIC: AOx3 , Normal mood and affect      LABS:                        11.6   4.88  )-----------( 94       ( 30 May 2022 02:51 )             38.3     05-30    132<L>  |  114<H>  |  33<H>  ----------------------------<  442<H>  >9.0<HH>   |  13<L>  |  0.79    Ca    6.1<LL>      30 May 2022 02:51    TPro  3.9<L>  /  Alb  2.3<L>  /  TBili  0.3  /  DBili  x   /  AST  14  /  ALT  11  /  AlkPhos  42  05-30    PT/INR - ( 30 May 2022 02:51 )   PT: 14.6 sec;   INR: 1.26 ratio         PTT - ( 30 May 2022 02:51 )  PTT:26.4 sec              Deb Lerma, PGY-1; [facility] Pager: [phone]; [facility] Pager: [phone]

## 2022-05-30 NOTE — H&P ADULT - NSHPPHYSICALEXAM_GEN_ALL_CORE
Vital Signs Last 24 Hrs  T(C): 37 (30 May 2022 05:04), Max: 37.7 (30 May 2022 01:10)  T(F): 98.6 (30 May 2022 05:04), Max: 99.9 (30 May 2022 01:10)  HR: 86 (30 May 2022 05:04) (86 - 88)  BP: 119/86 (30 May 2022 05:04) (115/67 - 129/71)  RR: 18 (30 May 2022 05:04) (16 - 20)  SpO2: 99% (30 May 2022 05:04) (95% - 100%) on RA    GENERAL: NAD, well-developed  HEAD:  Atraumatic, Normocephalic  EYES: EOMI, PERRL, conjunctiva and sclera clear  ENMT: MMM, good dentition  NECK: Supple, trachea midline  Lung: normal work of breathing, cta b/l  Cardiovascular: S1&S2+, rrr, no m/r/g appreciated; no pitting edema appreciated in b/l LE  ABDOMEN: bs+, soft, mild diffuse abdominal pain to palpation, NO rebound  : No ndiaye catheter, no CVA tenderness  Neuro: A&Ox3, no flaccid paralysis in extremities appreciated  SKIN: warm and dry, no visible purulence in exposed areas, normal skin turgor

## 2022-05-30 NOTE — CONSULT NOTE ADULT - ATTENDING COMMENTS
52 F pmh ESRD s/p xplant (HCV + donor), chronic SVC syndrome s/p left cephalic-iliac vein bypass on DAPT, s/p thrombectomy of L iliac vein and graft 2012, HTN presents to [facility] for hematemesis. Initially presented to OSH with hematemesis.  Responded to initial xfusion (suspect hgb of 11 was artificially high) and started on IV PPI drip and NPO.  However, had repeat episodes of hematemesis with drop in Hgb.  Ddx: PUD, EV, other causes Upper GIB.  Does have low plt, unclear if related to underlying liver disease; no imaging but normal LAEs/albumin.    - Transfer to MICU and plan for EGD today  - IV PPI Drip  - NPO    Rest of plan pending above
52 year old female with a PMHx of HTN, CKD now s/p kidney transplant on tacrolimus presenting with hematemesis. MICU consulted for GIB. Currently patient is hemodynamically stable. On POCUS, stomach small and not distended. Last hematemesis was 8 hours ago. She is likely not having active bleed at this time.   Initial presentation highly concerning for active bleed but has seemed to stop.   Low threshold for reconsult!  GI for scope, No need for CTA right now as would be low yield but would consider if starts bleeding again.   Clarify  with her vascular surgeon the need for dual anti platelet therapy.   Not in need of ICU at this time.

## 2022-05-30 NOTE — H&P ADULT - NSHPADDITIONALINFOADULT_GEN_ALL_CORE
Cuate Phillips MD  Medicine Attending  Department of Hospital Medicine  pager: [phone] (available from 20:00 to 08:00)

## 2022-05-31 DIAGNOSIS — D84.9 IMMUNODEFICIENCY, UNSPECIFIED: ICD-10-CM

## 2022-05-31 LAB
ALBUMIN SERPL ELPH-MCNC: 2.7 G/DL — LOW (ref 3.3–5)
ALP SERPL-CCNC: 40 U/L — SIGNIFICANT CHANGE UP (ref 40–120)
ALT FLD-CCNC: 11 U/L — SIGNIFICANT CHANGE UP (ref 10–45)
ANION GAP SERPL CALC-SCNC: 11 MMOL/L — SIGNIFICANT CHANGE UP (ref 5–17)
APTT BLD: 25.5 SEC — LOW (ref 27.5–35.5)
AST SERPL-CCNC: 12 U/L — SIGNIFICANT CHANGE UP (ref 10–40)
BASE EXCESS BLDV CALC-SCNC: -3.9 MMOL/L — LOW (ref -2–2)
BASOPHILS # BLD AUTO: 0.03 K/UL — SIGNIFICANT CHANGE UP (ref 0–0.2)
BASOPHILS # BLD AUTO: 0.03 K/UL — SIGNIFICANT CHANGE UP (ref 0–0.2)
BASOPHILS # BLD AUTO: 0.04 K/UL — SIGNIFICANT CHANGE UP (ref 0–0.2)
BASOPHILS NFR BLD AUTO: 0.3 % — SIGNIFICANT CHANGE UP (ref 0–2)
BASOPHILS NFR BLD AUTO: 0.4 % — SIGNIFICANT CHANGE UP (ref 0–2)
BASOPHILS NFR BLD AUTO: 0.6 % — SIGNIFICANT CHANGE UP (ref 0–2)
BILIRUB SERPL-MCNC: 0.4 MG/DL — SIGNIFICANT CHANGE UP (ref 0.2–1.2)
BLOOD GAS VENOUS - CREATININE: SIGNIFICANT CHANGE UP MG/DL (ref 0.5–1.3)
BUN SERPL-MCNC: 45 MG/DL — HIGH (ref 7–23)
CA-I SERPL-SCNC: 1.3 MMOL/L — SIGNIFICANT CHANGE UP (ref 1.15–1.33)
CALCIUM SERPL-MCNC: 8.7 MG/DL — SIGNIFICANT CHANGE UP (ref 8.4–10.5)
CHLORIDE BLDV-SCNC: 111 MMOL/L — HIGH (ref 96–108)
CHLORIDE SERPL-SCNC: 113 MMOL/L — HIGH (ref 96–108)
CO2 BLDV-SCNC: 22 MMOL/L — SIGNIFICANT CHANGE UP (ref 22–26)
CO2 SERPL-SCNC: 17 MMOL/L — LOW (ref 22–31)
CREAT SERPL-MCNC: 1.15 MG/DL — SIGNIFICANT CHANGE UP (ref 0.5–1.3)
EGFR: 58 ML/MIN/1.73M2 — LOW
EOSINOPHIL # BLD AUTO: 0.01 K/UL — SIGNIFICANT CHANGE UP (ref 0–0.5)
EOSINOPHIL # BLD AUTO: 0.1 K/UL — SIGNIFICANT CHANGE UP (ref 0–0.5)
EOSINOPHIL # BLD AUTO: 0.16 K/UL — SIGNIFICANT CHANGE UP (ref 0–0.5)
EOSINOPHIL NFR BLD AUTO: 0.1 % — SIGNIFICANT CHANGE UP (ref 0–6)
EOSINOPHIL NFR BLD AUTO: 1.6 % — SIGNIFICANT CHANGE UP (ref 0–6)
EOSINOPHIL NFR BLD AUTO: 2.2 % — SIGNIFICANT CHANGE UP (ref 0–6)
GAS PNL BLDA: SIGNIFICANT CHANGE UP
GAS PNL BLDV: 136 MMOL/L — SIGNIFICANT CHANGE UP (ref 136–145)
GAS PNL BLDV: SIGNIFICANT CHANGE UP
GAS PNL BLDV: SIGNIFICANT CHANGE UP
GLUCOSE BLDV-MCNC: 161 MG/DL — HIGH (ref 70–99)
GLUCOSE SERPL-MCNC: 117 MG/DL — HIGH (ref 70–99)
H PYLORI AB SER-ACNC: 8.6 UNITS — SIGNIFICANT CHANGE UP
HBV CORE AB SER-ACNC: REACTIVE
HBV SURFACE AB SER-ACNC: REACTIVE
HBV SURFACE AG SER-ACNC: SIGNIFICANT CHANGE UP
HCO3 BLDV-SCNC: 21 MMOL/L — LOW (ref 22–29)
HCT VFR BLD CALC: 23.4 % — LOW (ref 34.5–45)
HCT VFR BLD CALC: 23.7 % — LOW (ref 34.5–45)
HCT VFR BLD CALC: 23.9 % — LOW (ref 34.5–45)
HCT VFR BLDA CALC: 25 % — LOW (ref 34.5–46.5)
HCV AB S/CO SERPL IA: 0.06 S/CO — SIGNIFICANT CHANGE UP (ref 0–0.99)
HCV AB SERPL-IMP: SIGNIFICANT CHANGE UP
HGB BLD CALC-MCNC: 8.2 G/DL — LOW (ref 11.7–16.1)
HGB BLD-MCNC: 7.7 G/DL — LOW (ref 11.5–15.5)
HGB BLD-MCNC: 7.7 G/DL — LOW (ref 11.5–15.5)
HGB BLD-MCNC: 7.8 G/DL — LOW (ref 11.5–15.5)
IGA FLD-MCNC: 73 MG/DL — LOW (ref 84–499)
IGG FLD-MCNC: 331 MG/DL — LOW (ref 610–1660)
IGM SERPL-MCNC: 26 MG/DL — LOW (ref 35–242)
IMM GRANULOCYTES NFR BLD AUTO: 0.3 % — SIGNIFICANT CHANGE UP (ref 0–1.5)
IMM GRANULOCYTES NFR BLD AUTO: 0.6 % — SIGNIFICANT CHANGE UP (ref 0–1.5)
IMM GRANULOCYTES NFR BLD AUTO: 0.9 % — SIGNIFICANT CHANGE UP (ref 0–1.5)
INR BLD: 1.15 RATIO — SIGNIFICANT CHANGE UP (ref 0.88–1.16)
KAPPA LC SER QL IFE: 1.63 MG/DL — SIGNIFICANT CHANGE UP (ref 0.33–1.94)
KAPPA/LAMBDA FREE LIGHT CHAIN RATIO, SERUM: 1.5 RATIO — SIGNIFICANT CHANGE UP (ref 0.26–1.65)
LACTATE BLDV-MCNC: 1.5 MMOL/L — SIGNIFICANT CHANGE UP (ref 0.7–2)
LAMBDA LC SER QL IFE: 1.09 MG/DL — SIGNIFICANT CHANGE UP (ref 0.57–2.63)
LYMPHOCYTES # BLD AUTO: 0.63 K/UL — LOW (ref 1–3.3)
LYMPHOCYTES # BLD AUTO: 0.79 K/UL — LOW (ref 1–3.3)
LYMPHOCYTES # BLD AUTO: 1.21 K/UL — SIGNIFICANT CHANGE UP (ref 1–3.3)
LYMPHOCYTES # BLD AUTO: 12.5 % — LOW (ref 13–44)
LYMPHOCYTES # BLD AUTO: 16.4 % — SIGNIFICANT CHANGE UP (ref 13–44)
LYMPHOCYTES # BLD AUTO: 6.2 % — LOW (ref 13–44)
MAGNESIUM SERPL-MCNC: 1.9 MG/DL — SIGNIFICANT CHANGE UP (ref 1.6–2.6)
MCHC RBC-ENTMCNC: 27.5 PG — SIGNIFICANT CHANGE UP (ref 27–34)
MCHC RBC-ENTMCNC: 27.6 PG — SIGNIFICANT CHANGE UP (ref 27–34)
MCHC RBC-ENTMCNC: 28.2 PG — SIGNIFICANT CHANGE UP (ref 27–34)
MCHC RBC-ENTMCNC: 32.5 GM/DL — SIGNIFICANT CHANGE UP (ref 32–36)
MCHC RBC-ENTMCNC: 32.6 GM/DL — SIGNIFICANT CHANGE UP (ref 32–36)
MCHC RBC-ENTMCNC: 32.9 GM/DL — SIGNIFICANT CHANGE UP (ref 32–36)
MCV RBC AUTO: 83.6 FL — SIGNIFICANT CHANGE UP (ref 80–100)
MCV RBC AUTO: 84.9 FL — SIGNIFICANT CHANGE UP (ref 80–100)
MCV RBC AUTO: 86.3 FL — SIGNIFICANT CHANGE UP (ref 80–100)
MONOCYTES # BLD AUTO: 0.51 K/UL — SIGNIFICANT CHANGE UP (ref 0–0.9)
MONOCYTES # BLD AUTO: 0.56 K/UL — SIGNIFICANT CHANGE UP (ref 0–0.9)
MONOCYTES # BLD AUTO: 0.7 K/UL — SIGNIFICANT CHANGE UP (ref 0–0.9)
MONOCYTES NFR BLD AUTO: 5.5 % — SIGNIFICANT CHANGE UP (ref 2–14)
MONOCYTES NFR BLD AUTO: 8.1 % — SIGNIFICANT CHANGE UP (ref 2–14)
MONOCYTES NFR BLD AUTO: 9.5 % — SIGNIFICANT CHANGE UP (ref 2–14)
NEUTROPHILS # BLD AUTO: 4.83 K/UL — SIGNIFICANT CHANGE UP (ref 1.8–7.4)
NEUTROPHILS # BLD AUTO: 5.27 K/UL — SIGNIFICANT CHANGE UP (ref 1.8–7.4)
NEUTROPHILS # BLD AUTO: 8.83 K/UL — HIGH (ref 1.8–7.4)
NEUTROPHILS NFR BLD AUTO: 71.2 % — SIGNIFICANT CHANGE UP (ref 43–77)
NEUTROPHILS NFR BLD AUTO: 76.6 % — SIGNIFICANT CHANGE UP (ref 43–77)
NEUTROPHILS NFR BLD AUTO: 87 % — HIGH (ref 43–77)
NRBC # BLD: 0 /100 WBCS — SIGNIFICANT CHANGE UP (ref 0–0)
PCO2 BLDV: 34 MMHG — LOW (ref 39–42)
PH BLDV: 7.39 — SIGNIFICANT CHANGE UP (ref 7.32–7.43)
PHOSPHATE SERPL-MCNC: 2.3 MG/DL — LOW (ref 2.5–4.5)
PLATELET # BLD AUTO: 104 K/UL — LOW (ref 150–400)
PLATELET # BLD AUTO: 104 K/UL — LOW (ref 150–400)
PLATELET # BLD AUTO: 105 K/UL — LOW (ref 150–400)
PO2 BLDV: 26 MMHG — SIGNIFICANT CHANGE UP (ref 25–45)
POTASSIUM BLDV-SCNC: 4.1 MMOL/L — SIGNIFICANT CHANGE UP (ref 3.5–5.1)
POTASSIUM SERPL-MCNC: 3.9 MMOL/L — SIGNIFICANT CHANGE UP (ref 3.5–5.3)
POTASSIUM SERPL-SCNC: 3.9 MMOL/L — SIGNIFICANT CHANGE UP (ref 3.5–5.3)
PROT SERPL-MCNC: 4 G/DL — LOW (ref 6–8.3)
PROTHROM AB SERPL-ACNC: 13.3 SEC — SIGNIFICANT CHANGE UP (ref 10.5–13.4)
RBC # BLD: 2.77 M/UL — LOW (ref 3.8–5.2)
RBC # BLD: 2.79 M/UL — LOW (ref 3.8–5.2)
RBC # BLD: 2.8 M/UL — LOW (ref 3.8–5.2)
RBC # FLD: 14.9 % — HIGH (ref 10.3–14.5)
RBC # FLD: 15 % — HIGH (ref 10.3–14.5)
RBC # FLD: 15.5 % — HIGH (ref 10.3–14.5)
SAO2 % BLDV: 43.9 % — LOW (ref 67–88)
SODIUM SERPL-SCNC: 141 MMOL/L — SIGNIFICANT CHANGE UP (ref 135–145)
TACROLIMUS SERPL-MCNC: 15.8 NG/ML — SIGNIFICANT CHANGE UP
WBC # BLD: 10.15 K/UL — SIGNIFICANT CHANGE UP (ref 3.8–10.5)
WBC # BLD: 6.31 K/UL — SIGNIFICANT CHANGE UP (ref 3.8–10.5)
WBC # BLD: 7.39 K/UL — SIGNIFICANT CHANGE UP (ref 3.8–10.5)
WBC # FLD AUTO: 10.15 K/UL — SIGNIFICANT CHANGE UP (ref 3.8–10.5)
WBC # FLD AUTO: 6.31 K/UL — SIGNIFICANT CHANGE UP (ref 3.8–10.5)
WBC # FLD AUTO: 7.39 K/UL — SIGNIFICANT CHANGE UP (ref 3.8–10.5)

## 2022-05-31 PROCEDURE — 93975 VASCULAR STUDY: CPT | Mod: 26

## 2022-05-31 PROCEDURE — 99233 SBSQ HOSP IP/OBS HIGH 50: CPT

## 2022-05-31 PROCEDURE — 99232 SBSQ HOSP IP/OBS MODERATE 35: CPT | Mod: GC

## 2022-05-31 PROCEDURE — 76705 ECHO EXAM OF ABDOMEN: CPT | Mod: 26,59

## 2022-05-31 PROCEDURE — 99291 CRITICAL CARE FIRST HOUR: CPT | Mod: GC

## 2022-05-31 RX ORDER — POTASSIUM PHOSPHATE, MONOBASIC POTASSIUM PHOSPHATE, DIBASIC 236; 224 MG/ML; MG/ML
15 INJECTION, SOLUTION INTRAVENOUS ONCE
Refills: 0 | Status: COMPLETED | OUTPATIENT
Start: 2022-05-31 | End: 2022-05-31

## 2022-05-31 RX ORDER — TACROLIMUS 5 MG/1
8 CAPSULE ORAL DAILY
Refills: 0 | Status: DISCONTINUED | OUTPATIENT
Start: 2022-06-01 | End: 2022-06-10

## 2022-05-31 RX ORDER — CHLORHEXIDINE GLUCONATE 213 G/1000ML
15 SOLUTION TOPICAL EVERY 12 HOURS
Refills: 0 | Status: DISCONTINUED | OUTPATIENT
Start: 2022-05-31 | End: 2022-05-31

## 2022-05-31 RX ORDER — PANTOPRAZOLE SODIUM 20 MG/1
8 TABLET, DELAYED RELEASE ORAL
Qty: 80 | Refills: 0 | Status: DISCONTINUED | OUTPATIENT
Start: 2022-05-31 | End: 2022-06-02

## 2022-05-31 RX ORDER — ACETAMINOPHEN 500 MG
650 TABLET ORAL ONCE
Refills: 0 | Status: COMPLETED | OUTPATIENT
Start: 2022-05-31 | End: 2022-05-31

## 2022-05-31 RX ADMIN — PANTOPRAZOLE SODIUM 10 MG/HR: 20 TABLET, DELAYED RELEASE ORAL at 10:51

## 2022-05-31 RX ADMIN — CHLORHEXIDINE GLUCONATE 1 APPLICATION(S): 213 SOLUTION TOPICAL at 05:13

## 2022-05-31 RX ADMIN — CHLORHEXIDINE GLUCONATE 15 MILLILITER(S): 213 SOLUTION TOPICAL at 07:58

## 2022-05-31 RX ADMIN — PANTOPRAZOLE SODIUM 40 MILLIGRAM(S): 20 TABLET, DELAYED RELEASE ORAL at 05:12

## 2022-05-31 RX ADMIN — OCTREOTIDE ACETATE 10 MICROGRAM(S)/HR: 200 INJECTION, SOLUTION INTRAVENOUS; SUBCUTANEOUS at 18:02

## 2022-05-31 RX ADMIN — ONDANSETRON 4 MILLIGRAM(S): 8 TABLET, FILM COATED ORAL at 19:04

## 2022-05-31 RX ADMIN — Medication 650 MILLIGRAM(S): at 23:20

## 2022-05-31 RX ADMIN — OCTREOTIDE ACETATE 10 MICROGRAM(S)/HR: 200 INJECTION, SOLUTION INTRAVENOUS; SUBCUTANEOUS at 23:20

## 2022-05-31 RX ADMIN — Medication 4 MILLIGRAM(S): at 05:12

## 2022-05-31 RX ADMIN — PANTOPRAZOLE SODIUM 10 MG/HR: 20 TABLET, DELAYED RELEASE ORAL at 18:02

## 2022-05-31 RX ADMIN — POTASSIUM PHOSPHATE, MONOBASIC POTASSIUM PHOSPHATE, DIBASIC 62.5 MILLIMOLE(S): 236; 224 INJECTION, SOLUTION INTRAVENOUS at 10:07

## 2022-05-31 RX ADMIN — TACROLIMUS 6.26 MILLIGRAM(S): 5 CAPSULE ORAL at 17:25

## 2022-05-31 RX ADMIN — CHLORHEXIDINE GLUCONATE 15 MILLILITER(S): 213 SOLUTION TOPICAL at 05:12

## 2022-05-31 RX ADMIN — PANTOPRAZOLE SODIUM 10 MG/HR: 20 TABLET, DELAYED RELEASE ORAL at 23:21

## 2022-05-31 RX ADMIN — CEFTRIAXONE 100 MILLIGRAM(S): 500 INJECTION, POWDER, FOR SOLUTION INTRAMUSCULAR; INTRAVENOUS at 18:02

## 2022-05-31 NOTE — PROGRESS NOTE ADULT - ATTENDING COMMENTS
52F h/o ESRD s/p DDRT, chronic SVC syndrome s/p L cephalic-iliac vein bypass on DAPT, thrombectomy of L iliac vein and graft (2012), previous BK viremia initially presenting to OSH for melena and hematemesis, subsequently transferred to [facility] for active UGI bleed s/p elective intubation for urgent endoscopy showing small varices, esophagitis and gastric ulcers with no active bleeding.  - wean sedation for hopeful extubation today   - tolerating 5/5/30% PST - plan for extubation once more awake  - continue octreotide, protonix  - cirrhosis w/u per GI  - continue emperic ceftriaxone until cirrhosis r/o per GI  - continue to hold DAPT for now given bleed and thrombocytopenia   - ESRD s/p DDRT - continue transplant medications; can change back to PO once extubated   - trend CBC q12hr today, if remains stable can transition to daily tomorrow  - DVT Prophylaxis with SCD given active GIB

## 2022-05-31 NOTE — PROGRESS NOTE ADULT - ATTENDING COMMENTS
s/p EGD yesterday showing multiple gastric ulcers (one with flat pigmented spot) that were likely the cause of her bleeding and esophageal varices that were found unexpectedly with no stigmata of recent bleed.  On IV PPI, IV Octreotide, daily Iv abx for ppx currently    Plan for RUQ u/s, dopplers  c/w with above meds  Liver serology workup as above  GI to follow at this time  No objection to extubation from GI standpoint  f/u HP serology

## 2022-05-31 NOTE — CHART NOTE - NSCHARTNOTEFT_GEN_A_CORE
MAR Accept Note  Dr. Caterina Gaxiola, PGY3  Transfer to: [address]  Accepting Attending Physician:  Dr Ayers  Assigned Room:  621D    Patient seen and examined.   Labs and data reviewed.   No findings precluding transfer of service.       HPI/MICU COURSE:   Please refer to MICU transfer note for full details. Briefly, this is a   51 yo F with PMH of ESRD s/p HepC DDRT, chronic SVC syndrome (taking ASA and plavix) s/p L cephalic-iliac vein bypass on DAPT (s/p thrombectomy of L iliac vein and graft 2012), h/o BK viremia, HTN presenting as a transfer from [facility] ED on 5/30 for which she originally presented with hematemesis x2-3 times and melena. At OSH she received protonix 80mg IVP, 1u PRBC and transferred to [facility] for GI intervention. On arrival to [facility] pt with another 2 episodes of hematemesis with borderline BP transferred to MICU for elective intubation for urgent endoscopy by GI.     Pt underwent EGD 5/30 revealing multiple gastric ulcers likely the cause of her bleed and esophageal varices with no stigmata of recent bleeding. She in total received 3U PRBC, remains on PPI and Octreotide gtt and was successfully extubated 5/31 AM weaned to room air. She is tolerating a PO diet, restarted on her home prednisone and envarsus, and is stable for transfer to the medical floors. She is pending a liver serology workup as per GI to r/o cirrhosis and remains on Ceftriaxone empirically.      FOR FOLLOW-UP:  FOR FOLLOW UP:  [ ] GI recommendations, liver serology w/u   [ ] monitor CBC q12 hrs.    Caterina Gaxiola, PGY3

## 2022-05-31 NOTE — PROGRESS NOTE ADULT - TIME BILLING
Kidney Transplant recipient with functioning allograft  Admitted for evaluation of GI bleed. EGD findings noted  Comorbidities reviewed.  Patient seen, examined and reviewed available clinical and lab data including history,  progress notes and consult notes.  Reviewed immunosuppression and allograft function   Reviewed medication regimen for comorbidities. 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  Suggestions:  Continue immunosuppression as noted above, will switch to PO regimen when possible  I was present during and reviewed clinical and lab data as well as assessment and plan as documented by the house staff as noted. Please contact if any additional questions with any change in clinical condition or on availability of any additional information or reports.

## 2022-05-31 NOTE — PROGRESS NOTE ADULT - SUBJECTIVE AND OBJECTIVE BOX
INTERVAL HPI/OVERNIGHT EVENTS:    SUBJECTIVE: Patient seen and examined at bedside.         OBJECTIVE:    VITAL SIGNS:  ICU Vital Signs Last 24 Hrs  T(C): 36.5 (31 May 2022 04:00), Max: 37.3 (31 May 2022 00:00)  T(F): 97.7 (31 May 2022 04:00), Max: 99.1 (31 May 2022 00:00)  HR: 63 (31 May 2022 06:20) (57 - 96)  BP: 123/62 (31 May 2022 06:00) (87/54 - 158/79)  BP(mean): 87 (31 May 2022 06:00) (68 - 110)  ABP: --  ABP(mean): --  RR: 16 (31 May 2022 06:00) (10 - 24)  SpO2: 100% (31 May 2022 06:20) (90% - 100%)    Mode: AC/ CMV (Assist Control/ Continuous Mandatory Ventilation), RR (machine): 16, TV (machine): 500, FiO2: 50, PEEP: 5, ITime: 1, MAP: 6, PIP: 17    05-30 @ 07:01  -  05-31 @ 06:51  --------------------------------------------------------  IN: 992.3 mL / OUT: 1100 mL / NET: -107.7 mL      CAPILLARY BLOOD GLUCOSE          PHYSICAL EXAM:    GENERAL APPEARANCE: + intubated   HEENT:  PERRL, hearing grossly intact.  NECK: Neck supple, non-tender no lymphadenopathy, masses or thyromegaly.  CARDIAC: Normal S1 and S2. no mrg. RRR  LUNGS: Clear to auscultation B/L, no rales, rhonchi, or wheezing  ABDOMEN: Nontender to palpation, no distension  EXTREMITIES: No edema. R. brachial fistula with + thrills   NEUROLOGICAL: Non focal. Strength and sensation symmetric and intact throughout.   SKIN: Warm and dry , Well perfused    MEDICATIONS:  MEDICATIONS  (STANDING):  cefTRIAXone   IVPB 1000 milliGRAM(s) IV Intermittent every 24 hours  chlorhexidine 0.12% Liquid 15 milliLiter(s) Oral Mucosa every 12 hours  chlorhexidine 4% Liquid 1 Application(s) Topical <User Schedule>  dexMEDEtomidine Infusion 0.2 MICROgram(s)/kG/Hr (3.5 mL/Hr) IV Continuous <Continuous>  methylPREDNISolone sodium succinate Injectable 4 milliGRAM(s) IV Push daily  norepinephrine Infusion 0.05 MICROgram(s)/kG/Min (6.55 mL/Hr) IV Continuous <Continuous>  octreotide  Infusion 50 MICROgram(s)/Hr (10 mL/Hr) IV Continuous <Continuous>  pantoprazole  Injectable 40 milliGRAM(s) IV Push two times a day  propofol Infusion 15 MICROgram(s)/kG/Min (6.29 mL/Hr) IV Continuous <Continuous>  tacrolimus  IVPB 1.5 milliGRAM(s) IV Intermittent every 24 hours    MEDICATIONS  (PRN):  ondansetron Injectable 4 milliGRAM(s) IV Push once PRN Nausea and/or Vomiting      ALLERGIES:  Allergies    contrast media (iodine-based) (Urticaria)  ibuprofen (Hives)  ibuprofen/morphine (Unknown)  latex (Swelling)  morphine (Urticaria)  shellfish (Urticaria)    Intolerances        LABS:                        7.7    7.39  )-----------( 105      ( 31 May 2022 05:57 )             23.7         141  |  113<H>  |  45<H>  ----------------------------<  117<H>  3.9   |  17<L>  |  1.15    Ca    8.7      31 May 2022 01:18  Phos  2.3       Mg     1.9         TPro  4.0<L>  /  Alb  2.7<L>  /  TBili  0.4  /  DBili  x   /  AST  12  /  ALT  11  /  AlkPhos  40      PT/INR - ( 31 May 2022 01:18 )   PT: 13.3 sec;   INR: 1.15 ratio         PTT - ( 31 May 2022 01:18 )  PTT:25.5 sec  Urinalysis Basic - ( 30 May 2022 09:08 )    Color: Light Yellow / Appearance: Clear / S.026 / pH: x  Gluc: x / Ketone: Trace  / Bili: Negative / Urobili: Negative   Blood: x / Protein: Negative / Nitrite: Negative   Leuk Esterase: Moderate / RBC: 1 /hpf / WBC 2 /HPF   Sq Epi: x / Non Sq Epi: 2 /hpf / Bacteria: Negative        RADIOLOGY & ADDITIONAL TESTS: Reviewed. INTERVAL HPI/OVERNIGHT EVENTS: s/p endoscopy --> non-bleeding ulcers, esophageal ulcers. Was on precedex ON due to agitation.     SUBJECTIVE: Patient seen and examined at bedside. Arouses with verbal stimulation; intermittently follows commands. Does not follow commands. ROS unable to be obtained due to mental status.     OBJECTIVE:    VITAL SIGNS:  ICU Vital Signs Last 24 Hrs  T(C): 36.5 (31 May 2022 04:00), Max: 37.3 (31 May 2022 00:00)  T(F): 97.7 (31 May 2022 04:00), Max: 99.1 (31 May 2022 00:00)  HR: 63 (31 May 2022 06:20) (57 - 96)  BP: 123/62 (31 May 2022 06:00) (87/54 - 158/79)  BP(mean): 87 (31 May 2022 06:00) (68 - 110)  ABP: --  ABP(mean): --  RR: 16 (31 May 2022 06:00) (10 - 24)  SpO2: 100% (31 May 2022 06:20) (90% - 100%)    Mode: AC/ CMV (Assist Control/ Continuous Mandatory Ventilation), RR (machine): 16, TV (machine): 500, FiO2: 50, PEEP: 5, ITime: 1, MAP: 6, PIP: 17    05-30 @ 07:01  -  05-31 @ 06:51  --------------------------------------------------------  IN: 992.3 mL / OUT: 1100 mL / NET: -107.7 mL      CAPILLARY BLOOD GLUCOSE          PHYSICAL EXAM:    GENERAL APPEARANCE: + intubated   HEENT:  PERRL, hearing grossly intact.  NECK: Neck supple, non-tender no lymphadenopathy, masses or thyromegaly.  CARDIAC: Normal S1 and S2. no mrg. RRR  LUNGS: Clear to auscultation B/L, no rales, rhonchi, or wheezing  ABDOMEN: Nontender to palpation, no distension  EXTREMITIES: No edema. R. brachial fistula with + thrills   NEUROLOGICAL: Non focal. Strength and sensation symmetric and intact throughout.   SKIN: Warm and dry , Well perfused    MEDICATIONS:  MEDICATIONS  (STANDING):  cefTRIAXone   IVPB 1000 milliGRAM(s) IV Intermittent every 24 hours  chlorhexidine 0.12% Liquid 15 milliLiter(s) Oral Mucosa every 12 hours  chlorhexidine 4% Liquid 1 Application(s) Topical <User Schedule>  dexMEDEtomidine Infusion 0.2 MICROgram(s)/kG/Hr (3.5 mL/Hr) IV Continuous <Continuous>  methylPREDNISolone sodium succinate Injectable 4 milliGRAM(s) IV Push daily  norepinephrine Infusion 0.05 MICROgram(s)/kG/Min (6.55 mL/Hr) IV Continuous <Continuous>  octreotide  Infusion 50 MICROgram(s)/Hr (10 mL/Hr) IV Continuous <Continuous>  pantoprazole  Injectable 40 milliGRAM(s) IV Push two times a day  propofol Infusion 15 MICROgram(s)/kG/Min (6.29 mL/Hr) IV Continuous <Continuous>  tacrolimus  IVPB 1.5 milliGRAM(s) IV Intermittent every 24 hours    MEDICATIONS  (PRN):  ondansetron Injectable 4 milliGRAM(s) IV Push once PRN Nausea and/or Vomiting      ALLERGIES:  Allergies    contrast media (iodine-based) (Urticaria)  ibuprofen (Hives)  ibuprofen/morphine (Unknown)  latex (Swelling)  morphine (Urticaria)  shellfish (Urticaria)    Intolerances        LABS:                        7.7    7.39  )-----------( 105      ( 31 May 2022 05:57 )             23.7         141  |  113<H>  |  45<H>  ----------------------------<  117<H>  3.9   |  17<L>  |  1.15    Ca    8.7      31 May 2022 01:18  Phos  2.3       Mg     1.9         TPro  4.0<L>  /  Alb  2.7<L>  /  TBili  0.4  /  DBili  x   /  AST  12  /  ALT  11  /  AlkPhos  40      PT/INR - ( 31 May 2022 01:18 )   PT: 13.3 sec;   INR: 1.15 ratio         PTT - ( 31 May 2022 01:18 )  PTT:25.5 sec  Urinalysis Basic - ( 30 May 2022 09:08 )    Color: Light Yellow / Appearance: Clear / S.026 / pH: x  Gluc: x / Ketone: Trace  / Bili: Negative / Urobili: Negative   Blood: x / Protein: Negative / Nitrite: Negative   Leuk Esterase: Moderate / RBC: 1 /hpf / WBC 2 /HPF   Sq Epi: x / Non Sq Epi: 2 /hpf / Bacteria: Negative        RADIOLOGY & ADDITIONAL TESTS: Reviewed.

## 2022-05-31 NOTE — PROGRESS NOTE ADULT - PROBLEM SELECTOR PLAN 1
s/p DDRT performed in 2020 by Dr. Monroy. Allograft function at baseline.  Outpatient nephrologist is Dr. Lake.

## 2022-05-31 NOTE — PROGRESS NOTE ADULT - ASSESSMENT
52F w/ ESRD s/p HepC DDRT, chronic SVC syndrome s/p L cephalic-iliac vein bypass on DAPT, s/p thrombectomy of L iliac vein and graft 2012, Hx BK viremia, HTN presents as transfer from [facility] ED for hematemesis 2/2 acute upper GI hemorrhage admitted to MICU for urgent endoscopy with GI     Neuro:   AAOx3 currently  No acute issues     Resp:     # Intubation for endoscopy   Monitor resp status       CV:   #Borderline low BP   Likely i/s/o volume loss from hematemesis   Monitor BP   Transfuse to >7U     #HTN (hypertension)  hold antihypertensives given large volume hematemesis  Monitor BP     GI:   #Hematemesis   s/p 2 units PRBCs but recent hgb: 7.8 <-8.9   - Monitor CBC q8 and transfuse to >7  - maintain active type and screen   - s/p erythromycinx1 for GI motility   - 2 large bore IV's   - s/p endoscopy on 5/30:  - Grade II esophageal varices. LA Grade A esophagitis.Non-bleeding gastric ulcers  - c/w octreotide for varices   - c/w ceftriaxone   - c/w protonix BID     Renal/:   #Kidney transplant recipient.   s/p DDRT 8/2020 by Dr. Monroy.  Renal function at baseline.  Outpatient nephrologist is Dr. Lake.  Immunosuppression while pt is NPO per renal:   - hold leflunomide  - c/w methylpred 4mgs daily   - c/w IV tacrolimus 1.5mgs given over 24 hour constant infusion  - monitor tacrolimus level qd     ID:   No acute issues  Monitor fever curve, WBC       Endo:  No acute issues      Heme:   #SVC syndrome.   Previous hx of DAPT w/ surgical bypass on chronic DAPT  hold DAPT given large hemorrhage.    # Anemia due to acute blood loss.   Likely secondary to active upper GI Hemorrhage given pts episodes of hematemesis and melena  May be secondary to SVC syndrome (If there is compression of vessels causing varices), no noted recent endoscopy   s/p 2 units PRBCs  - Monitor CBC q8  - Transfuse to >7   - 2 large bore IV   - c/w pantoprazole 40 BID   - s/p endoscopy on 5/30:  - Grade II esophageal varices. LA Grade A esophagitis.Non-bleeding gastric ulcers  - c/w octreotide for varices   - c/w ceftriaxone   - c/w protonix BID       # Thrombocytopenia.   Will transfuse for <10, <15 w/ fever, <50 iso of active bleeding (hematemesis)  CTM plts     Ethics: to be discussed    Diet: NPO pending scope      52F w/ ESRD s/p HepC DDRT, chronic SVC syndrome s/p L cephalic-iliac vein bypass on DAPT, s/p thrombectomy of L iliac vein and graft 2012, Hx BK viremia, HTN presents as transfer from [facility] ED for hematemesis 2/2 acute upper GI hemorrhage admitted to MICU for elective intubation and urgent endoscopy with GI s/p endoscopy with evidence of non-bleeding ulcers & esophageal varices     Neuro:   was on precedex ON on 5/31 for agitation   Awakens with verbal stimulation   No acute issues     Resp:     # s/p Intubation for endoscopy   CPAP trial as tolerated  Plan for extubation later today once pt more awake   Monitor resp status     CV:   #Borderline low BP   Likely i/s/o volume loss from hematemesis   Monitor BP - not requiring pressors at this time   Transfuse to >7U     #HTN (hypertension)  hold antihypertensives given large volume hematemesis  Monitor BP     GI:   #Hematemesis   s/p 2 units PRBCs but recent hgb: 7.8 <-8.9   - Monitor CBC q8 and transfuse to >7  - maintain active type and screen   - s/p erythromycinx1 for GI motility   - 2 large bore IV's   - s/p endoscopy on 5/30:  - Grade II esophageal varices. LA Grade A esophagitis. Non-bleeding gastric ulcers  - regarding esophageal varices, f/u cirrhosis workup, f/u RUQ US for PVT   - c/w octreotide for varices   - c/w ceftriaxone   - c/w protonix gtt     #Esophageal Varices  - s/p endoscopy on 5/30:  - Grade II esophageal varices. LA Grade A esophagitis. Non-bleeding gastric ulcers  - regarding esophageal varices, f/u cirrhosis workup, f/u RUQ US for PVT   - c/w octreotide for varices   - c/w ceftriaxone   - c/w protonix gtt     Renal/:   #Kidney transplant recipient.   s/p DDRT 8/2020 by Dr. Monroy.  Renal function at baseline.  Outpatient nephrologist is Dr. Lake.  Immunosuppression while pt is NPO per renal:   - hold leflunomide  - c/w methylpred 4mgs daily   - c/w IV tacrolimus 1.5mgs given over 24 hour constant infusion  - monitor tacrolimus level qd   - will change IV immunosuppression meds to PO after PO access established     ID:   No acute issues  Monitor fever curve, WBC       Endo:  No acute issues      Heme:   #SVC syndrome.   Previous hx of DAPT w/ surgical bypass on chronic DAPT  hold DAPT given large hemorrhage.    # Anemia due to acute blood loss.   Likely secondary to active upper GI Hemorrhage given pts episodes of hematemesis and melena  May be secondary to SVC syndrome (If there is compression of vessels causing varices), no noted recent endoscopy   s/p 2 units PRBCs  - Monitor CBC q8  - Transfuse to >7   - 2 large bore IV   - c/w pantoprazole 40 BID   - s/p endoscopy on 5/30:  - Grade II esophageal varices. LA Grade A esophagitis.Non-bleeding gastric ulcers  - c/w octreotide for varices   - c/w protonix BID       # Thrombocytopenia.   Will transfuse for <10, <15 w/ fever, <50 iso of active bleeding (hematemesis)  CTM plts     Ethics: FULL CODE

## 2022-05-31 NOTE — PROGRESS NOTE ADULT - PROBLEM SELECTOR PLAN 3
UGI bleed 2/2 esophageal varices, esophagitis & gastric ulcers  Esophageal varices likely 2/2 central vein occlusion as seen on prior imaing  Continue PPI per GI UGI bleed , esophageal varices, esophagitis & gastric ulcers  Esophageal varices likely 2/2 central vein occlusion as seen on prior imaging  Continue PPI per GI

## 2022-05-31 NOTE — CHART NOTE - NSCHARTNOTEFT_GEN_A_CORE
MICU Transfer Note    Transfer from: MICU    Transfer to: ( X ) Medicine    (  ) Telemetry     (   ) RCU        (    ) Palliative         (   ) Stroke Unit          (   ) __________________    Accepting Physician:   Signout given to:     MICU COURSE:  51 yo F with PMH of ESRD s/p HepC DDRT, chronic SVC syndrome (taking ASA and plavix) s/p L cephalic-iliac vein bypass on DAPT (s/p thrombectomy of L iliac vein and graft 2012), h/o BK viremia, HTN presenting as a transfer from [facility] ED on 5/30 for which she originally presented with hematemesis x2-3 times and melena. At OSH she received protonix 80mg IVP, 1u PRBC and transferred to [facility] for GI intervention. On arrival to [facility] pt with another 2 episodes of hematemesis with borderline BP transferred to MICU for elective intubation for urgent endoscopy by GI.     Pt underwent EGD 5/30 revealing multiple gastric ulcers likely the cause of her bleed and esophageal varices with no stigmata of recent bleeding. She in total received 3U PRBC, remains on PPI and Octreotide gtt and was successfully extubated 5/31 AM weaned to room air. She is tolerating a PO diet, restarted on her home prednisone and envarsus, and is stable for transfer to the medical floors. She is pending a liver serology workup as per GI to r/o cirrhosis and remains on Ceftriaxone empirically.      ASSESSMENT & PLAN:             FOR FOLLOW UP:  [ ] GI recommendations, liver serology w/u   [ ] monitor CBC q12 hrs MICU Transfer Note    Transfer from: MICU    Transfer to: ( X ) Medicine    (  ) Telemetry     (   ) RCU        (    ) Palliative         (   ) Stroke Unit          (   ) __________________    Accepting Physician: Dr. Ayers  Signout given to:     MICU COURSE:  51 yo F with PMH of ESRD s/p HepC DDRT, chronic SVC syndrome (taking ASA and plavix) s/p L cephalic-iliac vein bypass on DAPT (s/p thrombectomy of L iliac vein and graft 2012), h/o BK viremia, HTN presenting as a transfer from [facility] ED on 5/30 for which she originally presented with hematemesis x2-3 times and melena. At OSH she received protonix 80mg IVP, 1u PRBC and transferred to [facility] for GI intervention. On arrival to [facility] pt with another 2 episodes of hematemesis with borderline BP transferred to MICU for elective intubation for urgent endoscopy by GI.     Pt underwent EGD 5/30 revealing multiple gastric ulcers likely the cause of her bleed and esophageal varices with no stigmata of recent bleeding. She in total received 3U PRBC, remains on PPI and Octreotide gtt and was successfully extubated 5/31 AM weaned to room air. She is tolerating a PO diet, restarted on her home prednisone and envarsus, and is stable for transfer to the medical floors. She is pending a liver serology workup as per GI to r/o cirrhosis and remains on Ceftriaxone empirically.      ASSESSMENT & PLAN:             FOR FOLLOW UP:  [ ] GI recommendations, liver serology w/u   [ ] monitor CBC q12 hrs MICU Transfer Note    Transfer from: MICU    Transfer to: ( X ) Medicine    (  ) Telemetry     (   ) RCU        (    ) Palliative         (   ) Stroke Unit          (   ) __________________    Accepting Physician: Dr. Ayers  Signout given to:     MICU COURSE:  53 yo F with PMH of ESRD s/p HepC DDRT, chronic SVC syndrome (taking ASA and plavix) s/p L cephalic-iliac vein bypass on DAPT (s/p thrombectomy of L iliac vein and graft 2012), h/o BK viremia, HTN presenting as a transfer from [facility] ED on 5/30 for which she originally presented with hematemesis x2-3 times and melena. At OSH she received protonix 80mg IVP, 1u PRBC and transferred to [facility] for GI intervention. On arrival to [facility] pt with another 2 episodes of hematemesis with borderline BP transferred to MICU for elective intubation for urgent endoscopy by GI.     Pt underwent EGD 5/30 revealing multiple gastric ulcers likely the cause of her bleed and esophageal varices with no stigmata of recent bleeding. She in total received 3U PRBC, remains on PPI and Octreotide gtt and was successfully extubated 5/31 AM weaned to room air. She is tolerating a PO diet, restarted on her home prednisone and envarsus, and is stable for transfer to the medical floors. She is pending a liver serology workup as per GI to r/o cirrhosis and remains on Ceftriaxone empirically.      ASSESSMENT & PLAN:   52F w/ ESRD s/p HepC DDRT, chronic SVC syndrome s/p L cephalic-iliac vein bypass on DAPT, s/p thrombectomy of L iliac vein and graft 2012, Hx BK viremia, HTN presents as transfer from [facility] ED for hematemesis 2/2 acute upper GI hemorrhage admitted to MICU for elective intubation and urgent endoscopy with GI s/p endoscopy with evidence of non-bleeding ulcers & esophageal varices     Neuro:   no active issues, at baseline mental status     Resp:     # s/p Intubation for endoscopy   extubated 5/31 AM to room air, no active issues     CV:   #Borderline low BP on admission  Likely i/s/o volume loss from hematemesis   Monitor BP - not requiring pressors at this time   Transfuse to >7U     #HTN (hypertension)  hold antihypertensives given recent GIB, restart as tolerated   Monitor BP     GI:   #Hematemesis  s/p total of 3 units PRBCs  - Monitor CBC q12 and transfuse to >7  - maintain active type and screen   - s/p erythromycinx1 for GI motility   - 2 large bore IV's   - s/p endoscopy on 5/30:  - Grade II esophageal varices. LA Grade A esophagitis. Non-bleeding gastric ulcers  - regarding esophageal varices, f/u cirrhosis workup, f/u RUQ US for PVT   - c/w octreotide for varices   - c/w ceftriaxone   - c/w protonix gtt   - f/u GI recommendations     #Esophageal Varices  - s/p endoscopy on 5/30:  - Grade II esophageal varices. LA Grade A esophagitis. Non-bleeding gastric ulcers  - regarding esophageal varices, f/u cirrhosis workup, f/u RUQ US for PVT   - c/w octreotide for varices   - c/w ceftriaxone   - c/w protonix gtt     Renal/:   #Kidney transplant recipient.   s/p DDRT 8/2020 by Dr. Monroy.  Renal function at baseline.  Outpatient nephrologist is Dr. Lake.  - restarted on home Immunosuppression as pt now tolerating PO     ID:   No acute issues  Monitor fever curve, WBC     Endo:  No acute issues    Heme:   #SVC syndrome.   Previous hx of DAPT w/ surgical bypass on chronic DAPT  hold DAPT given large hemorrhage.    # Anemia due to acute blood loss.   Likely secondary to active upper GI Hemorrhage given pts episodes of hematemesis and melena  May be secondary to SVC syndrome (If there is compression of vessels causing varices), no noted recent endoscopy   s/p 3 units PRBCs  - Monitor CBC q12 hrs   - Transfuse to >7   - 2 large bore IV   - c/w pantoprazole  - s/p endoscopy on 5/30:  - Grade II esophageal varices. LA Grade A esophagitis.Non-bleeding gastric ulcers  - c/w octreotide for varices    # Thrombocytopenia.   Will transfuse for <10, <15 w/ fever, <50 iso of active bleeding (hematemesis)  CTM plts     Ethics: FULL CODE       FOR FOLLOW UP:  [ ] GI recommendations, liver serology w/u   [ ] monitor CBC q12 hrs

## 2022-05-31 NOTE — PROGRESS NOTE ADULT - ASSESSMENT
#Renal transplant  #chronic SVC syndrome on DAPT    #Hematemesis  EGD with EV grade 2, PUD in the antrum (clean based ulcer and one Chidi Class II C.     #EV grade 2    Recommendations:  -c/w w octreotide for now  -c/w PPI drip for additional 48 hrs -> BID afterwards  -c/w Ceftriaxone daily (pending cirrhosis work up)  -Obtain CT abd with IV contrast to assess liver contour  -No CI for DAPT therapy  -send auto immune serologies: anti-mitochondrial Ab, anti-smooth muscle Ab, anti-LKM, anti-liver cytosol antibody, KAYLA, Immunoglobulin panel  - send viral hepatitis serologies: Hep B SAg  and ab, Hep B C ab, Hep C Ab  -daily CBC, CMP and INR    Recommendations preliminary until signed by attending.     Juan J Mukherjee MD  Gastroenterology/Hepatology Fellow  1st option: [phone] (text or call), ONLY available from 7:00 am to 5:00 pm.   **Contact on-call GI fellow via answering service ([phone]) from 5pm-7am AND on weekends/holidays**  2nd option: Available via Microsoft Teams  3rd option: Pager: [phone]

## 2022-05-31 NOTE — PROGRESS NOTE ADULT - SUBJECTIVE AND OBJECTIVE BOX
Interval Events:   still intubated  Hb stable with no overt Bleeding per nursing team    Hospital Medications:  cefTRIAXone   IVPB 1000 milliGRAM(s) IV Intermittent every 24 hours  chlorhexidine 0.12% Liquid 15 milliLiter(s) Oral Mucosa every 12 hours  chlorhexidine 4% Liquid 1 Application(s) Topical <User Schedule>  dexMEDEtomidine Infusion 0.2 MICROgram(s)/kG/Hr IV Continuous <Continuous>  methylPREDNISolone sodium succinate Injectable 4 milliGRAM(s) IV Push daily  norepinephrine Infusion 0.05 MICROgram(s)/kG/Min IV Continuous <Continuous>  octreotide  Infusion 50 MICROgram(s)/Hr IV Continuous <Continuous>  ondansetron Injectable 4 milliGRAM(s) IV Push once PRN  pantoprazole Infusion 8 mG/Hr IV Continuous <Continuous>  potassium phosphate IVPB 15 milliMole(s) IV Intermittent once  propofol Infusion 15 MICROgram(s)/kG/Min IV Continuous <Continuous>  tacrolimus  IVPB 1.5 milliGRAM(s) IV Intermittent every 24 hours      ROS: limited    PHYSICAL EXAM:   Vital Signs:  Vital Signs Last 24 Hrs  T(C): 36.5 (31 May 2022 04:00), Max: 37.3 (31 May 2022 00:00)  T(F): 97.7 (31 May 2022 04:00), Max: 99.1 (31 May 2022 00:00)  HR: 63 (31 May 2022 06:20) (57 - 96)  BP: 123/62 (31 May 2022 06:00) (87/54 - 158/79)  BP(mean): 87 (31 May 2022 06:00) (68 - 110)  RR: 16 (31 May 2022 06:00) (10 - 24)  SpO2: 100% (31 May 2022 06:20) (90% - 100%)  Daily     Daily Weight in k.6 (31 May 2022 04:00)    GENERAL:  NAD, intubated  HEENT:  NC/AT,  conjunctivae clear and pink, sclera -anicteric  CHEST:  Normal Effort, mechanical Breath sounds   HEART:  RRR, S1 + S2, no murmurs  ABDOMEN:  Soft, non-tender, non-distended, normoactive bowel sounds,  no masses  EXTREMITIES:  no cyanosis or edema  SKIN:  Warm & Dry. No rash or erythema  NEURO:  alert, following commands    LABS:                        7.7    7.39  )-----------( 105      ( 31 May 2022 05:57 )             23.7     Mean Cell Volume: 84.9 fl (-22 @ 05:57)        141  |  113<H>  |  45<H>  ----------------------------<  117<H>  3.9   |  17<L>  |  1.15    Ca    8.7      31 May 2022 01:18  Phos  2.3       Mg     1.9         TPro  4.0<L>  /  Alb  2.7<L>  /  TBili  0.4  /  DBili  x   /  AST  12  /  ALT  11  /  AlkPhos  40      LIVER FUNCTIONS - ( 31 May 2022 01:18 )  Alb: 2.7 g/dL / Pro: 4.0 g/dL / ALK PHOS: 40 U/L / ALT: 11 U/L / AST: 12 U/L / GGT: x           PT/INR - ( 31 May 2022 01:18 )   PT: 13.3 sec;   INR: 1.15 ratio         PTT - ( 31 May 2022 01:18 )  PTT:25.5 sec  Urinalysis Basic - ( 30 May 2022 09:08 )    Color: Light Yellow / Appearance: Clear / S.026 / pH: x  Gluc: x / Ketone: Trace  / Bili: Negative / Urobili: Negative   Blood: x / Protein: Negative / Nitrite: Negative   Leuk Esterase: Moderate / RBC: 1 /hpf / WBC 2 /HPF   Sq Epi: x / Non Sq Epi: 2 /hpf / Bacteria: Negative                              7.7    7.39  )-----------( 105      ( 31 May 2022 05:57 )             23.7                         7.7    6.31  )-----------( 104      ( 31 May 2022 01:18 )             23.4                         7.7    7.47  )-----------( 126      ( 30 May 2022 14:38 )             24.8                         8.9    5.44  )-----------( 129      ( 30 May 2022 12:35 )             27.6                         9.4    6.72  )-----------( 135      ( 30 May 2022 07:02 )             30.1       Imaging: Images reviewed.  < from: Upper Endoscopy (22 @ 17:55) >  Findings:       Suspected Grade II varices were found in the middle third of the esophagus and in the lower        third of the esophagus with no recent stigmata of recent bleeding. Varcies did not appear        downstream in nature.       LA Grade A (one or more mucosal breaks less than 5 mm, not extending between tops of 2        mucosal folds) esophagitis with no bleeding was found at the gastroesophageal junction.       The exam of the esophagus was otherwise normal.       Many non-bleeding cratered gastric ulcers with a clean ulcer base (Chidi Class III) were        found in the gastric antrum. The largest lesion was ten mm by twelve mm in largest dimension.        Once non/bleeding cratered ulcer (Murdo Class IIC) was seen in the antrum.       Diffuse moderate mucosal changes characterized by erythema and erosion were found in the        entire examined stomach.       The exam of the stomach was otherwise normal.       The firstportion of the duodenum and second portion of the duodenum were normal.       Otherwise normal endoscopy with no active bleeding. Decision made not to band varices given        no stigmata of bleeding on varcies and suspected source of GI Bleeding warren ulcers at        pylorus.                                                                                                        Impression:          - Grade II esophageal varices.                       - LA Grade A esophagitis.     - Non-bleeding gastric ulcers with a clean ulcer base (Chidi Class III)                        with one gastric ulcer Murdo Class IIC in antrum                       - Erythematous and eroded mucosa in the stomach.                       - No active bleeding appreciated throughout study  Recommendation:      - Return patient to ICU for ongoing care.                       - Advance diet as tolerated once extubated.                       - PPI drip x 72 hrs, can transition to BID afterwards                     - Start Octreotide drip and Ceftriaxone 1 gm daily x 7 days                       - Please get RUQ u/s with dopplers vs. CT scan abdomen pelvis with IV contrast                       - Check HP serology -> would recommend empiric treatment if positive                       - No CI to restart DAPT from a GI perspective                       - GI to follow    < end of copied text >

## 2022-05-31 NOTE — PROGRESS NOTE ADULT - ASSESSMENT
52F w/ ESRD s/p HepC DDRT in 2020, chronic SVC syndrome s/p L cephalic-iliac vein bypass on DAPT, s/p thrombectomy of L iliac vein and graft 2012, Hx BK viremia, HTN admitted with hematemesis found to have UGI bleed 2/2 esophageal varices, esophagitis & gastric ulcers

## 2022-05-31 NOTE — AIRWAY REMOVAL NOTE  ADULT & PEDS - ARTIFICAL AIRWAY REMOVAL COMMENTS
Written order for extubation verified. The patient was identified by full name and birth date compared to the identification band. Present during the procedure was  RN Shemar

## 2022-05-31 NOTE — PROGRESS NOTE ADULT - SUBJECTIVE AND OBJECTIVE BOX
Health system DIVISION OF KIDNEY DISEASES AND HYPERTENSION -- FOLLOW UP NOTE  --------------------------------------------------------------------------------      24 hour events/subjective: Pateint seen & examined. Vitals/ labs/ medications reviewed. Still intubated with FiO2 30%. No overt Bleeding per nursing team. UOP 1.1L in 24 hrs.         PAST HISTORY  --------------------------------------------------------------------------------  No significant changes to PMH, PSH, FHx, SHx, unless otherwise noted    ALLERGIES & MEDICATIONS  --------------------------------------------------------------------------------  Allergies    contrast media (iodine-based) (Urticaria)  ibuprofen (Hives)  ibuprofen/morphine (Unknown)  latex (Swelling)  morphine (Urticaria)  shellfish (Urticaria)    Intolerances      Standing Inpatient Medications  cefTRIAXone   IVPB 1000 milliGRAM(s) IV Intermittent every 24 hours  chlorhexidine 0.12% Liquid 15 milliLiter(s) Oral Mucosa every 12 hours  chlorhexidine 4% Liquid 1 Application(s) Topical <User Schedule>  dexMEDEtomidine Infusion 0.2 MICROgram(s)/kG/Hr IV Continuous <Continuous>  methylPREDNISolone sodium succinate Injectable 4 milliGRAM(s) IV Push daily  octreotide  Infusion 50 MICROgram(s)/Hr IV Continuous <Continuous>  pantoprazole Infusion 8 mG/Hr IV Continuous <Continuous>  tacrolimus  IVPB 1.5 milliGRAM(s) IV Intermittent every 24 hours    PRN Inpatient Medications  ondansetron Injectable 4 milliGRAM(s) IV Push once PRN      REVIEW OF SYSTEMS  --------------------------------------------------------------------------------  unable to obtain due to lethargy    VITALS/PHYSICAL EXAM  --------------------------------------------------------------------------------  T(C): 36.8 (05-31-22 @ 08:00), Max: 37.3 (05-31-22 @ 00:00)  HR: 55 (05-31-22 @ 11:00) (55 - 96)  BP: 100/48 (05-31-22 @ 11:00) (87/54 - 158/79)  RR: 21 (05-31-22 @ 11:00) (10 - 24)  SpO2: 100% (05-31-22 @ 11:00) (90% - 100%)  Wt(kg): --  Height (cm): 160 (05-30-22 @ 01:10)  Weight (kg): 69.9 (05-30-22 @ 01:10)  BMI (kg/m2): 27.3 (05-30-22 @ 01:10)  BSA (m2): 1.73 (05-30-22 @ 01:10)      05-30-22 @ 07:01  -  05-31-22 @ 07:00  --------------------------------------------------------  IN: 992.3 mL / OUT: 1100 mL / NET: -107.7 mL    05-31-22 @ 07:01  -  05-31-22 @ 11:32  --------------------------------------------------------  IN: 62.5 mL / OUT: 0 mL / NET: 62.5 mL      Physical Exam:  	Gen: intubated  	HEENT: supple neck  	Pulm: CTA B/L  	CV: RRR, S1S2; no rub  	Back:  no sacral edema  	Abd: +BS, soft, nontender/nondistended          Transplant: No tenderness, swelling  	Ext: no edema b/l  	Neuro: drowsy, off sedation  	Skin: Warm, without rashes      LABS/STUDIES  --------------------------------------------------------------------------------              7.7    7.39  >-----------<  105      [05-31-22 @ 05:57]              23.7     141  |  113  |  45  ----------------------------<  117      [05-31-22 @ 01:18]  3.9   |  17  |  1.15        Ca     8.7     [05-31-22 @ 01:18]      Mg     1.9     [05-31-22 @ 01:18]      Phos  2.3     [05-31-22 @ 01:18]    TPro  4.0  /  Alb  2.7  /  TBili  0.4  /  DBili  x   /  AST  12  /  ALT  11  /  AlkPhos  40  [05-31-22 @ 01:18]    PT/INR: PT 13.3 , INR 1.15       [05-31-22 @ 01:18]  PTT: 25.5       [05-31-22 @ 01:18]      Creatinine Trend:  SCr 1.15 [05-31 @ 01:18]  SCr 1.00 [05-30 @ 06:59]  SCr 0.79 [05-30 @ 02:51]              Urinalysis - [05-30-22 @ 09:08]      Color Light Yellow / Appearance Clear / SG 1.026 / pH 6.5      Gluc Negative / Ketone Trace  / Bili Negative / Urobili Negative       Blood Negative / Protein Negative / Leuk Est Moderate / Nitrite Negative      RBC 1 / WBC 2 / Hyaline 0 / Gran  / Sq Epi  / Non Sq Epi 2 / Bacteria Negative

## 2022-05-31 NOTE — PROGRESS NOTE ADULT - PROBLEM SELECTOR PLAN 2
PT is NPO.  Plans for extubation today.    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 Last BK PCR as outpatient was in 2021. Repeat BK PCR  Can change methylpred 4mgs daily to prednisone 5 when able to take PO.    Change IV tacrolimus 1.5mgs given over 24 hour to envarsus home dose when able to take PO

## 2022-06-01 ENCOUNTER — APPOINTMENT (OUTPATIENT)
Dept: CARDIOLOGY | Facility: CLINIC | Age: 52
End: 2022-06-01

## 2022-06-01 LAB
ALBUMIN SERPL ELPH-MCNC: 2.8 G/DL — LOW (ref 3.3–5)
ALP SERPL-CCNC: 42 U/L — SIGNIFICANT CHANGE UP (ref 40–120)
ALT FLD-CCNC: 12 U/L — SIGNIFICANT CHANGE UP (ref 10–45)
ANION GAP SERPL CALC-SCNC: 11 MMOL/L — SIGNIFICANT CHANGE UP (ref 5–17)
ANISOCYTOSIS BLD QL: SLIGHT — SIGNIFICANT CHANGE UP
APTT BLD: 25.7 SEC — LOW (ref 27.5–35.5)
AST SERPL-CCNC: 11 U/L — SIGNIFICANT CHANGE UP (ref 10–40)
BASOPHILS # BLD AUTO: 0 K/UL — SIGNIFICANT CHANGE UP (ref 0–0.2)
BASOPHILS # BLD AUTO: 0.03 K/UL — SIGNIFICANT CHANGE UP (ref 0–0.2)
BASOPHILS NFR BLD AUTO: 0 % — SIGNIFICANT CHANGE UP (ref 0–2)
BASOPHILS NFR BLD AUTO: 0.3 % — SIGNIFICANT CHANGE UP (ref 0–2)
BILIRUB SERPL-MCNC: 0.5 MG/DL — SIGNIFICANT CHANGE UP (ref 0.2–1.2)
BUN SERPL-MCNC: 46 MG/DL — HIGH (ref 7–23)
CALCIUM SERPL-MCNC: 8.5 MG/DL — SIGNIFICANT CHANGE UP (ref 8.4–10.5)
CHLORIDE SERPL-SCNC: 110 MMOL/L — HIGH (ref 96–108)
CO2 SERPL-SCNC: 20 MMOL/L — LOW (ref 22–31)
CREAT SERPL-MCNC: 1.78 MG/DL — HIGH (ref 0.5–1.3)
DACRYOCYTES BLD QL SMEAR: SLIGHT — SIGNIFICANT CHANGE UP
EGFR: 34 ML/MIN/1.73M2 — LOW
EOSINOPHIL # BLD AUTO: 0.07 K/UL — SIGNIFICANT CHANGE UP (ref 0–0.5)
EOSINOPHIL # BLD AUTO: 0.07 K/UL — SIGNIFICANT CHANGE UP (ref 0–0.5)
EOSINOPHIL NFR BLD AUTO: 0.8 % — SIGNIFICANT CHANGE UP (ref 0–6)
EOSINOPHIL NFR BLD AUTO: 0.9 % — SIGNIFICANT CHANGE UP (ref 0–6)
GLUCOSE SERPL-MCNC: 144 MG/DL — HIGH (ref 70–99)
HCT VFR BLD CALC: 21.4 % — LOW (ref 34.5–45)
HCT VFR BLD CALC: 23.7 % — LOW (ref 34.5–45)
HGB BLD-MCNC: 6.6 G/DL — CRITICAL LOW (ref 11.5–15.5)
HGB BLD-MCNC: 7.5 G/DL — LOW (ref 11.5–15.5)
HYPOCHROMIA BLD QL: SLIGHT — SIGNIFICANT CHANGE UP
IMM GRANULOCYTES NFR BLD AUTO: 0.6 % — SIGNIFICANT CHANGE UP (ref 0–1.5)
INR BLD: 1.11 RATIO — SIGNIFICANT CHANGE UP (ref 0.88–1.16)
LYMPHOCYTES # BLD AUTO: 0.21 K/UL — LOW (ref 1–3.3)
LYMPHOCYTES # BLD AUTO: 0.69 K/UL — LOW (ref 1–3.3)
LYMPHOCYTES # BLD AUTO: 2.5 % — LOW (ref 13–44)
LYMPHOCYTES # BLD AUTO: 7.9 % — LOW (ref 13–44)
MAGNESIUM SERPL-MCNC: 1.8 MG/DL — SIGNIFICANT CHANGE UP (ref 1.6–2.6)
MANUAL SMEAR VERIFICATION: SIGNIFICANT CHANGE UP
MCHC RBC-ENTMCNC: 27.7 PG — SIGNIFICANT CHANGE UP (ref 27–34)
MCHC RBC-ENTMCNC: 28.3 PG — SIGNIFICANT CHANGE UP (ref 27–34)
MCHC RBC-ENTMCNC: 30.8 GM/DL — LOW (ref 32–36)
MCHC RBC-ENTMCNC: 31.6 GM/DL — LOW (ref 32–36)
MCV RBC AUTO: 89.4 FL — SIGNIFICANT CHANGE UP (ref 80–100)
MCV RBC AUTO: 89.9 FL — SIGNIFICANT CHANGE UP (ref 80–100)
MICROCYTES BLD QL: SLIGHT — SIGNIFICANT CHANGE UP
MITOCHONDRIA AB SER-ACNC: SIGNIFICANT CHANGE UP
MONOCYTES # BLD AUTO: 0.21 K/UL — SIGNIFICANT CHANGE UP (ref 0–0.9)
MONOCYTES # BLD AUTO: 0.69 K/UL — SIGNIFICANT CHANGE UP (ref 0–0.9)
MONOCYTES NFR BLD AUTO: 2.5 % — SIGNIFICANT CHANGE UP (ref 2–14)
MONOCYTES NFR BLD AUTO: 7.9 % — SIGNIFICANT CHANGE UP (ref 2–14)
NEUTROPHILS # BLD AUTO: 7.25 K/UL — SIGNIFICANT CHANGE UP (ref 1.8–7.4)
NEUTROPHILS # BLD AUTO: 7.83 K/UL — HIGH (ref 1.8–7.4)
NEUTROPHILS NFR BLD AUTO: 82.5 % — HIGH (ref 43–77)
NEUTROPHILS NFR BLD AUTO: 94.1 % — HIGH (ref 43–77)
NRBC # BLD: 0 /100 WBCS — SIGNIFICANT CHANGE UP (ref 0–0)
OVALOCYTES BLD QL SMEAR: SLIGHT — SIGNIFICANT CHANGE UP
PHOSPHATE SERPL-MCNC: 3.4 MG/DL — SIGNIFICANT CHANGE UP (ref 2.5–4.5)
PLAT MORPH BLD: NORMAL — SIGNIFICANT CHANGE UP
PLATELET # BLD AUTO: 101 K/UL — LOW (ref 150–400)
PLATELET # BLD AUTO: 112 K/UL — LOW (ref 150–400)
POIKILOCYTOSIS BLD QL AUTO: SLIGHT — SIGNIFICANT CHANGE UP
POLYCHROMASIA BLD QL SMEAR: SLIGHT — SIGNIFICANT CHANGE UP
POTASSIUM SERPL-MCNC: 4.4 MMOL/L — SIGNIFICANT CHANGE UP (ref 3.5–5.3)
POTASSIUM SERPL-SCNC: 4.4 MMOL/L — SIGNIFICANT CHANGE UP (ref 3.5–5.3)
PROT SERPL-MCNC: 4.9 G/DL — LOW (ref 6–8.3)
PROTHROM AB SERPL-ACNC: 12.9 SEC — SIGNIFICANT CHANGE UP (ref 10.5–13.4)
RBC # BLD: 2.38 M/UL — LOW (ref 3.8–5.2)
RBC # BLD: 2.65 M/UL — LOW (ref 3.8–5.2)
RBC # FLD: 15.3 % — HIGH (ref 10.3–14.5)
RBC # FLD: 16.1 % — HIGH (ref 10.3–14.5)
RBC BLD AUTO: ABNORMAL
SCHISTOCYTES BLD QL AUTO: SLIGHT — SIGNIFICANT CHANGE UP
SMOOTH MUSCLE AB SER-ACNC: SIGNIFICANT CHANGE UP
SODIUM SERPL-SCNC: 141 MMOL/L — SIGNIFICANT CHANGE UP (ref 135–145)
TACROLIMUS SERPL-MCNC: 3.4 NG/ML — SIGNIFICANT CHANGE UP
TARGETS BLD QL SMEAR: SLIGHT — SIGNIFICANT CHANGE UP
WBC # BLD: 8.32 K/UL — SIGNIFICANT CHANGE UP (ref 3.8–10.5)
WBC # BLD: 8.78 K/UL — SIGNIFICANT CHANGE UP (ref 3.8–10.5)
WBC # FLD AUTO: 8.32 K/UL — SIGNIFICANT CHANGE UP (ref 3.8–10.5)
WBC # FLD AUTO: 8.78 K/UL — SIGNIFICANT CHANGE UP (ref 3.8–10.5)

## 2022-06-01 PROCEDURE — 99232 SBSQ HOSP IP/OBS MODERATE 35: CPT | Mod: GC

## 2022-06-01 PROCEDURE — 99232 SBSQ HOSP IP/OBS MODERATE 35: CPT

## 2022-06-01 PROCEDURE — 99233 SBSQ HOSP IP/OBS HIGH 50: CPT

## 2022-06-01 RX ORDER — PANTOPRAZOLE SODIUM 20 MG/1
40 TABLET, DELAYED RELEASE ORAL ONCE
Refills: 0 | Status: COMPLETED | OUTPATIENT
Start: 2022-06-02 | End: 2022-06-02

## 2022-06-01 RX ORDER — ACETAMINOPHEN 500 MG
650 TABLET ORAL EVERY 6 HOURS
Refills: 0 | Status: DISCONTINUED | OUTPATIENT
Start: 2022-06-01 | End: 2022-06-10

## 2022-06-01 RX ORDER — PANTOPRAZOLE SODIUM 20 MG/1
40 TABLET, DELAYED RELEASE ORAL
Refills: 0 | Status: DISCONTINUED | OUTPATIENT
Start: 2022-06-03 | End: 2022-06-08

## 2022-06-01 RX ADMIN — OCTREOTIDE ACETATE 10 MICROGRAM(S)/HR: 200 INJECTION, SOLUTION INTRAVENOUS; SUBCUTANEOUS at 06:30

## 2022-06-01 RX ADMIN — PANTOPRAZOLE SODIUM 10 MG/HR: 20 TABLET, DELAYED RELEASE ORAL at 06:30

## 2022-06-01 RX ADMIN — Medication 650 MILLIGRAM(S): at 00:44

## 2022-06-01 RX ADMIN — Medication 650 MILLIGRAM(S): at 22:40

## 2022-06-01 RX ADMIN — Medication 650 MILLIGRAM(S): at 12:11

## 2022-06-01 RX ADMIN — Medication 650 MILLIGRAM(S): at 21:47

## 2022-06-01 RX ADMIN — TACROLIMUS 8 MILLIGRAM(S): 5 CAPSULE ORAL at 06:28

## 2022-06-01 RX ADMIN — Medication 650 MILLIGRAM(S): at 13:11

## 2022-06-01 RX ADMIN — CEFTRIAXONE 100 MILLIGRAM(S): 500 INJECTION, POWDER, FOR SOLUTION INTRAMUSCULAR; INTRAVENOUS at 17:17

## 2022-06-01 RX ADMIN — Medication 5 MILLIGRAM(S): at 06:28

## 2022-06-01 NOTE — PROGRESS NOTE ADULT - ATTENDING COMMENTS
Extubated, transferred out of MICU  Tolerating clears, though some sore throat post intubation  No melena or Bm, but drop in Hgb to 6.6  HBV with prior exposure, HCV negative, HP serology negative  Imaging reviewed      Plan:  c/w iv ppi, octreotide, abx  xfuse 1 unit prbc  consideration for repeat endoscopy depending on response to xfusion  Would get CT abd pelvis with iv contrast to better assess causes of varix formation and/or portal HTN  consideration for liver elastography  f/u rest of liver labs pending

## 2022-06-01 NOTE — PROGRESS NOTE ADULT - PROBLEM SELECTOR PLAN 2
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 Last BK PCR as outpatient was in 2021. Repeat BK PCR  Continue with prednisone 5 mg  Continue envarsus 8mg Daily     Check Tacro level Daily 30min before AM dose.   Last level 15.8, please repeat today and will adjust dose accordingly.

## 2022-06-01 NOTE — PROGRESS NOTE ADULT - PROBLEM SELECTOR PROBLEM 2
Physical Therapy      Patient Name:  Yen Falcon   MRN:  [id number]    Patient not seen today secondary to Patient unwilling to participate. Will follow-up as lebert .    Kp Garza, PT     Kidney transplant recipient

## 2022-06-01 NOTE — PROGRESS NOTE ADULT - SUBJECTIVE AND OBJECTIVE BOX
Interval Events:   Having a little of nausea but no vomiting  Denies having any blood in the stool      Hospital Medications:  cefTRIAXone   IVPB 1000 milliGRAM(s) IV Intermittent every 24 hours  chlorhexidine 4% Liquid 1 Application(s) Topical <User Schedule>  octreotide  Infusion 50 MICROgram(s)/Hr IV Continuous <Continuous>  pantoprazole Infusion 8 mG/Hr IV Continuous <Continuous>  predniSONE   Tablet 5 milliGRAM(s) Oral daily  tacrolimus ER Tablet (ENVARSUS XR) 8 milliGRAM(s) Oral daily      ROS: All system reviewed and negative except as mentioned above.    PHYSICAL EXAM:   Vital Signs:  Vital Signs Last 24 Hrs  T(C): 36.7 (2022 08:14), Max: 37.4 (31 May 2022 20:00)  T(F): 98 (2022 08:14), Max: 99.3 (31 May 2022 20:00)  HR: 84 (2022 08:14) (55 - 88)  BP: 112/68 (2022 08:14) (94/47 - 139/64)  BP(mean): 75 (31 May 2022 21:00) (66 - 82)  RR: 18 (2022 08:14) (6 - 37)  SpO2: 100% (2022 08:14) (95% - 100%)  Daily     Daily Weight in k.8 (2022 05:00)    GENERAL:  NAD, Appears stated age  HEENT:  NC/AT,  conjunctivae clear and pink, sclera -anicteric  CHEST:  Normal Effort, Breath sounds clear  HEART:  RRR, S1 + S2, no murmurs  ABDOMEN:  Soft, non-tender, non-distended, normoactive bowel sounds,  no masses  EXTREMITIES:  no cyanosis or edema  SKIN:  Warm & Dry. No rash or erythema  NEURO:  Alert, oriented, no focal deficit    LABS:                        7.8    10.15 )-----------( 104      ( 31 May 2022 14:03 )             23.9     Mean Cell Volume: 86.3 fl (22 @ 14:03)        141  |  113<H>  |  45<H>  ----------------------------<  117<H>  3.9   |  17<L>  |  1.15    Ca    8.7      31 May 2022 01:18  Phos  2.3     05-  Mg     1.9     -    TPro  4.0<L>  /  Alb  2.7<L>  /  TBili  0.4  /  DBili  x   /  AST  12  /  ALT  11  /  AlkPhos  40  05-31    LIVER FUNCTIONS - ( 31 May 2022 01:18 )  Alb: 2.7 g/dL / Pro: 4.0 g/dL / ALK PHOS: 40 U/L / ALT: 11 U/L / AST: 12 U/L / GGT: x           PT/INR - ( 31 May 2022 01:18 )   PT: 13.3 sec;   INR: 1.15 ratio         PTT - ( 31 May 2022 01:18 )  PTT:25.5 sec                            7.8    10.15 )-----------( 104      ( 31 May 2022 14:03 )             23.9                         7.7    7.39  )-----------( 105      ( 31 May 2022 05:57 )             23.7                         7.7    6.31  )-----------( 104      ( 31 May 2022 01:18 )             23.4                         7.7    7.47  )-----------( 126      ( 30 May 2022 14:38 )             24.8                         8.9    5.44  )-----------( 129      ( 30 May 2022 12:35 )             27.6       Imaging: Images reviewed.

## 2022-06-01 NOTE — PROGRESS NOTE ADULT - PROBLEM SELECTOR PLAN 3
UGI bleed , esophageal varices, esophagitis & gastric ulcers  Esophageal varices likely 2/2 central vein occlusion as seen on prior imaging  Continue PPI per GI    If you have any questions, please feel free to contact me  Marquez Merida  Nephrology Fellow  [phone]; Prefer [employer] TEAMS  (After 5pm or on weekends please page the on-call fellow)

## 2022-06-01 NOTE — PROGRESS NOTE ADULT - ASSESSMENT
52F w/ ESRD s/p HepC DDRT, chronic SVC syndrome s/p L cephalic-iliac vein bypass on DAPT, s/p thrombectomy of L iliac vein and graft 2012, Hx BK viremia, HTN presents as transfer from [facility] ED for hematemesis 2/2 acute upper GI hemorrhage admitted to MICU for elective intubation and urgent endoscopy with GI s/p endoscopy with evidence of non-bleeding ulcers & esophageal varices

## 2022-06-01 NOTE — PROGRESS NOTE ADULT - PROBLEM SELECTOR PLAN 3
h/o bypass surgeries on DAPT asa and plavix at home   Will cont to hold for now given downtredning h/h still

## 2022-06-01 NOTE — PROGRESS NOTE ADULT - TIME BILLING
Kidney Transplant recipient with functioning allograft  Admitted for evaluation of GI bleed. EGD findings noted  Comorbidities reviewed.  I reviewed available clinical and lab data    Reviewed immunosuppression and allograft function   Reviewed medication regimen for comorbidities. 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  Suggestions:  Continue immunosuppression  PO regimen and recheck Tac level  Will follow  I was present during and reviewed clinical and lab data as well as assessment and plan as documented by the house staff as noted. Please contact if any additional questions with any change in clinical condition or on availability of any additional information or reports.

## 2022-06-01 NOTE — PROGRESS NOTE ADULT - PROBLEM SELECTOR PLAN 2
Cr increased today now to 1.78.   relative hypotension from bleeding? now BP seems stable.   back on prednisone 5mg, tacrolimus 8mg daily. repeat tacro level now 3's    holding  leflunomide now. repeat BK PCR  renal transplant follow up

## 2022-06-01 NOTE — PROGRESS NOTE ADULT - SUBJECTIVE AND OBJECTIVE BOX
[facility] Division of Hospital Medicine  Pham Harrington MD  Pager (M-F, 1A-5P): [phone]  Other Times:  [phone]    Patient is a 52y old  Female who presents with a chief complaint of 52F transferred from [facility] for hematemesis (01 Jun 2022 09:11)      SUBJECTIVE / OVERNIGHT EVENTS:      ADDITIONAL REVIEW OF SYSTEMS:    MEDICATIONS  (STANDING):  cefTRIAXone   IVPB 1000 milliGRAM(s) IV Intermittent every 24 hours  chlorhexidine 4% Liquid 1 Application(s) Topical <User Schedule>  octreotide  Infusion 50 MICROgram(s)/Hr (10 mL/Hr) IV Continuous <Continuous>  pantoprazole Infusion 8 mG/Hr (10 mL/Hr) IV Continuous <Continuous>  predniSONE   Tablet 5 milliGRAM(s) Oral daily  tacrolimus ER Tablet (ENVARSUS XR) 8 milliGRAM(s) Oral daily    MEDICATIONS  (PRN):  acetaminophen     Tablet .. 650 milliGRAM(s) Oral every 6 hours PRN Mild Pain (1 - 3), Moderate Pain (4 - 6), Severe Pain (7 - 10)      CAPILLARY BLOOD GLUCOSE        I&O's Summary    31 May 2022 07:01  -  01 Jun 2022 07:00  --------------------------------------------------------  IN: 773 mL / OUT: 0 mL / NET: 773 mL        PHYSICAL EXAM:  Vital Signs Last 24 Hrs  T(C): 36.7 (01 Jun 2022 12:37), Max: 37.4 (31 May 2022 20:00)  T(F): 98 (01 Jun 2022 12:37), Max: 99.3 (31 May 2022 20:00)  HR: 89 (01 Jun 2022 12:37) (64 - 89)  BP: 115/64 (01 Jun 2022 12:37) (94/47 - 139/64)  BP(mean): 75 (31 May 2022 21:00) (66 - 82)  RR: 18 (01 Jun 2022 12:37) (6 - 37)  SpO2: 95% (01 Jun 2022 12:37) (95% - 100%)    CONSTITUTIONAL: NAD, well-groomed  EYES:  conjunctiva and sclera clear  ENMT: Moist oral mucosa  NECK: Supple, no palpable masses; no JVD  RESPIRATORY: Normal respiratory effort; lungs are clear to auscultation bilaterally  CARDIOVASCULAR: Regular rate and rhythm, normal S1 and S2, no murmur/rub/gallop; No lower extremity edema  ABDOMEN: Nontender to palpation, normoactive bowel sounds, no rebound/guarding  MUSCULOSKELETAL:  no clubbing or cyanosis of digits; no joint swelling or tenderness to palpation  PSYCH: A+O to person, place, and time; affect appropriate  SKIN: No rashes; no palpable lesions    LABS:                        6.6    8.32  )-----------( 112      ( 01 Jun 2022 11:15 )             21.4     06-01    141  |  110<H>  |  46<H>  ----------------------------<  144<H>  4.4   |  20<L>  |  1.78<H>    Ca    8.5      01 Jun 2022 11:15  Phos  3.4     06-01  Mg     1.8     06-01    TPro  4.9<L>  /  Alb  2.8<L>  /  TBili  0.5  /  DBili  x   /  AST  11  /  ALT  12  /  AlkPhos  42  06-01    PT/INR - ( 01 Jun 2022 11:15 )   PT: 12.9 sec;   INR: 1.11 ratio         PTT - ( 01 Jun 2022 11:15 )  PTT:25.7 sec            RADIOLOGY & ADDITIONAL TESTS:  Results Reviewed:   Imaging Personally Reviewed:  Electrocardiogram Personally Reviewed:    COORDINATION OF CARE:  Care Discussed with Consultants/Other Providers [Y/N]:  Prior or Outpatient Records Reviewed [Y/N]:   [facility] Division of Hospital Medicine  Pham Harrington MD  Pager (M-F, 3A-3F): [phone]  Other Times:  [phone]    Patient is a 52y old  Female who presents with a chief complaint of 52F transferred from [facility] for hematemesis (01 Jun 2022 09:11)      SUBJECTIVE / OVERNIGHT EVENTS:  alert and awake. c/o dizziness and weakness unchanged. afebrile   denies any BM since yesterday. some nausea but no vomiting.       ADDITIONAL REVIEW OF SYSTEMS: otherwise neg    MEDICATIONS  (STANDING):  cefTRIAXone   IVPB 1000 milliGRAM(s) IV Intermittent every 24 hours  chlorhexidine 4% Liquid 1 Application(s) Topical <User Schedule>  octreotide  Infusion 50 MICROgram(s)/Hr (10 mL/Hr) IV Continuous <Continuous>  pantoprazole Infusion 8 mG/Hr (10 mL/Hr) IV Continuous <Continuous>  predniSONE   Tablet 5 milliGRAM(s) Oral daily  tacrolimus ER Tablet (ENVARSUS XR) 8 milliGRAM(s) Oral daily    MEDICATIONS  (PRN):  acetaminophen     Tablet .. 650 milliGRAM(s) Oral every 6 hours PRN Mild Pain (1 - 3), Moderate Pain (4 - 6), Severe Pain (7 - 10)      CAPILLARY BLOOD GLUCOSE        I&O's Summary    31 May 2022 07:01  -  01 Jun 2022 07:00  --------------------------------------------------------  IN: 773 mL / OUT: 0 mL / NET: 773 mL        PHYSICAL EXAM:  Vital Signs Last 24 Hrs  T(C): 36.7 (01 Jun 2022 12:37), Max: 37.4 (31 May 2022 20:00)  T(F): 98 (01 Jun 2022 12:37), Max: 99.3 (31 May 2022 20:00)  HR: 89 (01 Jun 2022 12:37) (64 - 89)  BP: 115/64 (01 Jun 2022 12:37) (94/47 - 139/64)  BP(mean): 75 (31 May 2022 21:00) (66 - 82)  RR: 18 (01 Jun 2022 12:37) (6 - 37)  SpO2: 95% (01 Jun 2022 12:37) (95% - 100%)    CONSTITUTIONAL: NAD, well-groomed  EYES:  conjunctiva and sclera clear  ENMT: Moist oral mucosa  NECK: Supple, no palpable masses; no JVD  RESPIRATORY: Normal respiratory effort; lungs are clear to auscultation bilaterally  CARDIOVASCULAR: Regular rate and rhythm, normal S1 and S2, no murmur/rub/gallop; No lower extremity edema  ABDOMEN: mild tenderness epigastric , normoactive bowel sounds, no rebound/guarding  MUSCULOSKELETAL:  no clubbing or cyanosis of digits; no joint swelling or tenderness to palpation  PSYCH: A+O to person, place, and time; affect appropriate  SKIN: No rashes; no palpable lesions    LABS:                        6.6    8.32  )-----------( 112      ( 01 Jun 2022 11:15 )             21.4     06-01    141  |  110<H>  |  46<H>  ----------------------------<  144<H>  4.4   |  20<L>  |  1.78<H>    Ca    8.5      01 Jun 2022 11:15  Phos  3.4     06-01  Mg     1.8     06-01    TPro  4.9<L>  /  Alb  2.8<L>  /  TBili  0.5  /  DBili  x   /  AST  11  /  ALT  12  /  AlkPhos  42  06-01    PT/INR - ( 01 Jun 2022 11:15 )   PT: 12.9 sec;   INR: 1.11 ratio         PTT - ( 01 Jun 2022 11:15 )  PTT:25.7 sec            RADIOLOGY & ADDITIONAL TESTS:  Results Reviewed:   Imaging Personally Reviewed:  Electrocardiogram Personally Reviewed:    COORDINATION OF CARE:  Care Discussed with Consultants/Other Providers [Y/N]:  Prior or Outpatient Records Reviewed [Y/N]:

## 2022-06-01 NOTE — PROGRESS NOTE ADULT - ASSESSMENT
#Renal transplant  #chronic SVC syndrome on DAPT    #Hematemesis  EGD with EV grade 2, PUD in the antrum (clean based ulcer and one Chidi Class II C.     #EV grade 2  #Concern for cirrhosis  US with normal appearing liver  Hep B and C negative    Recommendations:  -c/w w octreotide x 3 days, can stop tomorrow  -c/w PPI drip for additional 24 hrs -> BID afterwards  -c/w Ceftriaxone daily (pending cirrhosis work up)  -Obtain inpatient elastography   -No CI for DAPT therapy  -f/up auto immune serologies: anti-mitochondrial Ab, anti-smooth muscle Ab, anti-LKM, anti-liver cytosol antibody, KAYLA, Immunoglobulin panel  -daily CBC, CMP and INR    Recommendations preliminary until signed by attending.     Juan J Mukherjee MD  Gastroenterology/Hepatology Fellow  1st option: [phone] (text or call), ONLY available from 7:00 am to 5:00 pm.   **Contact on-call GI fellow via answering service ([phone]) from 5pm-7am AND on weekends/holidays**  2nd option: Available via [employer] Teams  3rd option: Pager: [phone]           #Renal transplant  #chronic SVC syndrome on DAPT    #Hematemesis  EGD with EV grade 2, PUD in the antrum (clean based ulcer and one Chidi Class II C.     #EV grade 2  #Concern for cirrhosis  US with normal appearing liver  Hep B and C negative    Recommendations:  -c/w w octreotide x 3 days, can stop tomorrow  -c/w PPI drip for additional 24 hrs -> BID afterwards  -c/w Ceftriaxone daily (pending cirrhosis work up)  -Obtain inpatient elastography and CT with IV contrast of abd/pelvis  -No CI for DAPT therapy  -f/up auto immune serologies: anti-mitochondrial Ab, anti-smooth muscle Ab, anti-LKM, anti-liver cytosol antibody, KAYLA, Immunoglobulin panel  -daily CBC, CMP and INR    Recommendations preliminary until signed by attending.     Juan J Mukherjee MD  Gastroenterology/Hepatology Fellow  1st option: [phone] (text or call), ONLY available from 7:00 am to 5:00 pm.   **Contact on-call GI fellow via answering service ([phone]) from 5pm-7am AND on weekends/holidays**  2nd option: Available via [employer] Teams  3rd option: Pager: [phone]

## 2022-06-01 NOTE — PROGRESS NOTE ADULT - PROBLEM SELECTOR PLAN 1
s/p EGD with esophageal varices and nonbleeding gastric ulcers   H/H still downtrended today to 6.6 from 7.8 yesterday.   No overt signs of bleeding s/p EGD with esophageal varices and nonbleeding gastric ulcers   H/H still downtrended today to 6.6 from 7.8 yesterday.   No overt signs of bleeding but has not had additional BM overnight or today.,  No vomiting. still feeling dizzy and weak unchanged  GI eval appreciated. cont with octreotide and PPI gtt for additional 24 hours then PPI IV BID  cont with ceftriaxone 7 days for prophylaxis   cont to monitor CBC q6hrs for another 24 hours given low h/h, symptoms.   Would cont to hold DAPT for now given lower h/h.   hold BP meds. s/p FFP, PRBC, PPI, erythromycin in MICU  US abd without evidence of cirrhosis. viral hepatitis panel pending.

## 2022-06-01 NOTE — PROGRESS NOTE ADULT - SUBJECTIVE AND OBJECTIVE BOX
[facility] DIVISION OF KIDNEY DISEASES AND HYPERTENSION -- FOLLOW UP NOTE  --------------------------------------------------------------------------------  24 hour events/subjective: Patient seen & examined. Vitals/ labs/ medications reviewed. Pt. extubated and downgraded to floors. Now tolerating PO intake. Pt. endorses some intermittent nausea but no emesis. No issues with urination. Pt. with some improving epigastric discomfort.     PAST HISTORY  --------------------------------------------------------------------------------  No significant changes to PMH, PSH, FHx, SHx, unless otherwise noted    ALLERGIES & MEDICATIONS  --------------------------------------------------------------------------------  Allergies    contrast media (iodine-based) (Urticaria)  ibuprofen (Hives)  ibuprofen/morphine (Unknown)  latex (Swelling)  morphine (Anaphylaxis)  morphine (Urticaria)  penicillin (Anaphylaxis)  shellfish (Urticaria)    Intolerances      Standing Inpatient Medications  cefTRIAXone   IVPB 1000 milliGRAM(s) IV Intermittent every 24 hours  chlorhexidine 4% Liquid 1 Application(s) Topical <User Schedule>  octreotide  Infusion 50 MICROgram(s)/Hr IV Continuous <Continuous>  pantoprazole Infusion 8 mG/Hr IV Continuous <Continuous>  predniSONE   Tablet 5 milliGRAM(s) Oral daily  tacrolimus ER Tablet (ENVARSUS XR) 8 milliGRAM(s) Oral daily    PRN Inpatient Medications      REVIEW OF SYSTEMS  --------------------------------------------------------------------------------  Gen: No fevers/chills  Respiratory: No dyspnea, cough,   CV: No chest pain, PND, orthopnea  GI: Epigastric discomfort, nausea+   Transplant: No pain  : No increased frequency, dysuria, hematuria   MSK: No edema  Neuro: No dizziness/lightheadedness    All other systems were reviewed and are negative, except as noted.    VITALS/PHYSICAL EXAM  --------------------------------------------------------------------------------  T(C): 36.7 (06-01-22 @ 08:14), Max: 37.4 (05-31-22 @ 20:00)  HR: 84 (06-01-22 @ 08:14) (55 - 88)  BP: 112/68 (06-01-22 @ 08:14) (94/47 - 139/64)  RR: 18 (06-01-22 @ 08:14) (6 - 37)  SpO2: 100% (06-01-22 @ 08:14) (95% - 100%)  Wt(kg): --        05-31-22 @ 07:01  -  06-01-22 @ 07:00  --------------------------------------------------------  IN: 773 mL / OUT: 0 mL / NET: 773 mL      Physical Exam:  	Gen: NAD  	HEENT: supple neck  	Pulm: CTA B/L  	CV: RRR, S1S2; no rub  	Back:  no sacral edema  	Abd: +BS, soft, epigastric tenderness           Transplant: No tenderness, swelling  	Ext: no edema b/l  	Neuro: Alert, awake   	Skin: Warm, without rashes    LABS/STUDIES  --------------------------------------------------------------------------------              7.8    10.15 >-----------<  104      [05-31-22 @ 14:03]              23.9     141  |  113  |  45  ----------------------------<  117      [05-31-22 @ 01:18]  3.9   |  17  |  1.15        Ca     8.7     [05-31-22 @ 01:18]      Mg     1.9     [05-31-22 @ 01:18]      Phos  2.3     [05-31-22 @ 01:18]    TPro  4.0  /  Alb  2.7  /  TBili  0.4  /  DBili  x   /  AST  12  /  ALT  11  /  AlkPhos  40  [05-31-22 @ 01:18]    PT/INR: PT 13.3 , INR 1.15       [05-31-22 @ 01:18]  PTT: 25.5       [05-31-22 @ 01:18]      Creatinine Trend:  SCr 1.15 [05-31 @ 01:18]  SCr 1.00 [05-30 @ 06:59]  SCr 0.79 [05-30 @ 02:51]  SCr 1.05 [05-29 @ 22:26]    Tacrolimus (), Serum: 15.8 ng/mL (05-31 @ 05:57)  Tacrolimus (), Serum: 3.2 ng/mL (05-30 @ 06:43)        Urinalysis - [05-30-22 @ 09:08]      Color Light Yellow / Appearance Clear / SG 1.026 / pH 6.5      Gluc Negative / Ketone Trace  / Bili Negative / Urobili Negative       Blood Negative / Protein Negative / Leuk Est Moderate / Nitrite Negative      RBC 1 / WBC 2 / Hyaline 0 / Gran  / Sq Epi  / Non Sq Epi 2 / Bacteria Negative      HBsAb Reactive      [05-31-22 @ 14:03]  HBsAg Nonreact      [05-31-22 @ 14:03]  HBcAb Reactive      [05-31-22 @ 14:03]  HCV 0.06, Nonreact      [05-31-22 @ 14:03]    Free Light Chains: kappa 1.63, lambda 1.09, ratio = 1.50      [05-31 @ 14:03]

## 2022-06-02 LAB
ALBUMIN SERPL ELPH-MCNC: 2.8 G/DL — LOW (ref 3.3–5)
ALP SERPL-CCNC: 40 U/L — SIGNIFICANT CHANGE UP (ref 40–120)
ALT FLD-CCNC: 10 U/L — SIGNIFICANT CHANGE UP (ref 10–45)
ANA TITR SER: NEGATIVE — SIGNIFICANT CHANGE UP
ANION GAP SERPL CALC-SCNC: 8 MMOL/L — SIGNIFICANT CHANGE UP (ref 5–17)
AST SERPL-CCNC: 16 U/L — SIGNIFICANT CHANGE UP (ref 10–40)
BASOPHILS # BLD AUTO: 0.03 K/UL — SIGNIFICANT CHANGE UP (ref 0–0.2)
BASOPHILS NFR BLD AUTO: 0.4 % — SIGNIFICANT CHANGE UP (ref 0–2)
BILIRUB SERPL-MCNC: 0.4 MG/DL — SIGNIFICANT CHANGE UP (ref 0.2–1.2)
BUN SERPL-MCNC: 26 MG/DL — HIGH (ref 7–23)
CALCIUM SERPL-MCNC: 8.9 MG/DL — SIGNIFICANT CHANGE UP (ref 8.4–10.5)
CHLORIDE SERPL-SCNC: 109 MMOL/L — HIGH (ref 96–108)
CO2 SERPL-SCNC: 20 MMOL/L — LOW (ref 22–31)
CREAT SERPL-MCNC: 1.12 MG/DL — SIGNIFICANT CHANGE UP (ref 0.5–1.3)
EGFR: 60 ML/MIN/1.73M2 — SIGNIFICANT CHANGE UP
EOSINOPHIL # BLD AUTO: 0.11 K/UL — SIGNIFICANT CHANGE UP (ref 0–0.5)
EOSINOPHIL NFR BLD AUTO: 1.3 % — SIGNIFICANT CHANGE UP (ref 0–6)
GAS PNL BLDA: SIGNIFICANT CHANGE UP
GLUCOSE SERPL-MCNC: 139 MG/DL — HIGH (ref 70–99)
HCT VFR BLD CALC: 24 % — LOW (ref 34.5–45)
HGB BLD-MCNC: 7.6 G/DL — LOW (ref 11.5–15.5)
IMM GRANULOCYTES NFR BLD AUTO: 0.7 % — SIGNIFICANT CHANGE UP (ref 0–1.5)
LKM AB SER-ACNC: <20.1 UNITS — SIGNIFICANT CHANGE UP (ref 0–20)
LYMPHOCYTES # BLD AUTO: 0.69 K/UL — LOW (ref 1–3.3)
LYMPHOCYTES # BLD AUTO: 8.2 % — LOW (ref 13–44)
MAGNESIUM SERPL-MCNC: 1.7 MG/DL — SIGNIFICANT CHANGE UP (ref 1.6–2.6)
MCHC RBC-ENTMCNC: 28.4 PG — SIGNIFICANT CHANGE UP (ref 27–34)
MCHC RBC-ENTMCNC: 31.7 GM/DL — LOW (ref 32–36)
MCV RBC AUTO: 89.6 FL — SIGNIFICANT CHANGE UP (ref 80–100)
MONOCYTES # BLD AUTO: 0.85 K/UL — SIGNIFICANT CHANGE UP (ref 0–0.9)
MONOCYTES NFR BLD AUTO: 10.1 % — SIGNIFICANT CHANGE UP (ref 2–14)
NEUTROPHILS # BLD AUTO: 6.68 K/UL — SIGNIFICANT CHANGE UP (ref 1.8–7.4)
NEUTROPHILS NFR BLD AUTO: 79.3 % — HIGH (ref 43–77)
NRBC # BLD: 0 /100 WBCS — SIGNIFICANT CHANGE UP (ref 0–0)
PHOSPHATE SERPL-MCNC: 2.3 MG/DL — LOW (ref 2.5–4.5)
PLATELET # BLD AUTO: 105 K/UL — LOW (ref 150–400)
POTASSIUM SERPL-MCNC: 4.3 MMOL/L — SIGNIFICANT CHANGE UP (ref 3.5–5.3)
POTASSIUM SERPL-SCNC: 4.3 MMOL/L — SIGNIFICANT CHANGE UP (ref 3.5–5.3)
PROT SERPL-MCNC: 4.7 G/DL — LOW (ref 6–8.3)
RBC # BLD: 2.68 M/UL — LOW (ref 3.8–5.2)
RBC # FLD: 15.8 % — HIGH (ref 10.3–14.5)
SODIUM SERPL-SCNC: 137 MMOL/L — SIGNIFICANT CHANGE UP (ref 135–145)
WBC # BLD: 8.42 K/UL — SIGNIFICANT CHANGE UP (ref 3.8–10.5)
WBC # FLD AUTO: 8.42 K/UL — SIGNIFICANT CHANGE UP (ref 3.8–10.5)

## 2022-06-02 PROCEDURE — 74177 CT ABD & PELVIS W/CONTRAST: CPT | Mod: 26

## 2022-06-02 PROCEDURE — 99232 SBSQ HOSP IP/OBS MODERATE 35: CPT

## 2022-06-02 PROCEDURE — 99232 SBSQ HOSP IP/OBS MODERATE 35: CPT | Mod: GC

## 2022-06-02 PROCEDURE — 99233 SBSQ HOSP IP/OBS HIGH 50: CPT

## 2022-06-02 PROCEDURE — 76981 USE PARENCHYMA: CPT | Mod: 26

## 2022-06-02 RX ORDER — DIPHENHYDRAMINE HCL 50 MG
50 CAPSULE ORAL ONCE
Refills: 0 | Status: COMPLETED | OUTPATIENT
Start: 2022-06-02 | End: 2022-06-02

## 2022-06-02 RX ADMIN — TACROLIMUS 8 MILLIGRAM(S): 5 CAPSULE ORAL at 10:09

## 2022-06-02 RX ADMIN — Medication 5 MILLIGRAM(S): at 06:47

## 2022-06-02 RX ADMIN — PANTOPRAZOLE SODIUM 40 MILLIGRAM(S): 20 TABLET, DELAYED RELEASE ORAL at 20:13

## 2022-06-02 RX ADMIN — Medication 40 MILLIGRAM(S): at 11:51

## 2022-06-02 RX ADMIN — Medication 650 MILLIGRAM(S): at 09:30

## 2022-06-02 RX ADMIN — CEFTRIAXONE 100 MILLIGRAM(S): 500 INJECTION, POWDER, FOR SOLUTION INTRAMUSCULAR; INTRAVENOUS at 17:45

## 2022-06-02 RX ADMIN — Medication 50 MILLIGRAM(S): at 15:53

## 2022-06-02 RX ADMIN — Medication 650 MILLIGRAM(S): at 08:39

## 2022-06-02 NOTE — PROGRESS NOTE ADULT - PROBLEM SELECTOR PLAN 2
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 Last BK PCR as outpatient was in 2021. Repeat BK PCR  Continue with prednisone 5 mg  Continue envarsus 8mg Daily     Check Tacro level Daily 30min before AM dose.   Last level 3.4

## 2022-06-02 NOTE — PHYSICAL THERAPY INITIAL EVALUATION ADULT - ADDITIONAL COMMENTS
Pt. lives in a first story apartment with her 77 y.o mother. She has a 32 y.o son who is available to help her if needed. The apt has 3-4 steps to enter with bilateral handrails. Shower has a large step that she needs to step over to get into the shower. Pt. lives in a first story apartment with her 77 y.o mother. She has a 32 y.o son who is available to help her if needed. The apt has 3-4 steps to enter with bilateral handrails. Shower has a large step that she needs to step over to get into the shower. PTA pt was ambulating (I) without an AD and was (I) with all ADLS

## 2022-06-02 NOTE — PROGRESS NOTE ADULT - PROBLEM SELECTOR PLAN 3
UGI bleed , esophageal varices, esophagitis & gastric ulcers  Esophageal varices likely 2/2 central vein occlusion as seen on prior imaging  Continue PPI per GI  Transfusion as per primary team.     If you have any questions, please feel free to contact me  Marquez Merida  Nephrology Fellow  [phone]; Prefer [employer] TEAMS  (After 5pm or on weekends please page the on-call fellow)

## 2022-06-02 NOTE — PHYSICAL THERAPY INITIAL EVALUATION ADULT - DISCHARGE DISPOSITION, PT EVAL
Anticipating home d/c w no PT needs. PT to f/u to assess further ambulation once dizziness resolves./no skilled PT needs

## 2022-06-02 NOTE — PROGRESS NOTE ADULT - PROBLEM SELECTOR PLAN 2
SCr now back to baseline 1.12  -continue on prednisone 5mg, tacrolimus 8mg daily. repeat tacro level now 3's    -holding leflunomide now. repeat BK PCR  -renal transplant follow up

## 2022-06-02 NOTE — PHYSICAL THERAPY INITIAL EVALUATION ADULT - PRECAUTIONS/LIMITATIONS, REHAB EVAL
She reported worsening of chronic LUQ abdominal pain, moderate severity, vague in character, no reported radiation, not worsened with eating, no clear intervention to improve. The patient denies history of stomach ulcer or NSAID use. Chronic abdominal pain reported for approximately 2mo but not as severe. Pt reports using aspirin and clopidogrel for unclear reason but per chart appears to be for chronic SVC syndrome. Chest XRay (5/30): Endotracheal tube tip in the midtrachea. Clear lungs. US Abdomen RUQ (5/30): No evidence of cirrhosis. Patent portal and hepatic veins. Chronic abdominal pain reported for approximately 2mo but not as severe. Pt reports using aspirin and clopidogrel for unclear reason but per chart appears to be for chronic SVC syndrome. s/p 2 total U PRBC, on 5/30 pt had 2 further episodes of hematemesis with diann blood (~2L) with borderline BP (99/57). Admitted to MICU for urgent endoscopy with GI, Intubated for urgent Endoscopy (5/30). Extubated 5/31 AM to room air, no active issues. Chest XRay (5/30): Endotracheal tube tip in the midtrachea. Clear lungs. US Abdomen RUQ (5/30): No evidence of cirrhosis. Patent portal and hepatic veins. Chronic abdominal pain reported for approximately 2mo but not as severe. Pt reports using aspirin and clopidogrel for unclear reason but per chart appears to be for chronic SVC syndrome. s/p 2 total U PRBC, on 5/30 pt had 2 further episodes of hematemesis with diann blood (~2L) with borderline BP (99/57). Admitted to MICU for urgent endoscopy with GI, Intubated for urgent Endoscopy (5/30). Extubated 5/31 AM to room air, no active issues. Chest XRay (5/30): Endotracheal tube tip in the midtrachea. Clear lungs. US Abdomen RUQ (5/30): No evidence of cirrhosis. Patent portal and hepatic veins./no known precautions/limitations

## 2022-06-02 NOTE — PROGRESS NOTE ADULT - TIME BILLING
Kidney Transplant recipient with functioning allograft  Admitted for evaluation of GI bleed;  post EGD    Stable/normal creatinine  Comorbidities reviewed.  I reviewed available clinical and lab data    Reviewed immunosuppression and allograft function   Reviewed medication regimen for comorbidities. Continue on modified immunosuppression due to prior BK viremia.  Suggestions:  Continue current immunosuppression    Monitor Hb/Hct   Will follow  I was present during and reviewed clinical and lab data as well as assessment and plan as documented by the house staff as noted. Please contact if any additional questions with any change in clinical condition or on availability of any additional information or reports.

## 2022-06-02 NOTE — PROGRESS NOTE ADULT - NSPROGADDITIONALINFOA_GEN_ALL_CORE
above plans discussed with GIUSEPPE Poole MD  Division of Hospital Medicine  Contact via Microsoft Teams  Office: [phone]

## 2022-06-02 NOTE — PROGRESS NOTE ADULT - ASSESSMENT
52F w/ ESRD s/p HepC DDRT, chronic SVC syndrome s/p L cephalic-iliac vein bypass on DAPT, s/p thrombectomy of L iliac vein and graft 2012, Hx BK viremia, HTN presents as transfer from [facility] ED for hematemesis 2/2 acute upper GI hemorrhage admitted to MICU for elective intubation and urgent endoscopy with GI s/p endoscopy with evidence of non-bleeding ulcers & esophageal varices

## 2022-06-02 NOTE — PROGRESS NOTE ADULT - SUBJECTIVE AND OBJECTIVE BOX
[facility] DIVISION OF KIDNEY DISEASES AND HYPERTENSION -- FOLLOW UP NOTE  --------------------------------------------------------------------------------  24 hour events/subjective: Patient seen & examined. Vitals/ labs/ medications reviewed. Pt. extubated and downgraded to floors. Now tolerating PO intake. No acute events overnight. No acute complaints. SCr. improving. Received pRBC yesterday.      PAST HISTORY  --------------------------------------------------------------------------------  No significant changes to PMH, PSH, FHx, SHx, unless otherwise noted    ALLERGIES & MEDICATIONS  --------------------------------------------------------------------------------  Allergies    contrast media (iodine-based) (Urticaria)  ibuprofen (Hives)  ibuprofen/morphine (Unknown)  latex (Swelling)  morphine (Anaphylaxis)  morphine (Urticaria)  penicillin (Anaphylaxis)  shellfish (Urticaria)    Intolerances      Standing Inpatient Medications  cefTRIAXone   IVPB 1000 milliGRAM(s) IV Intermittent every 24 hours  chlorhexidine 4% Liquid 1 Application(s) Topical <User Schedule>  octreotide  Infusion 50 MICROgram(s)/Hr IV Continuous <Continuous>  pantoprazole  Injectable 40 milliGRAM(s) IV Push once  pantoprazole Infusion 8 mG/Hr IV Continuous <Continuous>  predniSONE   Tablet 5 milliGRAM(s) Oral daily  tacrolimus ER Tablet (ENVARSUS XR) 8 milliGRAM(s) Oral daily    PRN Inpatient Medications  acetaminophen     Tablet .. 650 milliGRAM(s) Oral every 6 hours PRN      REVIEW OF SYSTEMS  --------------------------------------------------------------------------------  Gen: No fevers/chills  Respiratory: No dyspnea, cough,   CV: No chest pain, PND, orthopnea  GI: No abdominal pain, diarrhea, constipation, nausea, vomiting  Transplant: No pain  : No increased frequency, dysuria, hematuria   MSK: No edema  Neuro: No dizziness/lightheadedness    All other systems were reviewed and are negative, except as noted.    VITALS/PHYSICAL EXAM  --------------------------------------------------------------------------------  T(C): 36.7 (06-02-22 @ 08:38), Max: 37.7 (06-01-22 @ 17:00)  HR: 96 (06-02-22 @ 10:12) (84 - 96)  BP: 141/78 (06-02-22 @ 10:12) (115/64 - 152/76)  RR: 20 (06-02-22 @ 08:38) (17 - 20)  SpO2: 96% (06-02-22 @ 10:12) (95% - 97%)  Wt(kg): --        06-01-22 @ 07:01  -  06-02-22 @ 07:00  --------------------------------------------------------  IN: 1060 mL / OUT: 0 mL / NET: 1060 mL      Physical Exam:  	Gen: NAD  	HEENT: supple neck  	Pulm: CTA B/L  	CV: RRR, S1S2; no rub  	Back:  no sacral edema  	Abd: +BS, soft, epigastric tenderness           Transplant: No tenderness, swelling  	Ext: no edema b/l  	Neuro: Alert, awake   	Skin: Warm, without rashes    LABS/STUDIES  --------------------------------------------------------------------------------              7.6    8.42  >-----------<  105      [06-02-22 @ 00:30]              24.0     137  |  109  |  26  ----------------------------<  139      [06-02-22 @ 09:41]  4.3   |  20  |  1.12        Ca     8.9     [06-02-22 @ 09:41]      Mg     1.7     [06-02-22 @ 09:41]      Phos  2.3     [06-02-22 @ 09:41]    TPro  4.7  /  Alb  2.8  /  TBili  0.4  /  DBili  x   /  AST  16  /  ALT  10  /  AlkPhos  40  [06-02-22 @ 09:41]    PT/INR: PT 12.9 , INR 1.11       [06-01-22 @ 11:15]  PTT: 25.7       [06-01-22 @ 11:15]      Creatinine Trend:  SCr 1.12 [06-02 @ 09:41]  SCr 1.78 [06-01 @ 11:15]  SCr 1.15 [05-31 @ 01:18]  SCr 1.00 [05-30 @ 06:59]  SCr 0.79 [05-30 @ 02:51]    Tacrolimus (), Serum: 3.4 ng/mL (06-01 @ 11:15)  Tacrolimus (), Serum: 15.8 ng/mL (05-31 @ 05:57)  Tacrolimus (), Serum: 3.2 ng/mL (05-30 @ 06:43)      Urinalysis - [05-30-22 @ 09:08]      Color Light Yellow / Appearance Clear / SG 1.026 / pH 6.5      Gluc Negative / Ketone Trace  / Bili Negative / Urobili Negative       Blood Negative / Protein Negative / Leuk Est Moderate / Nitrite Negative      RBC 1 / WBC 2 / Hyaline 0 / Gran  / Sq Epi  / Non Sq Epi 2 / Bacteria Negative        HBsAb Reactive      [05-31-22 @ 14:03]  HBsAg Nonreact      [05-31-22 @ 14:03]  HBcAb Reactive      [05-31-22 @ 14:03]  HCV 0.06, Nonreact      [05-31-22 @ 14:03]    KAYLA: titer Negative, pattern --      [05-31-22 @ 14:03]  Free Light Chains: kappa 1.63, lambda 1.09, ratio = 1.50      [05-31 @ 14:03]

## 2022-06-02 NOTE — PROGRESS NOTE ADULT - PROBLEM SELECTOR PLAN 1
s/p EGD with esophageal varices and nonbleeding gastric ulcers   -s/p 1 unit of pRBC transfusion yesterday, H/H responded appropriately and remains stable  -one episode of melena this morning but likely old blood  -GI eval appreciated: continue with IV octreotide (last day today) and IV PPI  -cont with ceftriaxone 7 days for prophylaxis   -cont to monitor CBC q2hrs  -Would cont to hold DAPT for now given lower h/h.   -hold BP meds. s/p FFP, PRBC, PPI, erythromycin in MICU  -US abd without evidence of cirrhosis. viral hepatitis panel pending  -CTAP ordered to look for etiologies for portal HTN

## 2022-06-02 NOTE — PHYSICAL THERAPY INITIAL EVALUATION ADULT - PERTINENT HX OF CURRENT PROBLEM, REHAB EVAL
52F w/ ESRD s/p HepC DDRT, chronic SVC syndrome s/p L cephalic-iliac vein bypass on DAPT, s/p thrombectomy of L iliac vein and graft 2012, Hx BK viremia, HTN presents as transfer from [facility] ED for hematemesis. Pt states that on day of presentation she suddenly began have large volume bloody emesis 2-3 times and several episodes of black stool.

## 2022-06-02 NOTE — PROGRESS NOTE ADULT - ASSESSMENT
#Renal transplant  #chronic SVC syndrome on DAPT    #Hematemesis  EGD with EV grade 2, PUD in the antrum (clean based ulcer and one Chidi Class II C.     #EV grade 2  #Concern for cirrhosis  US with normal appearing liver  Hep B and C negative    Recommendations:  -c/w w octreotide x 3 days, can stop today  -c/w PPI BID  -c/w Ceftriaxone daily (pending cirrhosis work up)  -f/up inpatient elastography and CT with IV contrast of abd/pelvis  -No CI for DAPT therapy  -f/up auto immune serologies: anti-mitochondrial Ab, anti-smooth muscle Ab, anti-LKM, anti-liver cytosol antibody, KAYLA, Immunoglobulin panel  -daily CBC, CMP and INR    Recommendations preliminary until signed by attending.     Juan J Mukherjee MD  Gastroenterology/Hepatology Fellow  1st option: [phone] (text or call), ONLY available from 7:00 am to 5:00 pm.   **Contact on-call GI fellow via answering service ([phone]) from 5pm-7am AND on weekends/holidays**  2nd option: Available via [employer] Teams  3rd option: Pager: [phone]           #Renal transplant  #chronic SVC syndrome on DAPT    #Hematemesis  EGD with EV grade 2, PUD in the antrum (clean based ulcer and one Chidi Class II C.     #EV grade 2  #Concern for cirrhosis  US with normal appearing liver  Hep B and C negative    Recommendations:  -c/w w octreotide x 3 days, can stop today  -c/w PPI BID  -c/w Ceftriaxone daily (pending cirrhosis work up)  -f/up inpatient elastography and CT with IV contrast of abd/pelvis  -No CI for DAPT therapy  -daily CBC, CMP and INR  -Keep on clear liquid diet, if Hb drop and requires additional blood product would consider repeating EGD  -Obtain COVID swab today stat    Recommendations preliminary until signed by attending.     Juan J Mukherjee MD  Gastroenterology/Hepatology Fellow  1st option: [phone] (text or call), ONLY available from 7:00 am to 5:00 pm.   **Contact on-call GI fellow via answering service ([phone]) from 5pm-7am AND on weekends/holidays**  2nd option: Available via [employer] Teams  3rd option: Pager: [phone]

## 2022-06-02 NOTE — PROGRESS NOTE ADULT - ATTENDING COMMENTS
hgb stable  solid melena yesterday  otherwise doing well    Plan:  - stop octreotide  - abx x 7 days total  - IV PPI BID  - f/u rest of liver labs  - cross sectional imaging to r/o other causes of portal HTN

## 2022-06-02 NOTE — PROGRESS NOTE ADULT - SUBJECTIVE AND OBJECTIVE BOX
Patient is a 52y old  Female who presents with a chief complaint of 52F transferred from [facility] for hematemesis (02 Jun 2022 10:35)      SUBJECTIVE / OVERNIGHT EVENTS:  no acute events overnight, vss, afebrile  s/p 1 unit of pRBC Transfusion yesterday, H/H stable afterwards  pt with episode of melena this morning but no bloody BM or hematemesis  pt feels okay except weak    ROS:  14 point ROS negative in detail except stated as above    MEDICATIONS  (STANDING):  cefTRIAXone   IVPB 1000 milliGRAM(s) IV Intermittent every 24 hours  chlorhexidine 4% Liquid 1 Application(s) Topical <User Schedule>  diphenhydrAMINE Injectable 50 milliGRAM(s) IV Push once  octreotide  Infusion 50 MICROgram(s)/Hr (10 mL/Hr) IV Continuous <Continuous>  pantoprazole  Injectable 40 milliGRAM(s) IV Push once  predniSONE   Tablet 5 milliGRAM(s) Oral daily  tacrolimus ER Tablet (ENVARSUS XR) 8 milliGRAM(s) Oral daily    MEDICATIONS  (PRN):  acetaminophen     Tablet .. 650 milliGRAM(s) Oral every 6 hours PRN Mild Pain (1 - 3), Moderate Pain (4 - 6), Severe Pain (7 - 10)      CAPILLARY BLOOD GLUCOSE        I&O's Summary    01 Jun 2022 07:01  -  02 Jun 2022 07:00  --------------------------------------------------------  IN: 1060 mL / OUT: 0 mL / NET: 1060 mL        PHYSICAL EXAM:  Vital Signs Last 24 Hrs  T(C): 36.7 (02 Jun 2022 08:38), Max: 37.7 (01 Jun 2022 17:00)  T(F): 98 (02 Jun 2022 08:38), Max: 99.9 (01 Jun 2022 17:00)  HR: 96 (02 Jun 2022 10:12) (84 - 96)  BP: 141/78 (02 Jun 2022 10:12) (115/64 - 152/76)  BP(mean): --  RR: 20 (02 Jun 2022 08:38) (17 - 20)  SpO2: 96% (02 Jun 2022 10:12) (95% - 97%)    GENERAL: NAD, well-developed  HEAD:  Atraumatic, Normocephalic  EYES: EOMI, PERRLA, conjunctiva and sclera clear  NECK: Supple, No JVD  CHEST/LUNG: Clear to auscultation bilaterally; No wheeze  HEART: Regular rate and rhythm; No murmurs, rubs, or gallops  ABDOMEN: Soft, Nontender, Nondistended; Bowel sounds present  EXTREMITIES:  2+ Peripheral Pulses, No clubbing, cyanosis, or edema  NEUROLOGY: AAOx3; non-focal  SKIN: No rashes or lesions    LABS:  personally reviewed                        7.6    8.42  )-----------( 105      ( 02 Jun 2022 00:30 )             24.0     06-02    137  |  109<H>  |  26<H>  ----------------------------<  139<H>  4.3   |  20<L>  |  1.12    Ca    8.9      02 Jun 2022 09:41  Phos  2.3     06-02  Mg     1.7     06-02    TPro  4.7<L>  /  Alb  2.8<L>  /  TBili  0.4  /  DBili  x   /  AST  16  /  ALT  10  /  AlkPhos  40  06-02    PT/INR - ( 01 Jun 2022 11:15 )   PT: 12.9 sec;   INR: 1.11 ratio         PTT - ( 01 Jun 2022 11:15 )  PTT:25.7 sec          RADIOLOGY & ADDITIONAL TESTS:    Imaging Personally Reviewed:    Consultant(s) Notes Reviewed:  GI, transplant nephrology    Care Discussed with Consultants/Other Providers: Dr. Blanco (GI)

## 2022-06-02 NOTE — PROGRESS NOTE ADULT - SUBJECTIVE AND OBJECTIVE BOX
Interval Events:   Patient denies any abdominal pain, nausea /vomiting, diarrhea.  Hb stable after 1 unit of blood  Black stool yesterday but not witnessed .    Hospital Medications:  acetaminophen     Tablet .. 650 milliGRAM(s) Oral every 6 hours PRN  cefTRIAXone   IVPB 1000 milliGRAM(s) IV Intermittent every 24 hours  chlorhexidine 4% Liquid 1 Application(s) Topical <User Schedule>  octreotide  Infusion 50 MICROgram(s)/Hr IV Continuous <Continuous>  pantoprazole  Injectable 40 milliGRAM(s) IV Push once  pantoprazole Infusion 8 mG/Hr IV Continuous <Continuous>  predniSONE   Tablet 5 milliGRAM(s) Oral daily  tacrolimus ER Tablet (ENVARSUS XR) 8 milliGRAM(s) Oral daily      ROS: All system reviewed and negative except as mentioned above.    PHYSICAL EXAM:   Vital Signs:  Vital Signs Last 24 Hrs  T(C): 36.7 (2022 08:38), Max: 37.7 (2022 17:00)  T(F): 98 (2022 08:38), Max: 99.9 (2022 17:00)  HR: 96 (2022 10:12) (84 - 96)  BP: 141/78 (2022 10:12) (115/64 - 152/76)  BP(mean): --  RR: 20 (2022 08:38) (17 - 20)  SpO2: 96% (2022 10:12) (95% - 97%)  Daily     Daily Weight in k (2022 05:00)    GENERAL:  NAD, Appears stated age  HEENT:  NC/AT,  conjunctivae clear and pink, sclera -anicteric  CHEST:  Normal Effort, Breath sounds clear  HEART:  RRR, S1 + S2, no murmurs  ABDOMEN:  Soft, non-tender, non-distended, normoactive bowel sounds,  no masses  EXTREMITIES:  no cyanosis or edema  SKIN:  Warm & Dry. No rash or erythema  NEURO:  Alert, oriented, no focal deficit    LABS:                        7.6    8.42  )-----------( 105      ( 2022 00:30 )             24.0     Mean Cell Volume: 89.6 fl (-22 @ 00:30)        137  |  109<H>  |  26<H>  ----------------------------<  139<H>  4.3   |  20<L>  |  1.12    Ca    8.9      2022 09:41  Phos  2.3     06-02  Mg     1.7     06-02    TPro  4.7<L>  /  Alb  2.8<L>  /  TBili  0.4  /  DBili  x   /  AST  16  /  ALT  10  /  AlkPhos  40  06-02    LIVER FUNCTIONS - ( 2022 09:41 )  Alb: 2.8 g/dL / Pro: 4.7 g/dL / ALK PHOS: 40 U/L / ALT: 10 U/L / AST: 16 U/L / GGT: x           PT/INR - ( 2022 11:15 )   PT: 12.9 sec;   INR: 1.11 ratio         PTT - ( 2022 11:15 )  PTT:25.7 sec                            7.6    8.42  )-----------( 105      ( 2022 00:30 )             24.0                         7.5    8.78  )-----------( 101      ( 2022 18:17 )             23.7                         6.6    8.32  )-----------( 112      ( 2022 11:15 )             21.4                         7.8    10.15 )-----------( 104      ( 31 May 2022 14:03 )             23.9                         7.7    7.39  )-----------( 105      ( 31 May 2022 05:57 )             23.7       Imaging: Images reviewed.

## 2022-06-03 PROCEDURE — 99232 SBSQ HOSP IP/OBS MODERATE 35: CPT

## 2022-06-03 PROCEDURE — 99233 SBSQ HOSP IP/OBS HIGH 50: CPT

## 2022-06-03 PROCEDURE — 99232 SBSQ HOSP IP/OBS MODERATE 35: CPT | Mod: GC

## 2022-06-03 RX ORDER — SODIUM CHLORIDE 0.65 %
1 AEROSOL, SPRAY (ML) NASAL
Refills: 0 | Status: DISCONTINUED | OUTPATIENT
Start: 2022-06-03 | End: 2022-06-10

## 2022-06-03 RX ADMIN — Medication 1 SPRAY(S): at 02:04

## 2022-06-03 RX ADMIN — Medication 5 MILLIGRAM(S): at 06:11

## 2022-06-03 RX ADMIN — CEFTRIAXONE 100 MILLIGRAM(S): 500 INJECTION, POWDER, FOR SOLUTION INTRAMUSCULAR; INTRAVENOUS at 17:28

## 2022-06-03 RX ADMIN — PANTOPRAZOLE SODIUM 40 MILLIGRAM(S): 20 TABLET, DELAYED RELEASE ORAL at 17:29

## 2022-06-03 RX ADMIN — TACROLIMUS 8 MILLIGRAM(S): 5 CAPSULE ORAL at 13:29

## 2022-06-03 RX ADMIN — PANTOPRAZOLE SODIUM 40 MILLIGRAM(S): 20 TABLET, DELAYED RELEASE ORAL at 06:11

## 2022-06-03 RX ADMIN — Medication 1 SPRAY(S): at 23:31

## 2022-06-03 NOTE — PROGRESS NOTE ADULT - ASSESSMENT
52F w/ ESRD s/p HepC DDRT, chronic SVC syndrome s/p L cephalic-iliac vein bypass on DAPT, s/p thrombectomy of L iliac vein and graft 2012, Hx BK viremia, HTN presents as transfer from [facility] ED for hematemesis 2/2 acute upper GI hemorrhage admitted to MICU for elective intubation and urgent endoscopy with GI s/p endoscopy with evidence of non-bleeding ulcers & esophageal varices

## 2022-06-03 NOTE — PROGRESS NOTE ADULT - ATTENDING COMMENTS
Team with difficulty drawing bloods (failed phlebotomy and arterial labs) - no cbc in 24 hours  Had melena again, though vitals stable  Working to get PICC line for draws  CT scan with no clear portal HTN sources, re-documented SVC occlusions, graft occlusion with collaterals    C/w IV PPI BID  Finish 7 day course of abx  Needs CBC to clarify if melena old, or recurrent oozing  Will need OP Liver workup for isolated varices eventually

## 2022-06-03 NOTE — PROGRESS NOTE ADULT - ASSESSMENT
#Renal transplant  #chronic SVC syndrome on DAPT    #Hematemesis  EGD with EV grade 2, PUD in the antrum (clean based ulcer and one Chidi Class II C.     #EV grade 2  #Concern for cirrhosis  US with normal appearing liver. CT with IV contrast with normal liver morphology and patent PV and HV.  Hep B and C negative. KAYLA, antismooth, anti-mitochondrial negative.     Recommendations:  -keep on clear, if Hb this am okay to advance diet.   -c/w PPI BID  -okay to stop abx from a GI perspective given no evidence of cirrhosis.   -No CI for DAPT therapy  -daily CBC, CMP and INR  -Unclear etiology of EV, could consider repeat EGD as outpatient.     Recommendations preliminary until signed by attending.     Juan J Mukherjee MD  Gastroenterology/Hepatology Fellow  1st option: [phone] (text or call), ONLY available from 7:00 am to 5:00 pm.   **Contact on-call GI fellow via answering service ([phone]) from 5pm-7am AND on weekends/holidays**  2nd option: Available via [employer] Teams  3rd option: Pager: [phone]

## 2022-06-03 NOTE — PROGRESS NOTE ADULT - PROBLEM SELECTOR PLAN 1
s/p DDRT performed in 2020 by Dr. Monroy. Allograft function at baseline. Outpatient nephrologist is Dr. Lake.  Discussed with primary team to consult Vascular surgery for MR venogram of upper extremities. Obtain labs today.

## 2022-06-03 NOTE — PROGRESS NOTE ADULT - PROBLEM SELECTOR PLAN 1
s/p EGD with esophageal varices and nonbleeding gastric ulcers   -s/p 1 unit of pRBC transfusion 6/1, H/H responded appropriately and remains stable  -still melanotic stool but most likely old blood   -CTAP without evidence of cirrhosis and patent portal vein  -GI eval appreciated: continue with IV PPI  -cont with ceftriaxone 7 days for prophylaxis   -cont to monitor CBC q2hrs  -Would cont to hold DAPT for now given lower h/h.   -hold BP meds. s/p FFP, PRBC, PPI, erythromycin in MICU  -US abd without evidence of cirrhosis. viral hepatitis panel pending

## 2022-06-03 NOTE — CONSULT NOTE ADULT - ASSESSMENT
52F w/ ESRD with LUE Brachiocephalic AVF now s/p HepC DDRT, chronic SVC syndrome s/p L axillary-Femoral vein bypass presenting with clinical exam concerning for SVC syndrome.     Recommend MR Venogram of chest  Duplex of LUE to eval AVF  Duplex of Bypass along the LEFT chest wall to eval bypass graft for occlusion  Vascular Will follow for results  Discussed with Senior Vascular Fellow as well as Transplant Nephrology Fellow    Vascular Surgery  [id number]

## 2022-06-03 NOTE — PROGRESS NOTE ADULT - PROBLEM SELECTOR PLAN 3
h/o bypass surgeries on DAPT asa and plavix at home   -continue to hold DAPT for now  -vascular consult

## 2022-06-03 NOTE — PROVIDER CONTACT NOTE (OTHER) - SITUATION
Unable to obtain blood specimen from pt, extremely difficult stick. Multiple attempts by bedside RN, phlebotomist, vein finder utilized and still unsuccessful.

## 2022-06-03 NOTE — PROGRESS NOTE ADULT - PROBLEM SELECTOR PLAN 3
UGI bleed , esophageal varices, esophagitis & gastric ulcers  Esophageal varices likely 2/2 central vein occlusion as seen on prior imaging  Continue PPI per GI  Transfusion as per primary team.     If you have any questions, please feel free to contact me  Marquez Merida  Nephrology Fellow  [phone]; Prefer [employer] TEAMS  (After 5pm or on weekends please page the on-call fellow) ATTG: : abd pain concern for stone / colitis - check CT a,p, chck labs, check urine, pain medication, ivf, re eval for dispo

## 2022-06-03 NOTE — PROGRESS NOTE ADULT - SUBJECTIVE AND OBJECTIVE BOX
Interval Events:   Patient reporting 4 black stool yesterday with some streak of blood. Hb stable yesterday, am pending.     Hospital Medications:  acetaminophen     Tablet .. 650 milliGRAM(s) Oral every 6 hours PRN  cefTRIAXone   IVPB 1000 milliGRAM(s) IV Intermittent every 24 hours  chlorhexidine 4% Liquid 1 Application(s) Topical <User Schedule>  pantoprazole  Injectable 40 milliGRAM(s) IV Push two times a day  predniSONE   Tablet 5 milliGRAM(s) Oral daily  sodium chloride 0.65% Nasal 1 Spray(s) Both Nostrils five times a day PRN  tacrolimus ER Tablet (ENVARSUS XR) 8 milliGRAM(s) Oral daily      ROS: All system reviewed and negative except as mentioned above.    PHYSICAL EXAM:   Vital Signs:  Vital Signs Last 24 Hrs  T(C): 37.6 (2022 05:00), Max: 37.6 (2022 05:00)  T(F): 99.6 (2022 05:00), Max: 99.6 (2022 05:00)  HR: 81 (2022 05:00) (76 - 96)  BP: 143/74 (2022 05:00) (136/77 - 160/75)  BP(mean): --  RR: 18 (2022 05:00) (18 - 18)  SpO2: 97% (2022 05:00) (96% - 100%)  Daily     Daily Weight in k.2 (2022 05:00)    GENERAL:  NAD, Appears stated age  HEENT:  NC/AT,  conjunctivae clear and pink, sclera -anicteric  CHEST:  Normal Effort, Breath sounds clear  HEART:  RRR, S1 + S2, no murmurs  ABDOMEN:  Soft, non-tender, non-distended, normoactive bowel sounds,  no masses, LISSET empty vault   EXTREMITIES:  no cyanosis or edema  SKIN:  Warm & Dry. No rash or erythema  NEURO:  Alert, oriented, no focal deficit    LABS:                        7.6    8.42  )-----------( 105      ( 2022 00:30 )             24.0       06-02    137  |  109<H>  |  26<H>  ----------------------------<  139<H>  4.3   |  20<L>  |  1.12    Ca    8.9      2022 09:41  Phos  2.3     06-02  Mg     1.7     06-02    TPro  4.7<L>  /  Alb  2.8<L>  /  TBili  0.4  /  DBili  x   /  AST  16  /  ALT  10  /  AlkPhos  40  06-02    LIVER FUNCTIONS - ( 2022 09:41 )  Alb: 2.8 g/dL / Pro: 4.7 g/dL / ALK PHOS: 40 U/L / ALT: 10 U/L / AST: 16 U/L / GGT: x           PT/INR - ( 2022 11:15 )   PT: 12.9 sec;   INR: 1.11 ratio         PTT - ( 2022 11:15 )  PTT:25.7 sec                            7.6    8.42  )-----------( 105      ( 2022 00:30 )             24.0                         7.5    8.78  )-----------( 101      ( 2022 18:17 )             23.7                         6.6    8.32  )-----------( 112      ( 2022 11:15 )             21.4                         7.8    10.15 )-----------( 104      ( 31 May 2022 14:03 )             23.9       Imaging: Images reviewed.

## 2022-06-03 NOTE — CHART NOTE - NSCHARTNOTEFT_GEN_A_CORE
Called by RN for pt c/o difficulty breathing.  Pt states sudden onset difficulty catching her breath when closing her mouth and trying to sleep.  Does not occur while breathing through her mouth.   Never happened before; denies h/o chronic resp illness or macey.  +cough productive of clear phlegm and nasal congestion; denies fever, chills, rigors, wheezing.    T(C): , Max: 37.2 (06-02-22 @ 21:00)  HR: 86 (06-02-22 @ 21:00) (84 - 96)  BP: 160/75 (06-02-22 @ 21:00) (141/78 - 160/75)  RR: 18 (06-02-22 @ 21:00) (18 - 20)  SpO2: 96% (06-02-22 @ 21:00) (96% - 100%)    pt left lateral recumbant in bed bed, nad  no tachypnea, no acc muscle use,   +voice sounds nasal c/w congestion  cv: rrr  lungs: cta bilat w/o w/r/r  ext: no edema    repeat h/h tonight stable at 7.5/23.0    This is a 51yo F h/o DDRT, PVD admitted for GIB found with varices tx with octreotide.  Tonight c/o sob which appears to be related to nasal congestion.  - ocean spray 5 times daily prn with first dose now  - humidified O2 2lpm via NC PRN while sleeping for pt comfort  - will continue to monitor - pt agrees to contact staff for any additional concerns

## 2022-06-03 NOTE — PROGRESS NOTE ADULT - TIME BILLING
Kidney Transplant recipient with functioning allograft  Admitted for evaluation of GI bleed;  post EGD    Now has venous hypertension SVC territory  Facial and upper extremity edema, no stridor  Noted creatinine  Comorbidities reviewed.  I reviewed available clinical and lab data    Reviewed immunosuppression and allograft function   Reviewed medication regimen for comorbidities.    Suggestions:  Continue current immunosuppression    Vascular surgery evaluation regarding SVC territory venous hypertension  Imaging as per vascular surgeon  Monitor Hb/Hct   Will follow  I was present during and reviewed clinical and lab data as well as assessment and plan as documented by the house staff as noted. Please contact if any additional questions with any change in clinical condition or on availability of any additional information or reports. Kidney Transplant recipient with functioning allograft  Admitted for evaluation of GI bleed;  post EGD    Now has venous hypertension SVC territory  Facial and upper extremity edema, no stridor  Noted creatinine  Comorbidities reviewed.  I reviewed available clinical and lab data    Reviewed immunosuppression and allograft function   Reviewed medication regimen for comorbidities.    Suggestions:  Continue current immunosuppression    Vascular surgery evaluation regarding SVC territory venous hypertension- d/w primary team  Imaging as per vascular surgeon  Monitor Hb/Hct   Will follow  I was present during and reviewed clinical and lab data as well as assessment and plan as documented by the house staff as noted. Please contact if any additional questions with any change in clinical condition or on availability of any additional information or reports.

## 2022-06-03 NOTE — PROGRESS NOTE ADULT - SUBJECTIVE AND OBJECTIVE BOX
[facility] DIVISION OF KIDNEY DISEASES AND HYPERTENSION -- FOLLOW UP NOTE  --------------------------------------------------------------------------------  24 hour events/subjective: Patient seen & examined. Vitals/ labs/ medications reviewed. Pt. extubated and downgraded to floors. Now tolerating PO intake. Pt. seen this AM states that she has been having worsening shortness of breath and LUE edema and numbness as well as facial swelling.       PAST HISTORY  --------------------------------------------------------------------------------  No significant changes to PMH, PSH, FHx, SHx, unless otherwise noted    ALLERGIES & MEDICATIONS  --------------------------------------------------------------------------------  Allergies    contrast media (iodine-based) (Urticaria)  ibuprofen (Hives)  ibuprofen/morphine (Unknown)  latex (Swelling)  morphine (Anaphylaxis)  morphine (Urticaria)  penicillin (Anaphylaxis)  shellfish (Urticaria)    Intolerances      Standing Inpatient Medications  cefTRIAXone   IVPB 1000 milliGRAM(s) IV Intermittent every 24 hours  chlorhexidine 4% Liquid 1 Application(s) Topical <User Schedule>  pantoprazole  Injectable 40 milliGRAM(s) IV Push two times a day  predniSONE   Tablet 5 milliGRAM(s) Oral daily  tacrolimus ER Tablet (ENVARSUS XR) 8 milliGRAM(s) Oral daily    PRN Inpatient Medications  acetaminophen     Tablet .. 650 milliGRAM(s) Oral every 6 hours PRN  sodium chloride 0.65% Nasal 1 Spray(s) Both Nostrils five times a day PRN      REVIEW OF SYSTEMS  --------------------------------------------------------------------------------  Gen: No fevers/chills, increased facial puffiness   Respiratory: worsening SOB  CV: No chest pain, PND, orthopnea  GI: No abdominal pain, diarrhea, constipation, nausea, vomiting  Transplant: No pain  : No increased frequency, dysuria, hematuria   MSK: LUE edema   Neuro: No dizziness/lightheadedness; LUE numbness    All other systems were reviewed and are negative, except as noted.    VITALS/PHYSICAL EXAM  --------------------------------------------------------------------------------  T(C): 37.6 (06-03-22 @ 05:00), Max: 37.6 (06-03-22 @ 05:00)  HR: 81 (06-03-22 @ 05:00) (76 - 88)  BP: 143/74 (06-03-22 @ 05:00) (136/77 - 160/75)  RR: 18 (06-03-22 @ 05:00) (18 - 18)  SpO2: 97% (06-03-22 @ 05:00) (96% - 100%)  Wt(kg): --        06-02-22 @ 07:01  -  06-03-22 @ 07:00  --------------------------------------------------------  IN: 10 mL / OUT: 0 mL / NET: 10 mL    06-03-22 @ 07:01  -  06-03-22 @ 11:43  --------------------------------------------------------  IN: 240 mL / OUT: 0 mL / NET: 240 mL      Physical Exam:  	Gen: NAD, able to speak in full sentences   	HEENT: PERRL, MMM, facial edema   	Pulm: decreased breath sounds   	CV: RRR, S1S2+  	Abd: +BS, soft          Transplant: No tenderness, swelling  	: No suprapubic tenderness  	MSK: LUE edema, no LE edema   	Psych: Normal affect and mood  	Skin: Warm          Access: peripheral     LABS/STUDIES  --------------------------------------------------------------------------------              7.6    8.42  >-----------<  105      [06-02-22 @ 00:30]              24.0     137  |  109  |  26  ----------------------------<  139      [06-02-22 @ 09:41]  4.3   |  20  |  1.12        Ca     8.9     [06-02-22 @ 09:41]      Mg     1.7     [06-02-22 @ 09:41]      Phos  2.3     [06-02-22 @ 09:41]    TPro  4.7  /  Alb  2.8  /  TBili  0.4  /  DBili  x   /  AST  16  /  ALT  10  /  AlkPhos  40  [06-02-22 @ 09:41]      Creatinine Trend:  SCr 1.12 [06-02 @ 09:41]  SCr 1.78 [06-01 @ 11:15]  SCr 1.15 [05-31 @ 01:18]  SCr 1.00 [05-30 @ 06:59]  SCr 0.79 [05-30 @ 02:51]    Tacrolimus (), Serum: 3.4 ng/mL (06-01 @ 11:15)  Tacrolimus (), Serum: 15.8 ng/mL (05-31 @ 05:57)  Tacrolimus (), Serum: 3.2 ng/mL (05-30 @ 06:43)      Urinalysis - [05-30-22 @ 09:08]      Color Light Yellow / Appearance Clear / SG 1.026 / pH 6.5      Gluc Negative / Ketone Trace  / Bili Negative / Urobili Negative       Blood Negative / Protein Negative / Leuk Est Moderate / Nitrite Negative      RBC 1 / WBC 2 / Hyaline 0 / Gran  / Sq Epi  / Non Sq Epi 2 / Bacteria Negative      HBsAb Reactive      [05-31-22 @ 14:03]  HBsAg Nonreact      [05-31-22 @ 14:03]  HBcAb Reactive      [05-31-22 @ 14:03]  HCV 0.06, Nonreact      [05-31-22 @ 14:03]    KAYLA: titer Negative, pattern --      [05-31-22 @ 14:03]  Free Light Chains: kappa 1.63, lambda 1.09, ratio = 1.50      [05-31 @ 14:03]

## 2022-06-03 NOTE — PROGRESS NOTE ADULT - SUBJECTIVE AND OBJECTIVE BOX
Patient is a 52y old  Female who presents with a chief complaint of 52F transferred from [facility] for hematemesis (03 Jun 2022 09:11)      SUBJECTIVE / OVERNIGHT EVENTS:  no acute events overnight, vss, afebrile  pt with one BM this morning which was still black and had some strings of blood mixed with it  pt feels okay and denies further hematemesis, abdominal pain, nausea/vomiting    ROS:  14 point ROS negative in detail except stated as above    MEDICATIONS  (STANDING):  cefTRIAXone   IVPB 1000 milliGRAM(s) IV Intermittent every 24 hours  chlorhexidine 4% Liquid 1 Application(s) Topical <User Schedule>  pantoprazole  Injectable 40 milliGRAM(s) IV Push two times a day  predniSONE   Tablet 5 milliGRAM(s) Oral daily  tacrolimus ER Tablet (ENVARSUS XR) 8 milliGRAM(s) Oral daily    MEDICATIONS  (PRN):  acetaminophen     Tablet .. 650 milliGRAM(s) Oral every 6 hours PRN Mild Pain (1 - 3), Moderate Pain (4 - 6), Severe Pain (7 - 10)  sodium chloride 0.65% Nasal 1 Spray(s) Both Nostrils five times a day PRN Nasal Congestion/pt comfort      CAPILLARY BLOOD GLUCOSE        I&O's Summary    02 Jun 2022 07:01  -  03 Jun 2022 07:00  --------------------------------------------------------  IN: 10 mL / OUT: 0 mL / NET: 10 mL    03 Jun 2022 07:01  -  03 Jun 2022 10:50  --------------------------------------------------------  IN: 240 mL / OUT: 0 mL / NET: 240 mL        PHYSICAL EXAM:  Vital Signs Last 24 Hrs  T(C): 37.6 (03 Jun 2022 05:00), Max: 37.6 (03 Jun 2022 05:00)  T(F): 99.6 (03 Jun 2022 05:00), Max: 99.6 (03 Jun 2022 05:00)  HR: 81 (03 Jun 2022 05:00) (76 - 88)  BP: 143/74 (03 Jun 2022 05:00) (136/77 - 160/75)  BP(mean): --  RR: 18 (03 Jun 2022 05:00) (18 - 18)  SpO2: 97% (03 Jun 2022 05:00) (96% - 100%)    GENERAL: NAD, well-developed  HEAD:  Atraumatic, Normocephalic  EYES: EOMI, PERRLA, conjunctiva and sclera clear  NECK: Supple, No JVD  CHEST/LUNG: Clear to auscultation bilaterally; No wheeze  HEART: Regular rate and rhythm; No murmurs, rubs, or gallops  ABDOMEN: Soft, Nontender, Nondistended; Bowel sounds present  EXTREMITIES:  2+ Peripheral Pulses, No clubbing, cyanosis, or edema  NEUROLOGY: AAOx3; non-focal  SKIN: No rashes or lesions    LABS:  personally reviewed                        7.6    8.42  )-----------( 105      ( 02 Jun 2022 00:30 )             24.0     06-02    137  |  109<H>  |  26<H>  ----------------------------<  139<H>  4.3   |  20<L>  |  1.12    Ca    8.9      02 Jun 2022 09:41  Phos  2.3     06-02  Mg     1.7     06-02    TPro  4.7<L>  /  Alb  2.8<L>  /  TBili  0.4  /  DBili  x   /  AST  16  /  ALT  10  /  AlkPhos  40  06-02    PT/INR - ( 01 Jun 2022 11:15 )   PT: 12.9 sec;   INR: 1.11 ratio         PTT - ( 01 Jun 2022 11:15 )  PTT:25.7 sec          RADIOLOGY & ADDITIONAL TESTS:    Imaging Personally Reviewed:  -CTAP  < from: CT Abdomen and Pelvis w/ IV Cont (06.02.22 @ 17:08) >  LOWER CHEST: Trace bilateral pleural effusions.    LIVER: Within normal limits.  BILE DUCTS: Normal caliber.  GALLBLADDER: Within normal limits.  SPLEEN: Within normallimits.  PANCREAS: Within normal limits.  ADRENALS: Within normal limits.  KIDNEYS/URETERS: Atrophic native kidneys. Right lower quadrant transplant   kidney without hydronephrosis.    BLADDER: Within normal limits.  REPRODUCTIVE ORGANS: Hysterectomy.    BOWEL: No bowel obstruction. Appendix is normal.  PERITONEUM: No ascites.  VESSELS: Atherosclerotic changes. Partially visualized occluded   subcutaneous bypass graft in the left chest wall to the left external   iliac vein, unchanged. Patent hepatic and portal veins.  RETROPERITONEUM/LYMPH NODES: No lymphadenopathy.  ABDOMINAL WALL: Numerous venous collaterals in the left chest wall and   anterior abdomen.  BONES: Median sternotomy.    IMPRESSION:  No CT evidence of cirrhosis. Patent hepaticand portal veins.    Redemonstration of occluded left external iliac venous bypass graft with   numerous subcutaneous collaterals.    Trace bilateral pleural effusions.    < end of copied text >      Consultant(s) Notes Reviewed:  GI, nephrology    Care Discussed with Consultants/Other Providers: Dr. Blanco (GI)

## 2022-06-03 NOTE — CONSULT NOTE ADULT - SUBJECTIVE AND OBJECTIVE BOX
CC: Patient is a 52y old  Female who presents with a chief complaint of 52F transferred from [facility] for hematemesis     HPI:  52F w/ ESRD s/p HepC DDRT, chronic SVC syndrome s/p L axillary-Femoral vein bypass per Dr. Castellanos note, she is on DAPT, s/p thrombectomy of L iliac vein and graft 2012, Hx BK viremia, HTN s/p DDRT in 2020 presented as transfer from [facility] ED for hematemesis.  During this admission she was found to have sudden LUE swelling that extended into her face, she complains of congestion and SOB with new O2 requirement.  Vascular Surgery consulted to evaluate for bypass occlusion.          PMH  ESRD on Dialysis    HTN - Hypertension    Clotted Renal Dialysis AV Graft    Infection of AV Graft for Dialysis    Clotted Renal Dialysis AV Graft    Infection of AV Graft for Dialysis    Fibroid Tumor    CKD (chronic kidney disease) stage V requiring chronic dialysis    Chronic kidney disease, unspecified      PSH  History of Hysterectomy    AV fistula    H/O extremity bypass graft    Kidney transplant recipient    H/O kidney transplant      MEDS    Allergies    contrast media (iodine-based) (Urticaria)  ibuprofen (Hives)  ibuprofen/morphine (Unknown)  latex (Swelling)  morphine (Anaphylaxis)  morphine (Urticaria)  penicillin (Anaphylaxis)  shellfish (Urticaria)    Intolerances        Social        Physical Exam  T(C): 37 (06-03-22 @ 13:00), Max: 37.6 (06-03-22 @ 05:00)  HR: 84 (06-03-22 @ 15:19) (76 - 88)  BP: 124/81 (06-03-22 @ 15:19) (124/81 - 160/75)  RR: 18 (06-03-22 @ 13:00) (18 - 18)  SpO2: 96% (06-03-22 @ 13:00) (96% - 98%)  Wt(kg): --  Tmax: T(C): , Max: 37.6 (06-03-22 @ 05:00)  Wt(kg): --    Gen: NAD  HEENT: Marked edema throughout face, atraumatic, no scleral icterus  CV: RRR, appears well perfused  Pulm: B/L chest rise, satting well on 2L NC  Chest wall: LEFT chest wall with palpable ringed bypass graft along anterior axillary line  Abd: Soft, ND, NT  Ext: LUE edematous throughout entire extremity to fingers    06-02-22  -  06-03-22  --------------------------------------------------------  IN:    Octreotide: 10 mL  Total IN: 10 mL    OUT:  Total OUT: 0 mL    Total NET: 10 mL      06-03-22 - 06-03-22  --------------------------------------------------------  IN:    Oral Fluid: 240 mL  Total IN: 240 mL    OUT:  Total OUT: 0 mL    Total NET: 240 mL          Labs:                        7.6    8.42  )-----------( 105      ( 02 Jun 2022 00:30 )             24.0     06-02    137  |  109<H>  |  26<H>  ----------------------------<  139<H>  4.3   |  20<L>  |  1.12    Ca    8.9      02 Jun 2022 09:41  Phos  2.3     06-02  Mg     1.7     06-02    TPro  4.7<L>  /  Alb  2.8<L>  /  TBili  0.4  /  DBili  x   /  AST  16  /  ALT  10  /  AlkPhos  40  06-02        ABG - ( 02 Jun 2022 19:16 )  pH, Arterial: 7.40  pH, Blood: x     /  pCO2: 36    /  pO2: 76    / HCO3: 22    / Base Excess: -2.2  /  SaO2: 97.7                  Imaging:  < from: CT Abdomen and Pelvis w/ IV Cont (06.02.22 @ 17:08) >  FINDINGS:  LOWER CHEST: Trace bilateral pleural effusions.    LIVER: Within normal limits.  BILE DUCTS: Normal caliber.  GALLBLADDER: Within normal limits.  SPLEEN: Within normallimits.  PANCREAS: Within normal limits.  ADRENALS: Within normal limits.  KIDNEYS/URETERS: Atrophic native kidneys. Right lower quadrant transplant   kidney without hydronephrosis.    BLADDER: Within normal limits.  REPRODUCTIVE ORGANS: Hysterectomy.    BOWEL: No bowel obstruction. Appendix is normal.  PERITONEUM: No ascites.  VESSELS: Atherosclerotic changes. Partially visualized occluded   subcutaneous bypass graft in the left chest wall to the left external   iliac vein, unchanged. Patent hepatic and portal veins.  RETROPERITONEUM/LYMPH NODES: No lymphadenopathy.  ABDOMINAL WALL: Numerous venous collaterals in the left chest wall and   anterior abdomen.  BONES: Median sternotomy.    IMPRESSION:  No CT evidence of cirrhosis. Patent hepaticand portal veins.    Redemonstration of occluded left external iliac venous bypass graft with   numerous subcutaneous collaterals.    Trace bilateral pleural effusions.    < end of copied text >

## 2022-06-03 NOTE — PROGRESS NOTE ADULT - NSPROGADDITIONALINFOA_GEN_ALL_CORE
above plans discussed with GIUSEPPE Poole MD  Division of Hospital Medicine  Contact via Microsoft Teams  Office: [phone]

## 2022-06-04 LAB
ANION GAP SERPL CALC-SCNC: 10 MMOL/L — SIGNIFICANT CHANGE UP (ref 5–17)
BLD GP AB SCN SERPL QL: NEGATIVE — SIGNIFICANT CHANGE UP
BUN SERPL-MCNC: 18 MG/DL — SIGNIFICANT CHANGE UP (ref 7–23)
CALCIUM SERPL-MCNC: 9.8 MG/DL — SIGNIFICANT CHANGE UP (ref 8.4–10.5)
CHLORIDE SERPL-SCNC: 104 MMOL/L — SIGNIFICANT CHANGE UP (ref 96–108)
CO2 SERPL-SCNC: 23 MMOL/L — SIGNIFICANT CHANGE UP (ref 22–31)
CREAT SERPL-MCNC: 1.09 MG/DL — SIGNIFICANT CHANGE UP (ref 0.5–1.3)
EGFR: 62 ML/MIN/1.73M2 — SIGNIFICANT CHANGE UP
GLUCOSE SERPL-MCNC: 125 MG/DL — HIGH (ref 70–99)
HCT VFR BLD CALC: 22.1 % — LOW (ref 34.5–45)
HGB BLD-MCNC: 7.1 G/DL — LOW (ref 11.5–15.5)
MCHC RBC-ENTMCNC: 29.2 PG — SIGNIFICANT CHANGE UP (ref 27–34)
MCHC RBC-ENTMCNC: 32.1 GM/DL — SIGNIFICANT CHANGE UP (ref 32–36)
MCV RBC AUTO: 90.9 FL — SIGNIFICANT CHANGE UP (ref 80–100)
NRBC # BLD: 0 /100 WBCS — SIGNIFICANT CHANGE UP (ref 0–0)
PLATELET # BLD AUTO: 130 K/UL — LOW (ref 150–400)
POTASSIUM SERPL-MCNC: 3.9 MMOL/L — SIGNIFICANT CHANGE UP (ref 3.5–5.3)
POTASSIUM SERPL-SCNC: 3.9 MMOL/L — SIGNIFICANT CHANGE UP (ref 3.5–5.3)
RBC # BLD: 2.43 M/UL — LOW (ref 3.8–5.2)
RBC # FLD: 16.4 % — HIGH (ref 10.3–14.5)
RH IG SCN BLD-IMP: POSITIVE — SIGNIFICANT CHANGE UP
SODIUM SERPL-SCNC: 137 MMOL/L — SIGNIFICANT CHANGE UP (ref 135–145)
WBC # BLD: 8.17 K/UL — SIGNIFICANT CHANGE UP (ref 3.8–10.5)
WBC # FLD AUTO: 8.17 K/UL — SIGNIFICANT CHANGE UP (ref 3.8–10.5)

## 2022-06-04 PROCEDURE — 99233 SBSQ HOSP IP/OBS HIGH 50: CPT | Mod: GC

## 2022-06-04 PROCEDURE — 99232 SBSQ HOSP IP/OBS MODERATE 35: CPT | Mod: GC

## 2022-06-04 RX ORDER — DIPHENHYDRAMINE HCL 50 MG
50 CAPSULE ORAL ONCE
Refills: 0 | Status: COMPLETED | OUTPATIENT
Start: 2022-06-05 | End: 2022-06-05

## 2022-06-04 RX ORDER — CHLORHEXIDINE GLUCONATE 213 G/1000ML
1 SOLUTION TOPICAL DAILY
Refills: 0 | Status: DISCONTINUED | OUTPATIENT
Start: 2022-06-04 | End: 2022-06-10

## 2022-06-04 RX ORDER — LANOLIN ALCOHOL/MO/W.PET/CERES
3 CREAM (GRAM) TOPICAL ONCE
Refills: 0 | Status: COMPLETED | OUTPATIENT
Start: 2022-06-04 | End: 2022-06-04

## 2022-06-04 RX ADMIN — Medication 50 MILLIGRAM(S): at 22:05

## 2022-06-04 RX ADMIN — Medication 5 MILLIGRAM(S): at 06:26

## 2022-06-04 RX ADMIN — CHLORHEXIDINE GLUCONATE 1 APPLICATION(S): 213 SOLUTION TOPICAL at 11:37

## 2022-06-04 RX ADMIN — PANTOPRAZOLE SODIUM 40 MILLIGRAM(S): 20 TABLET, DELAYED RELEASE ORAL at 17:29

## 2022-06-04 RX ADMIN — CEFTRIAXONE 100 MILLIGRAM(S): 500 INJECTION, POWDER, FOR SOLUTION INTRAMUSCULAR; INTRAVENOUS at 17:29

## 2022-06-04 RX ADMIN — PANTOPRAZOLE SODIUM 40 MILLIGRAM(S): 20 TABLET, DELAYED RELEASE ORAL at 06:22

## 2022-06-04 RX ADMIN — Medication 50 MILLIGRAM(S): at 18:41

## 2022-06-04 RX ADMIN — Medication 3 MILLIGRAM(S): at 01:10

## 2022-06-04 RX ADMIN — TACROLIMUS 8 MILLIGRAM(S): 5 CAPSULE ORAL at 11:37

## 2022-06-04 NOTE — PROGRESS NOTE ADULT - ASSESSMENT
52F Hx ESRD s/p HepC DDRT, chronic SVC syndrome s/p L cephalic-iliac vein bypass on DAPT, s/p thrombectomy of L iliac vein and graft 2012, Hx BK viremia, HTN presents as transfer from [facility] ED for hematemesis.    #Renal transplant  #chronic SVC syndrome on DAPT    #Hematemesis  EGD with EV grade 2, PUD in the antrum (clean based ulcer and one Chidi Class II C.     #EV grade 2: downhill varices can be seen in setting of SVC syndrome vs ?c/f cirrhosis (no other signs of portal HTN seen)  US with normal appearing liver. CT with IV contrast with normal liver morphology and patent PV and HV.  Hep B and C negative. KAYLA, antismooth, anti-mitochondrial negative.     Recommendations:  -please obtain CBC -- no blood work x 2 days, transfuse Hb < 7  -c/w PPI BID  -okay to stop abx from a GI perspective given no evidence of cirrhosis.   -No CI for DAPT therapy  -daily CBC, CMP and INR  -Unclear etiology of EV, could consider repeat EGD as outpatient.     Recommendations preliminary until signed by attending.       Thank you for involving us in the care of this patient, please reach out if any further questions.     Te Zambrano MD  Gastroenterology/Hepatology Fellow, PGY5    Available on Microsoft Teams  [phone] ([facility])  [phone] ([facility])  Please contact on call fellow weekdays after 5pm-7am and weekends: [phone]

## 2022-06-04 NOTE — PROGRESS NOTE ADULT - ASSESSMENT
52F w/ ESRD s/p HepC DDRT, chronic SVC syndrome s/p L cephalic-iliac vein bypass on DAPT, s/p thrombectomy of L iliac vein and graft 2012, Hx BK viremia, HTN presents as transfer from [facility] ED for hematemesis 2/2 acute upper GI hemorrhage admitted to MICU for elective intubation and urgent endoscopy with GI s/p endoscopy with evidence of non-bleeding ulcers & esophageal varices

## 2022-06-04 NOTE — PROGRESS NOTE ADULT - SUBJECTIVE AND OBJECTIVE BOX
Interval Events:   NAEON, afebrile HD stable  Endorses on going black BMs x 4 today  No blood work on file x 2 days      Hospital Medications:  acetaminophen     Tablet .. 650 milliGRAM(s) Oral every 6 hours PRN  cefTRIAXone   IVPB 1000 milliGRAM(s) IV Intermittent every 24 hours  chlorhexidine 4% Liquid 1 Application(s) Topical <User Schedule>  pantoprazole  Injectable 40 milliGRAM(s) IV Push two times a day  predniSONE   Tablet 5 milliGRAM(s) Oral daily  sodium chloride 0.65% Nasal 1 Spray(s) Both Nostrils five times a day PRN  tacrolimus ER Tablet (ENVARSUS XR) 8 milliGRAM(s) Oral daily      ROS: All system reviewed and negative except as mentioned above.    PHYSICAL EXAM:   Vital Signs Last 24 Hrs  T(C): 36.6 (04 Jun 2022 12:49), Max: 37.1 (04 Jun 2022 05:00)  T(F): 97.9 (04 Jun 2022 12:49), Max: 98.8 (04 Jun 2022 05:00)  HR: 92 (04 Jun 2022 12:49) (84 - 92)  BP: 132/76 (04 Jun 2022 12:49) (108/67 - 141/83)  BP(mean): --  RR: 20 (04 Jun 2022 12:49) (18 - 20)  SpO2: 98% (04 Jun 2022 12:49) (97% - 98%)    GENERAL:  NAD, Appears stated age  HEENT:  NC/AT,  conjunctivae clear and pink, sclera -anicteric  CHEST:  Normal Effort, Breath sounds clear  HEART:  RRR, S1 + S2, no murmurs  ABDOMEN:  Soft, non-tender, non-distended, normoactive bowel sounds,  no masses, LISSET empty vault   EXTREMITIES:  no cyanosis or edema  SKIN:  Warm & Dry. No rash or erythema  NEURO:  Alert, oriented, no focal deficit      LABS:      Imaging: Images reviewed.

## 2022-06-04 NOTE — PROGRESS NOTE ADULT - PROBLEM SELECTOR PLAN 3
h/o bypass surgeries on DAPT asa and plavix at home   -continue to hold DAPT for now  -vascular consult appreciated, initially MRV, but after consultaiton with vascular and radiology, changed to CTA of chest. Patient will be pre-medicated. Cleared by radiology for contrast study (due to IV shortage, not AMADEO).

## 2022-06-04 NOTE — PROGRESS NOTE ADULT - PROBLEM SELECTOR PLAN 1
s/p EGD with esophageal varices and nonbleeding gastric ulcers   -s/p 1 unit of pRBC transfusion 6/1, H/H responded appropriately and remains stable  -still melanotic stool but most likely old blood   -CTAP without evidence of cirrhosis and patent portal vein  -GI eval appreciated: continue with IV PPI  -cont with ceftriaxone 7 days for prophylaxis   -cont to monitor CBC q24 hrs  -Would cont to hold DAPT for now given lower h/h.

## 2022-06-04 NOTE — CHART NOTE - NSCHARTNOTEFT_GEN_A_CORE
Medicine PA Note     Notified by RN that patient's left arm where an arrow was previously placed is swollen. Arrow has been removed at this time. Pt is evaluated at bedside, no acute distress is noted. Pt is afebrile, with stable vitals, and mentating well at bedside.   Noted that patient's left bicep is visibly swollen, non-tender, and warm.   Advised to elevate arm at this time and add hot packs   Will order LUE duplex to evaluate for DVT   Continue to monitor patient's vitals closely overnight   Endorse/sign out to day team on overnight events   RN aware of management    Celina Randle PA-C   Dept of Medicine   [id number]

## 2022-06-04 NOTE — PROGRESS NOTE ADULT - SUBJECTIVE AND OBJECTIVE BOX
Patient is a 52y old  Female who presents with a chief complaint of 52F transferred from [facility] for hematemesis (03 Jun 2022 09:11)      SUBJECTIVE / OVERNIGHT EVENTS:  no events overnight. Intermittent dizziness still. +melenotic stools still present. No chest pain or SOB    ROS:      MEDICATIONS  (STANDING):  cefTRIAXone   IVPB 1000 milliGRAM(s) IV Intermittent every 24 hours  chlorhexidine 4% Liquid 1 Application(s) Topical <User Schedule>  pantoprazole  Injectable 40 milliGRAM(s) IV Push two times a day  predniSONE   Tablet 5 milliGRAM(s) Oral daily  tacrolimus ER Tablet (ENVARSUS XR) 8 milliGRAM(s) Oral daily    MEDICATIONS  (PRN):  acetaminophen     Tablet .. 650 milliGRAM(s) Oral every 6 hours PRN Mild Pain (1 - 3), Moderate Pain (4 - 6), Severe Pain (7 - 10)  sodium chloride 0.65% Nasal 1 Spray(s) Both Nostrils five times a day PRN Nasal Congestion/pt comfort      CAPILLARY BLOOD GLUCOSE        I&O's Summary    02 Jun 2022 07:01  -  03 Jun 2022 07:00  --------------------------------------------------------  IN: 10 mL / OUT: 0 mL / NET: 10 mL    03 Jun 2022 07:01  -  03 Jun 2022 10:50  --------------------------------------------------------  IN: 240 mL / OUT: 0 mL / NET: 240 mL        PHYSICAL EXAM:  Vital Signs Last 24 Hrs  T(C): 36.6 (04 Jun 2022 17:00), Max: 37.1 (04 Jun 2022 05:00)  T(F): 97.9 (04 Jun 2022 17:00), Max: 98.8 (04 Jun 2022 05:00)  HR: 83 (04 Jun 2022 17:00) (83 - 92)  BP: 148/73 (04 Jun 2022 17:00) (108/67 - 148/73)  BP(mean): --  RR: 18 (04 Jun 2022 17:00) (18 - 20)  SpO2: 97% (04 Jun 2022 17:00) (97% - 98%)    GENERAL: NAD, well-developed  HEAD:  Atraumatic, Normocephalic  EYES: EOMI, PERRLA, conjunctiva and sclera clear  NECK: Supple, No JVD  CHEST/LUNG: Clear to auscultation bilaterally; No wheeze  HEART: Regular rate and rhythm; No murmurs, rubs, or gallops  ABDOMEN: Soft, Nontender, Nondistended; Bowel sounds present  EXTREMITIES:  LUE 2 + pitting edema .  NEUROLOGY: AAOx3; non-focal      LABS:  personally reviewed                                   7.1    8.17  )-----------( 130      ( 04 Jun 2022 16:21 )             22.1   06-04    137  |  104  |  18  ----------------------------<  125<H>  3.9   |  23  |  1.09    Ca    9.8      04 Jun 2022 16:21            RADIOLOGY & ADDITIONAL TESTS:    Imaging Personally Reviewed:  -CTAP  < from: CT Abdomen and Pelvis w/ IV Cont (06.02.22 @ 17:08) >  LOWER CHEST: Trace bilateral pleural effusions.    LIVER: Within normal limits.  BILE DUCTS: Normal caliber.  GALLBLADDER: Within normal limits.  SPLEEN: Within normallimits.  PANCREAS: Within normal limits.  ADRENALS: Within normal limits.  KIDNEYS/URETERS: Atrophic native kidneys. Right lower quadrant transplant   kidney without hydronephrosis.    BLADDER: Within normal limits.  REPRODUCTIVE ORGANS: Hysterectomy.    BOWEL: No bowel obstruction. Appendix is normal.  PERITONEUM: No ascites.  VESSELS: Atherosclerotic changes. Partially visualized occluded   subcutaneous bypass graft in the left chest wall to the left external   iliac vein, unchanged. Patent hepatic and portal veins.  RETROPERITONEUM/LYMPH NODES: No lymphadenopathy.  ABDOMINAL WALL: Numerous venous collaterals in the left chest wall and   anterior abdomen.  BONES: Median sternotomy.    IMPRESSION:  No CT evidence of cirrhosis. Patent hepaticand portal veins.    Redemonstration of occluded left external iliac venous bypass graft with   numerous subcutaneous collaterals.    Trace bilateral pleural effusions.    < end of copied text >      Consultant(s) Notes Reviewed:  GI, nephrology    Care Discussed with Consultants/Other Providers: Dr. Blanco (GI)

## 2022-06-05 LAB
BASOPHILS # BLD AUTO: 0.02 K/UL — SIGNIFICANT CHANGE UP (ref 0–0.2)
BASOPHILS NFR BLD AUTO: 0.3 % — SIGNIFICANT CHANGE UP (ref 0–2)
EOSINOPHIL # BLD AUTO: 0 K/UL — SIGNIFICANT CHANGE UP (ref 0–0.5)
EOSINOPHIL NFR BLD AUTO: 0 % — SIGNIFICANT CHANGE UP (ref 0–6)
HCT VFR BLD CALC: 24.6 % — LOW (ref 34.5–45)
HGB BLD-MCNC: 7.4 G/DL — LOW (ref 11.5–15.5)
IMM GRANULOCYTES NFR BLD AUTO: 0.9 % — SIGNIFICANT CHANGE UP (ref 0–1.5)
LYMPHOCYTES # BLD AUTO: 0.65 K/UL — LOW (ref 1–3.3)
LYMPHOCYTES # BLD AUTO: 10 % — LOW (ref 13–44)
MCHC RBC-ENTMCNC: 28.8 PG — SIGNIFICANT CHANGE UP (ref 27–34)
MCHC RBC-ENTMCNC: 30.1 GM/DL — LOW (ref 32–36)
MCV RBC AUTO: 95.7 FL — SIGNIFICANT CHANGE UP (ref 80–100)
MONOCYTES # BLD AUTO: 0.14 K/UL — SIGNIFICANT CHANGE UP (ref 0–0.9)
MONOCYTES NFR BLD AUTO: 2.1 % — SIGNIFICANT CHANGE UP (ref 2–14)
NEUTROPHILS # BLD AUTO: 5.65 K/UL — SIGNIFICANT CHANGE UP (ref 1.8–7.4)
NEUTROPHILS NFR BLD AUTO: 86.7 % — HIGH (ref 43–77)
NRBC # BLD: 0 /100 WBCS — SIGNIFICANT CHANGE UP (ref 0–0)
PLATELET # BLD AUTO: 116 K/UL — LOW (ref 150–400)
RAPID RVP RESULT: SIGNIFICANT CHANGE UP
RBC # BLD: 2.57 M/UL — LOW (ref 3.8–5.2)
RBC # FLD: 16.8 % — HIGH (ref 10.3–14.5)
SARS-COV-2 RNA SPEC QL NAA+PROBE: SIGNIFICANT CHANGE UP
TACROLIMUS SERPL-MCNC: 5.3 NG/ML — SIGNIFICANT CHANGE UP
WBC # BLD: 6.52 K/UL — SIGNIFICANT CHANGE UP (ref 3.8–10.5)
WBC # FLD AUTO: 6.52 K/UL — SIGNIFICANT CHANGE UP (ref 3.8–10.5)

## 2022-06-05 PROCEDURE — 99232 SBSQ HOSP IP/OBS MODERATE 35: CPT | Mod: GC

## 2022-06-05 PROCEDURE — 99233 SBSQ HOSP IP/OBS HIGH 50: CPT

## 2022-06-05 RX ADMIN — Medication 50 MILLIGRAM(S): at 05:00

## 2022-06-05 RX ADMIN — Medication 5 MILLIGRAM(S): at 05:00

## 2022-06-05 RX ADMIN — TACROLIMUS 8 MILLIGRAM(S): 5 CAPSULE ORAL at 12:19

## 2022-06-05 RX ADMIN — Medication 650 MILLIGRAM(S): at 18:03

## 2022-06-05 RX ADMIN — Medication 650 MILLIGRAM(S): at 16:54

## 2022-06-05 RX ADMIN — CEFTRIAXONE 100 MILLIGRAM(S): 500 INJECTION, POWDER, FOR SOLUTION INTRAMUSCULAR; INTRAVENOUS at 17:02

## 2022-06-05 RX ADMIN — CHLORHEXIDINE GLUCONATE 1 APPLICATION(S): 213 SOLUTION TOPICAL at 12:26

## 2022-06-05 RX ADMIN — Medication 50 MILLIGRAM(S): at 04:14

## 2022-06-05 RX ADMIN — PANTOPRAZOLE SODIUM 40 MILLIGRAM(S): 20 TABLET, DELAYED RELEASE ORAL at 05:01

## 2022-06-05 RX ADMIN — PANTOPRAZOLE SODIUM 40 MILLIGRAM(S): 20 TABLET, DELAYED RELEASE ORAL at 17:01

## 2022-06-05 NOTE — PROGRESS NOTE ADULT - PROBLEM SELECTOR PLAN 3
h/o bypass surgeries on DAPT asa and plavix at home   -continue to hold DAPT for now  -vascular consult appreciated, initially MRV, but after consultaiton with vascular and radiology, changed to CTA of chest. Patient will be pre-medicated. Cleared by radiology for contrast study (due to IV shortage, not AMADEO).  -await US in meantime as unable to get CT this AM and will need to be pre-medicated.

## 2022-06-05 NOTE — PROVIDER CONTACT NOTE (OTHER) - SITUATION
Provider from ICU did not come to place IV access. I followed up with Celina CHIN via Teams Messenger at 6/4 1100pm, 6/5 1210am, and 0130am.  Each time, Celina said MICU ACP was in an RRT or consult.

## 2022-06-05 NOTE — PROGRESS NOTE ADULT - SUBJECTIVE AND OBJECTIVE BOX
Patient is a 52y old  Female who presents with a chief complaint of 52F transferred from [facility] for hematemesis (03 Jun 2022 09:11)      SUBJECTIVE / OVERNIGHT EVENTS:  Unable to get CT as IV blew and new one also did not work at 5AM. To re-schedule once Mid-line placed. Patient with more dark green stools. +rhinorrhea. No SOB.    ROS:      MEDICATIONS  (STANDING):  cefTRIAXone   IVPB 1000 milliGRAM(s) IV Intermittent every 24 hours  chlorhexidine 4% Liquid 1 Application(s) Topical <User Schedule>  pantoprazole  Injectable 40 milliGRAM(s) IV Push two times a day  predniSONE   Tablet 5 milliGRAM(s) Oral daily  tacrolimus ER Tablet (ENVARSUS XR) 8 milliGRAM(s) Oral daily    MEDICATIONS  (PRN):  acetaminophen     Tablet .. 650 milliGRAM(s) Oral every 6 hours PRN Mild Pain (1 - 3), Moderate Pain (4 - 6), Severe Pain (7 - 10)  sodium chloride 0.65% Nasal 1 Spray(s) Both Nostrils five times a day PRN Nasal Congestion/pt comfort      CAPILLARY BLOOD GLUCOSE        I&O's Summary    02 Jun 2022 07:01  -  03 Jun 2022 07:00  --------------------------------------------------------  IN: 10 mL / OUT: 0 mL / NET: 10 mL    03 Jun 2022 07:01  -  03 Jun 2022 10:50  --------------------------------------------------------  IN: 240 mL / OUT: 0 mL / NET: 240 mL        PHYSICAL EXAM:  Vital Signs Last 24 Hrs  T(C): 36.6 (04 Jun 2022 17:00), Max: 37.1 (04 Jun 2022 05:00)  T(F): 97.9 (04 Jun 2022 17:00), Max: 98.8 (04 Jun 2022 05:00)  HR: 83 (04 Jun 2022 17:00) (83 - 92)  BP: 148/73 (04 Jun 2022 17:00) (108/67 - 148/73)  BP(mean): --  RR: 18 (04 Jun 2022 17:00) (18 - 20)  SpO2: 97% (04 Jun 2022 17:00) (97% - 98%)    GENERAL: NAD, well-developed  HEAD:  Atraumatic, Normocephalic  EYES: EOMI, PERRLA, conjunctiva and sclera clear  NECK: Supple, No JVD  CHEST/LUNG: Clear to auscultation bilaterally; No wheeze  HEART: Regular rate and rhythm; No murmurs, rubs, or gallops  ABDOMEN: Soft, Nontender, Nondistended; Bowel sounds present  EXTREMITIES:  LUE 2 + pitting edema .  NEUROLOGY: AAOx3; non-focal      LABS:  personally reviewed                                       7.4    6.52  )-----------( 116      ( 05 Jun 2022 06:42 )             24.6             RADIOLOGY & ADDITIONAL TESTS:    Imaging Personally Reviewed:  -CTAP  < from: CT Abdomen and Pelvis w/ IV Cont (06.02.22 @ 17:08) >  LOWER CHEST: Trace bilateral pleural effusions.    LIVER: Within normal limits.  BILE DUCTS: Normal caliber.  GALLBLADDER: Within normal limits.  SPLEEN: Within normallimits.  PANCREAS: Within normal limits.  ADRENALS: Within normal limits.  KIDNEYS/URETERS: Atrophic native kidneys. Right lower quadrant transplant   kidney without hydronephrosis.    BLADDER: Within normal limits.  REPRODUCTIVE ORGANS: Hysterectomy.    BOWEL: No bowel obstruction. Appendix is normal.  PERITONEUM: No ascites.  VESSELS: Atherosclerotic changes. Partially visualized occluded   subcutaneous bypass graft in the left chest wall to the left external   iliac vein, unchanged. Patent hepatic and portal veins.  RETROPERITONEUM/LYMPH NODES: No lymphadenopathy.  ABDOMINAL WALL: Numerous venous collaterals in the left chest wall and   anterior abdomen.  BONES: Median sternotomy.    IMPRESSION:  No CT evidence of cirrhosis. Patent hepaticand portal veins.    Redemonstration of occluded left external iliac venous bypass graft with   numerous subcutaneous collaterals.    Trace bilateral pleural effusions.    < end of copied text >      Consultant(s) Notes Reviewed:  GI, nephrology    Care Discussed with Consultants/Other Providers:

## 2022-06-05 NOTE — PROVIDER CONTACT NOTE (OTHER) - SITUATION
MICU RN and Selvin ROSEN SICU at bedside with U/S. patient incredibly difficult stick, finally successful with #20gleft arm confirmed patent, in vein, flushes well, blood return not strong/present.

## 2022-06-05 NOTE — PROGRESS NOTE ADULT - PROBLEM SELECTOR PLAN 2
SCr now back to baseline   -continue on prednisone 5mg, tacrolimus 8mg daily   -holding leflunomide now. repeat BK PCR  -renal transplant follow up

## 2022-06-05 NOTE — PROGRESS NOTE ADULT - SUBJECTIVE AND OBJECTIVE BOX
Interval Events:   NAEON, afebrile HD stable  Endorses BMs now green  Hb stable in 7s    Hospital Medications:  acetaminophen     Tablet .. 650 milliGRAM(s) Oral every 6 hours PRN  cefTRIAXone   IVPB 1000 milliGRAM(s) IV Intermittent every 24 hours  chlorhexidine 4% Liquid 1 Application(s) Topical <User Schedule>  pantoprazole  Injectable 40 milliGRAM(s) IV Push two times a day  predniSONE   Tablet 5 milliGRAM(s) Oral daily  sodium chloride 0.65% Nasal 1 Spray(s) Both Nostrils five times a day PRN  tacrolimus ER Tablet (ENVARSUS XR) 8 milliGRAM(s) Oral daily      ROS: All system reviewed and negative except as mentioned above.    PHYSICAL EXAM:   Vital Signs Last 24 Hrs  T(C): 37.2 (05 Jun 2022 13:00), Max: 37.6 (05 Jun 2022 12:54)  T(F): 98.9 (05 Jun 2022 13:00), Max: 99.7 (05 Jun 2022 12:54)  HR: 92 (05 Jun 2022 12:54) (76 - 92)  BP: 130/80 (05 Jun 2022 12:54) (130/80 - 166/83)  BP(mean): --  RR: 18 (05 Jun 2022 12:54) (18 - 18)  SpO2: 97% (05 Jun 2022 12:54) (97% - 99%)    GENERAL:  NAD, Appears stated age  HEENT:  NC/AT,  conjunctivae clear and pink, sclera -anicteric  CHEST:  Normal Effort, Breath sounds clear  HEART:  RRR, S1 + S2, no murmurs  ABDOMEN:  Soft, non-tender, non-distended, normoactive bowel sounds,  no masses, LISSET empty vault   EXTREMITIES:  no cyanosis or edema  SKIN:  Warm & Dry. No rash or erythema  NEURO:  Alert, oriented, no focal deficit    LABS:                        7.4    6.52  )-----------( 116      ( 05 Jun 2022 06:42 )             24.6     06-04    137  |  104  |  18  ----------------------------<  125<H>  3.9   |  23  |  1.09    Ca    9.8      04 Jun 2022 16:21                            Imaging: Images reviewed.

## 2022-06-05 NOTE — PROGRESS NOTE ADULT - ASSESSMENT
52F w/ ESRD s/p HepC DDRT, chronic SVC syndrome s/p L cephalic-iliac vein bypass on DAPT, s/p thrombectomy of L iliac vein and graft 2012, Hx BK viremia, HTN [facility] 2/2 acute upper GI hemorrhage admitted to MICU for elective intubation and urgent endoscopy with GI s/p endoscopy with evidence of non-bleeding ulcers & esophageal varices

## 2022-06-05 NOTE — PROGRESS NOTE ADULT - PROBLEM SELECTOR PLAN 1
s/p EGD with esophageal varices and nonbleeding gastric ulcers   -hemoglobin stable currently .  -still melanotic stool but most likely old blood   -CTAP without evidence of cirrhosis and patent portal vein  -GI eval appreciated: continue with IV PPI  -cont with ceftriaxone 7 days for prophylaxis   -cont to monitor CBC q24 hrs  -Would cont to hold DAPT for now given lower h/h.

## 2022-06-05 NOTE — PROVIDER CONTACT NOTE (OTHER) - SITUATION
Patient has left arm #22gauge PIV. needs #20 or #18g for CT angio. IV RN Justyn at bedside said she cannot obtain access and to call for ultrasound guided IV. ICU team in RRT, Celina CHIN called.

## 2022-06-05 NOTE — PROGRESS NOTE ADULT - ASSESSMENT
52F Hx ESRD s/p HepC DDRT, chronic SVC syndrome s/p L cephalic-iliac vein bypass on DAPT, s/p thrombectomy of L iliac vein and graft 2012, Hx BK viremia, HTN presents as transfer from [facility] ED for hematemesis.    #Renal transplant  #chronic SVC syndrome on DAPT    #Hematemesis  EGD with EV grade 2, PUD in the antrum (clean based ulcer and one Chidi Class II C.     #EV grade 2: downhill varices can be seen in setting of SVC syndrome vs ?c/f cirrhosis (no other signs of portal HTN seen)  US with normal appearing liver. CT with IV contrast with normal liver morphology and patent PV and HV.  Hep B and C negative. KAYLA, antismooth, anti-mitochondrial negative.     Recommendations:  -trend CBC transfuse Hb < 7  -c/w PPI BID  -No CI for DAPT therapy  -daily CBC, CMP and INR  -Unclear etiology of EV, could consider repeat EGD as outpatient.     Recommendations preliminary until signed by attending.       Thank you for involving us in the care of this patient, please reach out if any further questions.     Te Zambrano MD  Gastroenterology/Hepatology Fellow, PGY5    Available on Microsoft Teams  [phone] ([facility])  [phone] ([facility])  Please contact on call fellow weekdays after 5pm-7am and weekends: [phone]

## 2022-06-06 LAB
ALBUMIN SERPL ELPH-MCNC: 3.6 G/DL — SIGNIFICANT CHANGE UP (ref 3.3–5)
ALP SERPL-CCNC: 55 U/L — SIGNIFICANT CHANGE UP (ref 40–120)
ALT FLD-CCNC: 15 U/L — SIGNIFICANT CHANGE UP (ref 10–45)
ANION GAP SERPL CALC-SCNC: 9 MMOL/L — SIGNIFICANT CHANGE UP (ref 5–17)
AST SERPL-CCNC: 13 U/L — SIGNIFICANT CHANGE UP (ref 10–40)
BASOPHILS # BLD AUTO: 0 K/UL — SIGNIFICANT CHANGE UP (ref 0–0.2)
BASOPHILS NFR BLD AUTO: 0 % — SIGNIFICANT CHANGE UP (ref 0–2)
BILIRUB SERPL-MCNC: 0.4 MG/DL — SIGNIFICANT CHANGE UP (ref 0.2–1.2)
BUN SERPL-MCNC: 18 MG/DL — SIGNIFICANT CHANGE UP (ref 7–23)
CALCIUM SERPL-MCNC: 10.3 MG/DL — SIGNIFICANT CHANGE UP (ref 8.4–10.5)
CHLORIDE SERPL-SCNC: 107 MMOL/L — SIGNIFICANT CHANGE UP (ref 96–108)
CO2 SERPL-SCNC: 23 MMOL/L — SIGNIFICANT CHANGE UP (ref 22–31)
CREAT SERPL-MCNC: 1.04 MG/DL — SIGNIFICANT CHANGE UP (ref 0.5–1.3)
EGFR: 65 ML/MIN/1.73M2 — SIGNIFICANT CHANGE UP
EOSINOPHIL # BLD AUTO: 0 K/UL — SIGNIFICANT CHANGE UP (ref 0–0.5)
EOSINOPHIL NFR BLD AUTO: 0 % — SIGNIFICANT CHANGE UP (ref 0–6)
GIANT PLATELETS BLD QL SMEAR: PRESENT — SIGNIFICANT CHANGE UP
GLUCOSE SERPL-MCNC: 122 MG/DL — HIGH (ref 70–99)
HCT VFR BLD CALC: 21.4 % — LOW (ref 34.5–45)
HCT VFR BLD CALC: 26.5 % — LOW (ref 34.5–45)
HGB BLD-MCNC: 6.6 G/DL — CRITICAL LOW (ref 11.5–15.5)
HGB BLD-MCNC: 8.1 G/DL — LOW (ref 11.5–15.5)
LYMPHOCYTES # BLD AUTO: 0.99 K/UL — LOW (ref 1–3.3)
LYMPHOCYTES # BLD AUTO: 11.3 % — LOW (ref 13–44)
MANUAL SMEAR VERIFICATION: SIGNIFICANT CHANGE UP
MCHC RBC-ENTMCNC: 28.1 PG — SIGNIFICANT CHANGE UP (ref 27–34)
MCHC RBC-ENTMCNC: 28.2 PG — SIGNIFICANT CHANGE UP (ref 27–34)
MCHC RBC-ENTMCNC: 30.6 GM/DL — LOW (ref 32–36)
MCHC RBC-ENTMCNC: 30.8 GM/DL — LOW (ref 32–36)
MCV RBC AUTO: 91.5 FL — SIGNIFICANT CHANGE UP (ref 80–100)
MCV RBC AUTO: 92 FL — SIGNIFICANT CHANGE UP (ref 80–100)
MONOCYTES # BLD AUTO: 0.38 K/UL — SIGNIFICANT CHANGE UP (ref 0–0.9)
MONOCYTES NFR BLD AUTO: 4.3 % — SIGNIFICANT CHANGE UP (ref 2–14)
NEUTROPHILS # BLD AUTO: 7.39 K/UL — SIGNIFICANT CHANGE UP (ref 1.8–7.4)
NEUTROPHILS NFR BLD AUTO: 84.4 % — HIGH (ref 43–77)
NRBC # BLD: 0 /100 WBCS — SIGNIFICANT CHANGE UP (ref 0–0)
PLAT MORPH BLD: NORMAL — SIGNIFICANT CHANGE UP
PLATELET # BLD AUTO: 171 K/UL — SIGNIFICANT CHANGE UP (ref 150–400)
PLATELET # BLD AUTO: 187 K/UL — SIGNIFICANT CHANGE UP (ref 150–400)
POTASSIUM SERPL-MCNC: 4.2 MMOL/L — SIGNIFICANT CHANGE UP (ref 3.5–5.3)
POTASSIUM SERPL-SCNC: 4.2 MMOL/L — SIGNIFICANT CHANGE UP (ref 3.5–5.3)
PROT SERPL-MCNC: 6 G/DL — SIGNIFICANT CHANGE UP (ref 6–8.3)
RBC # BLD: 2.34 M/UL — LOW (ref 3.8–5.2)
RBC # BLD: 2.88 M/UL — LOW (ref 3.8–5.2)
RBC # FLD: 16.1 % — HIGH (ref 10.3–14.5)
RBC # FLD: 16.2 % — HIGH (ref 10.3–14.5)
RBC BLD AUTO: SIGNIFICANT CHANGE UP
SARS-COV-2 RNA SPEC QL NAA+PROBE: SIGNIFICANT CHANGE UP
SODIUM SERPL-SCNC: 139 MMOL/L — SIGNIFICANT CHANGE UP (ref 135–145)
TACROLIMUS SERPL-MCNC: 1.8 NG/ML — SIGNIFICANT CHANGE UP
WBC # BLD: 8.74 K/UL — SIGNIFICANT CHANGE UP (ref 3.8–10.5)
WBC # BLD: 8.75 K/UL — SIGNIFICANT CHANGE UP (ref 3.8–10.5)
WBC # FLD AUTO: 8.74 K/UL — SIGNIFICANT CHANGE UP (ref 3.8–10.5)
WBC # FLD AUTO: 8.75 K/UL — SIGNIFICANT CHANGE UP (ref 3.8–10.5)

## 2022-06-06 PROCEDURE — 71555 MRI ANGIO CHEST W OR W/O DYE: CPT | Mod: 26

## 2022-06-06 PROCEDURE — 99233 SBSQ HOSP IP/OBS HIGH 50: CPT

## 2022-06-06 PROCEDURE — 93990 DOPPLER FLOW TESTING: CPT | Mod: 26

## 2022-06-06 PROCEDURE — 99232 SBSQ HOSP IP/OBS MODERATE 35: CPT | Mod: GC

## 2022-06-06 PROCEDURE — 93971 EXTREMITY STUDY: CPT | Mod: 26,LT

## 2022-06-06 RX ADMIN — PANTOPRAZOLE SODIUM 40 MILLIGRAM(S): 20 TABLET, DELAYED RELEASE ORAL at 17:37

## 2022-06-06 RX ADMIN — CHLORHEXIDINE GLUCONATE 1 APPLICATION(S): 213 SOLUTION TOPICAL at 11:26

## 2022-06-06 RX ADMIN — Medication 650 MILLIGRAM(S): at 23:35

## 2022-06-06 RX ADMIN — Medication 5 MILLIGRAM(S): at 06:01

## 2022-06-06 RX ADMIN — PANTOPRAZOLE SODIUM 40 MILLIGRAM(S): 20 TABLET, DELAYED RELEASE ORAL at 06:01

## 2022-06-06 RX ADMIN — Medication 650 MILLIGRAM(S): at 16:30

## 2022-06-06 RX ADMIN — TACROLIMUS 8 MILLIGRAM(S): 5 CAPSULE ORAL at 06:01

## 2022-06-06 NOTE — PROGRESS NOTE ADULT - PROBLEM SELECTOR PLAN 1
s/p DDRT performed in 2020 by Dr. Monroy. Allograft function at baseline. Outpatient nephrologist is Dr. Lake.  Awaiting IV Access to obtain imaging of LUE and chest. Swelling now improving with elevation. s/p DDRT performed in 2020 by Dr. Monroy. Allograft function at baseline. Outpatient nephrologist is Dr. Lake.  Awaiting IV Access to obtain imaging of LUE and chest. Swelling now improving with elevation. Discussed with team about getting MRV as is less likely to affect renal function.

## 2022-06-06 NOTE — PROGRESS NOTE ADULT - ATTENDING COMMENTS
52 yr old woman with ESRD s/p DDRT in 2020, chronic SVC syndrome s/p L cephalic-iliac vein bypass on DAPT, s/p thrombectomy of L iliac vein and graft 2012, Hx BK viremia on leflunomide, admitted with hematemesis.  EGD showed Esophageal varices and non bleeding antral ulcer. Antiplatelets stopped, started IV Protonix and Transfused PRBC. C/o feeling dizzy this AM. Hgb down to 6.6. Repeat pending.     1.  Renal transplant in 2020, renal function stable. Creatinine 1.09 on 6/4/22  2.  Immunosuppression - Envarsus 8mg po daily, level 5.3 on 6/4/22. Check trough level. Aim for trough 4-6.  Continue prednisone 5mg po daily. Hold leflunomide, obtain serum BK PCR.   3. Anemia due to upper GI bleed (varices + PUD) - On IV Protonix. S/p PRBC x 4 units since admission, Hgb down to 6.6 today - repeat pending. GI following.   4. SVC syndrome - LUE arm swelling improving, Esophageal varices possibly related to SVC syndrome - imaging planned. If ok with vascular, would  prefer MRV instead of CT with iodinated contrast to limit risk of contrast induced injury.

## 2022-06-06 NOTE — PROGRESS NOTE ADULT - ASSESSMENT
52F Hx ESRD s/p HepC DDRT, chronic SVC syndrome s/p L cephalic-iliac vein bypass on DAPT, s/p thrombectomy of L iliac vein and graft 2012, Hx BK viremia, HTN presents as transfer from [facility] ED for hematemesis.    #Renal transplant  #chronic SVC syndrome on DAPT    #Hematemesis  s/p 3 unit of prbc on May 30th s/p EGD with EV grade 2, PUD in the antrum (clean based ulcer and one Chidi Class II C. Hb downtrending after procedure but no further overt GI bleeding noticed. Patient has required 1 unit of blood on 6/1/22 and now another unit for hb of 6.6 on 6//6/22    #EV grade 2: downhill varices can be seen in setting of SVC syndrome vs ?c/f cirrhosis (no other signs of portal HTN seen)  US with normal appearing liver. CT with IV contrast with normal liver morphology and patent PV and HV.  Hep B and C negative. KAYLA, antismooth, anti-mitochondrial negative.     Recommendations:  -trend CBC transfuse Hb < 7. Would be hesitant to repeat EGD given no sign of overt GI bleeding. Given ongoing drop of hb would consider obtaining hemolysis blood work.   -c/w PPI BID  -No CI for DAPT therapy  -daily CBC, CMP and INR  -Unclear etiology of EV, could consider repeat EGD as outpatient.   -Please provide patient with Hepatology Clinic number to arrange appointment once ready to be discharged; [phone] ([facility] [place]) or [phone] ([facility] at [address] [place] [address]) or [phone] ([facility] at [address] [place] [address]).    Recommendations preliminary until signed by attending.     Juan J Mukherjee MD  Gastroenterology/Hepatology Fellow  1st option: [phone] (text or call), ONLY available from 7:00 am to 5:00 pm.   **Contact on-call GI fellow via answering service ([phone]) from 5pm-7am AND on weekends/holidays**  2nd option: Available via Microsoft Teams  3rd option: Pager: [phone]                   52F Hx ESRD s/p HepC DDRT, chronic SVC syndrome s/p L cephalic-iliac vein bypass on DAPT, s/p thrombectomy of L iliac vein and graft 2012, Hx BK viremia, HTN presents as transfer from [facility] ED for hematemesis.    #Renal transplant  #chronic SVC syndrome on DAPT    #Hematemesis  s/p 3 unit of prbc on May 30th s/p EGD with EV grade 2, PUD in the antrum (clean based ulcer and one Chidi Class II C. Hb downtrending after procedure but no further overt GI bleeding noticed. Patient has required 1 unit of blood on 6/1/22. hb of 6.6 on 6//6/22 but repeat 8 (with no blood transfusion, most likely dilutional).     #EV grade 2: downhill varices can be seen in setting of SVC syndrome vs ?c/f cirrhosis (no other signs of portal HTN seen)  US with normal appearing liver. CT with IV contrast with normal liver morphology and patent PV and HV. Fibroscan with no concern for cirrhosis.   Hep B and C negative. KAYLA, antismooth, anti-mitochondrial negative.     Recommendations:  -trend CBC transfuse Hb < 7.   -c/w PPI BID  -No CI for DAPT therapy  -daily CBC, CMP and INR  -Unclear etiology of EV, could consider repeat EGD as outpatient.   -Please provide patient with Hepatology Clinic number to arrange appointment once ready to be discharged; [phone] ([facility] [place]) or [phone] ([facility] at [address] [place] [address]) or [phone] ([facility] at [address] [place] [address]).    No further work up from our perspective.     Recommendations preliminary until signed by attending.     Juan J Mukherjee MD  Gastroenterology/Hepatology Fellow  1st option: [phone] (text or call), ONLY available from 7:00 am to 5:00 pm.   **Contact on-call GI fellow via answering service ([phone]) from 5pm-7am AND on weekends/holidays**  2nd option: Available via Microsoft Teams  3rd option: Pager: [phone]                   52F Hx ESRD s/p HepC DDRT, chronic SVC syndrome s/p L cephalic-iliac vein bypass on DAPT, s/p thrombectomy of L iliac vein and graft 2012, Hx BK viremia, HTN presents as transfer from [facility] ED for hematemesis.    #Renal transplant  #chronic SVC syndrome on DAPT    #Hematemesis  s/p 3 unit of prbc on May 30th s/p EGD with EV grade 2, PUD in the antrum (clean based ulcer and one Chidi Class II C. Hb downtrending after procedure but no further overt GI bleeding noticed. Patient has required 1 unit of blood on 6/1/22. hb of 6.6 on 6//6/22 but repeat 8 (with no blood transfusion, most likely dilutional).     #EV grade 2: downhill varices can be seen in setting of SVC syndrome vs ?c/f cirrhosis (no other signs of portal HTN seen)  US with normal appearing liver. CT with IV contrast with normal liver morphology and patent PV and HV. Fibroscan with no concern for cirrhosis.   Hep B and C negative. KAYLA, antismooth, anti-mitochondrial negative.     Recommendations:  -trend CBC transfuse Hb < 7.   -c/w PPI BID  -No CI for DAPT therapy  -daily CBC, CMP and INR  -Unclear etiology of EV, could consider repeat EGD as outpatient.   -Please provide patient with Hepatology Clinic number to arrange appointment once ready to be discharged; [phone] ([facility] at [address] [place] [address]) or [phone] ([facility] at [address] [facility]) or [phone] ([facility] [place])     No further work up from our perspective.     Recommendations preliminary until signed by attending.     Juan J Mukherjee MD  Gastroenterology/Hepatology Fellow  1st option: [phone] (text or call), ONLY available from 7:00 am to 5:00 pm.   **Contact on-call GI fellow via answering service ([phone]) from 5pm-7am AND on weekends/holidays**  2nd option: Available via Microsoft Teams  3rd option: Pager: [phone]

## 2022-06-06 NOTE — PROGRESS NOTE ADULT - SUBJECTIVE AND OBJECTIVE BOX
24h Events:   - Overnight, no acute events    Subjective:   Patient examined at bedside this AM. Reports she still feels swollen and congested    Objective:  Vital Signs  T(C): 36.6 (06-06 @ 08:24), Max: 37.6 (06-05 @ 12:54)  HR: 85 (06-06 @ 08:24) (85 - 92)  BP: 136/82 (06-06 @ 08:24) (111/61 - 155/82)  RR: 18 (06-06 @ 08:24) (18 - 18)  SpO2: 100% (06-06 @ 08:24) (95% - 100%)      Physical Exam:  HEENT: Marked edema throughout face, atraumatic, no scleral icterus  CV: RRR, appears well perfused  Pulm: B/L chest rise, satting well on rm air  Chest wall: LEFT chest wall with palpable ringed bypass graft along anterior axillary line  Abd: Soft, ND, NT  Ext: LUE edematous throughout entire extremity to fingers    Labs:                        6.6    8.75  )-----------( 171      ( 06 Jun 2022 06:41 )             21.4   06-04    137  |  104  |  18  ----------------------------<  125<H>  3.9   |  23  |  1.09    Ca    9.8      04 Jun 2022 16:21      CAPILLARY BLOOD GLUCOSE          Medications:   MEDICATIONS  (STANDING):  chlorhexidine 2% Cloths 1 Application(s) Topical daily  pantoprazole  Injectable 40 milliGRAM(s) IV Push two times a day  predniSONE   Tablet 5 milliGRAM(s) Oral daily  tacrolimus ER Tablet (ENVARSUS XR) 8 milliGRAM(s) Oral daily    MEDICATIONS  (PRN):  acetaminophen     Tablet .. 650 milliGRAM(s) Oral every 6 hours PRN Mild Pain (1 - 3), Moderate Pain (4 - 6), Severe Pain (7 - 10)  sodium chloride 0.65% Nasal 1 Spray(s) Both Nostrils five times a day PRN Nasal Congestion/pt comfort      Assessment:   52F w/ ESRD with LUE Brachiocephalic AVF now s/p HepC DDRT, chronic SVC syndrome s/p L axillary-Femoral vein bypass presenting with clinical exam concerning for SVC syndrome.     Recommend   PATIENT NEEDS CT VENOGRAM OF THE CHEST TO ASSESS HER CENTRAL VENOUS SYSTEM AS ANGIOGRAM IS TIMED FOR ARTERIAL PHASE NOT VENOUS  Duplex of LUE to eval AVF  Vascular Will follow for results  Discussed with Senior Vascular Fellow as well as Transplant Nephrology Fellow    Vascular Surgery  [id number]

## 2022-06-06 NOTE — PROGRESS NOTE ADULT - SUBJECTIVE AND OBJECTIVE BOX
[facility] DIVISION OF KIDNEY DISEASES AND HYPERTENSION -- FOLLOW UP NOTE  --------------------------------------------------------------------------------  24 hour events/subjective: Patient seen & examined. Vitals/ labs/ medications reviewed. Pt. extubated and downgraded to floors. Now tolerating PO intake. Pt. seen this AM. Unable to have labs drawn the last 2 days because of no IV access. Pt. endorses improvement of facial swelling and LUE swelling. Denies any other complaints.         PAST HISTORY  --------------------------------------------------------------------------------  No significant changes to PMH, PSH, FHx, SHx, unless otherwise noted    ALLERGIES & MEDICATIONS  --------------------------------------------------------------------------------  Allergies    contrast media (iodine-based) (Urticaria)  ibuprofen (Hives)  ibuprofen/morphine (Unknown)  lactose (Hives)  latex (Swelling)  morphine (Anaphylaxis)  morphine (Urticaria)  penicillin (Anaphylaxis)  shellfish (Urticaria)    Intolerances      Standing Inpatient Medications  chlorhexidine 2% Cloths 1 Application(s) Topical daily  pantoprazole  Injectable 40 milliGRAM(s) IV Push two times a day  predniSONE   Tablet 5 milliGRAM(s) Oral daily  tacrolimus ER Tablet (ENVARSUS XR) 8 milliGRAM(s) Oral daily    PRN Inpatient Medications  acetaminophen     Tablet .. 650 milliGRAM(s) Oral every 6 hours PRN  sodium chloride 0.65% Nasal 1 Spray(s) Both Nostrils five times a day PRN      REVIEW OF SYSTEMS  --------------------------------------------------------------------------------  Gen: No fevers/chills  Respiratory: No dyspnea, cough,   CV: No chest pain, PND, orthopnea  GI: No abdominal pain, diarrhea, constipation, nausea, vomiting  Transplant: No pain  : No increased frequency, dysuria, hematuria   MSK: edema improving   Neuro: No dizziness/lightheadedness    All other systems were reviewed and are negative, except as noted.    VITALS/PHYSICAL EXAM  --------------------------------------------------------------------------------  T(C): 36.4 (06-06-22 @ 05:00), Max: 37.6 (06-05-22 @ 12:54)  HR: 87 (06-06-22 @ 05:00) (76 - 92)  BP: 152/83 (06-06-22 @ 05:00) (111/61 - 166/83)  RR: 18 (06-06-22 @ 05:00) (18 - 18)  SpO2: 98% (06-06-22 @ 05:00) (95% - 99%)  Wt(kg): --        06-05-22 @ 07:01  -  06-06-22 @ 07:00  --------------------------------------------------------  IN: 820 mL / OUT: 0 mL / NET: 820 mL      Physical Exam:  	Gen: NAD, able to speak in full sentences   	HEENT: PERRL, MMM, facial edema improving  	Pulm: decreased breath sounds   	CV: RRR, S1S2+  	Abd: +BS, soft          Transplant: No tenderness, swelling  	: No suprapubic tenderness  	MSK: LUE edema improving, no LE edema   	Psych: Normal affect and mood  	Skin: Warm          Access: peripheral     LABS/STUDIES  --------------------------------------------------------------------------------              6.6    8.75  >-----------<  171      [06-06-22 @ 06:41]              21.4     137  |  104  |  18  ----------------------------<  125      [06-04-22 @ 16:21]  3.9   |  23  |  1.09        Ca     9.8     [06-04-22 @ 16:21]    Creatinine Trend:  SCr 1.09 [06-04 @ 16:21]  SCr 1.12 [06-02 @ 09:41]  SCr 1.78 [06-01 @ 11:15]  SCr 1.15 [05-31 @ 01:18]  SCr 1.00 [05-30 @ 06:59]    Tacrolimus (), Serum: 5.3 ng/mL (06-04 @ 16:21)  Tacrolimus (), Serum: 3.4 ng/mL (06-01 @ 11:15)  Tacrolimus (), Serum: 15.8 ng/mL (05-31 @ 05:57)        Urinalysis - [05-30-22 @ 09:08]      Color Light Yellow / Appearance Clear / SG 1.026 / pH 6.5      Gluc Negative / Ketone Trace  / Bili Negative / Urobili Negative       Blood Negative / Protein Negative / Leuk Est Moderate / Nitrite Negative      RBC 1 / WBC 2 / Hyaline 0 / Gran  / Sq Epi  / Non Sq Epi 2 / Bacteria Negative        HBsAb Reactive      [05-31-22 @ 14:03]  HBsAg Nonreact      [05-31-22 @ 14:03]  HBcAb Reactive      [05-31-22 @ 14:03]  HCV 0.06, Nonreact      [05-31-22 @ 14:03]    KAYLA: titer Negative, pattern --      [05-31-22 @ 14:03]  Free Light Chains: kappa 1.63, lambda 1.09, ratio = 1.50      [05-31 @ 14:03]     [facility] DIVISION OF KIDNEY DISEASES AND HYPERTENSION -- FOLLOW UP NOTE  --------------------------------------------------------------------------------  24 hour events/subjective: Patient seen & examined. Vitals/ labs/ medications reviewed. Pt. extubated and downgraded to floors. Now tolerating PO intake. Pt. seen this AM. Unable to have labs drawn the last 2 days because of no IV access. Pt. endorses improvement of facial swelling and LUE swelling. Some dizziness. Denies any other complaints.         PAST HISTORY  --------------------------------------------------------------------------------  No significant changes to PMH, PSH, FHx, SHx, unless otherwise noted    ALLERGIES & MEDICATIONS  --------------------------------------------------------------------------------  Allergies    contrast media (iodine-based) (Urticaria)  ibuprofen (Hives)  ibuprofen/morphine (Unknown)  lactose (Hives)  latex (Swelling)  morphine (Anaphylaxis)  morphine (Urticaria)  penicillin (Anaphylaxis)  shellfish (Urticaria)    Intolerances      Standing Inpatient Medications  chlorhexidine 2% Cloths 1 Application(s) Topical daily  pantoprazole  Injectable 40 milliGRAM(s) IV Push two times a day  predniSONE   Tablet 5 milliGRAM(s) Oral daily  tacrolimus ER Tablet (ENVARSUS XR) 8 milliGRAM(s) Oral daily    PRN Inpatient Medications  acetaminophen     Tablet .. 650 milliGRAM(s) Oral every 6 hours PRN  sodium chloride 0.65% Nasal 1 Spray(s) Both Nostrils five times a day PRN      REVIEW OF SYSTEMS  --------------------------------------------------------------------------------  Gen: No fevers/chills  Respiratory: No dyspnea, cough,   CV: No chest pain, PND, orthopnea  GI: No abdominal pain, diarrhea, constipation, nausea, vomiting  Transplant: No pain  : No increased frequency, dysuria, hematuria   MSK: edema improving   Neuro: No dizziness/lightheadedness    All other systems were reviewed and are negative, except as noted.    VITALS/PHYSICAL EXAM  --------------------------------------------------------------------------------  T(C): 36.4 (06-06-22 @ 05:00), Max: 37.6 (06-05-22 @ 12:54)  HR: 87 (06-06-22 @ 05:00) (76 - 92)  BP: 152/83 (06-06-22 @ 05:00) (111/61 - 166/83)  RR: 18 (06-06-22 @ 05:00) (18 - 18)  SpO2: 98% (06-06-22 @ 05:00) (95% - 99%)  Wt(kg): --        06-05-22 @ 07:01  -  06-06-22 @ 07:00  --------------------------------------------------------  IN: 820 mL / OUT: 0 mL / NET: 820 mL      Physical Exam:  	Gen: NAD, able to speak in full sentences   	HEENT: PERRL, MMM, facial edema improving  	Pulm: decreased breath sounds   	CV: RRR, S1S2+  	Abd: +BS, soft          Transplant: No tenderness, swelling  	: No suprapubic tenderness  	MSK: LUE edema improving, no LE edema   	Psych: Normal affect and mood  	Skin: Warm          Access: peripheral     LABS/STUDIES  --------------------------------------------------------------------------------              6.6    8.75  >-----------<  171      [06-06-22 @ 06:41]              21.4     137  |  104  |  18  ----------------------------<  125      [06-04-22 @ 16:21]  3.9   |  23  |  1.09        Ca     9.8     [06-04-22 @ 16:21]    Creatinine Trend:  SCr 1.09 [06-04 @ 16:21]  SCr 1.12 [06-02 @ 09:41]  SCr 1.78 [06-01 @ 11:15]  SCr 1.15 [05-31 @ 01:18]  SCr 1.00 [05-30 @ 06:59]    Tacrolimus (), Serum: 5.3 ng/mL (06-04 @ 16:21)  Tacrolimus (), Serum: 3.4 ng/mL (06-01 @ 11:15)  Tacrolimus (), Serum: 15.8 ng/mL (05-31 @ 05:57)        Urinalysis - [05-30-22 @ 09:08]      Color Light Yellow / Appearance Clear / SG 1.026 / pH 6.5      Gluc Negative / Ketone Trace  / Bili Negative / Urobili Negative       Blood Negative / Protein Negative / Leuk Est Moderate / Nitrite Negative      RBC 1 / WBC 2 / Hyaline 0 / Gran  / Sq Epi  / Non Sq Epi 2 / Bacteria Negative        HBsAb Reactive      [05-31-22 @ 14:03]  HBsAg Nonreact      [05-31-22 @ 14:03]  HBcAb Reactive      [05-31-22 @ 14:03]  HCV 0.06, Nonreact      [05-31-22 @ 14:03]    KAYLA: titer Negative, pattern --      [05-31-22 @ 14:03]  Free Light Chains: kappa 1.63, lambda 1.09, ratio = 1.50      [05-31 @ 14:03]

## 2022-06-06 NOTE — PROGRESS NOTE ADULT - ATTENDING COMMENTS
GI following for hematemesis, s/p EGD w/ EV and PUD.   Currently w/o overt signs of GI bleeding.   Hgb this AM 6.6, repeat today was ~8 w/o any prbc transfusion. AM lab likely error.     continue PPI BID   monitor bms   trend h/h   repeat EGD as outpatient   Will need hepatology follow up as outpatient  agree w vascular w/u   rest of care per primary team     GI to sign off, please call with questions

## 2022-06-06 NOTE — PROGRESS NOTE ADULT - ASSESSMENT
52F w/ ESRD s/p HepC DDRT, chronic SVC syndrome s/p L cephalic-iliac vein bypass on DAPT, s/p thrombectomy of L iliac vein and graft 2012, Hx BK viremia, HTN presents as transfer from [facility] ED for hematemesis 2/2 acute upper GI hemorrhage admitted to MICU for elective intubation and urgent endoscopy with GI s/p endoscopy with evidence of non-bleeding ulcers & esophageal varices

## 2022-06-06 NOTE — CONSULT NOTE ADULT - SUBJECTIVE AND OBJECTIVE BOX
Reason for consult:    HPI:  52F w/ ESRD s/p HepC DDRT, chronic SVC syndrome s/p L cephalic-iliac vein bypass on DAPT, s/p thrombectomy of L iliac vein and graft 2012, Hx BK viremia, HTN presents as transfer from [facility] ED for hematemesis. The patient reports that on day of presentation she suddenly began have large volume bloody emesis 2-3 times and several episodes of black stool. She reported worsening of chronic LUQ abdominal pain, moderate severity, vague in character, no reported radiation, not worsened with eating, no clear intervention to improve. The patient denies history of stomach ulcer or NSAID use. Chronic abdominal pain reported for approximately 2mo but not as severe. The patient at [facility] received pantoprazole 80mg IVx1 and was transported via ambulance which during that time received 1uPRBC. The patient reports using aspirin and clopidogrel for unclear reason but per chart appears to be for chronic SVC syndrome.    Patient cannot recall all medications and therefore medrec per EMR outpatient data  - appears transplant medication titrated to envarsus XR 4mg x2, envarsus XR 1mg x2, Leflunomide 20mg x2 d, prednisone 5mg d (30 May 2022 06:02)    Hematology/Oncology consulted d/t patients history of erythrocytosis. Patient is under the care of Dr. Oliva of [facility].     Patient with history of CKD s/p a kidney transplant. Initially referred to hematology by MD Lake, a nephrologist, d/t abnormal lab results. Blood work performed at the outside facility indicated that her Hgb was 17.7.  Repeat blood work done in-office found that patient's Hgb dropped to16.7.   DAMIR­2 Negative. Has high calcium with high PTH and related to renal issues as per Dr Lake,      PAST MEDICAL & SURGICAL HISTORY:  HTN - Hypertension      Clotted Renal Dialysis AV Graft      Infection of AV Graft for Dialysis      Fibroid Tumor      CKD (chronic kidney disease) stage V requiring chronic dialysis      Chronic kidney disease, unspecified      History of Hysterectomy  for benign disease      AV fistula  B/L - failed      H/O extremity bypass graft  H/O RUE bypass and creation of right brachial graft      Kidney transplant recipient      H/O kidney transplant          FAMILY HISTORY:  No pertinent family history in first degree relatives        Alochol: Denied  Smoking: Nonsmoker  Drug Use: Denied  Marital Status:         Allergies    contrast media (iodine-based) (Urticaria)  ibuprofen (Hives)  ibuprofen/morphine (Unknown)  lactose (Hives)  latex (Swelling)  morphine (Anaphylaxis)  morphine (Urticaria)  penicillin (Anaphylaxis)  shellfish (Urticaria)    Intolerances        MEDICATIONS  (STANDING):  chlorhexidine 2% Cloths 1 Application(s) Topical daily  pantoprazole  Injectable 40 milliGRAM(s) IV Push two times a day  predniSONE   Tablet 5 milliGRAM(s) Oral daily  tacrolimus ER Tablet (ENVARSUS XR) 8 milliGRAM(s) Oral daily    MEDICATIONS  (PRN):  acetaminophen     Tablet .. 650 milliGRAM(s) Oral every 6 hours PRN Mild Pain (1 - 3), Moderate Pain (4 - 6), Severe Pain (7 - 10)  sodium chloride 0.65% Nasal 1 Spray(s) Both Nostrils five times a day PRN Nasal Congestion/pt comfort      ROS  No fever, sweats, chills  No epistaxis, HA, sore throat  No CP, SOB, cough, sputum  No n/v/d, abd pain, melena, hematochezia  No edema  No rash  No anxiety  No back pain, joint pain  No bleeding, bruising  No dysuria, hematuria    T(C): 36.6 (06-06-22 @ 08:24), Max: 37.6 (06-05-22 @ 12:54)  HR: 85 (06-06-22 @ 08:24) (85 - 92)  BP: 136/82 (06-06-22 @ 08:24) (111/61 - 155/82)  RR: 18 (06-06-22 @ 08:24) (18 - 18)  SpO2: 100% (06-06-22 @ 08:24) (95% - 100%)  Wt(kg): --    PE  NAD  Awake, alert  Anicteric, MMM  RRR  CTAB  Abd soft, NT, ND  No c/c/e  No rash grossly  FROM                          6.6    8.75  )-----------( 171      ( 06 Jun 2022 06:41 )             21.4       06-04    137  |  104  |  18  ----------------------------<  125<H>  3.9   |  23  |  1.09    Ca    9.8      04 Jun 2022 16:21    ACC: [id number] EXAM:  CT ABDOMEN AND PELVIS IC                          PROCEDURE DATE:  06/02/2022          INTERPRETATION:  CLINICAL INFORMATION: New onset hematemesis, status post   EGD with varices. Evaluate other causes of portal hypertension.    COMPARISON: CT abdomen pelvis 7/9/2020.    CONTRAST/COMPLICATIONS:  IV Contrast: Omnipaque 350  90 cc administered   0 cc discarded  Oral Contrast: NONE  Complications: None reported at time of study completion    PROCEDURE:  CT of the Abdomen and Pelvis was performed.  Sagittal and coronal reformats were performed.    FINDINGS:  LOWER CHEST: Trace bilateral pleural effusions.    LIVER: Within normal limits.  BILE DUCTS: Normal caliber.  GALLBLADDER: Within normal limits.  SPLEEN: Within normal limits.  PANCREAS: Within normal limits.  ADRENALS: Within normal limits.  KIDNEYS/URETERS: Atrophic native kidneys. Right lower quadrant transplant   kidney without hydronephrosis.    BLADDER: Within normal limits.  REPRODUCTIVE ORGANS: Hysterectomy.    BOWEL: No bowel obstruction. Appendix is normal.  PERITONEUM: No ascites.  VESSELS: Atherosclerotic changes. Partially visualized occluded   subcutaneous bypass graft in the left chest wall to the left external   iliac vein, unchanged. Patent hepatic and portal veins.  RETROPERITONEUM/LYMPH NODES: No lymphadenopathy.  ABDOMINAL WALL: Numerous venous collaterals in the left chest wall and   anterior abdomen.  BONES: Median sternotomy.    IMPRESSION:  No CT evidence of cirrhosis. Patent hepatic and portal veins.    Redemonstration of occluded left external iliac venous bypass graft with   numerous subcutaneous collaterals.    Trace bilateral pleural effusions.    --- End of Report ---         Reason for consult:    HPI:  52F w/ ESRD s/p HepC DDRT, chronic SVC syndrome s/p L cephalic-iliac vein bypass on DAPT, s/p thrombectomy of L iliac vein and graft 2012, Hx BK viremia, HTN presents as transfer from [facility] ED for hematemesis. The patient reports that on day of presentation she suddenly began have large volume bloody emesis 2-3 times and several episodes of black stool. She reported worsening of chronic LUQ abdominal pain, moderate severity, vague in character, no reported radiation, not worsened with eating, no clear intervention to improve. The patient denies history of stomach ulcer or NSAID use. Chronic abdominal pain reported for approximately 2mo but not as severe. The patient at [facility] received pantoprazole 80mg IVx1 and was transported via ambulance which during that time received 1uPRBC. The patient reports using aspirin and clopidogrel for unclear reason but per chart appears to be for chronic SVC syndrome.    Patient cannot recall all medications and therefore medrec per EMR outpatient data  - appears transplant medication titrated to envarsus XR 4mg x2, envarsus XR 1mg x2, Leflunomide 20mg x2 d, prednisone 5mg d (30 May 2022 06:02)    Hematology/Oncology consulted d/t patients history of erythrocytosis. Patient is under the care of Dr. Oliva of [facility].     Patient with history of CKD s/p a kidney transplant. Initially referred to hematology by MD Lake, a nephrologist, d/t abnormal lab results. Blood work performed at the outside facility indicated that her Hgb was 17.7.  Repeat blood work done in-office found that patient's Hgb dropped to16.7.   DAMIR­2 Negative. Has high calcium with high PTH and related to renal issues as per Dr Lake.    Patient seen and examined this afternoon    PAST MEDICAL & SURGICAL HISTORY:  HTN - Hypertension      Clotted Renal Dialysis AV Graft      Infection of AV Graft for Dialysis      Fibroid Tumor      CKD (chronic kidney disease) stage V requiring chronic dialysis      Chronic kidney disease, unspecified      History of Hysterectomy  for benign disease      AV fistula  B/L - failed      H/O extremity bypass graft  H/O RUE bypass and creation of right brachial graft      Kidney transplant recipient      H/O kidney transplant          FAMILY HISTORY:  No pertinent family history in first degree relatives        Alochol: Denied  Smoking: Nonsmoker  Drug Use: Denied  Marital Status:         Allergies    contrast media (iodine-based) (Urticaria)  ibuprofen (Hives)  ibuprofen/morphine (Unknown)  lactose (Hives)  latex (Swelling)  morphine (Anaphylaxis)  morphine (Urticaria)  penicillin (Anaphylaxis)  shellfish (Urticaria)    Intolerances        MEDICATIONS  (STANDING):  chlorhexidine 2% Cloths 1 Application(s) Topical daily  pantoprazole  Injectable 40 milliGRAM(s) IV Push two times a day  predniSONE   Tablet 5 milliGRAM(s) Oral daily  tacrolimus ER Tablet (ENVARSUS XR) 8 milliGRAM(s) Oral daily    MEDICATIONS  (PRN):  acetaminophen     Tablet .. 650 milliGRAM(s) Oral every 6 hours PRN Mild Pain (1 - 3), Moderate Pain (4 - 6), Severe Pain (7 - 10)  sodium chloride 0.65% Nasal 1 Spray(s) Both Nostrils five times a day PRN Nasal Congestion/pt comfort      ROS  No fever, sweats, chills  No epistaxis, HA, sore throat  No CP, SOB, cough, sputum  No n/v/d, abd pain, melena, hematochezia  No rash  No anxiety  No back pain, joint pain  No bleeding, bruising  No dysuria, hematuria    T(C): 36.6 (06-06-22 @ 08:24), Max: 37.6 (06-05-22 @ 12:54)  HR: 85 (06-06-22 @ 08:24) (85 - 92)  BP: 136/82 (06-06-22 @ 08:24) (111/61 - 155/82)  RR: 18 (06-06-22 @ 08:24) (18 - 18)  SpO2: 100% (06-06-22 @ 08:24) (95% - 100%)  Wt(kg): --    PE  NAD  Awake, alert  Anicteric, MMM  No c/c  No rash grossly                            6.6    8.75  )-----------( 171      ( 06 Jun 2022 06:41 )             21.4       06-04    137  |  104  |  18  ----------------------------<  125<H>  3.9   |  23  |  1.09    Ca    9.8      04 Jun 2022 16:21    ACC: [id number] EXAM:  CT ABDOMEN AND PELVIS IC                          PROCEDURE DATE:  06/02/2022          INTERPRETATION:  CLINICAL INFORMATION: New onset hematemesis, status post   EGD with varices. Evaluate other causes of portal hypertension.    COMPARISON: CT abdomen pelvis 7/9/2020.    CONTRAST/COMPLICATIONS:  IV Contrast: Omnipaque 350  90 cc administered   0 cc discarded  Oral Contrast: NONE  Complications: None reported at time of study completion    PROCEDURE:  CT of the Abdomen and Pelvis was performed.  Sagittal and coronal reformats were performed.    FINDINGS:  LOWER CHEST: Trace bilateral pleural effusions.    LIVER: Within normal limits.  BILE DUCTS: Normal caliber.  GALLBLADDER: Within normal limits.  SPLEEN: Within normal limits.  PANCREAS: Within normal limits.  ADRENALS: Within normal limits.  KIDNEYS/URETERS: Atrophic native kidneys. Right lower quadrant transplant   kidney without hydronephrosis.    BLADDER: Within normal limits.  REPRODUCTIVE ORGANS: Hysterectomy.    BOWEL: No bowel obstruction. Appendix is normal.  PERITONEUM: No ascites.  VESSELS: Atherosclerotic changes. Partially visualized occluded   subcutaneous bypass graft in the left chest wall to the left external   iliac vein, unchanged. Patent hepatic and portal veins.  RETROPERITONEUM/LYMPH NODES: No lymphadenopathy.  ABDOMINAL WALL: Numerous venous collaterals in the left chest wall and   anterior abdomen.  BONES: Median sternotomy.    IMPRESSION:  No CT evidence of cirrhosis. Patent hepatic and portal veins.    Redemonstration of occluded left external iliac venous bypass graft with   numerous subcutaneous collaterals.    Trace bilateral pleural effusions.    --- End of Report ---

## 2022-06-06 NOTE — PROGRESS NOTE ADULT - NSPROGADDITIONALINFOA_GEN_ALL_CORE
above plans discussed with GIUSEPPE Poole MD  Division of Hospital Medicine  Contact via [employer] Teams  Office: [phone]

## 2022-06-06 NOTE — CONSULT NOTE ADULT - ASSESSMENT
HPI:  52F w/ ESRD s/p HepC DDRT, chronic SVC syndrome s/p L cephalic-iliac vein bypass on DAPT, s/p thrombectomy of L iliac vein and graft 2012, Hx BK viremia, HTN presents as transfer from [facility] ED for hematemesis. The patient reports that on day of presentation she suddenly began have large volume bloody emesis 2-3 times and several episodes of black stool. She reported worsening of chronic LUQ abdominal pain, moderate severity, vague in character, no reported radiation, not worsened with eating, no clear intervention to improve. The patient denies history of stomach ulcer or NSAID use. Chronic abdominal pain reported for approximately 2mo but not as severe. The patient at [facility] received pantoprazole 80mg IVx1 and was transported via ambulance which during that time received 1uPRBC. The patient reports using aspirin and clopidogrel for unclear reason but per chart appears to be for chronic SVC syndrome.    Patient cannot recall all medications and therefore medrec per EMR outpatient data  - appears transplant medication titrated to envarsus XR 4mg x2, envarsus XR 1mg x2, Leflunomide 20mg x2 d, prednisone 5mg d (30 May 2022 06:02)    Hematology/Oncology consulted d/t patients history of erythrocytosis. Patient is under the care of Dr. Olvia of [facility].     Patient with history of CKD s/p a kidney transplant. Initially referred to hematology by MD Lake, a nephrologist, d/t abnormal lab results. Blood work performed at the outside facility indicated that her Hgb was 17.7.  Repeat blood work done in-office found that patient's Hgb dropped to16.7.   DAMIR­2 Negative. Has high calcium with high PTH and related to renal issues as per Dr Lake,      Erythrocytosis  -- longstanding history  -- Jak2 negative  -- while outpatient was phlebotomized to keep Hgb <14  -- will follow up with Dr Oliva of [facility] after discharge    Anemia due to acute blood loss   -- UGI bleed, esophageal varices, esophagitis & gastric ulcers  -- Esophageal varices likely 2/2 central vein occlusion as seen on prior imaging GI bleed  --continue with ppi BID  --will check: folate, ferritin, LDH, B12, and iron %sat  --will check: haptoglobin, LDH  --GI already consulted  --CT C/A/P done 6/2: negative for cirrhosis  --please transfuse for HGB <7    Thank you for the opportunity to participate in Mrs Jacobo's care.    Joycelyn Delcid NP  Hematology/ Oncology  [facility]  [phone] (office)  [phone] (alt office)  Evenings and weekends please call MD on call or office   HPI:  52F w/ ESRD s/p HepC DDRT, chronic SVC syndrome s/p L cephalic-iliac vein bypass on DAPT, s/p thrombectomy of L iliac vein and graft 2012, Hx BK viremia, HTN presents as transfer from [facility] ED for hematemesis. The patient reports that on day of presentation she suddenly began have large volume bloody emesis 2-3 times and several episodes of black stool. She reported worsening of chronic LUQ abdominal pain, moderate severity, vague in character, no reported radiation, not worsened with eating, no clear intervention to improve. The patient denies history of stomach ulcer or NSAID use. Chronic abdominal pain reported for approximately 2mo but not as severe. The patient at [facility] received pantoprazole 80mg IVx1 and was transported via ambulance which during that time received 1uPRBC. The patient reports using aspirin and clopidogrel for unclear reason but per chart appears to be for chronic SVC syndrome.    Patient cannot recall all medications and therefore medrec per EMR outpatient data  - appears transplant medication titrated to envarsus XR 4mg x2, envarsus XR 1mg x2, Leflunomide 20mg x2 d, prednisone 5mg d (30 May 2022 06:02)    Hematology/Oncology consulted d/t patients history of erythrocytosis. Patient is under the care of Dr. Oliva of [facility].     Patient with history of CKD s/p a kidney transplant. Initially referred to hematology by MD Lake, a nephrologist, d/t abnormal lab results. Blood work performed at the outside facility indicated that her Hgb was 17.7.  Repeat blood work done in-office found that patient's Hgb dropped to16.7.   DAMIR­2 Negative. Has high calcium with high PTH and related to renal issues as per Dr Lake,      Erythrocytosis  -- longstanding history  -- Jak2 negative  -- while outpatient was phlebotomized to keep Hgb <14  -- will follow up with Dr Oliva of [facility] after discharge    Anemia due to acute blood loss   -- UGI bleed, esophageal varices, esophagitis & gastric ulcers  -- Esophageal varices likely 2/2 central vein occlusion as seen on prior imaging  --continue with ppi BID  --will check: folate, ferritin, LDH, B12, and iron %sat  --will check: haptoglobin, LDH  --GI already consulted  --CT C/A/P done 6/2: negative for cirrhosis  --please transfuse for HGB <7    Thank you for the opportunity to participate in Mrs Jacobo's care.    Joycelyn Delcid NP  Hematology/ Oncology  [facility]  [phone] (office)  [phone] (alt office)  Evenings and weekends please call MD on call or office

## 2022-06-06 NOTE — PROGRESS NOTE ADULT - SUBJECTIVE AND OBJECTIVE BOX
Interval Events:   Hb 6.6 this am, no overt bleeding reported.   Nurse and patient reporting brown/greenish [facility] Medications:  acetaminophen     Tablet .. 650 milliGRAM(s) Oral every 6 hours PRN  chlorhexidine 2% Cloths 1 Application(s) Topical daily  pantoprazole  Injectable 40 milliGRAM(s) IV Push two times a day  predniSONE   Tablet 5 milliGRAM(s) Oral daily  sodium chloride 0.65% Nasal 1 Spray(s) Both Nostrils five times a day PRN  tacrolimus ER Tablet (ENVARSUS XR) 8 milliGRAM(s) Oral daily      ROS: All system reviewed and negative except as mentioned above.    PHYSICAL EXAM:   Vital Signs:  Vital Signs Last 24 Hrs  T(C): 36.4 (06 Jun 2022 05:00), Max: 37.6 (05 Jun 2022 12:54)  T(F): 97.6 (06 Jun 2022 05:00), Max: 99.7 (05 Jun 2022 12:54)  HR: 87 (06 Jun 2022 05:00) (76 - 92)  BP: 152/83 (06 Jun 2022 05:00) (111/61 - 166/83)  BP(mean): --  RR: 18 (06 Jun 2022 05:00) (18 - 18)  SpO2: 98% (06 Jun 2022 05:00) (95% - 99%)  Daily     Daily     GENERAL:  NAD, Appears stated age  HEENT:  NC/AT,  conjunctivae clear and pink, sclera -anicteric  CHEST:  Normal Effort, Breath sounds clear  HEART:  RRR, S1 + S2, no murmurs  ABDOMEN:  Soft, non-tender, non-distended, normoactive bowel sounds,  no masses  EXTREMITIES:  no cyanosis or edema  SKIN:  Warm & Dry. No rash or erythema  NEURO:  Alert, oriented, no focal deficit    LABS:                        6.6    8.75  )-----------( 171      ( 06 Jun 2022 06:41 )             21.4     Mean Cell Volume: 91.5 fl (06-06-22 @ 06:41)    06-04    137  |  104  |  18  ----------------------------<  125<H>  3.9   |  23  |  1.09    Ca    9.8      04 Jun 2022 16:21                                    6.6    8.75  )-----------( 171      ( 06 Jun 2022 06:41 )             21.4                         7.4    6.52  )-----------( 116      ( 05 Jun 2022 06:42 )             24.6                         7.1    8.17  )-----------( 130      ( 04 Jun 2022 16:21 )             22.1       Imaging: Images reviewed.

## 2022-06-06 NOTE — PROGRESS NOTE ADULT - PROBLEM SELECTOR PLAN 1
s/p EGD with esophageal varices and nonbleeding gastric ulcers   -hemoglobin dropped this morning but no overt sign of bleeding, pt reports brown stool  -repeat cbc  -CTAP without evidence of cirrhosis and patent portal vein  -GI eval appreciated: continue with IV PPI and outpatient hepatology followup; at this time, no plan for EGD  -cont with ceftriaxone 7 days for prophylaxis   -cont to monitor CBC q24 hrs  -Would cont to hold DAPT for now given lower h/h.

## 2022-06-06 NOTE — PROGRESS NOTE ADULT - PROBLEM SELECTOR PLAN 2
Hx BK viremia= resueme leflunomide. Last BK PCR as outpatient was in 2021. Repeat BK PCR  Continue with prednisone 5 mg  Continue envarsus 8mg Daily     Check [place] level Daily 30min before AM dose. Goal 3-6  Last level 5.3 Hx BK viremia, f/u PCR to assesses if we should resume leflunomide. Last BK PCR as outpatient was in 2021. Repeat BK PCR  Continue with prednisone 5 mg  Continue envarsus 8mg Daily     Check [place] level Daily 30min before AM dose. Goal 3-6  Last level 5.3

## 2022-06-06 NOTE — PROGRESS NOTE ADULT - PROBLEM SELECTOR PLAN 2
h/o bypass surgeries on DAPT (asa and plavix) at home   -continue to hold DAPT for now  -vascular consult appreciated, MR venogram as nephrology would prefer avoiding IV contrast agent  -bilateral UE duplex

## 2022-06-06 NOTE — PROGRESS NOTE ADULT - SUBJECTIVE AND OBJECTIVE BOX
Patient is a 52y old  Female who presents with a chief complaint of 52F transferred from [facility] for hematemesis (06 Jun 2022 09:54)      SUBJECTIVE / OVERNIGHT EVENTS:  no acute events overnight, vss, afebrile  pt reports that her stool is now brown color  pt agrees with further imaging to evaluate the svc syndrome as she is worried about re-bleed if started back on asa/plavix  still feels swollen    ROS:  14 point ROS negative in detail except stated as above    MEDICATIONS  (STANDING):  chlorhexidine 2% Cloths 1 Application(s) Topical daily  pantoprazole  Injectable 40 milliGRAM(s) IV Push two times a day  predniSONE   Tablet 5 milliGRAM(s) Oral daily  tacrolimus ER Tablet (ENVARSUS XR) 8 milliGRAM(s) Oral daily    MEDICATIONS  (PRN):  acetaminophen     Tablet .. 650 milliGRAM(s) Oral every 6 hours PRN Mild Pain (1 - 3), Moderate Pain (4 - 6), Severe Pain (7 - 10)  sodium chloride 0.65% Nasal 1 Spray(s) Both Nostrils five times a day PRN Nasal Congestion/pt comfort      CAPILLARY BLOOD GLUCOSE        I&O's Summary    05 Jun 2022 07:01  -  06 Jun 2022 07:00  --------------------------------------------------------  IN: 820 mL / OUT: 0 mL / NET: 820 mL        PHYSICAL EXAM:  Vital Signs Last 24 Hrs  T(C): 36.6 (06 Jun 2022 08:24), Max: 37.6 (05 Jun 2022 12:54)  T(F): 97.9 (06 Jun 2022 08:24), Max: 99.7 (05 Jun 2022 12:54)  HR: 85 (06 Jun 2022 08:24) (85 - 92)  BP: 136/82 (06 Jun 2022 08:24) (111/61 - 155/82)  BP(mean): --  RR: 18 (06 Jun 2022 08:24) (18 - 18)  SpO2: 100% (06 Jun 2022 08:24) (95% - 100%)    GENERAL: NAD, well-developed  HEAD:  Atraumatic, Normocephalic  EYES: EOMI, PERRLA, conjunctiva and sclera clear  NECK: Supple, No JVD  CHEST/LUNG: Clear to auscultation bilaterally; No wheeze  HEART: Regular rate and rhythm; No murmurs, rubs, or gallops  ABDOMEN: Soft, Nontender, Nondistended; Bowel sounds present  EXTREMITIES:  2+ Peripheral Pulses, No clubbing, cyanosis, or edema  NEUROLOGY: AAOx3; non-focal  SKIN: No rashes or lesions    LABS:  personally reviewed                        6.6    8.75  )-----------( 171      ( 06 Jun 2022 06:41 )             21.4     06-04    137  |  104  |  18  ----------------------------<  125<H>  3.9   |  23  |  1.09    Ca    9.8      04 Jun 2022 16:21                RADIOLOGY & ADDITIONAL TESTS:    Imaging Personally Reviewed:    Consultant(s) Notes Reviewed:  nephrology, vascular, GI    Care Discussed with Consultants/Other Providers: Dr. Monroy (nephro)

## 2022-06-06 NOTE — PROGRESS NOTE ADULT - PROBLEM SELECTOR PLAN 3
UGI bleed , esophageal varices, esophagitis & gastric ulcers  Esophageal varices likely 2/2 central vein occlusion as seen on prior imaging  Continue PPI per GI  Transfusion as per primary team.     If you have any questions, please feel free to contact me  Marquez Merida  Nephrology Fellow  [phone]; Prefer [employer] TEAMS  (After 5pm or on weekends please page the on-call fellow)

## 2022-06-06 NOTE — CONSULT NOTE ADULT - REASON FOR ADMISSION
52F transferred from [facility] for hematemesis
52F transferred from [facility] for hematemesis
52F transferred from [facility] for hematemesis
52F transferred from [facility] for hematemesis

## 2022-06-06 NOTE — CHART NOTE - NSCHARTNOTEFT_GEN_A_CORE
Informed by radiology of the following LUE doppler results:    IMPRESSION:    The left subclavian and axillary veins are thrombosed.    The left basilic and cephalic veins are thrombosed    Cystic nodules are identified in the left lobe of the thyroid gland.      Spoke with Vascular team regarding potential plan for starting anticoagulation given above LUE doppler results. Per vascular, pt has chronic occlusion of central venous system. Their team would like to obtain MR venogram to further delineate central venous system. Spoke with radiologist per their recommendation to determine if above thrombosis is chronic or acute. Night radiologist stated that it looks chronic and would f/u in AM with MR venogram to fully confirm. Will hold off on AC at this time per vascular recommendations and f/u with MR study.    Discussed with Dr. Poole.    Andreia Rodriges PA-C  [facility]  [id number]

## 2022-06-07 PROCEDURE — 99233 SBSQ HOSP IP/OBS HIGH 50: CPT

## 2022-06-07 RX ORDER — DIPHENHYDRAMINE HCL 50 MG
25 CAPSULE ORAL ONCE
Refills: 0 | Status: COMPLETED | OUTPATIENT
Start: 2022-06-07 | End: 2022-06-07

## 2022-06-07 RX ORDER — ACETAMINOPHEN 500 MG
1000 TABLET ORAL ONCE
Refills: 0 | Status: DISCONTINUED | OUTPATIENT
Start: 2022-06-07 | End: 2022-06-07

## 2022-06-07 RX ORDER — ASPIRIN/CALCIUM CARB/MAGNESIUM 324 MG
81 TABLET ORAL DAILY
Refills: 0 | Status: DISCONTINUED | OUTPATIENT
Start: 2022-06-08 | End: 2022-06-10

## 2022-06-07 RX ORDER — CLOPIDOGREL BISULFATE 75 MG/1
75 TABLET, FILM COATED ORAL DAILY
Refills: 0 | Status: DISCONTINUED | OUTPATIENT
Start: 2022-06-08 | End: 2022-06-10

## 2022-06-07 RX ADMIN — Medication 650 MILLIGRAM(S): at 00:40

## 2022-06-07 RX ADMIN — PANTOPRAZOLE SODIUM 40 MILLIGRAM(S): 20 TABLET, DELAYED RELEASE ORAL at 18:07

## 2022-06-07 RX ADMIN — TACROLIMUS 8 MILLIGRAM(S): 5 CAPSULE ORAL at 11:32

## 2022-06-07 RX ADMIN — Medication 25 MILLIGRAM(S): at 02:25

## 2022-06-07 RX ADMIN — CHLORHEXIDINE GLUCONATE 1 APPLICATION(S): 213 SOLUTION TOPICAL at 12:12

## 2022-06-07 RX ADMIN — PANTOPRAZOLE SODIUM 40 MILLIGRAM(S): 20 TABLET, DELAYED RELEASE ORAL at 06:58

## 2022-06-07 RX ADMIN — Medication 5 MILLIGRAM(S): at 06:58

## 2022-06-07 NOTE — CHART NOTE - NSCHARTNOTEFT_GEN_A_CORE
pt with chronically occluded L arm access, no surgical intervention planned for SVC syndrome  consider IR eval    vascular will sign off, please re call with questions  [phone]

## 2022-06-07 NOTE — PROCEDURE NOTE - NSINFORMCONSENT_GEN_A_CORE
For endoscopy/Benefits, risks, and possible complications of procedure explained to patient/caregiver who verbalized understanding and gave verbal consent.
Benefits, risks, and possible complications of procedure explained to patient/caregiver who verbalized understanding and gave written consent.

## 2022-06-07 NOTE — PROGRESS NOTE ADULT - ASSESSMENT
HPI:  52F w/ ESRD s/p HepC DDRT, chronic SVC syndrome s/p L cephalic-iliac vein bypass on DAPT, s/p thrombectomy of L iliac vein and graft 2012, Hx BK viremia, HTN presents as transfer from [facility] ED for hematemesis. The patient reports that on day of presentation she suddenly began have large volume bloody emesis 2-3 times and several episodes of black stool. She reported worsening of chronic LUQ abdominal pain, moderate severity, vague in character, no reported radiation, not worsened with eating, no clear intervention to improve. The patient denies history of stomach ulcer or NSAID use. Chronic abdominal pain reported for approximately 2mo but not as severe. The patient at [facility] received pantoprazole 80mg IVx1 and was transported via ambulance which during that time received 1uPRBC. The patient reports using aspirin and clopidogrel for unclear reason but per chart appears to be for chronic SVC syndrome.    Patient cannot recall all medications and therefore medrec per EMR outpatient data  - appears transplant medication titrated to envarsus XR 4mg x2, envarsus XR 1mg x2, Leflunomide 20mg x2 d, prednisone 5mg d (30 May 2022 06:02)    Hematology/Oncology consulted d/t patients history of erythrocytosis. Patient is under the care of Dr. Oliva of [facility].     Patient with history of CKD s/p a kidney transplant. Initially referred to hematology by MD Lake, a nephrologist, d/t abnormal lab results. Blood work performed at the outside facility indicated that her Hgb was 17.7.  Repeat blood work done in-office found that patient's Hgb dropped to16.7.   DAMIR­2 Negative. Has high calcium with high PTH and related to renal issues as per Dr Lake,      Erythrocytosis  -- longstanding history  -- Jak2 negative  -- while outpatient was phlebotomized to keep Hgb <14  -- will follow up with Dr Oliva of [facility] after discharge    Anemia due to acute blood loss   -- UGI bleed, esophageal varices, esophagitis & gastric ulcers  -- Esophageal varices likely 2/2 central vein occlusion as seen on prior imaging  --continue with ppi BID  --will check: folate, ferritin, LDH, B12, and iron %sat  --will check: haptoglobin, LDH  --GI already consulted  --CT C/A/P done 6/2: negative for cirrhosis  --please transfuse for HGB <7    SVC Syndrome  -- h/o bypass surgeries on DAPT (asa and plavix) at home   -- continue to hold DAPT for now  -- vascular consult appreciated, MR venogram as nephrology would prefer avoiding IV contrast agent  -- bilateral UE duplex, done 6/6.  IMPRESSION:  ·	The left subclavian and axillary veins are thrombosed.  ·	The left basilic and cephalic veins are thrombosed  ·	Cystic nodules are identified in the left lobe of the thyroid gland.  Impression:  ·	On the left there is a thrombosed brachial cephalic fistula.  ·	There is a thrombosed dialysis access graft.  ·	In the right upper extremity there are 2 dialysis access grafts; one thrombosed and 1 patent.The flow volume the patent graft measured 800 mL/m.      Thank you for the opportunity to participate in Mrs Jacobo's care.    Joycelyn Delcid NP  Hematology/ Oncology  [facility]  [phone] (office)  [phone] (alt office)  Evenings and weekends please call MD on call or office

## 2022-06-07 NOTE — PROGRESS NOTE ADULT - ASSESSMENT
52F w/ ESRD s/p HepC DDRT, chronic SVC syndrome s/p L cephalic-iliac vein bypass on DAPT, s/p thrombectomy of L iliac vein and graft 2012, Hx BK viremia, HTN presents as transfer from [facility] ED for hematemesis 2/2 acute upper GI hemorrhage admitted to MICU for elective intubation and urgent endoscopy with GI s/p endoscopy with evidence of non-bleeding ulcers & esophageal varices

## 2022-06-07 NOTE — PROGRESS NOTE ADULT - PROBLEM SELECTOR PLAN 2
h/o bypass surgeries on DAPT (asa and plavix) at home   -continue to hold DAPT for now  -MR venogram reads pending  -vascular consult appreciated: hold off on AC at this time  -LUE doppler with: The left subclavian and axillary veins are thrombosed. The left basilic and cephalic veins are thrombosed

## 2022-06-07 NOTE — PROGRESS NOTE ADULT - SUBJECTIVE AND OBJECTIVE BOX
Patient is a 52y old  Female who presents with a chief complaint of 52F transferred from [facility] for hematemesis (06 Jun 2022 10:53)      MEDICATIONS  (STANDING):  chlorhexidine 2% Cloths 1 Application(s) Topical daily  pantoprazole  Injectable 40 milliGRAM(s) IV Push two times a day  predniSONE   Tablet 5 milliGRAM(s) Oral daily  tacrolimus ER Tablet (ENVARSUS XR) 8 milliGRAM(s) Oral daily    MEDICATIONS  (PRN):  acetaminophen     Tablet .. 650 milliGRAM(s) Oral every 6 hours PRN Mild Pain (1 - 3), Moderate Pain (4 - 6), Severe Pain (7 - 10)  sodium chloride 0.65% Nasal 1 Spray(s) Both Nostrils five times a day PRN Nasal Congestion/pt comfort      ROS  No fever, sweats, chills  No epistaxis, HA, sore throat  No CP, SOB, cough, sputum  No n/v/d, abd pain, melena, hematochezia  No rash  No anxiety  No back pain, joint pain  No bleeding, bruising  No dysuria, hematuria    Vital Signs Last 24 Hrs  T(C): 36.6 (07 Jun 2022 08:23), Max: 37 (06 Jun 2022 17:00)  T(F): 97.8 (07 Jun 2022 08:23), Max: 98.6 (06 Jun 2022 17:00)  HR: 90 (07 Jun 2022 08:23) (85 - 92)  BP: 158/89 (07 Jun 2022 08:23) (106/64 - 158/89)  BP(mean): --  RR: 20 (07 Jun 2022 08:23) (18 - 20)  SpO2: 97% (07 Jun 2022 08:23) (95% - 98%)    PE  NAD  Awake, alert  Anicteric, MMM  No c/c  No rash grossly                            8.1    8.74  )-----------( 187      ( 06 Jun 2022 11:49 )             26.5       06-06    139  |  107  |  18  ----------------------------<  122<H>  4.2   |  23  |  1.04    Ca    10.3      06 Jun 2022 11:48    TPro  6.0  /  Alb  3.6  /  TBili  0.4  /  DBili  x   /  AST  13  /  ALT  15  /  AlkPhos  55  06-06    ACC: [id number] EXAM:  DUPLEX EXT VEINS UPPER LT                          PROCEDURE DATE:  06/06/2022          INTERPRETATION:  CLINICAL INFORMATION: End-stage renal disease,   functioning kidney transplant, left upper extremity swelling    COMPARISON: None available.    TECHNIQUE: Duplex sonography of the LEFT UPPER extremity veins with color   and spectral Doppler, with and without compression.    FINDINGS:    Cystic nodules are identified in the left lobe of the thyroid gland.    The left internal jugular vein is patent and free of thrombus.    The left subclavian vein is thrombosed.    The left axillary vein is thrombosed.    The left brachial veins are patent and free of clot.    The left basilic and cephalic veins, superficial veins, are thrombosed.    There is a thrombosed dialysis access graft in the left flank, its   communication with vascular structures not delineated.    IMPRESSION:    The left subclavian and axillary veins are thrombosed.    The left basilic and cephalic veins are thrombosed    Cystic nodules are identified in the left lobe of the thyroid gland.        ACC: [id number] EXAM:  DUPLEX HEMODIALYSIS ACCESS BI                          PROCEDURE DATE:  06/06/2022          INTERPRETATION:  History: End-stage renal disease, multiple dialysis   access fistulas and grafts. Currently functioning transplant kidney    On the left, there is a brachial cephalic dialysis access fistula with   the cephalic egress vein thrombosed.    There is a second left-sided dialysis access graft, its exact anatomy   unclear, also thrombosed.    On the right, there are 2 dialysis access grafts in the upper arm.    One graft is thrombosed.    The second graft, although not completely imaged is patent with a   measured flow volume of 800 mL/m.    Impression:    On the left there is a thrombosed brachial cephalic fistula.  There is a thrombosed dialysis access graft.    In the right upper extremity there are 2 dialysis access grafts; one   thrombosed and 1 patent.  The flow volume the patent graft measured 800 mL/m.

## 2022-06-07 NOTE — PROGRESS NOTE ADULT - PROBLEM SELECTOR PLAN 1
s/p EGD with esophageal varices and nonbleeding gastric ulcers   -H/H stable and no further episodes of hematemesis, melena  -CTAP without evidence of cirrhosis and patent portal vein  -GI eval appreciated: continue with IV PPI and outpatient hepatology followup; at this time, no plan for EGD  -cont with ceftriaxone 7 days for prophylaxis   -cont to monitor CBC q24 hrs  -Would cont to hold DAPT for now given lower h/h.

## 2022-06-07 NOTE — PROGRESS NOTE ADULT - NSPROGADDITIONALINFOA_GEN_ALL_CORE
above plans discussed with NP Keena Poole MD  Division of Hospital Medicine  Contact via [employer] Teams  Office: [phone]

## 2022-06-07 NOTE — CHART NOTE - NSCHARTNOTEFT_GEN_A_CORE
Patient with SVC syndrome. IR consulted for evaluation. Case discussed b/w Dr. Poole and Dr. Phillips, plan is for out patient telehealth visit to discuss treatment options as pt is currently asymptomatic and pending d/c.

## 2022-06-07 NOTE — PROGRESS NOTE ADULT - SUBJECTIVE AND OBJECTIVE BOX
Patient is a 52y old  Female who presents with a chief complaint of 52F transferred from [facility] for hematemesis (07 Jun 2022 11:09)      SUBJECTIVE / OVERNIGHT EVENTS:  no acute events overnight, vss, afebrile  no further episode of melena  s/p MRI venogram last night, awaiting for the reads      ROS:  14 point ROS negative in detail except stated as above    MEDICATIONS  (STANDING):  chlorhexidine 2% Cloths 1 Application(s) Topical daily  pantoprazole  Injectable 40 milliGRAM(s) IV Push two times a day  predniSONE   Tablet 5 milliGRAM(s) Oral daily  tacrolimus ER Tablet (ENVARSUS XR) 8 milliGRAM(s) Oral daily    MEDICATIONS  (PRN):  acetaminophen     Tablet .. 650 milliGRAM(s) Oral every 6 hours PRN Mild Pain (1 - 3), Moderate Pain (4 - 6), Severe Pain (7 - 10)  sodium chloride 0.65% Nasal 1 Spray(s) Both Nostrils five times a day PRN Nasal Congestion/pt comfort      CAPILLARY BLOOD GLUCOSE        I&O's Summary    06 Jun 2022 07:01  -  07 Jun 2022 07:00  --------------------------------------------------------  IN: 240 mL / OUT: 0 mL / NET: 240 mL        PHYSICAL EXAM:  Vital Signs Last 24 Hrs  T(C): 36.6 (07 Jun 2022 08:23), Max: 37 (06 Jun 2022 17:00)  T(F): 97.8 (07 Jun 2022 08:23), Max: 98.6 (06 Jun 2022 17:00)  HR: 90 (07 Jun 2022 08:23) (85 - 92)  BP: 158/89 (07 Jun 2022 08:23) (106/64 - 158/89)  BP(mean): --  RR: 20 (07 Jun 2022 08:23) (18 - 20)  SpO2: 97% (07 Jun 2022 08:23) (95% - 98%)    GENERAL: NAD, well-developed  HEAD:  Atraumatic, Normocephalic  EYES: EOMI, PERRLA, conjunctiva and sclera clear  NECK: Supple, No JVD  CHEST/LUNG: Clear to auscultation bilaterally; No wheeze  HEART: Regular rate and rhythm; No murmurs, rubs, or gallops  ABDOMEN: Soft, Nontender, Nondistended; Bowel sounds present  EXTREMITIES:  2+ Peripheral Pulses, No clubbing, cyanosis, or edema  NEUROLOGY: AAOx3; non-focal  SKIN: No rashes or lesions    LABS:  personally reviewed                        8.1    8.74  )-----------( 187      ( 06 Jun 2022 11:49 )             26.5     06-06    139  |  107  |  18  ----------------------------<  122<H>  4.2   |  23  |  1.04    Ca    10.3      06 Jun 2022 11:48    TPro  6.0  /  Alb  3.6  /  TBili  0.4  /  DBili  x   /  AST  13  /  ALT  15  /  AlkPhos  55  06-06              RADIOLOGY & ADDITIONAL TESTS:    Imaging Personally Reviewed:  < from: [facility] Duplex Upper Ext Vein Scan, Left (06.06.22 @ 15:26) >  The left subclavian and axillary veins are thrombosed.    The left basilic and cephalic veins are thrombosed    Cystic nodules are identified in the left lobe of the thyroid gland.    < end of copied text >      Consultant(s) Notes Reviewed:  vascular, nephro, GI, heme    Care Discussed with Consultants/Other Providers:

## 2022-06-08 ENCOUNTER — APPOINTMENT (OUTPATIENT)
Dept: TRANSPLANT | Facility: CLINIC | Age: 52
End: 2022-06-08

## 2022-06-08 LAB
ALBUMIN SERPL ELPH-MCNC: 3.7 G/DL — SIGNIFICANT CHANGE UP (ref 3.3–5)
ALP SERPL-CCNC: 66 U/L — SIGNIFICANT CHANGE UP (ref 40–120)
ALT FLD-CCNC: 14 U/L — SIGNIFICANT CHANGE UP (ref 10–45)
ANION GAP SERPL CALC-SCNC: 12 MMOL/L — SIGNIFICANT CHANGE UP (ref 5–17)
AST SERPL-CCNC: 15 U/L — SIGNIFICANT CHANGE UP (ref 10–40)
BILIRUB SERPL-MCNC: 0.5 MG/DL — SIGNIFICANT CHANGE UP (ref 0.2–1.2)
BKV DNA SPEC QL NAA+PROBE: SIGNIFICANT CHANGE UP
BUN SERPL-MCNC: 13 MG/DL — SIGNIFICANT CHANGE UP (ref 7–23)
CALCIUM SERPL-MCNC: 9.8 MG/DL — SIGNIFICANT CHANGE UP (ref 8.4–10.5)
CHLORIDE SERPL-SCNC: 107 MMOL/L — SIGNIFICANT CHANGE UP (ref 96–108)
CO2 SERPL-SCNC: 20 MMOL/L — LOW (ref 22–31)
CREAT SERPL-MCNC: 0.88 MG/DL — SIGNIFICANT CHANGE UP (ref 0.5–1.3)
EGFR: 80 ML/MIN/1.73M2 — SIGNIFICANT CHANGE UP
FERRITIN SERPL-MCNC: 27 NG/ML — SIGNIFICANT CHANGE UP (ref 15–150)
FOLATE SERPL-MCNC: 18.3 NG/ML — SIGNIFICANT CHANGE UP
GLUCOSE SERPL-MCNC: 112 MG/DL — HIGH (ref 70–99)
HAPTOGLOB SERPL-MCNC: 260 MG/DL — HIGH (ref 34–200)
HCT VFR BLD CALC: 25 % — LOW (ref 34.5–45)
HGB BLD-MCNC: 7.5 G/DL — LOW (ref 11.5–15.5)
IRON SATN MFR SERPL: 17 UG/DL — LOW (ref 30–160)
IRON SATN MFR SERPL: 5 % — LOW (ref 14–50)
LDH SERPL L TO P-CCNC: 296 U/L — HIGH (ref 50–242)
MCHC RBC-ENTMCNC: 27.3 PG — SIGNIFICANT CHANGE UP (ref 27–34)
MCHC RBC-ENTMCNC: 30 GM/DL — LOW (ref 32–36)
MCV RBC AUTO: 90.9 FL — SIGNIFICANT CHANGE UP (ref 80–100)
NRBC # BLD: 0 /100 WBCS — SIGNIFICANT CHANGE UP (ref 0–0)
PLATELET # BLD AUTO: 272 K/UL — SIGNIFICANT CHANGE UP (ref 150–400)
POTASSIUM SERPL-MCNC: 4.1 MMOL/L — SIGNIFICANT CHANGE UP (ref 3.5–5.3)
POTASSIUM SERPL-SCNC: 4.1 MMOL/L — SIGNIFICANT CHANGE UP (ref 3.5–5.3)
PROT SERPL-MCNC: 6.2 G/DL — SIGNIFICANT CHANGE UP (ref 6–8.3)
RBC # BLD: 2.75 M/UL — LOW (ref 3.8–5.2)
RBC # FLD: 16 % — HIGH (ref 10.3–14.5)
SODIUM SERPL-SCNC: 139 MMOL/L — SIGNIFICANT CHANGE UP (ref 135–145)
TIBC SERPL-MCNC: 362 UG/DL — SIGNIFICANT CHANGE UP (ref 220–430)
UIBC SERPL-MCNC: 345 UG/DL — SIGNIFICANT CHANGE UP (ref 110–370)
VIT B12 SERPL-MCNC: 575 PG/ML — SIGNIFICANT CHANGE UP (ref 232–1245)
WBC # BLD: 7.71 K/UL — SIGNIFICANT CHANGE UP (ref 3.8–10.5)
WBC # FLD AUTO: 7.71 K/UL — SIGNIFICANT CHANGE UP (ref 3.8–10.5)

## 2022-06-08 PROCEDURE — 99232 SBSQ HOSP IP/OBS MODERATE 35: CPT | Mod: GC

## 2022-06-08 PROCEDURE — 99233 SBSQ HOSP IP/OBS HIGH 50: CPT

## 2022-06-08 RX ORDER — AMLODIPINE BESYLATE 2.5 MG/1
5 TABLET ORAL DAILY
Refills: 0 | Status: DISCONTINUED | OUTPATIENT
Start: 2022-06-08 | End: 2022-06-10

## 2022-06-08 RX ORDER — PANTOPRAZOLE SODIUM 20 MG/1
40 TABLET, DELAYED RELEASE ORAL ONCE
Refills: 0 | Status: COMPLETED | OUTPATIENT
Start: 2022-06-08 | End: 2022-06-08

## 2022-06-08 RX ORDER — PANTOPRAZOLE SODIUM 20 MG/1
40 TABLET, DELAYED RELEASE ORAL EVERY 12 HOURS
Refills: 0 | Status: DISCONTINUED | OUTPATIENT
Start: 2022-06-08 | End: 2022-06-10

## 2022-06-08 RX ADMIN — Medication 650 MILLIGRAM(S): at 06:14

## 2022-06-08 RX ADMIN — Medication 650 MILLIGRAM(S): at 19:20

## 2022-06-08 RX ADMIN — CHLORHEXIDINE GLUCONATE 1 APPLICATION(S): 213 SOLUTION TOPICAL at 09:00

## 2022-06-08 RX ADMIN — TACROLIMUS 8 MILLIGRAM(S): 5 CAPSULE ORAL at 05:35

## 2022-06-08 RX ADMIN — Medication 650 MILLIGRAM(S): at 18:20

## 2022-06-08 RX ADMIN — CLOPIDOGREL BISULFATE 75 MILLIGRAM(S): 75 TABLET, FILM COATED ORAL at 08:58

## 2022-06-08 RX ADMIN — PANTOPRAZOLE SODIUM 40 MILLIGRAM(S): 20 TABLET, DELAYED RELEASE ORAL at 18:20

## 2022-06-08 RX ADMIN — AMLODIPINE BESYLATE 5 MILLIGRAM(S): 2.5 TABLET ORAL at 16:56

## 2022-06-08 RX ADMIN — Medication 5 MILLIGRAM(S): at 05:35

## 2022-06-08 RX ADMIN — Medication 650 MILLIGRAM(S): at 07:14

## 2022-06-08 RX ADMIN — Medication 81 MILLIGRAM(S): at 08:59

## 2022-06-08 RX ADMIN — PANTOPRAZOLE SODIUM 40 MILLIGRAM(S): 20 TABLET, DELAYED RELEASE ORAL at 05:35

## 2022-06-08 NOTE — PROGRESS NOTE ADULT - SUBJECTIVE AND OBJECTIVE BOX
Patient is a 52y old  Female who presents with a chief complaint of 52F transferred from [facility] for hematemesis (08 Jun 2022 07:33)      SUBJECTIVE / OVERNIGHT EVENTS:  pt with headache this morning but improved with tylenol  pt was resumed on asa/plavix this morning  pt reports overall improvement in facial/UE swelling  denies further melena    ROS:  14 point ROS negative in detail except stated as above    MEDICATIONS  (STANDING):  aspirin enteric coated 81 milliGRAM(s) Oral daily  chlorhexidine 2% Cloths 1 Application(s) Topical daily  clopidogrel Tablet 75 milliGRAM(s) Oral daily  pantoprazole  Injectable 40 milliGRAM(s) IV Push two times a day  predniSONE   Tablet 5 milliGRAM(s) Oral daily  tacrolimus ER Tablet (ENVARSUS XR) 8 milliGRAM(s) Oral daily    MEDICATIONS  (PRN):  acetaminophen     Tablet .. 650 milliGRAM(s) Oral every 6 hours PRN Mild Pain (1 - 3), Moderate Pain (4 - 6), Severe Pain (7 - 10)  sodium chloride 0.65% Nasal 1 Spray(s) Both Nostrils five times a day PRN Nasal Congestion/pt comfort      CAPILLARY BLOOD GLUCOSE        I&O's Summary    07 Jun 2022 07:01  -  08 Jun 2022 07:00  --------------------------------------------------------  IN: 320 mL / OUT: 0 mL / NET: 320 mL        PHYSICAL EXAM:  Vital Signs Last 24 Hrs  T(C): 36.4 (08 Jun 2022 08:22), Max: 37 (07 Jun 2022 12:44)  T(F): 97.6 (08 Jun 2022 08:22), Max: 98.6 (07 Jun 2022 12:44)  HR: 89 (08 Jun 2022 08:22) (84 - 91)  BP: 143/80 (08 Jun 2022 08:22) (143/78 - 169/85)  BP(mean): --  RR: 20 (08 Jun 2022 08:22) (18 - 20)  SpO2: 100% (08 Jun 2022 08:22) (94% - 100%)    GENERAL: NAD, well-developed  HEAD:  Atraumatic, Normocephalic  EYES: EOMI, PERRLA, conjunctiva and sclera clear  NECK: Supple, No JVD  CHEST/LUNG: Clear to auscultation bilaterally; No wheeze  HEART: Regular rate and rhythm; No murmurs, rubs, or gallops  ABDOMEN: Soft, Nontender, Nondistended; Bowel sounds present  EXTREMITIES:  2+ Peripheral Pulses, No clubbing, cyanosis, or edema  NEUROLOGY: AAOx3; non-focal  SKIN: No rashes or lesions    LABS:  personally reviewed                        8.1    8.74  )-----------( 187      ( 06 Jun 2022 11:49 )             26.5     06-06    139  |  107  |  18  ----------------------------<  122<H>  4.2   |  23  |  1.04    Ca    10.3      06 Jun 2022 11:48    TPro  6.0  /  Alb  3.6  /  TBili  0.4  /  DBili  x   /  AST  13  /  ALT  15  /  AlkPhos  55  06-06              RADIOLOGY & ADDITIONAL TESTS:    Imaging Personally Reviewed:    Consultant(s) Notes Reviewed:  GI, vascular, IR    Care Discussed with Consultants/Other Providers: Dr. Saleh (IR)

## 2022-06-08 NOTE — PROGRESS NOTE ADULT - SUBJECTIVE AND OBJECTIVE BOX
Patient is a 52y old  Female who presents with a chief complaint of 52F transferred from [facility] for hematemesis (08 Jun 2022 11:08)      MEDICATIONS  (STANDING):  amLODIPine   Tablet 5 milliGRAM(s) Oral daily  aspirin enteric coated 81 milliGRAM(s) Oral daily  chlorhexidine 2% Cloths 1 Application(s) Topical daily  clopidogrel Tablet 75 milliGRAM(s) Oral daily  pantoprazole  Injectable 40 milliGRAM(s) IV Push two times a day  predniSONE   Tablet 5 milliGRAM(s) Oral daily  tacrolimus ER Tablet (ENVARSUS XR) 8 milliGRAM(s) Oral daily    MEDICATIONS  (PRN):  acetaminophen     Tablet .. 650 milliGRAM(s) Oral every 6 hours PRN Mild Pain (1 - 3), Moderate Pain (4 - 6), Severe Pain (7 - 10)  sodium chloride 0.65% Nasal 1 Spray(s) Both Nostrils five times a day PRN Nasal Congestion/pt comfort      ROS  No fever, sweats, chills  No epistaxis, HA, sore throat  No CP, SOB, cough, sputum  No n/v/d, abd pain, melena, hematochezia  + UE edema  No rash  No anxiety  No back pain, joint pain  No bleeding,   + UE bruising  No dysuria, hematuria    Vital Signs Last 24 Hrs  T(C): 36.8 (08 Jun 2022 13:00), Max: 36.8 (08 Jun 2022 05:00)  T(F): 98.2 (08 Jun 2022 13:00), Max: 98.2 (08 Jun 2022 05:00)  HR: 87 (08 Jun 2022 13:00) (85 - 91)  BP: 163/91 (08 Jun 2022 13:00) (143/80 - 169/85)  BP(mean): --  RR: 20 (08 Jun 2022 13:00) (18 - 20)  SpO2: 97% (08 Jun 2022 13:00) (94% - 100%)    PE  NAD  Awake, alert  Anicteric, MMM  No c/c/e  No rash grossly  FROM    ACC: [id number] EXAM:  MR ANGIO CHEST NOT CARD                           PROCEDURE DATE:  06/06/2022          INTERPRETATION:  History: SVC syndrome. Assess central vasculature.    MRI/MRA of the chest was obtained on June 6, 2022. Comparison is made   with visualized portions of the chest from the abdominal CT scan. During   the examination IV infiltration to place with 5 cc of gadolinium contrast   introduced into the left arm. In the radiology resident assess the   patient as per department protocol and reported there findings to GIUSEPPE Chan, as documented in the preliminary radiology paperwork.    FINDINGS:      Visualized portions of the upper abdomen are within normal limits with   normal signal from the liver and spleen. The kidneys are noted to be   atrophic.    Trace bilateral pleural effusions with a grossly normal lung signal   bilaterally. Heart is normal in size. Thyroid gland is noted to be   enlarged. Mediastinal lymph nodes grossly within normal limits.    Evaluation of the vasculature is limited without IV contrast   administration. The aorta and its branches appear grossly patent without   a dissection flap identified.    The right subclavian vein and brachiocephalic vein appear grossly patent.   The left brachiocephalic vein appears atretic as seen on series 2 image   17. There is atrophy and stricturing of the SVC, best seen on series 2   image 19. Majority of the blood reaches the right atrium via the azygos   vein as seen on image 21 of series 2.    Known left axillary and subclavian vein thrombus is not clearly   visualized on this examination.    Poststernotomy with numerous venovenous collaterals within the soft   tissues.    IMPRESSION:    Severely limited examination due to IV contrast extravasation as   documented above.    The aorta and its branches appear grossly patent without a dissection   flap identified.    The right subclavian vein and brachiocephalic vein appear grossly patent.   The left brachiocephalic vein appears atretic as seen on series 2 image   17. There is atrophy and stricturing of the SVC, best seen on series 2   image 19. Majority of the blood reaches the right atrium via the azygos   vein as seen on image 21 of series 2.  Known left axillary and subclavian   vein thrombus is not clearly visualized on this examination. For   evaluation of the central veins consider CT venogram.    --- End of Report ---

## 2022-06-08 NOTE — PROGRESS NOTE ADULT - SUBJECTIVE AND OBJECTIVE BOX
[facility] DIVISION OF KIDNEY DISEASES AND HYPERTENSION -- FOLLOW UP NOTE  --------------------------------------------------------------------------------  24 hour events/subjective: Patient seen & examined. Vitals/ labs/ medications reviewed. Pt. extubated and downgraded to floors. Now tolerating PO intake. Pt. seen this AM. Unable to have labs drawn the last 2 days because of no IV access. Pt. endorses improvement of facial swelling and LUE swelling. Pt. with nasal congestion now on 2L NC. Off Hep gtt. Pt. complaining of headache and nausea. BP remains elevated.   PAST HISTORY  --------------------------------------------------------------------------------  No significant changes to PMH, PSH, FHx, SHx, unless otherwise noted    ALLERGIES & MEDICATIONS  --------------------------------------------------------------------------------  Allergies    contrast media (iodine-based) (Urticaria)  ibuprofen (Hives)  ibuprofen/morphine (Unknown)  lactose (Hives)  latex (Swelling)  morphine (Anaphylaxis)  morphine (Urticaria)  penicillin (Anaphylaxis)  shellfish (Urticaria)    Intolerances      Standing Inpatient Medications  aspirin enteric coated 81 milliGRAM(s) Oral daily  chlorhexidine 2% Cloths 1 Application(s) Topical daily  clopidogrel Tablet 75 milliGRAM(s) Oral daily  pantoprazole  Injectable 40 milliGRAM(s) IV Push two times a day  predniSONE   Tablet 5 milliGRAM(s) Oral daily  tacrolimus ER Tablet (ENVARSUS XR) 8 milliGRAM(s) Oral daily    PRN Inpatient Medications  acetaminophen     Tablet .. 650 milliGRAM(s) Oral every 6 hours PRN  sodium chloride 0.65% Nasal 1 Spray(s) Both Nostrils five times a day PRN      REVIEW OF SYSTEMS  --------------------------------------------------------------------------------  Gen: No fevers/chills  Respiratory: nasal congestion causing some shortness of breath   CV: No chest pain, PND, orthopnea  GI: No abdominal pain, diarrhea, constipation, nausea, vomiting  Transplant: No pain  : No increased frequency, dysuria, hematuria   MSK: LUE edema, improving   Neuro: No dizziness/lightheadedness    All other systems were reviewed and are negative, except as noted.    VITALS/PHYSICAL EXAM  --------------------------------------------------------------------------------  T(C): 36.8 (06-08-22 @ 05:00), Max: 37 (06-07-22 @ 12:44)  HR: 85 (06-08-22 @ 05:00) (84 - 91)  BP: 169/85 (06-08-22 @ 05:00) (143/78 - 169/85)  RR: 18 (06-08-22 @ 05:00) (18 - 20)  SpO2: 98% (06-08-22 @ 05:00) (94% - 99%)  Wt(kg): --        06-07-22 @ 07:01  -  06-08-22 @ 07:00  --------------------------------------------------------  IN: 320 mL / OUT: 0 mL / NET: 320 mL      Physical Exam:  	Gen: NAD, able to speak in full sentences   	HEENT: PERRL, MMM, facial edema improving  	Pulm: no crackles  	CV: RRR, S1S2+  	Abd: +BS, soft          Transplant: No tenderness, swelling  	: No suprapubic tenderness  	MSK: LUE edema improving, no LE edema   	Psych: Normal affect and mood  	Skin: Warm          Access: No IV access    LABS/STUDIES  --------------------------------------------------------------------------------              8.1    8.74  >-----------<  187      [06-06-22 @ 11:49]              26.5     139  |  107  |  18  ----------------------------<  122      [06-06-22 @ 11:48]  4.2   |  23  |  1.04        Ca     10.3     [06-06-22 @ 11:48]    TPro  6.0  /  Alb  3.6  /  TBili  0.4  /  DBili  x   /  AST  13  /  ALT  15  /  AlkPhos  55  [06-06-22 @ 11:48]      Creatinine Trend:  SCr 1.04 [06-06 @ 11:48]  SCr 1.09 [06-04 @ 16:21]  SCr 1.12 [06-02 @ 09:41]  SCr 1.78 [06-01 @ 11:15]  SCr 1.15 [05-31 @ 01:18]    Tacrolimus (), Serum: 1.8 ng/mL (06-06 @ 06:42)  Tacrolimus (), Serum: 5.3 ng/mL (06-04 @ 16:21)  Tacrolimus (), Serum: 3.4 ng/mL (06-01 @ 11:15)      Urinalysis - [05-30-22 @ 09:08]      Color Light Yellow / Appearance Clear / SG 1.026 / pH 6.5      Gluc Negative / Ketone Trace  / Bili Negative / Urobili Negative       Blood Negative / Protein Negative / Leuk Est Moderate / Nitrite Negative      RBC 1 / WBC 2 / Hyaline 0 / Gran  / Sq Epi  / Non Sq Epi 2 / Bacteria Negative        HBsAb Reactive      [05-31-22 @ 14:03]  HBsAg Nonreact      [05-31-22 @ 14:03]  HBcAb Reactive      [05-31-22 @ 14:03]  HCV 0.06, Nonreact      [05-31-22 @ 14:03]    KAYLA: titer Negative, pattern --      [05-31-22 @ 14:03]  Free Light Chains: kappa 1.63, lambda 1.09, ratio = 1.50      [05-31 @ 14:03]

## 2022-06-08 NOTE — PROGRESS NOTE ADULT - PROBLEM SELECTOR PLAN 1
s/p DDRT performed in 2020 by Dr. Monroy. Allograft function at baseline. Outpatient nephrologist is Dr. Lake.  Awaiting IV Access to obtain imaging of LUE and chest. Swelling now improving with elevation. Vascular consult appreciated. 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 No more episodes of bleeding HgB remains stable. Consider starting AC. BP remains elevated. UOP not documented, consider starting Lasix 20mg daily to augment diuresis and further decrease swelling. s/p DDRT performed in 2020 by Dr. Monroy. Allograft function at baseline. Outpatient nephrologist is Dr. Lake.  Awaiting IV Access to obtain imaging of LUE and chest. Swelling now improving with elevation. Vascular consult appreciated. 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 No more episodes of bleeding HgB remains stable. BP remains elevated. UOP not documented, consider starting Lasix 20mg daily to augment diuresis and further decrease swelling.

## 2022-06-08 NOTE — PROGRESS NOTE ADULT - PROBLEM SELECTOR PLAN 2
Hx BK viremia, f/u PCR to assesses if we should resume leflunomide. Last BK PCR as outpatient was in 2021. Repeat BK PCR  Continue with prednisone 5 mg  Continue envarsus 8mg Daily     Check Tacro level Daily 30min before AM dose. Goal 3-6  Last level 1.8 (6/6)

## 2022-06-08 NOTE — PROGRESS NOTE ADULT - ASSESSMENT
52F w/ ESRD s/p HepC DDRT, chronic SVC syndrome s/p L cephalic-iliac vein bypass on DAPT, s/p thrombectomy of L iliac vein and graft 2012, Hx BK viremia, HTN presents as transfer from [facility] ED for hematemesis 2/2 acute upper GI hemorrhage admitted to MICU for elective intubation and urgent endoscopy with GI s/p endoscopy with evidence of non-bleeding ulcers & esophageal varices

## 2022-06-08 NOTE — PROGRESS NOTE ADULT - ATTENDING COMMENTS
52 yr old woman with ESRD s/p DDRT in 2020 followed by Dr. Lake, baseline creatinine of 1mg/dL.   On immunosuppression with Envarsus, prednisone and on leflunomide due to h/o BK viremia.   H/o chronic SVC syndrome s/p L cephalic-iliac vein bypass on DAPT, s/p thrombectomy of L iliac vein and graft 2012    Admitted with hematemesis. EGD showed Esophageal varices and non bleeding antral ulcer. Antiplatelets stopped, started IV Protonix.   Received 3 units PRBC on 5/30 and 1 unit on 6/1.  Hgb 8 g/dL 2 days ago. No blood test since 6/6.   No further episodes of hematemesis.   UE and facial swelling. US showed left subclavian, axillary, basilic and cephalic vein thrombosis.     Feels well today. Resumed Aspirin and Plavix today.  LUE and facial swelling improving but still present.   MRV was done yesterday but no contrast due to extravasation.       1.  Renal transplant in 2020, renal function stable. Creatinine 1.04 on 6/6/22  2.  Immunosuppression - Continue Envarsus 8mg po daily, Aim for trough 4-6.  Continue prednisone 5mg po daily. Hold leflunomide, F/u serum BK PCR sent on 6/6  3. Anemia due to upper GI bleed. EGD with varices and antral ulcer - ulcer thought to be source of bleeding. On IV protonix.      S/p 4 units PRBC since admission. Hematemesis has resolved. Hemodynamically stable. Hgb 8 on 6/6. No new labs.      No evidence of cirrhosis (normal liver US and elastography), varices possibly downhill from SVC syndrome.    4. SVC syndrome - s/p L cephalic-iliac vein bypass on DAPT. Aspirin and Plavix were held for GI bleed, resumed today. Monitor for bleeding.

## 2022-06-08 NOTE — CHART NOTE - NSCHARTNOTEFT_GEN_A_CORE
Medicine PA Note     ESTEFANIA CORDERO    Patient is a 52y old  Female who presents with a chief complaint of 52F transferred from [facility] for hematemesis (07 Jun 2022 11:44)    Notified by RN, patient complaining of headache. Pt seen and examined at bedside, alert and oriented, in no acute distress. Pt states she has a 10/10 tension headache which started moments ago. She admits to photophobia and she is slightly nauseous. Pt denies any other complaints. Pt denies diaphoresis, palpitations, and chest pain.     Vital Signs Last 24 Hrs  T(C): 36.8 (08 Jun 2022 05:00), Max: 37 (07 Jun 2022 12:44)  T(F): 98.2 (08 Jun 2022 05:00), Max: 98.6 (07 Jun 2022 12:44)  HR: 85 (08 Jun 2022 05:00) (84 - 91)  BP: 169/85 (08 Jun 2022 05:00) (143/78 - 169/85)  BP(mean): --  RR: 18 (08 Jun 2022 05:00) (18 - 20)  SpO2: 98% (08 Jun 2022 05:00) (94% - 99%)      Labs:                        8.1    8.74  )-----------( 187      ( 06 Jun 2022 11:49 )             26.5     06-06    139  |  107  |  18  ----------------------------<  122<H>  4.2   |  23  |  1.04    Ca    10.3      06 Jun 2022 11:48    TPro  6.0  /  Alb  3.6  /  TBili  0.4  /  DBili  x   /  AST  13  /  ALT  15  /  AlkPhos  55  06-06      Physical Exam:  General: No acute distress  Neurology: A&Ox3, CN intact. 5/5 strength in both upper and lower extremity. No drift.   Head:  Normocephalic, atraumatic  Respiratory: CTA B/L  CV: RRR, S1S2, no murmur  Abdominal: Soft, NT, ND no palpable mass  MSK: No edema, + peripheral pulses, FROM all 4 extremity        Radiology:    Assessment & Plan:  HPI:  52F w/ ESRD s/p HepC DDRT, chronic SVC syndrome s/p L cephalic-iliac vein bypass on DAPT, s/p thrombectomy of L iliac vein and graft 2012, Hx BK viremia, HTN presents as transfer from [facility] ED for hematemesis. The patient reports that on day of presentation she suddenly began have large volume bloody emesis 2-3 times and several episodes of black stool. She reported worsening of chronic LUQ abdominal pain, moderate severity, vague in character, no reported radiation, not worsened with eating, no clear intervention to improve. The patient denies history of stomach ulcer or NSAID use. Chronic abdominal pain reported for approximately 2mo but not as severe. The patient at [facility] received pantoprazole 80mg IVx1 and was transported via ambulance which during that time received 1uPRBC. The patient reports using aspirin and clopidogrel for unclear reason but per chart appears to be for chronic SVC syndrome.    Pt now presenting with 10/10 headache. No focal neurological deficits on physical exam. Tylenol 650mg x1 dose given. If symptoms persist, STAT CT head.       1) Headache  - Tylenol 650mg x1 dose  - Will continue to monitor patients symptoms, if symptoms persist CT head STAT.   - Discussed with [facility] in regards to restarting BP meds as pts BP elevated which may be contributing to the headache. Recommended to continue to monitor off medications for now as status of GIB is still unknown.  - Will continue to monitor patients vitals  - Will endorse to AM Team    Bree Muse PA-C  Department of Medicine   Spectra #[id number]

## 2022-06-08 NOTE — PROVIDER CONTACT NOTE (OTHER) - SITUATION
Unable to draw labs on 6/7- no Tacro level drawn. Last Tacro level was 1.8 on 6/6/22 w/ no change in tacro dose since. Provider requested to hold AM labs again today due to RAVF and L arm thromosis. Unable to draw labs on 6/7- no Tacro level drawn. Last Tacro level was 1.8 on 6/6/22 w/ no change in tacro dose since. Provider requested to hold AM labs again today due to RAVF and L arm thrombosis.

## 2022-06-08 NOTE — PROGRESS NOTE ADULT - ASSESSMENT
HPI:  52F w/ ESRD s/p HepC DDRT, chronic SVC syndrome s/p L cephalic-iliac vein bypass on DAPT, s/p thrombectomy of L iliac vein and graft 2012, Hx BK viremia, HTN presents as transfer from [facility] ED for hematemesis. The patient reports that on day of presentation she suddenly began have large volume bloody emesis 2-3 times and several episodes of black stool. She reported worsening of chronic LUQ abdominal pain, moderate severity, vague in character, no reported radiation, not worsened with eating, no clear intervention to improve. The patient denies history of stomach ulcer or NSAID use. Chronic abdominal pain reported for approximately 2mo but not as severe. The patient at [facility] received pantoprazole 80mg IVx1 and was transported via ambulance which during that time received 1uPRBC. The patient reports using aspirin and clopidogrel for unclear reason but per chart appears to be for chronic SVC syndrome.    Patient cannot recall all medications and therefore medrec per EMR outpatient data  - appears transplant medication titrated to envarsus XR 4mg x2, envarsus XR 1mg x2, Leflunomide 20mg x2 d, prednisone 5mg d (30 May 2022 06:02)    Hematology/Oncology consulted d/t patients history of erythrocytosis. Patient is under the care of Dr. Oliva of [facility].     Patient with history of CKD s/p a kidney transplant. Initially referred to hematology by MD Lake, a nephrologist, d/t abnormal lab results. Blood work performed at the outside facility indicated that her Hgb was 17.7.  Repeat blood work done in-office found that patient's Hgb dropped to16.7.   DAMIR­2 Negative. Has high calcium with high PTH and related to renal issues as per Dr Lake,      Erythrocytosis  -- longstanding history  -- Jak2 negative  -- while outpatient was phlebotomized to keep Hgb <14  -- will follow up with Dr Oliva of [facility] after discharge    Anemia due to acute blood loss   -- UGI bleed, esophageal varices, esophagitis & gastric ulcers  -- Esophageal varices likely 2/2 central vein occlusion as seen on prior imaging  --continue with ppi BID  --will check: folate, ferritin, LDH, B12, and iron %sat  --will check: haptoglobin, LDH  --GI already consulted  --CT C/A/P done 6/2: negative for cirrhosis  --please transfuse for HGB <7    SVC Syndrome  -- h/o bypass surgeries on DAPT (asa and plavix) at home   -- vascular consult appreciated, MR venogram as nephrology would prefer avoiding IV contrast agent  -- bilateral UE duplex, done 6/6.  IMPRESSION:  ·	The left subclavian and axillary veins are thrombosed.  ·	The left basilic and cephalic veins are thrombosed  ·	Cystic nodules are identified in the left lobe of the thyroid gland.  Impression:  ·	On the left there is a thrombosed brachial cephalic fistula.  ·	There is a thrombosed dialysis access graft.  ·	In the right upper extremity there are 2 dialysis access grafts; one thrombosed and 1 patent.The flow volume the patent graft measured 800 mL/m.  -- will recommend prophylactic anticoagulation with enoxaparin 40 mg while in-hospital and than to transition to Eliquis 2.5mg upon hospital discharge   -- patient will f/up with hematologist Dr Oliva of [facility] for Eliquis titration outpatient     Thank you for the opportunity to participate in Mrs Jacobo's care.    Joycelyn Delcid NP  Hematology/ Oncology  [facility]  [phone] (office)  [phone] (alt office)  Evenings and weekends please call MD on call or office   75 YO RH White M with cervical myelopathy (sees Dr Beckford), Afib on ASA/Plavix (due to fall risk, due for Watch[facility] in Sept), presents with sudden onset L hand weakness. Given risk factor of Afib not on AC and symptoms consistent with cortical hand syndrome likely cardioembolic source. CT head is negative for hemorrhage. Risks/benefits of tPA discussed with pt and family. tPA bolus 7.3 pushed at 12:09pm. ]    [] post tPA protocol  [] CT H 24hr post tPA  [] permissive to BP <180/105, PRN Labetalol   [] MRI brain is contraindicated due to pacemaker   [] CTA H/N official report pending.  [] check lipid panel, A1C  [] speech/swallow  [] PT/OT   [] case d/w stroke fellow

## 2022-06-08 NOTE — PROGRESS NOTE ADULT - PROBLEM SELECTOR PLAN 1
s/p EGD with esophageal varices and nonbleeding gastric ulcers   -H/H stable and no further episodes of hematemesis, melena  -CTAP without evidence of cirrhosis and patent portal vein  -GI eval appreciated: no contraindication for DAPT: at this time, no plan for EGD  -s/p ceftriaxone 7 days for prophylaxis   -cont to monitor CBC q24 hrs  -resumed asa/plavix  -transition to po ppi

## 2022-06-08 NOTE — PROGRESS NOTE ADULT - PROBLEM SELECTOR PLAN 2
h/o bypass surgeries on DAPT (asa and plavix) at home   -resumed asa/plavix  -MR venogram reviewed as above  -vascular consult appreciated: hold off on AC at this time  -LUE doppler with: The left subclavian and axillary veins are thrombosed. The left basilic and cephalic veins are thrombosed  -IR consulted: pt to follow up with Dr. Phillips as outpatient for possible intervention

## 2022-06-08 NOTE — PROGRESS NOTE ADULT - NSPROGADDITIONALINFOA_GEN_ALL_CORE
above plans discussed with NP Hali Poole MD  Division of Hospital Medicine  Contact via [employer] Teams  Office: [phone]

## 2022-06-08 NOTE — PROGRESS NOTE ADULT - PROBLEM SELECTOR PLAN 3
UGI bleed , esophageal varices, esophagitis & gastric ulcers  Esophageal varices likely 2/2 central vein occlusion as seen on prior imaging  Continue PPI per GI  Transfusion as per primary team.     If you have any questions, please feel free to contact me  Marquez Merida  Nephrology Fellow  [phone]; Prefer [employer] TEAMS  (After 5pm or on weekends please page the on-call fellow)

## 2022-06-09 ENCOUNTER — TRANSCRIPTION ENCOUNTER (OUTPATIENT)
Age: 52
End: 2022-06-09

## 2022-06-09 PROBLEM — N18.9 CHRONIC KIDNEY DISEASE, UNSPECIFIED: Chronic | Status: ACTIVE | Noted: 2022-05-30

## 2022-06-09 LAB
BLD GP AB SCN SERPL QL: NEGATIVE — SIGNIFICANT CHANGE UP
HCT VFR BLD CALC: 22.6 % — LOW (ref 34.5–45)
HGB BLD-MCNC: 6.7 G/DL — CRITICAL LOW (ref 11.5–15.5)
MCHC RBC-ENTMCNC: 27 PG — SIGNIFICANT CHANGE UP (ref 27–34)
MCHC RBC-ENTMCNC: 29.6 GM/DL — LOW (ref 32–36)
MCV RBC AUTO: 91.1 FL — SIGNIFICANT CHANGE UP (ref 80–100)
NRBC # BLD: 0 /100 WBCS — SIGNIFICANT CHANGE UP (ref 0–0)
PLATELET # BLD AUTO: 259 K/UL — SIGNIFICANT CHANGE UP (ref 150–400)
RBC # BLD: 2.48 M/UL — LOW (ref 3.8–5.2)
RBC # FLD: 15.9 % — HIGH (ref 10.3–14.5)
RH IG SCN BLD-IMP: POSITIVE — SIGNIFICANT CHANGE UP
TACROLIMUS SERPL-MCNC: 13.4 NG/ML — SIGNIFICANT CHANGE UP
WBC # BLD: 7.2 K/UL — SIGNIFICANT CHANGE UP (ref 3.8–10.5)
WBC # FLD AUTO: 7.2 K/UL — SIGNIFICANT CHANGE UP (ref 3.8–10.5)

## 2022-06-09 PROCEDURE — 99233 SBSQ HOSP IP/OBS HIGH 50: CPT

## 2022-06-09 PROCEDURE — 99232 SBSQ HOSP IP/OBS MODERATE 35: CPT | Mod: GC

## 2022-06-09 RX ORDER — FERROUS SULFATE 325(65) MG
1 TABLET ORAL
Qty: 60 | Refills: 0
Start: 2022-06-09 | End: 2022-07-08

## 2022-06-09 RX ORDER — LEFLUNOMIDE 10 MG/1
2 TABLET ORAL
Qty: 0 | Refills: 0 | DISCHARGE

## 2022-06-09 RX ORDER — FERROUS SULFATE 325(65) MG
325 TABLET ORAL
Refills: 0 | Status: DISCONTINUED | OUTPATIENT
Start: 2022-06-09 | End: 2022-06-10

## 2022-06-09 RX ORDER — PANTOPRAZOLE SODIUM 20 MG/1
1 TABLET, DELAYED RELEASE ORAL
Qty: 60 | Refills: 0
Start: 2022-06-09 | End: 2022-07-08

## 2022-06-09 RX ORDER — FAMOTIDINE 10 MG/ML
1 INJECTION INTRAVENOUS
Qty: 0 | Refills: 0 | DISCHARGE

## 2022-06-09 RX ORDER — ONDANSETRON 8 MG/1
4 TABLET, FILM COATED ORAL ONCE
Refills: 0 | Status: DISCONTINUED | OUTPATIENT
Start: 2022-06-09 | End: 2022-06-10

## 2022-06-09 RX ADMIN — AMLODIPINE BESYLATE 5 MILLIGRAM(S): 2.5 TABLET ORAL at 05:18

## 2022-06-09 RX ADMIN — Medication 81 MILLIGRAM(S): at 13:28

## 2022-06-09 RX ADMIN — PANTOPRAZOLE SODIUM 40 MILLIGRAM(S): 20 TABLET, DELAYED RELEASE ORAL at 05:19

## 2022-06-09 RX ADMIN — CHLORHEXIDINE GLUCONATE 1 APPLICATION(S): 213 SOLUTION TOPICAL at 13:28

## 2022-06-09 RX ADMIN — Medication 325 MILLIGRAM(S): at 17:20

## 2022-06-09 RX ADMIN — TACROLIMUS 8 MILLIGRAM(S): 5 CAPSULE ORAL at 05:19

## 2022-06-09 RX ADMIN — PANTOPRAZOLE SODIUM 40 MILLIGRAM(S): 20 TABLET, DELAYED RELEASE ORAL at 17:20

## 2022-06-09 RX ADMIN — Medication 5 MILLIGRAM(S): at 05:18

## 2022-06-09 RX ADMIN — CLOPIDOGREL BISULFATE 75 MILLIGRAM(S): 75 TABLET, FILM COATED ORAL at 13:28

## 2022-06-09 NOTE — PROVIDER CONTACT NOTE (OTHER) - SITUATION
Holding pt AM labs until phlebotomy can come draw blood due to RAVF and L arm thrombosis. Tacro due during 6am med pass.

## 2022-06-09 NOTE — PROGRESS NOTE ADULT - SUBJECTIVE AND OBJECTIVE BOX
Patient is a 52y old  Female who presents with a chief complaint of 52F transferred from [facility] for hematemesis (09 Jun 2022 08:33)      SUBJECTIVE / OVERNIGHT EVENTS:  no acute events overnight, vss, afebrile  pt resumed on asa/plavix  so far, no further episodes of melena, hematemesis  pt reports facial swelling better, feels well this morning    ROS:  14 point ROS negative in detail except stated as above    MEDICATIONS  (STANDING):  amLODIPine   Tablet 5 milliGRAM(s) Oral daily  aspirin enteric coated 81 milliGRAM(s) Oral daily  chlorhexidine 2% Cloths 1 Application(s) Topical daily  clopidogrel Tablet 75 milliGRAM(s) Oral daily  pantoprazole    Tablet 40 milliGRAM(s) Oral every 12 hours  predniSONE   Tablet 5 milliGRAM(s) Oral daily  tacrolimus ER Tablet (ENVARSUS XR) 8 milliGRAM(s) Oral daily    MEDICATIONS  (PRN):  acetaminophen     Tablet .. 650 milliGRAM(s) Oral every 6 hours PRN Mild Pain (1 - 3), Moderate Pain (4 - 6), Severe Pain (7 - 10)  sodium chloride 0.65% Nasal 1 Spray(s) Both Nostrils five times a day PRN Nasal Congestion/pt comfort      CAPILLARY BLOOD GLUCOSE        I&O's Summary    08 Jun 2022 07:01  -  09 Jun 2022 07:00  --------------------------------------------------------  IN: 1320 mL / OUT: 0 mL / NET: 1320 mL    09 Jun 2022 07:01  -  09 Jun 2022 10:38  --------------------------------------------------------  IN: 500 mL / OUT: 0 mL / NET: 500 mL        PHYSICAL EXAM:  Vital Signs Last 24 Hrs  T(C): 36.7 (09 Jun 2022 08:17), Max: 36.8 (08 Jun 2022 13:00)  T(F): 98.1 (09 Jun 2022 08:17), Max: 98.2 (08 Jun 2022 13:00)  HR: 94 (09 Jun 2022 08:17) (62 - 94)  BP: 142/84 (09 Jun 2022 08:17) (142/84 - 174/92)  BP(mean): --  RR: 18 (09 Jun 2022 08:17) (18 - 20)  SpO2: 99% (09 Jun 2022 08:17) (97% - 99%)    GENERAL: NAD, well-developed  HEAD:  Atraumatic, Normocephalic  EYES: EOMI, PERRLA, conjunctiva and sclera clear  NECK: Supple, No JVD  CHEST/LUNG: Clear to auscultation bilaterally; No wheeze  HEART: Regular rate and rhythm; No murmurs, rubs, or gallops  ABDOMEN: Soft, Nontender, Nondistended; Bowel sounds present  EXTREMITIES:  2+ Peripheral Pulses, No clubbing, cyanosis, or edema  NEUROLOGY: AAOx3; non-focal  SKIN: No rashes or lesions    LABS:  personally reviewed                        7.5    7.71  )-----------( 272      ( 08 Jun 2022 14:52 )             25.0     06-08    139  |  107  |  13  ----------------------------<  112<H>  4.1   |  20<L>  |  0.88    Ca    9.8      08 Jun 2022 14:52    TPro  6.2  /  Alb  3.7  /  TBili  0.5  /  DBili  x   /  AST  15  /  ALT  14  /  AlkPhos  66  06-08              RADIOLOGY & ADDITIONAL TESTS:    Imaging Personally Reviewed:    Consultant(s) Notes Reviewed:  nephrology, vascular, IR    Care Discussed with Consultants/Other Providers: Dr. Merida (nephro)

## 2022-06-09 NOTE — PROVIDER CONTACT NOTE (CRITICAL VALUE NOTIFICATION) - ACTION/TREATMENT ORDERED:
GIUSEPPE Rodriges notified. Will order for repeat CBC
1 unit PRBCs ordered
provider at bedside; orders pending.
will order 1u PRBC, will continue to monitor

## 2022-06-09 NOTE — DISCHARGE NOTE PROVIDER - NSDCFUSCHEDAPPT_GEN_ALL_CORE_FT
Herbert Saleh  [facility]  [address]  Scheduled Appointment: 06/14/2022

## 2022-06-09 NOTE — PROVIDER CONTACT NOTE (OTHER) - BACKGROUND
H/O ESRD s/p DDRT in 2020. Admitted w/ gastrointestinal hemorrhage and hematemesis
PMHx of HTN, CKD now s/p kidney transplant admit with GI bleeding
PMH ESRD s/p DDRT on 2020. Admitted with hematemesis and gastrointestinal hemorrhage.
PMHx of HTN, CKD now s/p kidney transplant admit with GI bleeding
PMHx of HTN, CKD now s/p kidney transplant admit with GI bleeding
ESRD s/p DDRT in 2020
Pt admitted for GI hemorrhage s/p selective intubation for endoscopy. PMH of ESRD, s/p DDRT in April 2020.
52F h/o  ESRD s/p DDRT in 2020. Admitted w/ hematemesis and gastrointestinal hemorrhage
53yo F PMH ESRD s/p DDRT, chronic SVC syndrome, admitted for melena and hematemesis
pt admitted with GI bleed
Pt admitted for melena and hematemesis, s/p endoscopy with intubation showing esophageal varices and gastric ulcers but no active bleeding

## 2022-06-09 NOTE — PROVIDER CONTACT NOTE (CRITICAL VALUE NOTIFICATION) - RECOMMENDATIONS
provided fall risk education for pt. bed alarm in place. instructed pt to use call bell when oob. As per merlin Jhonson to use left arm for blood transfusion. orders pending.
Notify ACP
transfuse pt
give 1 unit PRBCs

## 2022-06-09 NOTE — PROGRESS NOTE ADULT - PROBLEM SELECTOR PLAN 3
UGI bleed , esophageal varices, esophagitis & gastric ulcers  Esophageal varices likely 2/2 central vein occlusion as seen on prior imaging  Continue PPI per GI  Transfusion as per primary team.     If you have any questions, please feel free to contact me  Marquez Merida  Nephrology Fellow  [phone]; Prefer [employer] TEAMS  (After 5pm or on weekends please page the on-call fellow)

## 2022-06-09 NOTE — DISCHARGE NOTE PROVIDER - HOSPITAL COURSE
52F w/ ESRD s/p HepC DDRT, chronic SVC syndrome s/p L cephalic-iliac vein bypass on DAPT, s/p thrombectomy of L iliac vein and graft 2012, Hx BK viremia, HTN [facility] 2/2 acute upper GI hemorrhage admitted to MICU for elective intubation and urgent endoscopy with GI s/p endoscopy with evidence of non-bleeding ulcers & esophageal varices        Problem/Plan - 1:  ·  Problem: Anemia due to acute blood loss.   ·  Plan: s/p EGD with esophageal varices and nonbleeding gastric ulcers   -H/H stable and no further episodes of hematemesis, melena  -CTAP without evidence of cirrhosis and patent portal vein  -GI eval appreciated: no contraindication for DAPT: at this time, no plan for EGD  -s/p ceftriaxone 7 days for prophylaxis   -cont to monitor CBC q24 hrs  -resumed asa/plavix  -transition to po ppi.  -start iron sulfate 325mg BID     Problem/Plan - 2:  ·  Problem: SVC syndrome.   ·  Plan: h/o bypass surgeries on DAPT (asa and plavix) at home   -resumed asa/plavix  -MR venogram reviewed as above  -vascular consult appreciated: hold off on AC at this time  -LUE doppler with: The left subclavian and axillary veins are thrombosed. The left basilic and cephalic veins are thrombosed  -IR consulted: pt to follow up with Dr. Phillips as outpatient for possible intervention: appt on 6/14.     Problem/Plan - 3:  ·  Problem: Kidney transplant recipient.   ·  Plan: SCr now back to baseline   -continue on prednisone 5mg, tacrolimus 8mg daily   -discontinue leflunomide as BK PCR negative  -renal transplant follow up.  -fu with Dr. Lake as outpatient    Pt has remained stable for discharge with close follow ups.

## 2022-06-09 NOTE — PROGRESS NOTE ADULT - PROBLEM SELECTOR PLAN 1
s/p EGD with esophageal varices and nonbleeding gastric ulcers   -H/H stable and no further episodes of hematemesis, melena  -CTAP without evidence of cirrhosis and patent portal vein  -GI eval appreciated: no contraindication for DAPT: at this time, no plan for EGD  -s/p ceftriaxone 7 days for prophylaxis   -cont to monitor CBC q24 hrs  -resumed asa/plavix  -transition to po ppi s/p EGD with esophageal varices and nonbleeding gastric ulcers   -no further episodes of melena/hematemesis but acute drop in hgb 6.7 this morning  -transfuse 1unit of pRBC  -CTAP without evidence of cirrhosis and patent portal vein  -GI eval appreciated: no contraindication for DAPT: at this time, no plan for EGD  -s/p ceftriaxone 7 days for prophylaxis   -cont to monitor CBC q24 hrs  -resumed asa/plavix  -transition to po ppi

## 2022-06-09 NOTE — PROVIDER CONTACT NOTE (OTHER) - REASON
Tacro level held due to arm precautions
Unable to obtain blood specimen from pt, difficult stick
unable to drawn pts morning labs (pt has tacro level included in am labs)
Blood draw
pt with ~4L bloody emesis and dark tarry stool
IV access
Lab draw
IV access for CT angio
IV access for CT angio w contrast
Last tacro level 1.8 6/6/22
Unable to obtain labs, need CBC and tacro level

## 2022-06-09 NOTE — PROGRESS NOTE ADULT - SUBJECTIVE AND OBJECTIVE BOX
Patient is a 52y old  Female who presents with a chief complaint of 52F transferred from [facility] for hematemesis (09 Jun 2022 07:39)      MEDICATIONS  (STANDING):  amLODIPine   Tablet 5 milliGRAM(s) Oral daily  aspirin enteric coated 81 milliGRAM(s) Oral daily  chlorhexidine 2% Cloths 1 Application(s) Topical daily  clopidogrel Tablet 75 milliGRAM(s) Oral daily  pantoprazole    Tablet 40 milliGRAM(s) Oral every 12 hours  predniSONE   Tablet 5 milliGRAM(s) Oral daily  tacrolimus ER Tablet (ENVARSUS XR) 8 milliGRAM(s) Oral daily    MEDICATIONS  (PRN):  acetaminophen     Tablet .. 650 milliGRAM(s) Oral every 6 hours PRN Mild Pain (1 - 3), Moderate Pain (4 - 6), Severe Pain (7 - 10)  sodium chloride 0.65% Nasal 1 Spray(s) Both Nostrils five times a day PRN Nasal Congestion/pt comfort      ROS  No fever, sweats, chills  No epistaxis, HA, sore throat  No CP, SOB, cough, sputum  No n/v/d, abd pain, melena, hematochezia  No edema  No rash  No anxiety  No back pain, joint pain  No bleeding, bruising  No dysuria, hematuria    Vital Signs Last 24 Hrs  T(C): 36.7 (09 Jun 2022 08:17), Max: 36.8 (08 Jun 2022 13:00)  T(F): 98.1 (09 Jun 2022 08:17), Max: 98.2 (08 Jun 2022 13:00)  HR: 94 (09 Jun 2022 08:17) (62 - 94)  BP: 142/84 (09 Jun 2022 08:17) (142/84 - 174/92)  BP(mean): --  RR: 18 (09 Jun 2022 08:17) (18 - 20)  SpO2: 99% (09 Jun 2022 08:17) (97% - 99%)    PE  NAD  Awake, alert  Anicteric, MMM  RRR  CTAB  Abd soft, NT, ND  No c/c/e  No rash grossly  FROM                          7.5    7.71  )-----------( 272      ( 08 Jun 2022 14:52 )             25.0       06-08    139  |  107  |  13  ----------------------------<  112<H>  4.1   |  20<L>  |  0.88    Ca    9.8      08 Jun 2022 14:52    TPro  6.2  /  Alb  3.7  /  TBili  0.5  /  DBili  x   /  AST  15  /  ALT  14  /  AlkPhos  66  06-08

## 2022-06-09 NOTE — CHART NOTE - NSCHARTNOTESELECT_GEN_ALL_CORE
Event Note
vascular chart note/Event Note
Event Note
Headache/Event Note
IR
MAR accept note/Transfer Note
MICU Acceptance/Transfer Note
Transfer Note

## 2022-06-09 NOTE — PROVIDER CONTACT NOTE (CRITICAL VALUE NOTIFICATION) - BACKGROUND
pt in for UGI
pt in for UGI, concerned for SVC syndrome, previous 1U PRBC canceled.
pt admitted for gastrointestinal hemorrhage.
Dx: Hematemesis- s/p urgent endoscopy with intubation 5/30 showing small varices, esophagitis and gastric ulcers with no active bleeding- cont ceftriaxone, PPI and octreotide gtt

## 2022-06-09 NOTE — PROGRESS NOTE ADULT - NSPROGADDITIONALINFOA_GEN_ALL_CORE
above plans discussed with NP Maurizio  Pt stable for discharge with close follow up with Dr. Lake    44 minutes spent on discharge process    Raisa Poole MD  Division of Hospital Medicine  Contact via [employer] Teams  Office: [phone] above plans discussed with NP Maurizio Poole MD  Division of Hospital Medicine  Contact via [employer] Teams  Office: [phone]

## 2022-06-09 NOTE — PROVIDER CONTACT NOTE (OTHER) - DATE AND TIME:
30-May-2022 14:33
02-Jun-2022 09:46
03-Jun-2022 13:25
05-Jun-2022 06:20
04-Jun-2022 23:00
08-Jun-2022 04:21
09-Jun-2022 04:20
02-Jun-2022 06:40
09-Jun-2022 04:31
04-Jun-2022 22:45

## 2022-06-09 NOTE — PROGRESS NOTE ADULT - SUBJECTIVE AND OBJECTIVE BOX
[facility] DIVISION OF KIDNEY DISEASES AND HYPERTENSION -- FOLLOW UP NOTE  --------------------------------------------------------------------------------  24 hour events/subjective: Patient seen & examined. Vitals/ labs/ medications reviewed. Pt. extubated and downgraded to floors. Now tolerating PO intake. Pt. seen this AM. Labs drawn yesterday but no Tacro level drawn. Labs delayed again today because of difficulty obtaining. Pt. endorses improvement of facial swelling and LUE swelling.    PAST HISTORY  --------------------------------------------------------------------------------  No significant changes to PMH, PSH, FHx, SHx, unless otherwise noted    ALLERGIES & MEDICATIONS  --------------------------------------------------------------------------------  Allergies    contrast media (iodine-based) (Urticaria)  ibuprofen (Hives)  ibuprofen/morphine (Unknown)  lactose (Hives)  latex (Swelling)  morphine (Anaphylaxis)  morphine (Urticaria)  penicillin (Anaphylaxis)  shellfish (Urticaria)    Intolerances      Standing Inpatient Medications  amLODIPine   Tablet 5 milliGRAM(s) Oral daily  aspirin enteric coated 81 milliGRAM(s) Oral daily  chlorhexidine 2% Cloths 1 Application(s) Topical daily  clopidogrel Tablet 75 milliGRAM(s) Oral daily  pantoprazole    Tablet 40 milliGRAM(s) Oral every 12 hours  predniSONE   Tablet 5 milliGRAM(s) Oral daily  tacrolimus ER Tablet (ENVARSUS XR) 8 milliGRAM(s) Oral daily    PRN Inpatient Medications  acetaminophen     Tablet .. 650 milliGRAM(s) Oral every 6 hours PRN  sodium chloride 0.65% Nasal 1 Spray(s) Both Nostrils five times a day PRN      REVIEW OF SYSTEMS  --------------------------------------------------------------------------------  Gen: No fevers/chills  Respiratory: No dyspnea, cough, breathing improved  CV: No chest pain, PND, orthopnea  GI: No abdominal pain, diarrhea, constipation, nausea, vomiting  Transplant: No pain  : No increased frequency, dysuria, hematuria   MSK:+ edema improving  Neuro: No dizziness/lightheadedness    All other systems were reviewed and are negative, except as noted.    VITALS/PHYSICAL EXAM  --------------------------------------------------------------------------------  T(C): 36.7 (06-09-22 @ 05:00), Max: 36.8 (06-08-22 @ 13:00)  HR: 89 (06-09-22 @ 05:00) (62 - 89)  BP: 152/93 (06-09-22 @ 05:00) (143/80 - 174/92)  RR: 18 (06-09-22 @ 05:00) (18 - 20)  SpO2: 98% (06-09-22 @ 05:00) (97% - 100%)  Wt(kg): --        06-08-22 @ 07:01  -  06-09-22 @ 07:00  --------------------------------------------------------  IN: 1320 mL / OUT: 0 mL / NET: 1320 mL      Physical Exam:  	Gen: NAD, able to speak in full sentences   	HEENT: PERRL, MMM, facial edema improving  	Pulm: no crackles  	CV: RRR, S1S2+  	Abd: +BS, soft          Transplant: No tenderness, swelling  	: No suprapubic tenderness  	MSK: LUE edema improving, no LE edema   	Psych: Normal affect and mood  	Skin: Warm          Access: No IV access    LABS/STUDIES  --------------------------------------------------------------------------------              7.5    7.71  >-----------<  272      [06-08-22 @ 14:52]              25.0     139  |  107  |  13  ----------------------------<  112      [06-08-22 @ 14:52]  4.1   |  20  |  0.88        Ca     9.8     [06-08-22 @ 14:52]    TPro  6.2  /  Alb  3.7  /  TBili  0.5  /  DBili  x   /  AST  15  /  ALT  14  /  AlkPhos  66  [06-08-22 @ 14:52]              [06-08-22 @ 14:52]    Creatinine Trend:  SCr 0.88 [06-08 @ 14:52]  SCr 1.04 [06-06 @ 11:48]  SCr 1.09 [06-04 @ 16:21]  SCr 1.12 [06-02 @ 09:41]  SCr 1.78 [06-01 @ 11:15]    Tacrolimus (), Serum: 1.8 ng/mL (06-06 @ 06:42)  Tacrolimus (), Serum: 5.3 ng/mL (06-04 @ 16:21)      Urinalysis - [05-30-22 @ 09:08]      Color Light Yellow / Appearance Clear / SG 1.026 / pH 6.5      Gluc Negative / Ketone Trace  / Bili Negative / Urobili Negative       Blood Negative / Protein Negative / Leuk Est Moderate / Nitrite Negative      RBC 1 / WBC 2 / Hyaline 0 / Gran  / Sq Epi  / Non Sq Epi 2 / Bacteria Negative      Iron 17, TIBC 362, %sat 5      [06-08-22 @ 14:52]  Ferritin 27      [06-08-22 @ 14:52]    HBsAb Reactive      [05-31-22 @ 14:03]  HBsAg Nonreact      [05-31-22 @ 14:03]  HBcAb Reactive      [05-31-22 @ 14:03]  HCV 0.06, Nonreact      [05-31-22 @ 14:03]    KAYLA: titer Negative, pattern --      [05-31-22 @ 14:03]  Free Light Chains: kappa 1.63, lambda 1.09, ratio = 1.50      [05-31 @ 14:03]     [facility] DIVISION OF KIDNEY DISEASES AND HYPERTENSION -- FOLLOW UP NOTE  --------------------------------------------------------------------------------  24 hour events/subjective: Patient seen & examined. Vitals/ labs/ medications reviewed. Pt. extubated and downgraded to floors. Now tolerating PO intake. Pt. seen this AM. Labs drawn yesterday but no Tacro level drawn. Labs delayed again today because of difficulty obtaining. Pt. endorses improvement of facial swelling and LUE swelling.    PAST HISTORY  --------------------------------------------------------------------------------  No significant changes to PMH, PSH, FHx, SHx, unless otherwise noted    ALLERGIES & MEDICATIONS  --------------------------------------------------------------------------------  Allergies  contrast media (iodine-based) (Urticaria)  ibuprofen (Hives)  ibuprofen/morphine (Unknown)  lactose (Hives)  latex (Swelling)  morphine (Anaphylaxis)  morphine (Urticaria)  penicillin (Anaphylaxis)  shellfish (Urticaria)      Standing Inpatient Medications  amLODIPine   Tablet 5 milliGRAM(s) Oral daily  aspirin enteric coated 81 milliGRAM(s) Oral daily  chlorhexidine 2% Cloths 1 Application(s) Topical daily  clopidogrel Tablet 75 milliGRAM(s) Oral daily  pantoprazole    Tablet 40 milliGRAM(s) Oral every 12 hours  predniSONE   Tablet 5 milliGRAM(s) Oral daily  tacrolimus ER Tablet (ENVARSUS XR) 8 milliGRAM(s) Oral daily    PRN Inpatient Medications  acetaminophen     Tablet .. 650 milliGRAM(s) Oral every 6 hours PRN  sodium chloride 0.65% Nasal 1 Spray(s) Both Nostrils five times a day PRN      REVIEW OF SYSTEMS  --------------------------------------------------------------------------------  Gen: No fevers/chills  Respiratory: No dyspnea, cough, breathing improved  CV: No chest pain, PND, orthopnea  GI: No abdominal pain, diarrhea, constipation, nausea, vomiting  Transplant: No pain  : No increased frequency, dysuria, hematuria   MSK:+ edema improving  Neuro: No dizziness/lightheadedness    All other systems were reviewed and are negative, except as noted.    VITALS/PHYSICAL EXAM  --------------------------------------------------------------------------------  T(C): 36.7 (06-09-22 @ 05:00), Max: 36.8 (06-08-22 @ 13:00)  HR: 89 (06-09-22 @ 05:00) (62 - 89)  BP: 152/93 (06-09-22 @ 05:00) (143/80 - 174/92)  RR: 18 (06-09-22 @ 05:00) (18 - 20)  SpO2: 98% (06-09-22 @ 05:00) (97% - 100%)      06-08-22 @ 07:01  -  06-09-22 @ 07:00  --------------------------------------------------------  IN: 1320 mL / OUT: 0 mL / NET: 1320 mL      Physical Exam:  	Gen: NAD, able to speak in full sentences   	HEENT: PERRL, MMM, facial edema improving  	Pulm: no crackles  	CV: RRR, S1S2+  	Abd: +BS, soft          Transplant: No tenderness, swelling  	: No suprapubic tenderness  	MSK: LUE edema improving, no LE edema   	Psych: Normal affect and mood  	Skin: Warm          Access: No IV access    LABS/STUDIES  --------------------------------------------------------------------------------              7.5    7.71  >-----------<  272      [06-08-22 @ 14:52]              25.0     139  |  107  |  13  ----------------------------<  112      [06-08-22 @ 14:52]  4.1   |  20  |  0.88        Ca     9.8     [06-08-22 @ 14:52]    TPro  6.2  /  Alb  3.7  /  TBili  0.5  /  DBili  x   /  AST  15  /  ALT  14  /  AlkPhos  66  [06-08-22 @ 14:52]              [06-08-22 @ 14:52]    Creatinine Trend:  SCr 0.88 [06-08 @ 14:52]  SCr 1.04 [06-06 @ 11:48]  SCr 1.09 [06-04 @ 16:21]  SCr 1.12 [06-02 @ 09:41]  SCr 1.78 [06-01 @ 11:15]    Tacrolimus (), Serum: 1.8 ng/mL (06-06 @ 06:42)  Tacrolimus (), Serum: 5.3 ng/mL (06-04 @ 16:21)      Urinalysis - [05-30-22 @ 09:08]      Color Light Yellow / Appearance Clear / SG 1.026 / pH 6.5      Gluc Negative / Ketone Trace  / Bili Negative / Urobili Negative       Blood Negative / Protein Negative / Leuk Est Moderate / Nitrite Negative      RBC 1 / WBC 2 / Hyaline 0 / Gran  / Sq Epi  / Non Sq Epi 2 / Bacteria Negative      Iron 17, TIBC 362, %sat 5      [06-08-22 @ 14:52]  Ferritin 27      [06-08-22 @ 14:52]    HBsAb Reactive      [05-31-22 @ 14:03]  HBsAg Nonreact      [05-31-22 @ 14:03]  HBcAb Reactive      [05-31-22 @ 14:03]  HCV 0.06, Nonreact      [05-31-22 @ 14:03]    KAYLA: titer Negative, pattern --      [05-31-22 @ 14:03]  Free Light Chains: kappa 1.63, lambda 1.09, ratio = 1.50      [05-31 @ 14:03]

## 2022-06-09 NOTE — PROVIDER CONTACT NOTE (OTHER) - RECOMMENDATIONS
Hold AM labs
ACP to bedside to assess?
push morning tacro medication? put pt on phlebotomy board?
Notify provider.
call ICU to ensure ICU ACP comes to place ultrasound guided IV
call dayshift MICU ACP to place U/S guided IV
needs IV access for CT angio
Hold tacro
Notify provider, possible central line for blood draws etc, possibility of blood transfusions? Pt needs tacro level for tacro dose scheduled for 10am. Phlebotomy and bedside RNs unsuccessful at blood

## 2022-06-09 NOTE — DISCHARGE NOTE PROVIDER - NSDCCPCAREPLAN_GEN_ALL_CORE_FT
PRINCIPAL DISCHARGE DIAGNOSIS  Diagnosis: Acute upper GI bleed  Assessment and Plan of Treatment: resolved   c/w PPI TWICE A DAY      SECONDARY DISCHARGE DIAGNOSES  Diagnosis: HTN (hypertension)  Assessment and Plan of Treatment: Follow up with your medical doctor to establish long term blood pressure treatment goals.      Diagnosis: Anemia due to acute blood loss  Assessment and Plan of Treatment: C/W Iron pills   please follow up with DR Negron

## 2022-06-09 NOTE — PROGRESS NOTE ADULT - ASSESSMENT
52F w/ ESRD s/p HepC DDRT, chronic SVC syndrome s/p L cephalic-iliac vein bypass on DAPT, s/p thrombectomy of L iliac vein and graft 2012, Hx BK viremia, HTN presents as transfer from [facility] ED for hematemesis 2/2 acute upper GI hemorrhage admitted to MICU for elective intubation and urgent endoscopy with GI s/p endoscopy with evidence of non-bleeding ulcers & esophageal varices

## 2022-06-09 NOTE — DISCHARGE NOTE PROVIDER - CARE PROVIDERS DIRECT ADDRESSES
,[email or web address]

## 2022-06-09 NOTE — DISCHARGE NOTE PROVIDER - PROVIDER TOKENS
PROVIDER:[TOKEN:[[id number]],ESTABLISHEDPATIENT:[T]] PROVIDER:[TOKEN:[[id number]],ESTABLISHEDPATIENT:[T]],PROVIDER:[TOKEN:[[id number]]]

## 2022-06-09 NOTE — PROGRESS NOTE ADULT - ATTENDING COMMENTS
52 yr old woman with ESRD s/p DDRT in 2020 followed by Dr. Lake, baseline creatinine of 1mg/dL.   On immunosuppression with Envarsus, prednisone and on leflunomide due to h/o BK viremia.   H/o chronic SVC syndrome s/p L cephalic-iliac vein bypass on DAPT, s/p thrombectomy of L iliac vein and graft 2012    Admitted with hematemesis. EGD showed Esophageal varices and non bleeding antral ulcer. Antiplatelets stopped, started IV Protonix.   Received 3 units PRBC on 5/30 and 1 unit on 6/1.    UE and facial swelling. US showed left subclavian, axillary, basilic and cephalic vein thrombosis.     Feels well today. Resumed Aspirin and Plavix yesterday. No hematemesis.   LUE and facial swelling stable.    1.  Renal transplant in 2020, renal function stable. Creatinine  0.8mg/dL yesterday.   2.  Immunosuppression - Continue Envarsus 8mg po daily.  Continue prednisone 5mg po daily. Serum BK PCR negative. No need to resume leflunomide.     3. Anemia due to upper GI bleed. EGD with varices and antral ulcer - ulcer thought to be source of bleeding. Protonix changed to 40mg po bid.      S/p 4 units PRBC since admission. Hematemesis has resolved. Hemodynamically stable. Hgb 7.5 yesterday.      No evidence of cirrhosis (normal liver US and elastography), varices possibly downhill from SVC syndrome.    4. SVC syndrome - s/p L cephalic-iliac vein bypass on DAPT. Aspirin and Plavix were held for GI bleed, resumed yesterday. No hematemesis. Would check CBC today prior to discharge.     5. Iron deficiency anemia - Tsat 5%. IV iron would be best but pt does not have IV access. Would start po iron 325mg po bid.    6. Hypertension - uncontrolled.  Increase Amlodipine to 10mg po daily 52 yr old woman with ESRD s/p DDRT in 2020 followed by Dr. Lake, baseline creatinine of 1mg/dL.   On immunosuppression with Envarsus, prednisone and on leflunomide due to h/o BK viremia.   H/o chronic SVC syndrome s/p L cephalic-iliac vein bypass on DAPT, s/p thrombectomy of L iliac vein and graft 2012    Admitted with hematemesis. EGD showed Esophageal varices and non bleeding antral ulcer. Antiplatelets stopped, started IV Protonix.   Received 3 units PRBC on 5/30 and 1 unit on 6/1.    UE and facial swelling. US showed left subclavian, axillary, basilic and cephalic vein thrombosis.     Feels well today. Resumed Aspirin and Plavix yesterday. No hematemesis.   LUE and facial swelling stable. RUE AVF + thrill.     1.  Renal transplant in 2020, renal function stable. Creatinine  0.8mg/dL yesterday.   2.  Immunosuppression - Continue Envarsus 8mg po daily.  Continue prednisone 5mg po daily. Serum BK PCR negative. No need to resume leflunomide.     3. Anemia due to upper GI bleed. EGD with varices and antral ulcer - ulcer thought to be source of bleeding. Protonix changed to 40mg po bid.      S/p 4 units PRBC since admission. Hematemesis has resolved. Hemodynamically stable. Hgb 7.5 yesterday.      No evidence of cirrhosis (normal liver US and elastography), varices possibly downhill from SVC syndrome.    4. SVC syndrome - s/p L cephalic-iliac vein bypass on DAPT. Aspirin and Plavix were held for GI bleed, resumed yesterday. No hematemesis. Would check CBC today prior to discharge. Will ask HD nurse to draw labs from AVF    5. Iron deficiency anemia - Tsat 5%. IV iron would be best but pt does not have IV access. Would start po iron 325mg po bid.    6. Hypertension - uncontrolled.  Increase Amlodipine to 10mg po daily

## 2022-06-09 NOTE — CHART NOTE - NSCHARTNOTEFT_GEN_A_CORE
Informed of infiltrated IV. Patient noted with ?swelling. Denies pain/numbness/tingling, neural-sensory intact, 2+ pulses, warm well perfused extremity with appropriate strength, and no signs of rash. Noted with ecchymotic area.   Instructed RN to follow guidelines for infiltration (hot/cold pack PRN, elevate extremity to reduce swelling), monitor site for worsening symptomology (worsening swelling/pain/numbness/tingling) and to notify provider should this occur; monitor site closely.   Will endorse to day provider, attending to follow.      Radha Navarro PA-C  Medicine Informed of infiltrated IV of the LUE. Patient noted with minimal swelling. Denies pain/numbness/tingling, neural-sensory intact, 2+ pulses, warm well perfused extremity with appropriate strength, and no signs of rash. Noted with small, dime-sized ecchymotic area at the site of infiltration. At the time of infiltration, pRBC were running and there was 20cc remaining in bag. Instructed RN to follow guidelines for infiltration (hot/cold pack PRN, elevate extremity to reduce swelling), monitor site for worsening symptomology (worsening swelling/pain/numbness/tingling) and to notify provider should this occur; monitor site closely. PRN Tylenol for pain. Will endorse to day provider.    Radha Navarro PA-C  Medicine Informed of infiltrated IV of the LUE. Patient noted with minimal swelling. Denies pain/numbness/tingling, neural-sensory intact, 2+ pulses, warm well perfused extremity with appropriate strength, and no signs of rash. Noted with small, dime-sized ecchymotic area at the site of infiltration. At the time of infiltration, pRBC were running through 22G w/ 20cc left in infusion. Instructed RN to follow guidelines for infiltration (hot/cold pack PRN, elevate extremity to reduce swelling), monitor site for worsening symptomology (worsening swelling/pain/numbness/tingling) and to notify provider should this occur; monitor site closely. PRN Tylenol for pain. Of note, patient with chronically occluded L arm access/SVC syndrome, following up outpatient as patient asymptomatic. Will endorse to day provider.    Radha Navarro PA-C  Medicine

## 2022-06-09 NOTE — PROVIDER CONTACT NOTE (OTHER) - ASSESSMENT
Celina CHNI said she gave ICU her number, and they told her "whenever shes available she'll come."  I attended the [facility] staffing meeting from 0330am to 0410am with JESUS Montanez and Selvin Streeter SICU ANM, who were made aware of the need for a new IV and Selvin said she would come see the patient for an IV after she finished her paperwork. I called Celina at 0445am to tell her that no one had come to place the IV from ICU, and Celina said "they have my number they said they would call". At 0505am, I called MICU to see if the ACP was there and I was told she had gone home, and the call disconnected. I immediately called Celina to notify her that the overnight MICU ACP was gone, and she stated "they will have to do it in the morning". I explained the patient had been receiving all of their pre-medications for the IV contrast and we should not delay testing or treatment, and asked her to call the current MICU ACP.
Pt A+Ox4, VSS, denies CP; no c/o pain, discomfort, or distress.
patient's LUE very swollen from previously infiltrated arrow IV. patient requires IV access for CT angio with IV contrast, receiving premedication
Pt in bed c/o nausea. Pt filled emesis basin to ~4L line of bloody emesis with clots. Pt with dark tarry BM at the same time. Pt stated she feels lightheaded and lethargic after episode. /77 than 93/59, HR tachy to 115, O2 sat high 90s.
Last Tacro level 1.8 on 6/6/22
Pt a&o x4. VS as charted. RN and 2 PCA's tried to draw patients morning labs but unsuccessful.
Pt has RAVF and L arm thrombosis
\Pt A+Ox4, VSS, denies CP; no c/o pain, discomfort, or distress. Pt has R arm precautions, L arm is swollen, pt complaining of pain from blood sticks and blood gas attempts. Refusing any more blood draws, asking for a PICC line.
patient sent to CT scan. CT scan called [facility] to say IV was unable to flush for them. patient was sent back to [facility] and I made Celina CHIN aware.
Last Tacro resulted on 6/6/22 was 1.8
Pt has L arm swelling

## 2022-06-09 NOTE — PROGRESS NOTE ADULT - PROBLEM SELECTOR PLAN 1
s/p DDRT performed in 2020 by Dr. Monroy. Allograft function at baseline. Outpatient nephrologist is Dr. Lake.  Swelling now improving with elevation. Vascular consult appreciated. 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 No more episodes of bleeding HgB remains stable. BP remains elevated. Pt. to be transitioned to Eliquis on discharge. s/p DDRT performed in 2020 by Dr. Monroy. Allograft function at baseline. Outpatient nephrologist is Dr. Lake.  Swelling now improving with elevation. Vascular consult appreciated. 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 No more episodes of bleeding HgB remains stable. BP remains elevated.

## 2022-06-09 NOTE — PROGRESS NOTE ADULT - PROBLEM SELECTOR PLAN 2
Hx BK viremia, f/u PCR to assesses if we should resume leflunomide. Last BK PCR as outpatient was in 2021.   BK PCR negative, can hold Leflunomide.   Continue with prednisone 5 mg  Continue envarsus 8mg Daily     Check Tacro level Daily 30min before AM dose. Goal 3-6  Last level 1.8 (6/6)

## 2022-06-09 NOTE — DISCHARGE NOTE PROVIDER - CARE PROVIDER_API CALL
Geoffrey Lake (MD)  Internal Medicine; Nephrology  [address] [place] [address]  Phone: [phone]  Fax: [phone]  Established Patient  Follow Up Time:    Geoffrey Lake)  Internal Medicine; Nephrology  [address] [place] [address]  Phone: [phone]  Fax: [phone]  Established Patient  Follow Up Time:     Elin Negron)  [facility]  [address] [place] [address]  Phone: [phone]  Fax: [phone]  Follow Up Time:

## 2022-06-09 NOTE — PROVIDER CONTACT NOTE (OTHER) - NAME OF MD/NP/PA/DO NOTIFIED:
Bree Muse
Bree CHIN
Celina CHIN
Celina CHIN
EDSON REDD
GIUSEPPE LOREDO
Celina CHIN
NITIN Lerma
Annette CHIN
GIUSEPPE Lovell
Mary Kay Pruett

## 2022-06-09 NOTE — PROVIDER CONTACT NOTE (OTHER) - ACTION/TREATMENT ORDERED:
Hold AM Labs put pt on Phlebotomy board
Hold AM labs, f/u w/ day team
Celina CHIN will call IR and escalate to day team
Will contact ACP in MICU for ultrasound IV
put patient on phlebotomy board and push 6am tacro medication until lab is drawn
ICU RN to come place U/S guided IV
Provider notified. Possibly PICC to be placed by IR. Will continue to monitor patient and maintain patient safety. Instructed to give tacro even without tacro level being drawn.
Hold AM labs
ACP at bedside. CBC ordered and sent. Zofran given with relief. awaiting more orders at this time. safety maintained.
Give AM tacro as per EMAR
Provider notified. Instructed to administer tacrolimus despite no tacro level. Pt refusing all lab draws.

## 2022-06-09 NOTE — PROGRESS NOTE ADULT - NS ATTEND AMEND GEN_ALL_CORE FT
Recommend prophylactic dose of anticoagulation to start if stable, then will plan to up-titrate dose as tolerated outpatient. Appreciate vascular recommendations.
pt with hemoptysis seems to have resolved. with upper ext thrombosis off AC at this time. please check cbc to ensure hg is stable. can consider starting PPX lovenox at 40mg daily and may uptitrate as outpt. our team discussed with primary hematologist
53 y/o female transferred to [place] with hematemesis. Previously followed by Dr. Oliva for erythrocytosis. Hospital course notable for esophageal varices and severe anemia.     - Monitor CBC and transfuse to maintain hemoglobin > 7.  - If no infectious contraindications to intravenous iron, would recommend IV iron sucrose 200 mg * 3 days. Can be done as outpatient if hindering discharge.   - Will follow-up with interventional radiology as outpatient for SVC syndrome.

## 2022-06-09 NOTE — DISCHARGE NOTE PROVIDER - NSDCFUADDAPPT_GEN_ALL_CORE_FT
APPTS ARE READY TO BE MADE: [x ] YES    Best Family or Patient Contact (if needed):    Additional Information about above appointments (if needed):    1: Please follow up with PCP with in a week   2:   3:     Other comments or requests:    APPTS ARE READY TO BE MADE: [x ] YES    Best Family or Patient Contact (if needed):    Additional Information about above appointments (if needed):    1: Please follow up with PCP with in a week   2:   3:     Other comments or requests:   						                                                                                  Patient was provided with follow up request details and was advised to call to schedule follow up within specified time frame.

## 2022-06-09 NOTE — PROVIDER CONTACT NOTE (CRITICAL VALUE NOTIFICATION) - ASSESSMENT
h/h 6.6/21.4
pt is aaox4; vss. c/o dizziness when oob. pt denied other discomforts or distress at this time.
No s/s of bleeding. VSS. Pt sleeping comfortably in bed. Pt has L22G only. Pending IR placement of PICC line.
patient A/Ox4, VS as charted.

## 2022-06-09 NOTE — PROGRESS NOTE ADULT - ASSESSMENT
HPI:  52F w/ ESRD s/p HepC DDRT, chronic SVC syndrome s/p L cephalic-iliac vein bypass on DAPT, s/p thrombectomy of L iliac vein and graft 2012, Hx BK viremia, HTN presents as transfer from [facility] ED for hematemesis. The patient reports that on day of presentation she suddenly began have large volume bloody emesis 2-3 times and several episodes of black stool. She reported worsening of chronic LUQ abdominal pain, moderate severity, vague in character, no reported radiation, not worsened with eating, no clear intervention to improve. The patient denies history of stomach ulcer or NSAID use. Chronic abdominal pain reported for approximately 2mo but not as severe. The patient at [facility] received pantoprazole 80mg IVx1 and was transported via ambulance which during that time received 1uPRBC. The patient reports using aspirin and clopidogrel for unclear reason but per chart appears to be for chronic SVC syndrome.    Patient cannot recall all medications and therefore medrec per EMR outpatient data  - appears transplant medication titrated to envarsus XR 4mg x2, envarsus XR 1mg x2, Leflunomide 20mg x2 d, prednisone 5mg d (30 May 2022 06:02)    Hematology/Oncology consulted d/t patients history of erythrocytosis. Patient is under the care of Dr. Oliva of [facility].     Patient with history of CKD s/p a kidney transplant. Initially referred to hematology by MD Lake, a nephrologist, d/t abnormal lab results. Blood work performed at the outside facility indicated that her Hgb was 17.7.  Repeat blood work done in-office found that patient's Hgb dropped to16.7.   DAMIR­2 Negative. Has high calcium with high PTH and related to renal issues as per Dr Lake,      Erythrocytosis  -- longstanding history  -- Jak2 negative  -- while outpatient was phlebotomized to keep Hgb <14  -- will follow up with Dr Oliva of [facility] after discharge    Anemia due to acute blood loss   -- UGI bleed, esophageal varices, esophagitis & gastric ulcers  -- Esophageal varices likely 2/2 central vein occlusion as seen on prior imaging  --continue with ppi BID  --folate (18.3), ferritin (27), LDH (296), B12 (575), and iron %sat (<5%)  --haptoglobin (260), LDH (296)  --GI already consulted  --CT C/A/P done 6/2: negative for cirrhosis  --please transfuse for HGB <7    SVC Syndrome  -- h/o bypass surgeries on DAPT (asa and plavix) at home   -- continue to hold DAPT for now  -- vascular consult appreciated, MR venogram as nephrology would prefer avoiding IV contrast agent  -- bilateral UE duplex, done 6/6.  IMPRESSION:  ·	The left subclavian and axillary veins are thrombosed.  ·	The left basilic and cephalic veins are thrombosed  ·	Cystic nodules are identified in the left lobe of the thyroid gland.  Impression:  ·	On the left there is a thrombosed brachial cephalic fistula.  ·	There is a thrombosed dialysis access graft.  ·	In the right upper extremity there are 2 dialysis access grafts; one thrombosed and 1 patent.The flow volume the patent graft measured 800 mL/m.      Thank you for the opportunity to participate in Mrs Jacobo's care.    Joycelyn Delcid NP  Hematology/ Oncology  [facility]  [phone] (office)  [phone] (alt office)  Evenings and weekends please call MD on call or office   HPI:  52F w/ ESRD s/p HepC DDRT, chronic SVC syndrome s/p L cephalic-iliac vein bypass on DAPT, s/p thrombectomy of L iliac vein and graft 2012, Hx BK viremia, HTN presents as transfer from [facility] ED for hematemesis. The patient reports that on day of presentation she suddenly began have large volume bloody emesis 2-3 times and several episodes of black stool. She reported worsening of chronic LUQ abdominal pain, moderate severity, vague in character, no reported radiation, not worsened with eating, no clear intervention to improve. The patient denies history of stomach ulcer or NSAID use. Chronic abdominal pain reported for approximately 2mo but not as severe. The patient at [facility] received pantoprazole 80mg IVx1 and was transported via ambulance which during that time received 1uPRBC. The patient reports using aspirin and clopidogrel for unclear reason but per chart appears to be for chronic SVC syndrome.    Patient cannot recall all medications and therefore medrec per EMR outpatient data  - appears transplant medication titrated to envarsus XR 4mg x2, envarsus XR 1mg x2, Leflunomide 20mg x2 d, prednisone 5mg d (30 May 2022 06:02)    Hematology/Oncology consulted d/t patients history of erythrocytosis. Patient is under the care of Dr. Oliva of [facility].     Patient with history of CKD s/p a kidney transplant. Initially referred to hematology by MD Lake, a nephrologist, d/t abnormal lab results. Blood work performed at the outside facility indicated that her Hgb was 17.7.  Repeat blood work done in-office found that patient's Hgb dropped to16.7.   DAMIR­2 Negative. Has high calcium with high PTH and related to renal issues as per Dr Lake,      Erythrocytosis  -- longstanding history  -- Jak2 negative  -- while outpatient was phlebotomized to keep Hgb <14  -- will follow up with Dr Oliva of [facility] after discharge    Anemia due to acute blood loss   -- UGI bleed, esophageal varices, esophagitis & gastric ulcers  -- Esophageal varices likely 2/2 central vein occlusion as seen on prior imaging  --continue with ppi BID  --folate (18.3), ferritin (27), LDH (296), B12 (575), and iron %sat (<5%)  --haptoglobin (260), LDH (296)  --GI already consulted  --CT C/A/P done 6/2: negative for cirrhosis  --please transfuse for HGB <7  -- when bleeding resolves we recommend prophylactic anticoagulation with enoxaparin 40 mg while in-hospital and than to transition to Eliquis 2.5mg upon hospital discharge   -- patient will f/up with hematologist Dr Oliva of [facility] for Eliquis titration outpatient     SVC Syndrome  -- h/o bypass surgeries on DAPT (asa and plavix) at home   -- continue to hold DAPT for now  -- vascular consult appreciated, MR venogram as nephrology would prefer avoiding IV contrast agent  -- bilateral UE duplex, done 6/6.  IMPRESSION:  ·	The left subclavian and axillary veins are thrombosed.  ·	The left basilic and cephalic veins are thrombosed  ·	Cystic nodules are identified in the left lobe of the thyroid gland.  Impression:  ·	On the left there is a thrombosed brachial cephalic fistula.  ·	There is a thrombosed dialysis access graft.  ·	In the right upper extremity there are 2 dialysis access grafts; one thrombosed and 1 patent. The flow volume the patent graft measured 800 mL/m.      Thank you for the opportunity to participate in Mrs Jacobo's care.    Joycelyn Delcid NP  Hematology/ Oncology  [facility]  [phone] (office)  [phone] (alt office)  Evenings and weekends please call MD on call or office

## 2022-06-09 NOTE — DISCHARGE NOTE PROVIDER - NSDCMRMEDTOKEN_GEN_ALL_CORE_FT
amLODIPine 5 mg oral tablet: 1 tab(s) orally once a day  Aspirin Enteric Coated 81 mg oral delayed release tablet: 1 tab(s) orally once a day  cinacalcet 30 mg oral tablet: 1 tab(s) orally 2 times a day  clopidogrel 75 mg oral tablet: 1 tab(s) orally once a day  Envarsus XR 1 mg oral tablet, extended release: 2 tab(s) orally once a day (in the morning)  Envarsus XR 4 mg oral tablet, extended release: 2 tab(s) orally once a day (in the morning)  famotidine 20 mg oral tablet: 1 tab(s) orally once a day  FeroSul 325 mg (65 mg elemental iron) oral tablet: 1 tab(s) orally 2 times a day   leflunomide 20 mg oral tablet: 2 tab(s) orally once a day  predniSONE 5 mg oral tablet: 1 tab(s) orally once a day  rosuvastatin 5 mg oral tablet: 1 tab(s) orally once a day  sulfamethoxazole-trimethoprim 400 mg-80 mg oral tablet: 1 tab(s) orally once a day   amLODIPine 5 mg oral tablet: 1 tab(s) orally once a day  Aspirin Enteric Coated 81 mg oral delayed release tablet: 1 tab(s) orally once a day  cinacalcet 30 mg oral tablet: 1 tab(s) orally 2 times a day  clopidogrel 75 mg oral tablet: 1 tab(s) orally once a day  Envarsus XR 1 mg oral tablet, extended release: 2 tab(s) orally once a day (in the morning)  Envarsus XR 4 mg oral tablet, extended release: 2 tab(s) orally once a day (in the morning)  FeroSul 325 mg (65 mg elemental iron) oral tablet: 1 tab(s) orally 2 times a day   pantoprazole 40 mg oral delayed release tablet: 1 tab(s) orally every 12 hours  predniSONE 5 mg oral tablet: 1 tab(s) orally once a day  rosuvastatin 5 mg oral tablet: 1 tab(s) orally once a day   amLODIPine 5 mg oral tablet: 1 tab(s) orally once a day  Aspirin Enteric Coated 81 mg oral delayed release tablet: 1 tab(s) orally once a day  cinacalcet 30 mg oral tablet: 1 tab(s) orally 2 times a day  clopidogrel 75 mg oral tablet: 1 tab(s) orally once a day  Envarsus XR 4 mg oral tablet, extended release: 2 tab(s) orally once a day (in the morning)  FeroSul 325 mg (65 mg elemental iron) oral tablet: 1 tab(s) orally 2 times a day   pantoprazole 40 mg oral delayed release tablet: 1 tab(s) orally every 12 hours  predniSONE 5 mg oral tablet: 1 tab(s) orally once a day  rosuvastatin 5 mg oral tablet: 1 tab(s) orally once a day

## 2022-06-10 ENCOUNTER — TRANSCRIPTION ENCOUNTER (OUTPATIENT)
Age: 52
End: 2022-06-10

## 2022-06-10 VITALS
RESPIRATION RATE: 20 BRPM | OXYGEN SATURATION: 96 % | TEMPERATURE: 98 F | HEART RATE: 86 BPM | DIASTOLIC BLOOD PRESSURE: 85 MMHG | SYSTOLIC BLOOD PRESSURE: 142 MMHG

## 2022-06-10 LAB
BASOPHILS # BLD AUTO: 0.03 K/UL — SIGNIFICANT CHANGE UP (ref 0–0.2)
BASOPHILS NFR BLD AUTO: 0.4 % — SIGNIFICANT CHANGE UP (ref 0–2)
EOSINOPHIL # BLD AUTO: 0.2 K/UL — SIGNIFICANT CHANGE UP (ref 0–0.5)
EOSINOPHIL NFR BLD AUTO: 2.9 % — SIGNIFICANT CHANGE UP (ref 0–6)
HCT VFR BLD CALC: 25 % — LOW (ref 34.5–45)
HGB BLD-MCNC: 7.8 G/DL — LOW (ref 11.5–15.5)
IMM GRANULOCYTES NFR BLD AUTO: 0.6 % — SIGNIFICANT CHANGE UP (ref 0–1.5)
LYMPHOCYTES # BLD AUTO: 0.67 K/UL — LOW (ref 1–3.3)
LYMPHOCYTES # BLD AUTO: 9.7 % — LOW (ref 13–44)
MCHC RBC-ENTMCNC: 27.8 PG — SIGNIFICANT CHANGE UP (ref 27–34)
MCHC RBC-ENTMCNC: 31.2 GM/DL — LOW (ref 32–36)
MCV RBC AUTO: 89 FL — SIGNIFICANT CHANGE UP (ref 80–100)
MONOCYTES # BLD AUTO: 0.59 K/UL — SIGNIFICANT CHANGE UP (ref 0–0.9)
MONOCYTES NFR BLD AUTO: 8.5 % — SIGNIFICANT CHANGE UP (ref 2–14)
NEUTROPHILS # BLD AUTO: 5.38 K/UL — SIGNIFICANT CHANGE UP (ref 1.8–7.4)
NEUTROPHILS NFR BLD AUTO: 77.9 % — HIGH (ref 43–77)
NRBC # BLD: 0 /100 WBCS — SIGNIFICANT CHANGE UP (ref 0–0)
PLATELET # BLD AUTO: 288 K/UL — SIGNIFICANT CHANGE UP (ref 150–400)
RBC # BLD: 2.81 M/UL — LOW (ref 3.8–5.2)
RBC # FLD: 15.8 % — HIGH (ref 10.3–14.5)
TACROLIMUS SERPL-MCNC: 2.4 NG/ML — SIGNIFICANT CHANGE UP
WBC # BLD: 6.91 K/UL — SIGNIFICANT CHANGE UP (ref 3.8–10.5)
WBC # FLD AUTO: 6.91 K/UL — SIGNIFICANT CHANGE UP (ref 3.8–10.5)

## 2022-06-10 PROCEDURE — 83690 ASSAY OF LIPASE: CPT

## 2022-06-10 PROCEDURE — 83605 ASSAY OF LACTIC ACID: CPT

## 2022-06-10 PROCEDURE — 82435 ASSAY OF BLOOD CHLORIDE: CPT

## 2022-06-10 PROCEDURE — 86923 COMPATIBILITY TEST ELECTRIC: CPT

## 2022-06-10 PROCEDURE — 84100 ASSAY OF PHOSPHORUS: CPT

## 2022-06-10 PROCEDURE — 84132 ASSAY OF SERUM POTASSIUM: CPT

## 2022-06-10 PROCEDURE — 36430 TRANSFUSION BLD/BLD COMPNT: CPT

## 2022-06-10 PROCEDURE — P9016: CPT

## 2022-06-10 PROCEDURE — U0005: CPT

## 2022-06-10 PROCEDURE — 97116 GAIT TRAINING THERAPY: CPT

## 2022-06-10 PROCEDURE — 86985 SPLIT BLOOD OR PRODUCTS: CPT

## 2022-06-10 PROCEDURE — 71045 X-RAY EXAM CHEST 1 VIEW: CPT

## 2022-06-10 PROCEDURE — 86803 HEPATITIS C AB TEST: CPT

## 2022-06-10 PROCEDURE — 83735 ASSAY OF MAGNESIUM: CPT

## 2022-06-10 PROCEDURE — 80197 ASSAY OF TACROLIMUS: CPT

## 2022-06-10 PROCEDURE — 93005 ELECTROCARDIOGRAM TRACING: CPT

## 2022-06-10 PROCEDURE — 76981 USE PARENCHYMA: CPT

## 2022-06-10 PROCEDURE — 93971 EXTREMITY STUDY: CPT

## 2022-06-10 PROCEDURE — 82784 ASSAY IGA/IGD/IGG/IGM EACH: CPT

## 2022-06-10 PROCEDURE — 82728 ASSAY OF FERRITIN: CPT

## 2022-06-10 PROCEDURE — 85730 THROMBOPLASTIN TIME PARTIAL: CPT

## 2022-06-10 PROCEDURE — 82330 ASSAY OF CALCIUM: CPT

## 2022-06-10 PROCEDURE — 81001 URINALYSIS AUTO W/SCOPE: CPT

## 2022-06-10 PROCEDURE — 99232 SBSQ HOSP IP/OBS MODERATE 35: CPT | Mod: GC

## 2022-06-10 PROCEDURE — 83550 IRON BINDING TEST: CPT

## 2022-06-10 PROCEDURE — 86677 HELICOBACTER PYLORI ANTIBODY: CPT

## 2022-06-10 PROCEDURE — 86850 RBC ANTIBODY SCREEN: CPT

## 2022-06-10 PROCEDURE — 0225U NFCT DS DNA&RNA 21 SARSCOV2: CPT

## 2022-06-10 PROCEDURE — 82565 ASSAY OF CREATININE: CPT

## 2022-06-10 PROCEDURE — 36415 COLL VENOUS BLD VENIPUNCTURE: CPT

## 2022-06-10 PROCEDURE — C8910: CPT

## 2022-06-10 PROCEDURE — 82803 BLOOD GASES ANY COMBINATION: CPT

## 2022-06-10 PROCEDURE — 83010 ASSAY OF HAPTOGLOBIN QUANT: CPT

## 2022-06-10 PROCEDURE — 85027 COMPLETE CBC AUTOMATED: CPT

## 2022-06-10 PROCEDURE — 74177 CT ABD & PELVIS W/CONTRAST: CPT

## 2022-06-10 PROCEDURE — 86376 MICROSOMAL ANTIBODY EACH: CPT

## 2022-06-10 PROCEDURE — 85014 HEMATOCRIT: CPT

## 2022-06-10 PROCEDURE — 80053 COMPREHEN METABOLIC PANEL: CPT

## 2022-06-10 PROCEDURE — 94002 VENT MGMT INPAT INIT DAY: CPT

## 2022-06-10 PROCEDURE — 86381 MITOCHONDRIAL ANTIBODY EACH: CPT

## 2022-06-10 PROCEDURE — 86038 ANTINUCLEAR ANTIBODIES: CPT

## 2022-06-10 PROCEDURE — 85018 HEMOGLOBIN: CPT

## 2022-06-10 PROCEDURE — 85045 AUTOMATED RETICULOCYTE COUNT: CPT

## 2022-06-10 PROCEDURE — U0003: CPT

## 2022-06-10 PROCEDURE — 97110 THERAPEUTIC EXERCISES: CPT

## 2022-06-10 PROCEDURE — 85025 COMPLETE CBC W/AUTO DIFF WBC: CPT

## 2022-06-10 PROCEDURE — 97161 PT EVAL LOW COMPLEX 20 MIN: CPT

## 2022-06-10 PROCEDURE — 86255 FLUORESCENT ANTIBODY SCREEN: CPT

## 2022-06-10 PROCEDURE — P9011: CPT

## 2022-06-10 PROCEDURE — 85610 PROTHROMBIN TIME: CPT

## 2022-06-10 PROCEDURE — 82746 ASSAY OF FOLIC ACID SERUM: CPT

## 2022-06-10 PROCEDURE — C1751: CPT

## 2022-06-10 PROCEDURE — 87340 HEPATITIS B SURFACE AG IA: CPT

## 2022-06-10 PROCEDURE — 84295 ASSAY OF SERUM SODIUM: CPT

## 2022-06-10 PROCEDURE — 82947 ASSAY GLUCOSE BLOOD QUANT: CPT

## 2022-06-10 PROCEDURE — 87799 DETECT AGENT NOS DNA QUANT: CPT

## 2022-06-10 PROCEDURE — 93990 DOPPLER FLOW TESTING: CPT

## 2022-06-10 PROCEDURE — 94003 VENT MGMT INPAT SUBQ DAY: CPT

## 2022-06-10 PROCEDURE — 83540 ASSAY OF IRON: CPT

## 2022-06-10 PROCEDURE — 99285 EMERGENCY DEPT VISIT HI MDM: CPT | Mod: 25

## 2022-06-10 PROCEDURE — 86900 BLOOD TYPING SEROLOGIC ABO: CPT

## 2022-06-10 PROCEDURE — 86901 BLOOD TYPING SEROLOGIC RH(D): CPT

## 2022-06-10 PROCEDURE — 86704 HEP B CORE ANTIBODY TOTAL: CPT

## 2022-06-10 PROCEDURE — 80048 BASIC METABOLIC PNL TOTAL CA: CPT

## 2022-06-10 PROCEDURE — 93975 VASCULAR STUDY: CPT

## 2022-06-10 PROCEDURE — 99239 HOSP IP/OBS DSCHRG MGMT >30: CPT

## 2022-06-10 PROCEDURE — 82607 VITAMIN B-12: CPT

## 2022-06-10 PROCEDURE — 86706 HEP B SURFACE ANTIBODY: CPT

## 2022-06-10 PROCEDURE — 76705 ECHO EXAM OF ABDOMEN: CPT

## 2022-06-10 PROCEDURE — 83615 LACTATE (LD) (LDH) ENZYME: CPT

## 2022-06-10 RX ORDER — PANTOPRAZOLE SODIUM 20 MG/1
1 TABLET, DELAYED RELEASE ORAL
Qty: 60 | Refills: 0
Start: 2022-06-10 | End: 2022-07-09

## 2022-06-10 RX ORDER — TACROLIMUS 5 MG/1
2 CAPSULE ORAL
Qty: 0 | Refills: 0 | DISCHARGE

## 2022-06-10 RX ORDER — FERROUS SULFATE 325(65) MG
1 TABLET ORAL
Qty: 60 | Refills: 0
Start: 2022-06-10 | End: 2022-07-09

## 2022-06-10 RX ADMIN — Medication 5 MILLIGRAM(S): at 05:43

## 2022-06-10 RX ADMIN — Medication 81 MILLIGRAM(S): at 11:42

## 2022-06-10 RX ADMIN — PANTOPRAZOLE SODIUM 40 MILLIGRAM(S): 20 TABLET, DELAYED RELEASE ORAL at 05:43

## 2022-06-10 RX ADMIN — AMLODIPINE BESYLATE 5 MILLIGRAM(S): 2.5 TABLET ORAL at 05:43

## 2022-06-10 RX ADMIN — CLOPIDOGREL BISULFATE 75 MILLIGRAM(S): 75 TABLET, FILM COATED ORAL at 11:42

## 2022-06-10 RX ADMIN — Medication 325 MILLIGRAM(S): at 05:43

## 2022-06-10 RX ADMIN — CHLORHEXIDINE GLUCONATE 1 APPLICATION(S): 213 SOLUTION TOPICAL at 11:43

## 2022-06-10 RX ADMIN — TACROLIMUS 8 MILLIGRAM(S): 5 CAPSULE ORAL at 10:25

## 2022-06-10 NOTE — DISCHARGE NOTE NURSING/CASE MANAGEMENT/SOCIAL WORK - PATIENT PORTAL LINK FT
You can access the FollowMyHealth Patient Portal offered by Health system by registering at the following website: [email or web address]. By joining [employer]’s FollowMyHealth portal, you will also be able to view your health information using other applications (apps) compatible with our system.

## 2022-06-10 NOTE — PROGRESS NOTE ADULT - PROVIDER SPECIALTY LIST ADULT
Heme/Onc
Gastroenterology
Gastroenterology
Transplant Nephrology
Vascular Surgery
Gastroenterology
Heme/Onc
Hospitalist
Internal Medicine
Gastroenterology
Gastroenterology
Heme/Onc
MICU
Hospitalist
Internal Medicine
Nephrology
Transplant Nephrology
Transplant Nephrology
Hospitalist
Transplant Nephrology
Transplant Nephrology
Hospitalist
Transplant Nephrology
Transplant Nephrology
Hospitalist

## 2022-06-10 NOTE — PROGRESS NOTE ADULT - ATTENDING COMMENTS
52 yr old woman with ESRD s/p DDRT in 2020 followed by Dr. Lake, baseline creatinine of 1mg/dL.   On immunosuppression with Envarsus, prednisone and on leflunomide due to h/o BK viremia.   H/o chronic SVC syndrome s/p L cephalic-iliac vein bypass on DAPT, s/p thrombectomy of L iliac vein and graft 2012    Admitted with hematemesis. EGD showed Esophageal varices and non bleeding antral ulcer. Antiplatelets stopped, started IV Protonix.   Received 3 units PRBC on 5/30 and 1 unit on 6/1.    UE and facial swelling. US showed left subclavian, axillary, basilic and cephalic vein thrombosis.     Hgb declined to 6.7 yesterday, received 1 unit PRBC, up to 7.8 this AM. No further episodes of hematemesis. Remains on Aspirin and Plavix.   LUE and facial swelling stable. RUE AVF + thrill.     1.  Renal transplant in 2020, renal function stable. Creatinine 0.88 on 6/8/22, stable.    2.  Immunosuppression - Continue Envarsus 8mg po daily. level fluctuating but not true trough.       Continue prednisone 5mg po daily. Serum BK PCR negative, leflunomide discontinued.   3. Anemia due to upper GI bleed. EGD with varices and antral ulcer. No evidence of cirrhosis (normal liver US and elastography), varices possibly     downhill from SVC syndrome. Ulcer thought to be source of bleeding. On Protonix 40mg po bid.       Aspirin and Plavix were held for GI bleed, resumed on 6/8/22. No further hematemesis, stools brown, transfused 1 more unit yesterday, Hgb appropriately responded 7.8 this AM. Has received total fo 5 units PRBC since admission.   4. SVC syndrome - s/p L cephalic-iliac vein bypass on DAPT. Chronically occluded LUE veins.   5. Iron deficiency anemia -start ferrous sulfate 325mg po bid   6. Hypertension - uncontrolled.  Increase Amlodipine to 10mg po daily.

## 2022-06-10 NOTE — PROGRESS NOTE ADULT - PROBLEM SELECTOR PLAN 1
s/p EGD with esophageal varices and nonbleeding gastric ulcers   -no further episodes of melena/hematemesis but acute drop in hgb 6.7 yesterday s/p transfusion  -hgb 7.8 this morning  -CTAP without evidence of cirrhosis and patent portal vein  -GI eval appreciated: no contraindication for DAPT: at this time, no plan for EGD  -s/p ceftriaxone 7 days for prophylaxis   -cont to monitor CBC q24 hrs  -resumed asa/plavix  -transition to po ppi  -start iron sulfate 325mg BID

## 2022-06-10 NOTE — PROGRESS NOTE ADULT - SUBJECTIVE AND OBJECTIVE BOX
[facility] DIVISION OF KIDNEY DISEASES AND HYPERTENSION -- FOLLOW UP NOTE  --------------------------------------------------------------------------------  24 hour events/subjective: Patient seen & examined. Vitals/ labs/ medications reviewed. Pt. extubated and downgraded to floors. Now tolerating PO intake. Pt. seen this AM. Pt. received 1 unit pRBC overnight though AVF. SARAHE IV infiltrated. Awaiting AM CBC prior to anticipated discharge.     PAST HISTORY  --------------------------------------------------------------------------------  No significant changes to PMH, PSH, FHx, SHx, unless otherwise noted    ALLERGIES & MEDICATIONS  --------------------------------------------------------------------------------  Allergies    contrast media (iodine-based) (Urticaria)  ibuprofen (Hives)  ibuprofen/morphine (Unknown)  lactose (Hives)  latex (Swelling)  morphine (Anaphylaxis)  morphine (Urticaria)  penicillin (Anaphylaxis)  shellfish (Urticaria)    Intolerances      Standing Inpatient Medications  amLODIPine   Tablet 5 milliGRAM(s) Oral daily  aspirin enteric coated 81 milliGRAM(s) Oral daily  chlorhexidine 2% Cloths 1 Application(s) Topical daily  clopidogrel Tablet 75 milliGRAM(s) Oral daily  ferrous    sulfate 325 milliGRAM(s) Oral two times a day  pantoprazole    Tablet 40 milliGRAM(s) Oral every 12 hours  predniSONE   Tablet 5 milliGRAM(s) Oral daily  tacrolimus ER Tablet (ENVARSUS XR) 8 milliGRAM(s) Oral daily    PRN Inpatient Medications  acetaminophen     Tablet .. 650 milliGRAM(s) Oral every 6 hours PRN  ondansetron Injectable 4 milliGRAM(s) IV Push once PRN  sodium chloride 0.65% Nasal 1 Spray(s) Both Nostrils five times a day PRN      REVIEW OF SYSTEMS  --------------------------------------------------------------------------------  Gen: No fevers/chills  Respiratory: No dyspnea, cough,   CV: No chest pain, PND, orthopnea  GI: No abdominal pain, diarrhea, constipation, nausea, vomiting  Transplant: No pain  : No increased frequency, dysuria, hematuria   MSK: LUE edema  Neuro: No dizziness/lightheadedness    All other systems were reviewed and are negative, except as noted.    VITALS/PHYSICAL EXAM  --------------------------------------------------------------------------------  T(C): 36.5 (06-10-22 @ 09:10), Max: 37.1 (06-09-22 @ 12:32)  HR: 83 (06-10-22 @ 09:10) (82 - 93)  BP: 175/87 (06-10-22 @ 09:10) (123/74 - 175/87)  RR: 18 (06-10-22 @ 09:10) (18 - 19)  SpO2: 98% (06-10-22 @ 09:10) (97% - 99%)  Wt(kg): --        06-09-22 @ 07:01  -  06-10-22 @ 07:00  --------------------------------------------------------  IN: 1290 mL / OUT: 0 mL / NET: 1290 mL    06-10-22 @ 07:01  -  06-10-22 @ 11:27  --------------------------------------------------------  IN: 280 mL / OUT: 0 mL / NET: 280 mL      Physical Exam:  	Gen: NAD, able to speak in full sentences   	HEENT: PERRL, MMM, facial edema improving  	Pulm: no crackles  	CV: RRR, S1S2+  	Abd: +BS, soft          Transplant: No tenderness, swelling  	: No suprapubic tenderness  	MSK: LUE edema, no LE edema   	Psych: Normal affect and mood  	Skin: Warm          Access: No IV access; LYUBOV FERREIRA    LABS/STUDIES  --------------------------------------------------------------------------------              7.8    6.91  >-----------<  288      [06-10-22 @ 10:10]              25.0     139  |  107  |  13  ----------------------------<  112      [06-08-22 @ 14:52]  4.1   |  20  |  0.88        Ca     9.8     [06-08-22 @ 14:52]    TPro  6.2  /  Alb  3.7  /  TBili  0.5  /  DBili  x   /  AST  15  /  ALT  14  /  AlkPhos  66  [06-08-22 @ 14:52]              [06-08-22 @ 14:52]    Creatinine Trend:  SCr 0.88 [06-08 @ 14:52]  SCr 1.04 [06-06 @ 11:48]  SCr 1.09 [06-04 @ 16:21]  SCr 1.12 [06-02 @ 09:41]  SCr 1.78 [06-01 @ 11:15]    Tacrolimus (), Serum: 13.4 ng/mL (06-08 @ 14:52)  Tacrolimus (), Serum: 1.8 ng/mL (06-06 @ 06:42)  Tacrolimus (), Serum: 5.3 ng/mL (06-04 @ 16:21)      Urinalysis - [05-30-22 @ 09:08]      Color Light Yellow / Appearance Clear / SG 1.026 / pH 6.5      Gluc Negative / Ketone Trace  / Bili Negative / Urobili Negative       Blood Negative / Protein Negative / Leuk Est Moderate / Nitrite Negative      RBC 1 / WBC 2 / Hyaline 0 / Gran  / Sq Epi  / Non Sq Epi 2 / Bacteria Negative      Iron 17, TIBC 362, %sat 5      [06-08-22 @ 14:52]  Ferritin 27      [06-08-22 @ 14:52]    HBsAb Reactive      [05-31-22 @ 14:03]  HBsAg Nonreact      [05-31-22 @ 14:03]  HBcAb Reactive      [05-31-22 @ 14:03]  HCV 0.06, Nonreact      [05-31-22 @ 14:03]    KAYLA: titer Negative, pattern --      [05-31-22 @ 14:03]  Free Light Chains: kappa 1.63, lambda 1.09, ratio = 1.50      [05-31 @ 14:03]

## 2022-06-10 NOTE — DISCHARGE NOTE NURSING/CASE MANAGEMENT/SOCIAL WORK - NSDCFUADDAPPT_GEN_ALL_CORE_FT
APPTS ARE READY TO BE MADE: [x ] YES    Best Family or Patient Contact (if needed):    Additional Information about above appointments (if needed):    1: Please follow up with PCP with in a week   2:   3:     Other comments or requests:

## 2022-06-10 NOTE — PROGRESS NOTE ADULT - NSPROGADDITIONALINFOA_GEN_ALL_CORE
above plans discussed with NP Maurizio  pt stable for discharge  44 minutes spent on discharge process    Raisa Poole MD  Division of Hospital Medicine  Contact via [employer] Teams  Office: [phone]

## 2022-06-10 NOTE — PROGRESS NOTE ADULT - PROBLEM SELECTOR PLAN 2
Hx BK viremia, f/u PCR to assesses if we should resume leflunomide. Last BK PCR as outpatient was in 2021.   BK PCR negative, Discontinued Leflunomide.   Continue with prednisone 5 mg  Continue envarsus 8mg Daily     Check Tacro level Daily 30min before AM dose. Goal 3-6  Last level 13.4 (6/8)- All prior levels around target goal, unclear if this was drawn at the appropriate time. Will not change regimen at this time.

## 2022-06-10 NOTE — DISCHARGE NOTE NURSING/CASE MANAGEMENT/SOCIAL WORK - INFLUENZA IMMUNIZATION DATE (APPROXIMATE):
[de-identified] : \par 57-year-old female right hip osteoarthritis. Isn't arthritis assessment. Given a prescription for Mobic side effects discussed physical therapy. Followup as needed 10-Oct-2022

## 2022-06-10 NOTE — PROGRESS NOTE ADULT - PROBLEM SELECTOR PLAN 2
h/o bypass surgeries on DAPT (asa and plavix) at home   -resumed asa/plavix  -MR venogram reviewed as above  -vascular consult appreciated: hold off on AC at this time  -LUE doppler with: The left subclavian and axillary veins are thrombosed. The left basilic and cephalic veins are thrombosed  -IR consulted: pt to follow up with Dr. Phillips as outpatient for possible intervention: appt on 6/14

## 2022-06-10 NOTE — PROGRESS NOTE ADULT - PROBLEM SELECTOR PLAN 1
s/p DDRT performed in 2020 by Dr. Monroy. Allograft function at baseline. Outpatient nephrologist is Dr. Lake.  Swelling now improving with elevation. Vascular consult appreciated. 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 No acute intervention. No more episodes of bleeding HgB reduced yesterday, received 1 unit overnight. BP remains elevated.

## 2022-06-10 NOTE — PROGRESS NOTE ADULT - REASON FOR ADMISSION
52F transferred from [facility] for hematemesis
52F transferred from [facility] for hematemesis
52F transferred from [facility] for hematemesis
52F transferred from [facility] for hematemesis
52F [facility] from [facility]
52F transferred from [facility] for hematemesis
52F transferred from [facility] for hematemesis
52F transferred from [facility] for hematemesis
52F transferred from [facility] for hematemesis
52F transferred from [facility] for hematemesis
52F transferred from [facility] for hematemesis
52F [facility] from [facility] hematemesis
52F transferred from [facility] for hematemesis
52F transferred from [facility] for hematemesis
52F transferred from [facility] for hematemesis
52F transferred from [facility] for hematemesis
52F transferred from [facility] for hematemesis
52F transferred from [facility] for hematemesis
52F transferred from [facility] for hematemesis
52F transferred from [facility] for hematemesis
52F transferred from [facility] for hematemesis
52F [facility] from [facility]esis
52F transferred from [facility] for hematemesis
52F transferred from [facility] for hematemesis
52F transferred from [facility] for hematemesis
52F transferred from [facility] for hematemesis
52F transferred from [facility] for hematemesis
52F transferred from [facility] for hematemesis
52F transferred from [facility] for hematemesis
52F transferred from [facility] for hematemesis
52F transferred from [facility] for hematemesis

## 2022-06-10 NOTE — PROGRESS NOTE ADULT - ASSESSMENT
52F w/ ESRD s/p HepC DDRT, chronic SVC syndrome s/p L cephalic-iliac vein bypass on DAPT, s/p thrombectomy of L iliac vein and graft 2012, Hx BK viremia, HTN presents as transfer from [facility] ED for hematemesis 2/2 acute upper GI hemorrhage admitted to MICU for elective intubation and urgent endoscopy with GI s/p endoscopy with evidence of non-bleeding ulcers & esophageal varices

## 2022-06-10 NOTE — PROGRESS NOTE ADULT - SUBJECTIVE AND OBJECTIVE BOX
Patient is a 52y old  Female who presents with a chief complaint of 52F transferred from [facility] for hematemesis (09 Jun 2022 10:41)      SUBJECTIVE / OVERNIGHT EVENTS:  no acute events overnight, vss, afebrile  s/p 1unit of pRBC transfusion, IV infiltrated during 2nd half of the transfusion  pt reports some pain in the Left forearm and improved with hotpack    ROS:  14 point ROS negative in detail except stated as above    MEDICATIONS  (STANDING):  amLODIPine   Tablet 5 milliGRAM(s) Oral daily  aspirin enteric coated 81 milliGRAM(s) Oral daily  chlorhexidine 2% Cloths 1 Application(s) Topical daily  clopidogrel Tablet 75 milliGRAM(s) Oral daily  ferrous    sulfate 325 milliGRAM(s) Oral two times a day  pantoprazole    Tablet 40 milliGRAM(s) Oral every 12 hours  predniSONE   Tablet 5 milliGRAM(s) Oral daily  tacrolimus ER Tablet (ENVARSUS XR) 8 milliGRAM(s) Oral daily    MEDICATIONS  (PRN):  acetaminophen     Tablet .. 650 milliGRAM(s) Oral every 6 hours PRN Mild Pain (1 - 3), Moderate Pain (4 - 6), Severe Pain (7 - 10)  ondansetron Injectable 4 milliGRAM(s) IV Push once PRN Nausea and/or Vomiting  sodium chloride 0.65% Nasal 1 Spray(s) Both Nostrils five times a day PRN Nasal Congestion/pt comfort      CAPILLARY BLOOD GLUCOSE        I&O's Summary    09 Jun 2022 07:01  -  10 Santana 2022 07:00  --------------------------------------------------------  IN: 1290 mL / OUT: 0 mL / NET: 1290 mL        PHYSICAL EXAM:  Vital Signs Last 24 Hrs  T(C): 36.5 (10 Santana 2022 09:10), Max: 37.1 (09 Jun 2022 12:32)  T(F): 97.7 (10 Santana 2022 09:10), Max: 98.7 (09 Jun 2022 12:32)  HR: 83 (10 Santana 2022 09:10) (82 - 93)  BP: 175/87 (10 Santana 2022 09:10) (123/74 - 175/87)  BP(mean): --  RR: 18 (10 Santana 2022 09:10) (18 - 19)  SpO2: 98% (10 Santana 2022 09:10) (97% - 99%)    GENERAL: NAD, well-developed  HEAD:  Atraumatic, Normocephalic  EYES: EOMI, PERRLA, conjunctiva and sclera clear  NECK: Supple, No JVD  CHEST/LUNG: Clear to auscultation bilaterally; No wheeze  HEART: Regular rate and rhythm; No murmurs, rubs, or gallops  ABDOMEN: Soft, Nontender, Nondistended; Bowel sounds present  EXTREMITIES:  2+ Peripheral Pulses, No clubbing, cyanosis, or edema  NEUROLOGY: AAOx3; non-focal  SKIN: LUE with ecchymosis, soft    LABS:  personally reviewed                        7.8    6.91  )-----------( 288      ( 10 Santana 2022 10:10 )             25.0     06-08    139  |  107  |  13  ----------------------------<  112<H>  4.1   |  20<L>  |  0.88    Ca    9.8      08 Jun 2022 14:52    TPro  6.2  /  Alb  3.7  /  TBili  0.5  /  DBili  x   /  AST  15  /  ALT  14  /  AlkPhos  66  06-08              RADIOLOGY & ADDITIONAL TESTS:    Imaging Personally Reviewed:    Consultant(s) Notes Reviewed:  renal    Care Discussed with Consultants/Other Providers:

## 2022-06-10 NOTE — PROGRESS NOTE ADULT - PROBLEM SELECTOR PROBLEM 1
Anemia due to acute blood loss
Kidney transplant recipient
Anemia due to acute blood loss
Anemia due to acute blood loss
Kidney transplant recipient
Anemia due to acute blood loss
Anemia due to acute blood loss
Kidney transplant recipient
Anemia due to acute blood loss
Kidney transplant recipient
Anemia due to acute blood loss

## 2022-06-10 NOTE — PROGRESS NOTE ADULT - PROBLEM SELECTOR PLAN 3
UGI bleed , esophageal varices, esophagitis & gastric ulcers  Esophageal varices likely 2/2 central vein occlusion as seen on prior imaging  Continue PPI per GI  Transfusion as per primary team.     If you have any questions, please feel free to contact me  Marquez Merida  Nephrology Fellow  [phone]; Prefer [employer] TEAMS  (After 5pm or on weekends please page the on-call fellow)

## 2022-06-14 ENCOUNTER — APPOINTMENT (OUTPATIENT)
Age: 52
End: 2022-06-14
Payer: MEDICARE

## 2022-06-14 ENCOUNTER — TRANSCRIPTION ENCOUNTER (OUTPATIENT)
Age: 52
End: 2022-06-14

## 2022-06-14 DIAGNOSIS — I87.1 COMPRESSION OF VEIN: ICD-10-CM

## 2022-06-14 DIAGNOSIS — Z91.041 RADIOGRAPHIC DYE ALLERGY STATUS: ICD-10-CM

## 2022-06-14 DIAGNOSIS — Z79.01 LONG TERM (CURRENT) USE OF ANTICOAGULANTS: ICD-10-CM

## 2022-06-14 PROCEDURE — 99204 OFFICE O/P NEW MOD 45 MIN: CPT | Mod: 95

## 2022-06-14 RX ORDER — FAMOTIDINE 20 MG/1
20 TABLET, FILM COATED ORAL
Qty: 90 | Refills: 3 | Status: DISCONTINUED | COMMUNITY
Start: 2020-08-17 | End: 2022-06-14

## 2022-06-14 RX ORDER — ROSUVASTATIN CALCIUM 5 MG/1
5 TABLET, FILM COATED ORAL DAILY
Qty: 90 | Refills: 3 | Status: DISCONTINUED | COMMUNITY
Start: 2021-09-22 | End: 2022-06-14

## 2022-06-14 RX ORDER — ERGOCALCIFEROL 1.25 MG/1
1.25 MG CAPSULE, LIQUID FILLED ORAL
Qty: 4 | Refills: 1 | Status: DISCONTINUED | COMMUNITY
Start: 2020-12-29 | End: 2022-06-14

## 2022-06-14 RX ORDER — ASPIRIN ENTERIC COATED TABLETS 81 MG 81 MG/1
81 TABLET, DELAYED RELEASE ORAL DAILY
Qty: 90 | Refills: 3 | Status: DISCONTINUED | COMMUNITY
Start: 2019-06-19 | End: 2022-06-14

## 2022-06-14 RX ORDER — LEFLUNOMIDE 20 MG/1
20 TABLET, FILM COATED ORAL DAILY
Qty: 60 | Refills: 11 | Status: DISCONTINUED | COMMUNITY
Start: 2020-11-18 | End: 2022-06-14

## 2022-06-14 RX ORDER — ASPIRIN 81 MG/1
81 TABLET, DELAYED RELEASE ORAL DAILY
Qty: 30 | Refills: 11 | Status: DISCONTINUED | COMMUNITY
Start: 2020-08-17 | End: 2022-06-14

## 2022-06-14 RX ORDER — TACROLIMUS 1 MG/1
1 TABLET, EXTENDED RELEASE ORAL DAILY
Qty: 60 | Refills: 5 | Status: DISCONTINUED | COMMUNITY
Start: 2022-05-23 | End: 2022-06-14

## 2022-06-14 RX ORDER — AMMONIUM LACTATE 12 %
12 CREAM (GRAM) TOPICAL
Qty: 1 | Refills: 5 | Status: DISCONTINUED | COMMUNITY
Start: 2021-08-26 | End: 2022-06-14

## 2022-06-20 ENCOUNTER — APPOINTMENT (OUTPATIENT)
Dept: TRANSPLANT | Facility: CLINIC | Age: 52
End: 2022-06-20

## 2022-06-28 LAB
ALBUMIN SERPL ELPH-MCNC: 4.5 G/DL
ALP BLD-CCNC: 75 U/L
ALT SERPL-CCNC: 15 U/L
ANION GAP SERPL CALC-SCNC: 11 MMOL/L
APPEARANCE: CLEAR
AST SERPL-CCNC: 13 U/L
BACTERIA: NEGATIVE
BASOPHILS # BLD AUTO: 0.02 K/UL
BASOPHILS NFR BLD AUTO: 0.4 %
BILIRUB SERPL-MCNC: 0.7 MG/DL
BILIRUBIN URINE: NEGATIVE
BKV DNA SPEC QL NAA+PROBE: 33 IU/ML
BLOOD URINE: NEGATIVE
BUN SERPL-MCNC: 20 MG/DL
CALCIUM SERPL-MCNC: 9.5 MG/DL
CHLORIDE SERPL-SCNC: 105 MMOL/L
CMV DNA SPEC QL NAA+PROBE: NOT DETECTED IU/ML
CMVPCR LOG: NOT DETECTED LOG10IU/ML
CO2 SERPL-SCNC: 24 MMOL/L
COLOR: NORMAL
CREAT SERPL-MCNC: 1.04 MG/DL
CREAT SPEC-SCNC: 119 MG/DL
CREAT/PROT UR: 0.1 RATIO
EGFR: 65 ML/MIN/1.73M2
EOSINOPHIL # BLD AUTO: 0.17 K/UL
EOSINOPHIL NFR BLD AUTO: 3.4 %
ESTIMATED AVERAGE GLUCOSE: 97 MG/DL
GLUCOSE QUALITATIVE U: NEGATIVE
GLUCOSE SERPL-MCNC: 87 MG/DL
HBA1C MFR BLD HPLC: 5 %
HCT VFR BLD CALC: 35 %
HGB BLD-MCNC: 10.4 G/DL
HYALINE CASTS: 0 /LPF
IMM GRANULOCYTES NFR BLD AUTO: 0.4 %
KETONES URINE: NEGATIVE
LEUKOCYTE ESTERASE URINE: NEGATIVE
LYMPHOCYTES # BLD AUTO: 1.07 K/UL
LYMPHOCYTES NFR BLD AUTO: 21.6 %
MAGNESIUM SERPL-MCNC: 1.4 MG/DL
MAN DIFF?: NORMAL
MCHC RBC-ENTMCNC: 27.9 PG
MCHC RBC-ENTMCNC: 29.7 GM/DL
MCV RBC AUTO: 93.8 FL
MICROSCOPIC-UA: NORMAL
MONOCYTES # BLD AUTO: 0.63 K/UL
MONOCYTES NFR BLD AUTO: 12.7 %
NEUTROPHILS # BLD AUTO: 3.05 K/UL
NEUTROPHILS NFR BLD AUTO: 61.5 %
NITRITE URINE: NEGATIVE
PH URINE: 6.5
PHOSPHATE SERPL-MCNC: 3.1 MG/DL
PLATELET # BLD AUTO: 219 K/UL
POTASSIUM SERPL-SCNC: 4 MMOL/L
PROT SERPL-MCNC: 6.3 G/DL
PROT UR-MCNC: 8 MG/DL
PROTEIN URINE: NEGATIVE
RBC # BLD: 3.73 M/UL
RBC # FLD: 19.4 %
RED BLOOD CELLS URINE: 1 /HPF
SODIUM SERPL-SCNC: 140 MMOL/L
SPECIFIC GRAVITY URINE: 1.01
SQUAMOUS EPITHELIAL CELLS: 1 /HPF
TACROLIMUS SERPL-MCNC: 2.3 NG/ML
URATE SERPL-MCNC: 5.3 MG/DL
UROBILINOGEN URINE: NORMAL
WBC # FLD AUTO: 4.96 K/UL
WHITE BLOOD CELLS URINE: 0 /HPF

## 2022-07-12 ENCOUNTER — APPOINTMENT (OUTPATIENT)
Dept: TRANSPLANT | Facility: CLINIC | Age: 52
End: 2022-07-12

## 2022-07-12 DIAGNOSIS — K21.9 GASTRO-ESOPHAGEAL REFLUX DISEASE W/OUT ESOPHAGITIS: ICD-10-CM

## 2022-07-12 LAB
ALBUMIN SERPL ELPH-MCNC: 4.7 G/DL
ALP BLD-CCNC: 70 U/L
ALT SERPL-CCNC: 15 U/L
ANION GAP SERPL CALC-SCNC: 13 MMOL/L
APPEARANCE: CLEAR
AST SERPL-CCNC: 17 U/L
BACTERIA: NEGATIVE
BASOPHILS # BLD AUTO: 0.04 K/UL
BASOPHILS NFR BLD AUTO: 0.8 %
BILIRUB SERPL-MCNC: 0.6 MG/DL
BILIRUBIN URINE: NEGATIVE
BLOOD URINE: NEGATIVE
BUN SERPL-MCNC: 18 MG/DL
CALCIUM SERPL-MCNC: 10.3 MG/DL
CHLORIDE SERPL-SCNC: 101 MMOL/L
CO2 SERPL-SCNC: 22 MMOL/L
COLOR: COLORLESS
CREAT SERPL-MCNC: 1.08 MG/DL
EGFR: 63 ML/MIN/1.73M2
EOSINOPHIL # BLD AUTO: 0.12 K/UL
EOSINOPHIL NFR BLD AUTO: 2.3 %
GLUCOSE QUALITATIVE U: NEGATIVE
GLUCOSE SERPL-MCNC: 91 MG/DL
HCT VFR BLD CALC: 41 %
HGB BLD-MCNC: 12.6 G/DL
HYALINE CASTS: 0 /LPF
IMM GRANULOCYTES NFR BLD AUTO: 0.2 %
KETONES URINE: NEGATIVE
LEUKOCYTE ESTERASE URINE: NEGATIVE
LYMPHOCYTES # BLD AUTO: 1.03 K/UL
LYMPHOCYTES NFR BLD AUTO: 19.3 %
MAN DIFF?: NORMAL
MCHC RBC-ENTMCNC: 27.9 PG
MCHC RBC-ENTMCNC: 30.7 GM/DL
MCV RBC AUTO: 90.7 FL
MICROSCOPIC-UA: NORMAL
MONOCYTES # BLD AUTO: 0.67 K/UL
MONOCYTES NFR BLD AUTO: 12.6 %
NEUTROPHILS # BLD AUTO: 3.46 K/UL
NEUTROPHILS NFR BLD AUTO: 64.8 %
NITRITE URINE: NEGATIVE
PH URINE: 6.5
PLATELET # BLD AUTO: 222 K/UL
POTASSIUM SERPL-SCNC: 4.4 MMOL/L
PROT SERPL-MCNC: 6.5 G/DL
PROTEIN URINE: NEGATIVE
RBC # BLD: 4.52 M/UL
RBC # FLD: 17.2 %
RED BLOOD CELLS URINE: 0 /HPF
SODIUM SERPL-SCNC: 137 MMOL/L
SPECIFIC GRAVITY URINE: 1.01
SQUAMOUS EPITHELIAL CELLS: 0 /HPF
TACROLIMUS SERPL-MCNC: 11.6 NG/ML
UROBILINOGEN URINE: NORMAL
WBC # FLD AUTO: 5.33 K/UL
WHITE BLOOD CELLS URINE: 0 /HPF

## 2022-07-12 RX ORDER — PANTOPRAZOLE SODIUM 40 MG/1
40 GRANULE, DELAYED RELEASE ORAL
Refills: 0 | Status: COMPLETED | COMMUNITY
End: 2022-07-12

## 2022-07-13 LAB — BKV DNA SPEC QL NAA+PROBE: ABNORMAL IU/ML

## 2022-07-26 ENCOUNTER — APPOINTMENT (OUTPATIENT)
Dept: TRANSPLANT | Facility: CLINIC | Age: 52
End: 2022-07-26

## 2022-07-28 ENCOUNTER — APPOINTMENT (OUTPATIENT)
Dept: TRANSPLANT | Facility: CLINIC | Age: 52
End: 2022-07-28

## 2022-07-29 LAB
ALBUMIN SERPL ELPH-MCNC: 4.8 G/DL
ALP BLD-CCNC: 71 U/L
ALT SERPL-CCNC: 13 U/L
ANION GAP SERPL CALC-SCNC: 11 MMOL/L
APPEARANCE: CLEAR
AST SERPL-CCNC: 14 U/L
BACTERIA: NEGATIVE
BASOPHILS # BLD AUTO: 0.03 K/UL
BASOPHILS NFR BLD AUTO: 0.7 %
BILIRUB SERPL-MCNC: 0.7 MG/DL
BILIRUBIN URINE: NEGATIVE
BLOOD URINE: NEGATIVE
BUN SERPL-MCNC: 16 MG/DL
CALCIUM SERPL-MCNC: 10.7 MG/DL
CHLORIDE SERPL-SCNC: 106 MMOL/L
CMV DNA SPEC QL NAA+PROBE: ABNORMAL IU/ML
CMVPCR LOG: ABNORMAL LOG10IU/ML
CO2 SERPL-SCNC: 25 MMOL/L
COLOR: NORMAL
CREAT SERPL-MCNC: 0.92 MG/DL
CREAT SPEC-SCNC: 130 MG/DL
CREAT/PROT UR: 0.1 RATIO
EGFR: 76 ML/MIN/1.73M2
EOSINOPHIL # BLD AUTO: 0.15 K/UL
EOSINOPHIL NFR BLD AUTO: 3.3 %
GLUCOSE QUALITATIVE U: NEGATIVE
GLUCOSE SERPL-MCNC: 80 MG/DL
HCT VFR BLD CALC: 44.8 %
HGB BLD-MCNC: 13.8 G/DL
HYALINE CASTS: 0 /LPF
IMM GRANULOCYTES NFR BLD AUTO: 0.2 %
KETONES URINE: NEGATIVE
LEUKOCYTE ESTERASE URINE: NEGATIVE
LYMPHOCYTES # BLD AUTO: 0.96 K/UL
LYMPHOCYTES NFR BLD AUTO: 21 %
MAGNESIUM SERPL-MCNC: 1.5 MG/DL
MAN DIFF?: NORMAL
MCHC RBC-ENTMCNC: 27.9 PG
MCHC RBC-ENTMCNC: 30.8 GM/DL
MCV RBC AUTO: 90.7 FL
MICROSCOPIC-UA: NORMAL
MONOCYTES # BLD AUTO: 0.56 K/UL
MONOCYTES NFR BLD AUTO: 12.3 %
NEUTROPHILS # BLD AUTO: 2.86 K/UL
NEUTROPHILS NFR BLD AUTO: 62.5 %
NITRITE URINE: NEGATIVE
PH URINE: 6.5
PHOSPHATE SERPL-MCNC: 3.2 MG/DL
PLATELET # BLD AUTO: 223 K/UL
POTASSIUM SERPL-SCNC: 4.2 MMOL/L
PROT SERPL-MCNC: 6.6 G/DL
PROT UR-MCNC: 10 MG/DL
PROTEIN URINE: NEGATIVE
RBC # BLD: 4.94 M/UL
RBC # FLD: 17 %
RED BLOOD CELLS URINE: 0 /HPF
SODIUM SERPL-SCNC: 142 MMOL/L
SPECIFIC GRAVITY URINE: 1.02
SQUAMOUS EPITHELIAL CELLS: 0 /HPF
TACROLIMUS SERPL-MCNC: 2.8 NG/ML
UROBILINOGEN URINE: NORMAL
WBC # FLD AUTO: 4.57 K/UL
WHITE BLOOD CELLS URINE: 0 /HPF

## 2022-07-31 LAB — BKV DNA SPEC QL NAA+PROBE: 33 IU/ML

## 2022-08-17 ENCOUNTER — APPOINTMENT (OUTPATIENT)
Dept: TRANSPLANT | Facility: CLINIC | Age: 52
End: 2022-08-17

## 2022-08-18 LAB
ALBUMIN SERPL ELPH-MCNC: 5 G/DL
ALP BLD-CCNC: 89 U/L
ALT SERPL-CCNC: 14 U/L
ANION GAP SERPL CALC-SCNC: 15 MMOL/L
AST SERPL-CCNC: 17 U/L
BASOPHILS # BLD AUTO: 0.02 K/UL
BASOPHILS NFR BLD AUTO: 0.4 %
BILIRUB SERPL-MCNC: 0.8 MG/DL
BUN SERPL-MCNC: 12 MG/DL
CALCIUM SERPL-MCNC: 11.5 MG/DL
CHLORIDE SERPL-SCNC: 100 MMOL/L
CO2 SERPL-SCNC: 25 MMOL/L
CREAT SERPL-MCNC: 0.94 MG/DL
CREAT SPEC-SCNC: 80 MG/DL
CREAT/PROT UR: 0.1 RATIO
EGFR: 74 ML/MIN/1.73M2
EOSINOPHIL # BLD AUTO: 0.19 K/UL
EOSINOPHIL NFR BLD AUTO: 3.5 %
GLUCOSE SERPL-MCNC: 73 MG/DL
HCT VFR BLD CALC: 53.9 %
HGB BLD-MCNC: 16.3 G/DL
IMM GRANULOCYTES NFR BLD AUTO: 0.4 %
LYMPHOCYTES # BLD AUTO: 1.37 K/UL
LYMPHOCYTES NFR BLD AUTO: 25 %
MAGNESIUM SERPL-MCNC: 1.5 MG/DL
MAN DIFF?: NORMAL
MCHC RBC-ENTMCNC: 27 PG
MCHC RBC-ENTMCNC: 30.2 GM/DL
MCV RBC AUTO: 89.4 FL
MONOCYTES # BLD AUTO: 0.57 K/UL
MONOCYTES NFR BLD AUTO: 10.4 %
NEUTROPHILS # BLD AUTO: 3.3 K/UL
NEUTROPHILS NFR BLD AUTO: 60.3 %
PHOSPHATE SERPL-MCNC: 3.2 MG/DL
PLATELET # BLD AUTO: 274 K/UL
POTASSIUM SERPL-SCNC: 4.3 MMOL/L
PROT SERPL-MCNC: 7.4 G/DL
PROT UR-MCNC: 5 MG/DL
RBC # BLD: 6.03 M/UL
RBC # FLD: 16.6 %
SODIUM SERPL-SCNC: 140 MMOL/L
TACROLIMUS SERPL-MCNC: 5.1 NG/ML
WBC # FLD AUTO: 5.47 K/UL

## 2022-08-19 LAB
APPEARANCE: CLEAR
BACTERIA: NEGATIVE
BILIRUBIN URINE: NEGATIVE
BKV DNA SPEC QL NAA+PROBE: 42 IU/ML
BLOOD URINE: NEGATIVE
CMV DNA SPEC QL NAA+PROBE: NOT DETECTED IU/ML
CMVPCR LOG: NOT DETECTED LOG10IU/ML
COLOR: NORMAL
GLUCOSE QUALITATIVE U: NEGATIVE
HYALINE CASTS: 0 /LPF
KETONES URINE: NEGATIVE
LEUKOCYTE ESTERASE URINE: NEGATIVE
MICROSCOPIC-UA: NORMAL
NITRITE URINE: NEGATIVE
PH URINE: 6.5
PROTEIN URINE: NEGATIVE
RED BLOOD CELLS URINE: 1 /HPF
SPECIFIC GRAVITY URINE: 1.01
SQUAMOUS EPITHELIAL CELLS: 0 /HPF
UROBILINOGEN URINE: NORMAL
WHITE BLOOD CELLS URINE: 1 /HPF

## 2022-10-03 ENCOUNTER — APPOINTMENT (OUTPATIENT)
Dept: NEPHROLOGY | Facility: CLINIC | Age: 52
End: 2022-10-03

## 2022-10-03 VITALS
WEIGHT: 156 LBS | DIASTOLIC BLOOD PRESSURE: 74 MMHG | HEART RATE: 72 BPM | SYSTOLIC BLOOD PRESSURE: 116 MMHG | RESPIRATION RATE: 14 BRPM | TEMPERATURE: 98 F | BODY MASS INDEX: 30.47 KG/M2

## 2022-10-03 DIAGNOSIS — D75.1 SECONDARY POLYCYTHEMIA: ICD-10-CM

## 2022-10-03 PROCEDURE — 99214 OFFICE O/P EST MOD 30 MIN: CPT

## 2022-10-03 NOTE — HISTORY OF PRESENT ILLNESS
[FreeTextEntry1] : Patient has completed one year post transplant. 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 Her mother stays with her.\par  is doing well after bladder cancer treatment. 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 No acute symptoms except for intermittent left lumbar area pain. No relationship with food/ bowel movement.\par Has seen Dr. Oliva for erythrocytosis. Had Phlebotomy(blood donation) last week at hospital. Has been ding once a month.\par Had no labs today. Plans to do it locally.\par

## 2022-10-03 NOTE — ASSESSMENT
[FreeTextEntry1] : Kidney Transplant recipient, functioning allograft, noted last creatinine normal. Labs done today. Will follow.\par Immunosuppression- Reviewed.n On Tac/ mg/dose and prednisone\par BK Viremia  ;  Resolved; follow up BKV PCR.  \par HTN: Good Control. Home measurements 120's. 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 Oliva, gets Phlebotomy at therapeutic phlebotomy dept at [facility].\par If persistent will consider Losartan after Doppler transplant\par Skin hyperpigmentation both ankles- Has seen Dermatologist, improved with local treatment\par Plan:\par Continue current medications\par additional changes- none made today \par Labs and follow up visit to be scheduled as discussed.\par She has completed vaccination, Pfizer including booster\par  \par Primary nephrologist- Jw Streeter- She has been advised to follow up with Dr. Streeter. She has seen him.\par \par Follow up at transplant center every 6 months once she is seeing Dr. Streeter consistently.

## 2022-10-03 NOTE — REASON FOR VISIT
[Follow-Up] : a follow-up visit [FreeTextEntry1] : Post kidney transplant follow up- No acute symptoms Complex Repair And A-T Advancement Flap Text: The defect edges were debeveled with a #15 scalpel blade.  The primary defect was closed partially with a complex linear closure.  Given the location of the remaining defect, shape of the defect and the proximity to free margins an A-T advancement flap was deemed most appropriate for complete closure of the defect.  Using a sterile surgical marker, an appropriate advancement flap was drawn incorporating the defect and placing the expected incisions within the relaxed skin tension lines where possible.    The area thus outlined was incised deep to adipose tissue with a #15 scalpel blade.  The skin margins were undermined to an appropriate distance in all directions utilizing iris scissors.

## 2022-10-04 LAB
ALBUMIN SERPL ELPH-MCNC: 4.6 G/DL
ALP BLD-CCNC: 75 U/L
ALT SERPL-CCNC: 20 U/L
ANION GAP SERPL CALC-SCNC: 12 MMOL/L
APPEARANCE: CLEAR
AST SERPL-CCNC: 18 U/L
BACTERIA: NEGATIVE
BASOPHILS # BLD AUTO: 0.03 K/UL
BASOPHILS NFR BLD AUTO: 0.6 %
BILIRUB SERPL-MCNC: 0.8 MG/DL
BILIRUBIN URINE: NEGATIVE
BLOOD URINE: NEGATIVE
BUN SERPL-MCNC: 14 MG/DL
CALCIUM SERPL-MCNC: 10.3 MG/DL
CHLORIDE SERPL-SCNC: 102 MMOL/L
CMV DNA SPEC QL NAA+PROBE: NOT DETECTED IU/ML
CMVPCR LOG: NOT DETECTED LOG10IU/ML
CO2 SERPL-SCNC: 23 MMOL/L
COLOR: COLORLESS
CREAT SERPL-MCNC: 0.85 MG/DL
CREAT SPEC-SCNC: 54 MG/DL
CREAT/PROT UR: 0.1 RATIO
EGFR: 83 ML/MIN/1.73M2
EOSINOPHIL # BLD AUTO: 0.37 K/UL
EOSINOPHIL NFR BLD AUTO: 7.6 %
GLUCOSE QUALITATIVE U: NEGATIVE
GLUCOSE SERPL-MCNC: 86 MG/DL
HCT VFR BLD CALC: 53 %
HGB BLD-MCNC: 16.3 G/DL
HYALINE CASTS: 0 /LPF
IMM GRANULOCYTES NFR BLD AUTO: 0.2 %
KETONES URINE: NEGATIVE
LDH SERPL-CCNC: 251 U/L
LEUKOCYTE ESTERASE URINE: NEGATIVE
LYMPHOCYTES # BLD AUTO: 1.15 K/UL
LYMPHOCYTES NFR BLD AUTO: 23.8 %
MAGNESIUM SERPL-MCNC: 1.4 MG/DL
MAN DIFF?: NORMAL
MCHC RBC-ENTMCNC: 26.5 PG
MCHC RBC-ENTMCNC: 30.8 GM/DL
MCV RBC AUTO: 86 FL
MICROSCOPIC-UA: NORMAL
MONOCYTES # BLD AUTO: 0.45 K/UL
MONOCYTES NFR BLD AUTO: 9.3 %
NEUTROPHILS # BLD AUTO: 2.83 K/UL
NEUTROPHILS NFR BLD AUTO: 58.5 %
NITRITE URINE: NEGATIVE
PH URINE: 6.5
PHOSPHATE SERPL-MCNC: 3.2 MG/DL
PLATELET # BLD AUTO: 224 K/UL
POTASSIUM SERPL-SCNC: 4.2 MMOL/L
PROT SERPL-MCNC: 6.6 G/DL
PROT UR-MCNC: 4 MG/DL
PROTEIN URINE: NEGATIVE
RBC # BLD: 6.16 M/UL
RBC # FLD: 17.2 %
RED BLOOD CELLS URINE: 1 /HPF
SODIUM SERPL-SCNC: 138 MMOL/L
SPECIFIC GRAVITY URINE: 1.01
SQUAMOUS EPITHELIAL CELLS: 0 /HPF
TACROLIMUS SERPL-MCNC: 4.3 NG/ML
URATE SERPL-MCNC: 6.9 MG/DL
UROBILINOGEN URINE: NORMAL
WBC # FLD AUTO: 4.84 K/UL
WHITE BLOOD CELLS URINE: 0 /HPF

## 2022-10-05 LAB — BKV DNA SPEC QL NAA+PROBE: NOT DETECTED IU/ML

## 2022-10-12 ENCOUNTER — RX RENEWAL (OUTPATIENT)
Age: 52
End: 2022-10-12

## 2022-11-03 ENCOUNTER — RX RENEWAL (OUTPATIENT)
Age: 52
End: 2022-11-03

## 2022-11-28 ENCOUNTER — NON-APPOINTMENT (OUTPATIENT)
Age: 52
End: 2022-11-28

## 2022-11-28 ENCOUNTER — APPOINTMENT (OUTPATIENT)
Dept: PULMONOLOGY | Facility: CLINIC | Age: 52
End: 2022-11-28

## 2022-11-28 ENCOUNTER — APPOINTMENT (OUTPATIENT)
Dept: INFECTIOUS DISEASE | Facility: CLINIC | Age: 52
End: 2022-11-28

## 2022-11-28 ENCOUNTER — FORM ENCOUNTER (OUTPATIENT)
Age: 52
End: 2022-11-28

## 2022-11-28 DIAGNOSIS — U07.1 COVID-19: ICD-10-CM

## 2022-11-28 PROCEDURE — 99205 OFFICE O/P NEW HI 60 MIN: CPT | Mod: CS,95

## 2022-11-28 PROCEDURE — 99442: CPT | Mod: CS,95

## 2022-11-28 NOTE — HISTORY OF PRESENT ILLNESS
[Home] : at home, [unfilled] , at the time of the visit. [Medical Office: ([facility])___] : at the medical office located in  [Verbal consent obtained from patient] : the patient, [unfilled] [FreeTextEntry1] : Initial telehealth visit.  Patient referred to the [facility] program for COVID infection.\par \par Patient is a 52-year-old woman 1 year status post renal transplant.  Maintained on CellCept, tacrolimus, prednisone 5.  Also on aspirin, Plavix (for prior SVC syndrome) and erythrocytosis.  BMI is 30\par \par Patient is vaccinated x3 only.  She did not receive the latest vaccine.\par \par Patient has normal kidney function at baseline, last creatinine was 0.85.\par \par Patient's symptoms started on November 26.  She is feeling somewhat worse.  She complains of body aches, headache.  She has significant liquidy diarrhea that has happened frequently already today.  She also has nausea and vomiting and dry retching when she tries to drink.  Temperature was 99.8.  She is taking Tylenol.  She has some dizziness .  Nasal congestion and cough.  But she does not have any shortness of breath or dyspnea with exertion.\par \par On video, patient is somewhat ill-appearing but not in distress.  She has severe nasal congestion.  An occasional mild cough.  But she is speaking clearly and easily without any respiratory effort or evidence of respiratory distress.\par COVID day 3.  Symptoms are moderate, and significant GI symptoms possible with some dehydration.\par Paxlovid would be very difficult to give concomitantly with the [place], and patient is already having nausea/vomiting/diarrhea which was likely be exacerbated by.\par I have referred patient to [facility] for administration of home IV remdesivir.  In addition to IV fluids.  Labs will be monitored daily per protocol.\par Patient has been advised already to hold her CellCept for 3 days due to the infection.\par Patient advised as well to try to slowly increase her fluid intake.  Monitor for symptoms of respiratory distress, call ASAP or go to ED if occurs\par Will follow closely\par

## 2022-11-29 ENCOUNTER — APPOINTMENT (OUTPATIENT)
Dept: PULMONOLOGY | Facility: CLINIC | Age: 52
End: 2022-11-29

## 2022-11-29 PROCEDURE — 99215 OFFICE O/P EST HI 40 MIN: CPT | Mod: CS,95

## 2022-11-29 NOTE — HISTORY OF PRESENT ILLNESS
[Home] : at home, [unfilled] , at the time of the visit. [Medical Office: ([facility])___] : at the medical office located in  [Verbal consent obtained from patient] : the patient, [unfilled] [FreeTextEntry1] : Follow up TEB\par COVID infection\par Renal transplant\par Vacc x 3, not most recent booster\par \par Overall, feels "a little better)\par But, still fever to 101\par Nasal congestion\par And still with diarrhea. Nausea, but not vomiting. Take seltzer in small sips\par Dizzy when first stands up\par No SOB. No sig cough\par \par On video, looks a bit less ill than yesterday\par Nasally congested\par Speaking easily, no respiratory effort\par \par Patient is scheduled for Homecare visit today -- \par For administration of IV remdesivir (planned for 3 doses)\par IVF - 1L NS\par Labs\par Vitals, RN check\par

## 2022-11-30 ENCOUNTER — APPOINTMENT (OUTPATIENT)
Dept: PULMONOLOGY | Facility: CLINIC | Age: 52
End: 2022-11-30

## 2022-11-30 PROCEDURE — 99213 OFFICE O/P EST LOW 20 MIN: CPT | Mod: CS,95

## 2022-11-30 NOTE — HISTORY OF PRESENT ILLNESS
[Home] : at home, [unfilled] , at the time of the visit. [Medical Office: ([facility])___] : at the medical office located in  [Verbal consent obtained from patient] : the patient, [unfilled] [FreeTextEntry1] : Follow-up telehealth.  COVID infection.\par \par Patient received her first dose of remdesivir IV at home yesterday.  She also received a liter of normal saline.\par \par She is feeling so much better today.  She has no fever.  She is no longer dizzy upon standing.  She still has loose stool but no longer watery.  No cough and no shortness of breath.\par \par Video she looks great today.  She does not appear ill.  Speaking clearly and easily with no respiratory effort\par \par Labs from yesterday reviewed.  Normal kidney and liver function remaining.  CRP 35\par \par 52-year-old woman, kidney transplant, vaccinated only x3.  COVID infection with initial symptoms of fevers, watery diarrhea and dehydration.  Doing much better after treatment.\par Plan to complete 3 days total of IV remdesivir.  She is also getting some more fluids today.  Monitor labs.

## 2022-12-01 ENCOUNTER — APPOINTMENT (OUTPATIENT)
Dept: PULMONOLOGY | Facility: CLINIC | Age: 52
End: 2022-12-01

## 2022-12-01 PROCEDURE — 99213 OFFICE O/P EST LOW 20 MIN: CPT | Mod: CS,95

## 2022-12-01 NOTE — HISTORY OF PRESENT ILLNESS
[Home] : at home, [unfilled] , at the time of the visit. [Medical Office: ([facility])___] : at the medical office located in  [Verbal consent obtained from patient] : the patient, [unfilled] [FreeTextEntry1] : Follow-up telehealth.  COVID infection.  Renal transplant.  On home IV remdesivir.\par \par Today is day 2.  Nurses currently there has together.  Patient is feeling much better.  Diarrhea is resolved.  She is not lightheaded or dizzy.  She has no shortness of breath, no fevers.\par \par On video she looks much better.  No longer ill-appearing, speaking easily in no distress.\par \par Labs reviewed.  LFTs and renal function are normal.  Other labs consistent with her known COVID infection.\par \par Complete total of 3 days remdesivir.  We will continue to follow her labs.

## 2022-12-03 PROBLEM — U07.1 COVID-19: Status: ACTIVE | Noted: 2022-11-28

## 2022-12-03 PROBLEM — U07.1 INFECTION DUE TO 2019 NOVEL CORONAVIRUS: Status: ACTIVE | Noted: 2022-12-03

## 2022-12-03 NOTE — PLAN
[FreeTextEntry1] : MAB no longer available.\par \par Will try paxlovid if ok with renal transplant team.\par \par Will refer to [facility] program also to see if home remdesevir can be setup.\par \par D/w Dr. Lake\par D/w Dr. Lisker \par \par Spent 12 min total

## 2022-12-03 NOTE — HISTORY OF PRESENT ILLNESS
[Home] : at home, [unfilled] , at the time of the visit. [Medical Office: ([facility])___] : at the medical office located in  [Verbal consent obtained from patient] : the patient, [unfilled] [FreeTextEntry1] : 53 y/o female called for COVID positive test.\par \par Tested positive on 11/26.\par \par Fever, nasal congestion, fatigue, cough. Diarrhea+\par \par On immunosuppression for kidney transplant. \par \par Not sob.  [Time Spent: ___ minutes] : I have spent [unfilled] minutes with the patient on the telephone

## 2022-12-06 ENCOUNTER — RX RENEWAL (OUTPATIENT)
Age: 52
End: 2022-12-06

## 2022-12-15 ENCOUNTER — RX RENEWAL (OUTPATIENT)
Age: 52
End: 2022-12-15

## 2023-01-13 ENCOUNTER — APPOINTMENT (OUTPATIENT)
Dept: TRANSPLANT | Facility: CLINIC | Age: 53
End: 2023-01-13

## 2023-01-19 ENCOUNTER — APPOINTMENT (OUTPATIENT)
Dept: NEPHROLOGY | Facility: CLINIC | Age: 53
End: 2023-01-19

## 2023-01-24 LAB
25(OH)D3 SERPL-MCNC: 41.5 NG/ML
ALBUMIN SERPL ELPH-MCNC: 4.9 G/DL
ALP BLD-CCNC: 76 U/L
ALT SERPL-CCNC: 15 U/L
ANION GAP SERPL CALC-SCNC: 14 MMOL/L
APPEARANCE: CLEAR
AST SERPL-CCNC: 17 U/L
BACTERIA: NEGATIVE
BASOPHILS # BLD AUTO: 0.03 K/UL
BASOPHILS NFR BLD AUTO: 0.5 %
BILIRUB SERPL-MCNC: 1.5 MG/DL
BILIRUBIN URINE: NEGATIVE
BKV DNA SPEC QL NAA+PROBE: NOT DETECTED IU/ML
BLOOD URINE: NEGATIVE
BUN SERPL-MCNC: 16 MG/DL
CALCIUM SERPL-MCNC: 11 MG/DL
CALCIUM SERPL-MCNC: 11 MG/DL
CHLORIDE SERPL-SCNC: 104 MMOL/L
CHOLEST SERPL-MCNC: 258 MG/DL
CMV DNA SPEC QL NAA+PROBE: NOT DETECTED IU/ML
CMVPCR LOG: NOT DETECTED LOG10IU/ML
CO2 SERPL-SCNC: 22 MMOL/L
COLOR: NORMAL
CREAT SERPL-MCNC: 0.98 MG/DL
CREAT SPEC-SCNC: 77 MG/DL
CREAT/PROT UR: 0.1 RATIO
EGFR: 70 ML/MIN/1.73M2
EOSINOPHIL # BLD AUTO: 0.19 K/UL
EOSINOPHIL NFR BLD AUTO: 3 %
ESTIMATED AVERAGE GLUCOSE: 120 MG/DL
GLUCOSE QUALITATIVE U: NEGATIVE
GLUCOSE SERPL-MCNC: 83 MG/DL
HBA1C MFR BLD HPLC: 5.8 %
HCT VFR BLD CALC: 48.6 %
HDLC SERPL-MCNC: 106 MG/DL
HGB BLD-MCNC: 14.4 G/DL
HYALINE CASTS: 0 /LPF
IMM GRANULOCYTES NFR BLD AUTO: 0.3 %
KETONES URINE: NEGATIVE
LDH SERPL-CCNC: 238 U/L
LDLC SERPL CALC-MCNC: 132 MG/DL
LEUKOCYTE ESTERASE URINE: NEGATIVE
LYMPHOCYTES # BLD AUTO: 1.58 K/UL
LYMPHOCYTES NFR BLD AUTO: 24.6 %
MAGNESIUM SERPL-MCNC: 1.4 MG/DL
MAN DIFF?: NORMAL
MCHC RBC-ENTMCNC: 26.1 PG
MCHC RBC-ENTMCNC: 29.6 GM/DL
MCV RBC AUTO: 88 FL
MICROSCOPIC-UA: NORMAL
MONOCYTES # BLD AUTO: 0.58 K/UL
MONOCYTES NFR BLD AUTO: 9 %
NEUTROPHILS # BLD AUTO: 4.01 K/UL
NEUTROPHILS NFR BLD AUTO: 62.6 %
NITRITE URINE: NEGATIVE
NONHDLC SERPL-MCNC: 152 MG/DL
PARATHYROID HORMONE INTACT: 134 PG/ML
PH URINE: 6
PHOSPHATE SERPL-MCNC: 3.4 MG/DL
PLATELET # BLD AUTO: 239 K/UL
POTASSIUM SERPL-SCNC: 4 MMOL/L
PROT SERPL-MCNC: 7.2 G/DL
PROT UR-MCNC: 6 MG/DL
PROTEIN URINE: NEGATIVE
RBC # BLD: 5.52 M/UL
RBC # FLD: 14.7 %
RED BLOOD CELLS URINE: 1 /HPF
SODIUM SERPL-SCNC: 139 MMOL/L
SPECIFIC GRAVITY URINE: 1.01
SQUAMOUS EPITHELIAL CELLS: 0 /HPF
TACROLIMUS SERPL-MCNC: 5.2 NG/ML
TRIGL SERPL-MCNC: 99 MG/DL
URATE SERPL-MCNC: 6 MG/DL
UROBILINOGEN URINE: NORMAL
WBC # FLD AUTO: 6.41 K/UL
WHITE BLOOD CELLS URINE: 1 /HPF

## 2023-02-02 ENCOUNTER — RX RENEWAL (OUTPATIENT)
Age: 53
End: 2023-02-02

## 2023-02-07 ENCOUNTER — APPOINTMENT (OUTPATIENT)
Dept: OBGYN | Facility: CLINIC | Age: 53
End: 2023-02-07
Payer: MEDICARE

## 2023-02-07 VITALS
SYSTOLIC BLOOD PRESSURE: 132 MMHG | HEIGHT: 60 IN | BODY MASS INDEX: 31.02 KG/M2 | WEIGHT: 158 LBS | DIASTOLIC BLOOD PRESSURE: 80 MMHG

## 2023-02-07 DIAGNOSIS — Z01.419 ENCOUNTER FOR GYNECOLOGICAL EXAMINATION (GENERAL) (ROUTINE) W/OUT ABNORMAL FINDINGS: ICD-10-CM

## 2023-02-07 DIAGNOSIS — Z01.818 ENCOUNTER FOR OTHER PREPROCEDURAL EXAMINATION: ICD-10-CM

## 2023-02-07 PROCEDURE — 99386 PREV VISIT NEW AGE 40-64: CPT | Mod: GY

## 2023-02-07 PROCEDURE — G0101: CPT

## 2023-02-07 NOTE — HISTORY OF PRESENT ILLNESS
[Patient reported mammogram was normal] : Patient reported mammogram was normal [FreeTextEntry1] : Check up \par Kidney transplant 2 years ago\par S/P GIANFRANCO 2013 for fibroids \par  [Mammogramdate] : 2021  [TextBox_31] : s/p GIANFRANCO  [TextBox_43] : no

## 2023-02-07 NOTE — PHYSICAL EXAM
[Chaperone Declined] : Patient declined chaperone [Appropriately responsive] : appropriately responsive [Alert] : alert [No Acute Distress] : no acute distress [No Lymphadenopathy] : no lymphadenopathy [Regular Rate Rhythm] : regular rate rhythm [No Murmurs] : no murmurs [Clear to Auscultation B/L] : clear to auscultation bilaterally [Soft] : soft [Non-tender] : non-tender [Non-distended] : non-distended [No HSM] : No HSM [No Lesions] : no lesions [No Mass] : no mass [Oriented x3] : oriented x3 [Examination Of The Breasts] : a normal appearance [No Masses] : no breast masses were palpable [Labia Majora] : normal [Labia Minora] : normal [Normal] : normal [Absent] : absent [Uterine Adnexae] : non-palpable

## 2023-02-07 NOTE — PLAN
[FreeTextEntry1] : S/P GIANFRANCO\par No need for pap\par Recommend colorgaurd to be completed . She was given it by IM

## 2023-02-15 ENCOUNTER — APPOINTMENT (OUTPATIENT)
Dept: OBGYN | Facility: CLINIC | Age: 53
End: 2023-02-15

## 2023-02-22 ENCOUNTER — APPOINTMENT (OUTPATIENT)
Dept: MAMMOGRAPHY | Facility: IMAGING CENTER | Age: 53
End: 2023-02-22
Payer: MEDICARE

## 2023-02-22 ENCOUNTER — OUTPATIENT (OUTPATIENT)
Dept: OUTPATIENT SERVICES | Facility: HOSPITAL | Age: 53
LOS: 1 days | End: 2023-02-22
Payer: MEDICARE

## 2023-02-22 ENCOUNTER — RESULT REVIEW (OUTPATIENT)
Age: 53
End: 2023-02-22

## 2023-02-22 DIAGNOSIS — I77.0 ARTERIOVENOUS FISTULA, ACQUIRED: Chronic | ICD-10-CM

## 2023-02-22 DIAGNOSIS — Z95.828 PRESENCE OF OTHER VASCULAR IMPLANTS AND GRAFTS: Chronic | ICD-10-CM

## 2023-02-22 DIAGNOSIS — Z00.00 ENCOUNTER FOR GENERAL ADULT MEDICAL EXAMINATION WITHOUT ABNORMAL FINDINGS: ICD-10-CM

## 2023-02-22 DIAGNOSIS — Z94.0 KIDNEY TRANSPLANT STATUS: Chronic | ICD-10-CM

## 2023-02-22 PROCEDURE — 77063 BREAST TOMOSYNTHESIS BI: CPT

## 2023-02-22 PROCEDURE — 77063 BREAST TOMOSYNTHESIS BI: CPT | Mod: 26

## 2023-02-22 PROCEDURE — 77067 SCR MAMMO BI INCL CAD: CPT

## 2023-02-22 PROCEDURE — 77067 SCR MAMMO BI INCL CAD: CPT | Mod: 26

## 2023-04-03 NOTE — PROGRESS NOTE ADULT - PROBLEM SELECTOR PLAN 2
Continue Regimen: Betamethasone dipropionate augmented 0.05% cream apply to legs QD prn Detail Level: Zone Render In Strict Bullet Format?: No h/o bypass surgeries on DAPT (asa and plavix) at home   -resumed asa/plavix  -MR venogram reviewed as above  -vascular consult appreciated: hold off on AC at this time  -LUE doppler with: The left subclavian and axillary veins are thrombosed. The left basilic and cephalic veins are thrombosed  -IR consulted: pt to follow up with Dr. Phillips as outpatient for possible intervention: appt on 6/14

## 2023-05-03 ENCOUNTER — RX RENEWAL (OUTPATIENT)
Age: 53
End: 2023-05-03

## 2023-06-02 NOTE — REVIEW OF SYSTEMS
[As Noted in HPI] : as noted in HPI [Negative] : Heme/Lymph Bilateral Helical Rim Advancement Flap Text: The defect edges were debeveled with a #15 blade scalpel.  Given the location of the defect and the proximity to free margins (helical rim) a bilateral helical rim advancement flap was deemed most appropriate.  Using a sterile surgical marker, the appropriate advancement flaps were drawn incorporating the defect and placing the expected incisions between the helical rim and antihelix where possible.  The area thus outlined was incised through and through with a #15 scalpel blade.  With a skin hook and iris scissors, the flaps were gently and sharply undermined and freed up.

## 2023-07-01 ENCOUNTER — NON-APPOINTMENT (OUTPATIENT)
Age: 53
End: 2023-07-01

## 2023-07-03 ENCOUNTER — INPATIENT (INPATIENT)
Facility: HOSPITAL | Age: 53
LOS: 5 days | Discharge: HOME | DRG: 369 | End: 2023-07-09
Attending: INTERNAL MEDICINE | Admitting: INTERNAL MEDICINE
Payer: MEDICARE

## 2023-07-03 VITALS
RESPIRATION RATE: 18 BRPM | HEART RATE: 79 BPM | OXYGEN SATURATION: 99 % | SYSTOLIC BLOOD PRESSURE: 112 MMHG | WEIGHT: 147.93 LBS | DIASTOLIC BLOOD PRESSURE: 75 MMHG | TEMPERATURE: 99 F | HEIGHT: 63 IN

## 2023-07-03 DIAGNOSIS — Z94.0 KIDNEY TRANSPLANT STATUS: Chronic | ICD-10-CM

## 2023-07-03 DIAGNOSIS — D84.9 IMMUNODEFICIENCY, UNSPECIFIED: ICD-10-CM

## 2023-07-03 DIAGNOSIS — Z94.0 KIDNEY TRANSPLANT STATUS: ICD-10-CM

## 2023-07-03 DIAGNOSIS — Z95.828 PRESENCE OF OTHER VASCULAR IMPLANTS AND GRAFTS: Chronic | ICD-10-CM

## 2023-07-03 DIAGNOSIS — K62.5 HEMORRHAGE OF ANUS AND RECTUM: ICD-10-CM

## 2023-07-03 DIAGNOSIS — I77.0 ARTERIOVENOUS FISTULA, ACQUIRED: Chronic | ICD-10-CM

## 2023-07-03 LAB
ALBUMIN SERPL ELPH-MCNC: 4.7 G/DL — SIGNIFICANT CHANGE UP (ref 3.3–5)
ALP SERPL-CCNC: 68 U/L — SIGNIFICANT CHANGE UP (ref 40–120)
ALT FLD-CCNC: 14 U/L — SIGNIFICANT CHANGE UP (ref 10–45)
ANION GAP SERPL CALC-SCNC: 11 MMOL/L — SIGNIFICANT CHANGE UP (ref 5–17)
APTT BLD: 28.7 SEC — SIGNIFICANT CHANGE UP (ref 27.5–35.5)
AST SERPL-CCNC: 12 U/L — SIGNIFICANT CHANGE UP (ref 10–40)
BASE EXCESS BLDV CALC-SCNC: -3.1 MMOL/L — LOW (ref -2–3)
BASOPHILS # BLD AUTO: 0.04 K/UL — SIGNIFICANT CHANGE UP (ref 0–0.2)
BASOPHILS NFR BLD AUTO: 0.5 % — SIGNIFICANT CHANGE UP (ref 0–2)
BILIRUB SERPL-MCNC: 0.7 MG/DL — SIGNIFICANT CHANGE UP (ref 0.2–1.2)
BUN SERPL-MCNC: 52 MG/DL — HIGH (ref 7–23)
CA-I SERPL-SCNC: 1.45 MMOL/L — HIGH (ref 1.15–1.33)
CALCIUM SERPL-MCNC: 11.1 MG/DL — HIGH (ref 8.4–10.5)
CALCIUM SERPL-MCNC: 11.2 MG/DL — HIGH (ref 8.4–10.5)
CHLORIDE BLDV-SCNC: 106 MMOL/L — SIGNIFICANT CHANGE UP (ref 96–108)
CHLORIDE SERPL-SCNC: 107 MMOL/L — SIGNIFICANT CHANGE UP (ref 96–108)
CO2 BLDV-SCNC: 25 MMOL/L — SIGNIFICANT CHANGE UP (ref 22–26)
CO2 SERPL-SCNC: 23 MMOL/L — SIGNIFICANT CHANGE UP (ref 22–31)
CREAT SERPL-MCNC: 1.04 MG/DL — SIGNIFICANT CHANGE UP (ref 0.5–1.3)
EGFR: 65 ML/MIN/1.73M2 — SIGNIFICANT CHANGE UP
EOSINOPHIL # BLD AUTO: 0.2 K/UL — SIGNIFICANT CHANGE UP (ref 0–0.5)
EOSINOPHIL NFR BLD AUTO: 2.4 % — SIGNIFICANT CHANGE UP (ref 0–6)
GAS PNL BLDV: 135 MMOL/L — LOW (ref 136–145)
GAS PNL BLDV: SIGNIFICANT CHANGE UP
GAS PNL BLDV: SIGNIFICANT CHANGE UP
GLUCOSE BLDV-MCNC: 110 MG/DL — HIGH (ref 70–99)
GLUCOSE SERPL-MCNC: 112 MG/DL — HIGH (ref 70–99)
HCO3 BLDV-SCNC: 23 MMOL/L — SIGNIFICANT CHANGE UP (ref 22–29)
HCT VFR BLD CALC: 44.5 % — SIGNIFICANT CHANGE UP (ref 34.5–45)
HCT VFR BLDA CALC: 41 % — SIGNIFICANT CHANGE UP (ref 34.5–46.5)
HGB BLD CALC-MCNC: 13.7 G/DL — SIGNIFICANT CHANGE UP (ref 11.7–16.1)
HGB BLD-MCNC: 13.3 G/DL — SIGNIFICANT CHANGE UP (ref 11.5–15.5)
IMM GRANULOCYTES NFR BLD AUTO: 0.5 % — SIGNIFICANT CHANGE UP (ref 0–0.9)
INR BLD: 1.09 RATIO — SIGNIFICANT CHANGE UP (ref 0.88–1.16)
LACTATE BLDV-MCNC: 1.3 MMOL/L — SIGNIFICANT CHANGE UP (ref 0.5–2)
LYMPHOCYTES # BLD AUTO: 1.04 K/UL — SIGNIFICANT CHANGE UP (ref 1–3.3)
LYMPHOCYTES # BLD AUTO: 12.4 % — LOW (ref 13–44)
MCHC RBC-ENTMCNC: 24.8 PG — LOW (ref 27–34)
MCHC RBC-ENTMCNC: 29.9 GM/DL — LOW (ref 32–36)
MCV RBC AUTO: 83 FL — SIGNIFICANT CHANGE UP (ref 80–100)
MONOCYTES # BLD AUTO: 0.56 K/UL — SIGNIFICANT CHANGE UP (ref 0–0.9)
MONOCYTES NFR BLD AUTO: 6.7 % — SIGNIFICANT CHANGE UP (ref 2–14)
NEUTROPHILS # BLD AUTO: 6.53 K/UL — SIGNIFICANT CHANGE UP (ref 1.8–7.4)
NEUTROPHILS NFR BLD AUTO: 77.5 % — HIGH (ref 43–77)
NRBC # BLD: 0 /100 WBCS — SIGNIFICANT CHANGE UP (ref 0–0)
OB PNL STL: POSITIVE
PCO2 BLDV: 45 MMHG — HIGH (ref 39–42)
PH BLDV: 7.32 — SIGNIFICANT CHANGE UP (ref 7.32–7.43)
PLATELET # BLD AUTO: 242 K/UL — SIGNIFICANT CHANGE UP (ref 150–400)
PO2 BLDV: 41 MMHG — SIGNIFICANT CHANGE UP (ref 25–45)
POTASSIUM BLDV-SCNC: 4.6 MMOL/L — SIGNIFICANT CHANGE UP (ref 3.5–5.1)
POTASSIUM SERPL-MCNC: 4.4 MMOL/L — SIGNIFICANT CHANGE UP (ref 3.5–5.3)
POTASSIUM SERPL-SCNC: 4.4 MMOL/L — SIGNIFICANT CHANGE UP (ref 3.5–5.3)
PROT SERPL-MCNC: 7.3 G/DL — SIGNIFICANT CHANGE UP (ref 6–8.3)
PROTHROM AB SERPL-ACNC: 12.6 SEC — SIGNIFICANT CHANGE UP (ref 10.5–13.4)
PTH-INTACT FLD-MCNC: 170 PG/ML — HIGH (ref 15–65)
RBC # BLD: 5.36 M/UL — HIGH (ref 3.8–5.2)
RBC # FLD: 17.1 % — HIGH (ref 10.3–14.5)
SAO2 % BLDV: 65.7 % — LOW (ref 67–88)
SODIUM SERPL-SCNC: 141 MMOL/L — SIGNIFICANT CHANGE UP (ref 135–145)
WBC # BLD: 8.41 K/UL — SIGNIFICANT CHANGE UP (ref 3.8–10.5)
WBC # FLD AUTO: 8.41 K/UL — SIGNIFICANT CHANGE UP (ref 3.8–10.5)

## 2023-07-03 PROCEDURE — 99223 1ST HOSP IP/OBS HIGH 75: CPT | Mod: GC

## 2023-07-03 PROCEDURE — 99222 1ST HOSP IP/OBS MODERATE 55: CPT

## 2023-07-03 PROCEDURE — 99285 EMERGENCY DEPT VISIT HI MDM: CPT | Mod: FS

## 2023-07-03 RX ORDER — ACETAMINOPHEN 500 MG
650 TABLET ORAL EVERY 6 HOURS
Refills: 0 | Status: DISCONTINUED | OUTPATIENT
Start: 2023-07-03 | End: 2023-07-09

## 2023-07-03 RX ORDER — ROSUVASTATIN CALCIUM 5 MG/1
1 TABLET ORAL
Qty: 0 | Refills: 0 | DISCHARGE

## 2023-07-03 RX ORDER — TACROLIMUS 5 MG/1
2 CAPSULE ORAL
Qty: 0 | Refills: 0 | DISCHARGE

## 2023-07-03 RX ORDER — PANTOPRAZOLE SODIUM 20 MG/1
40 TABLET, DELAYED RELEASE ORAL EVERY 12 HOURS
Refills: 0 | Status: DISCONTINUED | OUTPATIENT
Start: 2023-07-03 | End: 2023-07-05

## 2023-07-03 RX ORDER — PANTOPRAZOLE SODIUM 20 MG/1
80 TABLET, DELAYED RELEASE ORAL ONCE
Refills: 0 | Status: COMPLETED | OUTPATIENT
Start: 2023-07-03 | End: 2023-07-03

## 2023-07-03 RX ORDER — PANTOPRAZOLE SODIUM 20 MG/1
8 TABLET, DELAYED RELEASE ORAL
Qty: 80 | Refills: 0 | Status: DISCONTINUED | OUTPATIENT
Start: 2023-07-03 | End: 2023-07-03

## 2023-07-03 RX ORDER — CINACALCET 30 MG/1
30 TABLET, FILM COATED ORAL
Refills: 0 | Status: DISCONTINUED | OUTPATIENT
Start: 2023-07-03 | End: 2023-07-09

## 2023-07-03 RX ORDER — CINACALCET 30 MG/1
1 TABLET, FILM COATED ORAL
Qty: 0 | Refills: 0 | DISCHARGE

## 2023-07-03 RX ORDER — ATORVASTATIN CALCIUM 80 MG/1
20 TABLET, FILM COATED ORAL AT BEDTIME
Refills: 0 | Status: DISCONTINUED | OUTPATIENT
Start: 2023-07-03 | End: 2023-07-09

## 2023-07-03 RX ORDER — TACROLIMUS 5 MG/1
4 CAPSULE ORAL DAILY
Refills: 0 | Status: DISCONTINUED | OUTPATIENT
Start: 2023-07-03 | End: 2023-07-05

## 2023-07-03 RX ADMIN — ATORVASTATIN CALCIUM 20 MILLIGRAM(S): 80 TABLET, FILM COATED ORAL at 21:26

## 2023-07-03 RX ADMIN — Medication 5 MILLIGRAM(S): at 18:11

## 2023-07-03 RX ADMIN — CINACALCET 30 MILLIGRAM(S): 30 TABLET, FILM COATED ORAL at 18:10

## 2023-07-03 RX ADMIN — PANTOPRAZOLE SODIUM 40 MILLIGRAM(S): 20 TABLET, DELAYED RELEASE ORAL at 18:10

## 2023-07-03 RX ADMIN — PANTOPRAZOLE SODIUM 80 MILLIGRAM(S): 20 TABLET, DELAYED RELEASE ORAL at 13:18

## 2023-07-03 RX ADMIN — Medication 650 MILLIGRAM(S): at 17:33

## 2023-07-03 RX ADMIN — Medication 650 MILLIGRAM(S): at 18:05

## 2023-07-03 NOTE — ED ADULT NURSE NOTE - OBJECTIVE STATEMENT
54 y/o female w/ ESRD s/p HepC DDRT, chronic SVC syndrome s/p L cephalic-iliac vein bypass on DAPT, s/p thrombectomy of L iliac vein and graft 2012, HTN, presents to the ER for rectal bleeding. states started two days ago. states started as bright red rectal bleed and today noticed she had black colored stools. states feels weak. went to work this morning but felt too weak. denies f/n/v/d, CP, SOB, HA, dizziness, abdominal pain, urinary symptoms, cough. 53y female w/ ESRD, hep c,  HTN, presents to the ER for rectal bleeding. states started two days ago. states started as bright red rectal bleed and today noticed she had black colored stools. states feels weak. went to work this morning but felt too weak. denies f/n/v/d, CP, SOB, HA, dizziness, abdominal pain, urinary symptoms, cough.

## 2023-07-03 NOTE — CONSULT NOTE ADULT - ASSESSMENT
52 y/o female w/ ESRD s/p HepC DDRT, chronic SVC syndrome s/p L cephalic-iliac vein bypass on DAPT, s/p thrombectomy of L iliac vein and graft 2012, HTN, presents to the ER for rectal bleeding. states started two days ago. states started as bright red rectal bleed and today noticed she had black colored stools. states feels weak. went to work this morning but felt too weak. denies f/n/v/d, CP, SOB, HA, dizziness, abdominal pain, urinary symptoms, cough. Nephrology consulted for s/p DDRT.     A/P    s/p DDRT @ [facility] 08/14/2020  Outpatient nephrologist is Dr. Lake   Per pt, she is on Envarsus 13 mg, Cellcept 500 mg BID   Continue Immunosuppression per transplant team recommendations   Check Tacrolimus  30 min prior to morning dose   Monitor BMP, u/o    HTN:   BP optimal  Monitor    Anemia:   Mgmt per Transplant   Maintain Hgb > 8  Monitor Hgb    Hypercalcemia   Check PTH, Check Vitamin D   Recommend NS @ 75 cc/hr x 1L  52 y/o female w/ ESRD s/p HepC DDRT, chronic SVC syndrome s/p L cephalic-iliac vein bypass on DAPT, s/p thrombectomy of L iliac vein and graft 2012, HTN, presents to the ER for rectal bleeding. states started two days ago. states started as bright red rectal bleed and today noticed she had black colored stools. states feels weak. went to work this morning but felt too weak. denies f/n/v/d, CP, SOB, HA, dizziness, abdominal pain, urinary symptoms, cough. Nephrology consulted for s/p DDRT.     A/P    s/p DDRT @ [facility] 08/14/2020  Outpatient nephrologist is Dr. Lake / Dr Berg  Per pt, she is on Envarsus 13 mg, Cellcept 500 mg BID   Resume  Immunosuppression medications  Check Tacrolimus  30 min prior to morning dose   Monitor BMP, u/o    HTN:   BP optimal  Monitor    Anemia:   Per primary  Maintain Hgb > 8  Monitor Hgb    Hypercalcemia   Check PTH, Check Vitamin D   Recommend NS @ 75 cc/hr x 1L

## 2023-07-03 NOTE — ED PROVIDER NOTE - CLINICAL SUMMARY MEDICAL DECISION MAKING FREE TEXT BOX
Adult f pmh CKD sp renal transplant, PUD, Pw c/o rectal bleeding. Concerns for recurret pud n asa/plavix Check labs,quiac, probable admit  Papo Hassan MD, Facep

## 2023-07-03 NOTE — ED PROVIDER NOTE - PROGRESS NOTE DETAILS
Yazmin Barton PA-C: spoke to transplant team, Dr. Zamora. aware patient in ED. Yazmin Barton PA-C: labs reviewed. hgb 13.3. +occult blood. VSS. will admit patient to medicine for further evaluation and workup. email sent to GI. discussed with ED Attending.

## 2023-07-03 NOTE — H&P ADULT - HISTORY OF PRESENT ILLNESS
54 y/o female w/ ESRD s/p HepC DDRT, chronic SVC syndrome s/p L cephalic-iliac vein bypass on DAPT, s/p thrombectomy of L iliac vein and graft 2012, HTN, presents to the ER for rectal bleeding. states started two days ago. states started as bright red rectal bleed and today noticed she had black colored stools. states feels weak. went to work this morning but felt too weak. denies f/n/v/d, CP, SOB, HA, dizziness, abdominal pain, urinary

## 2023-07-03 NOTE — ED PROVIDER NOTE - NSICDXPASTSURGICALHX_GEN_ALL_CORE_FT
PAST SURGICAL HISTORY:  AV fistula B/L - failed    H/O extremity bypass graft H/O RUE bypass and creation of right brachial graft    H/O kidney transplant     History of Hysterectomy for benign disease    Kidney transplant recipient

## 2023-07-03 NOTE — ED PROVIDER NOTE - OBJECTIVE STATEMENT
52 y/o female w/ ESRD s/p HepC DDRT, chronic SVC syndrome s/p L cephalic-iliac vein bypass on DAPT, s/p thrombectomy of L iliac vein and graft 2012, HTN, presents to the ER for rectal bleeding. states started two days ago. states started as bright red rectal bleed and today noticed she had black colored stools. states feels weak. went to work this morning but felt too weak. denies f/n/v/d, CP, SOB, HA, dizziness, abdominal pain, urinary symptoms, cough.

## 2023-07-03 NOTE — CONSULT NOTE ADULT - SUBJECTIVE AND OBJECTIVE BOX
[facility] DIVISION OF KIDNEY DISEASES AND HYPERTENSION -- [phone]  -- INITIAL CONSULT NOTE  --------------------------------------------------------------------------------  HPI: 54 yo F with a PMH of ESRD s/p HepC DDRT in 2020, chronic SVC syndrome s/p L cephalic-iliac vein bypass on DAPT, s/p thrombectomy of L iliac vein and graft 2012, Hx BK viremia, HTN admitted with dark stools and BRBPR. The patient reports that a few days ago she began having dark stools with bright red blood as well. She has noted multiple soft stools over the past 2-3 days with associated abdominal discomfort across her abdomen. SCr at baseline of ~1. Transplant nephrology consulted for guidance on immunosuppression management.     Pt seen and examined at bedside. She states she feels a "little tired" and notes some shortness of breath when she has pain. Denied difficulty with urination, blood in her urine, leg swelling or pain specifically around her transplant site. She states she is on Envarsus 11mg daily, Prednisone 5mg daily and Mycophenolate 500mg BID.     PAST HISTORY  --------------------------------------------------------------------------------  PAST MEDICAL & SURGICAL HISTORY:  HTN - Hypertension  Clotted Renal Dialysis AV Graft  Infection of AV Graft for Dialysis  Fibroid Tumor  CKD (chronic kidney disease) stage V requiring chronic dialysis  Chronic kidney disease, unspecified  History of Hysterectomy  for benign disease  AV fistula  B/L - failed  H/O extremity bypass graft  H/O RUE bypass and creation of right brachial graft  Kidney transplant recipient  H/O kidney transplant    FAMILY HISTORY:  No pertinent family history in first degree relatives    PAST SOCIAL HISTORY: Denied illicit drugs     ALLERGIES & MEDICATIONS  --------------------------------------------------------------------------------  Allergies    penicillin (Anaphylaxis)  lactose (Hives)  ibuprofen/morphine (Unknown)  morphine (Urticaria)  ibuprofen (Hives)  shellfish (Urticaria)  morphine (Anaphylaxis)  latex (Swelling)  contrast media (iodine-based) (Urticaria)    Intolerances    Standing Inpatient Medications  atorvastatin 20 milliGRAM(s) Oral at bedtime  cinacalcet 30 milliGRAM(s) Oral two times a day  pantoprazole Infusion 8 mG/Hr IV Continuous <Continuous>  predniSONE   Tablet 5 milliGRAM(s) Oral daily  tacrolimus ER Tablet (ENVARSUS XR) 4 milliGRAM(s) Oral daily    PRN Inpatient Medications    REVIEW OF SYSTEMS  --------------------------------------------------------------------------------  Gen: No fevers/chills  Head/Eyes/Ears: No HA  Respiratory: No dyspnea, cough  CV: No chest pain  GI: per HPI  : No dysuria, hematuria  MSK: No edema  Skin: No rashes  Heme: No easy bruising or bleeding    All other systems were reviewed and are negative, except as noted.    VITALS/PHYSICAL EXAM  --------------------------------------------------------------------------------  T(C): 37.1 (07-03-23 @ 17:00), Max: 37.2 (07-03-23 @ 11:35)  HR: 82 (07-03-23 @ 17:00) (79 - 82)  BP: 135/84 (07-03-23 @ 17:00) (112/75 - 135/84)  RR: 18 (07-03-23 @ 17:00) (16 - 18)  SpO2: 96% (07-03-23 @ 17:00) (96% - 100%)  Wt(kg): --  Height (cm): 160 (07-03-23 @ 11:35)  Weight (kg): 67.1 (07-03-23 @ 11:35)  BMI (kg/m2): 26.2 (07-03-23 @ 11:35)  BSA (m2): 1.7 (07-03-23 @ 11:35)    Physical Exam:  	Gen: Laying in bed and in NAD  	HEENT: Anicteric  	Pulm: CTA B/L  	CV: S1S2+  	Abd: Soft, +BS                Transplant site: RLQ non tender, well healed surgical scar.  	Ext: No LE edema B/L  	Neuro: Awake  	Skin: Warm and dry    LABS/STUDIES  --------------------------------------------------------------------------------              13.3   8.41  >-----------<  242      [07-03-23 @ 13:17]              44.5     141  |  107  |  52  ----------------------------<  112      [07-03-23 @ 13:17]  4.4   |  23  |  1.04        Ca     11.1     [07-03-23 @ 13:17]    TPro  7.3  /  Alb  4.7  /  TBili  0.7  /  DBili  x   /  AST  12  /  ALT  14  /  AlkPhos  68  [07-03-23 @ 13:17]    PT/INR: PT 12.6 , INR 1.09       [07-03-23 @ 13:17]  PTT: 28.7       [07-03-23 @ 13:17]    Creatinine Trend:  SCr 1.04 [07-03 @ 13:17]    Urinalysis - [07-03-23 @ 13:17]      Color  / Appearance  / SG  / pH       Gluc 112 / Ketone   / Bili  / Urobili        Blood  / Protein  / Leuk Est  / Nitrite       RBC  / WBC  / Hyaline  / Gran  / Sq Epi  / Non Sq Epi  / Bacteria

## 2023-07-03 NOTE — H&P ADULT - ASSESSMENT
52 yo F with a ESRD s/p HepC DDRT in 2020, chronic SVC syndrome s/p L cephalic-iliac vein bypass on DAPT, s/p thrombectomy of L iliac vein and graft 2012, Hx BK viremia, HTN admitted with dark stools and BRBPR.       # BRBPR Monitor CBC.   Hold Plavix   Continue with Aspirin.   GI consult appreciated   Transfuse prn       # Kidney Transplant Continue with Tacrolimus   Transplant team consult appreciated

## 2023-07-03 NOTE — CONSULT NOTE ADULT - PROBLEM SELECTOR RECOMMENDATION 9
Pt. with kidney transplantation in 2020 ([facility]), currently admitted for possible GIB. SCr is stable at 1.04. Monitor labs and urine output. Avoid any potential nephrotoxins. Dose medications as per eGFR.

## 2023-07-03 NOTE — CONSULT NOTE ADULT - SUBJECTIVE AND OBJECTIVE BOX
Chief Complaint:  Patient is a 53y old  Female who presents with a chief complaint of     HPI:ESTEFANIA CORDERO is a 53y Female    PMHX/PSHX:  ESRD on Dialysis    HTN - Hypertension    Clotted Renal Dialysis AV Graft    Infection of AV Graft for Dialysis    Clotted Renal Dialysis AV Graft    Infection of AV Graft for Dialysis    Fibroid Tumor    CKD (chronic kidney disease) stage V requiring chronic dialysis    Chronic kidney disease, unspecified    History of Hysterectomy    AV fistula    H/O extremity bypass graft    Kidney transplant recipient    H/O kidney transplant      Allergies:  penicillin (Anaphylaxis)  lactose (Hives)  ibuprofen/morphine (Unknown)  morphine (Urticaria)  ibuprofen (Hives)  shellfish (Urticaria)  morphine (Anaphylaxis)  latex (Swelling)  contrast media (iodine-based) (Urticaria)      Home Medications: reviewed  Hospital Medications:  acetaminophen     Tablet .. 650 milliGRAM(s) Oral every 6 hours PRN  atorvastatin 20 milliGRAM(s) Oral at bedtime  cinacalcet 30 milliGRAM(s) Oral two times a day  pantoprazole Infusion 8 mG/Hr IV Continuous <Continuous>  predniSONE   Tablet 5 milliGRAM(s) Oral daily  tacrolimus ER Tablet (ENVARSUS XR) 4 milliGRAM(s) Oral daily      Social History:   Tob: Denies  EtOH: Denies  Illicit Drugs: Denies    Family history:  No pertinent family history in first degree relatives    No pertinent family history in first degree relatives      Denies family history of colon cancer/polyps, stomach cancer/polyps, pancreatic cancer/masses, liver cancer/disease, ovarian cancer and endometrial cancer.    ROS:   General:  No  fevers, chills, night sweats, fatigue  Eyes:  Good vision, no reported pain  ENT:  No sore throat, pain, runny nose  CV:  No pain, palpitations  Pulm:  No dyspnea, cough  GI:  See HPI, otherwise negative  :  No  incontinence, nocturia  Muscle:  No pain, weakness  Neuro:  No memory problems  Psych:  No insomnia, mood problems, depression  Endocrine:  No polyuria, polydipsia, cold/heat intolerance  Heme:  No petechiae, ecchymosis, easy bruisability  Skin:  No rash    PHYSICAL EXAM:   Vital Signs:  Vital Signs Last 24 Hrs  T(C): 37.1 (03 Jul 2023 17:00), Max: 37.2 (03 Jul 2023 11:35)  T(F): 98.8 (03 Jul 2023 17:00), Max: 98.9 (03 Jul 2023 11:35)  HR: 82 (03 Jul 2023 17:00) (79 - 82)  BP: 135/84 (03 Jul 2023 17:00) (112/75 - 135/84)  BP(mean): 98 (03 Jul 2023 12:50) (98 - 98)  RR: 18 (03 Jul 2023 17:00) (16 - 18)  SpO2: 96% (03 Jul 2023 17:00) (96% - 100%)    Parameters below as of 03 Jul 2023 17:00  Patient On (Oxygen Delivery Method): room air      Daily Height in cm: 160.02 (03 Jul 2023 11:35)    Daily     GENERAL: no acute distress  NEURO: alert  HEENT: anicteric sclera, no conjunctival pallor appreciated  CHEST: no respiratory distress, no accessory muscle use  CARDIAC: regular rate, rhythm  ABDOMEN: soft, non-tender, non-distended, no rebound or guarding  EXTREMITIES: warm, well perfused, no edema  SKIN: no lesions noted    LABS: reviewed                        13.3   8.41  )-----------( 242      ( 03 Jul 2023 13:17 )             44.5     07-03    141  |  107  |  52<H>  ----------------------------<  112<H>  4.4   |  23  |  1.04    Ca    11.1<H>      03 Jul 2023 13:17    TPro  7.3  /  Alb  4.7  /  TBili  0.7  /  DBili  x   /  AST  12  /  ALT  14  /  AlkPhos  68  07-03    LIVER FUNCTIONS - ( 03 Jul 2023 13:17 )  Alb: 4.7 g/dL / Pro: 7.3 g/dL / ALK PHOS: 68 U/L / ALT: 14 U/L / AST: 12 U/L / GGT: x               Diagnostic Studies: see sunrise for full report         Chief Complaint:  Patient is a 53y old  Female who presents with a chief complaint of     HPI:ESTEFANIA CORDERO is a 52 yo F with a ESRD s/p HepC DDRT in 2020, chronic SVC syndrome s/p L cephalic-iliac vein bypass on DAPT, s/p thrombectomy of L iliac vein and graft 2012, Hx BK viremia, HTN admitted with dark stools and BRBPR. For which GI was consulted.    [place] constipated on Thurdsay/Friday so she took milk of magnesia. On Saturday she had a few episodes of BRBPR mixed with yellow stool. She was instructed by her outside nephrologist to monitor, per patient, but then started having dark black stools on Sunday and Monday so she came to the ED. Also endorsing RLQ abdominal pain that is currently 8/10, previously higher, so improving. Denies heavy use of NSAIDs. Note, patient was previously scoped EGD last year, but says the bleeding she had last year was different and that she had clots in her stool that time.  She has otherwise been afebrile and HDS.     Of note: She states she is on Envarsus 11mg daily, Prednisone 5mg daily and Mycophenolate 500mg BID.     EGD 5/2022  EGD with EV grade 2 downhill varices, PUD in the antrum (clean based ulcer and one Chidi Class II C.   Prior US with normal appearing liver. CT with IV contrast with normal liver morphology and patent PV and HV. Fibroscan with no concern for cirrhosis. Hep B and C negative. KAYLA, antismooth, anti-mitochondrial negative.         PMHX/PSHX:  ESRD on Dialysis    HTN - Hypertension    Clotted Renal Dialysis AV Graft    Infection of AV Graft for Dialysis    Clotted Renal Dialysis AV Graft    Infection of AV Graft for Dialysis    Fibroid Tumor    CKD (chronic kidney disease) stage V requiring chronic dialysis    Chronic kidney disease, unspecified    History of Hysterectomy    AV fistula    H/O extremity bypass graft    Kidney transplant recipient    H/O kidney transplant      Allergies:  penicillin (Anaphylaxis)  lactose (Hives)  ibuprofen/morphine (Unknown)  morphine (Urticaria)  ibuprofen (Hives)  shellfish (Urticaria)  morphine (Anaphylaxis)  latex (Swelling)  contrast media (iodine-based) (Urticaria)      Home Medications: reviewed  Hospital Medications:  acetaminophen     Tablet .. 650 milliGRAM(s) Oral every 6 hours PRN  atorvastatin 20 milliGRAM(s) Oral at bedtime  cinacalcet 30 milliGRAM(s) Oral two times a day  pantoprazole Infusion 8 mG/Hr IV Continuous <Continuous>  predniSONE   Tablet 5 milliGRAM(s) Oral daily  tacrolimus ER Tablet (ENVARSUS XR) 4 milliGRAM(s) Oral daily      Social History:   Tob: Denies  EtOH: Denies  Illicit Drugs: Denies    Family history:  No pertinent family history in first degree relatives    No pertinent family history in first degree relatives      Denies family history of colon cancer/polyps, stomach cancer/polyps, pancreatic cancer/masses, liver cancer/disease, ovarian cancer and endometrial cancer.    ROS:   General:  No  fevers, chills, night sweats, fatigue  Eyes:  Good vision, no reported pain  ENT:  No sore throat, pain, runny nose  CV:  No pain, palpitations  Pulm:  No dyspnea, cough  GI:  See HPI, otherwise negative  :  No  incontinence, nocturia  Muscle:  No pain, weakness  Neuro:  No memory problems  Psych:  No insomnia, mood problems, depression  Endocrine:  No polyuria, polydipsia, cold/heat intolerance  Heme:  No petechiae, ecchymosis, easy bruisability  Skin:  No rash    PHYSICAL EXAM:   Vital Signs:  Vital Signs Last 24 Hrs  T(C): 37.1 (03 Jul 2023 17:00), Max: 37.2 (03 Jul 2023 11:35)  T(F): 98.8 (03 Jul 2023 17:00), Max: 98.9 (03 Jul 2023 11:35)  HR: 82 (03 Jul 2023 17:00) (79 - 82)  BP: 135/84 (03 Jul 2023 17:00) (112/75 - 135/84)  BP(mean): 98 (03 Jul 2023 12:50) (98 - 98)  RR: 18 (03 Jul 2023 17:00) (16 - 18)  SpO2: 96% (03 Jul 2023 17:00) (96% - 100%)    Parameters below as of 03 Jul 2023 17:00  Patient On (Oxygen Delivery Method): room air      Daily Height in cm: 160.02 (03 Jul 2023 11:35)    Daily     GENERAL: no acute distress  NEURO: alert  HEENT: anicteric sclera, no conjunctival pallor appreciated  CHEST: no respiratory distress, no accessory muscle use  CARDIAC: regular rate, rhythm  ABDOMEN: soft, non-tender, non-distended, no rebound or guarding  EXTREMITIES: warm, well perfused, no edema  SKIN: no lesions noted    LABS: reviewed                        13.3   8.41  )-----------( 242      ( 03 Jul 2023 13:17 )             44.5     07-03    141  |  107  |  52<H>  ----------------------------<  112<H>  4.4   |  23  |  1.04    Ca    11.1<H>      03 Jul 2023 13:17    TPro  7.3  /  Alb  4.7  /  TBili  0.7  /  DBili  x   /  AST  12  /  ALT  14  /  AlkPhos  68  07-03    LIVER FUNCTIONS - ( 03 Jul 2023 13:17 )  Alb: 4.7 g/dL / Pro: 7.3 g/dL / ALK PHOS: 68 U/L / ALT: 14 U/L / AST: 12 U/L / GGT: x               Diagnostic Studies: see sunrise for full report         Chief Complaint:  Patient is a 53y old  Female who presents with a chief complaint of     HPI:ESTEFANIA CORDERO is a 54 yo F with a ESRD s/p HepC DDRT in 2020, chronic SVC syndrome s/p L cephalic-iliac vein bypass on DAPT, s/p thrombectomy of L iliac vein and graft 2012, Hx BK viremia, HTN admitted with dark stools and BRBPR. For which GI was consulted.    [place] constipated on Thurdsay/Friday so she took milk of magnesia. On Saturday she had a few episodes of BRBPR mixed with yellow stool. She was instructed by her outside nephrologist to monitor, per patient, but then started having dark black stools on Sunday and Monday so she came to the ED. Also endorsing RLQ abdominal pain that is currently 8/10, previously higher, so improving. Denies heavy use of NSAIDs. Note, patient was previously scoped EGD last year, but says the bleeding she had last year was different and that she had clots in her stool that time. No prior colonoscopy. She has otherwise been afebrile and HDS.     Of note: She states she is on Envarsus 11mg daily, Prednisone 5mg daily and Mycophenolate 500mg BID.     EGD 5/2022  EGD with EV grade 2 downhill varices, PUD in the antrum (clean based ulcer and one Chidi Class II C.   Prior US with normal appearing liver. CT with IV contrast with normal liver morphology and patent PV and HV. Fibroscan with no concern for cirrhosis. Hep B and C negative. KAYLA, antismooth, anti-mitochondrial negative.         PMHX/PSHX:  ESRD on Dialysis    HTN - Hypertension    Clotted Renal Dialysis AV Graft    Infection of AV Graft for Dialysis    Clotted Renal Dialysis AV Graft    Infection of AV Graft for Dialysis    Fibroid Tumor    CKD (chronic kidney disease) stage V requiring chronic dialysis    Chronic kidney disease, unspecified    History of Hysterectomy    AV fistula    H/O extremity bypass graft    Kidney transplant recipient    H/O kidney transplant      Allergies:  penicillin (Anaphylaxis)  lactose (Hives)  ibuprofen/morphine (Unknown)  morphine (Urticaria)  ibuprofen (Hives)  shellfish (Urticaria)  morphine (Anaphylaxis)  latex (Swelling)  contrast media (iodine-based) (Urticaria)      Home Medications: reviewed  Hospital Medications:  acetaminophen     Tablet .. 650 milliGRAM(s) Oral every 6 hours PRN  atorvastatin 20 milliGRAM(s) Oral at bedtime  cinacalcet 30 milliGRAM(s) Oral two times a day  pantoprazole Infusion 8 mG/Hr IV Continuous <Continuous>  predniSONE   Tablet 5 milliGRAM(s) Oral daily  tacrolimus ER Tablet (ENVARSUS XR) 4 milliGRAM(s) Oral daily      Social History:   Tob: Denies  EtOH: Denies  Illicit Drugs: Denies    Family history:  No pertinent family history in first degree relatives      ROS:   Complete and normal except as mentioned above.  PHYSICAL EXAM:   Vital Signs:  Vital Signs Last 24 Hrs  T(C): 37.1 (03 Jul 2023 17:00), Max: 37.2 (03 Jul 2023 11:35)  T(F): 98.8 (03 Jul 2023 17:00), Max: 98.9 (03 Jul 2023 11:35)  HR: 82 (03 Jul 2023 17:00) (79 - 82)  BP: 135/84 (03 Jul 2023 17:00) (112/75 - 135/84)  BP(mean): 98 (03 Jul 2023 12:50) (98 - 98)  RR: 18 (03 Jul 2023 17:00) (16 - 18)  SpO2: 96% (03 Jul 2023 17:00) (96% - 100%)    Parameters below as of 03 Jul 2023 17:00  Patient On (Oxygen Delivery Method): room air      Daily Height in cm: 160.02 (03 Jul 2023 11:35)    Daily     GENERAL: no acute distress  NEURO: alert  HEENT: anicteric sclera, no conjunctival pallor appreciated  CHEST: no respiratory distress, no accessory muscle use  CARDIAC: regular rate  ABDOMEN: soft, RLQ tenderness, non-distended, no rebound or guarding  EXTREMITIES: warm, well perfused, no edema  SKIN: no lesions noted    LABS: reviewed                        13.3   8.41  )-----------( 242      ( 03 Jul 2023 13:17 )             44.5     07-03    141  |  107  |  52<H>  ----------------------------<  112<H>  4.4   |  23  |  1.04    Ca    11.1<H>      03 Jul 2023 13:17    TPro  7.3  /  Alb  4.7  /  TBili  0.7  /  DBili  x   /  AST  12  /  ALT  14  /  AlkPhos  68  07-03    LIVER FUNCTIONS - ( 03 Jul 2023 13:17 )  Alb: 4.7 g/dL / Pro: 7.3 g/dL / ALK PHOS: 68 U/L / ALT: 14 U/L / AST: 12 U/L / GGT: x               Diagnostic Studies: see sunrise for full report

## 2023-07-03 NOTE — ED PROVIDER NOTE - NSICDXPASTMEDICALHX_GEN_ALL_CORE_FT
PAST MEDICAL HISTORY:  Chronic kidney disease, unspecified     CKD (chronic kidney disease) stage V requiring chronic dialysis     Clotted Renal Dialysis AV Graft     Fibroid Tumor     HTN - Hypertension     Infection of AV Graft for Dialysis

## 2023-07-03 NOTE — ED ADULT TRIAGE NOTE - CHIEF COMPLAINT QUOTE
PT presents to ED with c/o black stool.  Pt on plavix and daily asa. PMHx of Bypass surgery and kidney transplant. Pt took plavix last night.

## 2023-07-03 NOTE — CONSULT NOTE ADULT - ATTENDING COMMENTS
Agree with above. Pt with hematochezia Saturday, which became dark for the last few BMs. Had 4 Bms a day. Reports new abdominal pain on "both sides" of her abdomen with onset of pain, now 8/10, was worse at home. No nausea/vomiting. No fevers. Feels that this episode of bleeding is different than that from 2022. Given abdominal pain and hematochezia initially, suspect LGI bleeding such as ischemic colitis. Ddx also includes diverticular bleeding, angioectasia. No nausea/vomiting, less likely UGI bleeding/variceal. She is not cirrhotic on prior elastography and imaging. Would check abdominal CT to r/o ischemic colitis. If unrevealing, will plan for colonoscopy Wednesday. Monitor H/H.
Allograft function is stable.   continue home IS med regime. tac goal is 4-6   GI w/u for drak stools and BRBPR

## 2023-07-03 NOTE — CONSULT NOTE ADULT - PROBLEM SELECTOR RECOMMENDATION 2
Currently on immunosuppression (Envarsus 11mg daily,  mg BID and prednisone 5 mg PO OD) for kidney transplant. Recommend continuing home immunosuppressives. Check serum tacrolimus level (trough) 30 mins prior to am dose. Goal trough of 4-6.    Assessment and plan discussed with attending on call.

## 2023-07-03 NOTE — CONSULT NOTE ADULT - ASSESSMENT
53F w/ ESRD s/p HepC DDRT in 2020, chronic SVC syndrome s/p L cephalic-iliac vein bypass on DAPT, s/p thrombectomy of L iliac vein and graft 2012, Hx BK viremia, HTN admitted with hematemasis.

## 2023-07-03 NOTE — H&P ADULT - PSYCHIATRIC
NO SIG EDEMA - TO SEE HOW HE DOES WITHOUT TREATMENT - REEVAL IN 7 WKS. alert and oriented x3/normal behavior

## 2023-07-03 NOTE — ED PROVIDER NOTE - PHYSICAL EXAMINATION
rectal exam done with ED attending. no active bleeding present. no hemorrhoids noted. no ttp during exam.

## 2023-07-03 NOTE — PATIENT PROFILE ADULT - FALL HARM RISK - HARM RISK INTERVENTIONS
Assistance with ambulation/Assistance OOB with selected safe patient handling equipment/Communicate Risk of Fall with Harm to all staff/Discuss with provider need for PT consult/Monitor gait and stability/Provide patient with walking aids - walker, cane, crutches/Reinforce activity limits and safety measures with patient and family/Sit up slowly, dangle for a short time, stand at bedside before walking/Tailored Fall Risk Interventions/Visual Cue: Yellow wristband and red socks/Bed in lowest position, wheels locked, appropriate side rails in place/Call bell, personal items and telephone in reach/Instruct patient to call for assistance before getting out of bed or chair/Non-slip footwear when patient is out of bed/Needles to call system/Physically safe environment - no spills, clutter or unnecessary equipment/Purposeful Proactive Rounding/Room/bathroom lighting operational, light cord in reach

## 2023-07-03 NOTE — CONSULT NOTE ADULT - SUBJECTIVE AND OBJECTIVE BOX
[facility]   MD MEL DOBBS MD BRIANA PETITO, NP    TEL:  FROM 9 AM to 5 PM ---OFFICE: [phone]  FROM 5 PM - 9 AM PLEASE CALL ANSWERING SERVICE: [phone]       RENAL INITIAL CONSULT NOTE: DATE OF SERVICE 07-03-23 @ 14:44    HPI: 54 y/o female w/ ESRD s/p HepC DDRT, chronic SVC syndrome s/p L cephalic-iliac vein bypass on DAPT, s/p thrombectomy of L iliac vein and graft 2012, HTN, presents to the ER for rectal bleeding. states started two days ago. states started as bright red rectal bleed and today noticed she had black colored stools. states feels weak. went to work this morning but felt too weak. denies f/n/v/d, CP, SOB, HA, dizziness, abdominal pain, urinary symptoms, cough. Nephrology consulted for s/p DDRT.         Allergies:  penicillin (Anaphylaxis)  lactose (Hives)  ibuprofen/morphine (Unknown)  morphine (Urticaria)  ibuprofen (Hives)  shellfish (Urticaria)  morphine (Anaphylaxis)  latex (Swelling)  contrast media (iodine-based) (Urticaria)      PAST MEDICAL & SURGICAL HISTORY:  HTN - Hypertension      Clotted Renal Dialysis AV Graft      Infection of AV Graft for Dialysis      Fibroid Tumor      CKD (chronic kidney disease) stage V requiring chronic dialysis      Chronic kidney disease, unspecified      History of Hysterectomy  for benign disease      AV fistula  B/L - failed      H/O extremity bypass graft  H/O RUE bypass and creation of right brachial graft      Kidney transplant recipient      H/O kidney transplant          Home Medications Reviewed    Hospital Medications:   MEDICATIONS  (STANDING):      SOCIAL HISTORY:  Denies ETOh, Smoking,     FAMILY HISTORY:  No pertinent family history in first degree relatives        REVIEW OF SYSTEMS:  CONSTITUTIONAL: SEE HPI  EYES/ENT: No visual changes;  No vertigo or throat pain   NECK: No pain or stiffness  RESPIRATORY: No cough, wheezing, hemoptysis; No shortness of breath  CARDIOVASCULAR: No chest pain or palpitations.  GASTROINTESTINAL: SEE HPI  GENITOURINARY: No dysuria, frequency, foamy urine, urinary urgency, incontinence or hematuria  NEUROLOGICAL: No numbness or weakness  SKIN: No itching, burning, rashes, or lesions   VASCULAR: No bilateral lower extremity edema.   All other review of systems is negative unless indicated above.    VITALS:  T(F): 98.3 (07-03-23 @ 12:50), Max: 98.9 (07-03-23 @ 11:35)  HR: 82 (07-03-23 @ 12:50)  BP: 134/83 (07-03-23 @ 12:50)  RR: 16 (07-03-23 @ 12:50)  SpO2: 100% (07-03-23 @ 12:50)  Wt(kg): --    Height (cm): 160 (07-03 @ 11:35)  Weight (kg): 67.1 (07-03 @ 11:35)  BMI (kg/m2): 26.2 (07-03 @ 11:35)  BSA (m2): 1.7 (07-03 @ 11:35)    PHYSICAL EXAM:  Constitutional: NAD  HEENT: anicteric sclera, oropharynx clear, MMM  Neck: No JVD  Respiratory: CTAB, no wheezes, rales or rhonchi  Cardiovascular: S1, S2, RRR  Gastrointestinal: BS+, soft, NT/ND  Extremities: No cyanosis or clubbing. No peripheral edema  Neurological: A/O x 3, no focal deficits  Psychiatric: Normal mood, normal affect  : No CVA tenderness. No ndiaye.   Skin: No rashes  Vascular Access: No HD cath.     LABS:  07-03    141  |  107  |  52<H>  ----------------------------<  112<H>  4.4   |  23  |  1.04    Ca    11.1<H>      03 Jul 2023 13:17    TPro  7.3  /  Alb  4.7  /  TBili  0.7  /  DBili      /  AST  12  /  ALT  14  /  AlkPhos  68  07-03    Creatinine Trend: 1.04 <--                        13.3   8.41  )-----------( 242      ( 03 Jul 2023 13:17 )             44.5     Urine Studies:  Urinalysis Basic - ( 03 Jul 2023 13:17 )    Color:  / Appearance:  / SG:  / pH:   Gluc: 112 mg/dL / Ketone:   / Bili:  / Urobili:    Blood:  / Protein:  / Nitrite:    Leuk Esterase:  / RBC:  / WBC    Sq Epi:  / Non Sq Epi:  / Bacteria:           RADIOLOGY & ADDITIONAL STUDIES:

## 2023-07-03 NOTE — CONSULT NOTE ADULT - ASSESSMENT
54 yo F with a ESRD s/p HepC DDRT in 2020, chronic SVC syndrome s/p L cephalic-iliac vein bypass on DAPT, s/p thrombectomy of L iliac vein and graft 2012, Hx BK viremia, HTN admitted with dark stools and BRBPR. For which GI was consulted.    # hematochezia  # Melena  Possibly experienced ischemic colitis given bleeding with abdominal pain, and the dark stool is old blood passing. No urgent concerns as patient sx are improving, but warrants further evaluation.    Recommendations:  - Urgent colonoscopy is not indicated, reasonable to consider on Wednesday if needed  - Check CTAP non con  - Daily CBC, CMP coags  - Ensure adequate hydration  - Can give PPI 40 IV BID, though less likely UGIB  - Will need to f/u with hepatology outpatient upon discharge  - Rest of care per primary    Preliminary note until signed by Attending.    Thank you for involving us in this patient's care.    Alessandra Davison MD  Gastroenterology/Hepatology Fellow, PGY-VI    NON-URGENT CONSULTS:  Please email [email or web address]  AT NIGHT AND ON WEEKENDS:  Contact on-call GI fellow via answering service ([phone]) from 5pm-8am and on weekends/holidays  MONDAY-FRIDAY 8AM-5PM:  Pager# [phone] ([facility])      52 yo F with a ESRD s/p HepC DDRT in 2020, chronic SVC syndrome s/p L cephalic-iliac vein bypass on DAPT, s/p thrombectomy of L iliac vein and graft 2012, Hx BK viremia, HTN admitted with dark stools and BRBPR. For which GI was consulted.    # hematochezia  # Melena  Possibly experienced ischemic colitis given bleeding with abdominal pain, and the dark stool is old blood passing. No urgent concerns as patient sx are improving, but warrants further evaluation. Ddx also include angioectasis, diverticular bleeding, hemorrhoidal bleeding.    Recommendations:  - Urgent colonoscopy is not indicated, reasonable to consider on Wednesday if needed  - Check CTAP non con  - Daily CBC, CMP coags  - Ensure adequate hydration  - Can give PPI 40 IV BID, though less likely UGIB  - Will need to f/u with hepatology outpatient upon discharge  - Rest of care per primary    Preliminary note until signed by Attending.    Thank you for involving us in this patient's care.    Alessandra Davison MD  Gastroenterology/Hepatology Fellow, PGY-VI    NON-URGENT CONSULTS:  Please email [email or web address]  [email or web address]  AT NIGHT AND ON WEEKENDS:  Contact on-call GI fellow via answering service ([phone]) from 5pm-8am and on weekends/holidays  MONDAY-FRIDAY 8AM-5PM:  Pager# [phone] ([facility])

## 2023-07-03 NOTE — ED PROVIDER NOTE - ATTENDING APP SHARED VISIT CONTRIBUTION OF CARE
Private Physician Arthur Nephro/pcp, [facility] Transplant Surg  53y female PMH CKD, sp renal transplant 8/2020, Uterine fibroids sp hystrectomy, PUD, Pt comes to ed c/o bloody stools onset two days ago. Color varies from bright red to black this am. Pt feels weak. No loc, cp, shortness of breath, palps, Pt went to work Private Physician [facility] Nephro/pcp, [facility] Transplant Surg  53y female PMH CKD, sp renal transplant 8/2020, Uterine fibroids sp hystrectomy, PUD, Pt comes to ed c/o bloody stools onset two days ago. Color varies from bright red to black this am. Pt feels weak. No loc, cp, shortness of breath, palps, Pt went to work 7a and then left to come to [facility]. PE WDWN Female awake alert normocephalic atraumatic neck supple chest clear anterior & posterior cv no rubs, gallops or murmurs abd soft +bs no mass guarding reboudn, neruo no focal defects  Papo Hassan MD, Facep

## 2023-07-04 LAB — TACROLIMUS SERPL-MCNC: 3.5 NG/ML — SIGNIFICANT CHANGE UP

## 2023-07-04 PROCEDURE — 74176 CT ABD & PELVIS W/O CONTRAST: CPT | Mod: 26

## 2023-07-04 PROCEDURE — 99232 SBSQ HOSP IP/OBS MODERATE 35: CPT | Mod: GC

## 2023-07-04 RX ORDER — ASPIRIN/CALCIUM CARB/MAGNESIUM 324 MG
81 TABLET ORAL DAILY
Refills: 0 | Status: DISCONTINUED | OUTPATIENT
Start: 2023-07-04 | End: 2023-07-09

## 2023-07-04 RX ORDER — SOD SULF/SODIUM/NAHCO3/KCL/PEG
4000 SOLUTION, RECONSTITUTED, ORAL ORAL ONCE
Refills: 0 | Status: COMPLETED | OUTPATIENT
Start: 2023-07-04 | End: 2023-07-04

## 2023-07-04 RX ADMIN — Medication 4000 MILLILITER(S): at 17:40

## 2023-07-04 RX ADMIN — PANTOPRAZOLE SODIUM 40 MILLIGRAM(S): 20 TABLET, DELAYED RELEASE ORAL at 06:31

## 2023-07-04 RX ADMIN — CINACALCET 30 MILLIGRAM(S): 30 TABLET, FILM COATED ORAL at 17:40

## 2023-07-04 RX ADMIN — CINACALCET 30 MILLIGRAM(S): 30 TABLET, FILM COATED ORAL at 06:31

## 2023-07-04 RX ADMIN — ATORVASTATIN CALCIUM 20 MILLIGRAM(S): 80 TABLET, FILM COATED ORAL at 21:34

## 2023-07-04 RX ADMIN — PANTOPRAZOLE SODIUM 40 MILLIGRAM(S): 20 TABLET, DELAYED RELEASE ORAL at 17:42

## 2023-07-04 RX ADMIN — TACROLIMUS 4 MILLIGRAM(S): 5 CAPSULE ORAL at 06:31

## 2023-07-04 RX ADMIN — Medication 5 MILLIGRAM(S): at 06:31

## 2023-07-04 NOTE — PROGRESS NOTE ADULT - ASSESSMENT
melena -- recurrent, mgmt per GI  -- protonix gtt  -- egd and colonoscopy tomorrow  -- hgb nml, monitor for now  -- f/u Dr Oliva after d/c    vasculopathy -- had been on plavix, was recommended to be on ppx AC in the past during previous admission  -- AC on hold for GIB  -- mgmt per primary team    d/w pt, will follow.

## 2023-07-04 NOTE — PROGRESS NOTE ADULT - ASSESSMENT
54 y/o female w/ ESRD s/p HepC DDRT, chronic SVC syndrome s/p L cephalic-iliac vein bypass on DAPT, s/p thrombectomy of L iliac vein and graft 2012, HTN, presents to the ER for rectal bleeding. states started two days ago. states started as bright red rectal bleed and today noticed she had black colored stools. states feels weak. went to work this morning but felt too weak. denies f/n/v/d, CP, SOB, HA, dizziness, abdominal pain, urinary symptoms, cough. Nephrology consulted for s/p DDRT.     A/P    s/p DDRT @ [facility] 08/14/2020  Outpatient nephrologist is Dr. Lake / Dr Berg  Per pt, she is on Envarsus 13 mg, Cellcept 500 mg BID   Continue  Immunosuppression medications  Check Tacrolimus  30 min prior to morning dose   Monitor BMP, u/o    HTN:   BP optimal  Monitor    Anemia:   Per primary  Maintain Hgb > 8  Monitor Hgb    Hypercalcemia   Check PTH, Check Vitamin D   Pending labs today

## 2023-07-04 NOTE — PROGRESS NOTE ADULT - ASSESSMENT
Impression:     52 yo F with a ESRD s/p HepC DDRT in 2020, chronic SVC syndrome s/p L cephalic-iliac vein bypass on DAPT, s/p thrombectomy of L iliac vein and graft 2012, Hx BK viremia, HTN admitted with dark stools and BRBPR. For which GI was consulted.    # hematochezia  # Melena  Possibly experienced ischemic colitis given bleeding with abdominal pain, and the dark stool is old blood passing. No urgent concerns as patient sx are improving, but warrants further evaluation. Ddx also include angioectasis, diverticular bleeding, hemorrhoidal bleeding. Also concerned for potential upper GI bleed given the persistent black stools overnight, would also add EGD for tomorrow.      Recommendations:  - Will proceed w/ both EGD and colonoscopy tomorrow.   - Please obtain CT A/P to evaluate for possible colitis.   - Hold off on Plavis for now if there is no absolute contraindication.   - can continue with ASA daily.   - CLD for now.   - Ordered GoLytely prep for this evening.   - NPO after midnight.   - continue with IV PPI BID  - Will need to f/u with hepatology outpatient upon discharge  - CBC Q12 hours and transfuse if hgb < 7.     GI will continue to follow.     All recommendations are tentative until note is attested by an attending.     Matt Horan, PGY-5  Gastroenterology/Hepatology Fellow  Available on [employer] Teams  [phone] (Short Range Pager)  [phone] (Long Range Pager)    After 5pm, please contact the on-call GI fellow. [phone] Impression:     54 yo F with a ESRD s/p HepC DDRT in 2020, chronic SVC syndrome s/p L cephalic-iliac vein bypass on DAPT, s/p thrombectomy of L iliac vein and graft 2012, Hx BK viremia, HTN admitted with dark stools and BRBPR. For which GI was consulted.    # hematochezia  # Melena  Possibly experienced ischemic colitis given bleeding with abdominal pain, and the dark stool is old blood passing. No urgent concerns as patient sx are improving, but warrants further evaluation. Ddx also include angioectasis, diverticular bleeding, hemorrhoidal bleeding. Also concerned for potential upper GI bleed given the persistent black stools overnight, would also add EGD for tomorrow.      Recommendations:  - Will proceed w/ both EGD and colonoscopy tomorrow.   - Please obtain CT A/P to evaluate for possible colitis.   - Hold off on Plavix for now if there is no absolute contraindication.   - can continue with ASA daily.   - CLD for now.   - Ordered GoLytely prep for this evening.   - NPO after midnight.   - continue with IV PPI BID  - Will need to f/u with hepatology outpatient upon discharge  - CBC Q12 hours and transfuse if hgb < 7.     GI will continue to follow.     All recommendations are tentative until note is attested by an attending.     Matt Horan, PGY-5  Gastroenterology/Hepatology Fellow  Available on [employer] Teams  [phone] (Short Range Pager)  [phone] (Long Range Pager)    After 5pm, please contact the on-call GI fellow. [phone]

## 2023-07-04 NOTE — PROGRESS NOTE ADULT - SUBJECTIVE AND OBJECTIVE BOX
Gastroenterology/Hepatology Progress Note      Interval Events:     - Patient reported she had three black stools overnight.   - Reported abd pain improved.   - no nausea, vomiting, fevers or chills.     Allergies:  penicillin (Anaphylaxis)  lactose (Hives)  ibuprofen/morphine (Unknown)  morphine (Urticaria)  ibuprofen (Hives)  shellfish (Urticaria)  morphine (Anaphylaxis)  latex (Swelling)  contrast media (iodine-based) (Urticaria)      Hospital Medications:  acetaminophen     Tablet .. 650 milliGRAM(s) Oral every 6 hours PRN  atorvastatin 20 milliGRAM(s) Oral at bedtime  cinacalcet 30 milliGRAM(s) Oral two times a day  pantoprazole  Injectable 40 milliGRAM(s) IV Push every 12 hours  predniSONE   Tablet 5 milliGRAM(s) Oral daily  tacrolimus ER Tablet (ENVARSUS XR) 4 milliGRAM(s) Oral daily      ROS: 14 point ROS negative unless otherwise state in subjective    PHYSICAL EXAM:   Vital Signs:  Vital Signs Last 24 Hrs  T(C): 36.8 (04 Jul 2023 04:26), Max: 37.2 (03 Jul 2023 11:35)  T(F): 98.2 (04 Jul 2023 04:26), Max: 98.9 (03 Jul 2023 11:35)  HR: 75 (04 Jul 2023 04:26) (75 - 82)  BP: 115/74 (04 Jul 2023 04:26) (112/75 - 135/84)  BP(mean): 101 (03 Jul 2023 16:57) (98 - 101)  RR: 18 (04 Jul 2023 04:26) (16 - 18)  SpO2: 98% (04 Jul 2023 04:26) (96% - 100%)    Parameters below as of 04 Jul 2023 04:26  Patient On (Oxygen Delivery Method): room air      Daily Height in cm: 160.02 (03 Jul 2023 16:57)    Daily     GENERAL:  No acute distress, obese female.   HEENT:  NCAT, no scleral icterus  CHEST: no resp distress  ABDOMEN:  Soft, mild tenderness in the RUQ and RLQ, non-distended,  no masses  EXTREMITIES:  No LE edema  NEURO:  Alert and oriented x 3, no tremor    LABS:                        13.3   8.41  )-----------( 242      ( 03 Jul 2023 13:17 )             44.5     Mean Cell Volume: 83.0 fl (07-03-23 @ 13:17)    07-03    141  |  107  |  52<H>  ----------------------------<  112<H>  4.4   |  23  |  1.04    Ca    11.1<H>      03 Jul 2023 13:17    TPro  7.3  /  Alb  4.7  /  TBili  0.7  /  DBili  x   /  AST  12  /  ALT  14  /  AlkPhos  68  07-03    LIVER FUNCTIONS - ( 03 Jul 2023 13:17 )  Alb: 4.7 g/dL / Pro: 7.3 g/dL / ALK PHOS: 68 U/L / ALT: 14 U/L / AST: 12 U/L / GGT: x           PT/INR - ( 03 Jul 2023 13:17 )   PT: 12.6 sec;   INR: 1.09 ratio         PTT - ( 03 Jul 2023 13:17 )  PTT:28.7 sec  Urinalysis Basic - ( 03 Jul 2023 13:17 )    Color: x / Appearance: x / SG: x / pH: x  Gluc: 112 mg/dL / Ketone: x  / Bili: x / Urobili: x   Blood: x / Protein: x / Nitrite: x   Leuk Esterase: x / RBC: x / WBC x   Sq Epi: x / Non Sq Epi: x / Bacteria: x            Imaging:           Gastroenterology/Hepatology Progress Note      Interval Events:     - Patient reported she had three black stools overnight.   - Reported abd pain improved.   - no nausea, vomiting, fevers or chills.     Allergies:  penicillin (Anaphylaxis)  lactose (Hives)  ibuprofen/morphine (Unknown)  morphine (Urticaria)  ibuprofen (Hives)  shellfish (Urticaria)  morphine (Anaphylaxis)  latex (Swelling)  contrast media (iodine-based) (Urticaria)      Hospital Medications:  acetaminophen     Tablet .. 650 milliGRAM(s) Oral every 6 hours PRN  atorvastatin 20 milliGRAM(s) Oral at bedtime  cinacalcet 30 milliGRAM(s) Oral two times a day  pantoprazole  Injectable 40 milliGRAM(s) IV Push every 12 hours  predniSONE   Tablet 5 milliGRAM(s) Oral daily  tacrolimus ER Tablet (ENVARSUS XR) 4 milliGRAM(s) Oral daily      ROS: 14 point ROS negative unless otherwise state in subjective    PHYSICAL EXAM:   Vital Signs:  Vital Signs Last 24 Hrs  T(C): 36.8 (04 Jul 2023 04:26), Max: 37.2 (03 Jul 2023 11:35)  T(F): 98.2 (04 Jul 2023 04:26), Max: 98.9 (03 Jul 2023 11:35)  HR: 75 (04 Jul 2023 04:26) (75 - 82)  BP: 115/74 (04 Jul 2023 04:26) (112/75 - 135/84)  BP(mean): 101 (03 Jul 2023 16:57) (98 - 101)  RR: 18 (04 Jul 2023 04:26) (16 - 18)  SpO2: 98% (04 Jul 2023 04:26) (96% - 100%)    Parameters below as of 04 Jul 2023 04:26  Patient On (Oxygen Delivery Method): room air      Daily Height in cm: 160.02 (03 Jul 2023 16:57)    Daily     GENERAL:  No acute distress, obese female.   HEENT:  NCAT, no scleral icterus  CHEST: no resp distress  ABDOMEN:  Soft, mild tenderness in the RUQ and RLQ, non-distended,  no masses  EXTREMITIES:  No LE edema  NEURO:  Alert and oriented x 3, no tremor    LABS:                        13.3   8.41  )-----------( 242      ( 03 Jul 2023 13:17 )             44.5     Mean Cell Volume: 83.0 fl (07-03-23 @ 13:17)    07-03    141  |  107  |  52<H>  ----------------------------<  112<H>  4.4   |  23  |  1.04    Ca    11.1<H>      03 Jul 2023 13:17    TPro  7.3  /  Alb  4.7  /  TBili  0.7  /  DBili  x   /  AST  12  /  ALT  14  /  AlkPhos  68  07-03    LIVER FUNCTIONS - ( 03 Jul 2023 13:17 )  Alb: 4.7 g/dL / Pro: 7.3 g/dL / ALK PHOS: 68 U/L / ALT: 14 U/L / AST: 12 U/L / GGT: x           PT/INR - ( 03 Jul 2023 13:17 )   PT: 12.6 sec;   INR: 1.09 ratio         PTT - ( 03 Jul 2023 13:17 )  PTT:28.7 sec  Urinalysis Basic - ( 03 Jul 2023 13:17 )    Color: x / Appearance: x / SG: x / pH: x  Gluc: 112 mg/dL / Ketone: x  / Bili: x / Urobili: x   Blood: x / Protein: x / Nitrite: x   Leuk Esterase: x / RBC: x / WBC x   Sq Epi: x / Non Sq Epi: x / Bacteria: x

## 2023-07-04 NOTE — PROGRESS NOTE ADULT - SUBJECTIVE AND OBJECTIVE BOX
Pt known to our service, pt of Dr Oliva.    54 y/o female w/ ESRD s/p HepC DDRT, chronic SVC syndrome s/p L cephalic-iliac vein bypass on DAPT, s/p thrombectomy of L iliac vein and graft 2012, HTN, presents to the ER for rectal bleeding. states started two days ago. states started as bright red rectal bleed and today noticed she had black colored stools. states feels weak. went to work this morning but felt too weak. denies f/n/v/d, CP, SOB, HA, dizziness, abdominal pain, urinary     She feels ok, better since presentation, minimal melena now       PAST MEDICAL & SURGICAL HISTORY:  HTN - Hypertension      Clotted Renal Dialysis AV Graft      Infection of AV Graft for Dialysis      Fibroid Tumor      CKD (chronic kidney disease) stage V requiring chronic dialysis      Chronic kidney disease, unspecified      History of Hysterectomy  for benign disease      AV fistula  B/L - failed      H/O extremity bypass graft  H/O RUE bypass and creation of right brachial graft      Kidney transplant recipient      H/O kidney transplant          FAMILY HISTORY:  No pertinent family history in first degree relatives        Alochol: Denied  Smoking: Nonsmoker  Drug Use: Denied  Marital Status:         Allergies    penicillin (Anaphylaxis)  lactose (Hives)  ibuprofen/morphine (Unknown)  morphine (Urticaria)  ibuprofen (Hives)  shellfish (Urticaria)  morphine (Anaphylaxis)  latex (Swelling)  contrast media (iodine-based) (Urticaria)    Intolerances        MEDICATIONS  (STANDING):  atorvastatin 20 milliGRAM(s) Oral at bedtime  cinacalcet 30 milliGRAM(s) Oral two times a day  pantoprazole  Injectable 40 milliGRAM(s) IV Push every 12 hours  polyethylene glycol/electrolyte Solution. 4000 milliLiter(s) Oral once  predniSONE   Tablet 5 milliGRAM(s) Oral daily  tacrolimus ER Tablet (ENVARSUS XR) 4 milliGRAM(s) Oral daily    MEDICATIONS  (PRN):  acetaminophen     Tablet .. 650 milliGRAM(s) Oral every 6 hours PRN Temp greater or equal to 38C (100.4F), Mild Pain (1 - 3), Moderate Pain (4 - 6)      ROS  No fever, sweats, chills  No epistaxis, HA, sore throat  No CP, SOB, cough, sputum  No n/v/d, abd pain, hematochezia  No edema  No rash  No anxiety  No back pain, joint pain  No bleeding, bruising  No dysuria, hematuria    T(C): 36.8 (07-04-23 @ 12:03), Max: 37.1 (07-03-23 @ 17:00)  HR: 71 (07-04-23 @ 12:03) (71 - 82)  BP: 107/72 (07-04-23 @ 12:03) (107/72 - 135/84)  RR: 18 (07-04-23 @ 12:03) (18 - 18)  SpO2: 98% (07-04-23 @ 12:03) (96% - 98%)  Wt(kg): --    PE  NAD  Awake, alert  Anicteric  limited 2/2 participation                          13.3   8.41  )-----------( 242      ( 03 Jul 2023 13:17 )             44.5       07-03    141  |  107  |  52<H>  ----------------------------<  112<H>  4.4   |  23  |  1.04    Ca    11.1<H>      03 Jul 2023 13:17    TPro  7.3  /  Alb  4.7  /  TBili  0.7  /  DBili  x   /  AST  12  /  ALT  14  /  AlkPhos  68  07-03

## 2023-07-04 NOTE — PROGRESS NOTE ADULT - SUBJECTIVE AND OBJECTIVE BOX
Patient is a 53y old  Female who presents with a chief complaint of BRBPR X 2 days ago (04 Jul 2023 12:17)      SUBJECTIVE / OVERNIGHT EVENTS: no events     MEDICATIONS  (STANDING):  atorvastatin 20 milliGRAM(s) Oral at bedtime  cinacalcet 30 milliGRAM(s) Oral two times a day  pantoprazole  Injectable 40 milliGRAM(s) IV Push every 12 hours  predniSONE   Tablet 5 milliGRAM(s) Oral daily  tacrolimus ER Tablet (ENVARSUS XR) 4 milliGRAM(s) Oral daily    MEDICATIONS  (PRN):  acetaminophen     Tablet .. 650 milliGRAM(s) Oral every 6 hours PRN Temp greater or equal to 38C (100.4F), Mild Pain (1 - 3), Moderate Pain (4 - 6)      CAPILLARY BLOOD GLUCOSE        I&O's Summary    T(C): 36.8 (07-04-23 @ 20:16), Max: 36.8 (07-04-23 @ 12:03)  HR: 85 (07-04-23 @ 20:16) (71 - 85)  BP: 111/72 (07-04-23 @ 20:16) (107/72 - 111/72)  RR: 18 (07-04-23 @ 20:16) (18 - 18)  SpO2: 94% (07-04-23 @ 20:16) (94% - 98%)    PHYSICAL EXAM:  GENERAL: NAD, well-developed  HEAD:  Atraumatic, Normocephalic  EYES: EOMI,  conjunctiva and sclera clear  NECK: Supple, No JVD  CHEST/LUNG: Clear to auscultation bilaterally; No wheeze  HEART: Regular rate and rhythm; No murmurs, rubs, or gallops  ABDOMEN: Soft, Nontender, Nondistended; Bowel sounds present  EXTREMITIES:  2+ Peripheral Pulses, No clubbing, cyanosis, or edema  PSYCH: AAOx3  NEUROLOGY: non-focal  SKIN: No rashes or lesions                              13.3   8.41  )-----------( 242      ( 03 Jul 2023 13:17 )             44.5           LIVER FUNCTIONS - ( 03 Jul 2023 13:17 )  Alb: 4.7 g/dL / Pro: 7.3 g/dL / ALK PHOS: 68 U/L / ALT: 14 U/L / AST: 12 U/L / GGT: x           PT/INR - ( 03 Jul 2023 13:17 )   PT: 12.6 sec;   INR: 1.09 ratio         PTT - ( 03 Jul 2023 13:17 )  PTT:28.7 sec

## 2023-07-04 NOTE — PROGRESS NOTE ADULT - SUBJECTIVE AND OBJECTIVE BOX
[facility] [place] NEPHROLOGY PRACTICE   MD MEL DOBBS MD RUORU WONG, PA    TEL:  FROM 9 AM to 5 PM ---OFFICE: [phone]    FROM 5 PM - 9 AM PLEASE CALL ANSWERING SERVICE: [phone]    RENAL FOLLOW UP NOTE--Date of Service 07-04-23 @ 09:42  --------------------------------------------------------------------------------  HPI:      Pt seen and examined at bedside.   Vanita SOB, chest pain     PAST HISTORY  --------------------------------------------------------------------------------  No significant changes to PMH, PSH, FHx, SHx, unless otherwise noted    ALLERGIES & MEDICATIONS  --------------------------------------------------------------------------------  Allergies    penicillin (Anaphylaxis)  lactose (Hives)  ibuprofen/morphine (Unknown)  morphine (Urticaria)  ibuprofen (Hives)  shellfish (Urticaria)  morphine (Anaphylaxis)  latex (Swelling)  contrast media (iodine-based) (Urticaria)    Intolerances      Standing Inpatient Medications  atorvastatin 20 milliGRAM(s) Oral at bedtime  cinacalcet 30 milliGRAM(s) Oral two times a day  pantoprazole  Injectable 40 milliGRAM(s) IV Push every 12 hours  predniSONE   Tablet 5 milliGRAM(s) Oral daily  tacrolimus ER Tablet (ENVARSUS XR) 4 milliGRAM(s) Oral daily    PRN Inpatient Medications  acetaminophen     Tablet .. 650 milliGRAM(s) Oral every 6 hours PRN      REVIEW OF SYSTEMS  --------------------------------------------------------------------------------  General: no fever,  MSK: no edema     VITALS/PHYSICAL EXAM  --------------------------------------------------------------------------------  T(C): 36.8 (07-04-23 @ 04:26), Max: 37.2 (07-03-23 @ 11:35)  HR: 75 (07-04-23 @ 04:26) (75 - 82)  BP: 115/74 (07-04-23 @ 04:26) (112/75 - 135/84)  RR: 18 (07-04-23 @ 04:26) (16 - 18)  SpO2: 98% (07-04-23 @ 04:26) (96% - 100%)  Wt(kg): --  Height (cm): 160 (07-03-23 @ 16:57)  Weight (kg): 67.1 (07-03-23 @ 16:57)  BMI (kg/m2): 26.2 (07-03-23 @ 16:57)  BSA (m2): 1.7 (07-03-23 @ 16:57)      Physical Exam:  	Gen: NAD  	HEENT: MMM  	Pulm: CTA B/L  	CV: S1S2  	Abd: Soft, +BS  	Ext: No LE edema B/L                      Neuro: Awake   	Skin: Warm and Dry   	Vascular access: NO HD catheter            no  zelda  LABS/STUDIES  --------------------------------------------------------------------------------              13.3   8.41  >-----------<  242      [07-03-23 @ 13:17]              44.5     141  |  107  |  52  ----------------------------<  112      [07-03-23 @ 13:17]  4.4   |  23  |  1.04        Ca     11.1     [07-03-23 @ 13:17]    TPro  7.3  /  Alb  4.7  /  TBili  0.7  /  DBili  x   /  AST  12  /  ALT  14  /  AlkPhos  68  [07-03-23 @ 13:17]    PT/INR: PT 12.6 , INR 1.09       [07-03-23 @ 13:17]  PTT: 28.7       [07-03-23 @ 13:17]      Creatinine Trend:  SCr 1.04 [07-03 @ 13:17]    Urinalysis - [07-03-23 @ 13:17]      Color  / Appearance  / SG  / pH       Gluc 112 / Ketone   / Bili  / Urobili        Blood  / Protein  / Leuk Est  / Nitrite       RBC  / WBC  / Hyaline  / Gran  / Sq Epi  / Non Sq Epi  / Bacteria       PTH -- (Ca 11.2)      [07-03-23 @ 15:05]   170  Vitamin D (25OH) 31.8      [07-03-23 @ 15:05]

## 2023-07-04 NOTE — PROGRESS NOTE ADULT - ASSESSMENT
52 yo F with a ESRD s/p HepC DDRT in 2020, chronic SVC syndrome s/p L cephalic-iliac vein bypass on DAPT, s/p thrombectomy of L iliac vein and graft 2012, Hx BK viremia, HTN admitted with dark stools and BRBPR.       # BRBPR Monitor CBC.   Hold Plavix   Continue with Aspirin.   GI consult appreciated   Transfuse prn     s/p EGD last admission Esophageal ulcer/varices     Plan for EGD and Colonoscopy am tomorrow       # Kidney Transplant Continue with Tacrolimus   Transplant team consult appreciated       Continue with Aspirin   Hold Plavix and AC for now

## 2023-07-05 LAB
ANION GAP SERPL CALC-SCNC: 13 MMOL/L — SIGNIFICANT CHANGE UP (ref 5–17)
BASOPHILS # BLD AUTO: 0.03 K/UL — SIGNIFICANT CHANGE UP (ref 0–0.2)
BASOPHILS NFR BLD AUTO: 0.5 % — SIGNIFICANT CHANGE UP (ref 0–2)
BUN SERPL-MCNC: 15 MG/DL — SIGNIFICANT CHANGE UP (ref 7–23)
CALCIUM SERPL-MCNC: 9.9 MG/DL — SIGNIFICANT CHANGE UP (ref 8.4–10.5)
CHLORIDE SERPL-SCNC: 106 MMOL/L — SIGNIFICANT CHANGE UP (ref 96–108)
CO2 SERPL-SCNC: 22 MMOL/L — SIGNIFICANT CHANGE UP (ref 22–31)
CREAT SERPL-MCNC: 0.94 MG/DL — SIGNIFICANT CHANGE UP (ref 0.5–1.3)
EGFR: 73 ML/MIN/1.73M2 — SIGNIFICANT CHANGE UP
EOSINOPHIL # BLD AUTO: 0.18 K/UL — SIGNIFICANT CHANGE UP (ref 0–0.5)
EOSINOPHIL NFR BLD AUTO: 3.2 % — SIGNIFICANT CHANGE UP (ref 0–6)
GLUCOSE SERPL-MCNC: 84 MG/DL — SIGNIFICANT CHANGE UP (ref 70–99)
HCT VFR BLD CALC: 39.8 % — SIGNIFICANT CHANGE UP (ref 34.5–45)
HGB BLD-MCNC: 12 G/DL — SIGNIFICANT CHANGE UP (ref 11.5–15.5)
IMM GRANULOCYTES NFR BLD AUTO: 0.7 % — SIGNIFICANT CHANGE UP (ref 0–0.9)
INR BLD: 1.03 RATIO — SIGNIFICANT CHANGE UP (ref 0.88–1.16)
LYMPHOCYTES # BLD AUTO: 1.18 K/UL — SIGNIFICANT CHANGE UP (ref 1–3.3)
LYMPHOCYTES # BLD AUTO: 21.1 % — SIGNIFICANT CHANGE UP (ref 13–44)
MCHC RBC-ENTMCNC: 24.7 PG — LOW (ref 27–34)
MCHC RBC-ENTMCNC: 30.2 GM/DL — LOW (ref 32–36)
MCV RBC AUTO: 81.9 FL — SIGNIFICANT CHANGE UP (ref 80–100)
MONOCYTES # BLD AUTO: 0.54 K/UL — SIGNIFICANT CHANGE UP (ref 0–0.9)
MONOCYTES NFR BLD AUTO: 9.7 % — SIGNIFICANT CHANGE UP (ref 2–14)
NEUTROPHILS # BLD AUTO: 3.62 K/UL — SIGNIFICANT CHANGE UP (ref 1.8–7.4)
NEUTROPHILS NFR BLD AUTO: 64.8 % — SIGNIFICANT CHANGE UP (ref 43–77)
NRBC # BLD: 0 /100 WBCS — SIGNIFICANT CHANGE UP (ref 0–0)
PLATELET # BLD AUTO: 210 K/UL — SIGNIFICANT CHANGE UP (ref 150–400)
POTASSIUM SERPL-MCNC: 3.7 MMOL/L — SIGNIFICANT CHANGE UP (ref 3.5–5.3)
POTASSIUM SERPL-SCNC: 3.7 MMOL/L — SIGNIFICANT CHANGE UP (ref 3.5–5.3)
PROTHROM AB SERPL-ACNC: 12 SEC — SIGNIFICANT CHANGE UP (ref 10.5–13.4)
RBC # BLD: 4.86 M/UL — SIGNIFICANT CHANGE UP (ref 3.8–5.2)
RBC # FLD: 16.8 % — HIGH (ref 10.3–14.5)
SODIUM SERPL-SCNC: 141 MMOL/L — SIGNIFICANT CHANGE UP (ref 135–145)
TACROLIMUS SERPL-MCNC: 2.5 NG/ML — SIGNIFICANT CHANGE UP
WBC # BLD: 5.59 K/UL — SIGNIFICANT CHANGE UP (ref 3.8–10.5)
WBC # FLD AUTO: 5.59 K/UL — SIGNIFICANT CHANGE UP (ref 3.8–10.5)

## 2023-07-05 PROCEDURE — 45378 DIAGNOSTIC COLONOSCOPY: CPT | Mod: GC

## 2023-07-05 PROCEDURE — 43239 EGD BIOPSY SINGLE/MULTIPLE: CPT | Mod: GC

## 2023-07-05 PROCEDURE — 88305 TISSUE EXAM BY PATHOLOGIST: CPT | Mod: 26

## 2023-07-05 DEVICE — NET RETRV ROT ROTH 2.5MMX230CM: Type: IMPLANTABLE DEVICE | Status: FUNCTIONAL

## 2023-07-05 RX ORDER — TACROLIMUS 5 MG/1
11 CAPSULE ORAL DAILY
Refills: 0 | Status: DISCONTINUED | OUTPATIENT
Start: 2023-07-06 | End: 2023-07-09

## 2023-07-05 RX ORDER — PANTOPRAZOLE SODIUM 20 MG/1
40 TABLET, DELAYED RELEASE ORAL
Refills: 0 | Status: DISCONTINUED | OUTPATIENT
Start: 2023-07-05 | End: 2023-07-09

## 2023-07-05 RX ORDER — MYCOPHENOLATE MOFETIL 250 MG/1
500 CAPSULE ORAL
Refills: 0 | Status: DISCONTINUED | OUTPATIENT
Start: 2023-07-05 | End: 2023-07-09

## 2023-07-05 RX ADMIN — PANTOPRAZOLE SODIUM 40 MILLIGRAM(S): 20 TABLET, DELAYED RELEASE ORAL at 05:47

## 2023-07-05 RX ADMIN — CINACALCET 30 MILLIGRAM(S): 30 TABLET, FILM COATED ORAL at 05:47

## 2023-07-05 RX ADMIN — Medication 81 MILLIGRAM(S): at 14:45

## 2023-07-05 RX ADMIN — Medication 5 MILLIGRAM(S): at 05:47

## 2023-07-05 RX ADMIN — ATORVASTATIN CALCIUM 20 MILLIGRAM(S): 80 TABLET, FILM COATED ORAL at 21:46

## 2023-07-05 RX ADMIN — MYCOPHENOLATE MOFETIL 500 MILLIGRAM(S): 250 CAPSULE ORAL at 17:10

## 2023-07-05 RX ADMIN — CINACALCET 30 MILLIGRAM(S): 30 TABLET, FILM COATED ORAL at 17:10

## 2023-07-05 RX ADMIN — TACROLIMUS 4 MILLIGRAM(S): 5 CAPSULE ORAL at 05:46

## 2023-07-05 NOTE — CONSULT NOTE ADULT - SUBJECTIVE AND OBJECTIVE BOX
VASCULAR SURGERY CONSULT NOTE    HPI: 52 y/o female w/ ESRD s/p HepC DDRT, chronic SVC syndrome s/p L cephalic-iliac vein bypass on DAPT, s/p thrombectomy of L iliac vein and graft 2012, HTN, presents to the ER for rectal bleeding. states started two days ago. states started as bright red rectal bleed and today noticed she had black colored stools. states feels weak. went to work this morning but felt too weak. denies f/n/v/d, CP, SOB, HA, dizziness, abdominal pain, urinary (03 Jul 2023 16:57)    Vascular surgery consulted for SVC syndrome    PMH/PSH: HTN - Hypertension    Clotted Renal Dialysis AV Graft    Infection of AV Graft for Dialysis    Fibroid Tumor    CKD (chronic kidney disease) stage V requiring chronic dialysis    Chronic kidney disease, unspecified    History of Hysterectomy    AV fistula    H/O extremity bypass graft    Kidney transplant recipient    H/O kidney transplant        MEDS:aspirin  chewable 81 milliGRAM(s) Oral daily  atorvastatin 20 milliGRAM(s) Oral at bedtime  cinacalcet 30 milliGRAM(s) Oral two times a day  mycophenolate mofetil 500 milliGRAM(s) Oral two times a day  pantoprazole    Tablet 40 milliGRAM(s) Oral before breakfast  predniSONE   Tablet 5 milliGRAM(s) Oral daily  tacrolimus ER Tablet (ENVARSUS XR) 11 milliGRAM(s) Oral daily  acetaminophen     Tablet .. 650 milliGRAM(s) Oral every 6 hours PRN Temp greater or equal to 38C (100.4F), Mild Pain (1 - 3), Moderate Pain (4 - 6)      ALLERGIES:    REVIEW OF SYSTEMS: All ROS negative except as per HPI.  ____________________________________________    VITALS:T(C): 36.7, Max: 36.9 (07-05)  T(F): 98.1, Max: 98.4 (07-05)  HR: 72 (60 - 87)  BP: 100/62 (96/66 - 159/81)  BP(mean): 98 (98 - 98)  ABP: --  ABP(mean): --  RR: 18 (18 - 19)  SpO2: 97% (96% - 99%)  CVP(mm Hg): --  CVP(cm H2O): --  room air            PHYSICAL EXAM:  General: AAOx3, no acute distress.  NECK: mild edema bilaterally, superficial collateral veins noted. No plethora or facial edema  Respiratory: breathing comfortably, no increased WOB   Abdomen: soft, nontender, nondistended, no rebound, no guarding  Extremities: Moves all four. No sensory or motor deficits of upper extremities. Radial and ulnar pulses palpable bilaterally    ____________________________________________    LABS:                      12.0  5.59 )-----------( 210    ( 05 Jul 2023 06:49 )             39.8  141  |  106  |  15  ----------------------------<  84    (07-05)  3.7   |  22  |  0.94          Ca    9.9      07-05  Mg    x  Phos  x        PT/INR - ( 05 Jul 2023 14:07 )   PT: 12.0 sec;   INR: 1.03 ratio           Urinalysis Basic - ( 05 Jul 2023 06:49 )    Color: x / Appearance: x / SG: x / pH: x  Gluc: 84 mg/dL / Ketone: x  / Bili: x / Urobili: x   Blood: x / Protein: x / Nitrite: x   Leuk Esterase: x / RBC: x / WBC x   Sq Epi: x / Non Sq Epi: x / Bacteria: x      ____________________________________________    RADIOLOGY & ADDITIONAL STUDIES:

## 2023-07-05 NOTE — PROGRESS NOTE ADULT - ASSESSMENT
54 y/o female w/ ESRD s/p HepC DDRT, chronic SVC syndrome s/p L cephalic-iliac vein bypass on DAPT, s/p thrombectomy of L iliac vein and graft 2012, HTN, presents to the ER for rectal bleeding. states started two days ago. states started as bright red rectal bleed and today noticed she had black colored stools. states feels weak. went to work this morning but felt too weak. denies f/n/v/d, CP, SOB, HA, dizziness, abdominal pain, urinary symptoms, cough. Nephrology consulted for s/p DDRT.     A/P    s/p DDRT @ [facility] 08/14/2020  Outpatient nephrologist is Dr. Lake / Dr Streeter   Continue home dose Immunosuppression meds per Transplant recc.    mg BID, Prednisone 5 mg qd, Envarsus 11 mg   Check Tacrolimus 30 min prior to morning dose   Monitor BMP, u/o    HTN:   BP flucutates   Monitor    Anemia:   Per primary  Maintain Hgb > 8  Monitor Hgb    Hypercalcemia     Vitamin D 31.8   Ca improving   Pt on Sensipar 30 mg BID    Monitor

## 2023-07-05 NOTE — PRE PROCEDURE NOTE - PRE PROCEDURE EVALUATION
Attending Physician:   Dr Black                         Procedure: EGD/colonoscopy    Indication for Procedure: GIB   ________________________________________________________  PAST MEDICAL & SURGICAL HISTORY:  HTN - Hypertension      Clotted Renal Dialysis AV Graft      Infection of AV Graft for Dialysis      Fibroid Tumor      CKD (chronic kidney disease) stage V requiring chronic dialysis      Chronic kidney disease, unspecified      History of Hysterectomy  for benign disease      AV fistula  B/L - failed      H/O extremity bypass graft  H/O RUE bypass and creation of right brachial graft      Kidney transplant recipient      H/O kidney transplant        ALLERGIES:  penicillin (Anaphylaxis)  lactose (Hives)  ibuprofen/morphine (Unknown)  morphine (Urticaria)  ibuprofen (Hives)  shellfish (Urticaria)  morphine (Anaphylaxis)  latex (Swelling)  contrast media (iodine-based) (Urticaria)    HOME MEDICATIONS:  amLODIPine 5 mg oral tablet: 1 tab(s) orally once a day  Aspirin Enteric Coated 81 mg oral delayed release tablet: 1 tab(s) orally once a day  clopidogrel 75 mg oral tablet: 1 tab(s) orally once a day  Envarsus XR 1 mg oral tablet, extended release: 3 tab(s) orally once a day  Envarsus XR 4 mg oral tablet, extended release: 2 tab(s) orally once a day  mycophenolate mofetil 500 mg oral tablet: 1 tab(s) orally 2 times a day  pantoprazole 40 mg oral delayed release tablet: 1 tab(s) orally once a day  predniSONE 5 mg oral tablet: 1 tab(s) orally once a day    AICD/PPM: [ ] yes   [X ] no    PERTINENT LAB DATA:                        12.0   5.59  )-----------( 210      ( 05 Jul 2023 06:49 )             39.8     07-05    141  |  106  |  15  ----------------------------<  84  3.7   |  22  |  0.94    Ca    9.9      05 Jul 2023 06:49    TPro  7.3  /  Alb  4.7  /  TBili  0.7  /  DBili  x   /  AST  12  /  ALT  14  /  AlkPhos  68  07-03    PT/INR - ( 03 Jul 2023 13:17 )   PT: 12.6 sec;   INR: 1.09 ratio         PTT - ( 03 Jul 2023 13:17 )  PTT:28.7 sec            PHYSICAL EXAMINATION:    T(C): 36.7  HR: 78  BP: 124/83  RR: 18  SpO2: 96%    Constitutional: NAD    Neck:  No JVD  Respiratory: no respiratory distress   Cardiovascular: RRR  Extremities: No peripheral edema  Neurological: A/O x 3, no focal deficits        COMMENTS:    The patient is a suitable candidate for the planned procedure unless box checked [ ]  No, explain:

## 2023-07-05 NOTE — PROGRESS NOTE ADULT - ASSESSMENT
54 yo F with a ESRD s/p HepC DDRT in 2020, chronic SVC syndrome s/p L cephalic-iliac vein bypass on DAPT, s/p thrombectomy of L iliac vein and graft 2012, Hx BK viremia, HTN admitted with dark stools and BRBPR.       # BRBPR Monitor CBC.   Hold Plavix   Continue with Aspirin.   GI consult appreciated   Transfuse prn     s/p EGD last admission Esophageal ulcer/varices     s/p EGD Colonoscopy   Hemorrhoids  Gastruci ulcers   Esophageal Varices    No evidence of portal HTN   Given SVC syndrome will get Vascular consult Called     # Kidney Transplant Continue with Tacrolimus   Transplant team consult appreciated       Continue with Aspirin   Resume Plavix

## 2023-07-05 NOTE — PRE-ANESTHESIA EVALUATION ADULT - WEIGHT IN LBS
[FreeTextEntry1] : Mr. Martell is a 64 years old male with Hampton score 3+4=7 adenocarcinoma of the prostate. He was referred by Dr. Menendez who patient consulted for elevated PSA  and biopsy proven adenocarcinoma of the prostate. He had a PSA of 2.39 in 2016, 2.99 in 2017 and PSA now is 5.01 on 10/19/19. \par \par Patient underwent prostate biopsy with Dr. Hickey on 11/7/19, pathology report indicated 6 out of 12 cores positive for cancer with Hampton score 3+4=7 involving 50% - 80% of the tissue.\par \par Bone scan done on 11/20/19 was negative.\par \par Patient underwent MRI of the prostate on 11/25/19 which showed prostate volume of 24cc.  Also noted was 17 x 19 mm left across transverse plane, base to midgland, peripheral zone lesion invades into both central zone and demonstrates overlying capsular, irregularity and bulging highly  and bulging highly concerning for mold extracapsular  extension. PIRADS 5. No evidence of seminal vesicle invasion, pelvic lymphadenopathy or aggressive osseous lesions.\par \par Patient presents here today to discuss his radiation treatment option. He endorsed one time nocturia per night and denies any other urinary or bowel issue. Patient has regular daily bowel function and he is sexually active. He denies groin or bone pain.
147.9

## 2023-07-05 NOTE — PROGRESS NOTE ADULT - SUBJECTIVE AND OBJECTIVE BOX
[facility] [place]   MD MEL DOBBS MD BRIANA PETITO, NP    TEL:  FROM 9 AM to 5 PM ---OFFICE: [phone]  FROM 5 PM - 9 AM PLEASE CALL ANSWERING SERVICE: [phone]   RENAL PROGRESS NOTE: DATE OF SERVICE 07-05-23 @ 13:41    Patient is a 53y old  Female who presents with a chief complaint of BRBPR X 2 days ago   Patient seen and examined at bedside. No chest pain/sob    VITALS:  T(F): 97.6 (07-05-23 @ 13:10), Max: 98.4 (07-05-23 @ 08:50)  HR: 87 (07-05-23 @ 13:10)  BP: 96/66 (07-05-23 @ 13:10)  RR: 19 (07-05-23 @ 13:10)  SpO2: 98% (07-05-23 @ 13:10)  Wt(kg): --    Height (cm): 160 (07-05 @ 09:16)  Weight (kg): 67.1 (07-05 @ 09:16)  BMI (kg/m2): 26.2 (07-05 @ 09:16)  BSA (m2): 1.7 (07-05 @ 09:16)    PHYSICAL EXAM:  Constitutional: NAD  Neck: No JVD  Respiratory: CTAB, no wheezes, rales or rhonchi  Cardiovascular: S1, S2, RRR  Gastrointestinal: BS+, soft, NT/ND  Extremities: No peripheral edema    Hospital Medications:   MEDICATIONS  (STANDING):  aspirin  chewable 81 milliGRAM(s) Oral daily  atorvastatin 20 milliGRAM(s) Oral at bedtime  cinacalcet 30 milliGRAM(s) Oral two times a day  pantoprazole  Injectable 40 milliGRAM(s) IV Push every 12 hours  predniSONE   Tablet 5 milliGRAM(s) Oral daily  tacrolimus ER Tablet (ENVARSUS XR) 4 milliGRAM(s) Oral daily    Tacrolimus (), Serum: 2.5 ng/mL (07-05 @ 06:49)    LABS:  07-05    141  |  106  |  15  ----------------------------<  84  3.7   |  22  |  0.94    Ca    9.9      05 Jul 2023 06:49      Creatinine Trend: 0.94 <--, 1.04 <--                                12.0   5.59  )-----------( 210      ( 05 Jul 2023 06:49 )             39.8     Urine Studies:  Urinalysis - [07-05-23 @ 06:49]      Color  / Appearance  / SG  / pH       Gluc 84 / Ketone   / Bili  / Urobili        Blood  / Protein  / Leuk Est  / Nitrite       RBC  / WBC  / Hyaline  / Gran  / Sq Epi  / Non Sq Epi  / Bacteria       PTH -- (Ca 11.2)      [07-03-23 @ 15:05]   170  Vitamin D (25OH) 31.8      [07-03-23 @ 15:05]        RADIOLOGY & ADDITIONAL STUDIES:

## 2023-07-05 NOTE — PROGRESS NOTE ADULT - SUBJECTIVE AND OBJECTIVE BOX
Patient is a 53y old  Female who presents with a chief complaint of BRBPR X 2 days ago (04 Jul 2023 12:17)      SUBJECTIVE / OVERNIGHT EVENTS: no events       T(C): 36.4 (07-05-23 @ 13:10), Max: 36.8 (07-05-23 @ 10:58)  HR: 87 (07-05-23 @ 13:10) (61 - 87)  BP: 96/66 (07-05-23 @ 13:10) (96/66 - 156/75)  RR: 19 (07-05-23 @ 13:10) (18 - 19)  SpO2: 98% (07-05-23 @ 13:10) (96% - 98%)  MEDICATIONS  (STANDING):  aspirin  chewable 81 milliGRAM(s) Oral daily  atorvastatin 20 milliGRAM(s) Oral at bedtime  cinacalcet 30 milliGRAM(s) Oral two times a day  mycophenolate mofetil 500 milliGRAM(s) Oral two times a day  pantoprazole    Tablet 40 milliGRAM(s) Oral before breakfast  predniSONE   Tablet 5 milliGRAM(s) Oral daily    MEDICATIONS  (PRN):  acetaminophen     Tablet .. 650 milliGRAM(s) Oral every 6 hours PRN Temp greater or equal to 38C (100.4F), Mild Pain (1 - 3), Moderate Pain (4 - 6)  PHYSICAL EXAM:  GENERAL: NAD, well-developed  HEAD:  Atraumatic, Normocephalic  EYES: EOMI,  conjunctiva and sclera clear  NECK: Supple, No JVD  CHEST/LUNG: Clear to auscultation bilaterally; No wheeze  HEART: Regular rate and rhythm; No murmurs, rubs, or gallops  ABDOMEN: Soft, Nontender, Nondistended; Bowel sounds present  EXTREMITIES:  2+ Peripheral Pulses, No clubbing, cyanosis, or edema  PSYCH: AAOx3  NEUROLOGY: non-focal  SKIN: No rashes or lesions                          12.0   5.59  )-----------( 210      ( 05 Jul 2023 06:49 )             39.8             PT/INR - ( 05 Jul 2023 14:07 )   PT: 12.0 sec;   INR: 1.03 ratio           141|106|15<84  3.7|22|0.94  9.9,--,--  07-05 @ 06:49

## 2023-07-05 NOTE — CONSULT NOTE ADULT - ASSESSMENT
53F PMH ESRD s/p HepC DDRT, chronic SVC syndrome s/p L cephalic-iliac vein bypass on DAPT, s/p thrombectomy of L iliac vein and graft 2012, HTN. Vascular surgery consulted for SVC syndrome workup / management.     PLAN  - No acute surgical intervention at this time  - Obtain CT Chest w/ IV  - IR consult  - Remainder per primary    Vascular  7995 53F PMH ESRD s/p HepC DDRT, chronic SVC syndrome s/p L cephalic-iliac vein bypass on DAPT, s/p thrombectomy of L iliac vein and graft 2012, HTN. Vascular surgery consulted for SVC syndrome workup / management.     PLAN  -CT venogram chest abdomen pelvis    Vascular  8945 53F PMH ESRD s/p HepC DDRT, chronic SVC syndrome s/p L cephalic-iliac vein bypass on DAPT, s/p thrombectomy of L iliac vein and graft 2012, HTN. Vascular surgery consulted for SVC syndrome workup / management.     PLAN  -CT venogram chest abdomen pelvis    Vascular  [id number]    ATTENDING STATEMENT :   Full consult note as above; discussed with surgery resident and vascular fellow.   She had some GI bleeding and has known occlusion of the SVC. She currently had no facial or UE swelling. She previously had those symptoms as well as breast engagement. She has no such symptoms now.  She has a functioning RT arm AV graft. Its patency is maintained via a HERO catheter.  The catheter goes through the occlusions at into the RT atrium. She has an occluded LT iliac to subclavian graft.  It has been occluded for many years and needs no intervention. Some esophageal and gastric varices were apparently seen. She has Hep C and that could certainly cause them. There is the potential for them to be "downhill varices". That condition can be  seen with SVC syndrome but she has no actual SVC syndrome just SVC occlusion.  Ultimately, there is no simple solution for her relatively asymptomatic SVC occlusion. She does not require ant anti-platelet therapy or anticoagulation. Her varices should be managed as per would be normally done for such an issue. There is no vascular intervention.

## 2023-07-06 LAB
ANION GAP SERPL CALC-SCNC: 14 MMOL/L — SIGNIFICANT CHANGE UP (ref 5–17)
BUN SERPL-MCNC: 19 MG/DL — SIGNIFICANT CHANGE UP (ref 7–23)
CALCIUM SERPL-MCNC: 9.4 MG/DL — SIGNIFICANT CHANGE UP (ref 8.4–10.5)
CHLORIDE SERPL-SCNC: 106 MMOL/L — SIGNIFICANT CHANGE UP (ref 96–108)
CO2 SERPL-SCNC: 22 MMOL/L — SIGNIFICANT CHANGE UP (ref 22–31)
CREAT SERPL-MCNC: 0.98 MG/DL — SIGNIFICANT CHANGE UP (ref 0.5–1.3)
EGFR: 70 ML/MIN/1.73M2 — SIGNIFICANT CHANGE UP
GLUCOSE SERPL-MCNC: 78 MG/DL — SIGNIFICANT CHANGE UP (ref 70–99)
POTASSIUM SERPL-MCNC: 4 MMOL/L — SIGNIFICANT CHANGE UP (ref 3.5–5.3)
POTASSIUM SERPL-SCNC: 4 MMOL/L — SIGNIFICANT CHANGE UP (ref 3.5–5.3)
SODIUM SERPL-SCNC: 142 MMOL/L — SIGNIFICANT CHANGE UP (ref 135–145)
TACROLIMUS SERPL-MCNC: 3.5 NG/ML — SIGNIFICANT CHANGE UP

## 2023-07-06 PROCEDURE — 99232 SBSQ HOSP IP/OBS MODERATE 35: CPT | Mod: GC

## 2023-07-06 RX ORDER — DIPHENHYDRAMINE HCL 50 MG
50 CAPSULE ORAL ONCE
Refills: 0 | Status: COMPLETED | OUTPATIENT
Start: 2023-07-07 | End: 2023-07-07

## 2023-07-06 RX ORDER — CLOPIDOGREL BISULFATE 75 MG/1
75 TABLET, FILM COATED ORAL DAILY
Refills: 0 | Status: DISCONTINUED | OUTPATIENT
Start: 2023-07-06 | End: 2023-07-09

## 2023-07-06 RX ADMIN — TACROLIMUS 11 MILLIGRAM(S): 5 CAPSULE ORAL at 05:06

## 2023-07-06 RX ADMIN — PANTOPRAZOLE SODIUM 40 MILLIGRAM(S): 20 TABLET, DELAYED RELEASE ORAL at 05:07

## 2023-07-06 RX ADMIN — ATORVASTATIN CALCIUM 20 MILLIGRAM(S): 80 TABLET, FILM COATED ORAL at 21:36

## 2023-07-06 RX ADMIN — Medication 81 MILLIGRAM(S): at 17:05

## 2023-07-06 RX ADMIN — Medication 5 MILLIGRAM(S): at 05:07

## 2023-07-06 RX ADMIN — MYCOPHENOLATE MOFETIL 500 MILLIGRAM(S): 250 CAPSULE ORAL at 05:04

## 2023-07-06 RX ADMIN — CINACALCET 30 MILLIGRAM(S): 30 TABLET, FILM COATED ORAL at 05:07

## 2023-07-06 RX ADMIN — MYCOPHENOLATE MOFETIL 500 MILLIGRAM(S): 250 CAPSULE ORAL at 17:04

## 2023-07-06 RX ADMIN — CINACALCET 30 MILLIGRAM(S): 30 TABLET, FILM COATED ORAL at 17:04

## 2023-07-06 RX ADMIN — CLOPIDOGREL BISULFATE 75 MILLIGRAM(S): 75 TABLET, FILM COATED ORAL at 13:23

## 2023-07-06 NOTE — PROGRESS NOTE ADULT - ASSESSMENT
52 y/o female w/ ESRD s/p HepC DDRT, chronic SVC syndrome s/p L cephalic-iliac vein bypass on DAPT, s/p thrombectomy of L iliac vein and graft 2012, HTN, presents to the ER for rectal bleeding. states started two days ago. states started as bright red rectal bleed and today noticed she had black colored stools. states feels weak. went to work this morning but felt too weak. denies f/n/v/d, CP, SOB, HA, dizziness, abdominal pain, urinary symptoms, cough. Nephrology consulted for s/p DDRT.     A/P    s/p DDRT @ [facility] 08/14/2020  Outpatient nephrologist is Dr. Lake / Dr Streeter   Continue home dose Immunosuppression meds per Transplant recc.    mg BID, Prednisone 5 mg qd, Envarsus 11 mg   Check Tacrolimus 30 min prior to morning dose  FK level 7/6 --> 3.5    Monitor BMP, u/o    HTN:   BP fluctuates   Monitor    Anemia:   Per primary  Maintain Hgb > 8  Monitor Hgb    Hypercalcemia     Vitamin D 31.8   Ca improving   Pt on Sensipar 30 mg BID    Monitor

## 2023-07-06 NOTE — PROGRESS NOTE ADULT - ASSESSMENT
54 yo F with a ESRD s/p HepC DDRT in 2020, chronic SVC syndrome s/p L cephalic-iliac vein bypass on DAPT, s/p thrombectomy of L iliac vein and graft 2012, Hx BK viremia, HTN admitted with dark stools and BRBPR.       # BRBPR Monitor CBC.   Hold Plavix   Continue with Aspirin.   GI consult appreciated   Transfuse prn     s/p EGD last admission Esophageal ulcer/varices     s/p EGD Colonoscopy   Hemorrhoids  Gastruci ulcers   Esophageal Varices      No evidence of portal HTN   Given SVC syndrome will get Vascular consult appreciated   CT Venogram   # Kidney Transplant Continue with Tacrolimus   Transplant team consult appreciated       Continue with Aspirin   Resume Plavix

## 2023-07-06 NOTE — PROGRESS NOTE ADULT - ASSESSMENT
Melena -- recurrent, mgmt per GI  -- protonix gtt  -- S/p EGD/colonoscopy - no active bleeding seen. Per GI, would not pursue banding unless there are signs of bleeding from these lesions.  -- Vascular f/u of SVC syndrome, plan for CT venogram  -- hgb nml, monitor for now  -- f/u Dr Oliva at [facility] after d/c    vasculopathy -- had been on plavix, was recommended to be on ppx AC in the past during previous admission  -- Per GI, can resume Plavix and continue with ASA daily  -- F/u CT venogram    Will continue to follow.    Jack Anderson PA-C  Hematology/Oncology  [facility]  [phone] (office)

## 2023-07-06 NOTE — PROGRESS NOTE ADULT - ASSESSMENT
Impression:     52 yo F with a ESRD s/p HepC DDRT in 2020, chronic SVC syndrome s/p L cephalic-iliac vein bypass on DAPT, s/p thrombectomy of L iliac vein and graft 2012, Hx BK viremia, HTN admitted with dark stools and BRBPR. For which GI was consulted.    # hematochezia  # Melena  s/p EGD and colonoscopy on 7/5, melena likely related to gastric ulcers, and hematochezia due to large external hemorrhoids. Patient has downhill upper esophageal varices likely due to SVC syndrome, so recommended vascular consult, planning to get venogram.     Recommendations:  - Hgb has been stable and no overt signs of GI bleeding at this time.   - PO PPI daily.   - Recommended high fiber diet.   - can resume Plavix and continue with ASA daily.   - Diet as tolerated.   - Will need to f/u with hepatology outpatient upon discharge  - CBC Q12 hours and transfuse if hgb < 7.     GI will plan to sign off at this time. Please feel free to reach out to our team with any follow up questions. Please provide patient with Gastroenterology Clinic number to confirm/arrange appointment; [phone] ([facility] at [address] [place] [address]) or [phone] ([facility] at [address] [place] [address]) or [phone] ([facility] at [address] [place] [address]).      Matt Horan, PGY-5  Gastroenterology/Hepatology Fellow  Available on Microsoft Teams  [phone] (Short Range Pager)  [phone] (Long Range Pager)    After 5pm, please contact the on-call GI fellow. [phone] Impression:     54 yo F with a ESRD s/p HepC DDRT in 2020, chronic SVC syndrome s/p L cephalic-iliac vein bypass on DAPT, s/p thrombectomy of L iliac vein and graft 2012, Hx BK viremia, HTN admitted with dark stools and BRBPR. For which GI was consulted.    # hematochezia  # Melena  s/p EGD and colonoscopy on 7/5, melena likely related to gastric ulcers, and hematochezia due to large external hemorrhoids. Patient has downhill upper esophageal varices likely due to SVC syndrome, so recommended vascular consult, planning to get venogram.     Recommendations:  - Hgb has been stable and no overt signs of GI bleeding at this time.   - PO PPI daily.   - Recommended high fiber diet.   - can resume Plavix and continue with ASA daily.   - Diet as tolerated.   - Follow-up vascular surgery regarding SVC syndrome  - CBC Q12 hours and transfuse if hgb < 7.     GI will plan to sign off at this time. Please feel free to reach out to our team with any follow up questions. Please provide patient with Gastroenterology Clinic number to confirm/arrange appointment; [phone] ([facility] at [address] [place] [address]) or [phone] ([facility] at [address] [place] [address]) or [phone] ([facility] at [address] [place] [address]).      Matt Horan, PGY-5  Gastroenterology/Hepatology Fellow  Available on Microsoft Teams  [phone] (Short Range Pager)  [phone] (Long Range Pager)    After 5pm, please contact the on-call GI fellow. [phone]

## 2023-07-06 NOTE — PROGRESS NOTE ADULT - SUBJECTIVE AND OBJECTIVE BOX
Gastroenterology/Hepatology Progress Note      Interval Events:     - No bowel movements since the procedure. Denied melena and hematochezia.   - No abd pain, nausea or vomiting.   - No fevers or chills.     Allergies:  penicillin (Anaphylaxis)  lactose (Hives)  ibuprofen/morphine (Unknown)  morphine (Urticaria)  ibuprofen (Hives)  shellfish (Urticaria)  morphine (Anaphylaxis)  latex (Swelling)  contrast media (iodine-based) (Urticaria)    Hospital Medications:  acetaminophen     Tablet .. 650 milliGRAM(s) Oral every 6 hours PRN  aspirin  chewable 81 milliGRAM(s) Oral daily  atorvastatin 20 milliGRAM(s) Oral at bedtime  cinacalcet 30 milliGRAM(s) Oral two times a day  clopidogrel Tablet 75 milliGRAM(s) Oral daily  mycophenolate mofetil 500 milliGRAM(s) Oral two times a day  pantoprazole    Tablet 40 milliGRAM(s) Oral before breakfast  predniSONE   Tablet 5 milliGRAM(s) Oral daily  tacrolimus ER Tablet (ENVARSUS XR) 11 milliGRAM(s) Oral daily    ROS: 14 point ROS negative unless otherwise state in subjective    PHYSICAL EXAM:   Vital Signs:  Vital Signs Last 24 Hrs  T(C): 36.4 (06 Jul 2023 05:30), Max: 36.7 (05 Jul 2023 22:21)  T(F): 97.6 (06 Jul 2023 05:30), Max: 98.1 (05 Jul 2023 22:21)  HR: 75 (06 Jul 2023 05:30) (72 - 87)  BP: 146/79 (06 Jul 2023 05:30) (96/66 - 146/79)  BP(mean): --  RR: 18 (06 Jul 2023 05:30) (18 - 19)  SpO2: 98% (06 Jul 2023 05:30) (97% - 98%)    Parameters below as of 06 Jul 2023 05:30  Patient On (Oxygen Delivery Method): room air    Daily     Daily     GENERAL:  No acute distress, obese female.   HEENT:  NCAT, no scleral icterus  CHEST: no resp distress  ABDOMEN:  Soft, non tender, non-distended,  no masses  EXTREMITIES:  No LE edema  NEURO:  Alert and oriented x 3, no tremor      LABS:                        12.0   5.59  )-----------( 210      ( 05 Jul 2023 06:49 )             39.8       07-06    142  |  106  |  19  ----------------------------<  78  4.0   |  22  |  0.98    Ca    9.4      06 Jul 2023 07:04        PT/INR - ( 05 Jul 2023 14:07 )   PT: 12.0 sec;   INR: 1.03 ratio           Urinalysis Basic - ( 06 Jul 2023 07:04 )    Color: x / Appearance: x / SG: x / pH: x  Gluc: 78 mg/dL / Ketone: x  / Bili: x / Urobili: x   Blood: x / Protein: x / Nitrite: x   Leuk Esterase: x / RBC: x / WBC x   Sq Epi: x / Non Sq Epi: x / Bacteria: x            Imaging:      EGD on 7/5    Impression:          - Grade II esophageal varices that do not involve the distal esophagus.                        Suspect downhill varices without evidence of bleeding.                       - 3 cm hiatal hernia.                       - Non-bleeding gastric ulcers with a clean ulcer base (Chidi Class III).                        Biopsied.                       - Normal duodenal bulb and second portion of the duodenum.                       - Melena could be related to gastric ulcers. Patient also had intermittent                   hematochezia which is likely from external hemorrhoids.    Colonoscopy on 7/5    Impression:          - Non-bleeding external hemorrhoids.                       - The entire examined colon is normal.                       - The examined portion of the ileum was normal.                       - Nospecimens collected.                       - Hematochezia likely secondary to external hemorrhoids. There was no                        bleeding during the procedure.

## 2023-07-06 NOTE — PROGRESS NOTE ADULT - SUBJECTIVE AND OBJECTIVE BOX
[facility] [place] [facility]   MD MEL DOBBS MD BRIANA PETITO, NP    TEL:  FROM 9 AM to 5 PM ---OFFICE: [phone]  FROM 5 PM - 9 AM PLEASE CALL ANSWERING SERVICE: [phone]     RENAL PROGRESS NOTE: DATE OF SERVICE 07-06-23 @ 12:21    Patient is a 53y old  Female who presents with a chief complaint of BRBPR X 2 days ago     Patient seen and examined at bedside. No chest pain/sob    VITALS:  T(F): 97.6 (07-06-23 @ 05:30), Max: 98.1 (07-05-23 @ 22:21)  HR: 75 (07-06-23 @ 05:30)  BP: 146/79 (07-06-23 @ 05:30)  RR: 18 (07-06-23 @ 05:30)  SpO2: 98% (07-06-23 @ 05:30)  Wt(kg): --    07-05 @ 07:01  -  07-06 @ 07:00  --------------------------------------------------------  IN: 360 mL / OUT: 0 mL / NET: 360 mL      PHYSICAL EXAM:  Constitutional: NAD  Neck: No JVD  Respiratory: CTAB, no wheezes, rales or rhonchi  Cardiovascular: S1, S2, RRR  Gastrointestinal: BS+, soft, NT/ND  Extremities: No peripheral edema    Hospital Medications:   MEDICATIONS  (STANDING):  aspirin  chewable 81 milliGRAM(s) Oral daily  atorvastatin 20 milliGRAM(s) Oral at bedtime  cinacalcet 30 milliGRAM(s) Oral two times a day  clopidogrel Tablet 75 milliGRAM(s) Oral daily  mycophenolate mofetil 500 milliGRAM(s) Oral two times a day  pantoprazole    Tablet 40 milliGRAM(s) Oral before breakfast  predniSONE   Tablet 5 milliGRAM(s) Oral daily  tacrolimus ER Tablet (ENVARSUS XR) 11 milliGRAM(s) Oral daily    Tacrolimus (), Serum: 3.5 ng/mL (07-06 @ 07:04)    LABS:  07-06    142  |  106  |  19  ----------------------------<  78  4.0   |  22  |  0.98    Ca    9.4      06 Jul 2023 07:04      Creatinine Trend: 0.98 <--, 0.94 <--, 1.04 <--                                12.0   5.59  )-----------( 210      ( 05 Jul 2023 06:49 )             39.8     Urine Studies:  Urinalysis - [07-06-23 @ 07:04]      Color  / Appearance  / SG  / pH       Gluc 78 / Ketone   / Bili  / Urobili        Blood  / Protein  / Leuk Est  / Nitrite       RBC  / WBC  / Hyaline  / Gran  / Sq Epi  / Non Sq Epi  / Bacteria       PTH -- (Ca 11.2)      [07-03-23 @ 15:05]   170  Vitamin D (25OH) 31.8      [07-03-23 @ 15:05]        RADIOLOGY & ADDITIONAL STUDIES:

## 2023-07-06 NOTE — PROGRESS NOTE ADULT - SUBJECTIVE AND OBJECTIVE BOX
Patient is a 53y old  Female who presents with a chief complaint of BRBPR X 2 days ago (06 Jul 2023 13:00)    Patient seen and examined at bedside. Feeling well. Denies further rectal bleed.    MEDICATIONS  (STANDING):  aspirin  chewable 81 milliGRAM(s) Oral daily  atorvastatin 20 milliGRAM(s) Oral at bedtime  cinacalcet 30 milliGRAM(s) Oral two times a day  clopidogrel Tablet 75 milliGRAM(s) Oral daily  mycophenolate mofetil 500 milliGRAM(s) Oral two times a day  pantoprazole    Tablet 40 milliGRAM(s) Oral before breakfast  predniSONE   Tablet 5 milliGRAM(s) Oral daily  tacrolimus ER Tablet (ENVARSUS XR) 11 milliGRAM(s) Oral daily    MEDICATIONS  (PRN):  acetaminophen     Tablet .. 650 milliGRAM(s) Oral every 6 hours PRN Temp greater or equal to 38C (100.4F), Mild Pain (1 - 3), Moderate Pain (4 - 6)      Vital Signs Last 24 Hrs  T(C): 36.8 (06 Jul 2023 12:15), Max: 36.8 (06 Jul 2023 12:15)  T(F): 98.2 (06 Jul 2023 12:15), Max: 98.2 (06 Jul 2023 12:15)  HR: 74 (06 Jul 2023 12:15) (72 - 75)  BP: 109/70 (06 Jul 2023 12:15) (100/62 - 146/79)  BP(mean): --  RR: 18 (06 Jul 2023 12:15) (18 - 18)  SpO2: 96% (06 Jul 2023 12:15) (96% - 98%)    Parameters below as of 06 Jul 2023 12:15  Patient On (Oxygen Delivery Method): room air        PE  NAD  Awake, alert  Anicteric, MMM  No c/c/e  No rash grossly  FROM                          12.0   5.59  )-----------( 210      ( 05 Jul 2023 06:49 )             39.8       07-06    142  |  106  |  19  ----------------------------<  78  4.0   |  22  |  0.98    Ca    9.4      06 Jul 2023 07:04

## 2023-07-06 NOTE — PROGRESS NOTE ADULT - ATTENDING COMMENTS
Agree with above. Pt H/H stable. No active bleeding seen on EGD/colonoscopy, clean based ulcer was not bleeding and the downhill varices were also without stigmata of bleeding. Would continue PPI BID to heal gastric ulcers - biopsies of stomach are negative for H pylori. The downhill varices are likely related to subclavian stenosis, vascular f/u appreciated, awaiting CT venogram. The treatment for these downhill varices should be directed to the cause of the SVC syndrome, would not pursue banding unless there are signs of bleeding from these lesions.
Reports a few additional dark stools overnight. Overall abdominal pain improved. As noted yesterday, will follow up CT without contrast to assess for possible colitis (ie ischemic); pending results, can pursue endoscopic evaluation tomorrow with colonoscopy as discussed yesterday, but would suggest EGD as well given reports of melenic stools and accelerated azotemia on admission. Continue PPI for now. Please recheck set of labs today.

## 2023-07-06 NOTE — PROGRESS NOTE ADULT - SUBJECTIVE AND OBJECTIVE BOX
Patient is a 53y old  Female who presents with a chief complaint of BRBPR X 2 days ago (04 Jul 2023 12:17)      SUBJECTIVE / OVERNIGHT EVENTS: no events     T(C): 37.1 (07-06-23 @ 21:28), Max: 37.1 (07-06-23 @ 21:28)  HR: 69 (07-06-23 @ 21:28) (69 - 74)  BP: 115/74 (07-06-23 @ 21:28) (109/70 - 115/74)  RR: 18 (07-06-23 @ 21:28) (18 - 18)  SpO2: 97% (07-06-23 @ 21:28) (96% - 97%)      MEDICATIONS  (STANDING):  aspirin  chewable 81 milliGRAM(s) Oral daily  atorvastatin 20 milliGRAM(s) Oral at bedtime  cinacalcet 30 milliGRAM(s) Oral two times a day  clopidogrel Tablet 75 milliGRAM(s) Oral daily  mycophenolate mofetil 500 milliGRAM(s) Oral two times a day  pantoprazole    Tablet 40 milliGRAM(s) Oral before breakfast  predniSONE   Tablet 5 milliGRAM(s) Oral daily  tacrolimus ER Tablet (ENVARSUS XR) 11 milliGRAM(s) Oral daily    MEDICATIONS  (PRN):  acetaminophen     Tablet .. 650 milliGRAM(s) Oral every 6 hours PRN Temp greater or equal to 38C (100.4F), Mild Pain (1 - 3), Moderate Pain (4 - 6)    PHYSICAL EXAM:  GENERAL: NAD, well-developed  HEAD:  Atraumatic, Normocephalic  EYES: EOMI,  conjunctiva and sclera clear  NECK: Supple, No JVD  CHEST/LUNG: Clear to auscultation bilaterally; No wheeze  HEART: Regular rate and rhythm; No murmurs, rubs, or gallops  ABDOMEN: Soft, Nontender, Nondistended; Bowel sounds present  EXTREMITIES:  2+ Peripheral Pulses, No clubbing, cyanosis, or edema  PSYCH: AAOx3  NEUROLOGY: non-focal  SKIN: No rashes or lesions                               12.0   5.59  )-----------( 210      ( 05 Jul 2023 06:49 )             39.8             PT/INR - ( 05 Jul 2023 14:07 )   PT: 12.0 sec;   INR: 1.03 ratio           142|106|19<78  4.0|22|0.98  9.4,--,--  07-06 @ 07:04    141|106|15<84  3.7|22|0.94  9.9,--,--  07-05 @ 06:49

## 2023-07-07 LAB
ANION GAP SERPL CALC-SCNC: 14 MMOL/L — SIGNIFICANT CHANGE UP (ref 5–17)
BUN SERPL-MCNC: 20 MG/DL — SIGNIFICANT CHANGE UP (ref 7–23)
CALCIUM SERPL-MCNC: 9.8 MG/DL — SIGNIFICANT CHANGE UP (ref 8.4–10.5)
CHLORIDE SERPL-SCNC: 105 MMOL/L — SIGNIFICANT CHANGE UP (ref 96–108)
CO2 SERPL-SCNC: 21 MMOL/L — LOW (ref 22–31)
CREAT SERPL-MCNC: 1.05 MG/DL — SIGNIFICANT CHANGE UP (ref 0.5–1.3)
EGFR: 64 ML/MIN/1.73M2 — SIGNIFICANT CHANGE UP
GLUCOSE SERPL-MCNC: 109 MG/DL — HIGH (ref 70–99)
HCT VFR BLD CALC: 40.3 % — SIGNIFICANT CHANGE UP (ref 34.5–45)
HGB BLD-MCNC: 12.3 G/DL — SIGNIFICANT CHANGE UP (ref 11.5–15.5)
MCHC RBC-ENTMCNC: 25.3 PG — LOW (ref 27–34)
MCHC RBC-ENTMCNC: 30.5 GM/DL — LOW (ref 32–36)
MCV RBC AUTO: 82.8 FL — SIGNIFICANT CHANGE UP (ref 80–100)
NRBC # BLD: 0 /100 WBCS — SIGNIFICANT CHANGE UP (ref 0–0)
PLATELET # BLD AUTO: 230 K/UL — SIGNIFICANT CHANGE UP (ref 150–400)
POTASSIUM SERPL-MCNC: 4.5 MMOL/L — SIGNIFICANT CHANGE UP (ref 3.5–5.3)
POTASSIUM SERPL-SCNC: 4.5 MMOL/L — SIGNIFICANT CHANGE UP (ref 3.5–5.3)
RBC # BLD: 4.87 M/UL — SIGNIFICANT CHANGE UP (ref 3.8–5.2)
RBC # FLD: 17.2 % — HIGH (ref 10.3–14.5)
SODIUM SERPL-SCNC: 140 MMOL/L — SIGNIFICANT CHANGE UP (ref 135–145)
TACROLIMUS SERPL-MCNC: 9.7 NG/ML — SIGNIFICANT CHANGE UP
WBC # BLD: 5.71 K/UL — SIGNIFICANT CHANGE UP (ref 3.8–10.5)
WBC # FLD AUTO: 5.71 K/UL — SIGNIFICANT CHANGE UP (ref 3.8–10.5)

## 2023-07-07 PROCEDURE — 71260 CT THORAX DX C+: CPT | Mod: 26

## 2023-07-07 PROCEDURE — 74177 CT ABD & PELVIS W/CONTRAST: CPT | Mod: 26

## 2023-07-07 RX ADMIN — Medication 50 MILLIGRAM(S): at 00:02

## 2023-07-07 RX ADMIN — CINACALCET 30 MILLIGRAM(S): 30 TABLET, FILM COATED ORAL at 05:39

## 2023-07-07 RX ADMIN — TACROLIMUS 11 MILLIGRAM(S): 5 CAPSULE ORAL at 05:40

## 2023-07-07 RX ADMIN — Medication 81 MILLIGRAM(S): at 12:06

## 2023-07-07 RX ADMIN — Medication 5 MILLIGRAM(S): at 05:39

## 2023-07-07 RX ADMIN — CINACALCET 30 MILLIGRAM(S): 30 TABLET, FILM COATED ORAL at 17:18

## 2023-07-07 RX ADMIN — MYCOPHENOLATE MOFETIL 500 MILLIGRAM(S): 250 CAPSULE ORAL at 05:40

## 2023-07-07 RX ADMIN — PANTOPRAZOLE SODIUM 40 MILLIGRAM(S): 20 TABLET, DELAYED RELEASE ORAL at 05:38

## 2023-07-07 RX ADMIN — Medication 50 MILLIGRAM(S): at 12:07

## 2023-07-07 RX ADMIN — Medication 50 MILLIGRAM(S): at 05:39

## 2023-07-07 RX ADMIN — CLOPIDOGREL BISULFATE 75 MILLIGRAM(S): 75 TABLET, FILM COATED ORAL at 12:06

## 2023-07-07 RX ADMIN — MYCOPHENOLATE MOFETIL 500 MILLIGRAM(S): 250 CAPSULE ORAL at 17:18

## 2023-07-07 RX ADMIN — ATORVASTATIN CALCIUM 20 MILLIGRAM(S): 80 TABLET, FILM COATED ORAL at 21:03

## 2023-07-07 NOTE — PROGRESS NOTE ADULT - SUBJECTIVE AND OBJECTIVE BOX
Patient is a 53y old  Female who presents with a chief complaint of BRBPR X 2 days ago (04 Jul 2023 12:17)      SUBJECTIVE / OVERNIGHT EVENTS: no events         T(C): 36.6 (07-07-23 @ 21:20), Max: 36.6 (07-07-23 @ 21:20)  HR: 83 (07-07-23 @ 21:20) (83 - 83)  BP: 117/74 (07-07-23 @ 21:20) (117/74 - 117/74)  RR: 18 (07-07-23 @ 21:20) (18 - 18)  SpO2: 97% (07-07-23 @ 21:20) (97% - 97%)    MEDICATIONS  (STANDING):  aspirin  chewable 81 milliGRAM(s) Oral daily  atorvastatin 20 milliGRAM(s) Oral at bedtime  cinacalcet 30 milliGRAM(s) Oral two times a day  clopidogrel Tablet 75 milliGRAM(s) Oral daily  mycophenolate mofetil 500 milliGRAM(s) Oral two times a day  pantoprazole    Tablet 40 milliGRAM(s) Oral before breakfast  predniSONE   Tablet 5 milliGRAM(s) Oral daily  tacrolimus ER Tablet (ENVARSUS XR) 11 milliGRAM(s) Oral daily    MEDICATIONS  (PRN):  acetaminophen     Tablet .. 650 milliGRAM(s) Oral every 6 hours PRN Temp greater or equal to 38C (100.4F), Mild Pain (1 - 3), Moderate Pain (4 - 6)  PHYSICAL EXAM:  GENERAL: NAD, well-developed  HEAD:  Atraumatic, Normocephalic  EYES: EOMI,  conjunctiva and sclera clear  NECK: Supple, No JVD  CHEST/LUNG: Clear to auscultation bilaterally; No wheeze  HEART: Regular rate and rhythm; No murmurs, rubs, or gallops  ABDOMEN: Soft, Nontender, Nondistended; Bowel sounds present  EXTREMITIES:  2+ Peripheral Pulses, No clubbing, cyanosis, or edema  PSYCH: AAOx3  NEUROLOGY: non-focal  SKIN: No rashes or lesions                               12.0   5.59  )-----------( 210      ( 05 Jul 2023 06:49 )             39.8             PT/INR - ( 05 Jul 2023 14:07 )   PT: 12.0 sec;   INR: 1.03 ratio           142|106|19<78  4.0|22|0.98  9.4,--,--  07-06 @ 07:04    141|106|15<84  3.7|22|0.94  9.9,--,--  07-05 @ 06:49

## 2023-07-07 NOTE — CHART NOTE - NSCHARTNOTEFT_GEN_A_CORE
HPI:  54 y/o female w/ ESRD s/p HepC DDRT, chronic SVC syndrome s/p L cephalic-iliac vein bypass on DAPT, s/p thrombectomy of L iliac vein and graft 2012, HTN, presents to the ER for rectal bleeding. states started two days ago. states started as bright red rectal bleed and today noticed she had black colored stools. states feels weak. went to work this morning but felt too weak. denies f/n/v/d, CP, SOB, HA, dizziness, abdominal pain, urinary (03 Jul 2023 16:57) HPI:  54 y/o female w/ ESRD s/p HepC DDRT, chronic SVC syndrome s/p L cephalic-iliac vein bypass on DAPT, s/p thrombectomy of L iliac vein and graft 2012, HTN, presents to the ER for rectal bleeding. Pt is s/p EGD/colonoscopy with grade 2 esophageal varices and nonbleeding gastric ulcers and external hemorrhoids.  Esophageal varices likely 22/ SVC syndrome.  Pt pending CT A/P with contrast venogram.    Pt with contrast allergy.  Case discussed with attending, will order contrast allergy premedication as per protocol.  Spoke with CT tech, CT schedule for 1pm today.    Pt and RN updated on the plan of care.     Will endorse to day ACP team.    Emelin Reyes [place]one[place], EDSON   Medicine Department   [facility] [id number]

## 2023-07-07 NOTE — PROGRESS NOTE ADULT - SUBJECTIVE AND OBJECTIVE BOX
[facility] [place] [facility]   MD MEL DOBBS MD BRIANA PETITO, NP    TEL:  FROM 9 AM to 5 PM ---OFFICE: [phone]  FROM 5 PM - 9 AM PLEASE CALL ANSWERING SERVICE: [phone]    RENAL PROGRESS NOTE: DATE OF SERVICE 07-07-23 @ 14:35    Patient is a 53y old  Female who presents with a chief complaint of BRBPR X 2 days ago    Patient seen and examined at bedside. No chest pain/sob    VITALS:  T(F): 97.8 (07-07-23 @ 11:43), Max: 98.7 (07-06-23 @ 21:28)  HR: 90 (07-07-23 @ 11:43)  BP: 126/80 (07-07-23 @ 11:43)  RR: 18 (07-07-23 @ 11:43)  SpO2: 94% (07-07-23 @ 11:43)  Wt(kg): --    07-06 @ 07:01  -  07-07 @ 07:00  --------------------------------------------------------  IN: 200 mL / OUT: 0 mL / NET: 200 mL    07-07 @ 07:01  -  07-07 @ 14:35  --------------------------------------------------------  IN: 240 mL / OUT: 0 mL / NET: 240 mL      PHYSICAL EXAM:  Constitutional: NAD  Neck: No JVD  Respiratory: CTAB, no wheezes, rales or rhonchi  Cardiovascular: S1, S2, RRR  Gastrointestinal: BS+, soft, NT/ND  Extremities: No peripheral edema    Hospital Medications:   MEDICATIONS  (STANDING):  aspirin  chewable 81 milliGRAM(s) Oral daily  atorvastatin 20 milliGRAM(s) Oral at bedtime  cinacalcet 30 milliGRAM(s) Oral two times a day  clopidogrel Tablet 75 milliGRAM(s) Oral daily  mycophenolate mofetil 500 milliGRAM(s) Oral two times a day  pantoprazole    Tablet 40 milliGRAM(s) Oral before breakfast  predniSONE   Tablet 5 milliGRAM(s) Oral daily  tacrolimus ER Tablet (ENVARSUS XR) 11 milliGRAM(s) Oral daily    Tacrolimus (), Serum: 9.7 ng/mL (07-07 @ 06:56)    LABS:  07-07    140  |  105  |  20  ----------------------------<  109<H>  4.5   |  21<L>  |  1.05    Ca    9.8      07 Jul 2023 06:56      Creatinine Trend: 1.05 <--, 0.98 <--, 0.94 <--, 1.04 <--                                12.3   5.71  )-----------( 230      ( 07 Jul 2023 06:56 )             40.3     Urine Studies:  Urinalysis - [07-07-23 @ 06:56]      Color  / Appearance  / SG  / pH       Gluc 109 / Ketone   / Bili  / Urobili        Blood  / Protein  / Leuk Est  / Nitrite       RBC  / WBC  / Hyaline  / Gran  / Sq Epi  / Non Sq Epi  / Bacteria       PTH -- (Ca 11.2)      [07-03-23 @ 15:05]   170  Vitamin D (25OH) 31.8      [07-03-23 @ 15:05]        RADIOLOGY & ADDITIONAL STUDIES:

## 2023-07-07 NOTE — PROGRESS NOTE ADULT - ASSESSMENT
52 y/o female w/ ESRD s/p HepC DDRT, chronic SVC syndrome s/p L cephalic-iliac vein bypass on DAPT, s/p thrombectomy of L iliac vein and graft 2012, HTN, presents to the ER for rectal bleeding. states started two days ago. States  started as bright red rectal bleed and today noticed she had black colored stools. states feels weak. went to work this morning but felt too weak.  Denies f/n/v/d, CP, SOB, HA, dizziness, abdominal pain, urinary symptoms, cough. Nephrology consulted for s/p DDRT.     A/P    s/p DDRT @ [facility] 08/14/2020  Outpatient nephrologist is Dr. Lake / Dr Streeter   Continue home dose Immunosuppression meds per Transplant recc.    mg BID, Prednisone 5 mg qd, Envarsus 11 mg   Check Tacrolimus 30 min prior to morning dose  FK level 7/6 --> 9.7    Monitor BMP, u/o    HTN:   BP fluctuates   Monitor    Anemia:   Per primary  Maintain Hgb > 8  Monitor Hgb    Hypercalcemia     Vitamin D 31.8   Ca improving   Pt on Sensipar 30 mg BID    Monitor     52 y/o female w/ ESRD s/p HepC DDRT, chronic SVC syndrome s/p L cephalic-iliac vein bypass on DAPT, s/p thrombectomy of L iliac vein and graft 2012, HTN, presents to the ER for rectal bleeding. states started two days ago. States  started as bright red rectal bleed and today noticed she had black colored stools. states feels weak. went to work this morning but felt too weak.  Denies f/n/v/d, CP, SOB, HA, dizziness, abdominal pain, urinary symptoms, cough. Nephrology consulted for s/p DDRT.     A/P    s/p DDRT @ [facility] 08/14/2020  Outpatient nephrologist is Dr. Lake / Dr Streeter   Continue home dose Immunosuppression meds per Transplant recc.    mg BID, Prednisone 5 mg qd, Envarsus 11 mg   FK level 7/6 --> 9.7 , possibly inaccurate as dose this am was given at ~ 5:40 but lab draw results indicate specimen was from 6:56 am.   please Check Tacrolimus 30 min prior to morning dose   Monitor BMP, u/o    HTN:   BP fluctuates   Monitor    Anemia:   Per primary  Maintain Hgb > 8  Monitor Hgb    Hypercalcemia     Vitamin D 31.8   Ca improving   Pt on Sensipar 30 mg BID    Monitor      Discussed with primary team/ RN  52 y/o female w/ ESRD s/p HepC DDRT, chronic SVC syndrome s/p L cephalic-iliac vein bypass on DAPT, s/p thrombectomy of L iliac vein and graft 2012, HTN, presents to the ER for rectal bleeding. states started two days ago. States  started as bright red rectal bleed and today noticed she had black colored stools. states feels weak. went to work this morning but felt too weak.  Denies f/n/v/d, CP, SOB, HA, dizziness, abdominal pain, urinary symptoms, cough. Nephrology consulted for s/p DDRT.     A/P    s/p DDRT @ [facility] 08/14/2020  Outpatient nephrologist is Dr. Lake / Dr Streeter   Continue home dose Immunosuppression meds per Transplant recc.    mg BID, Prednisone 5 mg qd, Envarsus 11 mg   FK level 7/6 --> 9.7 , possibly inaccurate as dose this am was given at ~ 5:40 but lab draw results indicate specimen was from 6:56 am.   Will repeat Tacrolimus level in AM   please Check Tacrolimus 30 min prior to morning dose   Monitor BMP, u/o    HTN:   BP fluctuates   Monitor    Anemia:   Per primary  Maintain Hgb > 8  Monitor Hgb    Hypercalcemia     Vitamin D 31.8   Ca improving   Pt on Sensipar 30 mg BID    Monitor      Discussed with primary team/ RN

## 2023-07-07 NOTE — PROGRESS NOTE ADULT - NS ATTEND AMEND GEN_ALL_CORE FT
amphetamine-dextroamphetamine (ADDERALL) 20 MG tablet   Take 1 tablet by mouth daily. In the afternoon.   Dispense: 30 tablet     Refills: 0           Last visit:7/12/22  Last refill:11/21/22  Last Labs:NA  Refilled per standing orders  Refill set up below  
Her Hg is stable, no bleeding per patient, EGD/colonoscopy results noted.  Awaiting CT venogram per vascular surgery.
as above
as above
as above  repeat FK in AM

## 2023-07-08 LAB — TACROLIMUS SERPL-MCNC: 8 NG/ML — SIGNIFICANT CHANGE UP

## 2023-07-08 RX ADMIN — Medication 81 MILLIGRAM(S): at 11:26

## 2023-07-08 RX ADMIN — Medication 650 MILLIGRAM(S): at 00:13

## 2023-07-08 RX ADMIN — MYCOPHENOLATE MOFETIL 500 MILLIGRAM(S): 250 CAPSULE ORAL at 17:17

## 2023-07-08 RX ADMIN — Medication 650 MILLIGRAM(S): at 01:13

## 2023-07-08 RX ADMIN — CINACALCET 30 MILLIGRAM(S): 30 TABLET, FILM COATED ORAL at 17:17

## 2023-07-08 RX ADMIN — TACROLIMUS 11 MILLIGRAM(S): 5 CAPSULE ORAL at 08:25

## 2023-07-08 RX ADMIN — CLOPIDOGREL BISULFATE 75 MILLIGRAM(S): 75 TABLET, FILM COATED ORAL at 11:26

## 2023-07-08 RX ADMIN — ATORVASTATIN CALCIUM 20 MILLIGRAM(S): 80 TABLET, FILM COATED ORAL at 21:46

## 2023-07-08 RX ADMIN — CINACALCET 30 MILLIGRAM(S): 30 TABLET, FILM COATED ORAL at 05:16

## 2023-07-08 RX ADMIN — Medication 5 MILLIGRAM(S): at 05:17

## 2023-07-08 RX ADMIN — PANTOPRAZOLE SODIUM 40 MILLIGRAM(S): 20 TABLET, DELAYED RELEASE ORAL at 05:19

## 2023-07-08 RX ADMIN — MYCOPHENOLATE MOFETIL 500 MILLIGRAM(S): 250 CAPSULE ORAL at 05:17

## 2023-07-08 NOTE — PROGRESS NOTE ADULT - ASSESSMENT
54 y/o female w/ ESRD s/p HepC DDRT, chronic SVC syndrome s/p L cephalic-iliac vein bypass on DAPT, s/p thrombectomy of L iliac vein and graft 2012, HTN, presents to the ER for rectal bleeding. states started two days ago. States  started as bright red rectal bleed and today noticed she had black colored stools. states feels weak. went to work this morning but felt too weak.  Denies f/n/v/d, CP, SOB, HA, dizziness, abdominal pain, urinary symptoms, cough. Nephrology consulted for s/p DDRT.     A/P    s/p DDRT @ [facility] 08/14/2020  Outpatient nephrologist is Dr. Lake / Dr Streeter   Continue home dose Immunosuppression meds per Transplant recc.    mg BID, Prednisone 5 mg qd, Envarsus 11 mg   FK level 7/6 --> 9.7 , possibly inaccurate as dose this am was given at ~ 5:40 but lab draw results indicate specimen was from 6:56 am.   Will repeat Tacrolimus level in AM   please Check Tacrolimus 30 min prior to morning dose   Monitor BMP, u/o    HTN:   BP fluctuates   Monitor    Anemia:   Per primary  Maintain Hgb > 8  Monitor Hgb    Hypercalcemia     Vitamin D 31.8   Ca improving   Pt on Sensipar 30 mg BID    Monitor

## 2023-07-08 NOTE — PROGRESS NOTE ADULT - ASSESSMENT
54 yo F with a ESRD s/p HepC DDRT in 2020, chronic SVC syndrome s/p L cephalic-iliac vein bypass on DAPT, s/p thrombectomy of L iliac vein and graft 2012, Hx BK viremia, HTN admitted with dark stools and BRBPR.       # BRBPR Monitor CBC.   Hold Plavix   Continue with Aspirin.   GI consult appreciated   Transfuse prn     s/p EGD last admission Esophageal ulcer/varices     s/p EGD Colonoscopy   Hemorrhoids  Gastruci ulcers   Esophageal Varices      No evidence of portal HTN   Given SVC syndrome will get Vascular consult appreciated   CT Venogram   # Kidney Transplant Continue with Tacrolimus   Transplant team consult appreciated       Continue with Aspirin   Resume Plavix        3

## 2023-07-08 NOTE — PROGRESS NOTE ADULT - SUBJECTIVE AND OBJECTIVE BOX
[facility] [place]   MD MEL DOBBS MD KRISTINE SOLTANPOUR, DO BRIANA PETITO, NP    TEL:  OFFICE: [phone]  From 5pm-7am Answering Service [phone]    -- RENAL FOLLOW UP NOTE ---Date of Service 07-08-23 @ 13:14    Patient is a 53y old  Female who presents with a chief complaint of BRBPR X 2 days ago (08 Jul 2023 18:56)      Patient seen and examined at bedside. No chest pain/sob    VITALS:  T(F): 98 (07-08-23 @ 21:29), Max: 98.8 (07-08-23 @ 13:43)  HR: 81 (07-08-23 @ 21:29)  BP: 128/75 (07-08-23 @ 21:29)  RR: 18 (07-08-23 @ 21:29)  SpO2: 98% (07-08-23 @ 21:29)  Wt(kg): --    07-07 @ 07:01  -  07-08 @ 07:00  --------------------------------------------------------  IN: 880 mL / OUT: 0 mL / NET: 880 mL    07-08 @ 07:01  -  07-09 @ 03:14  --------------------------------------------------------  IN: 780 mL / OUT: 0 mL / NET: 780 mL          PHYSICAL EXAM:  General: NAD  Neck: No JVD  Respiratory: CTAB, no wheezes, rales or rhonchi  Cardiovascular: S1, S2, RRR  Gastrointestinal: BS+, soft, NT/ND  Extremities: No peripheral edema    Hospital Medications:   MEDICATIONS  (STANDING):  aspirin  chewable 81 milliGRAM(s) Oral daily  atorvastatin 20 milliGRAM(s) Oral at bedtime  cinacalcet 30 milliGRAM(s) Oral two times a day  clopidogrel Tablet 75 milliGRAM(s) Oral daily  mycophenolate mofetil 500 milliGRAM(s) Oral two times a day  pantoprazole    Tablet 40 milliGRAM(s) Oral before breakfast  predniSONE   Tablet 5 milliGRAM(s) Oral daily  tacrolimus ER Tablet (ENVARSUS XR) 11 milliGRAM(s) Oral daily    Tacrolimus (), Serum: 8.0 ng/mL (07-08 @ 06:01)    LABS:  07-07    140  |  105  |  20  ----------------------------<  109<H>  4.5   |  21<L>  |  1.05    Ca    9.8      07 Jul 2023 06:56      Creatinine Trend: 1.05 <--, 0.98 <--, 0.94 <--, 1.04 <--                                12.3   5.71  )-----------( 230      ( 07 Jul 2023 06:56 )             40.3     Urine Studies:  Urinalysis - [07-07-23 @ 06:56]      Color  / Appearance  / SG  / pH       Gluc 109 / Ketone   / Bili  / Urobili        Blood  / Protein  / Leuk Est  / Nitrite       RBC  / WBC  / Hyaline  / Gran  / Sq Epi  / Non Sq Epi  / Bacteria       PTH -- (Ca 11.2)      [07-03-23 @ 15:05]   170  Vitamin D (25OH) 31.8      [07-03-23 @ 15:05]        RADIOLOGY & ADDITIONAL STUDIES:

## 2023-07-08 NOTE — PROGRESS NOTE ADULT - SUBJECTIVE AND OBJECTIVE BOX
Patient is a 53y old  Female who presents with a chief complaint of BRBPR X 2 days ago (04 Jul 2023 12:17)      SUBJECTIVE / OVERNIGHT EVENTS: no events     T(C): 36.7 (07-08-23 @ 21:29), Max: 37.1 (07-08-23 @ 13:43)  HR: 81 (07-08-23 @ 21:29) (76 - 81)  BP: 128/75 (07-08-23 @        MEDICATIONS  (STANDING):  aspirin  chewable 81 milliGRAM(s) Oral daily  atorvastatin 20 milliGRAM(s) Oral at bedtime  cinacalcet 30 milliGRAM(s) Oral two times a day  clopidogrel Tablet 75 milliGRAM(s) Oral daily  mycophenolate mofetil 500 milliGRAM(s) Oral two times a day  pantoprazole    Tablet 40 milliGRAM(s) Oral before breakfast  predniSONE   Tablet 5 milliGRAM(s) Oral daily  tacrolimus ER Tablet (ENVARSUS XR) 11 milliGRAM(s) Oral daily    MEDICATIONS  (PRN):  acetaminophen     Tablet .. 650 milliGRAM(s) Oral every 6 hours PRN Temp greater or equal to 38C (100.4F), Mild Pain (1 - 3), Moderate Pain (4 - 6)   21:29) (118/75 - 128/75)  RR: 18 (07-08-23 @ 21:29) (18 - 18)  SpO2: 98% (07-08-23 @ 21:29) (96% - 98%)PHYSICAL EXAM:  GENERAL: NAD, well-developed  HEAD:  Atraumatic, Normocephalic  EYES: EOMI,  conjunctiva and sclera clear  NECK: Supple, No JVD  CHEST/LUNG: Clear to auscultation bilaterally; No wheeze  HEART: Regular rate and rhythm; No murmurs, rubs, or gallops  ABDOMEN: Soft, Nontender, Nondistended; Bowel sounds present  EXTREMITIES:  2+ Peripheral Pulses, No clubbing, cyanosis, or edema  PSYCH: AAOx3  NEUROLOGY: non-focal  SKIN: No rashes or lesions                               12.0   5.59  )-----------( 210      ( 05 Jul 2023 06:49 )             39.8             PT/INR - ( 05 Jul 2023 14:07 )   PT: 12.0 sec;   INR: 1.03 ratio           142|106|19<78  4.0|22|0.98  9.4,--,--  07-06 @ 07:04    141|106|15<84  3.7|22|0.94  9.9,--,--  07-05 @ 06:49

## 2023-07-09 ENCOUNTER — TRANSCRIPTION ENCOUNTER (OUTPATIENT)
Age: 53
End: 2023-07-09

## 2023-07-09 VITALS
OXYGEN SATURATION: 97 % | SYSTOLIC BLOOD PRESSURE: 126 MMHG | RESPIRATION RATE: 18 BRPM | HEART RATE: 73 BPM | TEMPERATURE: 98 F | DIASTOLIC BLOOD PRESSURE: 79 MMHG

## 2023-07-09 LAB — TACROLIMUS SERPL-MCNC: 5.6 NG/ML — SIGNIFICANT CHANGE UP

## 2023-07-09 PROCEDURE — 99285 EMERGENCY DEPT VISIT HI MDM: CPT | Mod: 25

## 2023-07-09 PROCEDURE — 85014 HEMATOCRIT: CPT

## 2023-07-09 PROCEDURE — 86900 BLOOD TYPING SEROLOGIC ABO: CPT

## 2023-07-09 PROCEDURE — 85027 COMPLETE CBC AUTOMATED: CPT

## 2023-07-09 PROCEDURE — 86901 BLOOD TYPING SEROLOGIC RH(D): CPT

## 2023-07-09 PROCEDURE — 86850 RBC ANTIBODY SCREEN: CPT

## 2023-07-09 PROCEDURE — 85018 HEMOGLOBIN: CPT

## 2023-07-09 PROCEDURE — 83970 ASSAY OF PARATHORMONE: CPT

## 2023-07-09 PROCEDURE — 85610 PROTHROMBIN TIME: CPT

## 2023-07-09 PROCEDURE — 85730 THROMBOPLASTIN TIME PARTIAL: CPT

## 2023-07-09 PROCEDURE — 82310 ASSAY OF CALCIUM: CPT

## 2023-07-09 PROCEDURE — 88305 TISSUE EXAM BY PATHOLOGIST: CPT

## 2023-07-09 PROCEDURE — 36415 COLL VENOUS BLD VENIPUNCTURE: CPT

## 2023-07-09 PROCEDURE — 82947 ASSAY GLUCOSE BLOOD QUANT: CPT

## 2023-07-09 PROCEDURE — 96374 THER/PROPH/DIAG INJ IV PUSH: CPT

## 2023-07-09 PROCEDURE — 82330 ASSAY OF CALCIUM: CPT

## 2023-07-09 PROCEDURE — 74177 CT ABD & PELVIS W/CONTRAST: CPT

## 2023-07-09 PROCEDURE — 74176 CT ABD & PELVIS W/O CONTRAST: CPT

## 2023-07-09 PROCEDURE — 80053 COMPREHEN METABOLIC PANEL: CPT

## 2023-07-09 PROCEDURE — 84132 ASSAY OF SERUM POTASSIUM: CPT

## 2023-07-09 PROCEDURE — 82435 ASSAY OF BLOOD CHLORIDE: CPT

## 2023-07-09 PROCEDURE — 80048 BASIC METABOLIC PNL TOTAL CA: CPT

## 2023-07-09 PROCEDURE — 80197 ASSAY OF TACROLIMUS: CPT

## 2023-07-09 PROCEDURE — 85025 COMPLETE CBC W/AUTO DIFF WBC: CPT

## 2023-07-09 PROCEDURE — 82306 VITAMIN D 25 HYDROXY: CPT

## 2023-07-09 PROCEDURE — 82272 OCCULT BLD FECES 1-3 TESTS: CPT

## 2023-07-09 PROCEDURE — 83605 ASSAY OF LACTIC ACID: CPT

## 2023-07-09 PROCEDURE — 84295 ASSAY OF SERUM SODIUM: CPT

## 2023-07-09 PROCEDURE — 82803 BLOOD GASES ANY COMBINATION: CPT

## 2023-07-09 PROCEDURE — 93005 ELECTROCARDIOGRAM TRACING: CPT

## 2023-07-09 PROCEDURE — 71260 CT THORAX DX C+: CPT

## 2023-07-09 RX ORDER — CINACALCET 30 MG/1
1 TABLET, FILM COATED ORAL
Qty: 60 | Refills: 0
Start: 2023-07-09 | End: 2023-08-07

## 2023-07-09 RX ORDER — ATORVASTATIN CALCIUM 80 MG/1
1 TABLET, FILM COATED ORAL
Qty: 30 | Refills: 0
Start: 2023-07-09 | End: 2023-08-07

## 2023-07-09 RX ORDER — AMLODIPINE BESYLATE 2.5 MG/1
1 TABLET ORAL
Qty: 0 | Refills: 0 | DISCHARGE

## 2023-07-09 RX ADMIN — Medication 5 MILLIGRAM(S): at 05:24

## 2023-07-09 RX ADMIN — Medication 81 MILLIGRAM(S): at 11:26

## 2023-07-09 RX ADMIN — MYCOPHENOLATE MOFETIL 500 MILLIGRAM(S): 250 CAPSULE ORAL at 05:24

## 2023-07-09 RX ADMIN — PANTOPRAZOLE SODIUM 40 MILLIGRAM(S): 20 TABLET, DELAYED RELEASE ORAL at 05:24

## 2023-07-09 RX ADMIN — TACROLIMUS 11 MILLIGRAM(S): 5 CAPSULE ORAL at 05:24

## 2023-07-09 RX ADMIN — CLOPIDOGREL BISULFATE 75 MILLIGRAM(S): 75 TABLET, FILM COATED ORAL at 11:26

## 2023-07-09 RX ADMIN — CINACALCET 30 MILLIGRAM(S): 30 TABLET, FILM COATED ORAL at 05:24

## 2023-07-09 NOTE — PROGRESS NOTE ADULT - PROVIDER SPECIALTY LIST ADULT
Hospitalist
Hospitalist
Nephrology
Gastroenterology
Heme/Onc
Hospitalist
Nephrology
Hospitalist
Nephrology
Nephrology
Gastroenterology
Heme/Onc
Nephrology

## 2023-07-09 NOTE — DISCHARGE NOTE PROVIDER - CARE PROVIDER_API CALL
Geoffrey Lake  Nephrology  [address] [place] [id number]  Phone: [phone]  Fax: [phone]  Follow Up Time: 1-3 days

## 2023-07-09 NOTE — DISCHARGE NOTE PROVIDER - NSDCMRMEDTOKEN_GEN_ALL_CORE_FT
Aspirin Enteric Coated 81 mg oral delayed release tablet: 1 tab(s) orally once a day  atorvastatin 20 mg oral tablet: 1 tab(s) orally once a day (at bedtime)  cinacalcet 30 mg oral tablet: 1 tab(s) orally 2 times a day  clopidogrel 75 mg oral tablet: 1 tab(s) orally once a day  Envarsus XR 1 mg oral tablet, extended release: 3 tab(s) orally once a day  Envarsus XR 4 mg oral tablet, extended release: 2 tab(s) orally once a day  mycophenolate mofetil 500 mg oral tablet: 1 tab(s) orally 2 times a day  pantoprazole 40 mg oral delayed release tablet: 1 tab(s) orally once a day  predniSONE 5 mg oral tablet: 1 tab(s) orally once a day

## 2023-07-09 NOTE — DISCHARGE NOTE NURSING/CASE MANAGEMENT/SOCIAL WORK - PATIENT PORTAL LINK FT
You can access the FollowMyHealth Patient Portal offered by [facility] by registering at the following website: [email or web address]. By joining [facility]’s FollowMyHealth portal, you will also be able to view your health information using other applications (apps) compatible with our system.

## 2023-07-09 NOTE — DISCHARGE NOTE PROVIDER - HOSPITAL COURSE
52 yo F with a ESRD s/p HepC DDRT in 2020, chronic SVC syndrome s/p L cephalic-iliac vein bypass on DAPT, s/p thrombectomy of L iliac vein and graft 2012, Hx BK viremia, HTN admitted with dark stools and BRBPR.       > BRBPR Monitor CBC.   Hold Plavix   Continue with Aspirin.   GI consult appreciated   Transfuse prn   s/p EGD Colonoscopy   Hemorrhoids  Gastruci ulcers   Esophageal Varices  No evidence of portal HTN     > DX SVC Syndrome   Vascular consult appreciated   CT Venogram     >  Kidney Transplant Continue with Tacrolimus   Transplant team consult appreciated   Continue with Aspirin   Resume Plavix   Cleared by Dr Zamora for discharge home

## 2023-07-09 NOTE — PROGRESS NOTE ADULT - REASON FOR ADMISSION
BRBPR X 2 days ago

## 2023-07-09 NOTE — DISCHARGE NOTE NURSING/CASE MANAGEMENT/SOCIAL WORK - HAVE YOU HAD COVID IN THE LAST 60 DAYS?
Detail Level: Detailed Consent: The patient's consent was obtained including but not limited to risks of crusting, scabbing, blistering, scarring, darker or lighter pigmentary change, recurrence, incomplete removal and infection. The patient understands that the procedure is cosmetic in nature and is not covered by insurance. Post-Care Instructions: I reviewed with the patient in detail post-care instructions. Patient is to wear sunprotection, and avoid picking at any of the treated lesions. Pt may apply Vaseline to crusted or scabbing areas. Render Post-Care Instructions In Note?: no No

## 2023-07-09 NOTE — DISCHARGE NOTE NURSING/CASE MANAGEMENT/SOCIAL WORK - NSDCPEFALRISK_GEN_ALL_CORE
For information on Fall & Injury Prevention, visit: [email or web address] OR  [email or web address] OR  https://www.cdc.gov/steadi/patient.html Yes

## 2023-07-09 NOTE — DISCHARGE NOTE PROVIDER - NSDCFUSCHEDAPPT_GEN_ALL_CORE_FT
Geoffrey Lake  [facility] Physician [facility]  NEPHRO [address] [place] [address]  Scheduled Appointment: 07/17/2023

## 2023-07-09 NOTE — DISCHARGE NOTE PROVIDER - NSDCCPCAREPLAN_GEN_ALL_CORE_FT
PRINCIPAL DISCHARGE DIAGNOSIS  Diagnosis: Rectal bleed  Assessment and Plan of Treatment: resolved      SECONDARY DISCHARGE DIAGNOSES  Diagnosis: Renal transplant recipient  Assessment and Plan of Treatment: Follow up with transplant nephrology    Diagnosis: Hypertension  Assessment and Plan of Treatment: Hold amlodipine and Follow up with PMD    Diagnosis: Hyperlipidemia  Assessment and Plan of Treatment: Continue Atorvastatin as ordered

## 2023-07-09 NOTE — PROGRESS NOTE ADULT - SUBJECTIVE AND OBJECTIVE BOX
Patient is a 53y old  Female who presents with a chief complaint of BRBPR X 2 days ago (04 Jul 2023 12:17)      SUBJECTIVE / OVERNIGHT EVENTS: no events     D/C planning   CTV reviewed no SVC syndrome         MEDICATIONS  (STANDING):  aspirin  chewable 81 milliGRAM(s) Oral daily  atorvastatin 20 milliGRAM(s) Oral at bedtime  cinacalcet 30 milliGRAM(s) Oral two times a day  clopidogrel Tablet 75 milliGRAM(s) Oral daily  mycophenolate mofetil 500 milliGRAM(s) Oral two times a day  pantoprazole    Tablet 40 milliGRAM(s) Oral before breakfast  predniSONE   Tablet 5 milliGRAM(s) Oral daily  tacrolimus ER Tablet (ENVARSUS XR) 11 milliGRAM(s) Oral daily    MEDICATIONS  (PRN):  acetaminophen     Tablet .. 650 milliGRAM(s) Oral every 6 hours PRN Temp greater or equal to 38C (100.4F), Mild Pain (1 - 3), Moderate Pain (4 - 6)   21:29) (118/75 - 128/75)  RR: 18 (07-08-23 @ 21:29) (18 - 18)  SpO2: 98% (07-08-23 @ 21:29) (96% - 98%)PHYSICAL EXAM:  GENERAL: NAD, well-developed  HEAD:  Atraumatic, Normocephalic  EYES: EOMI,  conjunctiva and sclera clear  NECK: Supple, No JVD  CHEST/LUNG: Clear to auscultation bilaterally; No wheeze  HEART: Regular rate and rhythm; No murmurs, rubs, or gallops  ABDOMEN: Soft, Nontender, Nondistended; Bowel sounds present  EXTREMITIES:  2+ Peripheral Pulses, No clubbing, cyanosis, or edema  PSYCH: AAOx3  NEUROLOGY: non-focal  SKIN: No rashes or lesions                               12.0   5.59  )-----------( 210      ( 05 Jul 2023 06:49 )             39.8             PT/INR - ( 05 Jul 2023 14:07 )   PT: 12.0 sec;   INR: 1.03 ratio           142|106|19<78  4.0|22|0.98  9.4,--,--  07-06 @ 07:04    141|106|15<84  3.7|22|0.94  9.9,--,--  07-05 @ 06:49

## 2023-07-16 ENCOUNTER — INPATIENT (INPATIENT)
Facility: HOSPITAL | Age: 53
LOS: 4 days | Discharge: HOME | DRG: 872 | End: 2023-07-21
Attending: INTERNAL MEDICINE | Admitting: INTERNAL MEDICINE
Payer: MEDICARE

## 2023-07-16 VITALS
TEMPERATURE: 103 F | OXYGEN SATURATION: 96 % | DIASTOLIC BLOOD PRESSURE: 90 MMHG | WEIGHT: 145.06 LBS | RESPIRATION RATE: 22 BRPM | SYSTOLIC BLOOD PRESSURE: 144 MMHG | HEART RATE: 104 BPM | HEIGHT: 63 IN

## 2023-07-16 DIAGNOSIS — Z95.828 PRESENCE OF OTHER VASCULAR IMPLANTS AND GRAFTS: Chronic | ICD-10-CM

## 2023-07-16 DIAGNOSIS — Z94.0 KIDNEY TRANSPLANT STATUS: Chronic | ICD-10-CM

## 2023-07-16 DIAGNOSIS — I77.0 ARTERIOVENOUS FISTULA, ACQUIRED: Chronic | ICD-10-CM

## 2023-07-16 LAB
ALBUMIN SERPL ELPH-MCNC: 4 G/DL — SIGNIFICANT CHANGE UP (ref 3.3–5)
ALP SERPL-CCNC: 53 U/L — SIGNIFICANT CHANGE UP (ref 40–120)
ALT FLD-CCNC: 12 U/L — SIGNIFICANT CHANGE UP (ref 10–45)
ANION GAP SERPL CALC-SCNC: 13 MMOL/L — SIGNIFICANT CHANGE UP (ref 5–17)
ANISOCYTOSIS BLD QL: SLIGHT — SIGNIFICANT CHANGE UP
APTT BLD: 27.9 SEC — SIGNIFICANT CHANGE UP (ref 27.5–35.5)
AST SERPL-CCNC: 18 U/L — SIGNIFICANT CHANGE UP (ref 10–40)
BASE EXCESS BLDV CALC-SCNC: -3.5 MMOL/L — LOW (ref -2–3)
BASOPHILS # BLD AUTO: 0.28 K/UL — HIGH (ref 0–0.2)
BASOPHILS NFR BLD AUTO: 1.7 % — SIGNIFICANT CHANGE UP (ref 0–2)
BILIRUB SERPL-MCNC: 1.5 MG/DL — HIGH (ref 0.2–1.2)
BUN SERPL-MCNC: 10 MG/DL — SIGNIFICANT CHANGE UP (ref 7–23)
CA-I SERPL-SCNC: 1.31 MMOL/L — SIGNIFICANT CHANGE UP (ref 1.15–1.33)
CALCIUM SERPL-MCNC: 9.8 MG/DL — SIGNIFICANT CHANGE UP (ref 8.4–10.5)
CHLORIDE BLDV-SCNC: 92 MMOL/L — LOW (ref 96–108)
CHLORIDE SERPL-SCNC: 97 MMOL/L — SIGNIFICANT CHANGE UP (ref 96–108)
CO2 BLDV-SCNC: 23 MMOL/L — SIGNIFICANT CHANGE UP (ref 22–26)
CO2 SERPL-SCNC: 20 MMOL/L — LOW (ref 22–31)
CREAT SERPL-MCNC: 1.24 MG/DL — SIGNIFICANT CHANGE UP (ref 0.5–1.3)
EGFR: 53 ML/MIN/1.73M2 — LOW
ELLIPTOCYTES BLD QL SMEAR: SLIGHT — SIGNIFICANT CHANGE UP
EOSINOPHIL # BLD AUTO: 0 K/UL — SIGNIFICANT CHANGE UP (ref 0–0.5)
EOSINOPHIL NFR BLD AUTO: 0 % — SIGNIFICANT CHANGE UP (ref 0–6)
GAS PNL BLDV: 122 MMOL/L — LOW (ref 136–145)
GAS PNL BLDV: SIGNIFICANT CHANGE UP
GAS PNL BLDV: SIGNIFICANT CHANGE UP
GLUCOSE BLDV-MCNC: 137 MG/DL — HIGH (ref 70–99)
GLUCOSE SERPL-MCNC: 107 MG/DL — HIGH (ref 70–99)
HCO3 BLDV-SCNC: 22 MMOL/L — SIGNIFICANT CHANGE UP (ref 22–29)
HCT VFR BLD CALC: 36 % — SIGNIFICANT CHANGE UP (ref 34.5–45)
HCT VFR BLDA CALC: 33 % — LOW (ref 34.5–46.5)
HGB BLD CALC-MCNC: 11.1 G/DL — LOW (ref 11.7–16.1)
HGB BLD-MCNC: 11 G/DL — LOW (ref 11.5–15.5)
INR BLD: 1.28 RATIO — HIGH (ref 0.88–1.16)
LACTATE BLDV-MCNC: 1.4 MMOL/L — SIGNIFICANT CHANGE UP (ref 0.5–2)
LYMPHOCYTES # BLD AUTO: 0.28 K/UL — LOW (ref 1–3.3)
LYMPHOCYTES # BLD AUTO: 1.7 % — LOW (ref 13–44)
MANUAL SMEAR VERIFICATION: SIGNIFICANT CHANGE UP
MCHC RBC-ENTMCNC: 25.2 PG — LOW (ref 27–34)
MCHC RBC-ENTMCNC: 30.6 GM/DL — LOW (ref 32–36)
MCV RBC AUTO: 82.6 FL — SIGNIFICANT CHANGE UP (ref 80–100)
MICROCYTES BLD QL: SLIGHT — SIGNIFICANT CHANGE UP
MONOCYTES # BLD AUTO: 1.14 K/UL — HIGH (ref 0–0.9)
MONOCYTES NFR BLD AUTO: 6.9 % — SIGNIFICANT CHANGE UP (ref 2–14)
NEUTROPHILS # BLD AUTO: 14.86 K/UL — HIGH (ref 1.8–7.4)
NEUTROPHILS NFR BLD AUTO: 88.8 % — HIGH (ref 43–77)
NEUTS BAND # BLD: 0.9 % — SIGNIFICANT CHANGE UP (ref 0–8)
OVALOCYTES BLD QL SMEAR: SLIGHT — SIGNIFICANT CHANGE UP
PCO2 BLDV: 41 MMHG — SIGNIFICANT CHANGE UP (ref 39–42)
PH BLDV: 7.34 — SIGNIFICANT CHANGE UP (ref 7.32–7.43)
PLAT MORPH BLD: NORMAL — SIGNIFICANT CHANGE UP
PLATELET # BLD AUTO: 214 K/UL — SIGNIFICANT CHANGE UP (ref 150–400)
PO2 BLDV: 30 MMHG — SIGNIFICANT CHANGE UP (ref 25–45)
POIKILOCYTOSIS BLD QL AUTO: SLIGHT — SIGNIFICANT CHANGE UP
POLYCHROMASIA BLD QL SMEAR: SLIGHT — SIGNIFICANT CHANGE UP
POTASSIUM BLDV-SCNC: 2.9 MMOL/L — CRITICAL LOW (ref 3.5–5.1)
POTASSIUM SERPL-MCNC: 3.4 MMOL/L — LOW (ref 3.5–5.3)
POTASSIUM SERPL-SCNC: 3.4 MMOL/L — LOW (ref 3.5–5.3)
PROT SERPL-MCNC: 6.3 G/DL — SIGNIFICANT CHANGE UP (ref 6–8.3)
PROTHROM AB SERPL-ACNC: 14.8 SEC — HIGH (ref 10.5–13.4)
RAPID RVP RESULT: SIGNIFICANT CHANGE UP
RBC # BLD: 4.36 M/UL — SIGNIFICANT CHANGE UP (ref 3.8–5.2)
RBC # FLD: 17 % — HIGH (ref 10.3–14.5)
RBC BLD AUTO: ABNORMAL
SAO2 % BLDV: 45.9 % — LOW (ref 67–88)
SARS-COV-2 RNA SPEC QL NAA+PROBE: SIGNIFICANT CHANGE UP
SCHISTOCYTES BLD QL AUTO: SLIGHT — SIGNIFICANT CHANGE UP
SODIUM SERPL-SCNC: 130 MMOL/L — LOW (ref 135–145)
WBC # BLD: 16.57 K/UL — HIGH (ref 3.8–10.5)
WBC # FLD AUTO: 16.57 K/UL — HIGH (ref 3.8–10.5)

## 2023-07-16 PROCEDURE — 71045 X-RAY EXAM CHEST 1 VIEW: CPT | Mod: 26

## 2023-07-16 PROCEDURE — 74176 CT ABD & PELVIS W/O CONTRAST: CPT | Mod: 26,MA

## 2023-07-16 PROCEDURE — 99285 EMERGENCY DEPT VISIT HI MDM: CPT

## 2023-07-16 RX ORDER — ACETAMINOPHEN 500 MG
1000 TABLET ORAL ONCE
Refills: 0 | Status: COMPLETED | OUTPATIENT
Start: 2023-07-16 | End: 2023-07-16

## 2023-07-16 RX ORDER — CEFTRIAXONE 500 MG/1
1000 INJECTION, POWDER, FOR SOLUTION INTRAMUSCULAR; INTRAVENOUS ONCE
Refills: 0 | Status: COMPLETED | OUTPATIENT
Start: 2023-07-16 | End: 2023-07-16

## 2023-07-16 RX ORDER — SODIUM CHLORIDE 9 MG/ML
1000 INJECTION INTRAMUSCULAR; INTRAVENOUS; SUBCUTANEOUS ONCE
Refills: 0 | Status: COMPLETED | OUTPATIENT
Start: 2023-07-16 | End: 2023-07-16

## 2023-07-16 RX ADMIN — Medication 400 MILLIGRAM(S): at 20:54

## 2023-07-16 RX ADMIN — SODIUM CHLORIDE 1000 MILLILITER(S): 9 INJECTION INTRAMUSCULAR; INTRAVENOUS; SUBCUTANEOUS at 20:55

## 2023-07-16 RX ADMIN — CEFTRIAXONE 100 MILLIGRAM(S): 500 INJECTION, POWDER, FOR SOLUTION INTRAMUSCULAR; INTRAVENOUS at 21:45

## 2023-07-16 NOTE — ED ADULT NURSE NOTE - NSFALLUNIVINTERV_ED_ALL_ED
Bed/Stretcher in lowest position, wheels locked, appropriate side rails in place/Call bell, personal items and telephone in reach/Instruct patient to call for assistance before getting out of bed/chair/stretcher/Non-slip footwear applied when patient is off stretcher/[place] to call system/Physically safe environment - no spills, clutter or unnecessary equipment/Purposeful proactive rounding/Room/bathroom lighting operational, light cord in reach

## 2023-07-16 NOTE — ED PROVIDER NOTE - CLINICAL SUMMARY MEDICAL DECISION MAKING FREE TEXT BOX
Patient presents emergency department complaining of dysuria, abdominal pain, fever, chills.  Patient is febrile on presentation.  Otherwise hemodynamically stable.  Will obtain sepsis work-up to evaluate for any acute pathologies.  Patient likely presenting with infectious etiology.  We will speak to transplant service for recommendations for patient.

## 2023-07-16 NOTE — ED PROVIDER NOTE - OBJECTIVE STATEMENT
Patient is a 53-year-old female with a past medical history of renal transplant in 2020 who presents emergency department complaining of 1 day of fever, chills, nausea, vomiting.  Patient also states that she has dysuria associated with suprapubic tenderness.  Patient denies any blood in her urine or vomit.  Patient denies taking temperature at home however she is febrile to 102.7 here.  Patient is immunosuppressed at this time.  Patient denies any chest pain, shortness of breath, syncope, blurry vision, loss of vision, headaches.

## 2023-07-16 NOTE — ED PROVIDER NOTE - PHYSICAL EXAMINATION
Physical Exam:  Gen: NAD, AOx3, toxic appearing, able to ambulate without assistance  Head: NCAT  HEENT: EOMI, PEERLA, normal conjunctiva, tongue midline, oral mucosa moist  Lung: CTAB, no respiratory distress, no wheezes/rhonchi/rales B/L, speaking in full sentences  CV: RRR,   Abd: suprapubic abd pain, no guarding, no rigidity, no rebound tenderness,  CVA tenderness   MSK: no visible deformities, ROM normal in UE/LE, no back pain  Neuro: No focal sensory or motor deficits  Skin: Warm, well perfused, no rash, no leg swelling  Psych: normal affect, calm

## 2023-07-16 NOTE — ED ADULT NURSE NOTE - OBJECTIVE STATEMENT
pt is a 54yo female PMH kidney transplant 2020 presenting to the ED complaining of vomiting. pt states she has been experiencing nausea, vomiting, burning urination, urinary frequency, diffuse abdominal pain, b/l flank/lower back pain since saturday, pain unrelieved by OTC tylenol at home. pt A&Ox3 gross neuro intact, lungs cta bilaterally, no difficulty speaking in complete sentences, s1s2 heart sounds heard, pulses x 4, castaneda x4, abdomen soft, diffuse tenderness to palpation, abdomen nondistended, skin intact. pt denies chest pain, sob, ha, hematuria.

## 2023-07-16 NOTE — ED PROVIDER NOTE - ATTENDING CONTRIBUTION TO CARE
Pt last admitted Dr Linda swift one wk ago  53y F pmh ESRD Sp DDRT, Hep C, SVC Syndrome, HTN, last admitted 2 w ago  for BRBPR now returns for c/o Pt last admitted Dr Linda swift one wk ago Private Physician Aleksandra Maria.   53y F pmh ESRD Sp DDRT, Hep C, SVC Syndrome, HTN, last admitted 2 w ago  for BRBPR now returns for c/o nausea and vomiting since yesterday w fever w burning on urination, Associated w slight cough, scant sputum, Pt last admitted Dr Linda swift one wk ago Private Physician Aleksandra Maria.   53y F pmh ESRD Sp DDRT, Hep C, SVC Syndrome, HTN, last admitted 2 w ago  for BRBPR now returns for c/o nausea and vomiting since yesterday w fever w burning on urination, Associated w slight cough, scant sputum, +chills/rigors. PE WDWN female awake alert looking acutely ill heent normocephalic atraumatic neck supple chest clear anterior & posterior cv no rubs, gallops or murmurs abd soft +bs no mass guarding neruo no focal defects  Papo Hassan MD, Facep

## 2023-07-16 NOTE — ED ADULT NURSE NOTE - ED STAT RN HANDOFF DETAILS
Report endorsed to oncmerly ashford RN. Safety checks completed this shift. Safety rounds completed hourly.  IV sites checked Q2+remains WDL. Medications administered as ordered with no signs/symptoms of adverse reactions. Fall & skin precautions in place. Any issues endorsed to oncoming RN for follow up.

## 2023-07-16 NOTE — ED PROVIDER NOTE - PROGRESS NOTE DETAILS
Nani- Attending spoke with Dr. Link and to be admitted - requesting CT non con and antibiotic coverage - transplant has been paged. Nani- spoke with transplant who will follow patient- patient to be admitted to medicine Endorsed to Dr HE Hassan MD, Facep

## 2023-07-16 NOTE — ED ADULT NURSE NOTE - CAS EDP DISCH DISPOSITION ADMI
Discharge planning issues Discharge planning issues Discharge planning issues Discharge planning issues Discharge planning issues Discharge planning issues Discharge planning issues Discharge planning issues Discharge planning issues Discharge planning issues Discharge planning issues Discharge planning issues Discharge planning issues Discharge planning issues Discharge planning issues Discharge planning issues Discharge planning issues Discharge planning issues Discharge planning issues Discharge planning issues [facility]

## 2023-07-17 ENCOUNTER — APPOINTMENT (OUTPATIENT)
Dept: NEPHROLOGY | Facility: CLINIC | Age: 53
End: 2023-07-17

## 2023-07-17 DIAGNOSIS — Z94.0 KIDNEY TRANSPLANT STATUS: ICD-10-CM

## 2023-07-17 DIAGNOSIS — N39.0 URINARY TRACT INFECTION, SITE NOT SPECIFIED: ICD-10-CM

## 2023-07-17 DIAGNOSIS — Z29.9 ENCOUNTER FOR PROPHYLACTIC MEASURES, UNSPECIFIED: ICD-10-CM

## 2023-07-17 DIAGNOSIS — E78.5 HYPERLIPIDEMIA, UNSPECIFIED: ICD-10-CM

## 2023-07-17 DIAGNOSIS — N12 TUBULO-INTERSTITIAL NEPHRITIS, NOT SPECIFIED AS ACUTE OR CHRONIC: ICD-10-CM

## 2023-07-17 DIAGNOSIS — I87.1 COMPRESSION OF VEIN: ICD-10-CM

## 2023-07-17 LAB
ALBUMIN SERPL ELPH-MCNC: 3.6 G/DL — SIGNIFICANT CHANGE UP (ref 3.3–5)
ALP SERPL-CCNC: 50 U/L — SIGNIFICANT CHANGE UP (ref 40–120)
ALT FLD-CCNC: 10 U/L — SIGNIFICANT CHANGE UP (ref 10–45)
ANION GAP SERPL CALC-SCNC: 15 MMOL/L — SIGNIFICANT CHANGE UP (ref 5–17)
AST SERPL-CCNC: 22 U/L — SIGNIFICANT CHANGE UP (ref 10–40)
BILIRUB SERPL-MCNC: 1.6 MG/DL — HIGH (ref 0.2–1.2)
BUN SERPL-MCNC: 13 MG/DL — SIGNIFICANT CHANGE UP (ref 7–23)
CALCIUM SERPL-MCNC: 9.8 MG/DL — SIGNIFICANT CHANGE UP (ref 8.4–10.5)
CHLORIDE SERPL-SCNC: 103 MMOL/L — SIGNIFICANT CHANGE UP (ref 96–108)
CO2 SERPL-SCNC: 18 MMOL/L — LOW (ref 22–31)
CREAT ?TM UR-MCNC: 219 MG/DL — SIGNIFICANT CHANGE UP
CREAT SERPL-MCNC: 1.42 MG/DL — HIGH (ref 0.5–1.3)
E COLI DNA BLD POS QL NAA+NON-PROBE: SIGNIFICANT CHANGE UP
EGFR: 45 ML/MIN/1.73M2 — LOW
GLUCOSE SERPL-MCNC: 92 MG/DL — SIGNIFICANT CHANGE UP (ref 70–99)
GRAM STN FLD: SIGNIFICANT CHANGE UP
GRAM STN FLD: SIGNIFICANT CHANGE UP
HCT VFR BLD CALC: 36.2 % — SIGNIFICANT CHANGE UP (ref 34.5–45)
HGB BLD-MCNC: 10.9 G/DL — LOW (ref 11.5–15.5)
MCHC RBC-ENTMCNC: 25.1 PG — LOW (ref 27–34)
MCHC RBC-ENTMCNC: 30.1 GM/DL — LOW (ref 32–36)
MCV RBC AUTO: 83.4 FL — SIGNIFICANT CHANGE UP (ref 80–100)
METHOD TYPE: SIGNIFICANT CHANGE UP
NRBC # BLD: 0 /100 WBCS — SIGNIFICANT CHANGE UP (ref 0–0)
PLATELET # BLD AUTO: 201 K/UL — SIGNIFICANT CHANGE UP (ref 150–400)
POTASSIUM SERPL-MCNC: 3.7 MMOL/L — SIGNIFICANT CHANGE UP (ref 3.5–5.3)
POTASSIUM SERPL-SCNC: 3.7 MMOL/L — SIGNIFICANT CHANGE UP (ref 3.5–5.3)
PROT SERPL-MCNC: 6 G/DL — SIGNIFICANT CHANGE UP (ref 6–8.3)
RBC # BLD: 4.34 M/UL — SIGNIFICANT CHANGE UP (ref 3.8–5.2)
RBC # FLD: 17.2 % — HIGH (ref 10.3–14.5)
SODIUM SERPL-SCNC: 136 MMOL/L — SIGNIFICANT CHANGE UP (ref 135–145)
SODIUM UR-SCNC: 15 MMOL/L — SIGNIFICANT CHANGE UP
SPECIMEN SOURCE: SIGNIFICANT CHANGE UP
SPECIMEN SOURCE: SIGNIFICANT CHANGE UP
TACROLIMUS SERPL-MCNC: 2.7 NG/ML — SIGNIFICANT CHANGE UP
WBC # BLD: 27.89 K/UL — HIGH (ref 3.8–10.5)
WBC # FLD AUTO: 27.89 K/UL — HIGH (ref 3.8–10.5)

## 2023-07-17 PROCEDURE — 99223 1ST HOSP IP/OBS HIGH 75: CPT

## 2023-07-17 RX ORDER — TACROLIMUS 5 MG/1
11 CAPSULE ORAL DAILY
Refills: 0 | Status: DISCONTINUED | OUTPATIENT
Start: 2023-07-17 | End: 2023-07-21

## 2023-07-17 RX ORDER — ONDANSETRON 8 MG/1
4 TABLET, FILM COATED ORAL ONCE
Refills: 0 | Status: COMPLETED | OUTPATIENT
Start: 2023-07-17 | End: 2023-07-17

## 2023-07-17 RX ORDER — SODIUM CHLORIDE 9 MG/ML
1000 INJECTION INTRAMUSCULAR; INTRAVENOUS; SUBCUTANEOUS
Refills: 0 | Status: DISCONTINUED | OUTPATIENT
Start: 2023-07-17 | End: 2023-07-17

## 2023-07-17 RX ORDER — MYCOPHENOLATE MOFETIL 250 MG/1
500 CAPSULE ORAL
Refills: 0 | Status: DISCONTINUED | OUTPATIENT
Start: 2023-07-17 | End: 2023-07-21

## 2023-07-17 RX ORDER — CEFTRIAXONE 500 MG/1
1000 INJECTION, POWDER, FOR SOLUTION INTRAMUSCULAR; INTRAVENOUS EVERY 24 HOURS
Refills: 0 | Status: DISCONTINUED | OUTPATIENT
Start: 2023-07-17 | End: 2023-07-21

## 2023-07-17 RX ORDER — CLOPIDOGREL BISULFATE 75 MG/1
75 TABLET, FILM COATED ORAL DAILY
Refills: 0 | Status: DISCONTINUED | OUTPATIENT
Start: 2023-07-17 | End: 2023-07-21

## 2023-07-17 RX ORDER — ENOXAPARIN SODIUM 100 MG/ML
40 INJECTION SUBCUTANEOUS EVERY 24 HOURS
Refills: 0 | Status: DISCONTINUED | OUTPATIENT
Start: 2023-07-17 | End: 2023-07-17

## 2023-07-17 RX ORDER — ONDANSETRON 8 MG/1
4 TABLET, FILM COATED ORAL ONCE
Refills: 0 | Status: COMPLETED | OUTPATIENT
Start: 2023-07-17 | End: 2023-07-18

## 2023-07-17 RX ORDER — ATORVASTATIN CALCIUM 80 MG/1
20 TABLET, FILM COATED ORAL AT BEDTIME
Refills: 0 | Status: DISCONTINUED | OUTPATIENT
Start: 2023-07-17 | End: 2023-07-21

## 2023-07-17 RX ORDER — POTASSIUM CHLORIDE 20 MEQ
40 PACKET (EA) ORAL ONCE
Refills: 0 | Status: COMPLETED | OUTPATIENT
Start: 2023-07-17 | End: 2023-07-17

## 2023-07-17 RX ORDER — ACETAMINOPHEN 500 MG
325 TABLET ORAL ONCE
Refills: 0 | Status: COMPLETED | OUTPATIENT
Start: 2023-07-17 | End: 2023-07-18

## 2023-07-17 RX ORDER — PANTOPRAZOLE SODIUM 20 MG/1
40 TABLET, DELAYED RELEASE ORAL
Refills: 0 | Status: DISCONTINUED | OUTPATIENT
Start: 2023-07-17 | End: 2023-07-21

## 2023-07-17 RX ORDER — SODIUM CHLORIDE 9 MG/ML
1000 INJECTION INTRAMUSCULAR; INTRAVENOUS; SUBCUTANEOUS ONCE
Refills: 0 | Status: COMPLETED | OUTPATIENT
Start: 2023-07-17 | End: 2023-07-17

## 2023-07-17 RX ORDER — SODIUM CHLORIDE 9 MG/ML
1000 INJECTION, SOLUTION INTRAVENOUS
Refills: 0 | Status: COMPLETED | OUTPATIENT
Start: 2023-07-17 | End: 2023-07-18

## 2023-07-17 RX ORDER — ACETAMINOPHEN 500 MG
650 TABLET ORAL ONCE
Refills: 0 | Status: COMPLETED | OUTPATIENT
Start: 2023-07-17 | End: 2023-07-17

## 2023-07-17 RX ORDER — ASPIRIN/CALCIUM CARB/MAGNESIUM 324 MG
81 TABLET ORAL DAILY
Refills: 0 | Status: DISCONTINUED | OUTPATIENT
Start: 2023-07-17 | End: 2023-07-21

## 2023-07-17 RX ORDER — CINACALCET 30 MG/1
30 TABLET, FILM COATED ORAL
Refills: 0 | Status: DISCONTINUED | OUTPATIENT
Start: 2023-07-17 | End: 2023-07-21

## 2023-07-17 RX ORDER — ACETAMINOPHEN 500 MG
1000 TABLET ORAL ONCE
Refills: 0 | Status: COMPLETED | OUTPATIENT
Start: 2023-07-17 | End: 2023-07-17

## 2023-07-17 RX ORDER — SODIUM CHLORIDE 0.65 %
1 AEROSOL, SPRAY (ML) NASAL EVERY 6 HOURS
Refills: 0 | Status: DISCONTINUED | OUTPATIENT
Start: 2023-07-17 | End: 2023-07-21

## 2023-07-17 RX ADMIN — Medication 1000 MILLIGRAM(S): at 11:08

## 2023-07-17 RX ADMIN — Medication 650 MILLIGRAM(S): at 22:24

## 2023-07-17 RX ADMIN — CEFTRIAXONE 100 MILLIGRAM(S): 500 INJECTION, POWDER, FOR SOLUTION INTRAMUSCULAR; INTRAVENOUS at 21:59

## 2023-07-17 RX ADMIN — Medication 40 MILLIEQUIVALENT(S): at 05:34

## 2023-07-17 RX ADMIN — MYCOPHENOLATE MOFETIL 500 MILLIGRAM(S): 250 CAPSULE ORAL at 05:35

## 2023-07-17 RX ADMIN — SODIUM CHLORIDE 1000 MILLILITER(S): 9 INJECTION INTRAMUSCULAR; INTRAVENOUS; SUBCUTANEOUS at 10:07

## 2023-07-17 RX ADMIN — SODIUM CHLORIDE 100 MILLILITER(S): 9 INJECTION INTRAMUSCULAR; INTRAVENOUS; SUBCUTANEOUS at 11:46

## 2023-07-17 RX ADMIN — Medication 650 MILLIGRAM(S): at 21:59

## 2023-07-17 RX ADMIN — Medication 81 MILLIGRAM(S): at 11:33

## 2023-07-17 RX ADMIN — MYCOPHENOLATE MOFETIL 500 MILLIGRAM(S): 250 CAPSULE ORAL at 18:38

## 2023-07-17 RX ADMIN — CLOPIDOGREL BISULFATE 75 MILLIGRAM(S): 75 TABLET, FILM COATED ORAL at 11:34

## 2023-07-17 RX ADMIN — CINACALCET 30 MILLIGRAM(S): 30 TABLET, FILM COATED ORAL at 18:38

## 2023-07-17 RX ADMIN — ONDANSETRON 4 MILLIGRAM(S): 8 TABLET, FILM COATED ORAL at 02:00

## 2023-07-17 RX ADMIN — PANTOPRAZOLE SODIUM 40 MILLIGRAM(S): 20 TABLET, DELAYED RELEASE ORAL at 08:25

## 2023-07-17 RX ADMIN — ATORVASTATIN CALCIUM 20 MILLIGRAM(S): 80 TABLET, FILM COATED ORAL at 21:59

## 2023-07-17 RX ADMIN — Medication 5 MILLIGRAM(S): at 05:35

## 2023-07-17 RX ADMIN — CINACALCET 30 MILLIGRAM(S): 30 TABLET, FILM COATED ORAL at 07:33

## 2023-07-17 RX ADMIN — Medication 400 MILLIGRAM(S): at 10:08

## 2023-07-17 RX ADMIN — SODIUM CHLORIDE 75 MILLILITER(S): 9 INJECTION, SOLUTION INTRAVENOUS at 18:38

## 2023-07-17 RX ADMIN — TACROLIMUS 11 MILLIGRAM(S): 5 CAPSULE ORAL at 07:34

## 2023-07-17 NOTE — H&P ADULT - PROBLEM SELECTOR PLAN 1
with sepsis (leukocytosis, fever and pyelonephritis),  - s/p Ceftriaxone x1 and IVF 1L in ED  - will treat with Meropenem IV for now as culture pending  - follow up Urine culture and blood culture  - will check BK viruss with history of BK viremia with sepsis (leukocytosis, fever and pyelonephritis),  - s/p Ceftriaxone x1 and IVF 1L in ED  - will continue with Ceftriaxone IV for now as culture pending (PCN allergy)  - follow up Urine culture and blood culture  - will check BK viruss with history of BK viremia

## 2023-07-17 NOTE — H&P ADULT - NSHPADDITIONALINFOADULT_GEN_ALL_CORE
discussed with Dr. Zamora who requested for initial evaluation/H&P and will assume care in the morning of 7/17/2023.

## 2023-07-17 NOTE — H&P ADULT - ASSESSMENT
54 yo F with a ESRD s/p HepC DDRT in 2020, chronic SVC syndrome s/p L cephalic-iliac vein bypass on DAPT, s/p thrombectomy of L iliac vein and graft 2012, Hx BK viremia, HTN presenting to ED with 1 day of fever, chill, nausea and vomiting 54 yo F with a ESRD s/p HepC DDRT in 2020, chronic SVC syndrome s/p L cephalic-iliac vein bypass on DAPT, s/p thrombectomy of L iliac vein and graft 2012, Hx BK viremia, HTN presenting to ED with 1 day of fever, chill, nausea and vomiting admitted sepsis due to pyelonephritis

## 2023-07-17 NOTE — H&P ADULT - ADDITIONAL PE
T(F): 98.7 (17 Jul 2023 00:06), Max: 103.2 (16 Jul 2023 21:09)  HR: 63 (17 Jul 2023 00:06) (63 - 104)  BP: 128/78 (17 Jul 2023 00:06) (114/68 - 144/90)  RR: 22 (17 Jul 2023 00:06) (20 - 22)  SpO2: 95% (17 Jul 2023 00:06) (95% - 99%) room air

## 2023-07-17 NOTE — CONSULT NOTE ADULT - ASSESSMENT
54 yo F with a ESRD s/p HepC DDRT in 2020, chronic SVC syndrome s/p L cephalic-iliac vein bypass on DAPT, s/p thrombectomy of L iliac vein and graft 2012, Hx BK viremia, HTN presenting to ED with 1 day of fever 103', chill, nausea and vomiting associated with dysuria, low abd pain with urination and right flank pain.   She denies recent travel, sick contact, or recent procedure. Nephrology consulted for s/p DDRT.    A/P    s/p DDRT @ [facility] 08/14/2020  Outpatient nephrologist is Dr. Lake / Dr Streeter   Continue home dose Immunosuppression meds per Transplant recc  sCr worsening today possibly sec to dehydration vs hypotensionm  Check Urine Na, Urine Cr  Check Bladder scan  Start 1/2 NS + 75 mEq Sodium Bicarb @ 75 cc. hr x 24 hrs   CT A/P no contrast 6/16 --> Partially distended bladder    mg BID, Prednisone 5 mg qd, Envarsus 11 mg   FK level 7/17 --> 2.7, goal is 3-7   Check repeat Tacrolimus level this evening and tomorrow morning   please Check Tacrolimus 30 min prior to morning dose   Monitor BMP, u/o    HTN:   BP marginal   Monitor    Acidosis  Non AG  Sec to GI loss   IV bicarb as above   Monitor     Proteinuria/ Hematuria  In setting of UTI   Repeat UA after Abx  54 yo F with a ESRD s/p HepC DDRT in 2020, chronic SVC syndrome s/p L cephalic-iliac vein bypass on DAPT, s/p thrombectomy of L iliac vein and graft 2012, Hx BK viremia, HTN presenting to ED with 1 day of fever 103', chill, nausea and vomiting associated with dysuria, low abd pain with urination and right flank pain.   She denies recent travel, sick contact, or recent procedure. Nephrology consulted for s/p DDRT.    A/P    s/p DDRT @ [facility] 08/14/2020  Outpatient nephrologist is Dr. Lake / Dr Streeter   Continue home dose Immunosuppression meds per Transplant recc  sCr worsening today possibly sec to dehydration vs hypotensionm  Check Urine Na, Urine Cr  Bladder scan 7/17 ~ 142 cc   Start 1/2 NS + 75 mEq Sodium Bicarb @ 75 cc. hr x 24 hrs   CT A/P no contrast 6/16 --> Partially distended bladder    mg BID, Prednisone 5 mg qd, Envarsus 11 mg   FK level 7/17 --> 2.7, goal is 3-7   Check repeat Tacrolimus level this evening and tomorrow morning   please Check Tacrolimus 30 min prior to morning dose   Monitor BMP, u/o    HTN:   BP marginal   Monitor    Acidosis  Non AG  Sec to GI loss   IV bicarb as above   Monitor     Proteinuria/ Hematuria  In setting of UTI   Repeat UA after Abx  52 yo F with a ESRD s/p HepC DDRT in 2020, chronic SVC syndrome s/p L cephalic-iliac vein bypass on DAPT, s/p thrombectomy of L iliac vein and graft 2012, Hx BK viremia, HTN presenting to ED with 1 day of fever 103', chill, nausea and vomiting associated with dysuria, low abd pain with urination and right flank pain.   She denies recent travel, sick contact, or recent procedure. Nephrology consulted for s/p DDRT.    A/P    s/p DDRT @ [facility] 08/14/2020  Outpatient nephrologist is Dr. Lake / Dr Streeter   Continue home dose Immunosuppression meds   sCr worsening today possibly sec to dehydration vs hypotension  Check Urine Na, Urine Cr  Bladder scan 7/17 ~ 142 cc   Start 1/2 NS + 75 mEq Sodium Bicarb @ 75 cc. hr x 24 hrs   CT A/P no contrast 6/16 --> Partially distended bladder    mg BID, Prednisone 5 mg qd, Envarsus 11 mg   FK level 7/17 --> 2.7, goal is 3-7   Check repeat Tacrolimus level this evening and tomorrow morning   please Check Tacrolimus 30 min prior to morning dose   Monitor BMP, u/o    HTN:   BP marginal   Monitor    Acidosis  Non AG  Sec to GI loss   IV bicarb as above   Monitor     Proteinuria/ Hematuria  In setting of UTI   Repeat UA after Abx

## 2023-07-17 NOTE — CONSULT NOTE ADULT - SUBJECTIVE AND OBJECTIVE BOX
[facility]   MD MEL DOBBS MD BRIANA PETITO, NP    TEL:  FROM 9 AM to 5 PM ---OFFICE: [phone]  FROM 5 PM - 9 AM PLEASE CALL ANSWERING SERVICE: [phone]     RENAL INITIAL CONSULT NOTE: DATE OF SERVICE 07-17-23 @ 17:07    HPI:  52 yo F with a ESRD s/p HepC DDRT in 2020, chronic SVC syndrome s/p L cephalic-iliac vein bypass on DAPT, s/p thrombectomy of L iliac vein and graft 2012, Hx BK viremia, HTN presenting to ED with 1 day of fever 103', chill, nausea and vomiting associated with dysuria, low abd pain with urination and right flank pain.   She denies recent travel, sick contact, or recent procedure. Nephrology consulted for s/p DDRT.     Allergies:  shellfish (Urticaria)  contrast media (iodine-based) (Urticaria)  penicillin (Anaphylaxis)  ibuprofen/morphine (Unknown)  latex (Swelling)  lactose (Hives)  morphine (Urticaria)  ibuprofen (Hives)  morphine (Anaphylaxis)      PAST MEDICAL & SURGICAL HISTORY:  HTN - Hypertension      Clotted Renal Dialysis AV Graft      Infection of AV Graft for Dialysis      Fibroid Tumor      CKD (chronic kidney disease) stage V requiring chronic dialysis      Chronic kidney disease, unspecified      History of Hysterectomy  for benign disease      AV fistula  B/L - failed      H/O extremity bypass graft  H/O RUE bypass and creation of right brachial graft      Kidney transplant recipient      H/O kidney transplant        Home Medications Reviewed    Hospital Medications:   MEDICATIONS  (STANDING):  aspirin enteric coated 81 milliGRAM(s) Oral daily  atorvastatin 20 milliGRAM(s) Oral at bedtime  cefTRIAXone   IVPB 1000 milliGRAM(s) IV Intermittent every 24 hours  cinacalcet 30 milliGRAM(s) Oral two times a day  clopidogrel Tablet 75 milliGRAM(s) Oral daily  mycophenolate mofetil 500 milliGRAM(s) Oral two times a day  pantoprazole    Tablet 40 milliGRAM(s) Oral before breakfast  predniSONE   Tablet 5 milliGRAM(s) Oral daily  sodium chloride 0.9%. 1000 milliLiter(s) (100 mL/Hr) IV Continuous <Continuous>  tacrolimus ER Tablet (ENVARSUS XR) 11 milliGRAM(s) Oral daily      SOCIAL HISTORY:  Denies ETOh, Smoking,     FAMILY HISTORY:  FH: HTN (hypertension) (Father, Mother)        REVIEW OF SYSTEMS:  CONSTITUTIONAL: No weakness, fevers or chills  EYES/ENT: No visual changes;  No vertigo or throat pain   NECK: No pain or stiffness  RESPIRATORY: No cough, wheezing, hemoptysis; No shortness of breath  CARDIOVASCULAR: No chest pain or palpitations.  GASTROINTESTINAL: No abdominal or epigastric pain. No nausea, vomiting, or hematemesis; No diarrhea or constipation. No melena or hematochezia.  GENITOURINARY: No dysuria, frequency, foamy urine, urinary urgency, incontinence or hematuria  NEUROLOGICAL: No numbness or weakness  SKIN: No itching, burning, rashes, or lesions   VASCULAR: No bilateral lower extremity edema.   All other review of systems is negative unless indicated above.    VITALS:  T(F): 97.7 (07-17-23 @ 16:20), Max: 103.2 (07-16-23 @ 21:09)  HR: 76 (07-17-23 @ 16:20)  BP: 94/60 (07-17-23 @ 16:20)  RR: 19 (07-17-23 @ 16:20)  SpO2: 100% (07-17-23 @ 16:20)  Wt(kg): --    Height (cm): 160 (07-16 @ 19:44)  Weight (kg): 65.8 (07-16 @ 19:44)  BMI (kg/m2): 25.7 (07-16 @ 19:44)  BSA (m2): 1.69 (07-16 @ 19:44)    PHYSICAL EXAM:  Constitutional: NAD  HEENT: anicteric sclera, oropharynx clear, MMM  Neck: No JVD  Respiratory: CTAB, no wheezes, rales or rhonchi  Cardiovascular: S1, S2, RRR  Gastrointestinal: BS+, soft, NT/ND  Extremities: No cyanosis or clubbing. No peripheral edema  Neurological: A/O x 3, no focal deficits  Psychiatric: Normal mood, normal affect  : No CVA tenderness. No ndiaye.   Skin: No rashes  Vascular Access:    LABS:  07-17    136  |  103  |  13  ----------------------------<  92  3.7   |  18<L>  |  1.42<H>    Ca    9.8      17 Jul 2023 07:27    TPro  6.0  /  Alb  3.6  /  TBili  1.6<H>  /  DBili      /  AST  22  /  ALT  10  /  AlkPhos  50  07-17    Creatinine Trend: 1.42 <--, 1.24 <--                        10.9   27.89 )-----------( 201      ( 17 Jul 2023 07:27 )             36.2     Urine Studies:  Urinalysis Basic - ( 17 Jul 2023 07:27 )    Color:  / Appearance:  / SG:  / pH:   Gluc: 92 mg/dL / Ketone:   / Bili:  / Urobili:    Blood:  / Protein:  / Nitrite:    Leuk Esterase:  / RBC:  / WBC    Sq Epi:  / Non Sq Epi:  / Bacteria:           RADIOLOGY & ADDITIONAL STUDIES:

## 2023-07-17 NOTE — H&P ADULT - HISTORY OF PRESENT ILLNESS
52 yo F with a ESRD s/p HepC DDRT in 2020, chronic SVC syndrome s/p L cephalic-iliac vein bypass on DAPT, s/p thrombectomy of L iliac vein and graft 2012, Hx BK viremia, HTN presenting to ED with 1 day of fever, chill, nausea and vomiting 54 yo F with a ESRD s/p HepC DDRT in 2020, chronic SVC syndrome s/p L cephalic-iliac vein bypass on DAPT, s/p thrombectomy of L iliac vein and graft 2012, Hx BK viremia, HTN presenting to ED with 1 day of fever 103', chill, nausea and vomiting associated with dysuria, low abd pain with urination and right flank pain.   She denies recent travel, sick contact, or recent procedure.

## 2023-07-17 NOTE — H&P ADULT - TIME BILLING
reviewing prior documentation, reviewing available recent outpatient records, independently obtaining a history and interpreting results of tests, performing a physical examination, reviewing tests/imaging, discussing the plan with the patient, counseling and educating the patient/family member(s), ordering medications/tests, documenting clinical information in the electronic health record, and coordinating care.

## 2023-07-17 NOTE — H&P ADULT - PROBLEM SELECTOR PLAN 2
- will continue immunosuppression with Prednisone ,cellcept and Tacrolimus   - will check Tacrolimus level  - Renal transplant eval to call

## 2023-07-18 LAB
ANION GAP SERPL CALC-SCNC: 12 MMOL/L — SIGNIFICANT CHANGE UP (ref 5–17)
ANISOCYTOSIS BLD QL: SLIGHT — SIGNIFICANT CHANGE UP
BASOPHILS # BLD AUTO: 0 K/UL — SIGNIFICANT CHANGE UP (ref 0–0.2)
BASOPHILS NFR BLD AUTO: 0 % — SIGNIFICANT CHANGE UP (ref 0–2)
BKV DNA SPEC QL NAA+PROBE: SIGNIFICANT CHANGE UP
BUN SERPL-MCNC: 18 MG/DL — SIGNIFICANT CHANGE UP (ref 7–23)
CALCIUM SERPL-MCNC: 9 MG/DL — SIGNIFICANT CHANGE UP (ref 8.4–10.5)
CHLORIDE SERPL-SCNC: 102 MMOL/L — SIGNIFICANT CHANGE UP (ref 96–108)
CO2 SERPL-SCNC: 20 MMOL/L — LOW (ref 22–31)
CREAT SERPL-MCNC: 1.51 MG/DL — HIGH (ref 0.5–1.3)
DACRYOCYTES BLD QL SMEAR: SLIGHT — SIGNIFICANT CHANGE UP
EGFR: 42 ML/MIN/1.73M2 — LOW
ELLIPTOCYTES BLD QL SMEAR: SLIGHT — SIGNIFICANT CHANGE UP
EOSINOPHIL # BLD AUTO: 0 K/UL — SIGNIFICANT CHANGE UP (ref 0–0.5)
EOSINOPHIL NFR BLD AUTO: 0 % — SIGNIFICANT CHANGE UP (ref 0–6)
GLUCOSE SERPL-MCNC: 156 MG/DL — HIGH (ref 70–99)
HCT VFR BLD CALC: 34.5 % — SIGNIFICANT CHANGE UP (ref 34.5–45)
HGB BLD-MCNC: 10.4 G/DL — LOW (ref 11.5–15.5)
LYMPHOCYTES # BLD AUTO: 0.39 K/UL — LOW (ref 1–3.3)
LYMPHOCYTES # BLD AUTO: 2.6 % — LOW (ref 13–44)
MACROCYTES BLD QL: SLIGHT — SIGNIFICANT CHANGE UP
MANUAL SMEAR VERIFICATION: SIGNIFICANT CHANGE UP
MCHC RBC-ENTMCNC: 24.9 PG — LOW (ref 27–34)
MCHC RBC-ENTMCNC: 30.1 GM/DL — LOW (ref 32–36)
MCV RBC AUTO: 82.7 FL — SIGNIFICANT CHANGE UP (ref 80–100)
MONOCYTES # BLD AUTO: 0.52 K/UL — SIGNIFICANT CHANGE UP (ref 0–0.9)
MONOCYTES NFR BLD AUTO: 3.5 % — SIGNIFICANT CHANGE UP (ref 2–14)
MRSA PCR RESULT.: SIGNIFICANT CHANGE UP
NEUTROPHILS # BLD AUTO: 14.05 K/UL — HIGH (ref 1.8–7.4)
NEUTROPHILS NFR BLD AUTO: 93.9 % — HIGH (ref 43–77)
OVALOCYTES BLD QL SMEAR: SLIGHT — SIGNIFICANT CHANGE UP
PLAT MORPH BLD: ABNORMAL
PLATELET # BLD AUTO: 185 K/UL — SIGNIFICANT CHANGE UP (ref 150–400)
POIKILOCYTOSIS BLD QL AUTO: SLIGHT — SIGNIFICANT CHANGE UP
POLYCHROMASIA BLD QL SMEAR: SLIGHT — SIGNIFICANT CHANGE UP
POTASSIUM SERPL-MCNC: 3.8 MMOL/L — SIGNIFICANT CHANGE UP (ref 3.5–5.3)
POTASSIUM SERPL-SCNC: 3.8 MMOL/L — SIGNIFICANT CHANGE UP (ref 3.5–5.3)
RBC # BLD: 4.17 M/UL — SIGNIFICANT CHANGE UP (ref 3.8–5.2)
RBC # FLD: 17.2 % — HIGH (ref 10.3–14.5)
RBC BLD AUTO: ABNORMAL
S AUREUS DNA NOSE QL NAA+PROBE: SIGNIFICANT CHANGE UP
SODIUM SERPL-SCNC: 134 MMOL/L — LOW (ref 135–145)
TACROLIMUS SERPL-MCNC: 18.5 NG/ML — SIGNIFICANT CHANGE UP
TACROLIMUS SERPL-MCNC: 7.2 NG/ML — SIGNIFICANT CHANGE UP
WBC # BLD: 14.96 K/UL — HIGH (ref 3.8–10.5)
WBC # FLD AUTO: 14.96 K/UL — HIGH (ref 3.8–10.5)

## 2023-07-18 PROCEDURE — 99223 1ST HOSP IP/OBS HIGH 75: CPT | Mod: FS

## 2023-07-18 RX ORDER — ONDANSETRON 8 MG/1
4 TABLET, FILM COATED ORAL ONCE
Refills: 0 | Status: COMPLETED | OUTPATIENT
Start: 2023-07-18 | End: 2023-07-18

## 2023-07-18 RX ORDER — ACETAMINOPHEN 500 MG
650 TABLET ORAL EVERY 6 HOURS
Refills: 0 | Status: DISCONTINUED | OUTPATIENT
Start: 2023-07-18 | End: 2023-07-21

## 2023-07-18 RX ORDER — METOCLOPRAMIDE HCL 10 MG
10 TABLET ORAL ONCE
Refills: 0 | Status: COMPLETED | OUTPATIENT
Start: 2023-07-18 | End: 2023-07-18

## 2023-07-18 RX ORDER — SODIUM CHLORIDE 9 MG/ML
1000 INJECTION, SOLUTION INTRAVENOUS
Refills: 0 | Status: DISCONTINUED | OUTPATIENT
Start: 2023-07-18 | End: 2023-07-21

## 2023-07-18 RX ORDER — CHLORHEXIDINE GLUCONATE 213 G/1000ML
1 SOLUTION TOPICAL DAILY
Refills: 0 | Status: DISCONTINUED | OUTPATIENT
Start: 2023-07-18 | End: 2023-07-21

## 2023-07-18 RX ADMIN — Medication 650 MILLIGRAM(S): at 13:10

## 2023-07-18 RX ADMIN — TACROLIMUS 11 MILLIGRAM(S): 5 CAPSULE ORAL at 05:16

## 2023-07-18 RX ADMIN — SODIUM CHLORIDE 75 MILLILITER(S): 9 INJECTION, SOLUTION INTRAVENOUS at 17:16

## 2023-07-18 RX ADMIN — SODIUM CHLORIDE 75 MILLILITER(S): 9 INJECTION, SOLUTION INTRAVENOUS at 11:58

## 2023-07-18 RX ADMIN — MYCOPHENOLATE MOFETIL 500 MILLIGRAM(S): 250 CAPSULE ORAL at 05:16

## 2023-07-18 RX ADMIN — CLOPIDOGREL BISULFATE 75 MILLIGRAM(S): 75 TABLET, FILM COATED ORAL at 11:57

## 2023-07-18 RX ADMIN — Medication 650 MILLIGRAM(S): at 18:14

## 2023-07-18 RX ADMIN — Medication 81 MILLIGRAM(S): at 11:58

## 2023-07-18 RX ADMIN — Medication 325 MILLIGRAM(S): at 01:24

## 2023-07-18 RX ADMIN — PANTOPRAZOLE SODIUM 40 MILLIGRAM(S): 20 TABLET, DELAYED RELEASE ORAL at 05:16

## 2023-07-18 RX ADMIN — Medication 650 MILLIGRAM(S): at 12:13

## 2023-07-18 RX ADMIN — Medication 325 MILLIGRAM(S): at 00:02

## 2023-07-18 RX ADMIN — Medication 5 MILLIGRAM(S): at 05:16

## 2023-07-18 RX ADMIN — CEFTRIAXONE 100 MILLIGRAM(S): 500 INJECTION, POWDER, FOR SOLUTION INTRAMUSCULAR; INTRAVENOUS at 21:09

## 2023-07-18 RX ADMIN — Medication 10 MILLIGRAM(S): at 20:42

## 2023-07-18 RX ADMIN — ONDANSETRON 4 MILLIGRAM(S): 8 TABLET, FILM COATED ORAL at 18:40

## 2023-07-18 RX ADMIN — ATORVASTATIN CALCIUM 20 MILLIGRAM(S): 80 TABLET, FILM COATED ORAL at 21:10

## 2023-07-18 RX ADMIN — CINACALCET 30 MILLIGRAM(S): 30 TABLET, FILM COATED ORAL at 05:16

## 2023-07-18 RX ADMIN — MYCOPHENOLATE MOFETIL 500 MILLIGRAM(S): 250 CAPSULE ORAL at 17:12

## 2023-07-18 RX ADMIN — ONDANSETRON 4 MILLIGRAM(S): 8 TABLET, FILM COATED ORAL at 11:57

## 2023-07-18 RX ADMIN — CHLORHEXIDINE GLUCONATE 1 APPLICATION(S): 213 SOLUTION TOPICAL at 12:13

## 2023-07-18 RX ADMIN — CINACALCET 30 MILLIGRAM(S): 30 TABLET, FILM COATED ORAL at 17:12

## 2023-07-18 RX ADMIN — ONDANSETRON 4 MILLIGRAM(S): 8 TABLET, FILM COATED ORAL at 00:01

## 2023-07-18 RX ADMIN — SODIUM CHLORIDE 75 MILLILITER(S): 9 INJECTION, SOLUTION INTRAVENOUS at 20:41

## 2023-07-18 RX ADMIN — SODIUM CHLORIDE 75 MILLILITER(S): 9 INJECTION, SOLUTION INTRAVENOUS at 23:51

## 2023-07-18 NOTE — CONSULT NOTE ADULT - SUBJECTIVE AND OBJECTIVE BOX
Patient is a 53y old  Female who presents with a chief complaint of fever, chill, nausea and vomiting (18 Jul 2023 12:17)      HPI:  52 yo F with a ESRD s/p HepC DDRT in 2020, chronic SVC syndrome s/p L cephalic-iliac vein bypass on DAPT, s/p thrombectomy of L iliac vein and graft 2012, Hx BK viremia, HTN presenting to ED with 1 day of fever 103', chill, nausea and vomiting associated with dysuria, low abd pain with urination and right flank pain.   She denies recent travel, sick contact, or recent procedure. (17 Jul 2023 02:52)  Patient seen and examined at bedside reports symptoms stared Saturday night, and her mother who lives with her was discharged on Thursday from the hospital after been kept overnight for a leg trauma.      prior hospital charts reviewed [x  ]  primary team notes reviewed [x  ]  other consultant notes reviewed [  x]    PAST MEDICAL & SURGICAL HISTORY:  HTN - Hypertension      Clotted Renal Dialysis AV Graft      Infection of AV Graft for Dialysis      Fibroid Tumor      CKD (chronic kidney disease) stage V requiring chronic dialysis      Chronic kidney disease, unspecified      History of Hysterectomy  for benign disease      AV fistula  B/L - failed      H/O extremity bypass graft  H/O RUE bypass and creation of right brachial graft      Kidney transplant recipient      H/O kidney transplant          Allergies  shellfish (Urticaria)  contrast media (iodine-based) (Urticaria)  penicillin (Anaphylaxis)  ibuprofen/morphine (Unknown)  latex (Swelling)  lactose (Hives)  morphine (Urticaria)  ibuprofen (Hives)  morphine (Anaphylaxis)    ANTIMICROBIALS (past 90 days)  MEDICATIONS  (STANDING):  cefTRIAXone   IVPB   100 mL/Hr IV Intermittent (07-16-23 @ 21:45)    cefTRIAXone   IVPB   100 mL/Hr IV Intermittent (07-17-23 @ 21:59)      ANTIMICROBIALS:    cefTRIAXone   IVPB 1000 every 24 hours    OTHER MEDS: MEDICATIONS  (STANDING):  acetaminophen     Tablet .. 650 every 6 hours PRN  aspirin enteric coated 81 daily  atorvastatin 20 at bedtime  cinacalcet 30 two times a day  clopidogrel Tablet 75 daily  mycophenolate mofetil 500 two times a day  pantoprazole    Tablet 40 before breakfast  predniSONE   Tablet 5 daily  tacrolimus ER Tablet (ENVARSUS XR) 11 daily    SOCIAL HISTORY:  non-smoker    FAMILY HISTORY:  FH: HTN (hypertension) (Father, Mother)      REVIEW OF SYSTEMS  [  ] ROS unobtainable because:    [x  ] All other systems negative except as noted below:	    Constitutional:  [ ] fever [ x] chills  [ ] weight loss  [ ] weakness  Skin:  [ ] rash [ ] phlebitis	  Eyes: [ ] icterus [ ] pain  [ ] discharge	  ENMT: [ ] sore throat  [ ] thrush [ ] ulcers [ ] exudates  Respiratory: [ ] dyspnea [ ] hemoptysis [ ] cough [ ] sputum	  Cardiovascular:  [ ] chest pain [ ] palpitations [ ] edema	  Gastrointestinal:  [ x] nausea [ ] vomiting [x ] diarrhea [ ] constipation [ ] pain	  Genitourinary:  [x ] dysuria [s ] frequency [ ] hematuria [ ] discharge [ ] flank pain  [ ] incontinence  Musculoskeletal:  [ ] myalgias [ ] arthralgias [ ] arthritis  [ ] back pain  Neurological:  [ ] headache [ ] seizures  [ ] confusion/altered mental status  Psychiatric:  [ ] anxiety [ ] depression	  Hematology/Lymphatics:  [ ] lymphadenopathy  Endocrine:  [ ] adrenal [ ] thyroid  Allergic/Immunologic:	 [ ] transplant [ ] seasonal    Vital Signs Last 24 Hrs  T(F): 98.7 (07-18-23 @ 04:49), Max: 103.2 (07-16-23 @ 21:09)  Vital Signs Last 24 Hrs  HR: 90 (07-18-23 @ 11:54) (76 - 97)  BP: 109/70 (07-18-23 @ 11:54) (94/60 - 112/71)  RR: 18 (07-18-23 @ 11:54)  SpO2: 97% (07-18-23 @ 11:54) (95% - 100%)  Wt(kg): --    PHYSICAL EXAMINATION:  General: Alert and Awake, NAD  HEENT: PERRL, EOMI, No subconjunctival hemorrhages, Oropharynx Clear, MMM  Neck: Supple, No MILES  Cardiac: RRR, No M/R/G  Resp: CTAB, No Wh/Rh/Ra  Abdomen: NBS, NT/ND, No HSM, No rigidity or guarding  MSK: No LE edema. No stigmata of IE. No evidence of phlebitis. No evidence of synovitis.  : No Kim  Skin: No rashes or lesions. Skin is warm and dry to the touch.   Neuro: Alert and Awake. CN 2-12 Grossly intact. Moves all four extremities spontaneously.  Psych: Calm, Pleasant, Cooperative                          10.9   27.89 )-----------( 201      ( 17 Jul 2023 07:27 )             36.2     07-18    134<L>  |  102  |  18  ----------------------------<  156<H>  3.8   |  20<L>  |  1.51<H>    Ca    9.0      18 Jul 2023 10:32    TPro  6.0  /  Alb  3.6  /  TBili  1.6<H>  /  DBili  x   /  AST  22  /  ALT  10  /  AlkPhos  50  07-17    Urinalysis Basic - ( 18 Jul 2023 10:32 )    Color: x / Appearance: x / SG: x / pH: x  Gluc: 156 mg/dL / Ketone: x  / Bili: x / Urobili: x   Blood: x / Protein: x / Nitrite: x   Leuk Esterase: x / RBC: x / WBC x   Sq Epi: x / Non Sq Epi: x / Bacteria: x    MICROBIOLOGY:  Culture - Blood (collected 16 Jul 2023 21:02)  Source: .Blood Blood-Peripheral  Gram Stain (17 Jul 2023 13:07):    Growth in aerobic and anaerobic bottles: Gram Negative Rods  Preliminary Report (18 Jul 2023 12:21):    Growth in aerobic and anaerobic bottles: Escherichia coli    See previous culture [id number]    Culture - Blood (collected 16 Jul 2023 20:47)  Source: .Blood Blood-Peripheral  Gram Stain (17 Jul 2023 11:30):    Growth in aerobic and anaerobic bottles: Gram Negative Rods  Preliminary Report (18 Jul 2023 11:47):    Growth in aerobic and anaerobic bottles: Escherichia coli    Direct identification is available within approximately 3-5    hours either by Blood Panel Multiplexed PCR or Direct    MALDI-TOF. Details: [email or web address]  Organism: Blood Culture PCR (17 Jul 2023 13:53)  Organism: Blood Culture PCR (17 Jul 2023 13:53)      Method Type: PCR      -  Escherichia coli: Detec              Rapid RVP Result: NotDetec (07-16 @ 21:22)        RADIOLOGY:    <The imaging below has been reviewed and visualized by me independently. Findings as detailed in report below>    < from: CT Abdomen and Pelvis No Cont (07.16.23 @ 23:56) >  IMPRESSION:    Right lower quadrant transplant with prominent collecting system and   stranding/fluid. Bladder wall thickening with surrounding stranding.   Recommend clinical correlation to assess urinary tract infection.    Additional findings as described.    --- End of Report ---      < end of copied text >   Patient is a 53y old  Female who presents with a chief complaint of fever, chill, nausea and vomiting (18 Jul 2023 12:17)      HPI:  54 yo F with a ESRD s/p HepC DDRT in 2020, chronic SVC syndrome s/p L cephalic-iliac vein bypass on DAPT, s/p thrombectomy of L iliac vein and graft 2012, Hx BK viremia, HTN presenting to ED with 1 day of fever 103', chill, nausea and vomiting associated with dysuria, low abd pain with urination and right flank pain.   She denies recent travel, sick contact, or recent procedure. (17 Jul 2023 02:52)  Patient seen and examined at bedside reports symptoms stared Saturday night, and her mother who lives with her was discharged on Thursday from the hospital after been kept overnight for a leg trauma.      prior hospital charts reviewed [x  ]  primary team notes reviewed [x  ]  other consultant notes reviewed [  x]    PAST MEDICAL & SURGICAL HISTORY:  HTN - Hypertension      Clotted Renal Dialysis AV Graft      Infection of AV Graft for Dialysis      Fibroid Tumor      CKD (chronic kidney disease) stage V requiring chronic dialysis      Chronic kidney disease, unspecified      History of Hysterectomy  for benign disease      AV fistula  B/L - failed      H/O extremity bypass graft  H/O RUE bypass and creation of right brachial graft      Kidney transplant recipient      H/O kidney transplant          Allergies  shellfish (Urticaria)  contrast media (iodine-based) (Urticaria)  penicillin (Anaphylaxis)  ibuprofen/morphine (Unknown)  latex (Swelling)  lactose (Hives)  morphine (Urticaria)  ibuprofen (Hives)  morphine (Anaphylaxis)    ANTIMICROBIALS (past 90 days)  MEDICATIONS  (STANDING):  cefTRIAXone   IVPB   100 mL/Hr IV Intermittent (07-16-23 @ 21:45)    cefTRIAXone   IVPB   100 mL/Hr IV Intermittent (07-17-23 @ 21:59)      ANTIMICROBIALS:    cefTRIAXone   IVPB 1000 every 24 hours    OTHER MEDS: MEDICATIONS  (STANDING):  acetaminophen     Tablet .. 650 every 6 hours PRN  aspirin enteric coated 81 daily  atorvastatin 20 at bedtime  cinacalcet 30 two times a day  clopidogrel Tablet 75 daily  mycophenolate mofetil 500 two times a day  pantoprazole    Tablet 40 before breakfast  predniSONE   Tablet 5 daily  tacrolimus ER Tablet (ENVARSUS XR) 11 daily    SOCIAL HISTORY:  non-smoker. no etoh use.     FAMILY HISTORY:  FH: HTN (hypertension) (Father, Mother)      REVIEW OF SYSTEMS  [  ] ROS unobtainable because:    [x  ] All other systems negative except as noted below:	    Constitutional:  [ ] fever [ x] chills  [ ] weight loss  [ ] weakness  Skin:  [ ] rash [ ] phlebitis	  Eyes: [ ] icterus [ ] pain  [ ] discharge	  ENMT: [ ] sore throat  [ ] thrush [ ] ulcers [ ] exudates  Respiratory: [ ] dyspnea [ ] hemoptysis [ ] cough [ ] sputum	  Cardiovascular:  [ ] chest pain [ ] palpitations [ ] edema	  Gastrointestinal:  [ x] nausea [ ] vomiting [x ] diarrhea [ ] constipation [ ] pain	  Genitourinary:  [x ] dysuria [s ] frequency [ ] hematuria [ ] discharge [ ] flank pain  [ ] incontinence  Musculoskeletal:  [ ] myalgias [ ] arthralgias [ ] arthritis  [ ] back pain  Neurological:  [ ] headache [ ] seizures  [ ] confusion/altered mental status  Psychiatric:  [ ] anxiety [ ] depression	  Hematology/Lymphatics:  [ ] lymphadenopathy  Endocrine:  [ ] adrenal [ ] thyroid  Allergic/Immunologic:	 [ ] transplant [ ] seasonal    Vital Signs Last 24 Hrs  T(F): 98.7 (07-18-23 @ 04:49), Max: 103.2 (07-16-23 @ 21:09)  Vital Signs Last 24 Hrs  HR: 90 (07-18-23 @ 11:54) (76 - 97)  BP: 109/70 (07-18-23 @ 11:54) (94/60 - 112/71)  RR: 18 (07-18-23 @ 11:54)  SpO2: 97% (07-18-23 @ 11:54) (95% - 100%)  Wt(kg): --    PHYSICAL EXAMINATION:  General: Alert and Awake, NAD  HEENT: PERRL, EOMI, No subconjunctival hemorrhages, Oropharynx Clear, MMM  Neck: Supple, No MILES  Cardiac: RRR, No M/R/G  Resp: CTAB, No Wh/Rh/Ra  Abdomen: NBS, NT/ND, No HSM, No rigidity or guarding  MSK: No LE edema. No stigmata of IE. No evidence of phlebitis. No evidence of synovitis.  : No Kim  Skin: No rashes or lesions. Skin is warm and dry to the touch.   Neuro: Alert and Awake. CN 2-12 Grossly intact. Moves all four extremities spontaneously.  Psych: Calm, Pleasant, Cooperative                          10.9   27.89 )-----------( 201      ( 17 Jul 2023 07:27 )             36.2     07-18    134<L>  |  102  |  18  ----------------------------<  156<H>  3.8   |  20<L>  |  1.51<H>    Ca    9.0      18 Jul 2023 10:32    TPro  6.0  /  Alb  3.6  /  TBili  1.6<H>  /  DBili  x   /  AST  22  /  ALT  10  /  AlkPhos  50  07-17    Urinalysis Basic - ( 18 Jul 2023 10:32 )    Color: x / Appearance: x / SG: x / pH: x  Gluc: 156 mg/dL / Ketone: x  / Bili: x / Urobili: x   Blood: x / Protein: x / Nitrite: x   Leuk Esterase: x / RBC: x / WBC x   Sq Epi: x / Non Sq Epi: x / Bacteria: x    MICROBIOLOGY:  Culture - Blood (collected 16 Jul 2023 21:02)  Source: .Blood Blood-Peripheral  Gram Stain (17 Jul 2023 13:07):    Growth in aerobic and anaerobic bottles: Gram Negative Rods  Preliminary Report (18 Jul 2023 12:21):    Growth in aerobic and anaerobic bottles: Escherichia coli    See previous culture [id number]    Culture - Blood (collected 16 Jul 2023 20:47)  Source: .Blood Blood-Peripheral  Gram Stain (17 Jul 2023 11:30):    Growth in aerobic and anaerobic bottles: Gram Negative Rods  Preliminary Report (18 Jul 2023 11:47):    Growth in aerobic and anaerobic bottles: Escherichia coli    Direct identification is available within approximately 3-5    hours either by Blood Panel Multiplexed PCR or Direct    MALDI-TOF. Details: [email or web address]  Organism: Blood Culture PCR (17 Jul 2023 13:53)  Organism: Blood Culture PCR (17 Jul 2023 13:53)      Method Type: PCR      -  Escherichia coli: Detec              Rapid RVP Result: NotDetec (07-16 @ 21:22)        RADIOLOGY:    <The imaging below has been reviewed and visualized by me independently. Findings as detailed in report below>    < from: CT Abdomen and Pelvis No Cont (07.16.23 @ 23:56) >  IMPRESSION:    Right lower quadrant transplant with prominent collecting system and   stranding/fluid. Bladder wall thickening with surrounding stranding.   Recommend clinical correlation to assess urinary tract infection.    Additional findings as described.    --- End of Report ---      < end of copied text >

## 2023-07-18 NOTE — CONSULT NOTE ADULT - ASSESSMENT
Patient is a 53y old  Female who presents with a chief complaint of fever, chill, nausea and vomiting    fever/leukocytosis   --2/2 UTI  --on ceftriaxone  --management per primary team    anemia  --possibly 2/2 UTI, renal disfunction  --Hgb 14 at baseline  --nephology following  --will check hapto, LDH, and for nutritional deficiencies  --trend CBC  --patient to follow up w/ Dr Jeffrey Oliva of [facility] upon discharge    will follow,    Joycelyn Delcid NP  Hematology/ Oncology  [facility]  [phone] (office)  [phone] (alt office)  Evenings and weekends please call MD on call or office

## 2023-07-18 NOTE — PROGRESS NOTE ADULT - ASSESSMENT
54 yo F with a ESRD s/p HepC DDRT in 2020, chronic SVC syndrome s/p L cephalic-iliac vein bypass on DAPT, s/p thrombectomy of L iliac vein and graft 2012, Hx BK viremia, HTN presenting to ED with 1 day of fever 103', chill, nausea and vomiting associated with dysuria, low abd pain with urination and right flank pain.   She denies recent travel, sick contact, or recent procedure. Nephrology consulted for s/p DDRT.    A/P      SAM s/p DDRT @ [facility] 08/14/2020  Outpatient nephrologist is Dr. Lake / Dr Streeter   Continue home dose Immunosuppression meds   sCr slightly worsening today  FENa < 1%, suggestive of pre- renal   Bladder scan 7/17 ~ 142 cc   Continue 1/2 NS + 75 mEq Sodium Bicarb @ 75 cc/hr  x 24 hrs   CT A/P no contrast 6/16 --> Partially distended bladder    mg BID, Prednisone 5 mg qd, Envarsus 11 mg   FK level 7/18--> 7.2  please Check Tacrolimus 30 min prior to morning dose   Monitor BMP, u/o    HTN:   BP fluctuates   Monitor    Acidosis  Non AG  Sec to GI loss   improving   Continue IV bicarb as above   Monitor     Proteinuria/ Hematuria  In setting of UTI   Repeat UA after Abx

## 2023-07-18 NOTE — PROGRESS NOTE ADULT - ASSESSMENT
52 yo F with a ESRD s/p HepC DDRT in 2020, chronic SVC syndrome s/p L cephalic-iliac vein bypass on DAPT, s/p thrombectomy of L iliac vein and graft 2012, Hx BK viremia, HTN presenting to ED with 1 day of fever, chill, nausea and vomiting admitted sepsis due to pyelonephritis

## 2023-07-18 NOTE — PATIENT PROFILE ADULT - FALL HARM RISK - HARM RISK INTERVENTIONS
Assistance with ambulation/Assistance OOB with selected safe patient handling equipment/Communicate Risk of Fall with Harm to all staff/Discuss with provider need for PT consult/Monitor gait and stability/Provide patient with walking aids - walker, cane, crutches/Reinforce activity limits and safety measures with patient and family/Sit up slowly, dangle for a short time, stand at bedside before walking/Tailored Fall Risk Interventions/Visual Cue: Yellow wristband and red socks/Bed in lowest position, wheels locked, appropriate side rails in place/Call bell, personal items and telephone in reach/Instruct patient to call for assistance before getting out of bed or chair/Non-slip footwear when patient is out of bed/[place] to call system/Physically safe environment - no spills, clutter or unnecessary equipment/Purposeful Proactive Rounding/Room/bathroom lighting operational, light cord in reach

## 2023-07-18 NOTE — CONSULT NOTE ADULT - NS ATTEND AMEND GEN_ALL_CORE FT
Admitted for what looks like UTI  On antibiotics, monitoring for counts while admitted
as above
54 yo F with a ESRD s/p HepC DDRT in 2020, chronic SVC syndrome s/p L cephalic-iliac vein bypass on DAPT, s/p thrombectomy of L iliac vein and graft 2012, Hx BK viremia, HTN presenting to ED with fevers    Found to have E coli bacteremia  E coli on UCx  Tenderness over renal transplant   CT A/P with Right lower quadrant transplant with prominent collecting system and stranding/fluid.  Would continue Ceftriaxone (Blood Culture PCR without ESBL resistance markers)  Follow susceptibilities  Repeat blood cultures sent 7/18    I will continue to follow. Please feel free to contact me with any further questions.    Michael Szymanski M.D.  [facility] Division of Infectious Disease  8AM-5PM Monday - Friday: Available on [employer] Teams  After Hours and Holidays (or if no response on [employer] Teams): Please contact the Infectious Diseases Office at [phone]

## 2023-07-18 NOTE — CONSULT NOTE ADULT - SUBJECTIVE AND OBJECTIVE BOX
Patient is a 53y old  Female who presents with a chief complaint of fever, chill, nausea and vomiting (17 Jul 2023 17:06)    Patient known to [facility], follows caroline/ Dr Oliva for the management of erythrocytosis.   Seen and examined while in the ED.     MEDICATIONS  (STANDING):  aspirin enteric coated 81 milliGRAM(s) Oral daily  atorvastatin 20 milliGRAM(s) Oral at bedtime  cefTRIAXone   IVPB 1000 milliGRAM(s) IV Intermittent every 24 hours  chlorhexidine 2% Cloths 1 Application(s) Topical daily  cinacalcet 30 milliGRAM(s) Oral two times a day  clopidogrel Tablet 75 milliGRAM(s) Oral daily  mycophenolate mofetil 500 milliGRAM(s) Oral two times a day  pantoprazole    Tablet 40 milliGRAM(s) Oral before breakfast  predniSONE   Tablet 5 milliGRAM(s) Oral daily  sodium chloride 0.45% 1000 milliLiter(s) (75 mL/Hr) IV Continuous <Continuous>  tacrolimus ER Tablet (ENVARSUS XR) 11 milliGRAM(s) Oral daily    MEDICATIONS  (PRN):  acetaminophen     Tablet .. 650 milliGRAM(s) Oral every 6 hours PRN Temp greater or equal to 38C (100.4F), Moderate Pain (4 - 6)  sodium chloride 0.65% Nasal 1 Spray(s) Both Nostrils every 6 hours PRN Nasal Congestion      ROS  No fever, sweats, chills  No epistaxis, HA, sore throat  No CP, SOB, cough, sputum  No n/v/d, abd pain, melena, hematochezia  No rash  No anxiety  No back pain, joint pain  No bleeding, bruising  No dysuria, hematuria    Vital Signs Last 24 Hrs  T(C): 37.1 (18 Jul 2023 04:49), Max: 37.6 (17 Jul 2023 22:00)  T(F): 98.7 (18 Jul 2023 04:49), Max: 99.7 (17 Jul 2023 22:00)  HR: 90 (18 Jul 2023 11:54) (76 - 97)  BP: 109/70 (18 Jul 2023 11:54) (94/60 - 112/71)  BP(mean): --  RR: 18 (18 Jul 2023 11:54) (18 - 19)  SpO2: 97% (18 Jul 2023 11:54) (95% - 100%)    Parameters below as of 18 Jul 2023 11:54  Patient On (Oxygen Delivery Method): room air        PE  NAD  Awake, alert  Anicteric, MMM  RRR  CTAB  Abd soft, NT, ND  No c/c/e                            10.9   27.89 )-----------( 201      ( 17 Jul 2023 07:27 )             36.2       07-18    134<L>  |  102  |  18  ----------------------------<  156<H>  3.8   |  20<L>  |  1.51<H>    Ca    9.0      18 Jul 2023 10:32    TPro  6.0  /  Alb  3.6  /  TBili  1.6<H>  /  DBili  x   /  AST  22  /  ALT  10  /  AlkPhos  50  07-17

## 2023-07-18 NOTE — CONSULT NOTE ADULT - REASON FOR ADMISSION
fever, chill, nausea and vomiting

## 2023-07-18 NOTE — PROGRESS NOTE ADULT - SUBJECTIVE AND OBJECTIVE BOX
[facility]   MD MEL DOBBS MD BRIANA PETITO, NP    TEL:  FROM 9 AM to 5 PM ---OFFICE: [phone]  FROM 5 PM - 9 AM PLEASE CALL ANSWERING SERVICE: [phone]     RENAL PROGRESS NOTE: DATE OF SERVICE 07-18-23 @ 14:59    Patient is a 53y old  Female who presents with a chief complaint of fever, chill, nausea and vomiting   Patient seen and examined at bedside. No chest pain/sob    VITALS:  T(F): 97.5 (07-18-23 @ 14:34), Max: 99.7 (07-17-23 @ 22:00)  HR: 79 (07-18-23 @ 14:34)  BP: 107/70 (07-18-23 @ 14:34)  RR: 18 (07-18-23 @ 14:34)  SpO2: 93% (07-18-23 @ 14:34)  Wt(kg): --    Height (cm): 160 (07-18 @ 14:34)  Weight (kg): 67.132 (07-18 @ 14:34)  BMI (kg/m2): 26.2 (07-18 @ 14:34)  BSA (m2): 1.7 (07-18 @ 14:34)    PHYSICAL EXAM:  Constitutional: NAD  Neck: No JVD  Respiratory: CTAB, no wheezes, rales or rhonchi  Cardiovascular: S1, S2, RRR  Gastrointestinal: BS+, soft, NT/ND  Extremities: No peripheral edema    Hospital Medications:   MEDICATIONS  (STANDING):  aspirin enteric coated 81 milliGRAM(s) Oral daily  atorvastatin 20 milliGRAM(s) Oral at bedtime  cefTRIAXone   IVPB 1000 milliGRAM(s) IV Intermittent every 24 hours  chlorhexidine 2% Cloths 1 Application(s) Topical daily  cinacalcet 30 milliGRAM(s) Oral two times a day  clopidogrel Tablet 75 milliGRAM(s) Oral daily  mycophenolate mofetil 500 milliGRAM(s) Oral two times a day  pantoprazole    Tablet 40 milliGRAM(s) Oral before breakfast  predniSONE   Tablet 5 milliGRAM(s) Oral daily  sodium chloride 0.45% 1000 milliLiter(s) (75 mL/Hr) IV Continuous <Continuous>  tacrolimus ER Tablet (ENVARSUS XR) 11 milliGRAM(s) Oral daily    Tacrolimus (), Serum: 7.2 ng/mL (07-18 @ 07:06)  Tacrolimus (), Serum: 18.5 ng/mL (07-17 @ 20:09)    LABS:  07-18    134<L>  |  102  |  18  ----------------------------<  156<H>  3.8   |  20<L>  |  1.51<H>    Ca    9.0      18 Jul 2023 10:32    TPro  6.0  /  Alb  3.6  /  TBili  1.6<H>  /  DBili      /  AST  22  /  ALT  10  /  AlkPhos  50  07-17    Creatinine Trend: 1.51 <--, 1.42 <--, 1.24 <--                                10.9   27.89 )-----------( 201      ( 17 Jul 2023 07:27 )             36.2     Urine Studies:  Urinalysis - [07-18-23 @ 10:32]      Color  / Appearance  / SG  / pH       Gluc 156 / Ketone   / Bili  / Urobili        Blood  / Protein  / Leuk Est  / Nitrite       RBC  / WBC  / Hyaline  / Gran  / Sq Epi  / Non Sq Epi  / Bacteria     Urine Creatinine 219      [07-17-23 @ 17:28]  Urine Sodium 15      [07-17-23 @ 17:28]    PTH -- (Ca 11.2)      [07-03-23 @ 15:05]   170  Vitamin D (25OH) 31.8      [07-03-23 @ 15:05]        RADIOLOGY & ADDITIONAL STUDIES:

## 2023-07-18 NOTE — PROGRESS NOTE ADULT - SUBJECTIVE AND OBJECTIVE BOX
Patient is a 53y old  Female who presents with a chief complaint of fever, chill, nausea and vomiting (18 Jul 2023 14:59)      SUBJECTIVE / OVERNIGHT EVENTS:     MEDICATIONS  (STANDING):  aspirin enteric coated 81 milliGRAM(s) Oral daily  atorvastatin 20 milliGRAM(s) Oral at bedtime  cefTRIAXone   IVPB 1000 milliGRAM(s) IV Intermittent every 24 hours  chlorhexidine 2% Cloths 1 Application(s) Topical daily  cinacalcet 30 milliGRAM(s) Oral two times a day  clopidogrel Tablet 75 milliGRAM(s) Oral daily  mycophenolate mofetil 500 milliGRAM(s) Oral two times a day  pantoprazole    Tablet 40 milliGRAM(s) Oral before breakfast  predniSONE   Tablet 5 milliGRAM(s) Oral daily  sodium chloride 0.45% 1000 milliLiter(s) (75 mL/Hr) IV Continuous <Continuous>  tacrolimus ER Tablet (ENVARSUS XR) 11 milliGRAM(s) Oral daily    MEDICATIONS  (PRN):  acetaminophen     Tablet .. 650 milliGRAM(s) Oral every 6 hours PRN Temp greater or equal to 38C (100.4F), Moderate Pain (4 - 6)  sodium chloride 0.65% Nasal 1 Spray(s) Both Nostrils every 6 hours PRN Nasal Congestion      CAPILLARY BLOOD GLUCOSE        I&O's Summary    T(C): 36.8 (07-18-23 @ 21:14), Max: 36.8 (07-18-23 @ 21:14)  HR: 81 (07-18-23 @ 21:14) (79 - 90)  BP: 116/75 (07-18-23 @ 21:14) (107/70 - 116/75)  RR: 16 (07-18-23 @ 21:14) (16 - 18)  SpO2: 94% (07-18-23 @ 21:14) (93% - 98%)    PHYSICAL EXAM:  GENERAL: NAD, well-developed  HEAD:  Atraumatic, Normocephalic  EYES: EOMI, PERRLA, conjunctiva and sclera clear  NECK: Supple, No JVD  CHEST/LUNG: Clear to auscultation bilaterally; No wheeze  HEART: Regular rate and rhythm; No murmurs, rubs, or gallops  ABDOMEN: Soft, Nontender, Nondistended; Bowel sounds present  EXTREMITIES:  2+ Peripheral Pulses, No clubbing, cyanosis, or edema  PSYCH: AAOx3  NEUROLOGY: non-focal  SKIN: No rashes or lesions    LABS:                        10.4   14.96 )-----------( 185      ( 18 Jul 2023 19:18 )             34.5     07-18    134<L>  |  102  |  18  ----------------------------<  156<H>  3.8   |  20<L>  |  1.51<H>    Ca    9.0      18 Jul 2023 10:32    TPro  6.0  /  Alb  3.6  /  TBili  1.6<H>  /  DBili  x   /  AST  22  /  ALT  10  /  AlkPhos  50  07-17          Urinalysis Basic - ( 18 Jul 2023 10:32 )    Color: x / Appearance: x / SG: x / pH: x  Gluc: 156 mg/dL / Ketone: x  / Bili: x / Urobili: x   Blood: x / Protein: x / Nitrite: x   Leuk Esterase: x / RBC: x / WBC x   Sq Epi: x / Non Sq Epi: x / Bacteria: x        RADIOLOGY & ADDITIONAL TESTS:    Imaging Personally Reviewed:    Consultant(s) Notes Reviewed:      Care Discussed with Consultants/Other Providers:

## 2023-07-18 NOTE — CONSULT NOTE ADULT - ASSESSMENT
52 yo F with a ESRD s/p HepC DDRT in 2020, chronic SVC syndrome s/p L cephalic-iliac vein bypass on DAPT, s/p thrombectomy of L iliac vein and graft 2012, Hx BK viremia, HTN presenting to ED with 1 day of fever 103', chill, nausea and vomiting associated with dysuria, low abd pain with urination and right flank pain.   She denies recent travel, sick contact, or recent procedure. (17 Jul 2023 02:52)  Patient seen and examined at bedside reports symptoms stared Saturday night, and her mother who lives with her was discharged on Thursday from the hospital after been kept overnight for a leg trauma.       #Fever   #Leukocytosis  #Bacteriemia  #UTI  #DDRT Recipient  #Immunosuppression due to drug therapy  --Tmax of 103.2 - monitor temperatures  --Monitor CBC - last WBC 27.89  --UA (7/16) - positive nitrite, few bacteria, small leukocyte esterase, >50 WBC  --CXR (7/16) - Hazy opacities BLL  --BCX (7/16) - Prelim with E.Coli follow sensitivities  --Continue Ceftriaxone 1 gm 54 yo F with a ESRD s/p HepC DDRT in 2020, chronic SVC syndrome s/p L cephalic-iliac vein bypass on DAPT, s/p thrombectomy of L iliac vein and graft 2012, Hx BK viremia, HTN presenting to ED with 1 day of fever 103', chill, nausea and vomiting associated with dysuria, low abd pain with urination and right flank pain.   She denies recent travel, sick contact, or recent procedure. (17 Jul 2023 02:52)  Patient seen and examined at bedside reports symptoms stared Saturday night, and her mother who lives with her was discharged on Thursday from the hospital after been kept overnight for a leg trauma.       #Fever   #Leukocytosis  #Bacteremia  #UTI  #DDRT Recipient  #Immunosuppression due to drug therapy  --Tmax of 103.2 - monitor temperatures  --Monitor CBC - last WBC 27.89  --UA (7/16) - positive nitrite, few bacteria, small leukocyte esterase, >50 WBC  --CXR (7/16) - Hazy opacities BLL  --BCX (7/16) - Prelim with E.Coli follow sensitivities  --Continue Ceftriaxone 1 gm Q24H

## 2023-07-19 LAB
-  AMIKACIN: SIGNIFICANT CHANGE UP
-  AMIKACIN: SIGNIFICANT CHANGE UP
-  AMOXICILLIN/CLAVULANIC ACID: SIGNIFICANT CHANGE UP
-  AMPICILLIN/SULBACTAM: SIGNIFICANT CHANGE UP
-  AMPICILLIN/SULBACTAM: SIGNIFICANT CHANGE UP
-  AMPICILLIN: SIGNIFICANT CHANGE UP
-  AMPICILLIN: SIGNIFICANT CHANGE UP
-  AZTREONAM: SIGNIFICANT CHANGE UP
-  AZTREONAM: SIGNIFICANT CHANGE UP
-  CEFAZOLIN: SIGNIFICANT CHANGE UP
-  CEFAZOLIN: SIGNIFICANT CHANGE UP
-  CEFEPIME: SIGNIFICANT CHANGE UP
-  CEFEPIME: SIGNIFICANT CHANGE UP
-  CEFOXITIN: SIGNIFICANT CHANGE UP
-  CEFOXITIN: SIGNIFICANT CHANGE UP
-  CEFTRIAXONE: SIGNIFICANT CHANGE UP
-  CEFTRIAXONE: SIGNIFICANT CHANGE UP
-  CEFUROXIME: SIGNIFICANT CHANGE UP
-  CIPROFLOXACIN: SIGNIFICANT CHANGE UP
-  CIPROFLOXACIN: SIGNIFICANT CHANGE UP
-  ERTAPENEM: SIGNIFICANT CHANGE UP
-  ERTAPENEM: SIGNIFICANT CHANGE UP
-  GENTAMICIN: SIGNIFICANT CHANGE UP
-  GENTAMICIN: SIGNIFICANT CHANGE UP
-  IMIPENEM: SIGNIFICANT CHANGE UP
-  IMIPENEM: SIGNIFICANT CHANGE UP
-  LEVOFLOXACIN: SIGNIFICANT CHANGE UP
-  LEVOFLOXACIN: SIGNIFICANT CHANGE UP
-  MEROPENEM: SIGNIFICANT CHANGE UP
-  MEROPENEM: SIGNIFICANT CHANGE UP
-  NITROFURANTOIN: SIGNIFICANT CHANGE UP
-  PIPERACILLIN/TAZOBACTAM: SIGNIFICANT CHANGE UP
-  PIPERACILLIN/TAZOBACTAM: SIGNIFICANT CHANGE UP
-  TOBRAMYCIN: SIGNIFICANT CHANGE UP
-  TOBRAMYCIN: SIGNIFICANT CHANGE UP
-  TRIMETHOPRIM/SULFAMETHOXAZOLE: SIGNIFICANT CHANGE UP
-  TRIMETHOPRIM/SULFAMETHOXAZOLE: SIGNIFICANT CHANGE UP
ANION GAP SERPL CALC-SCNC: 11 MMOL/L — SIGNIFICANT CHANGE UP (ref 5–17)
BASOPHILS # BLD AUTO: 0.01 K/UL — SIGNIFICANT CHANGE UP (ref 0–0.2)
BASOPHILS NFR BLD AUTO: 0.1 % — SIGNIFICANT CHANGE UP (ref 0–2)
BUN SERPL-MCNC: 17 MG/DL — SIGNIFICANT CHANGE UP (ref 7–23)
CALCIUM SERPL-MCNC: 9 MG/DL — SIGNIFICANT CHANGE UP (ref 8.4–10.5)
CHLORIDE SERPL-SCNC: 101 MMOL/L — SIGNIFICANT CHANGE UP (ref 96–108)
CO2 SERPL-SCNC: 24 MMOL/L — SIGNIFICANT CHANGE UP (ref 22–31)
CREAT SERPL-MCNC: 1.66 MG/DL — HIGH (ref 0.5–1.3)
CULTURE RESULTS: SIGNIFICANT CHANGE UP
EGFR: 37 ML/MIN/1.73M2 — LOW
EOSINOPHIL # BLD AUTO: 0.02 K/UL — SIGNIFICANT CHANGE UP (ref 0–0.5)
EOSINOPHIL NFR BLD AUTO: 0.2 % — SIGNIFICANT CHANGE UP (ref 0–6)
FERRITIN SERPL-MCNC: 142 NG/ML — SIGNIFICANT CHANGE UP (ref 13–330)
FOLATE SERPL-MCNC: 11.7 NG/ML — SIGNIFICANT CHANGE UP
GLUCOSE SERPL-MCNC: 106 MG/DL — HIGH (ref 70–99)
HAPTOGLOB SERPL-MCNC: 253 MG/DL — HIGH (ref 34–200)
HCT VFR BLD CALC: 34 % — LOW (ref 34.5–45)
HGB BLD-MCNC: 10.2 G/DL — LOW (ref 11.5–15.5)
IMM GRANULOCYTES NFR BLD AUTO: 0.8 % — SIGNIFICANT CHANGE UP (ref 0–0.9)
IRON SATN MFR SERPL: 17 UG/DL — LOW (ref 30–160)
IRON SATN MFR SERPL: 6 % — LOW (ref 14–50)
LDH SERPL L TO P-CCNC: 270 U/L — HIGH (ref 50–242)
LYMPHOCYTES # BLD AUTO: 0.9 K/UL — LOW (ref 1–3.3)
LYMPHOCYTES # BLD AUTO: 8 % — LOW (ref 13–44)
MCHC RBC-ENTMCNC: 25.1 PG — LOW (ref 27–34)
MCHC RBC-ENTMCNC: 30 GM/DL — LOW (ref 32–36)
MCV RBC AUTO: 83.5 FL — SIGNIFICANT CHANGE UP (ref 80–100)
METHOD TYPE: SIGNIFICANT CHANGE UP
METHOD TYPE: SIGNIFICANT CHANGE UP
MONOCYTES # BLD AUTO: 0.64 K/UL — SIGNIFICANT CHANGE UP (ref 0–0.9)
MONOCYTES NFR BLD AUTO: 5.7 % — SIGNIFICANT CHANGE UP (ref 2–14)
NEUTROPHILS # BLD AUTO: 9.58 K/UL — HIGH (ref 1.8–7.4)
NEUTROPHILS NFR BLD AUTO: 85.2 % — HIGH (ref 43–77)
NRBC # BLD: 0 /100 WBCS — SIGNIFICANT CHANGE UP (ref 0–0)
ORGANISM # SPEC MICROSCOPIC CNT: SIGNIFICANT CHANGE UP
PLATELET # BLD AUTO: 185 K/UL — SIGNIFICANT CHANGE UP (ref 150–400)
POTASSIUM SERPL-MCNC: 3.4 MMOL/L — LOW (ref 3.5–5.3)
POTASSIUM SERPL-SCNC: 3.4 MMOL/L — LOW (ref 3.5–5.3)
RBC # BLD: 4.07 M/UL — SIGNIFICANT CHANGE UP (ref 3.8–5.2)
RBC # FLD: 17.3 % — HIGH (ref 10.3–14.5)
SODIUM SERPL-SCNC: 136 MMOL/L — SIGNIFICANT CHANGE UP (ref 135–145)
SPECIMEN SOURCE: SIGNIFICANT CHANGE UP
TACROLIMUS SERPL-MCNC: 6.6 NG/ML — SIGNIFICANT CHANGE UP
TIBC SERPL-MCNC: 262 UG/DL — SIGNIFICANT CHANGE UP (ref 220–430)
UIBC SERPL-MCNC: 245 UG/DL — SIGNIFICANT CHANGE UP (ref 110–370)
VIT B12 SERPL-MCNC: 909 PG/ML — SIGNIFICANT CHANGE UP (ref 232–1245)
WBC # BLD: 11.24 K/UL — HIGH (ref 3.8–10.5)
WBC # FLD AUTO: 11.24 K/UL — HIGH (ref 3.8–10.5)

## 2023-07-19 PROCEDURE — 99232 SBSQ HOSP IP/OBS MODERATE 35: CPT | Mod: FS

## 2023-07-19 PROCEDURE — 70450 CT HEAD/BRAIN W/O DYE: CPT | Mod: 26

## 2023-07-19 RX ORDER — SODIUM CHLORIDE 9 MG/ML
1000 INJECTION INTRAMUSCULAR; INTRAVENOUS; SUBCUTANEOUS
Refills: 0 | Status: DISCONTINUED | OUTPATIENT
Start: 2023-07-19 | End: 2023-07-21

## 2023-07-19 RX ADMIN — CINACALCET 30 MILLIGRAM(S): 30 TABLET, FILM COATED ORAL at 17:06

## 2023-07-19 RX ADMIN — MYCOPHENOLATE MOFETIL 500 MILLIGRAM(S): 250 CAPSULE ORAL at 06:04

## 2023-07-19 RX ADMIN — CHLORHEXIDINE GLUCONATE 1 APPLICATION(S): 213 SOLUTION TOPICAL at 12:21

## 2023-07-19 RX ADMIN — MYCOPHENOLATE MOFETIL 500 MILLIGRAM(S): 250 CAPSULE ORAL at 17:07

## 2023-07-19 RX ADMIN — SODIUM CHLORIDE 100 MILLILITER(S): 9 INJECTION INTRAMUSCULAR; INTRAVENOUS; SUBCUTANEOUS at 13:36

## 2023-07-19 RX ADMIN — Medication 650 MILLIGRAM(S): at 11:02

## 2023-07-19 RX ADMIN — TACROLIMUS 11 MILLIGRAM(S): 5 CAPSULE ORAL at 06:02

## 2023-07-19 RX ADMIN — CINACALCET 30 MILLIGRAM(S): 30 TABLET, FILM COATED ORAL at 06:04

## 2023-07-19 RX ADMIN — CLOPIDOGREL BISULFATE 75 MILLIGRAM(S): 75 TABLET, FILM COATED ORAL at 12:21

## 2023-07-19 RX ADMIN — Medication 5 MILLIGRAM(S): at 06:03

## 2023-07-19 RX ADMIN — Medication 650 MILLIGRAM(S): at 10:02

## 2023-07-19 RX ADMIN — ATORVASTATIN CALCIUM 20 MILLIGRAM(S): 80 TABLET, FILM COATED ORAL at 22:12

## 2023-07-19 RX ADMIN — PANTOPRAZOLE SODIUM 40 MILLIGRAM(S): 20 TABLET, DELAYED RELEASE ORAL at 06:03

## 2023-07-19 RX ADMIN — Medication 81 MILLIGRAM(S): at 12:20

## 2023-07-19 RX ADMIN — CEFTRIAXONE 100 MILLIGRAM(S): 500 INJECTION, POWDER, FOR SOLUTION INTRAMUSCULAR; INTRAVENOUS at 21:24

## 2023-07-19 NOTE — PROGRESS NOTE ADULT - ATTENDING COMMENTS
52 yo F with a ESRD s/p HepC DDRT in 2020, chronic SVC syndrome s/p L cephalic-iliac vein bypass on DAPT, s/p thrombectomy of L iliac vein and graft 2012, Hx BK viremia, HTN presenting to ED with fevers    Found to have E coli bacteremia  E coli on UCx  Tenderness over renal transplant   CT A/P with Right lower quadrant transplant with prominent collecting system and stranding/fluid.  Would continue Ceftriaxone (isolate is susceptible)  Anticipate discharge on PO Abx  Repeat blood cultures sent 7/18    Michael Szymanski M.D.  [facility] Division of Infectious Disease  8AM-5PM Monday - Friday: Available on [employer] Teams  After Hours and Holidays (or if no response on [employer] Teams): Please contact the Infectious Diseases Office at [phone]

## 2023-07-19 NOTE — PROGRESS NOTE ADULT - SUBJECTIVE AND OBJECTIVE BOX
Patient is a 53y old  Female who presents with a chief complaint of fever, chill, nausea and vomiting (18 Jul 2023 14:59)      SUBJECTIVE / OVERNIGHT EVENTS: no events     T(C): 36.6 (07-19-23 @ 20:51), Max: 36.8 (07-19-23 @ 12:42)  HR: 71 (07-19-23 @ 20:51) (71 - 74)  BP: 138/82 (07-19-23 @ 20:51) (124/80 - 138/82)  RR: 18 (07-19-23 @ 20:51) (18 - 18)  SpO2: 93% (07-19-23 @ 20:51) (93% - 97%)      MEDICATIONS  (STANDING):  aspirin enteric coated 81 milliGRAM(s) Oral daily  atorvastatin 20 milliGRAM(s) Oral at bedtime  cefTRIAXone   IVPB 1000 milliGRAM(s) IV Intermittent every 24 hours  chlorhexidine 2% Cloths 1 Application(s) Topical daily  cinacalcet 30 milliGRAM(s) Oral two times a day  clopidogrel Tablet 75 milliGRAM(s) Oral daily  mycophenolate mofetil 500 milliGRAM(s) Oral two times a day  pantoprazole    Tablet 40 milliGRAM(s) Oral before breakfast  predniSONE   Tablet 5 milliGRAM(s) Oral daily  sodium chloride 0.45% 1000 milliLiter(s) (75 mL/Hr) IV Continuous <Continuous>  sodium chloride 0.9%. 1000 milliLiter(s) (100 mL/Hr) IV Continuous <Continuous>  tacrolimus ER Tablet (ENVARSUS XR) 11 milliGRAM(s) Oral daily    MEDICATIONS  (PRN):  acetaminophen     Tablet .. 650 milliGRAM(s) Oral every 6 hours PRN Temp greater or equal to 38C (100.4F), Moderate Pain (4 - 6)  sodium chloride 0.65% Nasal 1 Spray(s) Both Nostrils every 6 hours PRN Nasal Congestion  CAPILLARY BLOOD GLUCOSE      T(C): 36.6 (07-19-23 @ 20:51), Max: 36.8 (07-19-23 @ 12:42)  HR: 71 (07-19-23 @ 20:51) (71 - 74)  BP: 138/82 (07-19-23 @ 20:51) (124/80 - 138/82)  RR: 18 (07-19-23 @ 20:51) (18 - 18)  SpO2: 93% (07-19-23 @ 20:51) (93% - 97%)PHYSICAL EXAM:  GENERAL: NAD, well-developed  HEAD:  Atraumatic, Normocephalic  EYES: EOMI, PERRLA, conjunctiva and sclera clear  NECK: Supple, No JVD  CHEST/LUNG: Clear to auscultation bilaterally; No wheeze  HEART: Regular rate and rhythm; No murmurs, rubs, or gallops  ABDOMEN: Soft, Nontender, Nondistended; Bowel sounds present  EXTREMITIES:  2+ Peripheral Pulses, No clubbing, cyanosis, or edema  PSYCH: AAOx3  NEUROLOGY: non-focal  SKIN: No rashes or lesions                            10.2   11.24 )-----------( 185      ( 19 Jul 2023 07:51 )             34.0               136|101|17<106  3.4|24|1.66  9.0,--,--  07-19 @ 07:51  Urinalysis Basic - ( 18 Jul 2023 10:32 )    Color: x / Appearance: x / SG: x / pH: x  Gluc: 156 mg/dL / Ketone: x  / Bili: x / Urobili: x   Blood: x / Protein: x / Nitrite: x   Leuk Esterase: x / RBC: x / WBC x   Sq Epi: x / Non Sq Epi: x / Bacteria: x        RADIOLOGY & ADDITIONAL TESTS:    Imaging Personally Reviewed:    Consultant(s) Notes Reviewed:      Care Discussed with Consultants/Other Providers:

## 2023-07-19 NOTE — PROGRESS NOTE ADULT - ASSESSMENT
54 yo F with a ESRD s/p HepC DDRT in 2020, chronic SVC syndrome s/p L cephalic-iliac vein bypass on DAPT, s/p thrombectomy of L iliac vein and graft 2012, Hx BK viremia, HTN presenting to ED with 1 day of fever 103', chill, nausea and vomiting associated with dysuria, low abd pain with urination and right flank pain.   She denies recent travel, sick contact, or recent procedure. (17 Jul 2023 02:52)  Patient seen and examined at bedside reports symptoms stared Saturday night, and her mother who lives with her was discharged on Thursday from the hospital after been kept overnight for a leg trauma.       #Fever   #Leukocytosis  #Bacteremia  #UTI  #DDRT Recipient  #Immunosuppression due to drug therapy  --Tmax of 103.2 on 7/16 - remains afebrile, monitor temperatures  --Monitor CBC - last WBC 27.89  --UA (7/16) - positive nitrite, few bacteria, small leukocyte esterase, >50 WBC  --CXR (7/16) - Hazy opacities BLL  --BCX (7/16) - E.Coli. Repeat cultures (7/18) - no growth at 24 hours- follow  --Continue Ceftriaxone 1 gm Q24H

## 2023-07-19 NOTE — PROGRESS NOTE ADULT - NEGATIVE NEUROLOGICAL SYMPTOMS
no difficulty walking/no headache/no loss of consciousness
no difficulty walking/no headache/no loss of consciousness

## 2023-07-19 NOTE — PROGRESS NOTE ADULT - SUBJECTIVE AND OBJECTIVE BOX
Patient is a 53y old  Female who presents with a chief complaint of fever, chill, nausea and vomiting (18 Jul 2023 23:00)    Patient seen and examined at bedside. Patient reports abdominal/ lower back discomfort.    MEDICATIONS  (STANDING):  aspirin enteric coated 81 milliGRAM(s) Oral daily  atorvastatin 20 milliGRAM(s) Oral at bedtime  cefTRIAXone   IVPB 1000 milliGRAM(s) IV Intermittent every 24 hours  chlorhexidine 2% Cloths 1 Application(s) Topical daily  cinacalcet 30 milliGRAM(s) Oral two times a day  clopidogrel Tablet 75 milliGRAM(s) Oral daily  mycophenolate mofetil 500 milliGRAM(s) Oral two times a day  pantoprazole    Tablet 40 milliGRAM(s) Oral before breakfast  predniSONE   Tablet 5 milliGRAM(s) Oral daily  sodium chloride 0.45% 1000 milliLiter(s) (75 mL/Hr) IV Continuous <Continuous>  tacrolimus ER Tablet (ENVARSUS XR) 11 milliGRAM(s) Oral daily    MEDICATIONS  (PRN):  acetaminophen     Tablet .. 650 milliGRAM(s) Oral every 6 hours PRN Temp greater or equal to 38C (100.4F), Moderate Pain (4 - 6)  sodium chloride 0.65% Nasal 1 Spray(s) Both Nostrils every 6 hours PRN Nasal Congestion      ROS  No fever, sweats, chills  No epistaxis, HA, sore throat  No CP, SOB, cough, sputum  No n/v/d, abd pain, melena, hematochezia  No edema  No rash  No anxiety  No back pain, joint pain  No bleeding, bruising  No dysuria, hematuria    Vital Signs Last 24 Hrs  T(C): 37.4 (19 Jul 2023 04:34), Max: 37.4 (19 Jul 2023 04:34)  T(F): 99.3 (19 Jul 2023 04:34), Max: 99.3 (19 Jul 2023 04:34)  HR: 89 (19 Jul 2023 04:34) (79 - 90)  BP: 132/81 (19 Jul 2023 04:34) (107/70 - 132/81)  BP(mean): --  RR: 17 (19 Jul 2023 04:34) (16 - 18)  SpO2: 95% (19 Jul 2023 04:34) (93% - 98%)    Parameters below as of 19 Jul 2023 04:34  Patient On (Oxygen Delivery Method): room air        PE  NAD  Awake, alert  Anicteric, MMM  RRR  CTAB  Abd soft, NT, ND  No c/c/e                            10.2   11.24 )-----------( 185      ( 19 Jul 2023 07:51 )             34.0       07-19    136  |  101  |  17  ----------------------------<  106<H>  3.4<L>   |  24  |  1.66<H>    Ca    9.0      19 Jul 2023 07:51

## 2023-07-19 NOTE — PROGRESS NOTE ADULT - GENERAL GENITOURINARY SYMPTOMS
flank pain R/dysuria/urinary hesitancy/increased urinary frequency
flank pain R/dysuria/urinary hesitancy/increased urinary frequency

## 2023-07-19 NOTE — PROGRESS NOTE ADULT - ASSESSMENT
Patient is a 53y old  Female who presents with a chief complaint of fever, chill, nausea and vomiting    fever/leukocytosis   --2/2 UTI  --on ceftriaxone  --management per primary team    anemia  --possibly 2/2 UTI, renal disfunction  --Hgb 14 at baseline  --nephology following  --will check hapto, LDH, and for nutritional deficiencies  --trend CBC  --patient to follow up w/ Dr Jeffrey Oliva of [facility] upon discharge    will follow,    Joycelyn Delcid NP  Hematology/ Oncology  [facility]  [phone] (office)  [phone] (alt office)  Evenings and weekends please call MD on call or office   Patient is a 53y old  Female who presents with a chief complaint of fever, chill, nausea and vomiting    fever/leukocytosis   --2/2 UTI  --on ceftriaxone  --management per primary team    anemia  --possibly 2/2 UTI, renal disfunction  --Hgb 14 at baseline  --nephology following  --b12 folate normal, hapto elevated   --trend CBC  --patient to follow up w/ Dr Jeffrey Oliva of [facility] upon discharge    will follow,    Joycelyn Delcid NP  Hematology/ Oncology  [facility]  [phone] (office)  [phone] (alt office)  Evenings and weekends please call MD on call or office

## 2023-07-19 NOTE — PROGRESS NOTE ADULT - SUBJECTIVE AND OBJECTIVE BOX
Follow Up:    Fevers, bacteriemia    Interval History:  Seen and examined at bedside, endorse some improvement in dysuria. Reports headache and RLQ pain with palpation.  Afebrile with downtrending leukocytosis    REVIEW OF SYSTEMS  [  ] ROS unobtainable because:    [x  ] All other systems negative except as noted below    Constitutional:  [ ] fever [ ] chills  [ ] weight loss  [ ] weakness  Skin:  [ ] rash [ ] phlebitis	  Eyes: [ ] icterus [ ] pain  [ ] discharge	  ENMT: [ ] sore throat  [ ] thrush [ ] ulcers [ ] exudates  Respiratory: [ ] dyspnea [ ] hemoptysis [ ] cough [ ] sputum	  Cardiovascular:  [ ] chest pain [ ] palpitations [ ] edema	  Gastrointestinal:  [ ] nausea [ ] vomiting [ ] diarrhea [ ] constipation [ x] pain	  Genitourinary:  [x ] dysuria [ ] frequency [ ] hematuria [ ] discharge [ ] flank pain  [ ] incontinence  Musculoskeletal:  [ ] myalgias [ ] arthralgias [ ] arthritis  [ ] back pain  Neurological:  [ ] headache [ ] seizures  [ ] confusion/altered mental status    Allergies  shellfish (Urticaria)  contrast media (iodine-based) (Urticaria)  penicillin (Anaphylaxis)  ibuprofen/morphine (Unknown)  latex (Swelling)  lactose (Hives)  morphine (Urticaria)  ibuprofen (Hives)  morphine (Anaphylaxis)        ANTIMICROBIALS:  cefTRIAXone   IVPB 1000 every 24 hours      OTHER MEDS:  MEDICATIONS  (STANDING):  acetaminophen     Tablet .. 650 every 6 hours PRN  aspirin enteric coated 81 daily  atorvastatin 20 at bedtime  cinacalcet 30 two times a day  clopidogrel Tablet 75 daily  mycophenolate mofetil 500 two times a day  pantoprazole    Tablet 40 before breakfast  predniSONE   Tablet 5 daily  tacrolimus ER Tablet (ENVARSUS XR) 11 daily      Vital Signs Last 24 Hrs  T(C): 36.8 (19 Jul 2023 12:42), Max: 37.4 (19 Jul 2023 04:34)  T(F): 98.3 (19 Jul 2023 12:42), Max: 99.3 (19 Jul 2023 04:34)  HR: 74 (19 Jul 2023 12:42) (74 - 89)  BP: 124/80 (19 Jul 2023 12:42) (107/70 - 132/81)  BP(mean): --  RR: 18 (19 Jul 2023 12:42) (16 - 18)  SpO2: 97% (19 Jul 2023 12:42) (93% - 98%)    Parameters below as of 19 Jul 2023 12:42  Patient On (Oxygen Delivery Method): room air        PHYSICAL EXAMINATION:  General: Alert and Awake, NAD  HEENT: PERRL, EOMI  Neck: Supple  Cardiac: RRR, No M/R/G  Resp: CTAB, No Wh/Rh/Ra  Abdomen: NBS, NT/ND, No HSM, No rigidity or guarding  MSK: No LE edema. No Calf tenderness  : No Kim  Skin: No rashes or lesions. Skin is warm and dry to the touch.   Neuro: Alert and Awake. CN 2-12 Grossly intact. Moves all four extremities spontaneously.  Psych: Calm, Pleasant, Cooperative                          10.2   11.24 )-----------( 185      ( 19 Jul 2023 07:51 )             34.0       07-19    136  |  101  |  17  ----------------------------<  106<H>  3.4<L>   |  24  |  1.66<H>    Ca    9.0      19 Jul 2023 07:51        Urinalysis Basic - ( 19 Jul 2023 07:51 )    Color: x / Appearance: x / SG: x / pH: x  Gluc: 106 mg/dL / Ketone: x  / Bili: x / Urobili: x   Blood: x / Protein: x / Nitrite: x   Leuk Esterase: x / RBC: x / WBC x   Sq Epi: x / Non Sq Epi: x / Bacteria: x        MICROBIOLOGY:  v  .Blood Blood  07-18-23   No growth at 24 hours  --  --      Clean Catch Clean Catch (Midstream)  07-16-23   >100,000 CFU/ml Escherichia coli  --  --      .Blood Blood-Peripheral  07-16-23   Growth in aerobic and anaerobic bottles: Escherichia coli  See previous culture [id number]  --    Growth in aerobic and anaerobic bottles: Gram Negative Rods      .Blood Blood-Peripheral  07-16-23   Growth in aerobic and anaerobic bottles: Escherichia coli  Direct identification is available within approximately 3-5  hours either by Blood Panel Multiplexed PCR or Direct  MALDI-TOF. Details: [email or web address]  --  Blood Culture PCR  Escherichia coli          Rapid RVP Result: NotDetec (07-16 @ 21:22)        RADIOLOGY:    <The imaging below has been reviewed and visualized by me independently. Findings as detailed in report below>    < from: CT Head No Cont (07.19.23 @ 02:16) >  FINDINGS: There is no acute intracranial hemorrhage, mass effect, shift   of the midline structures, herniation, extra-axial fluid collection, or   hydrocephalus.    Polypoidal soft tissue is seen within the bilateral maxillary sinuses.   The tympanomastoid cavities are clear. The orbitsare unremarkable. The   calvarium is intact.      < end of copied text >

## 2023-07-19 NOTE — PROGRESS NOTE ADULT - ASSESSMENT
54 yo F with a ESRD s/p HepC DDRT in 2020, chronic SVC syndrome s/p L cephalic-iliac vein bypass on DAPT, s/p thrombectomy of L iliac vein and graft 2012, Hx BK viremia, HTN presenting to ED with 1 day of fever, chill, nausea and vomiting admitted sepsis due to pyelonephritis

## 2023-07-19 NOTE — PROGRESS NOTE ADULT - SUBJECTIVE AND OBJECTIVE BOX
[facility]   MD MEL DOBBS MD BRIANA PETITO, NP    TEL:  FROM 9 AM to 5 PM ---OFFICE: [phone]  FROM 5 PM - 9 AM PLEASE CALL ANSWERING SERVICE: [phone]     RENAL PROGRESS NOTE: DATE OF SERVICE 07-19-23 @ 13:15  Patient is a 53y old  Female who presents with a chief complaint of fever, chill, nausea and vomiting     Patient seen and examined at bedside. No chest pain/sob    VITALS:  T(F): 98.3 (07-19-23 @ 12:42), Max: 99.3 (07-19-23 @ 04:34)  HR: 74 (07-19-23 @ 12:42)  BP: 124/80 (07-19-23 @ 12:42)  RR: 18 (07-19-23 @ 12:42)  SpO2: 97% (07-19-23 @ 12:42)  Wt(kg): --    07-18 @ 07:01  -  07-19 @ 07:00  --------------------------------------------------------  IN: 1000 mL / OUT: 0 mL / NET: 1000 mL    07-19 @ 07:01  -  07-19 @ 13:15  --------------------------------------------------------  IN: 480 mL / OUT: 0 mL / NET: 480 mL      Height (cm): 160 (07-18 @ 14:34)  Weight (kg): 67.132 (07-18 @ 14:34)  BMI (kg/m2): 26.2 (07-18 @ 14:34)  BSA (m2): 1.7 (07-18 @ 14:34)    PHYSICAL EXAM:  Constitutional: NAD  Neck: No JVD  Respiratory: CTAB, no wheezes, rales or rhonchi  Cardiovascular: S1, S2, RRR  Gastrointestinal: BS+, soft, NT/ND  Extremities: No peripheral edema    Hospital Medications:   MEDICATIONS  (STANDING):  aspirin enteric coated 81 milliGRAM(s) Oral daily  atorvastatin 20 milliGRAM(s) Oral at bedtime  cefTRIAXone   IVPB 1000 milliGRAM(s) IV Intermittent every 24 hours  chlorhexidine 2% Cloths 1 Application(s) Topical daily  cinacalcet 30 milliGRAM(s) Oral two times a day  clopidogrel Tablet 75 milliGRAM(s) Oral daily  mycophenolate mofetil 500 milliGRAM(s) Oral two times a day  pantoprazole    Tablet 40 milliGRAM(s) Oral before breakfast  predniSONE   Tablet 5 milliGRAM(s) Oral daily  sodium chloride 0.45% 1000 milliLiter(s) (75 mL/Hr) IV Continuous <Continuous>  sodium chloride 0.9%. 1000 milliLiter(s) (100 mL/Hr) IV Continuous <Continuous>  tacrolimus ER Tablet (ENVARSUS XR) 11 milliGRAM(s) Oral daily    Tacrolimus (), Serum: 6.6 ng/mL (07-19 @ 07:50)    LABS:  07-19    136  |  101  |  17  ----------------------------<  106<H>  3.4<L>   |  24  |  1.66<H>    Ca    9.0      19 Jul 2023 07:51      Creatinine Trend: 1.66 <--, 1.51 <--, 1.42 <--, 1.24 <--    Iron - Total Binding Capacity.: 262 ug/dL (07-19 @ 07:51)  Iron Total: 17 ug/dL (07-19 @ 07:51)  Ferritin: 142 ng/mL (07-19 @ 07:51)                              10.2   11.24 )-----------( 185      ( 19 Jul 2023 07:51 )             34.0     Urine Studies:  Urinalysis - [07-19-23 @ 07:51]      Color  / Appearance  / SG  / pH       Gluc 106 / Ketone   / Bili  / Urobili        Blood  / Protein  / Leuk Est  / Nitrite       RBC  / WBC  / Hyaline  / Gran  / Sq Epi  / Non Sq Epi  / Bacteria     Urine Creatinine 219      [07-17-23 @ 17:28]  Urine Sodium 15      [07-17-23 @ 17:28]    Iron 17, TIBC 262, %sat 6      [07-19-23 @ 07:51]  Ferritin 142      [07-19-23 @ 07:51]  PTH -- (Ca 11.2)      [07-03-23 @ 15:05]   170  Vitamin D (25OH) 31.8      [07-03-23 @ 15:05]        RADIOLOGY & ADDITIONAL STUDIES:

## 2023-07-20 LAB
ANION GAP SERPL CALC-SCNC: 11 MMOL/L — SIGNIFICANT CHANGE UP (ref 5–17)
BASOPHILS # BLD AUTO: 0 K/UL — SIGNIFICANT CHANGE UP (ref 0–0.2)
BASOPHILS NFR BLD AUTO: 0 % — SIGNIFICANT CHANGE UP (ref 0–2)
BUN SERPL-MCNC: 12 MG/DL — SIGNIFICANT CHANGE UP (ref 7–23)
C DIFF GDH STL QL: NEGATIVE — SIGNIFICANT CHANGE UP
C DIFF GDH STL QL: SIGNIFICANT CHANGE UP
CALCIUM SERPL-MCNC: 9.4 MG/DL — SIGNIFICANT CHANGE UP (ref 8.4–10.5)
CHLORIDE SERPL-SCNC: 103 MMOL/L — SIGNIFICANT CHANGE UP (ref 96–108)
CO2 SERPL-SCNC: 24 MMOL/L — SIGNIFICANT CHANGE UP (ref 22–31)
CREAT SERPL-MCNC: 1.42 MG/DL — HIGH (ref 0.5–1.3)
EGFR: 45 ML/MIN/1.73M2 — LOW
EOSINOPHIL # BLD AUTO: 0.17 K/UL — SIGNIFICANT CHANGE UP (ref 0–0.5)
EOSINOPHIL NFR BLD AUTO: 1.7 % — SIGNIFICANT CHANGE UP (ref 0–6)
GLUCOSE SERPL-MCNC: 112 MG/DL — HIGH (ref 70–99)
HCT VFR BLD CALC: 33.9 % — LOW (ref 34.5–45)
HGB BLD-MCNC: 10.4 G/DL — LOW (ref 11.5–15.5)
LYMPHOCYTES # BLD AUTO: 0.35 K/UL — LOW (ref 1–3.3)
LYMPHOCYTES # BLD AUTO: 3.5 % — LOW (ref 13–44)
MANUAL SMEAR VERIFICATION: SIGNIFICANT CHANGE UP
MCHC RBC-ENTMCNC: 25.1 PG — LOW (ref 27–34)
MCHC RBC-ENTMCNC: 30.7 GM/DL — LOW (ref 32–36)
MCV RBC AUTO: 81.7 FL — SIGNIFICANT CHANGE UP (ref 80–100)
MONOCYTES # BLD AUTO: 0.78 K/UL — SIGNIFICANT CHANGE UP (ref 0–0.9)
MONOCYTES NFR BLD AUTO: 7.8 % — SIGNIFICANT CHANGE UP (ref 2–14)
NEUTROPHILS # BLD AUTO: 8.69 K/UL — HIGH (ref 1.8–7.4)
NEUTROPHILS NFR BLD AUTO: 86.1 % — HIGH (ref 43–77)
NEUTS BAND # BLD: 0.9 % — SIGNIFICANT CHANGE UP (ref 0–8)
PLAT MORPH BLD: NORMAL — SIGNIFICANT CHANGE UP
PLATELET # BLD AUTO: 208 K/UL — SIGNIFICANT CHANGE UP (ref 150–400)
POTASSIUM SERPL-MCNC: 3.3 MMOL/L — LOW (ref 3.5–5.3)
POTASSIUM SERPL-SCNC: 3.3 MMOL/L — LOW (ref 3.5–5.3)
RBC # BLD: 4.15 M/UL — SIGNIFICANT CHANGE UP (ref 3.8–5.2)
RBC # FLD: 17.2 % — HIGH (ref 10.3–14.5)
RBC BLD AUTO: SIGNIFICANT CHANGE UP
SODIUM SERPL-SCNC: 138 MMOL/L — SIGNIFICANT CHANGE UP (ref 135–145)
TACROLIMUS SERPL-MCNC: 6.4 NG/ML — SIGNIFICANT CHANGE UP
WBC # BLD: 9.99 K/UL — SIGNIFICANT CHANGE UP (ref 3.8–10.5)
WBC # FLD AUTO: 9.99 K/UL — SIGNIFICANT CHANGE UP (ref 3.8–10.5)

## 2023-07-20 PROCEDURE — 99233 SBSQ HOSP IP/OBS HIGH 50: CPT

## 2023-07-20 RX ORDER — SODIUM CHLORIDE 9 MG/ML
1000 INJECTION INTRAMUSCULAR; INTRAVENOUS; SUBCUTANEOUS
Refills: 0 | Status: DISCONTINUED | OUTPATIENT
Start: 2023-07-20 | End: 2023-07-21

## 2023-07-20 RX ORDER — POTASSIUM CHLORIDE 20 MEQ
20 PACKET (EA) ORAL ONCE
Refills: 0 | Status: COMPLETED | OUTPATIENT
Start: 2023-07-20 | End: 2023-07-20

## 2023-07-20 RX ORDER — ONDANSETRON 8 MG/1
4 TABLET, FILM COATED ORAL ONCE
Refills: 0 | Status: COMPLETED | OUTPATIENT
Start: 2023-07-20 | End: 2023-07-20

## 2023-07-20 RX ADMIN — Medication 20 MILLIEQUIVALENT(S): at 12:58

## 2023-07-20 RX ADMIN — TACROLIMUS 11 MILLIGRAM(S): 5 CAPSULE ORAL at 06:38

## 2023-07-20 RX ADMIN — CHLORHEXIDINE GLUCONATE 1 APPLICATION(S): 213 SOLUTION TOPICAL at 12:59

## 2023-07-20 RX ADMIN — CLOPIDOGREL BISULFATE 75 MILLIGRAM(S): 75 TABLET, FILM COATED ORAL at 12:59

## 2023-07-20 RX ADMIN — Medication 5 MILLIGRAM(S): at 06:09

## 2023-07-20 RX ADMIN — CINACALCET 30 MILLIGRAM(S): 30 TABLET, FILM COATED ORAL at 06:08

## 2023-07-20 RX ADMIN — CINACALCET 30 MILLIGRAM(S): 30 TABLET, FILM COATED ORAL at 18:36

## 2023-07-20 RX ADMIN — MYCOPHENOLATE MOFETIL 500 MILLIGRAM(S): 250 CAPSULE ORAL at 06:09

## 2023-07-20 RX ADMIN — MYCOPHENOLATE MOFETIL 500 MILLIGRAM(S): 250 CAPSULE ORAL at 18:36

## 2023-07-20 RX ADMIN — PANTOPRAZOLE SODIUM 40 MILLIGRAM(S): 20 TABLET, DELAYED RELEASE ORAL at 06:09

## 2023-07-20 RX ADMIN — SODIUM CHLORIDE 100 MILLILITER(S): 9 INJECTION INTRAMUSCULAR; INTRAVENOUS; SUBCUTANEOUS at 15:00

## 2023-07-20 RX ADMIN — ONDANSETRON 4 MILLIGRAM(S): 8 TABLET, FILM COATED ORAL at 22:21

## 2023-07-20 RX ADMIN — ATORVASTATIN CALCIUM 20 MILLIGRAM(S): 80 TABLET, FILM COATED ORAL at 21:46

## 2023-07-20 RX ADMIN — Medication 81 MILLIGRAM(S): at 12:58

## 2023-07-20 RX ADMIN — CEFTRIAXONE 100 MILLIGRAM(S): 500 INJECTION, POWDER, FOR SOLUTION INTRAMUSCULAR; INTRAVENOUS at 21:43

## 2023-07-20 NOTE — PROGRESS NOTE ADULT - SUBJECTIVE AND OBJECTIVE BOX
Follow Up:    UTI    Interval History:  Seen and examined at bedside. Reports loose watery bowel movements that comes out every time she urinates  Denies fever/chills/body aches. Endorses mild dysuria and groin pain    REVIEW OF SYSTEMS  [  ] ROS unobtainable because:    [ x ] All other systems negative except as noted below    Constitutional:  [ ] fever [ ] chills  [ ] weight loss  [ ] weakness  Skin:  [ ] rash [ ] phlebitis	  Eyes: [ ] icterus [ ] pain  [ ] discharge	  ENMT: [ ] sore throat  [ ] thrush [ ] ulcers [ ] exudates  Respiratory: [ ] dyspnea [ ] hemoptysis [ ] cough [ ] sputum	  Cardiovascular:  [ ] chest pain [ ] palpitations [ ] edema	  Gastrointestinal:  [ ] nausea [ ] vomiting [ ] diarrhea [ ] constipation [ ] pain	  Genitourinary:  [ ] dysuria [ ] frequency [ ] hematuria [ ] discharge [ ] flank pain  [ ] incontinence  Musculoskeletal:  [ ] myalgias [ ] arthralgias [ ] arthritis  [ ] back pain  Neurological:  [ ] headache [ ] seizures  [ ] confusion/altered mental status    Allergies  shellfish (Urticaria)  contrast media (iodine-based) (Urticaria)  penicillin (Anaphylaxis)  ibuprofen/morphine (Unknown)  latex (Swelling)  lactose (Hives)  morphine (Urticaria)  ibuprofen (Hives)  morphine (Anaphylaxis)        ANTIMICROBIALS:  cefTRIAXone   IVPB 1000 every 24 hours      OTHER MEDS:  MEDICATIONS  (STANDING):  acetaminophen     Tablet .. 650 every 6 hours PRN  aspirin enteric coated 81 daily  atorvastatin 20 at bedtime  cinacalcet 30 two times a day  clopidogrel Tablet 75 daily  mycophenolate mofetil 500 two times a day  pantoprazole    Tablet 40 before breakfast  predniSONE   Tablet 5 daily  tacrolimus ER Tablet (ENVARSUS XR) 11 daily      Vital Signs Last 24 Hrs  T(C): 36.5 (20 Jul 2023 13:31), Max: 37 (20 Jul 2023 01:00)  T(F): 97.7 (20 Jul 2023 13:31), Max: 98.6 (20 Jul 2023 01:00)  HR: 76 (20 Jul 2023 13:31) (71 - 91)  BP: 135/84 (20 Jul 2023 13:31) (129/83 - 138/82)  BP(mean): --  RR: 18 (20 Jul 2023 13:31) (16 - 18)  SpO2: 98% (20 Jul 2023 13:31) (93% - 98%)    Parameters below as of 20 Jul 2023 13:31  Patient On (Oxygen Delivery Method): room air        PHYSICAL EXAMINATION:  General: Alert and Awake, NAD  HEENT: PERRL, EOMI  Neck: Supple  Cardiac: RRR, No M/R/G  Resp: CTAB, No Wh/Rh/Ra  Abdomen: NBS, NT/ND, No HSM, No rigidity or guarding  MSK: No LE edema. No Calf tenderness  : No ndiaye  Skin: No rashes or lesions. Skin is warm and dry to the touch.   Neuro: Alert and Awake. CN 2-12 Grossly intact. Moves all four extremities spontaneously.  Psych: Calm, Pleasant, Cooperative                          10.4   9.99  )-----------( 208      ( 20 Jul 2023 07:08 )             33.9       07-20    138  |  103  |  12  ----------------------------<  112<H>  3.3<L>   |  24  |  1.42<H>    Ca    9.4      20 Jul 2023 07:02        Urinalysis Basic - ( 20 Jul 2023 07:02 )    Color: x / Appearance: x / SG: x / pH: x  Gluc: 112 mg/dL / Ketone: x  / Bili: x / Urobili: x   Blood: x / Protein: x / Nitrite: x   Leuk Esterase: x / RBC: x / WBC x   Sq Epi: x / Non Sq Epi: x / Bacteria: x        MICROBIOLOGY:  v  .Blood Blood  07-18-23   No growth at 24 hours  --  --      .Blood Blood  07-18-23   No growth at 48 Hours  --  --      Clean Catch Clean Catch (Midstream)  07-16-23   >100,000 CFU/ml Escherichia coli  --  Escherichia coli      .Blood Blood-Peripheral  07-16-23   Growth in aerobic and anaerobic bottles: Escherichia coli  See previous culture [id number]  --    Growth in aerobic and anaerobic bottles: Gram Negative Rods      .Blood Blood-Peripheral  07-16-23   Growth in aerobic and anaerobic bottles: Escherichia coli  Direct identification is available within approximately 3-5  hours either by Blood Panel Multiplexed PCR or Direct  MALDI-TOF. Details: [email or web address]  --  Blood Culture PCR  Escherichia coli          Rapid RVP Result: NotDetec (07-16 @ 21:22)    Clostridium difficile GDH Toxins A&amp;B, EIA:   Negative (07-20-23 @ 13:51)  Clostridium difficile GDH Interpretation: Negative for toxigenic C. Difficile.  This specimen is negative for C.  Difficile glutamate dehydrogenase (GDH) antigen and negative for C.  Difficile Toxins A & B, by EIA.  GDH is a highly sensitive screening  marker for C. Difficile that is produced in large amounts by all C.  Difficile strains, both toxigenic and nontoxigenic.  This assay has not  been validated as a test of cure.  Repeat testing during the same episode  of diarrhea is of limited value and is discouraged.  The results of this  assay should always be interpreted in conjunction with pateint's clinical  history. (07-20-23 @ 13:51)      RADIOLOGY:    <The imaging below has been reviewed and visualized by me independently. Findings as detailed in report below> Follow Up:    UTI    Interval History:  Seen and examined at bedside. Reports loose watery bowel movements that comes out every time she urinates  Denies fever/chills/body aches. Endorses mild dysuria and groin pain    REVIEW OF SYSTEMS  [  ] ROS unobtainable because:    [ x ] All other systems negative except as noted below    Constitutional:  [ ] fever [ ] chills  [ ] weight loss  [ ] weakness  Skin:  [ ] rash [ ] phlebitis	  Eyes: [ ] icterus [ ] pain  [ ] discharge	  ENMT: [ ] sore throat  [ ] thrush [ ] ulcers [ ] exudates  Respiratory: [ ] dyspnea [ ] hemoptysis [ ] cough [ ] sputum	  Cardiovascular:  [ ] chest pain [ ] palpitations [ ] edema	  Gastrointestinal:  [ ] nausea [ ] vomiting [ ] diarrhea [ ] constipation [ ] pain	  Genitourinary:  [ ] dysuria [ ] frequency [ ] hematuria [ ] discharge [ ] flank pain  [ ] incontinence  Musculoskeletal:  [ ] myalgias [ ] arthralgias [ ] arthritis  [ ] back pain  Neurological:  [ ] headache [ ] seizures  [ ] confusion/altered mental status    Allergies  shellfish (Urticaria)  contrast media (iodine-based) (Urticaria)  penicillin (Anaphylaxis)  ibuprofen/morphine (Unknown)  latex (Swelling)  lactose (Hives)  morphine (Urticaria)  ibuprofen (Hives)  morphine (Anaphylaxis)        ANTIMICROBIALS:  cefTRIAXone   IVPB 1000 every 24 hours      OTHER MEDS:  MEDICATIONS  (STANDING):  acetaminophen     Tablet .. 650 every 6 hours PRN  aspirin enteric coated 81 daily  atorvastatin 20 at bedtime  cinacalcet 30 two times a day  clopidogrel Tablet 75 daily  mycophenolate mofetil 500 two times a day  pantoprazole    Tablet 40 before breakfast  predniSONE   Tablet 5 daily  tacrolimus ER Tablet (ENVARSUS XR) 11 daily      Vital Signs Last 24 Hrs  T(C): 36.5 (20 Jul 2023 13:31), Max: 37 (20 Jul 2023 01:00)  T(F): 97.7 (20 Jul 2023 13:31), Max: 98.6 (20 Jul 2023 01:00)  HR: 76 (20 Jul 2023 13:31) (71 - 91)  BP: 135/84 (20 Jul 2023 13:31) (129/83 - 138/82)  BP(mean): --  RR: 18 (20 Jul 2023 13:31) (16 - 18)  SpO2: 98% (20 Jul 2023 13:31) (93% - 98%)    Parameters below as of 20 Jul 2023 13:31  Patient On (Oxygen Delivery Method): room air        PHYSICAL EXAMINATION:  General: Alert and Awake, NAD  HEENT: PERRL, EOMI  Neck: Supple  Cardiac: RRR, No M/R/G  Resp: CTAB, No Wh/Rh/Ra  Abdomen: NBS, NT/ND, No HSM, No rigidity or guarding  MSK: No LE edema. No Calf tenderness  : No ndiaye  Skin: No rashes or lesions. Skin is warm and dry to the touch.   Neuro: Alert and Awake. CN 2-12 Grossly intact. Moves all four extremities spontaneously.  Psych: Calm, Pleasant, Cooperative                          10.4   9.99  )-----------( 208      ( 20 Jul 2023 07:08 )             33.9       07-20    138  |  103  |  12  ----------------------------<  112<H>  3.3<L>   |  24  |  1.42<H>    Ca    9.4      20 Jul 2023 07:02        Urinalysis Basic - ( 20 Jul 2023 07:02 )    Color: x / Appearance: x / SG: x / pH: x  Gluc: 112 mg/dL / Ketone: x  / Bili: x / Urobili: x   Blood: x / Protein: x / Nitrite: x   Leuk Esterase: x / RBC: x / WBC x   Sq Epi: x / Non Sq Epi: x / Bacteria: x        MICROBIOLOGY:  v  .Blood Blood  07-18-23   No growth at 24 hours  --  --      .Blood Blood  07-18-23   No growth at 48 Hours  --  --      Clean Catch Clean Catch (Midstream)  07-16-23   >100,000 CFU/ml Escherichia coli  --  Escherichia coli      .Blood Blood-Peripheral  07-16-23   Growth in aerobic and anaerobic bottles: Escherichia coli  See previous culture [id number]  --    Growth in aerobic and anaerobic bottles: Gram Negative Rods      .Blood Blood-Peripheral  07-16-23   Growth in aerobic and anaerobic bottles: Escherichia coli  Direct identification is available within approximately 3-5  hours either by Blood Panel Multiplexed PCR or Direct  MALDI-TOF. Details: [email or web address]  --  Blood Culture PCR  Escherichia coli          Rapid RVP Result: NotDetec (07-16 @ 21:22)    Clostridium difficile GDH Toxins A&amp;B, EIA:   Negative (07-20-23 @ 13:51)  Clostridium difficile GDH Interpretation: Negative for toxigenic C. Difficile.  This specimen is negative for C.  Difficile glutamate dehydrogenase (GDH) antigen and negative for C.  Difficile Toxins A & B, by EIA.  GDH is a highly sensitive screening  marker for C. Difficile that is produced in large amounts by all C.  Difficile strains, both toxigenic and nontoxigenic.  This assay has not  been validated as a test of cure.  Repeat testing during the same episode  of diarrhea is of limited value and is discouraged.  The results of this  assay should always be interpreted in conjunction with pateint's clinical  history. (07-20-23 @ 13:51)      RADIOLOGY:    <The imaging below has been reviewed and visualized by me independently. Findings as detailed in report below>    < from: CT Head No Cont (07.19.23 @ 02:16) >  IMPRESSION: No acute intracranial hemorrhage, mass effect, or shift of   the midline structures.    --- End of Report ---        < end of copied text >

## 2023-07-20 NOTE — PROGRESS NOTE ADULT - ASSESSMENT
52 yo F with a ESRD s/p HepC DDRT in 2020, chronic SVC syndrome s/p L cephalic-iliac vein bypass on DAPT, s/p thrombectomy of L iliac vein and graft 2012, Hx BK viremia, HTN presenting to ED with 1 day of fever 103', chill, nausea and vomiting associated with dysuria, low abd pain with urination and right flank pain.   She denies recent travel, sick contact, or recent procedure. (17 Jul 2023 02:52)  Patient seen and examined at bedside reports symptoms stared Saturday night, and her mother who lives with her was discharged on Thursday from the hospital after been kept overnight for a leg trauma.       #Fever   #Leukocytosis  #Bacteremia  #UTI  #DDRT Recipient  #Immunosuppression due to drug therapy  --Tmax of 103.2 on 7/16 - remains afebrile, monitor temperatures  --Monitor CBC - last WBC 27.89  --UA (7/16) - positive nitrite, few bacteria, small leukocyte esterase, >50 WBC  --CXR (7/16) - Hazy opacities BLL  --BCX (7/16) - E.Coli. Repeat cultures (7/18) - no growth at 24 hours- follow  --Continue Ceftriaxone 1 gm Q24H    #Diarrhea   --R/O C-diff  --Send stool for C-Diff  --Initiate contact isolation   --Send GI PCR

## 2023-07-20 NOTE — PROGRESS NOTE ADULT - SUBJECTIVE AND OBJECTIVE BOX
Patient is a 53y old  Female who presents with a chief complaint of fever, chill, nausea and vomiting (19 Jul 2023 21:27)    Pt seen and examined at bedside.     MEDICATIONS  (STANDING):  aspirin enteric coated 81 milliGRAM(s) Oral daily  atorvastatin 20 milliGRAM(s) Oral at bedtime  cefTRIAXone   IVPB 1000 milliGRAM(s) IV Intermittent every 24 hours  chlorhexidine 2% Cloths 1 Application(s) Topical daily  cinacalcet 30 milliGRAM(s) Oral two times a day  clopidogrel Tablet 75 milliGRAM(s) Oral daily  mycophenolate mofetil 500 milliGRAM(s) Oral two times a day  pantoprazole    Tablet 40 milliGRAM(s) Oral before breakfast  potassium chloride    Tablet ER 20 milliEquivalent(s) Oral once  predniSONE   Tablet 5 milliGRAM(s) Oral daily  sodium chloride 0.45% 1000 milliLiter(s) (75 mL/Hr) IV Continuous <Continuous>  sodium chloride 0.9%. 1000 milliLiter(s) (100 mL/Hr) IV Continuous <Continuous>  sodium chloride 0.9%. 1000 milliLiter(s) (100 mL/Hr) IV Continuous <Continuous>  tacrolimus ER Tablet (ENVARSUS XR) 11 milliGRAM(s) Oral daily    MEDICATIONS  (PRN):  acetaminophen     Tablet .. 650 milliGRAM(s) Oral every 6 hours PRN Temp greater or equal to 38C (100.4F), Moderate Pain (4 - 6)  sodium chloride 0.65% Nasal 1 Spray(s) Both Nostrils every 6 hours PRN Nasal Congestion      Vital Signs Last 24 Hrs  T(C): 37 (20 Jul 2023 05:03), Max: 37 (20 Jul 2023 01:00)  T(F): 98.6 (20 Jul 2023 05:03), Max: 98.6 (20 Jul 2023 01:00)  HR: 91 (20 Jul 2023 05:03) (71 - 91)  BP: 133/85 (20 Jul 2023 05:03) (129/83 - 138/82)  BP(mean): --  RR: 18 (20 Jul 2023 06:48) (16 - 18)  SpO2: 95% (20 Jul 2023 06:48) (93% - 95%)    Parameters below as of 20 Jul 2023 06:48  Patient On (Oxygen Delivery Method): room air        PE  NAD  Awake, alert  Anicteric, MMM  No c/c/e  No rash grossly  FROM                          10.4   9.99  )-----------( 208      ( 20 Jul 2023 07:08 )             33.9       07-20    138  |  103  |  12  ----------------------------<  112<H>  3.3<L>   |  24  |  1.42<H>    Ca    9.4      20 Jul 2023 07:02

## 2023-07-20 NOTE — PROGRESS NOTE ADULT - ASSESSMENT
Patient is a 53y old  Female who presents with a chief complaint of fever, chill, nausea and vomiting    fever/leukocytosis   --2/2 UTI  --on ceftriaxone  --management per primary team  --Currently afebrile    anemia  --possibly 2/2 UTI, renal disfunction  --Hgb 14 at baseline  --nephology following  --b12 folate normal, hapto elevated   --trend CBC  --patient to follow up w/ Dr Jeffrey Oliva of [facility] upon discharge    will follow,    Jack Anderson PA-C  Hematology/Oncology  [facility]  [phone] (office)

## 2023-07-20 NOTE — PROGRESS NOTE ADULT - SUBJECTIVE AND OBJECTIVE BOX
[facility] [place] [facility]   MD MEL DOBBS MD BRIANA PETITO, NP    TEL:  FROM 9 AM to 5 PM ---OFFICE: [phone]  FROM 5 PM - 9 AM PLEASE CALL ANSWERING SERVICE: [phone]     RENAL PROGRESS NOTE: DATE OF SERVICE 07-20-23 @ 14:01  Patient is a 53y old  Female who presents with a chief complaint of fever, chill, nausea and vomiting     Patient seen and examined at bedside. No chest pain/sob    VITALS:  T(F): 97.7 (07-20-23 @ 13:31), Max: 98.6 (07-20-23 @ 01:00)  HR: 76 (07-20-23 @ 13:31)  BP: 135/84 (07-20-23 @ 13:31)  RR: 18 (07-20-23 @ 13:31)  SpO2: 98% (07-20-23 @ 13:31)  Wt(kg): --    07-19 @ 07:01  -  07-20 @ 07:00  --------------------------------------------------------  IN: 3172.5 mL / OUT: 100 mL / NET: 3072.5 mL      PHYSICAL EXAM:  Constitutional: NAD  Neck: No JVD  Respiratory: CTAB, no wheezes, rales or rhonchi  Cardiovascular: S1, S2, RRR  Gastrointestinal: BS+, soft, NT/ND  Extremities: No peripheral edema    Hospital Medications:   MEDICATIONS  (STANDING):  aspirin enteric coated 81 milliGRAM(s) Oral daily  atorvastatin 20 milliGRAM(s) Oral at bedtime  cefTRIAXone   IVPB 1000 milliGRAM(s) IV Intermittent every 24 hours  chlorhexidine 2% Cloths 1 Application(s) Topical daily  cinacalcet 30 milliGRAM(s) Oral two times a day  clopidogrel Tablet 75 milliGRAM(s) Oral daily  mycophenolate mofetil 500 milliGRAM(s) Oral two times a day  pantoprazole    Tablet 40 milliGRAM(s) Oral before breakfast  predniSONE   Tablet 5 milliGRAM(s) Oral daily  sodium chloride 0.45% 1000 milliLiter(s) (75 mL/Hr) IV Continuous <Continuous>  sodium chloride 0.9%. 1000 milliLiter(s) (100 mL/Hr) IV Continuous <Continuous>  sodium chloride 0.9%. 1000 milliLiter(s) (100 mL/Hr) IV Continuous <Continuous>  tacrolimus ER Tablet (ENVARSUS XR) 11 milliGRAM(s) Oral daily    Tacrolimus (), Serum: 6.4 ng/mL (07-20 @ 07:05)    LABS:  07-20    138  |  103  |  12  ----------------------------<  112<H>  3.3<L>   |  24  |  1.42<H>    Ca    9.4      20 Jul 2023 07:02      Creatinine Trend: 1.42 <--, 1.66 <--, 1.51 <--, 1.42 <--, 1.24 <--                                10.4   9.99  )-----------( 208      ( 20 Jul 2023 07:08 )             33.9     Urine Studies:  Urinalysis - [07-20-23 @ 07:02]      Color  / Appearance  / SG  / pH       Gluc 112 / Ketone   / Bili  / Urobili        Blood  / Protein  / Leuk Est  / Nitrite       RBC  / WBC  / Hyaline  / Gran  / Sq Epi  / Non Sq Epi  / Bacteria     Urine Creatinine 219      [07-17-23 @ 17:28]  Urine Sodium 15      [07-17-23 @ 17:28]    Iron 17, TIBC 262, %sat 6      [07-19-23 @ 07:51]  Ferritin 142      [07-19-23 @ 07:51]  PTH -- (Ca 11.2)      [07-03-23 @ 15:05]   170  Vitamin D (25OH) 31.8      [07-03-23 @ 15:05]        RADIOLOGY & ADDITIONAL STUDIES:

## 2023-07-20 NOTE — PROGRESS NOTE ADULT - PROBLEM SELECTOR PLAN 2
- will continue immunosuppression with Prednisone ,cellcept and Tacrolimus   Tacro levels   - Renal transplant team following

## 2023-07-20 NOTE — PROGRESS NOTE ADULT - PROBLEM SELECTOR PLAN 1
with sepsis (leukocytosis, fever and pyelonephritis),  Bacteremia   CT abdomen reviewed   - s/p Ceftriaxone x1 and IVF 1L in ED  - will continue with Ceftriaxone IV for now as culture pending (PCN allergy)  -ID consult appreciated     Blood cx no grewth 07/16   Follow up
with sepsis (leukocytosis, fever and pyelonephritis),  Bacteremia   CT abdomen reviewed   - s/p Ceftriaxone x1 and IVF 1L in ED  - will continue with Ceftriaxone IV for now as culture pending (PCN allergy)  -ID consult appreciated     Blood cx no grewth 07/16   Follow up
with sepsis (leukocytosis, fever and pyelonephritis),  Bacteremia   CT abdomen reviewed   - s/p Ceftriaxone x1 and IVF 1L in ED  - will continue with Ceftriaxone IV for now as culture pending (PCN allergy)  -ID consult appreciated

## 2023-07-20 NOTE — PROGRESS NOTE ADULT - ASSESSMENT
52 yo F with a ESRD s/p HepC DDRT in 2020, chronic SVC syndrome s/p L cephalic-iliac vein bypass on DAPT, s/p thrombectomy of L iliac vein and graft 2012, Hx BK viremia, HTN presenting to ED with 1 day of fever 103', chill, nausea and vomiting associated with dysuria, low abd pain with urination and right flank pain.   She denies recent travel, sick contact, or recent procedure. Nephrology consulted for s/p DDRT.    A/P    SAM s/p DDRT @ [facility] 08/14/2020  Outpatient nephrologist is Dr. Lake / Dr Streeter   Continue home dose Immunosuppression meds   Kidney function improving at present   FENa < 1%, suggestive of pre- renal   Bladder scan 7/17 ~ 142 cc   s/p NS + 75 mEq Sodium Bicarb @ 75 cc/hr  x 24 hrs   Continue IVF to NS @ 100cc/hr x 24 hrs   Pt states she is able to take more po intake today   CT A/P no contrast 6/16 --> Partially distended bladder   Pt on Ceftriaxone for + UTI. Would recommend daily CBC with diff to assess for Eosinophilia    mg BID, Prednisone 5 mg qd, Envarsus 11 mg   FK level 7/20--> 6.4   please Check Tacrolimus 30 min prior to morning dose. Reinforced with NP and RN to pass along to night staff.   Monitor BMP, u/o    HTN:   BP optimal   Monitor    Acidosis  Non AG  Sec to GI loss   improving   s/p IV bicarb   Monitor     Proteinuria/ Hematuria  In setting of UTI   Repeat UA after Abx

## 2023-07-21 ENCOUNTER — TRANSCRIPTION ENCOUNTER (OUTPATIENT)
Age: 53
End: 2023-07-21

## 2023-07-21 VITALS
DIASTOLIC BLOOD PRESSURE: 88 MMHG | TEMPERATURE: 98 F | HEART RATE: 73 BPM | OXYGEN SATURATION: 98 % | RESPIRATION RATE: 17 BRPM | SYSTOLIC BLOOD PRESSURE: 132 MMHG

## 2023-07-21 DIAGNOSIS — D64.9 ANEMIA, UNSPECIFIED: ICD-10-CM

## 2023-07-21 LAB
ANION GAP SERPL CALC-SCNC: 13 MMOL/L — SIGNIFICANT CHANGE UP (ref 5–17)
BUN SERPL-MCNC: 7 MG/DL — SIGNIFICANT CHANGE UP (ref 7–23)
CALCIUM SERPL-MCNC: 9 MG/DL — SIGNIFICANT CHANGE UP (ref 8.4–10.5)
CHLORIDE SERPL-SCNC: 106 MMOL/L — SIGNIFICANT CHANGE UP (ref 96–108)
CO2 SERPL-SCNC: 20 MMOL/L — LOW (ref 22–31)
CREAT SERPL-MCNC: 1.18 MG/DL — SIGNIFICANT CHANGE UP (ref 0.5–1.3)
EGFR: 56 ML/MIN/1.73M2 — LOW
GI PCR PANEL: SIGNIFICANT CHANGE UP
GLUCOSE SERPL-MCNC: 104 MG/DL — HIGH (ref 70–99)
POTASSIUM SERPL-MCNC: 3.5 MMOL/L — SIGNIFICANT CHANGE UP (ref 3.5–5.3)
POTASSIUM SERPL-SCNC: 3.5 MMOL/L — SIGNIFICANT CHANGE UP (ref 3.5–5.3)
SODIUM SERPL-SCNC: 139 MMOL/L — SIGNIFICANT CHANGE UP (ref 135–145)
TACROLIMUS SERPL-MCNC: 4.6 NG/ML — SIGNIFICANT CHANGE UP

## 2023-07-21 PROCEDURE — 87640 STAPH A DNA AMP PROBE: CPT

## 2023-07-21 PROCEDURE — 87040 BLOOD CULTURE FOR BACTERIA: CPT

## 2023-07-21 PROCEDURE — 99285 EMERGENCY DEPT VISIT HI MDM: CPT

## 2023-07-21 PROCEDURE — 84295 ASSAY OF SERUM SODIUM: CPT

## 2023-07-21 PROCEDURE — 99233 SBSQ HOSP IP/OBS HIGH 50: CPT

## 2023-07-21 PROCEDURE — 82803 BLOOD GASES ANY COMBINATION: CPT

## 2023-07-21 PROCEDURE — 81001 URINALYSIS AUTO W/SCOPE: CPT

## 2023-07-21 PROCEDURE — 80053 COMPREHEN METABOLIC PANEL: CPT

## 2023-07-21 PROCEDURE — 83550 IRON BINDING TEST: CPT

## 2023-07-21 PROCEDURE — 83010 ASSAY OF HAPTOGLOBIN QUANT: CPT

## 2023-07-21 PROCEDURE — 82746 ASSAY OF FOLIC ACID SERUM: CPT

## 2023-07-21 PROCEDURE — 87507 IADNA-DNA/RNA PROBE TQ 12-25: CPT

## 2023-07-21 PROCEDURE — 87186 SC STD MICRODIL/AGAR DIL: CPT

## 2023-07-21 PROCEDURE — 82728 ASSAY OF FERRITIN: CPT

## 2023-07-21 PROCEDURE — 74176 CT ABD & PELVIS W/O CONTRAST: CPT | Mod: MA

## 2023-07-21 PROCEDURE — 84300 ASSAY OF URINE SODIUM: CPT

## 2023-07-21 PROCEDURE — 85730 THROMBOPLASTIN TIME PARTIAL: CPT

## 2023-07-21 PROCEDURE — 82607 VITAMIN B-12: CPT

## 2023-07-21 PROCEDURE — 36415 COLL VENOUS BLD VENIPUNCTURE: CPT

## 2023-07-21 PROCEDURE — 82435 ASSAY OF BLOOD CHLORIDE: CPT

## 2023-07-21 PROCEDURE — 70450 CT HEAD/BRAIN W/O DYE: CPT

## 2023-07-21 PROCEDURE — 87449 NOS EACH ORGANISM AG IA: CPT

## 2023-07-21 PROCEDURE — 83605 ASSAY OF LACTIC ACID: CPT

## 2023-07-21 PROCEDURE — 82330 ASSAY OF CALCIUM: CPT

## 2023-07-21 PROCEDURE — 96374 THER/PROPH/DIAG INJ IV PUSH: CPT

## 2023-07-21 PROCEDURE — 71045 X-RAY EXAM CHEST 1 VIEW: CPT

## 2023-07-21 PROCEDURE — 85610 PROTHROMBIN TIME: CPT

## 2023-07-21 PROCEDURE — 87799 DETECT AGENT NOS DNA QUANT: CPT

## 2023-07-21 PROCEDURE — 87150 DNA/RNA AMPLIFIED PROBE: CPT

## 2023-07-21 PROCEDURE — 87324 CLOSTRIDIUM AG IA: CPT

## 2023-07-21 PROCEDURE — 87077 CULTURE AEROBIC IDENTIFY: CPT

## 2023-07-21 PROCEDURE — 80197 ASSAY OF TACROLIMUS: CPT

## 2023-07-21 PROCEDURE — 0225U NFCT DS DNA&RNA 21 SARSCOV2: CPT

## 2023-07-21 PROCEDURE — 84132 ASSAY OF SERUM POTASSIUM: CPT

## 2023-07-21 PROCEDURE — 85014 HEMATOCRIT: CPT

## 2023-07-21 PROCEDURE — 83615 LACTATE (LD) (LDH) ENZYME: CPT

## 2023-07-21 PROCEDURE — 87086 URINE CULTURE/COLONY COUNT: CPT

## 2023-07-21 PROCEDURE — 83540 ASSAY OF IRON: CPT

## 2023-07-21 PROCEDURE — 87641 MR-STAPH DNA AMP PROBE: CPT

## 2023-07-21 PROCEDURE — 85018 HEMOGLOBIN: CPT

## 2023-07-21 PROCEDURE — 80048 BASIC METABOLIC PNL TOTAL CA: CPT

## 2023-07-21 PROCEDURE — 85025 COMPLETE CBC W/AUTO DIFF WBC: CPT

## 2023-07-21 PROCEDURE — 82570 ASSAY OF URINE CREATININE: CPT

## 2023-07-21 PROCEDURE — 82947 ASSAY GLUCOSE BLOOD QUANT: CPT

## 2023-07-21 RX ORDER — LEVOFLOXACIN 5 MG/ML
1 INJECTION, SOLUTION INTRAVENOUS
Qty: 6 | Refills: 0
Start: 2023-07-21 | End: 2023-07-26

## 2023-07-21 RX ADMIN — CINACALCET 30 MILLIGRAM(S): 30 TABLET, FILM COATED ORAL at 17:41

## 2023-07-21 RX ADMIN — TACROLIMUS 11 MILLIGRAM(S): 5 CAPSULE ORAL at 06:04

## 2023-07-21 RX ADMIN — MYCOPHENOLATE MOFETIL 500 MILLIGRAM(S): 250 CAPSULE ORAL at 17:42

## 2023-07-21 RX ADMIN — MYCOPHENOLATE MOFETIL 500 MILLIGRAM(S): 250 CAPSULE ORAL at 06:04

## 2023-07-21 RX ADMIN — Medication 81 MILLIGRAM(S): at 13:17

## 2023-07-21 RX ADMIN — CINACALCET 30 MILLIGRAM(S): 30 TABLET, FILM COATED ORAL at 06:03

## 2023-07-21 RX ADMIN — CLOPIDOGREL BISULFATE 75 MILLIGRAM(S): 75 TABLET, FILM COATED ORAL at 13:17

## 2023-07-21 RX ADMIN — PANTOPRAZOLE SODIUM 40 MILLIGRAM(S): 20 TABLET, DELAYED RELEASE ORAL at 06:04

## 2023-07-21 RX ADMIN — CHLORHEXIDINE GLUCONATE 1 APPLICATION(S): 213 SOLUTION TOPICAL at 13:17

## 2023-07-21 RX ADMIN — Medication 5 MILLIGRAM(S): at 06:04

## 2023-07-21 NOTE — PROGRESS NOTE ADULT - NS ATTEND AMEND GEN_ALL_CORE FT
Agree with above
as above
as above
Agree with plan above.   Ecoli transplant pyelonephritis- Continue ceftriaxone, at discharge, could complete course with levoquin 750 mg daily po for total of 10 day course.  Diarrhea: please check gi PCR and Cdiff given immunocompromised status and ongoing antibiotic therapy  Hepatitis C history - treated?
Gi PCr and Cdiff negative.   Ok to discharge on levoquin 750 mg daily for creatinine clearance >50 to complete a total of 10 day course      Thank you for involving us in the care of this patient  Transplant ID will continue to follow  Please call or page with additional questions  Pager; #[phone]  Teams: from 8 am to 5 pm  Savita De La Cruz MD
E coli urosepsis/bacteremia on treatment with rocephin.  She is much improved, leukocytosis better.
as above
as above

## 2023-07-21 NOTE — DISCHARGE NOTE NURSING/CASE MANAGEMENT/SOCIAL WORK - PATIENT PORTAL LINK FT
You can access the FollowMyHealth Patient Portal offered by [facility] by registering at the following website: [email or web address]. By joining [employer]’s FollowMyHealth portal, you will also be able to view your health information using other applications (apps) compatible with our system.

## 2023-07-21 NOTE — PROGRESS NOTE ADULT - SUBJECTIVE AND OBJECTIVE BOX
[facility] [place] [place] [facility]   MD MEL DOBBS MD BRIANA PETITO, NP    TEL:  FROM 9 AM to 5 PM ---OFFICE: [phone]  FROM 5 PM - 9 AM PLEASE CALL ANSWERING SERVICE: [phone]       RENAL PROGRESS NOTE: DATE OF SERVICE 07-21-23 @ 15:53    Patient is a 53y old  Female who presents with a chief complaint of fever, chill, nausea and vomiting   Patient seen and examined at bedside. No chest pain/sob    VITALS:  T(F): 98.6 (07-21-23 @ 14:52), Max: 98.8 (07-21-23 @ 00:10)  HR: 74 (07-21-23 @ 14:52)  BP: 135/82 (07-21-23 @ 14:52)  RR: 18 (07-21-23 @ 14:52)  SpO2: 96% (07-21-23 @ 14:52)  Wt(kg): --    07-20 @ 07:01  -  07-21 @ 07:00  --------------------------------------------------------  IN: 1680 mL / OUT: 1799 mL / NET: -119 mL    07-21 @ 07:01  -  07-21 @ 15:53  --------------------------------------------------------  IN: 720 mL / OUT: 0 mL / NET: 720 mL      PHYSICAL EXAM:  Constitutional: NAD  Neck: No JVD  Respiratory: CTAB, no wheezes, rales or rhonchi  Cardiovascular: S1, S2, RRR  Gastrointestinal: BS+, soft, NT/ND  Extremities: No peripheral edema    Hospital Medications:   MEDICATIONS  (STANDING):  aspirin enteric coated 81 milliGRAM(s) Oral daily  atorvastatin 20 milliGRAM(s) Oral at bedtime  cefTRIAXone   IVPB 1000 milliGRAM(s) IV Intermittent every 24 hours  chlorhexidine 2% Cloths 1 Application(s) Topical daily  cinacalcet 30 milliGRAM(s) Oral two times a day  clopidogrel Tablet 75 milliGRAM(s) Oral daily  mycophenolate mofetil 500 milliGRAM(s) Oral two times a day  pantoprazole    Tablet 40 milliGRAM(s) Oral before breakfast  predniSONE   Tablet 5 milliGRAM(s) Oral daily  sodium chloride 0.45% 1000 milliLiter(s) (75 mL/Hr) IV Continuous <Continuous>  sodium chloride 0.9%. 1000 milliLiter(s) (100 mL/Hr) IV Continuous <Continuous>  sodium chloride 0.9%. 1000 milliLiter(s) (100 mL/Hr) IV Continuous <Continuous>  tacrolimus ER Tablet (ENVARSUS XR) 11 milliGRAM(s) Oral daily    Tacrolimus (), Serum: 4.6 ng/mL (07-21 @ 06:58)    LABS:  07-21    139  |  106  |  7   ----------------------------<  104<H>  3.5   |  20<L>  |  1.18    Ca    9.0      21 Jul 2023 06:57      Creatinine Trend: 1.18 <--, 1.42 <--, 1.66 <--, 1.51 <--, 1.42 <--, 1.24 <--                                10.4   9.99  )-----------( 208      ( 20 Jul 2023 07:08 )             33.9     Urine Studies:  Urinalysis - [07-21-23 @ 06:57]      Color  / Appearance  / SG  / pH       Gluc 104 / Ketone   / Bili  / Urobili        Blood  / Protein  / Leuk Est  / Nitrite       RBC  / WBC  / Hyaline  / Gran  / Sq Epi  / Non Sq Epi  / Bacteria     Urine Creatinine 219      [07-17-23 @ 17:28]  Urine Sodium 15      [07-17-23 @ 17:28]    Iron 17, TIBC 262, %sat 6      [07-19-23 @ 07:51]  Ferritin 142      [07-19-23 @ 07:51]  PTH -- (Ca 11.2)      [07-03-23 @ 15:05]   170  Vitamin D (25OH) 31.8      [07-03-23 @ 15:05]        RADIOLOGY & ADDITIONAL STUDIES:

## 2023-07-21 NOTE — PROGRESS NOTE ADULT - PROVIDER SPECIALTY LIST ADULT
Heme/Onc
Nephrology
Transplant ID
Transplant ID
Hospitalist
Nephrology
Transplant ID
Heme/Onc
Internal Medicine
Hospitalist

## 2023-07-21 NOTE — PROGRESS NOTE ADULT - NS ATTEND OPT1 GEN_ALL_CORE
I independently performed the documented:
I independently performed the documented:
I attest my time as attending is greater than 50% of the total combined time spent on qualifying patient care activities by the PA/NP and attending.
I independently performed the documented:

## 2023-07-21 NOTE — PROGRESS NOTE ADULT - SUBJECTIVE AND OBJECTIVE BOX
Follow Up:    UTI    Interval History:  Examined at bedside, reports overall improvement in symptoms today.  C-diff and GI PCR both negative    REVIEW OF SYSTEMS  [  ] ROS unobtainable because:    [ x ] All other systems negative except as noted below    Constitutional:  [ ] fever [ ] chills  [ ] weight loss  [ ] weakness  Skin:  [ ] rash [ ] phlebitis	  Eyes: [ ] icterus [ ] pain  [ ] discharge	  ENMT: [ ] sore throat  [ ] thrush [ ] ulcers [ ] exudates  Respiratory: [ ] dyspnea [ ] hemoptysis [ ] cough [ ] sputum	  Cardiovascular:  [ ] chest pain [ ] palpitations [ ] edema	  Gastrointestinal:  [ ] nausea [ ] vomiting [ x] diarrhea [ ] constipation [ ] pain	  Genitourinary:  [ ] dysuria [ ] frequency [ ] hematuria [ ] discharge [ ] flank pain  [ ] incontinence  Musculoskeletal:  [ ] myalgias [ ] arthralgias [ ] arthritis  [ ] back pain  Neurological:  [ ] headache [ ] seizures  [ ] confusion/altered mental status    Allergies  shellfish (Urticaria)  contrast media (iodine-based) (Urticaria)  penicillin (Anaphylaxis)  ibuprofen/morphine (Unknown)  latex (Swelling)  lactose (Hives)  morphine (Urticaria)  ibuprofen (Hives)  morphine (Anaphylaxis)        ANTIMICROBIALS:  cefTRIAXone   IVPB 1000 every 24 hours      OTHER MEDS:  MEDICATIONS  (STANDING):  acetaminophen     Tablet .. 650 every 6 hours PRN  aspirin enteric coated 81 daily  atorvastatin 20 at bedtime  cinacalcet 30 two times a day  clopidogrel Tablet 75 daily  mycophenolate mofetil 500 two times a day  pantoprazole    Tablet 40 before breakfast  predniSONE   Tablet 5 daily  tacrolimus ER Tablet (ENVARSUS XR) 11 daily      Vital Signs Last 24 Hrs  T(C): 36.9 (21 Jul 2023 05:39), Max: 37.1 (21 Jul 2023 00:10)  T(F): 98.5 (21 Jul 2023 05:39), Max: 98.8 (21 Jul 2023 00:10)  HR: 81 (21 Jul 2023 05:39) (71 - 81)  BP: 146/84 (21 Jul 2023 05:39) (131/82 - 146/84)  BP(mean): --  RR: 18 (21 Jul 2023 05:39) (18 - 18)  SpO2: 92% (21 Jul 2023 05:39) (92% - 98%)    Parameters below as of 21 Jul 2023 05:39  Patient On (Oxygen Delivery Method): room air        PHYSICAL EXAMINATION:  General: Alert and Awake, NAD  HEENT: PERRL, EOMI  Neck: Supple  Cardiac: RRR, No M/R/G  Resp: CTAB, No Wh/Rh/Ra  Abdomen: NBS, NT/ND, No HSM, No rigidity or guarding  MSK: No LE edema. No Calf tenderness  : No ndiaye  Skin: No rashes or lesions. Skin is warm and dry to the touch.   Neuro: Alert and Awake. CN 2-12 Grossly intact. Moves all four extremities spontaneously.  Psych: Calm, Pleasant, Cooperative                          10.4   9.99  )-----------( 208      ( 20 Jul 2023 07:08 )             33.9       07-21    139  |  106  |  7   ----------------------------<  104<H>  3.5   |  20<L>  |  1.18    Ca    9.0      21 Jul 2023 06:57        Urinalysis Basic - ( 21 Jul 2023 06:57 )    Color: x / Appearance: x / SG: x / pH: x  Gluc: 104 mg/dL / Ketone: x  / Bili: x / Urobili: x   Blood: x / Protein: x / Nitrite: x   Leuk Esterase: x / RBC: x / WBC x   Sq Epi: x / Non Sq Epi: x / Bacteria: x        MICROBIOLOGY:  v  .Blood Blood  07-18-23   No growth at 48 Hours  --  --      .Blood Blood  07-18-23   No growth at 72 Hours  --  --      Clean Catch Clean Catch (Midstream)  07-16-23   >100,000 CFU/ml Escherichia coli  --  Escherichia coli      .Blood Blood-Peripheral  07-16-23   Growth in aerobic and anaerobic bottles: Escherichia coli  See previous culture [id number]  --    Growth in aerobic and anaerobic bottles: Gram Negative Rods      .Blood Blood-Peripheral  07-16-23   Growth in aerobic and anaerobic bottles: Escherichia coli  Direct identification is available within approximately 3-5  hours either by Blood Panel Multiplexed PCR or Direct  MALDI-TOF. Details: [email or web address]  --  Blood Culture PCR  Escherichia coli          Rapid RVP Result: David (07-16 @ 21:22)    Clostridium difficile GDH Toxins A&amp;B, EIA:   Negative (07-20-23 @ 13:51)  Clostridium difficile GDH Interpretation: Negative for toxigenic C. Difficile.  This specimen is negative for C.  Difficile glutamate dehydrogenase (GDH) antigen and negative for C.  Difficile Toxins A & B, by EIA.  GDH is a highly sensitive screening  marker for C. Difficile that is produced in large amounts by all C.  Difficile strains, both toxigenic and nontoxigenic.  This assay has not  been validated as a test of cure.  Repeat testing during the same episode  of diarrhea is of limited value and is discouraged.  The results of this  assay should always be interpreted in conjunction with pateint's clinical  history. (07-20-23 @ 13:51)      RADIOLOGY:    <The imaging below has been reviewed and visualized by me independently. Findings as detailed in report below>    < from: CT Head No Cont (07.19.23 @ 02:16) >    IMPRESSION: No acute intracranial hemorrhage, mass effect, or shift of   the midline structures.    --- End of Report ---        < end of copied text >

## 2023-07-21 NOTE — DISCHARGE NOTE PROVIDER - CARE PROVIDERS DIRECT ADDRESSES
[email or web address],DirectAddress_Unknown

## 2023-07-21 NOTE — PROGRESS NOTE ADULT - ASSESSMENT
52 yo F with a ESRD s/p HepC DDRT in 2020, chronic SVC syndrome s/p L cephalic-iliac vein bypass on DAPT, s/p thrombectomy of L iliac vein and graft 2012, Hx BK viremia, HTN presenting to ED with 1 day of fever 103', chill, nausea and vomiting associated with dysuria, low abd pain with urination and right flank pain.   She denies recent travel, sick contact, or recent procedure. Nephrology consulted for s/p DDRT.    A/P    SAM s/p DDRT @ [facility] 08/14/2020  Outpatient nephrologist is Dr. Lake / Dr Streeter   Continue home dose Immunosuppression meds   Kidney function improving at present   FENa < 1%, suggestive of pre- renal   Bladder scan 7/17 ~ 142 cc   s/p NS + 75 mEq Sodium Bicarb @ 75 cc/hr  x 24 hrs  No need for IVF at this time, encourage PO intake. Pt would like to advance diet from clears   CT A/P no contrast 6/16 --> Partially distended bladder   Pt on Ceftriaxone for + UTI. Would recommend daily CBC with diff to assess for Eosinophilia    mg BID, Prednisone 5 mg qd, Envarsus 11 mg   FK level 7/20--> 4.6.   please Check Tacrolimus 30 min prior to morning dose. Reinforced with NP and RN to pass along to night staff.   Monitor BMP, u/o    HTN:   BP optimal   Monitor    Acidosis  Non AG  Sec to GI loss   s/p IV bicarb   Monitor     Proteinuria/ Hematuria  In setting of UTI   Repeat UA after Abx

## 2023-07-21 NOTE — DISCHARGE NOTE PROVIDER - CARE PROVIDER_API CALL
Geoffrey Lake  Nephrology  [address] [place] [id number]  Phone: [phone]  Fax: [phone]  Follow Up Time:     Jeffrey Oliva  Hematology  [address] [place] [address]  Phone: [phone]  Fax: [phone]  Follow Up Time:

## 2023-07-21 NOTE — DISCHARGE NOTE PROVIDER - NSDCCPCAREPLAN_GEN_ALL_CORE_FT
PRINCIPAL DISCHARGE DIAGNOSIS  Diagnosis: Pyelonephritis  Assessment and Plan of Treatment: Pyelonephritis; with sepsis (leukocytosis, fever and pyelonephritis),  Bacteremia  - CT abdomen reviewed   - s/p Ceftriaxone x1 and IVF 1L in ED and continued with Ceftriaxone IV (PCN allergy) - Blood cx no growth 7/16   - ID consult seen and followed throughout   -UA (7/16) - positive nitrite, few bacteria, small leukocyte esterase, >50 WBC  - CXR (7/16) - Hazy opacities B/L  - BCX (7/16) - E. Coli. Repeat cultures (7/18) - no growth at 24 hours- follow  - Continued Ceftriaxone 1 gm Q24H - for discharge, complete course with Levaquin 750 mg daily po for total of 10 day course.      SECONDARY DISCHARGE DIAGNOSES  Diagnosis: SAM (acute kidney injury)  Assessment and Plan of Treatment: SAM  - S/P kidney transplant DDRT @ [facility] 08/14/2020 - outpatient nephrologist is Dr. Lake / Dr Streeter for followup    Continue home dose Immunosuppression meds; per nephrology team kidney function improving and continue immunosuppression with Prednisone ,Cellcept and Tacrolimus    Tacro. levels were trended and renal transplant followed    Diagnosis: Anemia  Assessment and Plan of Treatment: Anemia - possibly 2/2 UTI, renal disfunction and Hgb 14 at baseline  Seen and follwed by Heme-Onc  - b12 folate normal, hapto elevated and trended as above  Patient should follow up w/ Dr Jeffrey Oliva of [facility] after discharge    Diagnosis: SVC syndrome  Assessment and Plan of Treatment: SVC syndrome; will continue ASA and Plavix.    Diagnosis: HLD (hyperlipidemia)  Assessment and Plan of Treatment: Hyperlipidemia; will continue with statin.

## 2023-07-21 NOTE — DISCHARGE NOTE NURSING/CASE MANAGEMENT/SOCIAL WORK - NSDCPEFALRISK_GEN_ALL_CORE
For information on Fall & Injury Prevention, visit: [email or web address] OR  [email or web address] OR  https://www.cdc.gov/steadi/patient.html

## 2023-07-21 NOTE — DISCHARGE NOTE PROVIDER - NSDCMRMEDTOKEN_GEN_ALL_CORE_FT
Aspirin Enteric Coated 81 mg oral delayed release tablet: 1 tab(s) orally once a day  atorvastatin 20 mg oral tablet: 1 tab(s) orally once a day (at bedtime)  cinacalcet 30 mg oral tablet: 1 tab(s) orally 2 times a day  clopidogrel 75 mg oral tablet: 1 tab(s) orally once a day  Envarsus XR 1 mg oral tablet, extended release: 3 tab(s) orally once a day  Envarsus XR 4 mg oral tablet, extended release: 2 tab(s) orally once a day  mycophenolate mofetil 500 mg oral tablet: 1 tab(s) orally 2 times a day  pantoprazole 40 mg oral delayed release tablet: 1 tab(s) orally once a day  predniSONE 5 mg oral tablet: 1 tab(s) orally once a day   Aspirin Enteric Coated 81 mg oral delayed release tablet: 1 tab(s) orally once a day  atorvastatin 20 mg oral tablet: 1 tab(s) orally once a day (at bedtime)  cinacalcet 30 mg oral tablet: 1 tab(s) orally 2 times a day  clopidogrel 75 mg oral tablet: 1 tab(s) orally once a day  Envarsus XR 1 mg oral tablet, extended release: 3 tab(s) orally once a day  Envarsus XR 4 mg oral tablet, extended release: 2 tab(s) orally once a day  levoFLOXacin 750 mg oral tablet: 1 tab(s) orally once a day As directed  mycophenolate mofetil 500 mg oral tablet: 1 tab(s) orally 2 times a day  pantoprazole 40 mg oral delayed release tablet: 1 tab(s) orally once a day  predniSONE 5 mg oral tablet: 1 tab(s) orally once a day

## 2023-07-21 NOTE — PROGRESS NOTE ADULT - ASSESSMENT
52 yo F with a ESRD s/p HepC DDRT in 2020, chronic SVC syndrome s/p L cephalic-iliac vein bypass on DAPT, s/p thrombectomy of L iliac vein and graft 2012, Hx BK viremia, HTN presenting to ED with 1 day of fever 103', chill, nausea and vomiting associated with dysuria, low abd pain with urination and right flank pain.   She denies recent travel, sick contact, or recent procedure. (17 Jul 2023 02:52)  Patient seen and examined at bedside reports symptoms stared Saturday night, and her mother who lives with her was discharged on Thursday from the hospital after been kept overnight for a leg trauma.       #Fever   #Leukocytosis  #Bacteremia  #UTI  #DDRT Recipient  #Immunosuppression due to drug therapy  --Tmax of 103.2 on 7/16 - remains afebrile, monitor temperatures  --Monitor CBC - last WBC 27.89  --UA (7/16) - positive nitrite, few bacteria, small leukocyte esterase, >50 WBC  --CXR (7/16) - Hazy opacities BLL  --BCX (7/16) - E.Coli. Repeat cultures (7/18) - no growth at 24 hours- follow  --Continue Ceftriaxone 1 gm Q24H - at discharge, could complete course with levoquin 750 mg daily po for total of 10 day course.      #Diarrhea   --C-diff - negative  --GI PCR - negative   Decreased frequency in loose stools- supportive care

## 2023-07-21 NOTE — PROGRESS NOTE ADULT - TIME BILLING
face-to-face encounter, review of extensive medical records in this and prior charts, laboratory findings, radiographic and microbiology results; documentation as noted above and discussion of diagnostic impressions and plan with the patient and team
face-to-face encounter, review of extensive medical records in this and prior charts, laboratory findings, radiographic and microbiology results; documentation as noted above and discussion of diagnostic impressions and plan with the patient and team
reviewing prior documentation, reviewing available recent outpatient records, independently obtaining a history and interpreting results of tests, performing a physical examination, reviewing tests/imaging, discussing the plan with the patient, counseling and educating the patient/family member(s), ordering medications/tests, documenting clinical information in the electronic health record, and coordinating care.

## 2023-07-21 NOTE — DISCHARGE NOTE PROVIDER - NSDCFUSCHEDAPPT_GEN_ALL_CORE_FT
[facility] Physician [facility]  TRANSPLANT [address] [place] [address]   Scheduled Appointment: 08/03/2023

## 2023-07-21 NOTE — PROGRESS NOTE ADULT - REASON FOR ADMISSION
fever, chill, nausea and vomiting

## 2023-07-21 NOTE — DISCHARGE NOTE PROVIDER - HOSPITAL COURSE
52 yo F with a ESRD s/p HepC DDRT in 2020, chronic SVC syndrome s/p L cephalic-iliac vein bypass on DAPT, s/p thrombectomy of L iliac vein and graft 2012, Hx BK viremia, HTN presenting to ED with 1 day of fever, chill, nausea and vomiting admitted sepsis due to pyelonephritis     Pyelonephritis; with sepsis (leukocytosis, fever and pyelonephritis),  Bacteremia  - CT abdomen reviewed   - s/p Ceftriaxone x1 and IVF 1L in ED and continued with Ceftriaxone IV (PCN allergy) - Blood cx no growth 7/16   - ID consult seen and followed throughout   -UA (7/16) - positive nitrite, few bacteria, small leukocyte esterase, >50 WBC  - CXR (7/16) - Hazy opacities B/L  - BCX (7/16) - E. Coli. Repeat cultures (7/18) - no growth at 24 hours- follow  - Continued Ceftriaxone 1 gm Q24H - for discharge, complete course with Levaquin 750 mg daily po for total of 10 day course.    SAM  - S/P kidney transplant DDRT @ [facility] 08/14/2020 - outpatient nephrologist is Dr. Lake / Dr Streeter for followup    Continue home dose Immunosuppression meds; per nephrology team kidney function improving and continue immunosuppression with Prednisone ,Cellcept and Tacrolimus    Tacro. levels were trended and renal transplant followed     Anemia - possibly 2/2 UTI, renal disfunction and Hgb 14 at baseline  Seen and follwed by Heme-Onc  - b12 folate normal, hapto elevated and trended as above  Patient should follow up w/ Dr Jeffrey Oliva of [facility] after discharge    SVC syndrome; will continue ASA and Plavix.    Hyperlipidemia; will continue with statin.    On 7/21 pt. discharged and med rec and d/c plan d/w Dr. Zamora        54 yo F with a ESRD s/p HepC DDRT in 2020, chronic SVC syndrome s/p L cephalic-iliac vein bypass on DAPT, s/p thrombectomy of L iliac vein and graft 2012, Hx BK viremia, HTN presenting to ED with 1 day of fever, chill, nausea and vomiting admitted sepsis due to pyelonephritis     Pyelonephritis; with sepsis (leukocytosis, fever and pyelonephritis),  Bacteremia - CT A/P with Right lower quadrant transplant and reviewed by ID and continued Ceftriaxone (Blood Culture PCR without ESBL resistance markers)  Ceftriaxone x1 and IVF 1L in ED and continued with Ceftriaxone IV (PCN allergy) - Blood cx no growth 7/16   ID as above followed throughout  - UA (7/16) - positive nitrite, few bacteria, small leukocyte esterase, >50 WBC  CXR (7/16) - Hazy opacities B/L / BCX (7/16) - E. Coli. Repeat cultures (7/18) - no growth at 24 hours- follow  Ceftriaxone 1 gm Q24H as above - and for discharge will complete course with Levaquin 750 mg daily po for total of 10 day course. (will complete on 7/27)    SAM  - S/P kidney transplant DDRT @ [facility] 08/14/2020 - outpatient nephrologist is Dr. Lake / Dr Streeter for followup    Continue home dose Immunosuppression meds; per nephrology team kidney function improving and continue immunosuppression with Prednisone ,Cellcept and Tacrolimus    Tacro. levels were trended and renal transplant followed     Anemia - possibly 2/2 UTI, renal disfunction and Hgb 14 at baseline  Seen and follwed by Heme-Onc  - b12 folate normal, hapto elevated and trended as above  Patient should follow up w/ Dr Jeffrey Oliva of [facility] after discharge    SVC syndrome; will continue ASA and Plavix.    Hyperlipidemia; will continue with statin.    On 7/21 pt. discharged and med rec and d/c plan d/w Dr. Zamora

## 2023-07-23 LAB
CULTURE RESULTS: SIGNIFICANT CHANGE UP
CULTURE RESULTS: SIGNIFICANT CHANGE UP
SPECIMEN SOURCE: SIGNIFICANT CHANGE UP
SPECIMEN SOURCE: SIGNIFICANT CHANGE UP

## 2023-07-27 ENCOUNTER — EMERGENCY (EMERGENCY)
Facility: HOSPITAL | Age: 53
LOS: 1 days | Discharge: HOME | End: 2023-07-27
Attending: EMERGENCY MEDICINE
Payer: MEDICARE

## 2023-07-27 VITALS
WEIGHT: 149.91 LBS | DIASTOLIC BLOOD PRESSURE: 72 MMHG | SYSTOLIC BLOOD PRESSURE: 117 MMHG | OXYGEN SATURATION: 100 % | HEIGHT: 63 IN | TEMPERATURE: 99 F | RESPIRATION RATE: 20 BRPM | HEART RATE: 98 BPM

## 2023-07-27 VITALS
OXYGEN SATURATION: 95 % | DIASTOLIC BLOOD PRESSURE: 83 MMHG | RESPIRATION RATE: 18 BRPM | SYSTOLIC BLOOD PRESSURE: 124 MMHG | HEART RATE: 86 BPM

## 2023-07-27 DIAGNOSIS — I77.0 ARTERIOVENOUS FISTULA, ACQUIRED: Chronic | ICD-10-CM

## 2023-07-27 DIAGNOSIS — Z94.0 KIDNEY TRANSPLANT STATUS: Chronic | ICD-10-CM

## 2023-07-27 DIAGNOSIS — Z95.828 PRESENCE OF OTHER VASCULAR IMPLANTS AND GRAFTS: Chronic | ICD-10-CM

## 2023-07-27 LAB
ALBUMIN SERPL ELPH-MCNC: 3.4 G/DL — SIGNIFICANT CHANGE UP (ref 3.3–5)
ALP SERPL-CCNC: 50 U/L — SIGNIFICANT CHANGE UP (ref 40–120)
ALT FLD-CCNC: 17 U/L — SIGNIFICANT CHANGE UP (ref 10–45)
ANION GAP SERPL CALC-SCNC: 13 MMOL/L — SIGNIFICANT CHANGE UP (ref 5–17)
APPEARANCE UR: CLEAR — SIGNIFICANT CHANGE UP
AST SERPL-CCNC: 20 U/L — SIGNIFICANT CHANGE UP (ref 10–40)
BACTERIA # UR AUTO: NEGATIVE — SIGNIFICANT CHANGE UP
BASE EXCESS BLDV CALC-SCNC: -0.7 MMOL/L — SIGNIFICANT CHANGE UP (ref -2–3)
BASOPHILS # BLD AUTO: 0.02 K/UL — SIGNIFICANT CHANGE UP (ref 0–0.2)
BASOPHILS NFR BLD AUTO: 0.3 % — SIGNIFICANT CHANGE UP (ref 0–2)
BILIRUB SERPL-MCNC: 0.5 MG/DL — SIGNIFICANT CHANGE UP (ref 0.2–1.2)
BILIRUB UR-MCNC: NEGATIVE — SIGNIFICANT CHANGE UP
BUN SERPL-MCNC: 10 MG/DL — SIGNIFICANT CHANGE UP (ref 7–23)
CA-I SERPL-SCNC: 1.23 MMOL/L — SIGNIFICANT CHANGE UP (ref 1.15–1.33)
CALCIUM SERPL-MCNC: 9.4 MG/DL — SIGNIFICANT CHANGE UP (ref 8.4–10.5)
CHLORIDE BLDV-SCNC: 102 MMOL/L — SIGNIFICANT CHANGE UP (ref 96–108)
CHLORIDE SERPL-SCNC: 102 MMOL/L — SIGNIFICANT CHANGE UP (ref 96–108)
CO2 BLDV-SCNC: 24 MMOL/L — SIGNIFICANT CHANGE UP (ref 22–26)
CO2 SERPL-SCNC: 17 MMOL/L — LOW (ref 22–31)
COLOR SPEC: COLORLESS — SIGNIFICANT CHANGE UP
CREAT SERPL-MCNC: 1.39 MG/DL — HIGH (ref 0.5–1.3)
DIFF PNL FLD: NEGATIVE — SIGNIFICANT CHANGE UP
EGFR: 46 ML/MIN/1.73M2 — LOW
EOSINOPHIL # BLD AUTO: 0.11 K/UL — SIGNIFICANT CHANGE UP (ref 0–0.5)
EOSINOPHIL NFR BLD AUTO: 1.4 % — SIGNIFICANT CHANGE UP (ref 0–6)
EPI CELLS # UR: 1 /HPF — SIGNIFICANT CHANGE UP
GAS PNL BLDV: 130 MMOL/L — LOW (ref 136–145)
GAS PNL BLDV: SIGNIFICANT CHANGE UP
GAS PNL BLDV: SIGNIFICANT CHANGE UP
GLUCOSE BLDV-MCNC: 92 MG/DL — SIGNIFICANT CHANGE UP (ref 70–99)
GLUCOSE SERPL-MCNC: 88 MG/DL — SIGNIFICANT CHANGE UP (ref 70–99)
GLUCOSE UR QL: NEGATIVE — SIGNIFICANT CHANGE UP
HCO3 BLDV-SCNC: 23 MMOL/L — SIGNIFICANT CHANGE UP (ref 22–29)
HCT VFR BLD CALC: 33 % — LOW (ref 34.5–45)
HCT VFR BLDA CALC: 32 % — LOW (ref 34.5–46.5)
HGB BLD CALC-MCNC: 10.7 G/DL — LOW (ref 11.7–16.1)
HGB BLD-MCNC: 10.1 G/DL — LOW (ref 11.5–15.5)
HPIV3 RNA SPEC QL NAA+PROBE: DETECTED
HYALINE CASTS # UR AUTO: 0 /LPF — SIGNIFICANT CHANGE UP (ref 0–2)
IMM GRANULOCYTES NFR BLD AUTO: 1.7 % — HIGH (ref 0–0.9)
KETONES UR-MCNC: NEGATIVE — SIGNIFICANT CHANGE UP
LACTATE BLDV-MCNC: 1.2 MMOL/L — SIGNIFICANT CHANGE UP (ref 0.5–2)
LEUKOCYTE ESTERASE UR-ACNC: NEGATIVE — SIGNIFICANT CHANGE UP
LYMPHOCYTES # BLD AUTO: 1.07 K/UL — SIGNIFICANT CHANGE UP (ref 1–3.3)
LYMPHOCYTES # BLD AUTO: 13.9 % — SIGNIFICANT CHANGE UP (ref 13–44)
MCHC RBC-ENTMCNC: 24.8 PG — LOW (ref 27–34)
MCHC RBC-ENTMCNC: 30.6 GM/DL — LOW (ref 32–36)
MCV RBC AUTO: 80.9 FL — SIGNIFICANT CHANGE UP (ref 80–100)
MONOCYTES # BLD AUTO: 0.94 K/UL — HIGH (ref 0–0.9)
MONOCYTES NFR BLD AUTO: 12.2 % — SIGNIFICANT CHANGE UP (ref 2–14)
NEUTROPHILS # BLD AUTO: 5.45 K/UL — SIGNIFICANT CHANGE UP (ref 1.8–7.4)
NEUTROPHILS NFR BLD AUTO: 70.5 % — SIGNIFICANT CHANGE UP (ref 43–77)
NITRITE UR-MCNC: NEGATIVE — SIGNIFICANT CHANGE UP
NRBC # BLD: 0 /100 WBCS — SIGNIFICANT CHANGE UP (ref 0–0)
NT-PROBNP SERPL-SCNC: 76 PG/ML — SIGNIFICANT CHANGE UP (ref 0–300)
PCO2 BLDV: 34 MMHG — LOW (ref 39–42)
PH BLDV: 7.44 — HIGH (ref 7.32–7.43)
PH UR: 7 — SIGNIFICANT CHANGE UP (ref 5–8)
PLATELET # BLD AUTO: 357 K/UL — SIGNIFICANT CHANGE UP (ref 150–400)
PO2 BLDV: 57 MMHG — HIGH (ref 25–45)
POTASSIUM BLDV-SCNC: 4.3 MMOL/L — SIGNIFICANT CHANGE UP (ref 3.5–5.1)
POTASSIUM SERPL-MCNC: 4.6 MMOL/L — SIGNIFICANT CHANGE UP (ref 3.5–5.3)
POTASSIUM SERPL-SCNC: 4.6 MMOL/L — SIGNIFICANT CHANGE UP (ref 3.5–5.3)
PROT SERPL-MCNC: 6.5 G/DL — SIGNIFICANT CHANGE UP (ref 6–8.3)
PROT UR-MCNC: NEGATIVE — SIGNIFICANT CHANGE UP
RAPID RVP RESULT: DETECTED
RBC # BLD: 4.08 M/UL — SIGNIFICANT CHANGE UP (ref 3.8–5.2)
RBC # FLD: 17.2 % — HIGH (ref 10.3–14.5)
RBC CASTS # UR COMP ASSIST: 1 /HPF — SIGNIFICANT CHANGE UP (ref 0–4)
SAO2 % BLDV: 89.3 % — HIGH (ref 67–88)
SARS-COV-2 RNA SPEC QL NAA+PROBE: SIGNIFICANT CHANGE UP
SODIUM SERPL-SCNC: 132 MMOL/L — LOW (ref 135–145)
SP GR SPEC: 1.01 — LOW (ref 1.01–1.02)
TACROLIMUS SERPL-MCNC: 6.5 NG/ML — SIGNIFICANT CHANGE UP
TROPONIN T, HIGH SENSITIVITY RESULT: 15 NG/L — SIGNIFICANT CHANGE UP (ref 0–51)
TROPONIN T, HIGH SENSITIVITY RESULT: 16 NG/L — SIGNIFICANT CHANGE UP (ref 0–51)
UROBILINOGEN FLD QL: NEGATIVE — SIGNIFICANT CHANGE UP
WBC # BLD: 7.72 K/UL — SIGNIFICANT CHANGE UP (ref 3.8–10.5)
WBC # FLD AUTO: 7.72 K/UL — SIGNIFICANT CHANGE UP (ref 3.8–10.5)
WBC UR QL: 2 /HPF — SIGNIFICANT CHANGE UP (ref 0–5)

## 2023-07-27 PROCEDURE — 36415 COLL VENOUS BLD VENIPUNCTURE: CPT

## 2023-07-27 PROCEDURE — 82947 ASSAY GLUCOSE BLOOD QUANT: CPT

## 2023-07-27 PROCEDURE — 94640 AIRWAY INHALATION TREATMENT: CPT

## 2023-07-27 PROCEDURE — 84295 ASSAY OF SERUM SODIUM: CPT

## 2023-07-27 PROCEDURE — 85025 COMPLETE CBC W/AUTO DIFF WBC: CPT

## 2023-07-27 PROCEDURE — 71046 X-RAY EXAM CHEST 2 VIEWS: CPT | Mod: 26

## 2023-07-27 PROCEDURE — 99284 EMERGENCY DEPT VISIT MOD MDM: CPT | Mod: GC

## 2023-07-27 PROCEDURE — 80180 DRUG SCRN QUAN MYCOPHENOLATE: CPT

## 2023-07-27 PROCEDURE — 82803 BLOOD GASES ANY COMBINATION: CPT

## 2023-07-27 PROCEDURE — 84132 ASSAY OF SERUM POTASSIUM: CPT

## 2023-07-27 PROCEDURE — 84484 ASSAY OF TROPONIN QUANT: CPT

## 2023-07-27 PROCEDURE — 80197 ASSAY OF TACROLIMUS: CPT

## 2023-07-27 PROCEDURE — 81001 URINALYSIS AUTO W/SCOPE: CPT

## 2023-07-27 PROCEDURE — 96375 TX/PRO/DX INJ NEW DRUG ADDON: CPT

## 2023-07-27 PROCEDURE — 85018 HEMOGLOBIN: CPT

## 2023-07-27 PROCEDURE — 87086 URINE CULTURE/COLONY COUNT: CPT

## 2023-07-27 PROCEDURE — 71046 X-RAY EXAM CHEST 2 VIEWS: CPT

## 2023-07-27 PROCEDURE — 96374 THER/PROPH/DIAG INJ IV PUSH: CPT

## 2023-07-27 PROCEDURE — 0225U NFCT DS DNA&RNA 21 SARSCOV2: CPT

## 2023-07-27 PROCEDURE — 83605 ASSAY OF LACTIC ACID: CPT

## 2023-07-27 PROCEDURE — 83880 ASSAY OF NATRIURETIC PEPTIDE: CPT

## 2023-07-27 PROCEDURE — 82330 ASSAY OF CALCIUM: CPT

## 2023-07-27 PROCEDURE — 99285 EMERGENCY DEPT VISIT HI MDM: CPT | Mod: 25

## 2023-07-27 PROCEDURE — 82435 ASSAY OF BLOOD CHLORIDE: CPT

## 2023-07-27 PROCEDURE — 85014 HEMATOCRIT: CPT

## 2023-07-27 PROCEDURE — 93005 ELECTROCARDIOGRAM TRACING: CPT

## 2023-07-27 PROCEDURE — 80053 COMPREHEN METABOLIC PANEL: CPT

## 2023-07-27 RX ORDER — IPRATROPIUM/ALBUTEROL SULFATE 18-103MCG
3 AEROSOL WITH ADAPTER (GRAM) INHALATION ONCE
Refills: 0 | Status: COMPLETED | OUTPATIENT
Start: 2023-07-27 | End: 2023-07-27

## 2023-07-27 RX ORDER — FAMOTIDINE 10 MG/ML
20 INJECTION INTRAVENOUS ONCE
Refills: 0 | Status: COMPLETED | OUTPATIENT
Start: 2023-07-27 | End: 2023-07-27

## 2023-07-27 RX ORDER — SODIUM CHLORIDE 9 MG/ML
1000 INJECTION INTRAMUSCULAR; INTRAVENOUS; SUBCUTANEOUS ONCE
Refills: 0 | Status: COMPLETED | OUTPATIENT
Start: 2023-07-27 | End: 2023-07-27

## 2023-07-27 RX ORDER — ACETAMINOPHEN 500 MG
1000 TABLET ORAL ONCE
Refills: 0 | Status: COMPLETED | OUTPATIENT
Start: 2023-07-27 | End: 2023-07-27

## 2023-07-27 RX ADMIN — Medication 30 MILLILITER(S): at 11:25

## 2023-07-27 RX ADMIN — SODIUM CHLORIDE 1000 MILLILITER(S): 9 INJECTION INTRAMUSCULAR; INTRAVENOUS; SUBCUTANEOUS at 09:31

## 2023-07-27 RX ADMIN — FAMOTIDINE 20 MILLIGRAM(S): 10 INJECTION INTRAVENOUS at 11:27

## 2023-07-27 RX ADMIN — Medication 3 MILLILITER(S): at 08:02

## 2023-07-27 RX ADMIN — Medication 400 MILLIGRAM(S): at 06:49

## 2023-07-27 NOTE — ED PROVIDER NOTE - PROGRESS NOTE DETAILS
52 yo F with a ESRD s/p HepC DDRT in 2020, chronic SVC syndrome s/p L cephalic-iliac vein bypass on DAPT, s/p thrombectomy of L iliac vein and graft 2012, Hx BK viremia, HTN, recently admitted for pyelonephritis presenting with cough, post-tussive chest tightness, and subjective fever/chills x 5 days. Pt denying any pleuritic chest pain, and there is no tachycardia or hypoxia to suggest PE. Patient tested positive for parainfluenza, most likely cause for sxs. Abdomen soft, NTND, low suspicion for intra-abdominal pathology. CTs cancelled. Will send urinalysis and urine culture to r/o possible uti as alternate source of fever, felix given hx pyelonephritis. Anne MARTINEZ PGY-3: Urinalysis negative, no leukocytosis to suggest recurrent pyelonephritis. Trop stable 15 -> 16. Sxs most likely 2/2 URI. Return precautions and follow up plan with PCP and/or specialist were discussed. Time was taken to answer any questions that the patient had before providing them with discharge paperwork. Patient signed out to me by the prior attending.  The patient's disposition was discussed with the treating team and agreed upon.  Papo Pemberton M.D. (attending) Patient with no abdominal pain, no rebound/guarding, non-tachycardic or signs of tachycardia on vital sign review, patient has soft exam and lungs clear to auscultation bilaterally, equal breath sounds bilaterally on reassessment. CTa not indicated at this time based on review of clinical picture and physical exam, patient is parainfluenza + and can follow up closely as an outpatient.  The patient was serially evaluated throughout emergency department course by the team. There was no acute deterioration up to this time in the emergency department. The patient has demonstrated clinical improvement and/or stability, feels better at this time according to emergency department team. Agree with goals/plan of emergency department care as described in this physician's electronic medical record, including diagnostics, therapeutics and consultation recommendation as clinically warranted. Will discharge home with close outpatient follow up with primary care physician/provider and specialist if necessary. The patient and/or family was educated on expectant management and return precautions concerning signs and features to return to the emergency department, in layman terms, including but not limited to: nausea, vomiting, fever, chills, the inability to eat, take medications, or drink, persistent/worsening symptoms or any concerns at all. There are no acute or immediate life threatening issues present on history, clinical exam, or any diagnostic evaluation. The patient is a safe disposition home, has capacity and insight into their condition, is ambulatory in the Emergency Department with no further questions and will follow up with their doctor(s) this week. Diagnosis, prognosis, natural history and treatment was discussed with patient and/or family. The patient and/or family were given the opportunity to ask questions and have them answered in full. The patient and/or family are with capacity and insight into the situation, treatment, risks, benefits, alternative therapies, and understand that they can ask any further questions if needed. Patient and/or family/guardian understands anticipatory guidance and was given strict return and follow up precautions. The patient and/or family/guardian has been informed of the necessity to follow up with the PMD/Clinic/follow up as provided within 2-3 days, and the patient and/or family/guardian reports understanding of above with capacity and insight. The patient and/or family/guardian were informed of any results of their tests and are were encouraged to follow up on the findings with their doctor as well as the need to inform their doctor of any results. The patient and/or family/guardian are aware of the need to follow up with repeat testing as applicable and report understanding of the above with capacity and insight. The patient and/or family/guardian was made aware of any pending test results at the time of discharge and of the need to call back for the final results as well as the need to inform their doctor of the results. Patient signed out to me by the prior attending.  The patient's disposition was discussed with the treating team and agreed upon.  Papo Pemberton M.D. (attending) Patient with no abdominal pain, no rebound/guarding, non-tachycardic or signs of tachycardia on vital sign review, patient has soft exam and lungs clear to auscultation bilaterally, equal breath sounds bilaterally on reassessment. CTa not indicated at this time based on review of clinical picture and physical exam normal serial CE, patient is parainfluenza + and can follow up closely as an outpatient.  The patient was serially evaluated throughout emergency department course by the team. There was no acute deterioration up to this time in the emergency department. The patient has demonstrated clinical improvement and/or stability, feels better at this time according to emergency department team. Agree with goals/plan of emergency department care as described in this physician's electronic medical record, including diagnostics, therapeutics and consultation recommendation as clinically warranted. Will discharge home with close outpatient follow up with primary care physician/provider and specialist if necessary. The patient and/or family was educated on expectant management and return precautions concerning signs and features to return to the emergency department, in layman terms, including but not limited to: nausea, vomiting, fever, chills, the inability to eat, take medications, or drink, persistent/worsening symptoms or any concerns at all. There are no acute or immediate life threatening issues present on history, clinical exam, or any diagnostic evaluation. The patient is a safe disposition home, has capacity and insight into their condition, is ambulatory in the Emergency Department with no further questions and will follow up with their doctor(s) this week. Diagnosis, prognosis, natural history and treatment was discussed with patient and/or family. The patient and/or family were given the opportunity to ask questions and have them answered in full. The patient and/or family are with capacity and insight into the situation, treatment, risks, benefits, alternative therapies, and understand that they can ask any further questions if needed. Patient and/or family/guardian understands anticipatory guidance and was given strict return and follow up precautions. The patient and/or family/guardian has been informed of the necessity to follow up with the PMD/Clinic/follow up as provided within 2-3 days, and the patient and/or family/guardian reports understanding of above with capacity and insight. The patient and/or family/guardian were informed of any results of their tests and are were encouraged to follow up on the findings with their doctor as well as the need to inform their doctor of any results. The patient and/or family/guardian are aware of the need to follow up with repeat testing as applicable and report understanding of the above with capacity and insight. The patient and/or family/guardian was made aware of any pending test results at the time of discharge and of the need to call back for the final results as well as the need to inform their doctor of the results.

## 2023-07-27 NOTE — ED ADULT NURSE NOTE - OBJECTIVE STATEMENT
Pt is a 53y F PMH kidney transplant came into ED c/o cough x 4 days with SOB. Pt states she's been experiencing intermittent fevers, and chills, yellow sputum, vomiting. Pt endorses pain in chest and ribs when coughing. Pt denies abdominal pain, headache, dizziness, fatigue, burning with urination, hematuria, blood in stool, changes in diet, recent travel, recent sick contacts. Pt is A&Ox4, ambulatory. Pt laying in bed, stretcher is locked and in lowest position, with appropriate side rails raised.

## 2023-07-27 NOTE — ED ADULT NURSE NOTE - NSFALLUNIVINTERV_ED_ALL_ED
Bed/Stretcher in lowest position, wheels locked, appropriate side rails in place/Call bell, personal items and telephone in reach/Instruct patient to call for assistance before getting out of bed/chair/stretcher/Non-slip footwear applied when patient is off stretcher/[place] to call system/Physically safe environment - no spills, clutter or unnecessary equipment/Purposeful proactive rounding/Room/bathroom lighting operational, light cord in reach

## 2023-07-27 NOTE — ED PROVIDER NOTE - NSFOLLOWUPINSTRUCTIONS_ED_ALL_ED_FT
– Return for any worsening or concerning symptoms (see below).  – Follow up with primary care doctor in the next 2 to 3 days.     [facility] Internal Medicine  General Internal Medicine  [address] [place] [address]  Phone: [phone]  Fax:     [facility]  Internal Medicine  [address] [place] [address]  Phone: [phone]  Fax: [phone] Syndrome    WHAT YOU NEED TO KNOW:    Viral syndrome is a term used for symptoms of an infection caused by a virus. Viruses are spread easily from person to person through the air and on shared items.    DISCHARGE INSTRUCTIONS:    Call your local emergency number (911 in the [place]) or have someone else call if:    You have a seizure.    You cannot be woken.    You have chest pain or trouble breathing.  Seek care immediately if:    You have a stiff neck, a bad headache, and sensitivity to light.    You feel weak, dizzy, or confused.    You stop urinating or urinate a lot less than usual.    You cough up blood or thick yellow or green mucus.    You have severe abdominal pain or your abdomen is larger than usual.  Call your doctor if:    Your symptoms do not get better with treatment or get worse after 3 days.    You have a rash or ear pain.    You have burning when you urinate.    You have questions or concerns about your condition or care.  Medicines: You may need any of the following:    Acetaminophen decreases pain and fever. It is available without a doctor's order. Ask how much to take and how often to take it. Follow directions. Read the labels of all other medicines you are using to see if they also contain acetaminophen, or ask your doctor or pharmacist. Acetaminophen can cause liver damage if not taken correctly. Do not use more than 4 grams (4,000 milligrams) total of acetaminophen in one day.    NSAIDs, such as ibuprofen, help decrease swelling, pain, and fever. NSAIDs can cause stomach bleeding or kidney problems in certain people. If you take blood thinner medicine, always ask your healthcare provider if NSAIDs are safe for you. Always read the medicine label and follow directions.    Cold medicine helps decrease swelling, control a cough, and relieve chest or nasal congestion.    Saline nasal spray helps decrease nasal congestion.    Take your medicine as directed. Contact your healthcare provider if you think your medicine is not helping or if you have side effects. Tell him of her if you are allergic to any medicine. Keep a list of the medicines, vitamins, and herbs you take. Include the amounts, and when and why you take them. Bring the list or the pill bottles to follow-up visits. Carry your medicine list with you in case of an emergency.  Manage your symptoms:    Drink liquids as directed to prevent dehydration. Ask how much liquid to drink each day and which liquids are best for you. Ask if you should drink an oral rehydration solution (ORS). An ORS has the right amounts of water, salts, and sugar you need to replace body fluids. This may help prevent dehydration caused by vomiting or diarrhea. Do not drink liquids with caffeine. Liquids with caffeine can make dehydration worse.    Get plenty of rest to help your body heal. Take naps throughout the day. Ask your healthcare provider when you can return to work and your normal activities.    Use a cool mist humidifier to help you breathe easier. Ask your healthcare provider how to use a cool mist humidifier.    Eat honey or use cough drops for a sore throat. Cough drops are available without a doctor's order. Follow directions for taking cough drops.    Do not smoke or be close to anyone who is smoking. Nicotine and other chemicals in cigarettes and cigars can cause lung damage. Smoking can also delay healing. Ask your healthcare provider for information if you currently smoke and need help to quit. E-cigarettes or smokeless tobacco still contain nicotine. Talk to your healthcare provider before you use these products.  Prevent the spread of germs:      Wash your hands often. Wash your hands several times each day. Wash after you use the bathroom, change a child's diaper, and before you prepare or eat food. Use soap and water every time. Rub your soapy hands together, lacing your fingers. Wash the front and back of your hands, and in between your fingers. Use the fingers of one hand to scrub under the fingernails of the other hand. Wash for at least 20 seconds. Rinse with warm, running water for several seconds. Then dry your hands with a clean towel or paper towel. Use hand  that contains alcohol if soap and water are not available. Do not touch your eyes, nose, or mouth without washing your hands first.  Handwashing      Cover a sneeze or cough. Use a tissue that covers your mouth and nose. Throw the tissue away in a trash can right away. Use the bend of your arm if a tissue is not available. Wash your hands well with soap and water or use a hand .    Stay away from others while you are sick. Avoid crowds as much as possible.    Ask about vaccines you may need. Talk to your healthcare provider about your vaccine history. He or she will tell you which vaccines you need, and when to get them.  Get the influenza (flu) vaccine as soon as recommended each year. The flu vaccine is available starting in September or October. Flu viruses change, so it is important to get a flu vaccine every year.    Get the pneumonia vaccine if recommended. This vaccine is usually recommended every 5 years. Your provider will tell you when to get this vaccine, if needed.  Follow up with your doctor as directed: Write down your questions so you remember to ask them during your visits.

## 2023-07-27 NOTE — ED ADULT NURSE REASSESSMENT NOTE - NS ED NURSE REASSESS COMMENT FT1
Handoff received from previous RN, pt AxO4, brought into private room, duoneb administered, VSS, speaking full sentences, no acute distress.

## 2023-07-27 NOTE — PROGRESS NOTE ADULT - SUBJECTIVE AND OBJECTIVE BOX
54 yo F with a ESRD s/p HepC DDRT in 2020, chronic SVC syndrome s/p L cephalic-iliac vein bypass on DAPT, s/p thrombectomy of L iliac vein and graft 2012, Hx BK viremia, HTN, recently admitted for pyelonephritis presenting with cough, post-tussive chest tightness, and subjective fever/chills x 5 days. Pt denying any pleuritic chest pain, and there is no tachycardia or hypoxia to suggest PE. Patient tested positive for parainfluenza, most likely cause for sxs. Abdomen soft, NTND, low suspicion for intra-abdominal pathology. CTs cancelled. Will send urinalysis and urine culture to r/o possible uti as alternate source of fever, felix given hx pyelonephritis.      MEDICATIONS  (STANDING):    MEDICATIONS  (PRN):      ROS  No fever, sweats, chills  No epistaxis, HA, sore throat  No CP, SOB, cough, sputum  No n/v/d, abd pain, melena, hematochezia  No edema  No rash  No anxiety  No back pain, joint pain  No bleeding, bruising  No dysuria, hematuria    Vital Signs Last 24 Hrs  T(C): 36.8 (27 Jul 2023 11:30), Max: 37.1 (27 Jul 2023 03:52)  T(F): 98.3 (27 Jul 2023 11:30), Max: 98.7 (27 Jul 2023 03:52)  HR: 86 (27 Jul 2023 12:56) (79 - 98)  BP: 124/83 (27 Jul 2023 12:56) (113/77 - 126/84)  BP(mean): --  RR: 18 (27 Jul 2023 12:56) (18 - 20)  SpO2: 95% (27 Jul 2023 12:56) (95% - 100%)    Parameters below as of 27 Jul 2023 12:56  Patient On (Oxygen Delivery Method): room air        PE  NAD  Awake, alert  Anicteric, MMM  RRR  CTAB  Abd soft, NT, ND  No c/c/e  No rash grossly  FROM                          10.1   7.72  )-----------( 357      ( 27 Jul 2023 05:50 )             33.0       07-27    132<L>  |  102  |  10  ----------------------------<  88  4.6   |  17<L>  |  1.39<H>    Ca    9.4      27 Jul 2023 05:50    TPro  6.5  /  Alb  3.4  /  TBili  0.5  /  DBili  x   /  AST  20  /  ALT  17  /  AlkPhos  50  07-27       52 yo F with a ESRD s/p HepC DDRT in 2020, chronic SVC syndrome s/p L cephalic-iliac vein bypass on DAPT, s/p thrombectomy of L iliac vein and graft 2012, Hx BK viremia, HTN, recently admitted for pyelonephritis presenting with cough, post-tussive chest tightness, and subjective fever/chills x 5 days. Pt denying any pleuritic chest pain, and there is no tachycardia or hypoxia to suggest PE. Patient tested positive for parainfluenza, most likely cause for sxs. Abdomen soft, NTND, low suspicion for intra-abdominal pathology. CTs cancelled. Will send urinalysis and urine culture to r/o possible uti as alternate source of fever, felix given hx pyelonephritis.      MEDICATIONS  (STANDING):    MEDICATIONS  (PRN):      ROS  No fever, sweats, chills  No epistaxis, HA, sore throat  No CP, SOB, cough, sputum  No n/v/d, abd pain, melena, hematochezia  No edema  No rash  No anxiety  No back pain, joint pain  No bleeding, bruising  No dysuria, hematuria    Vital Signs Last 24 Hrs  T(C): 36.8 (27 Jul 2023 11:30), Max: 37.1 (27 Jul 2023 03:52)  T(F): 98.3 (27 Jul 2023 11:30), Max: 98.7 (27 Jul 2023 03:52)  HR: 86 (27 Jul 2023 12:56) (79 - 98)  BP: 124/83 (27 Jul 2023 12:56) (113/77 - 126/84)  BP(mean): --  RR: 18 (27 Jul 2023 12:56) (18 - 20)  SpO2: 95% (27 Jul 2023 12:56) (95% - 100%)    Parameters below as of 27 Jul 2023 12:56  Patient On (Oxygen Delivery Method): room air        PE  NAD  Awake, alert  Anicteric, MMM  RRR  CTAB  Abd soft, NT, ND  No c/c/e  No rash grossly  FROM                          10.1   7.72  )-----------( 357      ( 27 Jul 2023 05:50 )             33.0       07-27    132<L>  |  102  |  10  ----------------------------<  88  4.6   |  17<L>  |  1.39<H>    Ca    9.4      27 Jul 2023 05:50    TPro  6.5  /  Alb  3.4  /  TBili  0.5  /  DBili  x   /  AST  20  /  ALT  17  /  AlkPhos  50  07-27

## 2023-07-27 NOTE — PROGRESS NOTE ADULT - NS ATTEND AMEND GEN_ALL_CORE FT
Patient presented with cough, dyspnea found to have parainfluenza.  She overall feels improved, her counts are stable.   Discharge planning to follow up in the office as an outpatient.

## 2023-07-27 NOTE — ED PROVIDER NOTE - ATTENDING CONTRIBUTION TO CARE
MD Vanegas:  patient seen and evaluated personally.   I agree with the History & Physical,  Impression & Plan other than what was detailed in my note.  MD Vanegas  52 yo F with a ESRD s/p HepC DDRT in 2020, chronic SVC syndrome s/p L cephalic-iliac vein bypass on DAPT, s/p thrombectomy of L iliac vein and graft 2012, Hx BK viremia, HTN, recently admitted for pyelonephritis presenting to ed w/ cc of cough, gen malaise, feeling unwell, also having epigastric abd pain, going to back, no cp, no palpitaiotns, no syncope, feeling unwell,  non toxic well appearing, NC/AT,  conjunctiva non conjected, sclera anicteric, moist mucous membranes, neck supple, heart sounds, normal, no mrg, lungs cta b/l no wrr, abd soft non distended w/ mild epigastrictenderness, no visual deformities of extremities, axox3, , normal mood and affect, plan for cbc, cmp, viral studies, cxr, likely ct r/o pe as well as ct abd.

## 2023-07-27 NOTE — ED PROVIDER NOTE - OBJECTIVE STATEMENT
Anne MARTINEZ PGY-3: took sign out from night team. 54 yo F with a ESRD s/p HepC DDRT in 2020, chronic SVC syndrome s/p L cephalic-iliac vein bypass on DAPT, s/p thrombectomy of L iliac vein and graft 2012, Hx BK viremia, HTN, recently admitted for pyelonephritis presenting with cough, post-tussive chest tightness, and subjective fever/chills x 5 days. Pt denying any pleuritic chest pain, and there is no tachycardia or hypoxia to suggest PE. Patient tested positive for parainfluenza, most likely cause for sxs. Abdomen soft, NTND, low suspicion for intra-abdominal pathology. CTs cancelled. Will send urinalysis and urine culture to r/o possible uti as alternate source of fever, felix given hx pyelonephritis.

## 2023-07-27 NOTE — ED PROVIDER NOTE - PATIENT PORTAL LINK FT
You can access the FollowMyHealth Patient Portal offered by [facility] by registering at the following website: [email or web address]. By joining [facility]’s FollowMyHealth portal, you will also be able to view your health information using other applications (apps) compatible with our system.

## 2023-07-27 NOTE — PROGRESS NOTE ADULT - ASSESSMENT
Patient is a 53y old  Female who presents with a chief complaint of fever, chill, nausea and vomiting    fever/leukocytosis   --2/2 parainfluenza  --management per primary team  --Currently afebrile    anemia  --possibly 2/2 renal disfunction  --Hgb 14 at baseline  --nephology following  --b12 folate normal, hapto elevated   --trend CBC  --patient to follow up w/ Dr Jeffrey Oliva of [facility] upon discharge    dc'd from ED    Joycelyn Delcid NP  Hematology/ Oncology  [facility]  [phone] (office)  [phone] (alt office)  Evenings and weekends please call MD on call or office Patient is a 53y old  Female who presents with presenting with cough, post-tussive chest tightness, and subjective fever/chills x 5 days.    cough, fever x5days  --2/2 parainfluenza  --management per primary team  --Currently afebrile    anemia  --possibly 2/2 renal disfunction  --Hgb 14 at baseline  --nephology following  --b12 folate normal, hapto elevated   --trend CBC  --patient to follow up w/ Dr Jeffrey Oliva of [facility] upon discharge    dc'd from ED    Joycelyn Delcid NP  Hematology/ Oncology  [facility]  [phone] (office)  [phone] (alt office)  Evenings and weekends please call MD on call or office

## 2023-07-27 NOTE — ED PROVIDER NOTE - CARE PLAN
1 Principal Discharge DX:	Cough  Secondary Diagnosis:	Parainfluenza   Alert-The patient is alert, awake and responds to voice. The patient is oriented to time, place, and person. The triage nurse is able to obtain subjective information.

## 2023-07-28 LAB
CULTURE RESULTS: SIGNIFICANT CHANGE UP
SPECIMEN SOURCE: SIGNIFICANT CHANGE UP

## 2023-07-31 LAB
MYCOPHENOLATE SERPL-MCNC: 0.2 UG/ML — LOW (ref 1–3.5)
MYCOPHENOLIC ACID GLUCURONIDE: 26 UG/ML — SIGNIFICANT CHANGE UP (ref 15–125)

## 2023-08-01 NOTE — ED POST DISCHARGE NOTE - OTHER COMMUNICATION
Pt returned call from above. Informed of low level. Pt advised she missed dosings. Advised to take as prescribed and f/u with doctor. States she has f/u this Thursday with Nephro Dr. Lake and will take meds as prescribed. - Rush Yee PA-C

## 2023-08-03 ENCOUNTER — APPOINTMENT (OUTPATIENT)
Dept: TRANSPLANT | Facility: CLINIC | Age: 53
End: 2023-08-03

## 2023-08-03 LAB
ALBUMIN SERPL ELPH-MCNC: 4.4 G/DL
ALP BLD-CCNC: 70 U/L
ALT SERPL-CCNC: 15 U/L
ANION GAP SERPL CALC-SCNC: 12 MMOL/L
APPEARANCE: CLEAR
AST SERPL-CCNC: 16 U/L
BACTERIA: NEGATIVE /HPF
BILIRUB SERPL-MCNC: 0.8 MG/DL
BILIRUBIN URINE: NEGATIVE
BLOOD URINE: NEGATIVE
BUN SERPL-MCNC: 11 MG/DL
CALCIUM SERPL-MCNC: 10.2 MG/DL
CAST: 0 /LPF
CHLORIDE SERPL-SCNC: 105 MMOL/L
CO2 SERPL-SCNC: 25 MMOL/L
COLOR: YELLOW
CREAT SERPL-MCNC: 1.21 MG/DL
CREAT SPEC-SCNC: 79 MG/DL
CREAT/PROT UR: 0.1 RATIO
EGFR: 54 ML/MIN/1.73M2
EPITHELIAL CELLS: 2 /HPF
GLUCOSE QUALITATIVE U: NEGATIVE MG/DL
GLUCOSE SERPL-MCNC: 80 MG/DL
KETONES URINE: NEGATIVE MG/DL
LDH SERPL-CCNC: 258 U/L
LEUKOCYTE ESTERASE URINE: ABNORMAL
MAGNESIUM SERPL-MCNC: 1.3 MG/DL
MICROSCOPIC-UA: NORMAL
NITRITE URINE: NEGATIVE
PH URINE: 6
PHOSPHATE SERPL-MCNC: 3.5 MG/DL
POTASSIUM SERPL-SCNC: 4.2 MMOL/L
PROT SERPL-MCNC: 6.9 G/DL
PROT UR-MCNC: 10 MG/DL
PROTEIN URINE: NEGATIVE MG/DL
RED BLOOD CELLS URINE: 0 /HPF
SODIUM SERPL-SCNC: 142 MMOL/L
SPECIFIC GRAVITY URINE: 1.01
TACROLIMUS SERPL-MCNC: 7.4 NG/ML
URATE SERPL-MCNC: 7.6 MG/DL
UROBILINOGEN URINE: 0.2 MG/DL
WHITE BLOOD CELLS URINE: 6 /HPF

## 2023-08-04 LAB
BKV DNA SPEC QL NAA+PROBE: ABNORMAL IU/ML
CMV DNA SPEC QL NAA+PROBE: NOT DETECTED IU/ML
CMVPCR LOG: NOT DETECTED LOG10IU/ML

## 2023-09-06 ENCOUNTER — APPOINTMENT (OUTPATIENT)
Dept: NEPHROLOGY | Facility: CLINIC | Age: 53
End: 2023-09-06
Payer: MEDICARE

## 2023-09-06 VITALS
DIASTOLIC BLOOD PRESSURE: 84 MMHG | HEART RATE: 71 BPM | RESPIRATION RATE: 14 BRPM | BODY MASS INDEX: 30.43 KG/M2 | WEIGHT: 155 LBS | SYSTOLIC BLOOD PRESSURE: 150 MMHG | OXYGEN SATURATION: 100 % | TEMPERATURE: 96.1 F | HEIGHT: 60 IN

## 2023-09-06 PROCEDURE — 99214 OFFICE O/P EST MOD 30 MIN: CPT

## 2023-09-06 RX ORDER — SULFAMETHOXAZOLE AND TRIMETHOPRIM 400; 80 MG/1; MG/1
400-80 TABLET ORAL DAILY
Qty: 90 | Refills: 3 | Status: DISCONTINUED | COMMUNITY
Start: 2020-08-17 | End: 2023-09-06

## 2023-09-06 RX ORDER — NIRMATRELVIR AND RITONAVIR 300-100 MG
20 X 150 MG & KIT ORAL
Qty: 30 | Refills: 0 | Status: DISCONTINUED | COMMUNITY
Start: 2022-11-28 | End: 2023-09-06

## 2023-09-06 RX ORDER — MYCOPHENOLATE MOFETIL 500 MG/1
500 TABLET ORAL
Qty: 180 | Refills: 3 | Status: ACTIVE | COMMUNITY
Start: 2022-06-28 | End: 1900-01-01

## 2023-09-06 RX ORDER — ATORVASTATIN CALCIUM 20 MG/1
20 TABLET, FILM COATED ORAL
Qty: 30 | Refills: 6 | Status: ACTIVE | COMMUNITY
Start: 2023-09-06 | End: 1900-01-01

## 2023-09-06 RX ORDER — SENNOSIDES 8.6 MG
8.6 TABLET ORAL
Qty: 180 | Refills: 3 | Status: DISCONTINUED | COMMUNITY
Start: 2020-08-17 | End: 2023-09-06

## 2023-09-06 RX ORDER — PANTOPRAZOLE 40 MG/1
40 TABLET, DELAYED RELEASE ORAL DAILY
Qty: 90 | Refills: 1 | Status: ACTIVE | COMMUNITY
Start: 2022-07-12 | End: 1900-01-01

## 2023-09-06 RX ORDER — ASPIRIN 81 MG/1
81 TABLET, COATED ORAL
Qty: 90 | Refills: 3 | Status: ACTIVE | COMMUNITY
Start: 2022-04-12 | End: 1900-01-01

## 2023-09-06 RX ORDER — PREDNISONE 5 MG/1
5 TABLET ORAL
Qty: 90 | Refills: 3 | Status: ACTIVE | COMMUNITY
Start: 2020-08-17 | End: 1900-01-01

## 2023-09-06 RX ORDER — CINACALCET 30 MG/1
30 TABLET ORAL
Qty: 90 | Refills: 3 | Status: ACTIVE | COMMUNITY
Start: 2020-09-15 | End: 1900-01-01

## 2023-09-06 RX ORDER — TACROLIMUS 4 MG/1
4 TABLET, EXTENDED RELEASE ORAL DAILY
Qty: 180 | Refills: 3 | Status: ACTIVE | COMMUNITY
Start: 2020-08-17 | End: 1900-01-01

## 2023-09-06 RX ORDER — TACROLIMUS 1 MG/1
1 TABLET, EXTENDED RELEASE ORAL
Qty: 270 | Refills: 3 | Status: ACTIVE | COMMUNITY
Start: 2022-10-03 | End: 1900-01-01

## 2023-09-06 NOTE — ASSESSMENT
[FreeTextEntry1] : Kidney Transplant recipient, functioning allograft, noted last creatinine normal. Labs done today. Will follow. Immunosuppression- Reviewed.n On Tac/ mg/dose and prednisone BK Viremia  ;  Just detected.  follow up BKV PCR.   HTN: Good Control. Home measurements 120's. Advised to take amlodipine consistently. Hyperlipidemia: She is on statin - rosuvastatin. On Plavix for prior surgical bypass for svc syndrome while on dialysis- continue Also advised eye check, She has had Derm check done Erythrocytosis: On f./u with Dr. Oliva, gets Phlebotomy at therapeutic phlebotomy dept at [facility]. If persistent will consider Losartan after Doppler transplant Skin hyperpigmentation both ankles- Has seen Dermatologist, improved with local treatment Plan: Continue current medications additional changes- none made today  Labs and follow up visit to be scheduled as discussed. She has completed vaccination, Pfizer including booster  Discussed Renal Preservation strategies including achieving optimal weight through calory restriction and regular exercise, daily exercise, sleep hygiene, optimized control of  blood pressure, lipids, avoidance of NSAIDs, Low Na and Low animal protein diet and medication adherence. Primary nephrologist- Jw Streeter- She has been advised to follow up with Dr. Streeter. She has seen him.  Follow up at transplant center every 6 months

## 2023-09-06 NOTE — HISTORY OF PRESENT ILLNESS
[FreeTextEntry1] : Patient has completed one year post transplant. 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 Her mother stays with her.  is doing well after bladder cancer treatment. Back to work now, full time  She is on Plavix for prior surgical bypass for svc syndrome started while she was on dialysis.  Currently:  Patient feels well. Now working for [employer] at [employer].  She lives with her  and her mother  Reviewed labs from August 3, stable creatinine, Tac level therapeutic. BK was detected. On Envarsus 11, Cellcept 500/500 and pred 5/d.

## 2023-09-07 LAB
25(OH)D3 SERPL-MCNC: 36.2 NG/ML
ALBUMIN SERPL ELPH-MCNC: 4.7 G/DL
ALP BLD-CCNC: 70 U/L
ALT SERPL-CCNC: 11 U/L
ANION GAP SERPL CALC-SCNC: 13 MMOL/L
APPEARANCE: CLEAR
AST SERPL-CCNC: 13 U/L
BACTERIA: NEGATIVE /HPF
BILIRUB SERPL-MCNC: 0.8 MG/DL
BILIRUBIN URINE: NEGATIVE
BLOOD URINE: NEGATIVE
BUN SERPL-MCNC: 13 MG/DL
CALCIUM SERPL-MCNC: 10.7 MG/DL
CALCIUM SERPL-MCNC: 10.7 MG/DL
CAST: 1 /LPF
CHLORIDE SERPL-SCNC: 105 MMOL/L
CHOLEST SERPL-MCNC: 207 MG/DL
CMV DNA SPEC QL NAA+PROBE: NOT DETECTED IU/ML
CMVPCR LOG: NOT DETECTED LOG10IU/ML
CO2 SERPL-SCNC: 23 MMOL/L
COLOR: YELLOW
CREAT SERPL-MCNC: 1.19 MG/DL
CREAT SPEC-SCNC: 101 MG/DL
CREAT/PROT UR: 0.1 RATIO
EGFR: 55 ML/MIN/1.73M2
EPITHELIAL CELLS: 1 /HPF
ESTIMATED AVERAGE GLUCOSE: 128 MG/DL
GLUCOSE QUALITATIVE U: NEGATIVE MG/DL
GLUCOSE SERPL-MCNC: 87 MG/DL
HBA1C MFR BLD HPLC: 6.1 %
HCT VFR BLD CALC: 38.6 %
HDLC SERPL-MCNC: 104 MG/DL
HGB BLD-MCNC: 11.6 G/DL
KETONES URINE: NEGATIVE MG/DL
LDH SERPL-CCNC: 225 U/L
LDLC SERPL CALC-MCNC: 93 MG/DL
LEUKOCYTE ESTERASE URINE: ABNORMAL
MAGNESIUM SERPL-MCNC: 1.7 MG/DL
MCHC RBC-ENTMCNC: 25 PG
MCHC RBC-ENTMCNC: 30.1 GM/DL
MCV RBC AUTO: 83.2 FL
MICROSCOPIC-UA: NORMAL
NITRITE URINE: NEGATIVE
NONHDLC SERPL-MCNC: 103 MG/DL
PARATHYROID HORMONE INTACT: 148 PG/ML
PH URINE: 6.5
PHOSPHATE SERPL-MCNC: 3.7 MG/DL
PLATELET # BLD AUTO: 275 K/UL
POTASSIUM SERPL-SCNC: 4.5 MMOL/L
PROT SERPL-MCNC: 7 G/DL
PROT UR-MCNC: 14 MG/DL
PROTEIN URINE: NEGATIVE MG/DL
RBC # BLD: 4.64 M/UL
RBC # FLD: 16.9 %
RED BLOOD CELLS URINE: 1 /HPF
SODIUM SERPL-SCNC: 141 MMOL/L
SPECIFIC GRAVITY URINE: 1.01
TACROLIMUS SERPL-MCNC: 5.1 NG/ML
TRIGL SERPL-MCNC: 52 MG/DL
URATE SERPL-MCNC: 6.3 MG/DL
UROBILINOGEN URINE: 0.2 MG/DL
WBC # FLD AUTO: 5.36 K/UL
WHITE BLOOD CELLS URINE: 1 /HPF

## 2023-09-08 LAB — BKV DNA SPEC QL NAA+PROBE: ABNORMAL IU/ML

## 2023-11-10 ENCOUNTER — RX RENEWAL (OUTPATIENT)
Age: 53
End: 2023-11-10

## 2024-02-03 ENCOUNTER — INPATIENT (INPATIENT)
Facility: HOSPITAL | Age: 54
LOS: 4 days | Discharge: HOME | DRG: 151 | End: 2024-02-08
Attending: HOSPITALIST | Admitting: STUDENT IN AN ORGANIZED HEALTH CARE EDUCATION/TRAINING PROGRAM
Payer: MEDICARE

## 2024-02-03 VITALS
OXYGEN SATURATION: 96 % | RESPIRATION RATE: 22 BRPM | HEIGHT: 63 IN | DIASTOLIC BLOOD PRESSURE: 78 MMHG | WEIGHT: 145.06 LBS | TEMPERATURE: 99 F | SYSTOLIC BLOOD PRESSURE: 132 MMHG | HEART RATE: 91 BPM

## 2024-02-03 DIAGNOSIS — J32.9 CHRONIC SINUSITIS, UNSPECIFIED: ICD-10-CM

## 2024-02-03 DIAGNOSIS — I87.1 COMPRESSION OF VEIN: ICD-10-CM

## 2024-02-03 DIAGNOSIS — I77.0 ARTERIOVENOUS FISTULA, ACQUIRED: Chronic | ICD-10-CM

## 2024-02-03 DIAGNOSIS — R07.9 CHEST PAIN, UNSPECIFIED: ICD-10-CM

## 2024-02-03 DIAGNOSIS — Z29.9 ENCOUNTER FOR PROPHYLACTIC MEASURES, UNSPECIFIED: ICD-10-CM

## 2024-02-03 DIAGNOSIS — Z94.0 KIDNEY TRANSPLANT STATUS: Chronic | ICD-10-CM

## 2024-02-03 DIAGNOSIS — Z95.828 PRESENCE OF OTHER VASCULAR IMPLANTS AND GRAFTS: Chronic | ICD-10-CM

## 2024-02-03 DIAGNOSIS — Z94.0 KIDNEY TRANSPLANT STATUS: ICD-10-CM

## 2024-02-03 DIAGNOSIS — Z95.5 PRESENCE OF CORONARY ANGIOPLASTY IMPLANT AND GRAFT: ICD-10-CM

## 2024-02-03 LAB
ALBUMIN SERPL ELPH-MCNC: 4 G/DL — SIGNIFICANT CHANGE UP (ref 3.3–5)
ALP SERPL-CCNC: 60 U/L — SIGNIFICANT CHANGE UP (ref 40–120)
ALT FLD-CCNC: 19 U/L — SIGNIFICANT CHANGE UP (ref 10–45)
ANION GAP SERPL CALC-SCNC: 13 MMOL/L — SIGNIFICANT CHANGE UP (ref 5–17)
APPEARANCE UR: CLEAR — SIGNIFICANT CHANGE UP
AST SERPL-CCNC: 39 U/L — SIGNIFICANT CHANGE UP (ref 10–40)
BACTERIA # UR AUTO: NEGATIVE /HPF — SIGNIFICANT CHANGE UP
BASE EXCESS BLDV CALC-SCNC: 2.6 MMOL/L — SIGNIFICANT CHANGE UP (ref -2–3)
BASOPHILS # BLD AUTO: 0.03 K/UL — SIGNIFICANT CHANGE UP (ref 0–0.2)
BASOPHILS NFR BLD AUTO: 0.3 % — SIGNIFICANT CHANGE UP (ref 0–2)
BILIRUB SERPL-MCNC: 0.9 MG/DL — SIGNIFICANT CHANGE UP (ref 0.2–1.2)
BILIRUB UR-MCNC: NEGATIVE — SIGNIFICANT CHANGE UP
BUN SERPL-MCNC: 13 MG/DL — SIGNIFICANT CHANGE UP (ref 7–23)
CA-I SERPL-SCNC: 1.38 MMOL/L — HIGH (ref 1.15–1.33)
CALCIUM SERPL-MCNC: 10.2 MG/DL — SIGNIFICANT CHANGE UP (ref 8.4–10.5)
CAST: 0 /LPF — SIGNIFICANT CHANGE UP (ref 0–4)
CHLORIDE BLDV-SCNC: 99 MMOL/L — SIGNIFICANT CHANGE UP (ref 96–108)
CHLORIDE SERPL-SCNC: 98 MMOL/L — SIGNIFICANT CHANGE UP (ref 96–108)
CO2 BLDV-SCNC: 30 MMOL/L — HIGH (ref 22–26)
CO2 SERPL-SCNC: 23 MMOL/L — SIGNIFICANT CHANGE UP (ref 22–31)
COLOR SPEC: YELLOW — SIGNIFICANT CHANGE UP
CREAT SERPL-MCNC: 0.92 MG/DL — SIGNIFICANT CHANGE UP (ref 0.5–1.3)
DIFF PNL FLD: NEGATIVE — SIGNIFICANT CHANGE UP
EGFR: 75 ML/MIN/1.73M2 — SIGNIFICANT CHANGE UP
EOSINOPHIL # BLD AUTO: 0.1 K/UL — SIGNIFICANT CHANGE UP (ref 0–0.5)
EOSINOPHIL NFR BLD AUTO: 0.9 % — SIGNIFICANT CHANGE UP (ref 0–6)
FLUAV AG NPH QL: SIGNIFICANT CHANGE UP
FLUBV AG NPH QL: SIGNIFICANT CHANGE UP
GAS PNL BLDV: 133 MMOL/L — LOW (ref 136–145)
GAS PNL BLDV: SIGNIFICANT CHANGE UP
GLUCOSE BLDV-MCNC: 111 MG/DL — HIGH (ref 70–99)
GLUCOSE SERPL-MCNC: 113 MG/DL — HIGH (ref 70–99)
GLUCOSE UR QL: NEGATIVE MG/DL — SIGNIFICANT CHANGE UP
HCO3 BLDV-SCNC: 28 MMOL/L — SIGNIFICANT CHANGE UP (ref 22–29)
HCT VFR BLD CALC: 37.1 % — SIGNIFICANT CHANGE UP (ref 34.5–45)
HCT VFR BLDA CALC: 34 % — LOW (ref 34.5–46.5)
HGB BLD CALC-MCNC: 11.4 G/DL — LOW (ref 11.7–16.1)
HGB BLD-MCNC: 11.1 G/DL — LOW (ref 11.5–15.5)
IMM GRANULOCYTES NFR BLD AUTO: 0.3 % — SIGNIFICANT CHANGE UP (ref 0–0.9)
KETONES UR-MCNC: NEGATIVE MG/DL — SIGNIFICANT CHANGE UP
LACTATE BLDV-MCNC: 1.1 MMOL/L — SIGNIFICANT CHANGE UP (ref 0.5–2)
LEUKOCYTE ESTERASE UR-ACNC: NEGATIVE — SIGNIFICANT CHANGE UP
LIDOCAIN IGE QN: 55 U/L — SIGNIFICANT CHANGE UP (ref 7–60)
LYMPHOCYTES # BLD AUTO: 1.17 K/UL — SIGNIFICANT CHANGE UP (ref 1–3.3)
LYMPHOCYTES # BLD AUTO: 11 % — LOW (ref 13–44)
MAGNESIUM SERPL-MCNC: 1.5 MG/DL — LOW (ref 1.6–2.6)
MCHC RBC-ENTMCNC: 23.5 PG — LOW (ref 27–34)
MCHC RBC-ENTMCNC: 29.9 GM/DL — LOW (ref 32–36)
MCV RBC AUTO: 78.6 FL — LOW (ref 80–100)
MONOCYTES # BLD AUTO: 1.13 K/UL — HIGH (ref 0–0.9)
MONOCYTES NFR BLD AUTO: 10.6 % — SIGNIFICANT CHANGE UP (ref 2–14)
NEUTROPHILS # BLD AUTO: 8.19 K/UL — HIGH (ref 1.8–7.4)
NEUTROPHILS NFR BLD AUTO: 76.9 % — SIGNIFICANT CHANGE UP (ref 43–77)
NITRITE UR-MCNC: NEGATIVE — SIGNIFICANT CHANGE UP
NRBC # BLD: 0 /100 WBCS — SIGNIFICANT CHANGE UP (ref 0–0)
NT-PROBNP SERPL-SCNC: 90 PG/ML — SIGNIFICANT CHANGE UP (ref 0–300)
PCO2 BLDV: 48 MMHG — HIGH (ref 39–42)
PH BLDV: 7.38 — SIGNIFICANT CHANGE UP (ref 7.32–7.43)
PH UR: 6.5 — SIGNIFICANT CHANGE UP (ref 5–8)
PLATELET # BLD AUTO: 240 K/UL — SIGNIFICANT CHANGE UP (ref 150–400)
PO2 BLDV: 32 MMHG — SIGNIFICANT CHANGE UP (ref 25–45)
POTASSIUM BLDV-SCNC: 3.9 MMOL/L — SIGNIFICANT CHANGE UP (ref 3.5–5.1)
POTASSIUM SERPL-MCNC: 5 MMOL/L — SIGNIFICANT CHANGE UP (ref 3.5–5.3)
POTASSIUM SERPL-SCNC: 5 MMOL/L — SIGNIFICANT CHANGE UP (ref 3.5–5.3)
PROCALCITONIN SERPL-MCNC: 0.07 NG/ML — SIGNIFICANT CHANGE UP (ref 0.02–0.1)
PROT SERPL-MCNC: 7.2 G/DL — SIGNIFICANT CHANGE UP (ref 6–8.3)
PROT UR-MCNC: 30 MG/DL
RBC # BLD: 4.72 M/UL — SIGNIFICANT CHANGE UP (ref 3.8–5.2)
RBC # FLD: 18.8 % — HIGH (ref 10.3–14.5)
RBC CASTS # UR COMP ASSIST: 2 /HPF — SIGNIFICANT CHANGE UP (ref 0–4)
RSV RNA NPH QL NAA+NON-PROBE: SIGNIFICANT CHANGE UP
SAO2 % BLDV: 51.8 % — LOW (ref 67–88)
SARS-COV-2 RNA SPEC QL NAA+PROBE: SIGNIFICANT CHANGE UP
SODIUM SERPL-SCNC: 134 MMOL/L — LOW (ref 135–145)
SP GR SPEC: 1.02 — SIGNIFICANT CHANGE UP (ref 1–1.03)
SQUAMOUS # UR AUTO: 1 /HPF — SIGNIFICANT CHANGE UP (ref 0–5)
TROPONIN T, HIGH SENSITIVITY RESULT: 9 NG/L — SIGNIFICANT CHANGE UP (ref 0–51)
UROBILINOGEN FLD QL: 0.2 MG/DL — SIGNIFICANT CHANGE UP (ref 0.2–1)
WBC # BLD: 10.65 K/UL — HIGH (ref 3.8–10.5)
WBC # FLD AUTO: 10.65 K/UL — HIGH (ref 3.8–10.5)
WBC UR QL: 1 /HPF — SIGNIFICANT CHANGE UP (ref 0–5)

## 2024-02-03 PROCEDURE — 76776 US EXAM K TRANSPL W/DOPPLER: CPT | Mod: 26,RT

## 2024-02-03 PROCEDURE — 99285 EMERGENCY DEPT VISIT HI MDM: CPT

## 2024-02-03 PROCEDURE — 71045 X-RAY EXAM CHEST 1 VIEW: CPT | Mod: 26,59

## 2024-02-03 PROCEDURE — 99222 1ST HOSP IP/OBS MODERATE 55: CPT | Mod: GC

## 2024-02-03 PROCEDURE — 71046 X-RAY EXAM CHEST 2 VIEWS: CPT | Mod: 26

## 2024-02-03 RX ORDER — ATORVASTATIN CALCIUM 80 MG/1
20 TABLET, FILM COATED ORAL AT BEDTIME
Refills: 0 | Status: DISCONTINUED | OUTPATIENT
Start: 2024-02-03 | End: 2024-02-08

## 2024-02-03 RX ORDER — CEFEPIME 1 G/1
1000 INJECTION, POWDER, FOR SOLUTION INTRAMUSCULAR; INTRAVENOUS EVERY 8 HOURS
Refills: 0 | Status: DISCONTINUED | OUTPATIENT
Start: 2024-02-03 | End: 2024-02-03

## 2024-02-03 RX ORDER — HEPARIN SODIUM 5000 [USP'U]/ML
5000 INJECTION INTRAVENOUS; SUBCUTANEOUS EVERY 8 HOURS
Refills: 0 | Status: DISCONTINUED | OUTPATIENT
Start: 2024-02-03 | End: 2024-02-08

## 2024-02-03 RX ORDER — ASPIRIN/CALCIUM CARB/MAGNESIUM 324 MG
1 TABLET ORAL
Qty: 0 | Refills: 0 | DISCHARGE

## 2024-02-03 RX ORDER — MAGNESIUM SULFATE 500 MG/ML
2 VIAL (ML) INJECTION ONCE
Refills: 0 | Status: COMPLETED | OUTPATIENT
Start: 2024-02-03 | End: 2024-02-03

## 2024-02-03 RX ORDER — PANTOPRAZOLE SODIUM 20 MG/1
40 TABLET, DELAYED RELEASE ORAL
Refills: 0 | Status: DISCONTINUED | OUTPATIENT
Start: 2024-02-03 | End: 2024-02-08

## 2024-02-03 RX ORDER — MYCOPHENOLATE MOFETIL 250 MG/1
1 CAPSULE ORAL
Refills: 0 | DISCHARGE

## 2024-02-03 RX ORDER — ASPIRIN/CALCIUM CARB/MAGNESIUM 324 MG
81 TABLET ORAL DAILY
Refills: 0 | Status: DISCONTINUED | OUTPATIENT
Start: 2024-02-03 | End: 2024-02-08

## 2024-02-03 RX ORDER — CLOPIDOGREL BISULFATE 75 MG/1
75 TABLET, FILM COATED ORAL DAILY
Refills: 0 | Status: DISCONTINUED | OUTPATIENT
Start: 2024-02-03 | End: 2024-02-03

## 2024-02-03 RX ORDER — AMPICILLIN SODIUM AND SULBACTAM SODIUM 250; 125 MG/ML; MG/ML
3 INJECTION, POWDER, FOR SUSPENSION INTRAMUSCULAR; INTRAVENOUS EVERY 6 HOURS
Refills: 0 | Status: DISCONTINUED | OUTPATIENT
Start: 2024-02-03 | End: 2024-02-06

## 2024-02-03 RX ORDER — TACROLIMUS 5 MG/1
3 CAPSULE ORAL
Refills: 0 | DISCHARGE

## 2024-02-03 RX ORDER — CEFEPIME 1 G/1
1000 INJECTION, POWDER, FOR SOLUTION INTRAMUSCULAR; INTRAVENOUS ONCE
Refills: 0 | Status: COMPLETED | OUTPATIENT
Start: 2024-02-03 | End: 2024-02-03

## 2024-02-03 RX ORDER — VANCOMYCIN HCL 1 G
1000 VIAL (EA) INTRAVENOUS ONCE
Refills: 0 | Status: COMPLETED | OUTPATIENT
Start: 2024-02-03 | End: 2024-02-03

## 2024-02-03 RX ORDER — MYCOPHENOLATE MOFETIL 250 MG/1
500 CAPSULE ORAL
Refills: 0 | Status: DISCONTINUED | OUTPATIENT
Start: 2024-02-03 | End: 2024-02-08

## 2024-02-03 RX ORDER — ACETAMINOPHEN 500 MG
650 TABLET ORAL EVERY 6 HOURS
Refills: 0 | Status: DISCONTINUED | OUTPATIENT
Start: 2024-02-03 | End: 2024-02-08

## 2024-02-03 RX ORDER — PANTOPRAZOLE SODIUM 20 MG/1
1 TABLET, DELAYED RELEASE ORAL
Refills: 0 | DISCHARGE

## 2024-02-03 RX ORDER — TACROLIMUS 5 MG/1
2 CAPSULE ORAL
Refills: 0 | DISCHARGE

## 2024-02-03 RX ORDER — TACROLIMUS 5 MG/1
11 CAPSULE ORAL DAILY
Refills: 0 | Status: DISCONTINUED | OUTPATIENT
Start: 2024-02-03 | End: 2024-02-08

## 2024-02-03 RX ORDER — CEFEPIME 1 G/1
INJECTION, POWDER, FOR SOLUTION INTRAMUSCULAR; INTRAVENOUS
Refills: 0 | Status: DISCONTINUED | OUTPATIENT
Start: 2024-02-03 | End: 2024-02-03

## 2024-02-03 RX ORDER — CINACALCET 30 MG/1
1 TABLET, FILM COATED ORAL
Refills: 0 | DISCHARGE

## 2024-02-03 RX ADMIN — CEFEPIME 100 MILLIGRAM(S): 1 INJECTION, POWDER, FOR SOLUTION INTRAMUSCULAR; INTRAVENOUS at 16:43

## 2024-02-03 RX ADMIN — Medication 5 MILLIGRAM(S): at 19:43

## 2024-02-03 RX ADMIN — TACROLIMUS 11 MILLIGRAM(S): 5 CAPSULE ORAL at 19:44

## 2024-02-03 RX ADMIN — Medication 250 MILLIGRAM(S): at 16:43

## 2024-02-03 RX ADMIN — Medication 25 GRAM(S): at 18:16

## 2024-02-03 RX ADMIN — ATORVASTATIN CALCIUM 20 MILLIGRAM(S): 80 TABLET, FILM COATED ORAL at 23:57

## 2024-02-03 RX ADMIN — CEFEPIME 1000 MILLIGRAM(S): 1 INJECTION, POWDER, FOR SOLUTION INTRAMUSCULAR; INTRAVENOUS at 17:10

## 2024-02-03 RX ADMIN — MYCOPHENOLATE MOFETIL 500 MILLIGRAM(S): 250 CAPSULE ORAL at 19:44

## 2024-02-03 NOTE — CONSULT NOTE ADULT - ASSESSMENT
53 y.o F w/ ESRD s/p HepC DDRT in 2020, chronic SVC syndrome, HTN, here for chest pressure and rhinorockets placed at [facility]. Transplant nephrology consulted for management

## 2024-02-03 NOTE — H&P ADULT - ASSESSMENT
53yoF PMHx of ESRD s/p R-DDRT, chronic SVC syndrome, HTN, CABG presenting after being discharged from OSH for epistaxis and MSK pain 53yoF PMHx of ESRD s/p R-DDRT, chronic SVC syndrome, HTN, CABG, with recent hospitalization at OSH for epistaxis and CP presenting with chest pain.

## 2024-02-03 NOTE — H&P ADULT - PROBLEM SELECTOR PLAN 2
New onset chest pain when patient had epistaxis  Patient was told EKG at OSH unremarkable. Echo performed but no records  In ED, troponin 9, EKG no STEMI  Chest pain on exam noted to be more point tenderness, worse around left mid-axillary with     Plan  - f/u OSH records for EKG and Echo report New onset chest pain when patient had epistaxis  Patient was told EKG at OSH unremarkable. Echo performed but no records  In ED, troponin 9, EKG no STEMI  Chest pain on exam noted to be more point tenderness, worse around left mid-axillary around the location of drain  - Drain placed ~2003(?) by Dr. Lit Knight(?) for SVC syndrome    Plan  - f/u OSH records for EKG, Echo, and about SVC drain  - Repeat troponin + EKG in AM  - Chest X-ray AP/Lateral to evaluate for drain  - Lidocaine patch and acetaminophen for pain New onset chest pain when patient had epistaxis  Patient was told EKG at OSH unremarkable. Echo performed but no records  In ED, troponin 9, EKG no STEMI  T-waves noted on EKG  [facility] 2019 showing clean coronaries  3-day history of Chest Pain  Low-likelihood of ACS  TTP left mid-axillary    Plan  - F/U OSH for records  - Repeat troponin + EKG in AM  - Chest X-ray AP/Lateral  - Lidocaine patch and acetaminophen for pain

## 2024-02-03 NOTE — CONSULT NOTE ADULT - PROBLEM SELECTOR RECOMMENDATION 9
Pt. with kidney transplantation in 2020 ([facility]), currently admitted for sinusitis and chest pressure. SCr is stable at 0.92. Monitor labs and urine output. Avoid any potential nephrotoxins. Dose medications as per eGFR. c/w home immunosuppression of 11 envarsus daily and Pred 5 daily. Check serum tacrolimus level (trough) 30 mins prior to am dose. Goal trough of 4-6.    Hold 500 BID of cellcept. Patient   Hypomag- replete with 2mg of mag. c/w oral home repletions    Assessment and plan discussed with attending on call.  If you have any questions, please feel free to contact me  Neil Zarco  Nephrology Fellow  [phone]; Prefer [employer] TEAMS  (After 5pm or on weekends please page the on-call fellow). Pt. with kidney transplantation in 2020 ([facility]), currently admitted for sinusitis and chest pressure. SCr is stable at 0.92. Monitor labs and urine output. Avoid any potential nephrotoxins. Dose medications as per eGFR. c/w home immunosuppression of 11 envarsus daily and Pred 5 daily. Check serum tacrolimus level (trough) 30 mins prior to am dose. Goal trough of 4-6.    Cellcept  Hypomag- replete with 2mg of mag. c/w oral home repletions    INCOMPLETE NOTE    Assessment and plan discussed with attending on call.  If you have any questions, please feel free to contact me  Niel Zarco  Nephrology Fellow  [phone]; Prefer [employer] TEAMS  (After 5pm or on weekends please page the on-call fellow). Pt. with kidney transplantation in 2020 ([facility]), currently admitted for sinusitis and chest pressure. SCr is stable at 0.92. Monitor labs and urine output. Avoid any potential nephrotoxins. Dose medications as per eGFR. c/w home immunosuppression of 11 envarsus daily, cellcept 500 BID and Pred 5 daily. Check serum tacrolimus level (trough) 30 mins prior to am dose. Goal trough of 4-6.    Rest of management per primary team. Patient has been followed by private neprology, Dr. Alamo during her past admissions.    Assessment and plan discussed with attending on call.  If you have any questions, please feel free to contact me  Neil Zarco  Nephrology Fellow  [phone]; Prefer [employer] TEAMS  (After 5pm or on weekends please page the on-call fellow).

## 2024-02-03 NOTE — H&P ADULT - HISTORY OF PRESENT ILLNESS
53yoF PMHx of ESRD s/p HCV R-DDRT (2020), chronic SVC syndrome s/p drain in ~2105, HTN, coronary bypass 2015 on DAPT, presenting after being discharged from [facility] (2/1 - 2/3) for epistaxis and chest pain. Her symptoms started Thursday, 02/01, around midday when she was walking down the stairs and and started having nosebleeds. She has not had something similar before and noticed blurry vision, facial swelling, chest pain, and stomach problems around the same time. The chest pain she describes as non-radiating, primarily located in the upper left chest/neck region that is sharp, 8/10 in intensity - at one point a crushing chest pain that improved with pain meds, that worsens with cough with associated dizziness. She was evaluated and admitted for further workup of her bleeding and chest pain. For the bleed, ENT was consulted, diagnosed with sinusitis, and was given x2 rhinorockets, one was removed prior to discharge with plans to follow up Monday, 02/05. She had a EKG done that she recalls as unremarkable, Echo (doesn't recall result), CT head that was negative, and CXR that was also unremarkable. She also noticed dark green stools. Denies sick contacts and recent travels.     In the ED, she was afebrile, VSS. WBC 10.65, Hgb 11.1 (around baseline), CMP unremarkable, Trop 9, Lipase 55, procalc 0.07. CXR unremarkable and US kidney WNL. Nephro was consulted for her kidney transplant. Patient started on cefepime. 53yoF PMHx of ESRD R-DDRT (2020) c/b HCV (treated per patient), chronic SVC syndrome s/p drain(?), HTN, coronary bypass 2015, presenting after being discharged from [facility] (2/1 - 2/3) for epistaxis and chest pain. She presented here for further workup of her epistaxis and chest pain. Her symptoms started Thursday, 02/01, around midday when she was walking down the stairs and started having nosebleeds. She has not had something similar before and noticed blurry vision, facial swelling, chest pain, and stomach problems around the same time. The chest pain she describes as non-radiating, primarily located in the upper left chest/neck region that is sharp, 8/10 in intensity - at one point a crushing chest pain that improved with pain meds, that worsens with cough with associated dizziness. She was evaluated and admitted for further workup of her bleeding and chest pain. For the bleed, ENT was consulted, diagnosed with sinusitis, and was given x2 rhinorockets, one was removed prior to discharge with plans to follow up Monday, 02/05. She had a EKG done that she recalls as unremarkable, Echo (doesn't recall result), CT head that was negative, and CXR that was also unremarkable. She also noticed dark green stools. Denies sick contacts and recent travels.     In the ED, she was afebrile, VSS. WBC 10.65, Hgb 11.1 (around baseline), CMP unremarkable, Trop 9, Lipase 55, procalc 0.07. CXR unremarkable and US kidney WNL. Nephro was consulted for her kidney transplant. s/p x1 vancomycin + cefepime 53yoF PMHx of ESRD R-DDRT (2020) c/b HCV (treated per patient), chronic SVC syndrome s/p drain(?), HTN, coronary bypass 2015, presenting after being discharged from [facility] (2/1 - 2/3) for epistaxis and chest pain. She presented here for further workup of her epistaxis and chest pain. Her symptoms started Thursday, 02/01, around midday when she was walking down the stairs and started having nosebleeds. She has not had something similar before and noticed blurry vision, facial swelling, chest pain, and stomach problems around the same time. The chest pain she describes as non-radiating, primarily located in the upper left chest/neck region that is sharp, 8/10 in intensity - at one point a crushing chest pain that improved with pain meds, that worsens with cough with associated dizziness. She was evaluated and admitted for further workup of her bleeding and chest pain. For the bleed, ENT was consulted, diagnosed with sinusitis, and was given x2 rhinorockets, one was removed prior to discharge with plans to follow up Monday, 02/05. She had a EKG done that she recalls as unremarkable, Echo (doesn't recall result), CT head that was negative, and CXR that was also unremarkable. She also noticed dark green stools. Denies sick contacts and recent travels.  Pt states she did not like the way she was treated at OSH which is why she decided to come to [facility].    In the ED, she was afebrile, VSS. WBC 10.65, Hgb 11.1 (around baseline), CMP unremarkable, Trop 9, Lipase 55, procalc 0.07. CXR unremarkable and US kidney WNL. Nephro was consulted for her kidney transplant. s/p x1 vancomycin + cefepime

## 2024-02-03 NOTE — H&P ADULT - PROBLEM SELECTOR PLAN 1
3-day history of epistaxis, diagnosed with sinusitis at [facility], s/p x2 rhinorockets with one removed prior to discharge with plans to follow up with [facility] 02/05  Hgb stable on admission  Procalc 0.07  Started on cefepime in the ED    Plan  - Consult ENT in the AM  - Trend CBC 3-day history of epistaxis, diagnosed with sinusitis at [facility], s/p x2 rhinorockets with one removed prior to discharge with plans to follow up with [facility] 02/05  Hgb stable on admission  Procalc 0.07  s/p x1 vancomycin and started on cefepime    Plan  - Follow up with OP ENT for management of epistaxis  - Start unasyn

## 2024-02-03 NOTE — H&P ADULT - NSHPPHYSICALEXAM_GEN_ALL_CORE
Constitutional: No acute distress  HEENOT: PEERLA  Cardiovascular: RRR, S1,S2  Pulm: CTAB, coughing noted  Abdomen: Soft, non-distended  MSK: Tenderness to palpation left mid-axillary line with drainage catheter noted, TTP left clavicle  Neuro: AOx3, normal LUE ROM 5/5  Skin: AVF graft scar and mid-sternal scar noted T(C): 37.4 (03 Feb 2024 19:29), Max: 37.4 (03 Feb 2024 15:19)  T(F): 99.3 (03 Feb 2024 19:29), Max: 99.3 (03 Feb 2024 15:19)  HR: 83 (03 Feb 2024 19:29) (81 - 91)  BP: 122/78 (03 Feb 2024 19:29) (122/70 - 132/78)  BP(mean): 85 (03 Feb 2024 16:00) (85 - 85)  ABP: --  ABP(mean): --  RR: 20 (03 Feb 2024 19:29) (18 - 22)  SpO2: 96% (03 Feb 2024 19:29) (96% - 98%)    O2 Parameters below as of 03 Feb 2024 16:00  Patient On (Oxygen Delivery Method): room air    Constitutional: No acute distress  HEENOT: PEERLA  Cardiovascular: RRR, S1,S2  Pulm: CTAB, coughing noted  Abdomen: Soft, non-distended  MSK: Tenderness to palpation left mid-axillary line, TTP left clavicle  Neuro: AOx3, normal LUE ROM 5/5  Skin: AVF graft scar and mid-sternal scar noted

## 2024-02-03 NOTE — H&P ADULT - PROBLEM SELECTOR PLAN 5
Diet: Renal Diet  DVT: SubQ Heparin  PT: Ordered  Dispo: Pending  Code: FULL Drain placed for SVC syndrome(?) in ~2005?    Plan  - f/u PMD about possible drain placement  - f/u PMD/Cards about needing to be on DAPT for SVC

## 2024-02-03 NOTE — ED PROVIDER NOTE - PHYSICAL EXAMINATION
GENERAL: well appearing in no acute distress, non-toxic appearing  HEAD: normocephalic, atraumatic  HEENT: no JVD  CARDIAC: RUSB blowing holosystolic murmur   PULM: normal breath sounds, clear to ascultation bilaterally, no rales, rhonchi, wheezing  GI: abdomen nondistended, soft, nontender, no guarding, rebound tenderness  : no CVA tenderness b/l, no suprapubic tenderness  MSK: no peripheral edema, no calf tenderness b/l  SKIN: well-perfused, extremities warm, no visible rashes  PSYCH: appropriate mood and affect

## 2024-02-03 NOTE — H&P ADULT - PROBLEM SELECTOR PLAN 3
Hx of ESRD s/p R-DDRT 2020 with c/b HCV patient said was treated  On Envarus 11qd, Cellcept 500mg BID with goal 4-6, and prednisone 5qd for maintenance  US Renal unremarkable  Denies dysuria, increased urinary frequency    Plan  - Transplant nephro consulted - appreciate júnior Hx of ESRD s/p R-DDRT 2020 with c/b HCV patient said was treated  On Envarus 1mg TID, Cellcept 500mg BID with goal 4-6, and prednisone 5qd for maintenance  US Renal unremarkable  Denies dysuria, increased urinary frequency    Plan  - Transplant nephro consulted - appreciate recs  - Tacrolimus level 30-minutes before AM dose Hx of ESRD s/p R-DDRT 2020 with c/b HCV patient said was treated  On Envarus 1mg TID, Cellcept 500mg BID with goal 4-6, and prednisone 5qd for maintenance  US Renal unremarkable  Denies dysuria, increased urinary frequency    Plan  - Transplant nephro consulted - appreciate recs  - Tacrolimus level 30-minutes before AM dose    - monitor for fever.

## 2024-02-03 NOTE — ED PROVIDER NOTE - OBJECTIVE STATEMENT
53 ESRD s/p HepC DDRT in 2020, chronic SVC syndrome s/p L cephalic-iliac vein bypass on DAPT, s/p thrombectomy of L iliac vein and graft 2012, Hx BK viremia, HTN, here for chest pain and SOB at rest. patient works [employer] went to ED for epistaxis, got b/l rhino rockets, and was found to have abnormal ekg and chest pain. admitted and got echo, unclear of results, and patient was discharged today 2/3. says she wanted to get her care here because that is where she got her transplant. had fever during admission thurs night, but unclear source. also endorses dizziness when she has the chest pain. denies n/v, abdominal pain, dysuria, leg swelling.

## 2024-02-03 NOTE — H&P ADULT - PROBLEM SELECTOR PLAN 4
Hx of bypass ~2015 with a stent placed  On DAPT    Plan   - c/w DAPT Hx of bypass ~2015 with a stent placed  On DAPT    Plan   - c/w ASA, hold Plavix  - f/u PMD or OP Cards about needing to continue Plavix

## 2024-02-03 NOTE — H&P ADULT - ATTENDING COMMENTS
Pt was seen and examined during key portion of E/M service. Case discussed with resident. H&P reviewed and edited where appropriate. Other than the following, I agree with the above history, exam, assessment, and plan.  53yoF PMHx of ESRD s/p R-DDRT, chronic SVC syndrome, HTN, CABG, with recent hospitalization at OSH for epistaxis and CP presenting with chest pain.  tele, trend CE. f/u ENT

## 2024-02-03 NOTE — ED PROVIDER NOTE - ATTENDING CONTRIBUTION TO CARE
Patient is a 53-year-old female with a history of ESRD s/p HepC DDRT in 2020, chronic SVC syndrome s/p L cephalic-iliac vein bypass on DAPT, s/p thrombectomy of L iliac vein and graft 2012, Hx BK viremia, HTN, here for evaluation of chest pain and shortness of breath.  Patient states that she had epistaxis on Thursday and went to [facility].  She had bilateral Rhino Rocket's placed and during her evaluation was found to have an abnormal EKG and was admitted.  Patient states she got an echo during her evaluation but does not know the results.  She states that she contacted her transplant doctor who recommended that she be transferred to [facility] but patient was discharged today.  Patient states she still has chest pain and shortness of breath so came to [facility] today.  Chest pain is sharp and located primarily in the left-sided chest, nonradiating. Patient still has a left-sided Rhino Rocket in place.  She states during her admission she developed a fever on Thursday night.  She denies any nausea, vomiting.  No abdominal pain.  She makes urine and denies dysuria.      VS noted  Gen. no acute distress, Non toxic   HEENT: EOMI, mmm  Lungs: CTAB/L no C/ W /R   CVS: RRR, holosystolic murmur present  Abd; Soft non tender, non distended   Ext: no edema  Skin: no rash  Neuro AAOx3 non focal clear speech  a/p:  chest pain–EKG reviewed and there are no ST elevations or depressions.  Patient reports he previously had a PE but is not on any AC. Given history of transplant/CKD–patient may require inpatient VQ scan if chest pain continues. She is not tachycardic or hypoxic at this time.  Plan for labs including troponin,  Chest x-ray.  Patient is a transplant patient.  Will contact transplant team.  - Anival MARTINEZ

## 2024-02-03 NOTE — ED ADULT NURSE NOTE - OBJECTIVE STATEMENT
53F aaox4 ambulatory came as walk in coming from [facility], came to ED here after discharged fro [facility] for evaluation secondary to Nosebleeding that started last Thursday and she was admitted also for an abnormal EKG but the patient was discontented with the care she got from [facility] and was asking to be transferred here at [facility] but instead opatient was dc today, vipul came here. Patient with h/o CKD (chronic kidney disease) stage V requiring chronic dialysis Clotted Renal Dialysis AV Graft Fibroid Tumor HTN - Hypertension Infection of AV Graft for Dialysis Kidney transplant 2020 [facility].  Patient still have a  rhino rocket in then left nares. Patient reports sob and chest discomfort since Thursday as well and aslo reports fever yesterday. Afebrile in the ED.  Left wrist 20g IV access inserted, sepsis work up performed. due antibiotics given as ordered and tolerated well.

## 2024-02-03 NOTE — CONSULT NOTE ADULT - SUBJECTIVE AND OBJECTIVE BOX
[facility] DIVISION OF KIDNEY DISEASES AND HYPERTENSION -- [phone]  -- INITIAL CONSULT NOTE  --------------------------------------------------------------------------------  HPI:  53 y.o F w/ ESRD s/p HepC DDRT in 2020, chronic SVC syndrome, HTN, here for chest pressure and rhinorockets placed at [facility]. Patient is a PCA at [employer], noted to have sudden onset of nasal bleed, patient was given rhinorockets by the ENT and diagnosed with sinusitis and placed on abx- does not remember which ones. Patient also diagnosed with abnormal EKG, says it was irregular but does not remember other specifics. She wanted to come to [facility] as she gets all her medical care here. She was discharged today and then came here. She reports nasal congestion and nasal drips ongoing for couple weeks prior to this episode also with fevers and chills. Pt. denies any blood in urine or foamy urine. No recent NSAID use. Denies any N/V, headaches, palpitations, and leg swelling.    Patient reports recieving her immunosuppression at the other hospital. Reports no improvement in her URI symptoms.      PAST HISTORY  --------------------------------------------------------------------------------  PAST MEDICAL & SURGICAL HISTORY:  HTN - Hypertension      Clotted Renal Dialysis AV Graft      Infection of AV Graft for Dialysis      Fibroid Tumor      CKD (chronic kidney disease) stage V requiring chronic dialysis      Chronic kidney disease, unspecified      History of Hysterectomy  for benign disease      AV fistula  B/L - failed      H/O extremity bypass graft  H/O RUE bypass and creation of right brachial graft      Kidney transplant recipient      H/O kidney transplant        FAMILY HISTORY:  FH: HTN (hypertension) (Father, Mother)      PAST SOCIAL HISTORY:    ALLERGIES & MEDICATIONS  --------------------------------------------------------------------------------  Allergies    ibuprofen/morphine (Unknown)  latex (Swelling)  lactose (Hives)  ibuprofen (Hives)  morphine (Anaphylaxis)  contrast media (iodine-based) (Urticaria)  shellfish (Urticaria)  morphine (Urticaria)  penicillin (Anaphylaxis)    Intolerances      Standing Inpatient Medications  cefepime   IVPB      cefepime   IVPB 1000 milliGRAM(s) IV Intermittent every 8 hours  magnesium sulfate  IVPB 2 Gram(s) IV Intermittent Once    PRN Inpatient Medications      REVIEW OF SYSTEMS  --------------------------------------------------------------------------------  per above    VITALS/PHYSICAL EXAM  --------------------------------------------------------------------------------  T(C): 37.2 (02-03-24 @ 16:00), Max: 37.4 (02-03-24 @ 15:19)  HR: 81 (02-03-24 @ 16:00) (81 - 91)  BP: 122/70 (02-03-24 @ 16:00) (122/70 - 132/78)  RR: 18 (02-03-24 @ 16:00) (18 - 22)  SpO2: 98% (02-03-24 @ 16:00) (96% - 98%)  Wt(kg): --  Height (cm): 160 (02-03-24 @ 15:19)  Weight (kg): 65.8 (02-03-24 @ 15:19)  BMI (kg/m2): 25.7 (02-03-24 @ 15:19)  BSA (m2): 1.69 (02-03-24 @ 15:19)        Physical Exam:  	Gen: NAD; b/l rhinorockets in place   	HEENT: Anicteric  	Pulm: CTA B/L  	CV: S1S2+  	Abd: Soft, +BS            Transplant site: RLQ non tender, well healed surgical scar.  	Ext: No LE edema B/L  	Neuro: Awake    	Skin: Warm and dry  	Dialysis access:     LABS/STUDIES  --------------------------------------------------------------------------------              11.1   10.65 >-----------<  240      [02-03-24 @ 16:19]              37.1     134  |  98  |  13  ----------------------------<  113      [02-03-24 @ 16:19]  5.0   |  23  |  0.92        Ca     10.2     [02-03-24 @ 16:19]      Mg     1.5     [02-03-24 @ 16:19]    TPro  7.2  /  Alb  4.0  /  TBili  0.9  /  DBili  x   /  AST  39  /  ALT  19  /  AlkPhos  60  [02-03-24 @ 16:19]          Creatinine Trend:  SCr 0.92 [02-03 @ 16:19]    Urinalysis - [02-03-24 @ 16:19]      Color  / Appearance  / SG  / pH       Gluc 113 / Ketone   / Bili  / Urobili        Blood  / Protein  / Leuk Est  / Nitrite       RBC  / WBC  / Hyaline  / Gran  / Sq Epi  / Non Sq Epi  / Bacteria       HBsAb Reactive      [05-31-22 @ 14:03]  HBsAg Nonreact      [05-31-22 @ 14:03]  HBcAb Reactive      [05-31-22 @ 14:03]  HCV 0.06, Nonreact      [05-31-22 @ 14:03]    KAYLA: titer Negative, pattern --      [05-31-22 @ 14:03]  Free Light Chains: kappa 1.63, lambda 1.09, ratio = 1.50      [05-31 @ 14:03]    Tacrolimus  Cyclosporine  Sirolimus  Mycophenolate  BK PCR  CMV PCR  Parvo PCR  EBV PCR

## 2024-02-04 ENCOUNTER — TRANSCRIPTION ENCOUNTER (OUTPATIENT)
Age: 54
End: 2024-02-04

## 2024-02-04 LAB
ALBUMIN SERPL ELPH-MCNC: 3.7 G/DL — SIGNIFICANT CHANGE UP (ref 3.3–5)
ALP SERPL-CCNC: 60 U/L — SIGNIFICANT CHANGE UP (ref 40–120)
ALT FLD-CCNC: 13 U/L — SIGNIFICANT CHANGE UP (ref 10–45)
ANION GAP SERPL CALC-SCNC: 12 MMOL/L — SIGNIFICANT CHANGE UP (ref 5–17)
APTT BLD: 29.6 SEC — SIGNIFICANT CHANGE UP (ref 24.5–35.6)
AST SERPL-CCNC: 10 U/L — SIGNIFICANT CHANGE UP (ref 10–40)
BASOPHILS # BLD AUTO: 0.02 K/UL — SIGNIFICANT CHANGE UP (ref 0–0.2)
BASOPHILS NFR BLD AUTO: 0.2 % — SIGNIFICANT CHANGE UP (ref 0–2)
BILIRUB SERPL-MCNC: 1 MG/DL — SIGNIFICANT CHANGE UP (ref 0.2–1.2)
BUN SERPL-MCNC: 10 MG/DL — SIGNIFICANT CHANGE UP (ref 7–23)
CALCIUM SERPL-MCNC: 10 MG/DL — SIGNIFICANT CHANGE UP (ref 8.4–10.5)
CHLORIDE SERPL-SCNC: 100 MMOL/L — SIGNIFICANT CHANGE UP (ref 96–108)
CK MB BLD-MCNC: <1.6 % — SIGNIFICANT CHANGE UP (ref 0–3.5)
CK MB CFR SERPL CALC: <1 NG/ML — SIGNIFICANT CHANGE UP (ref 0–3.8)
CK SERPL-CCNC: 62 U/L — SIGNIFICANT CHANGE UP (ref 25–170)
CO2 SERPL-SCNC: 24 MMOL/L — SIGNIFICANT CHANGE UP (ref 22–31)
CREAT SERPL-MCNC: 0.97 MG/DL — SIGNIFICANT CHANGE UP (ref 0.5–1.3)
EGFR: 71 ML/MIN/1.73M2 — SIGNIFICANT CHANGE UP
EOSINOPHIL # BLD AUTO: 0.05 K/UL — SIGNIFICANT CHANGE UP (ref 0–0.5)
EOSINOPHIL NFR BLD AUTO: 0.5 % — SIGNIFICANT CHANGE UP (ref 0–6)
GLUCOSE SERPL-MCNC: 103 MG/DL — HIGH (ref 70–99)
HCT VFR BLD CALC: 34.4 % — LOW (ref 34.5–45)
HGB BLD-MCNC: 10.5 G/DL — LOW (ref 11.5–15.5)
IMM GRANULOCYTES NFR BLD AUTO: 0.5 % — SIGNIFICANT CHANGE UP (ref 0–0.9)
INR BLD: 1.12 RATIO — SIGNIFICANT CHANGE UP (ref 0.85–1.18)
LYMPHOCYTES # BLD AUTO: 0.85 K/UL — LOW (ref 1–3.3)
LYMPHOCYTES # BLD AUTO: 8.9 % — LOW (ref 13–44)
MAGNESIUM SERPL-MCNC: 1.8 MG/DL — SIGNIFICANT CHANGE UP (ref 1.6–2.6)
MCHC RBC-ENTMCNC: 23.9 PG — LOW (ref 27–34)
MCHC RBC-ENTMCNC: 30.5 GM/DL — LOW (ref 32–36)
MCV RBC AUTO: 78.2 FL — LOW (ref 80–100)
MONOCYTES # BLD AUTO: 0.92 K/UL — HIGH (ref 0–0.9)
MONOCYTES NFR BLD AUTO: 9.6 % — SIGNIFICANT CHANGE UP (ref 2–14)
NEUTROPHILS # BLD AUTO: 7.65 K/UL — HIGH (ref 1.8–7.4)
NEUTROPHILS NFR BLD AUTO: 80.3 % — HIGH (ref 43–77)
NRBC # BLD: 0 /100 WBCS — SIGNIFICANT CHANGE UP (ref 0–0)
PHOSPHATE SERPL-MCNC: 3 MG/DL — SIGNIFICANT CHANGE UP (ref 2.5–4.5)
PLATELET # BLD AUTO: 216 K/UL — SIGNIFICANT CHANGE UP (ref 150–400)
POTASSIUM SERPL-MCNC: 3.9 MMOL/L — SIGNIFICANT CHANGE UP (ref 3.5–5.3)
POTASSIUM SERPL-SCNC: 3.9 MMOL/L — SIGNIFICANT CHANGE UP (ref 3.5–5.3)
PROT SERPL-MCNC: 6.5 G/DL — SIGNIFICANT CHANGE UP (ref 6–8.3)
PROTHROM AB SERPL-ACNC: 12.3 SEC — SIGNIFICANT CHANGE UP (ref 9.5–13)
RBC # BLD: 4.4 M/UL — SIGNIFICANT CHANGE UP (ref 3.8–5.2)
RBC # FLD: 18.2 % — HIGH (ref 10.3–14.5)
SODIUM SERPL-SCNC: 136 MMOL/L — SIGNIFICANT CHANGE UP (ref 135–145)
TACROLIMUS SERPL-MCNC: 3.8 NG/ML — SIGNIFICANT CHANGE UP
TACROLIMUS SERPL-MCNC: 9.3 NG/ML — SIGNIFICANT CHANGE UP
TROPONIN T, HIGH SENSITIVITY RESULT: 10 NG/L — SIGNIFICANT CHANGE UP (ref 0–51)
WBC # BLD: 9.54 K/UL — SIGNIFICANT CHANGE UP (ref 3.8–10.5)
WBC # FLD AUTO: 9.54 K/UL — SIGNIFICANT CHANGE UP (ref 3.8–10.5)

## 2024-02-04 PROCEDURE — 99232 SBSQ HOSP IP/OBS MODERATE 35: CPT | Mod: GC

## 2024-02-04 PROCEDURE — 93306 TTE W/DOPPLER COMPLETE: CPT | Mod: 26

## 2024-02-04 RX ORDER — TACROLIMUS 5 MG/1
11 CAPSULE ORAL
Refills: 0 | DISCHARGE

## 2024-02-04 RX ORDER — SODIUM CHLORIDE 0.65 %
1 AEROSOL, SPRAY (ML) NASAL
Refills: 0 | Status: DISCONTINUED | OUTPATIENT
Start: 2024-02-04 | End: 2024-02-08

## 2024-02-04 RX ORDER — LIDOCAINE 4 G/100G
1 CREAM TOPICAL DAILY
Refills: 0 | Status: DISCONTINUED | OUTPATIENT
Start: 2024-02-04 | End: 2024-02-08

## 2024-02-04 RX ORDER — ONDANSETRON 8 MG/1
8 TABLET, FILM COATED ORAL ONCE
Refills: 0 | Status: COMPLETED | OUTPATIENT
Start: 2024-02-04 | End: 2024-02-04

## 2024-02-04 RX ADMIN — Medication 650 MILLIGRAM(S): at 03:46

## 2024-02-04 RX ADMIN — PANTOPRAZOLE SODIUM 40 MILLIGRAM(S): 20 TABLET, DELAYED RELEASE ORAL at 05:47

## 2024-02-04 RX ADMIN — ONDANSETRON 8 MILLIGRAM(S): 8 TABLET, FILM COATED ORAL at 17:03

## 2024-02-04 RX ADMIN — HEPARIN SODIUM 5000 UNIT(S): 5000 INJECTION INTRAVENOUS; SUBCUTANEOUS at 13:39

## 2024-02-04 RX ADMIN — ATORVASTATIN CALCIUM 20 MILLIGRAM(S): 80 TABLET, FILM COATED ORAL at 22:09

## 2024-02-04 RX ADMIN — AMPICILLIN SODIUM AND SULBACTAM SODIUM 200 GRAM(S): 250; 125 INJECTION, POWDER, FOR SUSPENSION INTRAMUSCULAR; INTRAVENOUS at 05:47

## 2024-02-04 RX ADMIN — AMPICILLIN SODIUM AND SULBACTAM SODIUM 200 GRAM(S): 250; 125 INJECTION, POWDER, FOR SUSPENSION INTRAMUSCULAR; INTRAVENOUS at 02:08

## 2024-02-04 RX ADMIN — Medication 5 MILLIGRAM(S): at 05:47

## 2024-02-04 RX ADMIN — LIDOCAINE 1 PATCH: 4 CREAM TOPICAL at 12:49

## 2024-02-04 RX ADMIN — MYCOPHENOLATE MOFETIL 500 MILLIGRAM(S): 250 CAPSULE ORAL at 18:52

## 2024-02-04 RX ADMIN — TACROLIMUS 11 MILLIGRAM(S): 5 CAPSULE ORAL at 22:08

## 2024-02-04 RX ADMIN — Medication 650 MILLIGRAM(S): at 03:16

## 2024-02-04 RX ADMIN — Medication 81 MILLIGRAM(S): at 12:49

## 2024-02-04 RX ADMIN — HEPARIN SODIUM 5000 UNIT(S): 5000 INJECTION INTRAVENOUS; SUBCUTANEOUS at 05:55

## 2024-02-04 RX ADMIN — LIDOCAINE 1 PATCH: 4 CREAM TOPICAL at 19:00

## 2024-02-04 RX ADMIN — HEPARIN SODIUM 5000 UNIT(S): 5000 INJECTION INTRAVENOUS; SUBCUTANEOUS at 22:09

## 2024-02-04 RX ADMIN — AMPICILLIN SODIUM AND SULBACTAM SODIUM 200 GRAM(S): 250; 125 INJECTION, POWDER, FOR SUSPENSION INTRAMUSCULAR; INTRAVENOUS at 12:50

## 2024-02-04 RX ADMIN — AMPICILLIN SODIUM AND SULBACTAM SODIUM 200 GRAM(S): 250; 125 INJECTION, POWDER, FOR SUSPENSION INTRAMUSCULAR; INTRAVENOUS at 18:52

## 2024-02-04 RX ADMIN — MYCOPHENOLATE MOFETIL 500 MILLIGRAM(S): 250 CAPSULE ORAL at 05:47

## 2024-02-04 NOTE — CONSULT NOTE ADULT - SUBJECTIVE AND OBJECTIVE BOX
[facility] [place] [facility]   MD MEL DOBBS MD RUORU WONG, PA    TEL:  FROM 9 AM to 5 PM--OFFICE: [phone]  AVAILABLE oN TEAMS   FROM 5 PM- 9 AM PLEASE CALL ANSWERING SERVICE AT [phone]    -- INITIAL RENAL CONSULT NOTE --- Date Of service 02-04-24 @ 07:54  --------------------------------------------------------------------------------  HPI:    53yoF PMHx of ESRD R-DDRT (2020) c/b HCV (treated per patient), chronic SVC syndrome s/p drain(?), HTN, coronary bypass 2015, presenting after being discharged from [facility] (2/1 - 2/3) for epistaxis and chest pain. She presented here for further workup of her epistaxis and chest pain.   Nephrology consulted for renal trasnplant      PAST HISTORY  --------------------------------------------------------------------------------  PAST MEDICAL & SURGICAL HISTORY:  HTN - Hypertension      Clotted Renal Dialysis AV Graft      Infection of AV Graft for Dialysis      Fibroid Tumor      CKD (chronic kidney disease) stage V requiring chronic dialysis      Chronic kidney disease, unspecified      History of Hysterectomy  for benign disease      AV fistula  B/L - failed      H/O extremity bypass graft  H/O RUE bypass and creation of right brachial graft      Kidney transplant recipient      H/O kidney transplant        FAMILY HISTORY:  FH: HTN (hypertension) (Father, Mother)      PAST SOCIAL HISTORY:    ALLERGIES & MEDICATIONS  --------------------------------------------------------------------------------  Allergies    ibuprofen/morphine (Unknown)  latex (Swelling)  lactose (Hives)  ibuprofen (Hives)  morphine (Anaphylaxis)  contrast media (iodine-based) (Urticaria)  shellfish (Urticaria)  morphine (Urticaria)  penicillin (Anaphylaxis)    Intolerances      Standing Inpatient Medications  ampicillin/sulbactam  IVPB 3 Gram(s) IV Intermittent every 6 hours  aspirin enteric coated 81 milliGRAM(s) Oral daily  atorvastatin 20 milliGRAM(s) Oral at bedtime  heparin   Injectable 5000 Unit(s) SubCutaneous every 8 hours  lidocaine   4% Patch 1 Patch Transdermal daily  mycophenolate mofetil 500 milliGRAM(s) Oral two times a day  pantoprazole    Tablet 40 milliGRAM(s) Oral before breakfast  predniSONE   Tablet 5 milliGRAM(s) Oral daily  tacrolimus ER Tablet (ENVARSUS XR) 11 milliGRAM(s) Oral daily    PRN Inpatient Medications  acetaminophen     Tablet .. 650 milliGRAM(s) Oral every 6 hours PRN      REVIEW OF SYSTEMS  --------------------------------------------------------------------------------  Gen: No fevers/chills  Skin: No rashes  Head/Eyes/Ears: Normal hearing,  Normal vision   Respiratory: No dyspnea, cough  CV: No chest pain  GI: No abdominal pain, diarrhea, constipation, nausea, vomiting  : No dysuria, hematuria  MSK: No  edema  Heme: No easy bruising or bleeding  Psych: No significant depression    All other systems were reviewed and are negative, except as noted.    VITALS/PHYSICAL EXAM  --------------------------------------------------------------------------------  T(C): 37 (02-04-24 @ 04:40), Max: 37.5 (02-04-24 @ 02:30)  HR: 84 (02-04-24 @ 04:40) (81 - 93)  BP: 125/74 (02-04-24 @ 04:40) (117/63 - 132/78)  RR: 18 (02-04-24 @ 04:40) (18 - 22)  SpO2: 98% (02-04-24 @ 04:40) (96% - 98%)  Wt(kg): --  Height (cm): 160 (02-03-24 @ 15:19)  Weight (kg): 65.8 (02-03-24 @ 15:19)  BMI (kg/m2): 25.7 (02-03-24 @ 15:19)  BSA (m2): 1.69 (02-03-24 @ 15:19)      Physical Exam:  	Gen: NAD  	HEENT: MMM  	Pulm: CTA B/L  	CV: S1S2  	Abd: Soft, +BS   	Ext: No LE edema B/L  	Neuro: Awake, alert  	Skin: Warm and dry  	Vascular access: No HD catheter           : zelda  LABS/STUDIES  --------------------------------------------------------------------------------              10.5   9.54  >-----------<  216      [02-04-24 @ 05:22]              34.4     136  |  100  |  10  ----------------------------<  103      [02-04-24 @ 05:22]  3.9   |  24  |  0.97        Ca     10.0     [02-04-24 @ 05:22]      Mg     1.5     [02-03-24 @ 16:19]      Phos  3.3     [02-03-24 @ 16:19]    TPro  6.5  /  Alb  3.7  /  TBili  1.0  /  DBili  x   /  AST  10  /  ALT  13  /  AlkPhos  60  [02-04-24 @ 05:22]    PT/INR: PT 12.3 , INR 1.12       [02-04-24 @ 05:22]  PTT: 29.6       [02-04-24 @ 05:22]    CK 62      [02-04-24 @ 05:22]    Creatinine Trend:  SCr 0.97 [02-04 @ 05:22]  SCr 0.92 [02-03 @ 16:19]    Urinalysis - [02-04-24 @ 05:22]      Color  / Appearance  / SG  / pH       Gluc 103 / Ketone   / Bili  / Urobili        Blood  / Protein  / Leuk Est  / Nitrite       RBC  / WBC  / Hyaline  / Gran  / Sq Epi  / Non Sq Epi  / Bacteria       Iron 17, TIBC 262, %sat 6      [07-19-23 @ 07:51]  Ferritin 142      [07-19-23 @ 07:51]  PTH -- (Ca 11.2)      [07-03-23 @ 15:05]   170  Vitamin D (25OH) 31.8      [07-03-23 @ 15:05]    HBsAb Reactive      [05-31-22 @ 14:03]  HBsAg Nonreact      [05-31-22 @ 14:03]  HBcAb Reactive      [05-31-22 @ 14:03]  HCV 0.06, Nonreact      [05-31-22 @ 14:03]    KAYLA: titer Negative, pattern --      [05-31-22 @ 14:03]  Free Light Chains: kappa 1.63, lambda 1.09, ratio = 1.50      [05-31 @ 14:03]   [facility] [place] [facility]   MD MEL DOBBS MD RUORU WONG, PA    TEL:  FROM 9 AM to 5 PM--OFFICE: [phone]  AVAILABLE oN TEAMS   FROM 5 PM- 9 AM PLEASE CALL ANSWERING SERVICE AT [phone]    -- INITIAL RENAL CONSULT NOTE --- Date Of service 02-04-24 @ 07:54  --------------------------------------------------------------------------------  HPI:    53yoF PMHx of ESRD R-DDRT (2020) c/b HCV (treated per patient), chronic SVC syndrome s/p drain(?), HTN, coronary bypass 2015, presenting after being discharged from [facility] (2/1 - 2/3) for epistaxis and chest pain. She presented here for further workup of her epistaxis and chest pain.   Nephrology consulted for renal trasnplant      PAST HISTORY  --------------------------------------------------------------------------------  PAST MEDICAL & SURGICAL HISTORY:  HTN - Hypertension      Clotted Renal Dialysis AV Graft      Infection of AV Graft for Dialysis      Fibroid Tumor      CKD (chronic kidney disease) stage V requiring chronic dialysis      Chronic kidney disease, unspecified      History of Hysterectomy  for benign disease      AV fistula  B/L - failed      H/O extremity bypass graft  H/O RUE bypass and creation of right brachial graft      Kidney transplant recipient      H/O kidney transplant        FAMILY HISTORY:  FH: HTN (hypertension) (Father, Mother)      PAST SOCIAL HISTORY:    ALLERGIES & MEDICATIONS  --------------------------------------------------------------------------------  Allergies    ibuprofen/morphine (Unknown)  latex (Swelling)  lactose (Hives)  ibuprofen (Hives)  morphine (Anaphylaxis)  contrast media (iodine-based) (Urticaria)  shellfish (Urticaria)  morphine (Urticaria)  penicillin (Anaphylaxis)    Intolerances      Standing Inpatient Medications  ampicillin/sulbactam  IVPB 3 Gram(s) IV Intermittent every 6 hours  aspirin enteric coated 81 milliGRAM(s) Oral daily  atorvastatin 20 milliGRAM(s) Oral at bedtime  heparin   Injectable 5000 Unit(s) SubCutaneous every 8 hours  lidocaine   4% Patch 1 Patch Transdermal daily  mycophenolate mofetil 500 milliGRAM(s) Oral two times a day  pantoprazole    Tablet 40 milliGRAM(s) Oral before breakfast  predniSONE   Tablet 5 milliGRAM(s) Oral daily  tacrolimus ER Tablet (ENVARSUS XR) 11 milliGRAM(s) Oral daily    PRN Inpatient Medications  acetaminophen     Tablet .. 650 milliGRAM(s) Oral every 6 hours PRN      REVIEW OF SYSTEMS  --------------------------------------------------------------------------------  Gen: No fevers/chills  Skin: No rashes  Head/Eyes/Ears: Normal hearing,  Normal vision   Respiratory: No dyspnea, cough  CV: + chest pain  GI: No abdominal pain, diarrhea  : No dysuria, hematuria  MSK: No  edema  Heme: No easy bruising or bleeding  Psych: No significant depression    All other systems were reviewed and are negative, except as noted.    VITALS/PHYSICAL EXAM  --------------------------------------------------------------------------------  T(C): 37 (02-04-24 @ 04:40), Max: 37.5 (02-04-24 @ 02:30)  HR: 84 (02-04-24 @ 04:40) (81 - 93)  BP: 125/74 (02-04-24 @ 04:40) (117/63 - 132/78)  RR: 18 (02-04-24 @ 04:40) (18 - 22)  SpO2: 98% (02-04-24 @ 04:40) (96% - 98%)  Wt(kg): --  Height (cm): 160 (02-03-24 @ 15:19)  Weight (kg): 65.8 (02-03-24 @ 15:19)  BMI (kg/m2): 25.7 (02-03-24 @ 15:19)  BSA (m2): 1.69 (02-03-24 @ 15:19)      Physical Exam:  	Gen: NAD  	HEENT: MMM  	Pulm: CTA B/L  	CV: S1S2  	Abd: Soft, +BS   	Ext: No LE edema B/L  	Neuro: Awake,   	Skin: Warm and dry  	Vascular access: No HD catheter           :  no zelda  LABS/STUDIES  --------------------------------------------------------------------------------              10.5   9.54  >-----------<  216      [02-04-24 @ 05:22]              34.4     136  |  100  |  10  ----------------------------<  103      [02-04-24 @ 05:22]  3.9   |  24  |  0.97        Ca     10.0     [02-04-24 @ 05:22]      Mg     1.5     [02-03-24 @ 16:19]      Phos  3.3     [02-03-24 @ 16:19]    TPro  6.5  /  Alb  3.7  /  TBili  1.0  /  DBili  x   /  AST  10  /  ALT  13  /  AlkPhos  60  [02-04-24 @ 05:22]    PT/INR: PT 12.3 , INR 1.12       [02-04-24 @ 05:22]  PTT: 29.6       [02-04-24 @ 05:22]    CK 62      [02-04-24 @ 05:22]    Creatinine Trend:  SCr 0.97 [02-04 @ 05:22]  SCr 0.92 [02-03 @ 16:19]    Urinalysis - [02-04-24 @ 05:22]      Color  / Appearance  / SG  / pH       Gluc 103 / Ketone   / Bili  / Urobili        Blood  / Protein  / Leuk Est  / Nitrite       RBC  / WBC  / Hyaline  / Gran  / Sq Epi  / Non Sq Epi  / Bacteria       Iron 17, TIBC 262, %sat 6      [07-19-23 @ 07:51]  Ferritin 142      [07-19-23 @ 07:51]  PTH -- (Ca 11.2)      [07-03-23 @ 15:05]   170  Vitamin D (25OH) 31.8      [07-03-23 @ 15:05]    HBsAb Reactive      [05-31-22 @ 14:03]  HBsAg Nonreact      [05-31-22 @ 14:03]  HBcAb Reactive      [05-31-22 @ 14:03]  HCV 0.06, Nonreact      [05-31-22 @ 14:03]    KAYLA: titer Negative, pattern --      [05-31-22 @ 14:03]  Free Light Chains: kappa 1.63, lambda 1.09, ratio = 1.50      [05-31 @ 14:03]

## 2024-02-04 NOTE — DISCHARGE NOTE PROVIDER - CARE PROVIDER_API CALL
Elin Negron Marquez  Internal Medicine  [address] [place] [address]  Phone: [phone]  Fax: [phone]  Established Patient  Follow Up Time: 2 weeks    Geoffrey Lake  Nephrology  [address] [place] [id number]  Phone: [phone]  Fax: [phone]  Established Patient  Follow Up Time: 2 weeks

## 2024-02-04 NOTE — PROGRESS NOTE ADULT - PROBLEM SELECTOR PLAN 4
Pt w/ a history of CABG in ~2015 with a stent placed, reportedly on home DAPT with ASA and Plavix  - c/w ASA, hold Plavix  - f/u PMD or OP Cards about needing to continue Plavix Pt w/ a history of CABG in ~2015 with a stent placed, reportedly on home DAPT with ASA and Plavix  - c/w ASA, hold Plavix  - f/u PMD or OP Cards about needing to continue Plavix  TTE is ordered

## 2024-02-04 NOTE — PHYSICAL THERAPY INITIAL EVALUATION ADULT - ADDITIONAL COMMENTS
Pt lives alone in an apartment, 4 steps to enter, no steps inside. Pt was independent with all functional mobility and ADLs prior to admission without AD.

## 2024-02-04 NOTE — PROGRESS NOTE ADULT - ASSESSMENT
Pt is a 52 y/o F w/ a PMHx of ESRD s/p R-DDRT (2020), chronic SVC syndrome, HTN, CABG (2015), with recent hospitalization at [facility] for epistaxis and CP, who is presenting with continued chest pain.

## 2024-02-04 NOTE — DISCHARGE NOTE PROVIDER - NSDCCPTREATMENT_GEN_ALL_CORE_FT
PRINCIPAL PROCEDURE  Procedure: Ultrasound of kidney transplant  Findings and Treatment: IMPRESSION:  No evidence of hydronephrosis or perinephric collection.  Patent vessels. No evidence of a significant renal artery stenosis.      SECONDARY PROCEDURE  Procedure: XR portable chest, 1 view  Findings and Treatment: IMPRESSION:  No focal consolidation.     PRINCIPAL PROCEDURE  Procedure: Ultrasound of kidney transplant  Findings and Treatment: IMPRESSION:  No evidence of hydronephrosis or perinephric collection.  Patent vessels. No evidence of a significant renal artery stenosis.      SECONDARY PROCEDURE  Procedure: XR portable chest, 1 view  Findings and Treatment: IMPRESSION:  No focal consolidation.    Procedure: CT neck  Findings and Treatment:   < end of copied text >  IMPRESSION: Limited study secondary to exclusion of IV contrast.  Numerouscollateral vessels along the anterior chest walls which only   partially visualized. Partial visualization of a right-sided subclavian   to SVC graft. Please refer to the dedicated concurrent chest CT study.  No cervical mass or cervical lymphadenopathy.< from: CT Neck Soft Tissue No Cont (02.05.24 @ 18:17) >      Procedure: Transthoracic echocardiography (TTE)  Findings and Treatment:   < end of copied text >  CONCLUSIONS:      1. Left ventricular cavity is normal. Left ventricular wall thickness is normal. Left ventricular systolic function is hyperdynamic with an ejection fraction of 71 % by Marina's method of disks. There are no regional wall motion abnormalities seen.   2. Normal left ventricular diastolic function, with normal filling pressure.   3. Normal right ventricular cavity size, wall thickness, and probably normal systolic function.   4. Normal left and right atrial size.   5. No significant valvular disease.   6. Estimated pulmonary artery systolic pressure is 33 mmHg.   7. No pericardial effusion seen.< from: TTE W or WO Ultrasound Enhancing Agent (02.04.24 @ 12:16) >

## 2024-02-04 NOTE — PROGRESS NOTE ADULT - PROBLEM SELECTOR PLAN 5
Pt w/ a history of SVC syndrome in ~2005, reportedly with a drain placed  - f/u PMD about possible drain placement  - f/u PMD/Cards about needing to be on DAPT for SVC

## 2024-02-04 NOTE — DISCHARGE NOTE PROVIDER - PROVIDER TOKENS
PROVIDER:[TOKEN:[[id number]],FOLLOWUP:[2 weeks],ESTABLISHEDPATIENT:[T]],PROVIDER:[TOKEN:[[id number]],FOLLOWUP:[2 weeks],ESTABLISHEDPATIENT:[T]]

## 2024-02-04 NOTE — DISCHARGE NOTE PROVIDER - NSFOLLOWUPCLINICS_GEN_ALL_ED_FT
[facility] [place] - ENT  Otolaryngology (ENT)  [address] [place] [address]  Phone: [phone]  Fax:   Follow Up Time: 2 weeks    Cardiology at [facility] [place]  Cardiology  [address] [place] [address]  Phone: [phone]  Fax:   Follow Up Time: 2 weeks

## 2024-02-04 NOTE — DISCHARGE NOTE PROVIDER - HOSPITAL COURSE
HPI:   53yoF PMHx of ESRD R-DDRT (2020) c/b HCV (treated per patient), chronic SVC syndrome s/p drain(?), HTN, coronary bypass 2015, presenting after being discharged from [facility] (2/1 - 2/3) for epistaxis and chest pain. She presented here for further workup of her epistaxis and chest pain. Her symptoms started Thursday, 02/01, around midday when she was walking down the stairs and started having nosebleeds. She has not had something similar before and noticed blurry vision, facial swelling, chest pain, and stomach problems around the same time. The chest pain she describes as non-radiating, primarily located in the upper left chest/neck region that is sharp, 8/10 in intensity - at one point a crushing chest pain that improved with pain meds, that worsens with cough with associated dizziness. She was evaluated and admitted for further workup of her bleeding and chest pain. For the bleed, ENT was consulted, diagnosed with sinusitis, and was given x2 rhinorockets, one was removed prior to discharge with plans to follow up Monday, 02/05. She had a EKG done that she recalls as unremarkable, Echo (doesn't recall result), CT head that was negative, and CXR that was also unremarkable. She also noticed dark green stools. Denies sick contacts and recent travels.  Pt states she did not like the way she was treated at OSH which is why she decided to come to [facility].    Hospital Course:   In the ED, she was afebrile, VSS. WBC 10.65, Hgb 11.1 (around baseline), CMP unremarkable, Trop 9, Lipase 55, procalc 0.07. CXR unremarkable and US kidney WNL. s/p x1 vancomycin + cefepime. Nephro was consulted for her kidney transplant, who recommended keeping the patient on her home immunosuppression regimen.  ECG w/ NSR and possible small T wave inversions. Troponins negative x2. TTE ordered as team unable to get outside records from [place]. Started on Unasyn given elevated risk of infection with nasal packing. ENT consulted, who recommended ............    Patient is currently hemodynamically stable and no longer requires inpatient level of care. Patient should follow up with their outpatient primary care provider and the specialists listed below after discharge from the hospital.     Important Changes and Follow Ups:  - Follow up with outpatient PCP (Dr. Marquez Negron) within 2 weeks.  - Follow up with outpatient cardiologist within 2 weeks.  - Follow up with outpatient ENT within 2 weeks.  - Follow up with outpatient transplant nephrology within 2 weeks. HPI:   53yoF PMHx of ESRD R-DDRT (2020) c/b HCV (treated per patient), chronic SVC syndrome s/p drain(?), HTN, coronary bypass 2015, presenting after being discharged from [facility] (2/1 - 2/3) for epistaxis and chest pain. She presented here for further workup of her epistaxis and chest pain. Her symptoms started Thursday, 02/01, around midday when she was walking down the stairs and started having nosebleeds. She has not had something similar before and noticed blurry vision, facial swelling, chest pain, and stomach problems around the same time. The chest pain she describes as non-radiating, primarily located in the upper left chest/neck region that is sharp, 8/10 in intensity - at one point a crushing chest pain that improved with pain meds, that worsens with cough with associated dizziness. She was evaluated and admitted for further workup of her bleeding and chest pain. For the bleed, ENT was consulted, diagnosed with sinusitis, and was given x2 rhinorockets, one was removed prior to discharge with plans to follow up Monday, 02/05. She had a EKG done that she recalls as unremarkable, Echo (doesn't recall result), CT head that was negative, and CXR that was also unremarkable. She also noticed dark green stools. Denies sick contacts and recent travels.  Pt states she did not like the way she was treated at OSH which is why she decided to come to [facility].    Hospital Course:   In the ED, she was afebrile, VSS. WBC 10.65, Hgb 11.1 (around baseline), CMP unremarkable, Trop 9, Lipase 55, procalc 0.07. CXR unremarkable and US kidney WNL. s/p x1 vancomycin + cefepime. Nephro was consulted for her kidney transplant, who recommended keeping the patient on her home immunosuppression regimen.  ECG w/ NSR and possible small T wave inversions. Troponins negative x2. TTE ordered as team unable to get outside records from [place]. Started on Unasyn given elevated risk of infection with nasal packing. ENT consulted, who removed the rhinorocket, and recommended saline nasal spray. Bleeding resolved on day of discharge. Chest pain resolved, TTE EF 71% no wall abnormalities. Pt had L facial swelling and tenderness, CT head/neck demonstrating venous bypass but without contrast unable to assess patency, however swelling resolved after a day with no concern for posterior bleeding/extravasation or SVC syndrome.     Patient is currently hemodynamically stable and no longer requires inpatient level of care. Patient should follow up with their outpatient primary care provider and the specialists listed below after discharge from the hospital.     Important Changes and Follow Ups:  - Follow up with outpatient PCP (Dr. Marquez Negron) within 2 weeks.  - Follow up with outpatient cardiologist within 2 weeks.  - Follow up with outpatient ENT within 2 weeks.  - Follow up with outpatient transplant nephrology within 2 weeks.

## 2024-02-04 NOTE — DISCHARGE NOTE PROVIDER - NSDCMRMEDTOKEN_GEN_ALL_CORE_FT
Aspirin Enteric Coated 81 mg oral delayed release tablet: 1 tab(s) orally once a day  atorvastatin 20 mg oral tablet: 1 tab(s) orally once a day (at bedtime)  cinacalcet 30 mg oral tablet: 1 tab(s) orally once a day  clopidogrel 75 mg oral tablet: 1 tab(s) orally once a day  Envarsus XR 1 mg oral tablet, extended release: 11 milligram(s) orally once a day  mycophenolate mofetil 500 mg oral tablet: 1 tab(s) orally 2 times a day  pantoprazole 40 mg oral delayed release tablet: 1 tab(s) orally once a day  predniSONE 5 mg oral tablet: 1 tab(s) orally once a day   amLODIPine 5 mg oral tablet: 1 tab(s) orally once a day  Aspirin Enteric Coated 81 mg oral delayed release tablet: 1 tab(s) orally once a day  atorvastatin 20 mg oral tablet: 1 tab(s) orally once a day (at bedtime)  cinacalcet 30 mg oral tablet: 1 tab(s) orally once a day  clopidogrel 75 mg oral tablet: 1 tab(s) orally once a day  Envarsus XR 1 mg oral tablet, extended release: 11 milligram(s) orally once a day  mycophenolate mofetil 500 mg oral tablet: 1 tab(s) orally 2 times a day  pantoprazole 40 mg oral delayed release tablet: 1 tab(s) orally once a day  predniSONE 5 mg oral tablet: 1 tab(s) orally once a day   amLODIPine 5 mg oral tablet: 1 tab(s) orally once a day  Aspirin Enteric Coated 81 mg oral delayed release tablet: 1 tab(s) orally once a day  atorvastatin 20 mg oral tablet: 1 tab(s) orally once a day (at bedtime)  cinacalcet 30 mg oral tablet: 1 tab(s) orally once a day  clopidogrel 75 mg oral tablet: 1 tab(s) orally once a day  Envarsus XR 1 mg oral tablet, extended release: 11 milligram(s) orally once a day  mycophenolate mofetil 500 mg oral tablet: 1 tab(s) orally 2 times a day  pantoprazole 40 mg oral delayed release tablet: 1 tab(s) orally once a day  predniSONE 5 mg oral tablet: 1 tab(s) orally once a day  sodium chloride 0.65% nasal spray: 2 puff(s) nasal 4 times a day as needed for  dry nasal passages   amLODIPine 5 mg oral tablet: 1 tab(s) orally once a day  Aspirin Enteric Coated 81 mg oral delayed release tablet: 1 tab(s) orally once a day  atorvastatin 20 mg oral tablet: 1 tab(s) orally once a day (at bedtime)  cinacalcet 30 mg oral tablet: 1 tab(s) orally once a day  clopidogrel 75 mg oral tablet: 1 tab(s) orally once a day Please do not start taking until 2/10/24  Envarsus XR 1 mg oral tablet, extended release: 11 milligram(s) orally once a day  mycophenolate mofetil 500 mg oral tablet: 1 tab(s) orally 2 times a day  pantoprazole 40 mg oral delayed release tablet: 1 tab(s) orally once a day  predniSONE 5 mg oral tablet: 1 tab(s) orally once a day  sodium chloride 0.65% nasal spray: 2 puff(s) nasal 4 times a day as needed for  dry nasal passages

## 2024-02-04 NOTE — DISCHARGE NOTE PROVIDER - CARE PROVIDERS DIRECT ADDRESSES
,DirectAddress_Unknown,[email or web address]

## 2024-02-04 NOTE — PROGRESS NOTE ADULT - PROBLEM SELECTOR PLAN 1
Pt w/ 3 day history of epistaxis, diagnosed with sinusitis at [facility], s/p x2 rhinorockets with one removed prior to discharge with plans to follow up with [place] [facility] 02/05  - Hgb stable on admission  - Procalc 0.07  - s/p x1 vancomycin and cefepime  - Start Unasyn (2/4 - )  - Follow up with OP ENT for management of epistaxis Pt w/ 3 day history of epistaxis, diagnosed with sinusitis at [facility], s/p x2 rhinorockets with one removed prior to discharge with plans to follow up with [place] [facility] 02/05  - Hgb stable on admission  - Procalc 0.07  - s/p x1 vancomycin and cefepime  - Start Unasyn (2/4 - )  - ENT consult if patient stays inpatient, otherwise f/u outpatient Pt w/ 3 day history of epistaxis, diagnosed with sinusitis at [facility], s/p x2 rhinorockets with one removed prior to discharge with plans to follow up with [place] [facility] 02/05  - Hgb stable on admission  - Procalc 0.07  - s/p x1 vancomycin and cefepime  - Start Unasyn (2/4 - )  - ENT consult to be called in AM Pt w/ 3 day history of epistaxis, diagnosed with sinusitis at [facility], s/p x2 rhinorockets with one removed prior to discharge with plans to follow up with [place] [facility] 02/05  - Hgb stable on admission  - Procalc 0.07  - s/p x1 vancomycin and cefepime  - Start Unasyn (2/4 - ) while the packing is in place   - ENT consult to be called in AM

## 2024-02-04 NOTE — DISCHARGE NOTE PROVIDER - NSDCCONDITION_GEN_ALL_CORE
Stable Action 2: Continue Detail Level: Zone Increase Regimen: Increase moisturizing skin at least once daily. Recommend to use cerave or eucerin cream.

## 2024-02-04 NOTE — PHYSICAL THERAPY INITIAL EVALUATION ADULT - PERTINENT HX OF CURRENT PROBLEM, REHAB EVAL
53yoF PMHx of ESRD R-DDRT (2020) c/b HCV (treated per patient), chronic SVC syndrome s/p drain(?), HTN, coronary bypass 2015, presenting after being discharged from [facility] (2/1 - 2/3) for epistaxis and chest pain. She presented here for further workup of her epistaxis and chest pain. Her symptoms started Thursday, 02/01, around midday when she was walking down the stairs and started having nosebleeds. She has not had something similar before and noticed blurry vision, facial swelling, chest pain, and stomach problems around the same time. The chest pain she describes as non-radiating, primarily located in the upper left chest/neck region that is sharp, 8/10 in intensity - at one point a crushing chest pain that improved with pain meds, that worsens with cough with associated dizziness. She was evaluated and admitted for further workup of her bleeding and chest pain. For the bleed, ENT was consulted, diagnosed with sinusitis, and was given x2 rhinorockets, one was removed prior to discharge with plans to follow up Monday, 02/05. She had a EKG done that she recalls as unremarkable, Echo (doesn't recall result), CT head that was negative, and CXR that was also unremarkable. She also noticed dark green stools. Denies sick contacts and recent travels.  Pt states she did not like the way she was treated at OSH which is why she decided to come to [facility]. 2/3 Chest Xray: No focal consolidation. US Trans R kidney: No evidence of hydronephrosis or perinephric collection.  Patent vessels. No evidence of a significant renal artery stenosis.

## 2024-02-04 NOTE — DISCHARGE NOTE PROVIDER - NSDCCPCAREPLAN_GEN_ALL_CORE_FT
PRINCIPAL DISCHARGE DIAGNOSIS  Diagnosis: Chest pain  Assessment and Plan of Treatment: You came to the hospital because you were having chest pain. An ECG was done, which showed no heart attack. Your cardiac enzymes were all normal as well. An ultrasound of your heart (echocardiogram) was done to further evaluate your heart, which showed ..........  Please follow up with your outpatient primary care provider (Dr. Negron) and an outpatient cardiologist within 2 weeks of discharge from the hospital.      SECONDARY DISCHARGE DIAGNOSES  Diagnosis: Sinusitis  Assessment and Plan of Treatment: You were previously seen at [facility] for a nosebleed and were diagnosed with sinusitis while admitted there. You still had 1 nasal packing in your left nostril when you came to this hospital, so you were started on antibiotics (Unasyn) to prevent infection. You were seen by ENT specialists, who recommended ...........  Please follow up with your outpatient ENT specialist within 2 weeks of discharge from the hospital.    Diagnosis: History of renal transplant  Assessment and Plan of Treatment: You have a history of a kidney transplant. You were seen by nephrology and transplant nephrology while in the hospital, who recommended keeping you on your home regimen of immunosuppresion medications (Tacrolimus, Cellcept, Prednisone).  Please follow up with your outpatient transplant nephrologist within 2 weeks of discharge from the hospital.     PRINCIPAL DISCHARGE DIAGNOSIS  Diagnosis: Chest pain  Assessment and Plan of Treatment: You came to the hospital because you were having chest pain. An ECG was done, which showed no heart attack. Your cardiac enzymes were all normal as well. An ultrasound of your heart (echocardiogram) was done to further evaluate your heart, which showed an ejection fraction of 71% which is normal, and no abnormalities.   Please follow up with your outpatient primary care provider (Dr. Negron) and an outpatient cardiologist within 2 weeks of discharge from the hospital.      SECONDARY DISCHARGE DIAGNOSES  Diagnosis: Sinusitis  Assessment and Plan of Treatment: You were previously seen at [facility] for a nosebleed and were diagnosed with sinusitis while admitted there. You still had 1 nasal packing in your left nostril when you came to this hospital, so you were started on antibiotics (Unasyn) to prevent infection. You were seen by ENT specialists, who recommended saline nasal spray to help control any further nosebleeds  Please follow up with your outpatient ENT specialist within 2 weeks of discharge from the hospital.    Diagnosis: History of renal transplant  Assessment and Plan of Treatment: You have a history of a kidney transplant. You were seen by nephrology and transplant nephrology while in the hospital, who recommended keeping you on your home regimen of immunosuppresion medications (Tacrolimus, Cellcept, Prednisone).  Please follow up with your outpatient transplant nephrologist within 2 weeks of discharge from the hospital.    Diagnosis: Facial swelling  Assessment and Plan of Treatment: You had facial swelling during your hospitalization which we did imaging of your neck for to assess for possible bleeding or SVC syndrome. The imaging showed the bypass but could not evaluate further. Your swelling and pain resolved without intervention and no bleed or mass was noted on imaging. Please followup with your PMD for further evaluation.     PRINCIPAL DISCHARGE DIAGNOSIS  Diagnosis: Chest pain  Assessment and Plan of Treatment: You came to the hospital because you were having chest pain. An ECG was done, which showed no heart attack. Your cardiac enzymes were all normal as well. An ultrasound of your heart (echocardiogram) was done to further evaluate your heart, which showed an ejection fraction of 71% which is normal, and no abnormalities.   Please follow up with your outpatient primary care provider (Dr. Negron) and an outpatient cardiologist within 2 weeks of discharge from the hospital.      SECONDARY DISCHARGE DIAGNOSES  Diagnosis: Sinusitis  Assessment and Plan of Treatment: You were previously seen at [facility] for a nosebleed and were diagnosed with sinusitis while admitted there. You still had 1 nasal packing in your left nostril when you came to this hospital, so you were started on antibiotics (Unasyn) to prevent infection. You were seen by ENT specialists, who recommended saline nasal spray to help control any further nosebleeds  Please follow up with your outpatient ENT specialist within 2 weeks of discharge from the hospital.  Please do not take your Plavix until Saturday 2/10/24 due to increased risk of bleeding.    Diagnosis: History of renal transplant  Assessment and Plan of Treatment: You have a history of a kidney transplant. You were seen by nephrology and transplant nephrology while in the hospital, who recommended keeping you on your home regimen of immunosuppresion medications (Tacrolimus, Cellcept, Prednisone).  Please follow up with your outpatient transplant nephrologist within 2 weeks of discharge from the hospital.    Diagnosis: Facial swelling  Assessment and Plan of Treatment: You had facial swelling during your hospitalization which we did imaging of your neck for to assess for possible bleeding or SVC syndrome. The imaging showed the bypass but could not evaluate further. Your swelling and pain resolved without intervention and no bleed or mass was noted on imaging. Please followup with your PMD for further evaluation.

## 2024-02-04 NOTE — PROGRESS NOTE ADULT - PROBLEM SELECTOR PLAN 3
Pt w/ history of ESRD s/p R-DDRT 2020, c/b HCV which patient said was treated  - Home regimen: Envarsus 11mg qd (goal 4-6), Cellcept 500mg BID, and prednisone 5mg qd for maintenance  - Doppler Renal Transplant wnl  - Transplant nephro consulted, appreciate recs  - c/w home immunosuppression regimen  - Check tacrolimus level 30-minutes before AM dose

## 2024-02-04 NOTE — PROGRESS NOTE ADULT - PROBLEM SELECTOR PLAN 2
Pt w/ new onset chest pain that started when she developed epistaxis. [facility] 2019 showing clean coronaries.  - At OSH, ECG reportedly normal, TTE done (no records here)  - In the ED, troponin 9, EKG no STEMI but ?T-waves  - Low-likelihood of ACS  - TTP left mid-axillary  - F/U OSH for records  - Repeat troponin + EKG in AM  - Chest X-ray AP/Lateral  - Lidocaine patch and acetaminophen for pain Pt w/ new onset chest pain that started when she developed epistaxis. [facility] 2019 showing clean coronaries.  - At OSH, ECG reportedly normal, TTE done (no records here)  - In the ED, troponins 9 and10, EKG no STEMI but ?T-waves  - Low-likelihood of ACS  - TTP left mid-axillary  - F/U OSH for records  - Repeat troponin + EKG in AM  - Chest X-ray AP/Lateral  - Lidocaine patch and acetaminophen for pain Pt w/ new onset chest pain that started when she developed epistaxis. [facility] 2019 showing clean coronaries.  - At OSH, ECG reportedly normal, TTE done (no records here)  - In the ED, troponins 9 and10, EKG no STEMI but ?T-waves  - Low-likelihood of ACS  - TTP left mid-axillary  - F/U OSH for records  - Repeat troponin + EKG in AM  - Chest X-ray AP/Lateral to evaluate for left-sided chest pain  - Lidocaine patch and acetaminophen for pain Pt w/ new onset chest pain that started when she developed epistaxis. [facility] 2019 showing clean coronaries.  - At OSH, ECG reportedly normal, TTE done (no records here)  - In the ED, troponins 9 and10, EKG no STEMI but ?T-waves  - Low-likelihood of ACS  - TTP left mid-axillary  - F/U OSH for records  - Repeat troponin + EKG in AM  - Chest X-ray AP/Lateral to evaluate for left-sided chest pain  - Lidocaine patch and acetaminophen for pain  TTE  due to noted Non specific T wave changes

## 2024-02-04 NOTE — PROGRESS NOTE ADULT - SUBJECTIVE AND OBJECTIVE BOX
PROGRESS NOTE:   Patient is a 53y old  Female who presents with a chief complaint of Chest pain and nose bleed (03 Feb 2024 21:05)    SUBJECTIVE / OVERNIGHT EVENTS:        MEDICATIONS  (STANDING):  ampicillin/sulbactam  IVPB 3 Gram(s) IV Intermittent every 6 hours  aspirin enteric coated 81 milliGRAM(s) Oral daily  atorvastatin 20 milliGRAM(s) Oral at bedtime  heparin   Injectable 5000 Unit(s) SubCutaneous every 8 hours  lidocaine   4% Patch 1 Patch Transdermal daily  mycophenolate mofetil 500 milliGRAM(s) Oral two times a day  pantoprazole    Tablet 40 milliGRAM(s) Oral before breakfast  predniSONE   Tablet 5 milliGRAM(s) Oral daily  tacrolimus ER Tablet (ENVARSUS XR) 11 milliGRAM(s) Oral daily    MEDICATIONS  (PRN):  acetaminophen     Tablet .. 650 milliGRAM(s) Oral every 6 hours PRN Temp greater or equal to 38C (100.4F), Mild Pain (1 - 3)      PHYSICAL EXAM:  Vital Signs Last 24 Hrs  T(C): 37 (04 Feb 2024 04:40), Max: 37.5 (04 Feb 2024 02:30)  T(F): 98.6 (04 Feb 2024 04:40), Max: 99.5 (04 Feb 2024 02:30)  HR: 84 (04 Feb 2024 04:40) (81 - 93)  BP: 125/74 (04 Feb 2024 04:40) (117/63 - 132/78)  BP(mean): 81 (04 Feb 2024 00:51) (81 - 85)  RR: 18 (04 Feb 2024 04:40) (18 - 22)  SpO2: 98% (04 Feb 2024 04:40) (96% - 98%)  Parameters below as of 04 Feb 2024 04:40  Patient On (Oxygen Delivery Method): room air    CONSTITUTIONAL: NAD, well-developed  HEENT: PERRLA, EOMI, moist mucous membranes.  NECK: Supple. No JVD.  RESPIRATORY: Normal respiratory effort, Lungs CTAB  CARDIOVASCULAR: Regular rate and rhythm with normal S1 and S2. No murmurs.  ABDOMEN: Soft, non-tender, non-distended. Normoactive bowel sounds, no rebound/guarding; No hepatosplenomegaly  EXTREMITIES: 2+ peripheral pulses. No clubbing, cyanosis, or edema.  MUSCULOSKELETAL: +TTP left mid-axillary line, TTP left clavicle  SKIN: AVF graft scar and mid-sternal scar noted  NEUROLOGIC: A&Ox3. No focal deficits.  PSYCH: Affect appropriate      LABS:                        10.5   9.54  )-----------( 216      ( 04 Feb 2024 05:22 )             34.4     02-04  136  |  100  |  10  ----------------------------<  103<H>  3.9   |  24  |  0.97    Ca    10.0      04 Feb 2024 05:22  Phos  3.3     02-03  Mg     1.5     02-03    TPro  6.5  /  Alb  3.7  /  TBili  1.0  /  DBili  x   /  AST  10  /  ALT  13  /  AlkPhos  60  02-04    PT/INR - ( 04 Feb 2024 05:22 )   PT: 12.3 sec;   INR: 1.12 ratio    PTT - ( 04 Feb 2024 05:22 )  PTT:29.6 sec    CARDIAC MARKERS ( 04 Feb 2024 05:22 )  x     / x     / 62 U/L / x     / <1.0 ng/mL    Urinalysis Basic - ( 04 Feb 2024 05:22 )  Color: x / Appearance: x / SG: x / pH: x  Gluc: 103 mg/dL / Ketone: x  / Bili: x / Urobili: x   Blood: x / Protein: x / Nitrite: x   Leuk Esterase: x / RBC: x / WBC x   Sq Epi: x / Non Sq Epi: x / Bacteria: x        RADIOLOGY & ADDITIONAL TESTS:  < from: US Trans Kidney w/ Doppler, Right (02.03.24 @ 19:05) >  IMPRESSION:  No evidence of hydronephrosis or perinephric collection.  Patent vessels. No evidence of a significant renal artery stenosis.    < end of copied text >    < from: Xray Chest 1 View- PORTABLE-Urgent (02.03.24 @ 17:15) >  FINDINGS:  Status post median sternotomy. Second sternotomy wire appears fractured,   unchanged from prior imaging.  No focal consolidation.  There is no pleural effusion or pneumothorax.  The heart is normal in size  The visualized osseous structures demonstrate no acute pathology.    IMPRESSION:  No focal consolidation.    < end of copied text >      COORDINATION OF CARE:  Care Discussed with Consultants/Other Providers [Y/N]:  Prior or Outpatient Records Reviewed [Y/N]: PROGRESS NOTE:   Patient is a 53y old  Female who presents with a chief complaint of Chest pain and nose bleed (03 Feb 2024 21:05)    SUBJECTIVE / OVERNIGHT EVENTS:  No acute events overnight. Patient states that she feels better with improvement in her chest pain. Patient feels that her chest pain is due to a procedure she had for SVC syndrome many years ago where they did a venous graft. Patient otherwise reports resolution of epistaxis. Patient denies any fever, chills, SOB, chest pain, abdominal pain, nausea/vomiting, diarrhea, or urinary symptoms.       MEDICATIONS  (STANDING):  ampicillin/sulbactam  IVPB 3 Gram(s) IV Intermittent every 6 hours  aspirin enteric coated 81 milliGRAM(s) Oral daily  atorvastatin 20 milliGRAM(s) Oral at bedtime  heparin   Injectable 5000 Unit(s) SubCutaneous every 8 hours  lidocaine   4% Patch 1 Patch Transdermal daily  mycophenolate mofetil 500 milliGRAM(s) Oral two times a day  pantoprazole    Tablet 40 milliGRAM(s) Oral before breakfast  predniSONE   Tablet 5 milliGRAM(s) Oral daily  tacrolimus ER Tablet (ENVARSUS XR) 11 milliGRAM(s) Oral daily    MEDICATIONS  (PRN):  acetaminophen     Tablet .. 650 milliGRAM(s) Oral every 6 hours PRN Temp greater or equal to 38C (100.4F), Mild Pain (1 - 3)      PHYSICAL EXAM:  Vital Signs Last 24 Hrs  T(C): 37 (04 Feb 2024 04:40), Max: 37.5 (04 Feb 2024 02:30)  T(F): 98.6 (04 Feb 2024 04:40), Max: 99.5 (04 Feb 2024 02:30)  HR: 84 (04 Feb 2024 04:40) (81 - 93)  BP: 125/74 (04 Feb 2024 04:40) (117/63 - 132/78)  BP(mean): 81 (04 Feb 2024 00:51) (81 - 85)  RR: 18 (04 Feb 2024 04:40) (18 - 22)  SpO2: 98% (04 Feb 2024 04:40) (96% - 98%)  Parameters below as of 04 Feb 2024 04:40  Patient On (Oxygen Delivery Method): room air    CONSTITUTIONAL: NAD, well-developed  HEENT: PERRLA, EOMI, moist mucous membranes.  NECK: Supple. No JVD.  RESPIRATORY: Normal respiratory effort, Lungs CTAB  CARDIOVASCULAR: Regular rate and rhythm with normal S1 and S2. No murmurs.  ABDOMEN: Soft, non-tender, non-distended. Normoactive bowel sounds, no rebound/guarding; No hepatosplenomegaly  EXTREMITIES: 2+ peripheral pulses. No clubbing, cyanosis, or edema.  MUSCULOSKELETAL: +TTP left mid-axillary line, TTP left clavicle  SKIN: AVF graft scar and mid-sternal scar noted  NEUROLOGIC: A&Ox3. No focal deficits.  PSYCH: Affect appropriate      LABS:                        10.5   9.54  )-----------( 216      ( 04 Feb 2024 05:22 )             34.4     02-04  136  |  100  |  10  ----------------------------<  103<H>  3.9   |  24  |  0.97    Ca    10.0      04 Feb 2024 05:22  Phos  3.3     02-03  Mg     1.5     02-03    TPro  6.5  /  Alb  3.7  /  TBili  1.0  /  DBili  x   /  AST  10  /  ALT  13  /  AlkPhos  60  02-04    PT/INR - ( 04 Feb 2024 05:22 )   PT: 12.3 sec;   INR: 1.12 ratio    PTT - ( 04 Feb 2024 05:22 )  PTT:29.6 sec    CARDIAC MARKERS ( 04 Feb 2024 05:22 )  x     / x     / 62 U/L / x     / <1.0 ng/mL    Urinalysis Basic - ( 04 Feb 2024 05:22 )  Color: x / Appearance: x / SG: x / pH: x  Gluc: 103 mg/dL / Ketone: x  / Bili: x / Urobili: x   Blood: x / Protein: x / Nitrite: x   Leuk Esterase: x / RBC: x / WBC x   Sq Epi: x / Non Sq Epi: x / Bacteria: x        RADIOLOGY & ADDITIONAL TESTS:  < from: US Trans Kidney w/ Doppler, Right (02.03.24 @ 19:05) >  IMPRESSION:  No evidence of hydronephrosis or perinephric collection.  Patent vessels. No evidence of a significant renal artery stenosis.    < end of copied text >    < from: Xray Chest 1 View- PORTABLE-Urgent (02.03.24 @ 17:15) >  FINDINGS:  Status post median sternotomy. Second sternotomy wire appears fractured,   unchanged from prior imaging.  No focal consolidation.  There is no pleural effusion or pneumothorax.  The heart is normal in size  The visualized osseous structures demonstrate no acute pathology.    IMPRESSION:  No focal consolidation.    < end of copied text >      COORDINATION OF CARE:  Care Discussed with Consultants/Other Providers [Y/N]:  Prior or Outpatient Records Reviewed [Y/N]: PROGRESS NOTE:   Patient is a 53y old  Female who presents with a chief complaint of Chest pain and nose bleed (03 Feb 2024 21:05)    SUBJECTIVE / OVERNIGHT EVENTS:  No acute events overnight. Patient states that she feels better with improvement in her chest pain. Patient feels that her chest pain is due to a procedure she had for SVC syndrome many years ago where they did a venous graft. Patient otherwise reports resolution of epistaxis. Patient denies any fever, chills, SOB, chest pain, abdominal pain, nausea/vomiting, diarrhea, or urinary symptoms.       MEDICATIONS  (STANDING):  ampicillin/sulbactam  IVPB 3 Gram(s) IV Intermittent every 6 hours  aspirin enteric coated 81 milliGRAM(s) Oral daily  atorvastatin 20 milliGRAM(s) Oral at bedtime  heparin   Injectable 5000 Unit(s) SubCutaneous every 8 hours  lidocaine   4% Patch 1 Patch Transdermal daily  mycophenolate mofetil 500 milliGRAM(s) Oral two times a day  pantoprazole    Tablet 40 milliGRAM(s) Oral before breakfast  predniSONE   Tablet 5 milliGRAM(s) Oral daily  tacrolimus ER Tablet (ENVARSUS XR) 11 milliGRAM(s) Oral daily    MEDICATIONS  (PRN):  acetaminophen     Tablet .. 650 milliGRAM(s) Oral every 6 hours PRN Temp greater or equal to 38C (100.4F), Mild Pain (1 - 3)      PHYSICAL EXAM:  Vital Signs Last 24 Hrs  T(C): 37 (04 Feb 2024 04:40), Max: 37.5 (04 Feb 2024 02:30)  T(F): 98.6 (04 Feb 2024 04:40), Max: 99.5 (04 Feb 2024 02:30)  HR: 84 (04 Feb 2024 04:40) (81 - 93)  BP: 125/74 (04 Feb 2024 04:40) (117/63 - 132/78)  BP(mean): 81 (04 Feb 2024 00:51) (81 - 85)  RR: 18 (04 Feb 2024 04:40) (18 - 22)  SpO2: 98% (04 Feb 2024 04:40) (96% - 98%)  Parameters below as of 04 Feb 2024 04:40  Patient On (Oxygen Delivery Method): room air    CONSTITUTIONAL: NAD, well-developed  HEENT: PERRLA, EOMI, moist mucous membranes. POS LEFT nasal packing in place   NECK: Supple. No JVD. POs left neck swelling with no erythema   RESPIRATORY: Normal respiratory effort, Lungs CTAB  CARDIOVASCULAR: Regular rate and rhythm with normal S1 and S2. No murmurs. POS healed surgical scar   ABDOMEN: Soft, non-tender, non-distended. Normoactive bowel sounds, no rebound/guarding; No hepatosplenomegaly  EXTREMITIES: 2+ peripheral pulses. No clubbing, cyanosis, or edema.  MUSCULOSKELETAL: +TTP left mid-axillary line, TTP left clavicle  SKIN: AVF graft scar and mid-sternal scar noted  NEUROLOGIC: A&Ox3. No focal deficits.  PSYCH: Affect appropriate      LABS:                        10.5   9.54  )-----------( 216      ( 04 Feb 2024 05:22 )             34.4     02-04  136  |  100  |  10  ----------------------------<  103<H>  3.9   |  24  |  0.97    Ca    10.0      04 Feb 2024 05:22  Phos  3.3     02-03  Mg     1.5     02-03    TPro  6.5  /  Alb  3.7  /  TBili  1.0  /  DBili  x   /  AST  10  /  ALT  13  /  AlkPhos  60  02-04    PT/INR - ( 04 Feb 2024 05:22 )   PT: 12.3 sec;   INR: 1.12 ratio    PTT - ( 04 Feb 2024 05:22 )  PTT:29.6 sec    CARDIAC MARKERS ( 04 Feb 2024 05:22 )  x     / x     / 62 U/L / x     / <1.0 ng/mL    Urinalysis Basic - ( 04 Feb 2024 05:22 )  Color: x / Appearance: x / SG: x / pH: x  Gluc: 103 mg/dL / Ketone: x  / Bili: x / Urobili: x   Blood: x / Protein: x / Nitrite: x   Leuk Esterase: x / RBC: x / WBC x   Sq Epi: x / Non Sq Epi: x / Bacteria: x        RADIOLOGY & ADDITIONAL TESTS:  < from: US Trans Kidney w/ Doppler, Right (02.03.24 @ 19:05) >  IMPRESSION:  No evidence of hydronephrosis or perinephric collection.  Patent vessels. No evidence of a significant renal artery stenosis.    < end of copied text >    < from: Xray Chest 1 View- PORTABLE-Urgent (02.03.24 @ 17:15) >  FINDINGS:  Status post median sternotomy. Second sternotomy wire appears fractured,   unchanged from prior imaging.  No focal consolidation.  There is no pleural effusion or pneumothorax.  The heart is normal in size  The visualized osseous structures demonstrate no acute pathology.    IMPRESSION:  No focal consolidation.    < end of copied text >      COORDINATION OF CARE:  Care Discussed with Consultants/Other Providers [Y/N]:  Prior or Outpatient Records Reviewed [Y/N]:

## 2024-02-04 NOTE — PROGRESS NOTE ADULT - ATTENDING COMMENTS
53yoF PMHx of ESRD R-DDRT (2020) c/b HCV (treated per patient), chronic SVC syndrome s/p drain(?), HTN, coronary bypass 2015, presenting after being discharged from [facility] (2/1 - 2/3) for epistaxis and chest pain. She presented here for further workup of her epistaxis and chest pain. Her symptoms started Thursday, 02/01, around midday when she was walking down the stairs and started having nosebleeds. She has not had something similar before and noticed blurry vision, facial swelling, chest pain, and stomach problems around the same time. The chest pain she describes as non-radiating, primarily located in the upper left chest/neck region that is sharp, 8/10 in intensity - at one point a crushing chest pain that improved with pain meds, that worsens with cough with associated dizziness. She was evaluated and admitted for further workup of her bleeding and chest pain. For the bleed, ENT was consulted, diagnosed with sinusitis, and was given x2 rhinorockets, one was removed prior to discharge with plans to follow up Monday, 02/05. She had a EKG done that she recalls as unremarkable, Echo (doesn't recall result), CT head that was negative, and CXR that was also unremarkable. She also noticed dark green stools. Denies sick contacts and recent travels.  Pt states she did not like the way she was treated at OSH which is why she decided to come to [facility].    In the ED, she was afebrile, VSS. WBC 10.65, Hgb 11.1 (around baseline), CMP unremarkable, Trop 9, Lipase 55, procalc 0.07. CXR unremarkable and US kidney WNL. Nephro was consulted for her kidney transplant. s/p x1 vancomycin + cefepime   If stable in AM can consider DC home if no further work up is planned   ENT evaluation 53yoF PMHx of ESRD R-DDRT (2020) c/b HCV (treated per patient), chronic SVC syndrome s/p drain(?), HTN, coronary bypass 2015, presenting after being discharged from [facility] (2/1 - 2/3) for epistaxis and chest pain.  She was evaluated and admitted for further workup of her bleeding and chest pain. For the bleed, ENT was consulted at the other hospital, she was diagnosed with sinusitis, and 2 rhinorockets were placed, one was removed prior to discharge with plans to follow up Monday, 02/05 outpatient.  As she states,  She had a EKG done that she recalls as unremarkable, Echo (doesn't recall result), CT head that was negative, and CXR that was also unremarkable" . She also noticed dark green stools and none currently .   Pt states she did not like the way she was treated at OSH which is why she decided to come to [facility].  In the ED at [facility], she was afebrile, VSS.   CMP unremarkable, Trop 9, Lipase 55, procalc 0.07. CXR unremarkable and US kidney WNL. Nephro was consulted for her kidney transplant. s/p x1 vancomycin + cefepime   - Left side of neck is noted to be generally swollen with no erythema and the distal portion of the left side drain is felt on the left flank are with no    exit area--> WILL GET a US of the neck to R/O any obstruction of drain or other pathology of the left neck causing the swelling.  HOLD off ABX for    now   - TTE was done due to non specific T wave inversion on the lateral leads , but with no wall motion abnormalities   - ENT evaluation is to be requested in AM  as pt still has the packing on the Left nostril   - Transplant team is on the case , f/up recommendations   Might need to get record from previous hospital     Case is discussed with resident of the team/rest as per above       Aretha Borden   HOspitalist   available on TEAMS

## 2024-02-04 NOTE — CONSULT NOTE ADULT - ASSESSMENT
53yoF PMHx of ESRD R-DDRT (2020) c/b HCV (treated per patient), chronic SVC syndrome s/p drain(?), HTN, coronary bypass 2015, presenting after being discharged from [facility] (2/1 - 2/3) for epistaxis and chest pain. She presented here for further workup of her epistaxis and chest pain.   Nephrology consulted for renal trasnplant    s/p DDRT @ [facility] 08/14/2020  Outpatient nephrologist is Dr. Lake / Dr Streeter   Continue home dose Immunosuppression meds    mg BID, Prednisone 5 mg qd, Envarsus 11 mg   please Check Tacrolimus 30 min prior to morning dose  Kidney function stable  at present   Monitor BMP, u/o    HTN:   BP optimal   Monitor    Hyponatremia  improved  monitor     Proteinuria  Repeat UA  if persistent -->Defer to transplant nephrology if further work up needs to be done inpatient    53yoF PMHx of ESRD R-DDRT (2020) c/b HCV (treated per patient), chronic SVC syndrome s/p drain(?), HTN, coronary bypass 2015, presenting after being discharged from [facility] (2/1 - 2/3) for epistaxis and chest pain. She presented here for further workup of her epistaxis and chest pain.   Nephrology consulted for renal trasnplant    s/p DDRT @ [facility] 08/14/2020  Outpatient nephrologist is Dr. Lake / Dr Streeter   Continue home dose Immunosuppression meds    mg BID, Prednisone 5 mg qd, Envarsus 11 mg   please Check Tacrolimus 30 min prior to morning dose  Kidney function stable  at present   Monitor BMP, u/o    HTN:   BP optimal   Monitor    Hyponatremia  improved  monitor     Proteinuria  Repeat UA  if persistent -->Defer to transplant nephrology for outpt work up .     above plan Discussed with transplant nephrologist DR Rosen

## 2024-02-04 NOTE — DISCHARGE NOTE PROVIDER - NSDCFUSCHEDAPPT_GEN_ALL_CORE_FT
Geoffrey Lake  [facility]  NEPHRO [address] [place] [address]  Scheduled Appointment: 02/13/2024

## 2024-02-04 NOTE — DISCHARGE NOTE PROVIDER - NSDCFUADDAPPT_GEN_ALL_CORE_FT
APPTS ARE READY TO BE MADE: [ ] YES    Best Family or Patient Contact (if needed):    Additional Information about above appointments (if needed):    1: PCP (Dr. Negron), within 2 weeks  2: Transplant Nephrology (Dr. Lake), within 2 weeks  3: ENT ([facility] or patient's own ENT provider), within 2 weeks  4: Cardiology, within 2 weeks    Other comments or requests:    APPTS ARE READY TO BE MADE: [X] YES    Best Family or Patient Contact (if needed):    Additional Information about above appointments (if needed):    1: PCP (Dr. Negron), within 2 weeks  2: Transplant Nephrology (Dr. Lake), within 2 weeks  3: ENT ([facility] or patient's own ENT provider), within 2 weeks  4: Cardiology, within 2 weeks    Other comments or requests:    APPTS ARE READY TO BE MADE: [X] YES    Best Family or Patient Contact (if needed):    Additional Information about above appointments (if needed):    1: PCP (Dr. Negron), within 2 weeks  2: Transplant Nephrology (Dr. Lake), within 2 weeks  3: ENT ([facility] or patient's own ENT provider), within 2 weeks  4: Cardiology, within 2 weeks    Other comments or requests:       Prior to outreaching the patient, it was visible that the patient has secured a follow up appointment which was not scheduled by our team. Dr. Ruiz 2/13 at 1:30pm APPTS ARE READY TO BE MADE: [X] YES    Best Family or Patient Contact (if needed):    Additional Information about above appointments (if needed):    1: PCP (Dr. Negron), within 2 weeks  2: Transplant Nephrology (Dr. Lake), within 2 weeks  3: ENT ([facility] or patient's own ENT provider), within 2 weeks  4: Cardiology, within 2 weeks    Other comments or requests:     Prior to outreaching the patient, it was visible that the patient has secured a follow up appointment which was not scheduled by our team. Dr. Ruiz 2/13 at 1:30pm    Patient states that she will wait to speak with Dr. Lake to determine if and when to see Dr. Negron.    APPTS ARE READY TO BE MADE: [X] YES    Best Family or Patient Contact (if needed):    Additional Information about above appointments (if needed):    1: PCP (Dr. Negron), within 2 weeks  2: Transplant Nephrology (Dr. Lake), within 2 weeks  3: ENT ([facility] or patient's own ENT provider), within 2 weeks  4: Cardiology, within 2 weeks    Other comments or requests:     Prior to outreaching the patient, it was visible that the patient has secured a follow up appointment which was not scheduled by our team. Dr. Ruiz 2/13 at 1:30pm    Patient states that she will wait to speak with Dr. Lake to determine if and when to see Dr. Negron.     Patient advised they did not want to proceed with scheduling appointments with the providers on their referrals. They will coordinate care on their own.

## 2024-02-04 NOTE — PROGRESS NOTE ADULT - PROBLEM SELECTOR PLAN 6
- DVT: SubQ Heparin  - Diet: Renal Diet  - Code: Full Code  - PT: Ordered  - Dispo: Pending clinical course - DVT: SubQ Heparin  - Diet: Renal Diet  - Code: Full Code  - PT: Ordered  - Pharmacy: [facility] [address] [place] [address]  - Dispo: Pending clinical course

## 2024-02-05 DIAGNOSIS — R04.0 EPISTAXIS: ICD-10-CM

## 2024-02-05 LAB
ALBUMIN SERPL ELPH-MCNC: 3.6 G/DL — SIGNIFICANT CHANGE UP (ref 3.3–5)
ALP SERPL-CCNC: 52 U/L — SIGNIFICANT CHANGE UP (ref 40–120)
ALT FLD-CCNC: 13 U/L — SIGNIFICANT CHANGE UP (ref 10–45)
ANION GAP SERPL CALC-SCNC: 12 MMOL/L — SIGNIFICANT CHANGE UP (ref 5–17)
AST SERPL-CCNC: 13 U/L — SIGNIFICANT CHANGE UP (ref 10–40)
BASOPHILS # BLD AUTO: 0.01 K/UL — SIGNIFICANT CHANGE UP (ref 0–0.2)
BASOPHILS NFR BLD AUTO: 0.2 % — SIGNIFICANT CHANGE UP (ref 0–2)
BILIRUB SERPL-MCNC: 0.6 MG/DL — SIGNIFICANT CHANGE UP (ref 0.2–1.2)
BUN SERPL-MCNC: 16 MG/DL — SIGNIFICANT CHANGE UP (ref 7–23)
CALCIUM SERPL-MCNC: 10.6 MG/DL — HIGH (ref 8.4–10.5)
CHLORIDE SERPL-SCNC: 102 MMOL/L — SIGNIFICANT CHANGE UP (ref 96–108)
CO2 SERPL-SCNC: 23 MMOL/L — SIGNIFICANT CHANGE UP (ref 22–31)
CREAT SERPL-MCNC: 1.33 MG/DL — HIGH (ref 0.5–1.3)
CULTURE RESULTS: NO GROWTH — SIGNIFICANT CHANGE UP
EGFR: 48 ML/MIN/1.73M2 — LOW
EOSINOPHIL # BLD AUTO: 0.14 K/UL — SIGNIFICANT CHANGE UP (ref 0–0.5)
EOSINOPHIL NFR BLD AUTO: 2.5 % — SIGNIFICANT CHANGE UP (ref 0–6)
GLUCOSE SERPL-MCNC: 110 MG/DL — HIGH (ref 70–99)
HCT VFR BLD CALC: 33.1 % — LOW (ref 34.5–45)
HCV RNA FLD QL NAA+PROBE: SIGNIFICANT CHANGE UP
HCV RNA SPEC QL PROBE+SIG AMP: SIGNIFICANT CHANGE UP
HGB BLD-MCNC: 9.9 G/DL — LOW (ref 11.5–15.5)
IMM GRANULOCYTES NFR BLD AUTO: 0.4 % — SIGNIFICANT CHANGE UP (ref 0–0.9)
LYMPHOCYTES # BLD AUTO: 0.62 K/UL — LOW (ref 1–3.3)
LYMPHOCYTES # BLD AUTO: 11 % — LOW (ref 13–44)
MAGNESIUM SERPL-MCNC: 1.5 MG/DL — LOW (ref 1.6–2.6)
MCHC RBC-ENTMCNC: 23.7 PG — LOW (ref 27–34)
MCHC RBC-ENTMCNC: 29.9 GM/DL — LOW (ref 32–36)
MCV RBC AUTO: 79.4 FL — LOW (ref 80–100)
MONOCYTES # BLD AUTO: 0.93 K/UL — HIGH (ref 0–0.9)
MONOCYTES NFR BLD AUTO: 16.5 % — HIGH (ref 2–14)
NEUTROPHILS # BLD AUTO: 3.9 K/UL — SIGNIFICANT CHANGE UP (ref 1.8–7.4)
NEUTROPHILS NFR BLD AUTO: 69.4 % — SIGNIFICANT CHANGE UP (ref 43–77)
NRBC # BLD: 0 /100 WBCS — SIGNIFICANT CHANGE UP (ref 0–0)
PHOSPHATE SERPL-MCNC: 2.7 MG/DL — SIGNIFICANT CHANGE UP (ref 2.5–4.5)
PLATELET # BLD AUTO: 229 K/UL — SIGNIFICANT CHANGE UP (ref 150–400)
POTASSIUM SERPL-MCNC: 3.9 MMOL/L — SIGNIFICANT CHANGE UP (ref 3.5–5.3)
POTASSIUM SERPL-SCNC: 3.9 MMOL/L — SIGNIFICANT CHANGE UP (ref 3.5–5.3)
PROT SERPL-MCNC: 6.3 G/DL — SIGNIFICANT CHANGE UP (ref 6–8.3)
RBC # BLD: 4.17 M/UL — SIGNIFICANT CHANGE UP (ref 3.8–5.2)
RBC # FLD: 18.4 % — HIGH (ref 10.3–14.5)
SODIUM SERPL-SCNC: 137 MMOL/L — SIGNIFICANT CHANGE UP (ref 135–145)
SPECIMEN SOURCE: SIGNIFICANT CHANGE UP
TACROLIMUS SERPL-MCNC: 4.8 NG/ML — SIGNIFICANT CHANGE UP
WBC # BLD: 5.62 K/UL — SIGNIFICANT CHANGE UP (ref 3.8–10.5)
WBC # FLD AUTO: 5.62 K/UL — SIGNIFICANT CHANGE UP (ref 3.8–10.5)

## 2024-02-05 PROCEDURE — 99233 SBSQ HOSP IP/OBS HIGH 50: CPT | Mod: GC

## 2024-02-05 PROCEDURE — 76536 US EXAM OF HEAD AND NECK: CPT | Mod: 26

## 2024-02-05 PROCEDURE — 70490 CT SOFT TISSUE NECK W/O DYE: CPT | Mod: 26

## 2024-02-05 PROCEDURE — 71250 CT THORAX DX C-: CPT | Mod: 26

## 2024-02-05 RX ORDER — SODIUM CHLORIDE 9 MG/ML
1000 INJECTION INTRAMUSCULAR; INTRAVENOUS; SUBCUTANEOUS
Refills: 0 | Status: DISCONTINUED | OUTPATIENT
Start: 2024-02-05 | End: 2024-02-06

## 2024-02-05 RX ORDER — MAGNESIUM SULFATE 500 MG/ML
2 VIAL (ML) INJECTION ONCE
Refills: 0 | Status: COMPLETED | OUTPATIENT
Start: 2024-02-05 | End: 2024-02-05

## 2024-02-05 RX ADMIN — HEPARIN SODIUM 5000 UNIT(S): 5000 INJECTION INTRAVENOUS; SUBCUTANEOUS at 05:37

## 2024-02-05 RX ADMIN — MYCOPHENOLATE MOFETIL 500 MILLIGRAM(S): 250 CAPSULE ORAL at 18:20

## 2024-02-05 RX ADMIN — AMPICILLIN SODIUM AND SULBACTAM SODIUM 200 GRAM(S): 250; 125 INJECTION, POWDER, FOR SUSPENSION INTRAMUSCULAR; INTRAVENOUS at 01:35

## 2024-02-05 RX ADMIN — Medication 650 MILLIGRAM(S): at 23:57

## 2024-02-05 RX ADMIN — AMPICILLIN SODIUM AND SULBACTAM SODIUM 200 GRAM(S): 250; 125 INJECTION, POWDER, FOR SUSPENSION INTRAMUSCULAR; INTRAVENOUS at 05:36

## 2024-02-05 RX ADMIN — Medication 25 GRAM(S): at 13:00

## 2024-02-05 RX ADMIN — AMPICILLIN SODIUM AND SULBACTAM SODIUM 200 GRAM(S): 250; 125 INJECTION, POWDER, FOR SUSPENSION INTRAMUSCULAR; INTRAVENOUS at 11:36

## 2024-02-05 RX ADMIN — MYCOPHENOLATE MOFETIL 500 MILLIGRAM(S): 250 CAPSULE ORAL at 05:37

## 2024-02-05 RX ADMIN — AMPICILLIN SODIUM AND SULBACTAM SODIUM 200 GRAM(S): 250; 125 INJECTION, POWDER, FOR SUSPENSION INTRAMUSCULAR; INTRAVENOUS at 18:21

## 2024-02-05 RX ADMIN — Medication 81 MILLIGRAM(S): at 11:10

## 2024-02-05 RX ADMIN — ATORVASTATIN CALCIUM 20 MILLIGRAM(S): 80 TABLET, FILM COATED ORAL at 21:20

## 2024-02-05 RX ADMIN — LIDOCAINE 1 PATCH: 4 CREAM TOPICAL at 19:00

## 2024-02-05 RX ADMIN — PANTOPRAZOLE SODIUM 40 MILLIGRAM(S): 20 TABLET, DELAYED RELEASE ORAL at 05:36

## 2024-02-05 RX ADMIN — LIDOCAINE 1 PATCH: 4 CREAM TOPICAL at 11:10

## 2024-02-05 RX ADMIN — Medication 5 MILLIGRAM(S): at 05:36

## 2024-02-05 RX ADMIN — LIDOCAINE 1 PATCH: 4 CREAM TOPICAL at 00:50

## 2024-02-05 RX ADMIN — TACROLIMUS 11 MILLIGRAM(S): 5 CAPSULE ORAL at 18:20

## 2024-02-05 RX ADMIN — HEPARIN SODIUM 5000 UNIT(S): 5000 INJECTION INTRAVENOUS; SUBCUTANEOUS at 21:20

## 2024-02-05 NOTE — PROGRESS NOTE ADULT - SUBJECTIVE AND OBJECTIVE BOX
ESTEFANIA CORDERO  53y  MRN: [id number]    Patient is a 53y old  Female who presents with a chief complaint of Chest pain and nose bleed (04 Feb 2024 12:28)      Subjective: no events ON. Denies fever, CP, SOB, abn pain, N/V, dysuria. Tolerating diet.      MEDICATIONS  (STANDING):  ampicillin/sulbactam  IVPB 3 Gram(s) IV Intermittent every 6 hours  aspirin enteric coated 81 milliGRAM(s) Oral daily  atorvastatin 20 milliGRAM(s) Oral at bedtime  heparin   Injectable 5000 Unit(s) SubCutaneous every 8 hours  lidocaine   4% Patch 1 Patch Transdermal daily  mycophenolate mofetil 500 milliGRAM(s) Oral two times a day  pantoprazole    Tablet 40 milliGRAM(s) Oral before breakfast  predniSONE   Tablet 5 milliGRAM(s) Oral daily  tacrolimus ER Tablet (ENVARSUS XR) 11 milliGRAM(s) Oral daily    MEDICATIONS  (PRN):  acetaminophen     Tablet .. 650 milliGRAM(s) Oral every 6 hours PRN Temp greater or equal to 38C (100.4F), Mild Pain (1 - 3)  sodium chloride 0.65% Nasal 1 Spray(s) Both Nostrils four times a day PRN Nasal Congestion      Objective:    Vitals: Vital Signs Last 24 Hrs  T(C): 36.7 (02-05-24 @ 04:50), Max: 37 (02-04-24 @ 19:45)  T(F): 98.1 (02-05-24 @ 04:50), Max: 98.6 (02-04-24 @ 19:45)  HR: 84 (02-05-24 @ 04:50) (79 - 88)  BP: 122/73 (02-05-24 @ 04:50) (111/66 - 137/71)  BP(mean): --  RR: 18 (02-05-24 @ 04:50) (16 - 18)  SpO2: 94% (02-05-24 @ 04:50) (94% - 96%)            I&O's Summary      PHYSICAL EXAM:  GENERAL: NAD  HEAD:  Atraumatic, Normocephalic  EYES: EOMI, conjunctiva and sclera clear  CHEST/LUNG: Clear to auscultation bilaterally; No rales, rhonchi, wheezing, or rubs  HEART: Regular rate and rhythm; No murmurs, rubs, or gallops  ABDOMEN: Soft, Nontender, Nondistended;   SKIN: No rashes or lesions  NERVOUS SYSTEM:  Alert & Oriented X3, no focal deficits    LABS:  02-04    136  |  100  |  10  ----------------------------<  103<H>  3.9   |  24  |  0.97  02-03    134<L>  |  98  |  13  ----------------------------<  113<H>  5.0   |  23  |  0.92    Ca    10.0      04 Feb 2024 05:22  Ca    10.2      03 Feb 2024 16:19  Phos  3.0     02-04  Mg     1.8     02-04    TPro  6.5  /  Alb  3.7  /  TBili  1.0  /  DBili  x   /  AST  10  /  ALT  13  /  AlkPhos  60  02-04  TPro  7.2  /  Alb  4.0  /  TBili  0.9  /  DBili  x   /  AST  39  /  ALT  19  /  AlkPhos  60  02-03      PT/INR - ( 04 Feb 2024 05:22 )   PT: 12.3 sec;   INR: 1.12 ratio         PTT - ( 04 Feb 2024 05:22 )  PTT:29.6 sec              Urinalysis Basic - ( 04 Feb 2024 05:22 )    Color: x / Appearance: x / SG: x / pH: x  Gluc: 103 mg/dL / Ketone: x  / Bili: x / Urobili: x   Blood: x / Protein: x / Nitrite: x   Leuk Esterase: x / RBC: x / WBC x   Sq Epi: x / Non Sq Epi: x / Bacteria: x                              10.5   9.54  )-----------( 216      ( 04 Feb 2024 05:22 )             34.4                         11.1   10.65 )-----------( 240      ( 03 Feb 2024 16:19 )             37.1     CAPILLARY BLOOD GLUCOSE          RADIOLOGY & ADDITIONAL TESTS:    Imaging Personally Reviewed:  [x ] YES  [ ] NO    Consultants involved in case:   Consultant(s) Notes Reviewed:  [ x] YES  [ ] NO:   Care Discussed with Consultants/Other Providers [x ] YES  [ ] NO         ESTEFANIA CORDERO  53y  MRN: [id number]    Patient is a 53y old  Female who presents with a chief complaint of Chest pain and nose bleed (04 Feb 2024 12:28)      Subjective: no events ON. Denies fever, SOB, abn pain, N/V, dysuria. Tolerating diet.  Endorsing chest pressure on L side along w L facial swelling and pain extending down to neck since yday.     MEDICATIONS  (STANDING):  ampicillin/sulbactam  IVPB 3 Gram(s) IV Intermittent every 6 hours  aspirin enteric coated 81 milliGRAM(s) Oral daily  atorvastatin 20 milliGRAM(s) Oral at bedtime  heparin   Injectable 5000 Unit(s) SubCutaneous every 8 hours  lidocaine   4% Patch 1 Patch Transdermal daily  mycophenolate mofetil 500 milliGRAM(s) Oral two times a day  pantoprazole    Tablet 40 milliGRAM(s) Oral before breakfast  predniSONE   Tablet 5 milliGRAM(s) Oral daily  tacrolimus ER Tablet (ENVARSUS XR) 11 milliGRAM(s) Oral daily    MEDICATIONS  (PRN):  acetaminophen     Tablet .. 650 milliGRAM(s) Oral every 6 hours PRN Temp greater or equal to 38C (100.4F), Mild Pain (1 - 3)  sodium chloride 0.65% Nasal 1 Spray(s) Both Nostrils four times a day PRN Nasal Congestion      Objective:    Vitals: Vital Signs Last 24 Hrs  T(C): 36.7 (02-05-24 @ 04:50), Max: 37 (02-04-24 @ 19:45)  T(F): 98.1 (02-05-24 @ 04:50), Max: 98.6 (02-04-24 @ 19:45)  HR: 84 (02-05-24 @ 04:50) (79 - 88)  BP: 122/73 (02-05-24 @ 04:50) (111/66 - 137/71)  BP(mean): --  RR: 18 (02-05-24 @ 04:50) (16 - 18)  SpO2: 94% (02-05-24 @ 04:50) (94% - 96%)            I&O's Summary      PHYSICAL EXAM:  GENERAL: NAD  HEAD:  Atraumatic, Normocephalic. L sided facial swelling TTP, no flushing noted  EYES: EOMI, conjunctiva and sclera clear, L rhinorocket   CHEST/LUNG: Clear to auscultation bilaterally; No rales, rhonchi, wheezing, or rubs. tenderness on palpation of L chest wall   HEART: Regular rate and rhythm; No murmurs, rubs, or gallops  ABDOMEN: Soft, Nontender, Nondistended;   SKIN: No rashes or lesions  NERVOUS SYSTEM:  Alert & Oriented X3, no focal deficits    LABS:  02-04    136  |  100  |  10  ----------------------------<  103<H>  3.9   |  24  |  0.97  02-03    134<L>  |  98  |  13  ----------------------------<  113<H>  5.0   |  23  |  0.92    Ca    10.0      04 Feb 2024 05:22  Ca    10.2      03 Feb 2024 16:19  Phos  3.0     02-04  Mg     1.8     02-04    TPro  6.5  /  Alb  3.7  /  TBili  1.0  /  DBili  x   /  AST  10  /  ALT  13  /  AlkPhos  60  02-04  TPro  7.2  /  Alb  4.0  /  TBili  0.9  /  DBili  x   /  AST  39  /  ALT  19  /  AlkPhos  60  02-03      PT/INR - ( 04 Feb 2024 05:22 )   PT: 12.3 sec;   INR: 1.12 ratio         PTT - ( 04 Feb 2024 05:22 )  PTT:29.6 sec              Urinalysis Basic - ( 04 Feb 2024 05:22 )    Color: x / Appearance: x / SG: x / pH: x  Gluc: 103 mg/dL / Ketone: x  / Bili: x / Urobili: x   Blood: x / Protein: x / Nitrite: x   Leuk Esterase: x / RBC: x / WBC x   Sq Epi: x / Non Sq Epi: x / Bacteria: x                              10.5   9.54  )-----------( 216      ( 04 Feb 2024 05:22 )             34.4                         11.1   10.65 )-----------( 240      ( 03 Feb 2024 16:19 )             37.1     CAPILLARY BLOOD GLUCOSE          RADIOLOGY & ADDITIONAL TESTS:    Imaging Personally Reviewed:  [x ] YES  [ ] NO    Consultants involved in case:   Consultant(s) Notes Reviewed:  [ x] YES  [ ] NO:   Care Discussed with Consultants/Other Providers [x ] YES  [ ] NO

## 2024-02-05 NOTE — PROGRESS NOTE ADULT - ASSESSMENT
53yoF PMHx of ESRD R-DDRT (2020) c/b HCV (treated per patient), chronic SVC syndrome s/p drain(?), HTN, coronary bypass 2015, presenting after being discharged from [facility] (2/1 - 2/3) for epistaxis and chest pain. She presented here for further workup of her epistaxis and chest pain.   Nephrology consulted for renal trasnplant    s/p DDRT @ [facility] 08/14/2020  Outpatient nephrologist is Dr. Lake / Dr Streeter   Continue home dose Immunosuppression meds    mg BID, Prednisone 5 mg qd, Envarsus 11 mg   please Check Tacrolimus 30 min prior to morning dose  Kidney function worsening ---recommend IVF ( NS at 75cc for 1 day)  Monitor BMP, u/o    HTN:   BP optimal   Monitor    Hyponatremia  improved  monitor     Proteinuria  Repeat UA  if persistent -->Defer to transplant nephrology for outpt work up .     Hypomagnesemia  Repelte Mg sul today  Monitor     Hypercalcemia  IVF as above  if does not improve check PTH, VItamin D

## 2024-02-05 NOTE — PROGRESS NOTE ADULT - ASSESSMENT
Pt is a 52 y/o F w/ a PMHx of ESRD s/p R-DDRT (2020), chronic SVC syndrome, HTN, CABG (2015), with recent hospitalization at [facility] for epistaxis and CP, who is presenting with continued chest pain. Pt is a 54 y/o F w/ a PMHx of ESRD s/p R-DDRT (2020), chronic SVC syndrome, HTN, CABG (2015), with recent hospitalization at [facility] for epistaxis and CP, who is presenting with continued chest pain, now w L facial swelling c/f SVC syndrome

## 2024-02-05 NOTE — PROGRESS NOTE ADULT - PROBLEM SELECTOR PLAN 5
Pt w/ a history of SVC syndrome in ~2005, reportedly with a drain placed  - f/u PMD about possible drain placement  - f/u PMD/Cards about needing to be on DAPT for SVC Pt w/ a history of SVC syndrome in ~2005, reportedly with a drain placed  - f/u PMD about possible drain placement  - f/u PMD/Cards about needing to be on DAPT for SVC  - f/u US head and neck for evaluation of swelling

## 2024-02-05 NOTE — PROGRESS NOTE ADULT - PROBLEM SELECTOR PLAN 6
- DVT: SubQ Heparin  - Diet: Renal Diet  - Code: Full Code  - PT: Ordered  - [facility]acy[facility] on [address] [place] [address]  - Dispo: Pending clinical course

## 2024-02-05 NOTE — CONSULT NOTE ADULT - SUBJECTIVE AND OBJECTIVE BOX
CC: epistaxis    HPI: 53yoF PMHx of ESRD R-DDRT (2020) c/b HCV (treated per patient), chronic SVC syndrome s/p drain(?), HTN, coronary bypass 2015, presenting after being discharged from [facility] (2/1 - 2/3) for epistaxis and chest pain. She presented here for further workup of her epistaxis and chest pain. ENT was consulted for epistaxis. Pt was seen at [facility] for epistaxis controlled with B/L nasal packings (RR). The right nasal packing was removed prior to discharge. No further bleeding        PAST MEDICAL & SURGICAL HISTORY:  HTN - Hypertension      Clotted Renal Dialysis AV Graft      Infection of AV Graft for Dialysis      Fibroid Tumor      CKD (chronic kidney disease) stage V requiring chronic dialysis      Chronic kidney disease, unspecified      History of Hysterectomy  for benign disease      AV fistula  B/L - failed      H/O extremity bypass graft  H/O RUE bypass and creation of right brachial graft      Kidney transplant recipient      H/O kidney transplant        Allergies    ibuprofen/morphine (Unknown)  latex (Swelling)  lactose (Hives)  ibuprofen (Hives)  morphine (Anaphylaxis)  contrast media (iodine-based) (Urticaria)  shellfish (Urticaria)  morphine (Urticaria)  penicillin (Anaphylaxis)    Intolerances      MEDICATIONS  (STANDING):  ampicillin/sulbactam  IVPB 3 Gram(s) IV Intermittent every 6 hours  aspirin enteric coated 81 milliGRAM(s) Oral daily  atorvastatin 20 milliGRAM(s) Oral at bedtime  heparin   Injectable 5000 Unit(s) SubCutaneous every 8 hours  lidocaine   4% Patch 1 Patch Transdermal daily  mycophenolate mofetil 500 milliGRAM(s) Oral two times a day  pantoprazole    Tablet 40 milliGRAM(s) Oral before breakfast  predniSONE   Tablet 5 milliGRAM(s) Oral daily  sodium chloride 0.9%. 1000 milliLiter(s) (75 mL/Hr) IV Continuous <Continuous>  tacrolimus ER Tablet (ENVARSUS XR) 11 milliGRAM(s) Oral daily    MEDICATIONS  (PRN):  acetaminophen     Tablet .. 650 milliGRAM(s) Oral every 6 hours PRN Temp greater or equal to 38C (100.4F), Mild Pain (1 - 3)  sodium chloride 0.65% Nasal 1 Spray(s) Both Nostrils four times a day PRN Nasal Congestion      Social History: no tobacco use    Family history: no pertinent FHx    ROS:   ENT: all negative except as noted in HPI   CV: denies palpitations  Pulm: denies SOB, cough, hemoptysis  GI: denies change in apetite, indigestion, n/v  : denies pertinent urinary symptoms, urgency  Neuro: denies numbness/tingling, loss of sensation  Psych: denies anxiety  MS: denies muscle weakness, instability  Heme: denies easy bruising or bleeding  Endo: denies heat/cold intolerance, excessive sweating  Vascular: denies LE edema    Vital Signs Last 24 Hrs  T(C): 37.1 (05 Feb 2024 13:00), Max: 37.1 (05 Feb 2024 13:00)  T(F): 98.7 (05 Feb 2024 13:00), Max: 98.7 (05 Feb 2024 13:00)  HR: 85 (05 Feb 2024 13:00) (84 - 88)  BP: 129/83 (05 Feb 2024 13:00) (111/66 - 129/83)  BP(mean): --  RR: 18 (05 Feb 2024 13:00) (18 - 18)  SpO2: 92% (05 Feb 2024 13:00) (92% - 96%)    Parameters below as of 05 Feb 2024 13:00  Patient On (Oxygen Delivery Method): room air                              9.9    5.62  )-----------( 229      ( 05 Feb 2024 07:08 )             33.1    02-05    137  |  102  |  16  ----------------------------<  110<H>  3.9   |  23  |  1.33<H>    Ca    10.6<H>      05 Feb 2024 07:08  Phos  2.7     02-05  Mg     1.5     02-05    TPro  6.3  /  Alb  3.6  /  TBili  0.6  /  DBili  x   /  AST  13  /  ALT  13  /  AlkPhos  52  02-05   PT/INR - ( 04 Feb 2024 05:22 )   PT: 12.3 sec;   INR: 1.12 ratio         PTT - ( 04 Feb 2024 05:22 )  PTT:29.6 sec    PHYSICAL EXAM:  Gen: NAD  Skin: No rashes, bruises, or lesions  Head: Normocephalic, Atraumatic  Face: no edema, erythema, or fluctuance. Parotid glands soft without mass  Eyes: no scleral injection  Nose: Left nasal packing (RR) deflated and removed, bleeding slightly from the anterior septum, cauterization performed with silver nitrate and hemostasis was obtained, baci also placed. No further bleeding.Nares bilaterally patent, no discharge  Mouth: No Stridor / Drooling / Trismus.  Mucosa moist, tongue/uvula midline, oropharynx clear  Neck: Flat, supple, no lymphadenopathy, trachea midline, no masses  Lymphatic: No lymphadenopathy  Resp: breathing easily, no stridor  CV: no peripheral edema/cyanosis  GI: nondistended   Peripheral vascular: no JVD or edema  Neuro: facial nerve intact, no facial droop        Diagnostic Nasal Endoscopy: (Scope #2 used)    Fiberoptic Indirect laryngoscopy:  (Scope #2 used)        IMAGING/ADDITIONAL STUDIES:  CC: epistaxis    HPI: 53yoF PMHx of ESRD R-DDRT (2020) c/b HCV (treated per patient), chronic SVC syndrome s/p drain(?), HTN, coronary bypass 2015, presenting after being discharged from [facility] (2/1 - 2/3) for epistaxis and chest pain. She presented here for further workup of her epistaxis and chest pain. ENT was consulted for epistaxis. Pt was seen at [facility] for epistaxis controlled with B/L nasal packings (RR). The right nasal packing was removed prior to discharge. No further bleeding        PAST MEDICAL & SURGICAL HISTORY:  HTN - Hypertension      Clotted Renal Dialysis AV Graft      Infection of AV Graft for Dialysis      Fibroid Tumor      CKD (chronic kidney disease) stage V requiring chronic dialysis      Chronic kidney disease, unspecified      History of Hysterectomy  for benign disease      AV fistula  B/L - failed      H/O extremity bypass graft  H/O RUE bypass and creation of right brachial graft      Kidney transplant recipient      H/O kidney transplant        Allergies    ibuprofen/morphine (Unknown)  latex (Swelling)  lactose (Hives)  ibuprofen (Hives)  morphine (Anaphylaxis)  contrast media (iodine-based) (Urticaria)  shellfish (Urticaria)  morphine (Urticaria)  penicillin (Anaphylaxis)    Intolerances      MEDICATIONS  (STANDING):  ampicillin/sulbactam  IVPB 3 Gram(s) IV Intermittent every 6 hours  aspirin enteric coated 81 milliGRAM(s) Oral daily  atorvastatin 20 milliGRAM(s) Oral at bedtime  heparin   Injectable 5000 Unit(s) SubCutaneous every 8 hours  lidocaine   4% Patch 1 Patch Transdermal daily  mycophenolate mofetil 500 milliGRAM(s) Oral two times a day  pantoprazole    Tablet 40 milliGRAM(s) Oral before breakfast  predniSONE   Tablet 5 milliGRAM(s) Oral daily  sodium chloride 0.9%. 1000 milliLiter(s) (75 mL/Hr) IV Continuous <Continuous>  tacrolimus ER Tablet (ENVARSUS XR) 11 milliGRAM(s) Oral daily    MEDICATIONS  (PRN):  acetaminophen     Tablet .. 650 milliGRAM(s) Oral every 6 hours PRN Temp greater or equal to 38C (100.4F), Mild Pain (1 - 3)  sodium chloride 0.65% Nasal 1 Spray(s) Both Nostrils four times a day PRN Nasal Congestion      Social History: no tobacco use    Family history: no pertinent FHx    ROS:   ENT: all negative except as noted in HPI   CV: denies palpitations  Pulm: denies SOB, cough, hemoptysis  GI: denies change in apetite, indigestion, n/v  : denies pertinent urinary symptoms, urgency  Neuro: denies numbness/tingling, loss of sensation  Psych: denies anxiety  MS: denies muscle weakness, instability  Heme: denies easy bruising or bleeding  Endo: denies heat/cold intolerance, excessive sweating  Vascular: denies LE edema    Vital Signs Last 24 Hrs  T(C): 37.1 (05 Feb 2024 13:00), Max: 37.1 (05 Feb 2024 13:00)  T(F): 98.7 (05 Feb 2024 13:00), Max: 98.7 (05 Feb 2024 13:00)  HR: 85 (05 Feb 2024 13:00) (84 - 88)  BP: 129/83 (05 Feb 2024 13:00) (111/66 - 129/83)  BP(mean): --  RR: 18 (05 Feb 2024 13:00) (18 - 18)  SpO2: 92% (05 Feb 2024 13:00) (92% - 96%)    Parameters below as of 05 Feb 2024 13:00  Patient On (Oxygen Delivery Method): room air                              9.9    5.62  )-----------( 229      ( 05 Feb 2024 07:08 )             33.1    02-05    137  |  102  |  16  ----------------------------<  110<H>  3.9   |  23  |  1.33<H>    Ca    10.6<H>      05 Feb 2024 07:08  Phos  2.7     02-05  Mg     1.5     02-05    TPro  6.3  /  Alb  3.6  /  TBili  0.6  /  DBili  x   /  AST  13  /  ALT  13  /  AlkPhos  52  02-05   PT/INR - ( 04 Feb 2024 05:22 )   PT: 12.3 sec;   INR: 1.12 ratio         PTT - ( 04 Feb 2024 05:22 )  PTT:29.6 sec    PHYSICAL EXAM:  Gen: NAD  Skin: No rashes, bruises, or lesions  Head: Normocephalic, Atraumatic  Face: no edema, erythema, or fluctuance. Parotid glands soft without mass  Eyes: no scleral injection  Nose: Left nasal packing (RR) deflated and removed, bleeding slightly from the anterior septum, cauterization performed with silver nitrate and hemostasis was obtained, baci also placed. No further bleeding. Nares bilaterally patent, no discharge  Mouth: No Stridor / Drooling / Trismus.  Mucosa moist, tongue/uvula midline, oropharynx clear  Neck: Flat, supple, no lymphadenopathy, trachea midline, no masses  Lymphatic: No lymphadenopathy  Resp: breathing easily, no stridor  CV: no peripheral edema/cyanosis  GI: nondistended   Peripheral vascular: no JVD or edema  Neuro: facial nerve intact, no facial droop

## 2024-02-05 NOTE — PROGRESS NOTE ADULT - ATTENDING COMMENTS
53yoF PMHx of ESRD R-DDRT (2020) c/b HCV (treated per patient), chronic SVC syndrome s/p drain(?), HTN, coronary bypass 2015, presenting after being discharged from [facility] (2/1 - 2/3) for epistaxis and chest pain and c/o left sided face and neck pain with swelling.    Epistaxis  - seen by ENT. Rhinorocket removed. --> afrin prn, saline spray. monitor for resolution of bleeding    Face/Neck swelling  - awaiting head/Neck US to eval for venous obstruction    Chest pain  -TTE was done due to non specific T wave inversion on the lateral leads , but with no wall motion abnormalities   -Trops negative x2. Troponin Trend:  <--10,  <--9    SAM in a Renal transplant  - will give gentle hydration  -Creatinine Trend: 1.33<--, 0.97<--, 0.92<--    Renal Transplant  - c/w immunosuppressants.

## 2024-02-05 NOTE — PROGRESS NOTE ADULT - SUBJECTIVE AND OBJECTIVE BOX
Health system DIVISION OF KIDNEY DISEASES AND HYPERTENSION   FOLLOW UP NOTE    --------------------------------------------------------------------------------  Chief Complaint:    24 hour events/subjective: Pt. was seen and examined today. Epistaxis stable, rhinorocket in place. Otherwise feels well.       PAST HISTORY  --------------------------------------------------------------------------------  No significant changes to PMH, PSH, FHx, SHx, unless otherwise noted    ALLERGIES & MEDICATIONS  --------------------------------------------------------------------------------  Allergies    ibuprofen/morphine (Unknown)  latex (Swelling)  lactose (Hives)  ibuprofen (Hives)  morphine (Anaphylaxis)  contrast media (iodine-based) (Urticaria)  shellfish (Urticaria)  morphine (Urticaria)  penicillin (Anaphylaxis)    Intolerances      Standing Inpatient Medications  ampicillin/sulbactam  IVPB 3 Gram(s) IV Intermittent every 6 hours  aspirin enteric coated 81 milliGRAM(s) Oral daily  atorvastatin 20 milliGRAM(s) Oral at bedtime  heparin   Injectable 5000 Unit(s) SubCutaneous every 8 hours  lidocaine   4% Patch 1 Patch Transdermal daily  magnesium sulfate  IVPB 2 Gram(s) IV Intermittent once  mycophenolate mofetil 500 milliGRAM(s) Oral two times a day  pantoprazole    Tablet 40 milliGRAM(s) Oral before breakfast  predniSONE   Tablet 5 milliGRAM(s) Oral daily  sodium chloride 0.9%. 1000 milliLiter(s) IV Continuous <Continuous>  tacrolimus ER Tablet (ENVARSUS XR) 11 milliGRAM(s) Oral daily    PRN Inpatient Medications  acetaminophen     Tablet .. 650 milliGRAM(s) Oral every 6 hours PRN  sodium chloride 0.65% Nasal 1 Spray(s) Both Nostrils four times a day PRN      REVIEW OF SYSTEMS  --------------------------------------------------------------------------------  Gen: No fevers/chills  Head/Eyes/Ears: No HA   Respiratory: No dyspnea, cough  CV: No chest pain  GI: No abdominal pain, diarrhea  : No dysuria, hematuria  MSK: No  edema  Skin: No rashes  Heme: No easy bruising or bleeding    All other systems were reviewed and are negative, except as noted.    VITALS/PHYSICAL EXAM  --------------------------------------------------------------------------------  T(C): 36.7 (02-05-24 @ 04:50), Max: 37 (02-04-24 @ 19:45)  HR: 84 (02-05-24 @ 04:50) (84 - 88)  BP: 122/73 (02-05-24 @ 04:50) (111/66 - 122/73)  RR: 18 (02-05-24 @ 04:50) (18 - 18)  SpO2: 94% (02-05-24 @ 04:50) (94% - 96%)  Wt(kg): --  Height (cm): 160 (02-03-24 @ 15:19)  Weight (kg): 65.8 (02-03-24 @ 15:19)  BMI (kg/m2): 25.7 (02-03-24 @ 15:19)  BSA (m2): 1.69 (02-03-24 @ 15:19)        Physical Exam:  	Gen: NAD  	HEENT: Anicteric, rhino-rocket in place  	Pulm: CTA B/L  	CV: S1S2+  	Abd: Soft, +BS           Transplant site: non tender  	Ext: No LE edema B/L  	Neuro: Awake  	Skin: Warm and dry      LABS/STUDIES  --------------------------------------------------------------------------------              9.9    5.62  >-----------<  229      [02-05-24 @ 07:08]              33.1     137  |  102  |  16  ----------------------------<  110      [02-05-24 @ 07:08]  3.9   |  23  |  1.33        Ca     10.6     [02-05-24 @ 07:08]      Mg     1.5     [02-05-24 @ 07:08]      Phos  2.7     [02-05-24 @ 07:08]    TPro  6.3  /  Alb  3.6  /  TBili  0.6  /  DBili  x   /  AST  13  /  ALT  13  /  AlkPhos  52  [02-05-24 @ 07:08]    PT/INR: PT 12.3 , INR 1.12       [02-04-24 @ 05:22]  PTT: 29.6       [02-04-24 @ 05:22]    CK 62      [02-04-24 @ 05:22]    Creatinine Trend:  SCr 1.33 [02-05 @ 07:08]  SCr 0.97 [02-04 @ 05:22]  SCr 0.92 [02-03 @ 16:19]    Urinalysis - [02-05-24 @ 07:08]      Color  / Appearance  / SG  / pH       Gluc 110 / Ketone   / Bili  / Urobili        Blood  / Protein  / Leuk Est  / Nitrite       RBC  / WBC  / Hyaline  / Gran  / Sq Epi  / Non Sq Epi  / Bacteria           TacrolimusTacrolimus (), Serum: 4.8 ng/mL (02-04 @ 19:58)  Tacrolimus (), Serum: 9.3 ng/mL (02-04 @ 05:22)  Tacrolimus (), Serum: 3.8 ng/mL (02-03 @ 18:47)    Cyclosporine  Sirolimus  Mycophenolate  BK PCR  CMV PCR  Parvo PCR  EBV PCR

## 2024-02-05 NOTE — PROGRESS NOTE ADULT - ASSESSMENT
53 y.o F w/ ESRD s/p HepC DDRT in 2020, chronic SVC syndrome, HTN, here for chest pressure and rhinorockets placed at [facility]. Transplant nephrology consulted for management    Pt. with kidney transplantation in 2020 ([facility]), currently admitted for sinusitis and chest pressure. SCr worse today 1.33 (from 0.97). UA bland, US: No evidence of hydronephrosis or perinephric collection.  Patent vessels. No evidence of a significant renal artery stenosis. Agree with fluids at current rate.    IS: c/w home dose envarsus daily, cellcept 500 BID and Pred 5 daily.   Check serum tacrolimus level (trough) 30 mins prior to am dose. Goal trough of 4-6. Today at goal.     Salazar Frias  Nephrology Fellow  Feel free to contact me on TEAMS  After 4 pm please contact the on-call Fellow.

## 2024-02-05 NOTE — CONSULT NOTE ADULT - ASSESSMENT
53yoF PMHx of ESRD R-DDRT (2020) c/b HCV (treated per patient), chronic SVC syndrome s/p drain(?), HTN, coronary bypass 2015, presenting after being discharged from [facility] (2/1 - 2/3) for epistaxis and chest pain. She presented here for further workup of her epistaxis and chest pain.  Pt was seen at [facility] for epistaxis controlled with B/L nasal packings (RR). The right nasal packing was removed prior to discharge. The left nasal packing was removed today. There was a slight ooze from the L. anterior septum. Cauterization performed with silver nitrate. Baci placed. No further bleeding

## 2024-02-05 NOTE — PROGRESS NOTE ADULT - PROBLEM SELECTOR PLAN 4
Pt w/ a history of CABG in ~2015 with a stent placed, reportedly on home DAPT with ASA and Plavix  - c/w ASA, hold Plavix  - f/u PMD or OP Cards about needing to continue Plavix  TTE is ordered Pt w/ a history of CABG in ~2015 with a stent placed, reportedly on home DAPT with ASA and Plavix  - c/w ASA, hold Plavix - will dc on ASA   - f/u PMD or OP Cards about needing to continue Plavix

## 2024-02-05 NOTE — PROGRESS NOTE ADULT - PROBLEM SELECTOR PLAN 2
Pt w/ new onset chest pain that started when she developed epistaxis. [facility] 2019 showing clean coronaries.  - At OSH, ECG reportedly normal, TTE done (no records here)  - In the ED, troponins 9 and10, EKG no STEMI but ?T-waves  - Low-likelihood of ACS  - TTP left mid-axillary  - F/U OSH for records  - Repeat troponin + EKG in AM  - Chest X-ray AP/Lateral to evaluate for left-sided chest pain  - Lidocaine patch and acetaminophen for pain  TTE  due to noted Non specific T wave changes Pt w/ new onset chest pain that started when she developed epistaxis. [facility] 2019 showing clean coronaries.  - At OSH, ECG reportedly normal, TTE done (no records here)  - In the ED, troponins 9 and10, EKG no STEMI but ?T-waves  - Low-likelihood of ACS  - TTP left mid-axillary  - F/U OSH for records  - Repeat troponin + EKG in AM  - Chest X-ray AP/Lateral to evaluate for left-sided chest pain  - Lidocaine patch and acetaminophen for pain  TTE  EF 71% no regional wall abnormalities

## 2024-02-05 NOTE — PROGRESS NOTE ADULT - SUBJECTIVE AND OBJECTIVE BOX
[facility]   MD MEL DOBBS MD RUORU WONG, PA    TEL:  FROM 9 AM to 5 PM ---OFFICE: [phone]  AVAILABLE ON TEAMS    FROM 5 PM - 9 AM PLEASE CALL ANSWERING SERVICE: [phone]    RENAL FOLLOW UP NOTE--Date of Service 02-05-24 @ 11:06  --------------------------------------------------------------------------------  HPI:      Pt seen and examined at bedside.       PAST HISTORY  --------------------------------------------------------------------------------  No significant changes to PMH, PSH, FHx, SHx, unless otherwise noted    ALLERGIES & MEDICATIONS  --------------------------------------------------------------------------------  Allergies    ibuprofen/morphine (Unknown)  latex (Swelling)  lactose (Hives)  ibuprofen (Hives)  morphine (Anaphylaxis)  contrast media (iodine-based) (Urticaria)  shellfish (Urticaria)  morphine (Urticaria)  penicillin (Anaphylaxis)    Intolerances      Standing Inpatient Medications  ampicillin/sulbactam  IVPB 3 Gram(s) IV Intermittent every 6 hours  aspirin enteric coated 81 milliGRAM(s) Oral daily  atorvastatin 20 milliGRAM(s) Oral at bedtime  heparin   Injectable 5000 Unit(s) SubCutaneous every 8 hours  lidocaine   4% Patch 1 Patch Transdermal daily  magnesium sulfate  IVPB 2 Gram(s) IV Intermittent once  mycophenolate mofetil 500 milliGRAM(s) Oral two times a day  pantoprazole    Tablet 40 milliGRAM(s) Oral before breakfast  predniSONE   Tablet 5 milliGRAM(s) Oral daily  sodium chloride 0.9%. 1000 milliLiter(s) IV Continuous <Continuous>  tacrolimus ER Tablet (ENVARSUS XR) 11 milliGRAM(s) Oral daily    PRN Inpatient Medications  acetaminophen     Tablet .. 650 milliGRAM(s) Oral every 6 hours PRN  sodium chloride 0.65% Nasal 1 Spray(s) Both Nostrils four times a day PRN      REVIEW OF SYSTEMS  --------------------------------------------------------------------------------  General: no fever  MSK: no edema     VITALS/PHYSICAL EXAM  --------------------------------------------------------------------------------  T(C): 36.7 (02-05-24 @ 04:50), Max: 37 (02-04-24 @ 19:45)  HR: 84 (02-05-24 @ 04:50) (79 - 88)  BP: 122/73 (02-05-24 @ 04:50) (111/66 - 137/71)  RR: 18 (02-05-24 @ 04:50) (16 - 18)  SpO2: 94% (02-05-24 @ 04:50) (94% - 96%)  Wt(kg): --  Height (cm): 160 (02-03-24 @ 15:19)  Weight (kg): 65.8 (02-03-24 @ 15:19)  BMI (kg/m2): 25.7 (02-03-24 @ 15:19)  BSA (m2): 1.69 (02-03-24 @ 15:19)      Physical Exam:  	Gen: NAD  	HEENT: MMM  	Pulm: CTA B/L  	CV: S1S2  	Abd: Soft, +BS  	Ext: No LE edema B/L                      Neuro: Awake   	Skin: Warm and Dry   	Vascular access: NO HD catheter             no zelda  LABS/STUDIES  --------------------------------------------------------------------------------              9.9    5.62  >-----------<  229      [02-05-24 @ 07:08]              33.1     137  |  102  |  16  ----------------------------<  110      [02-05-24 @ 07:08]  3.9   |  23  |  1.33        Ca     10.6     [02-05-24 @ 07:08]      Mg     1.5     [02-05-24 @ 07:08]      Phos  2.7     [02-05-24 @ 07:08]    TPro  6.3  /  Alb  3.6  /  TBili  0.6  /  DBili  x   /  AST  13  /  ALT  13  /  AlkPhos  52  [02-05-24 @ 07:08]    PT/INR: PT 12.3 , INR 1.12       [02-04-24 @ 05:22]  PTT: 29.6       [02-04-24 @ 05:22]    CK 62      [02-04-24 @ 05:22]    Creatinine Trend:  SCr 1.33 [02-05 @ 07:08]  SCr 0.97 [02-04 @ 05:22]  SCr 0.92 [02-03 @ 16:19]    Urinalysis - [02-05-24 @ 07:08]      Color  / Appearance  / SG  / pH       Gluc 110 / Ketone   / Bili  / Urobili        Blood  / Protein  / Leuk Est  / Nitrite       RBC  / WBC  / Hyaline  / Gran  / Sq Epi  / Non Sq Epi  / Bacteria       Iron 17, TIBC 262, %sat 6      [07-19-23 @ 07:51]  Ferritin 142      [07-19-23 @ 07:51]  PTH -- (Ca 11.2)      [07-03-23 @ 15:05]   170  Vitamin D (25OH) 31.8      [07-03-23 @ 15:05]

## 2024-02-05 NOTE — PROGRESS NOTE ADULT - PROBLEM SELECTOR PLAN 1
Pt w/ 3 day history of epistaxis, diagnosed with sinusitis at [facility], s/p x2 rhinorockets with one removed prior to discharge with plans to follow up with [facility] 02/05  - Hgb stable on admission  - Procalc 0.07  - s/p x1 vancomycin and cefepime  - Start Unasyn (2/4 - ) while the packing is in place   - ENT consult to be called in AM Pt w/ 3 day history of epistaxis, diagnosed with sinusitis at [facility], s/p x2 rhinorockets with one removed prior to discharge with plans to follow up with [facility] 02/05  - Hgb stable on admission  - Procalc 0.07  - s/p x1 vancomycin and cefepime  - Start Unasyn (2/4 - ) while the packing is in place   - f/u ENT for rhinorocket removal

## 2024-02-05 NOTE — CONSULT NOTE ADULT - PROBLEM SELECTOR RECOMMENDATION 9
- Nasal saline, 2 sprays to both nares 4 times a day  - Avoid nasal trauma; no nose rubbing, blowing.  Sneeze with mouth open and pinching nares.  - Avoid bending with head blow the waist.    - No heavy lifting.

## 2024-02-06 DIAGNOSIS — N17.9 ACUTE KIDNEY FAILURE, UNSPECIFIED: ICD-10-CM

## 2024-02-06 LAB
ANION GAP SERPL CALC-SCNC: 12 MMOL/L — SIGNIFICANT CHANGE UP (ref 5–17)
BUN SERPL-MCNC: 16 MG/DL — SIGNIFICANT CHANGE UP (ref 7–23)
CALCIUM SERPL-MCNC: 10 MG/DL — SIGNIFICANT CHANGE UP (ref 8.4–10.5)
CHLORIDE SERPL-SCNC: 103 MMOL/L — SIGNIFICANT CHANGE UP (ref 96–108)
CO2 SERPL-SCNC: 23 MMOL/L — SIGNIFICANT CHANGE UP (ref 22–31)
CREAT SERPL-MCNC: 1.21 MG/DL — SIGNIFICANT CHANGE UP (ref 0.5–1.3)
EGFR: 54 ML/MIN/1.73M2 — LOW
GLUCOSE SERPL-MCNC: 151 MG/DL — HIGH (ref 70–99)
HCT VFR BLD CALC: 33.7 % — LOW (ref 34.5–45)
HGB BLD-MCNC: 10 G/DL — LOW (ref 11.5–15.5)
MAGNESIUM SERPL-MCNC: 1.4 MG/DL — LOW (ref 1.6–2.6)
MCHC RBC-ENTMCNC: 23.6 PG — LOW (ref 27–34)
MCHC RBC-ENTMCNC: 29.7 GM/DL — LOW (ref 32–36)
MCV RBC AUTO: 79.7 FL — LOW (ref 80–100)
MYCOPHENOLATE SERPL-MCNC: 0.4 UG/ML — LOW (ref 1–3.5)
MYCOPHENOLIC ACID GLUCURONIDE: 20 UG/ML — SIGNIFICANT CHANGE UP (ref 15–125)
NRBC # BLD: 0 /100 WBCS — SIGNIFICANT CHANGE UP (ref 0–0)
PHOSPHATE SERPL-MCNC: 2.6 MG/DL — SIGNIFICANT CHANGE UP (ref 2.5–4.5)
PLATELET # BLD AUTO: 215 K/UL — SIGNIFICANT CHANGE UP (ref 150–400)
POTASSIUM SERPL-MCNC: 4.6 MMOL/L — SIGNIFICANT CHANGE UP (ref 3.5–5.3)
POTASSIUM SERPL-SCNC: 4.6 MMOL/L — SIGNIFICANT CHANGE UP (ref 3.5–5.3)
RBC # BLD: 4.23 M/UL — SIGNIFICANT CHANGE UP (ref 3.8–5.2)
RBC # FLD: 18.2 % — HIGH (ref 10.3–14.5)
SODIUM SERPL-SCNC: 138 MMOL/L — SIGNIFICANT CHANGE UP (ref 135–145)
WBC # BLD: 3.52 K/UL — LOW (ref 3.8–10.5)
WBC # FLD AUTO: 3.52 K/UL — LOW (ref 3.8–10.5)

## 2024-02-06 PROCEDURE — 30901 CONTROL OF NOSEBLEED: CPT | Mod: LT

## 2024-02-06 PROCEDURE — 99232 SBSQ HOSP IP/OBS MODERATE 35: CPT | Mod: FS,25

## 2024-02-06 PROCEDURE — 99233 SBSQ HOSP IP/OBS HIGH 50: CPT | Mod: GC

## 2024-02-06 RX ORDER — MAGNESIUM SULFATE 500 MG/ML
2 VIAL (ML) INJECTION ONCE
Refills: 0 | Status: COMPLETED | OUTPATIENT
Start: 2024-02-06 | End: 2024-02-06

## 2024-02-06 RX ORDER — SODIUM CHLORIDE 9 MG/ML
1000 INJECTION INTRAMUSCULAR; INTRAVENOUS; SUBCUTANEOUS
Refills: 0 | Status: DISCONTINUED | OUTPATIENT
Start: 2024-02-06 | End: 2024-02-08

## 2024-02-06 RX ADMIN — Medication 650 MILLIGRAM(S): at 16:53

## 2024-02-06 RX ADMIN — AMPICILLIN SODIUM AND SULBACTAM SODIUM 200 GRAM(S): 250; 125 INJECTION, POWDER, FOR SUSPENSION INTRAMUSCULAR; INTRAVENOUS at 00:41

## 2024-02-06 RX ADMIN — MYCOPHENOLATE MOFETIL 500 MILLIGRAM(S): 250 CAPSULE ORAL at 05:55

## 2024-02-06 RX ADMIN — ATORVASTATIN CALCIUM 20 MILLIGRAM(S): 80 TABLET, FILM COATED ORAL at 22:08

## 2024-02-06 RX ADMIN — Medication 5 MILLIGRAM(S): at 05:55

## 2024-02-06 RX ADMIN — HEPARIN SODIUM 5000 UNIT(S): 5000 INJECTION INTRAVENOUS; SUBCUTANEOUS at 05:54

## 2024-02-06 RX ADMIN — MYCOPHENOLATE MOFETIL 500 MILLIGRAM(S): 250 CAPSULE ORAL at 18:03

## 2024-02-06 RX ADMIN — HEPARIN SODIUM 5000 UNIT(S): 5000 INJECTION INTRAVENOUS; SUBCUTANEOUS at 22:09

## 2024-02-06 RX ADMIN — AMPICILLIN SODIUM AND SULBACTAM SODIUM 200 GRAM(S): 250; 125 INJECTION, POWDER, FOR SUSPENSION INTRAMUSCULAR; INTRAVENOUS at 05:54

## 2024-02-06 RX ADMIN — Medication 25 GRAM(S): at 16:48

## 2024-02-06 RX ADMIN — Medication 81 MILLIGRAM(S): at 12:16

## 2024-02-06 RX ADMIN — PANTOPRAZOLE SODIUM 40 MILLIGRAM(S): 20 TABLET, DELAYED RELEASE ORAL at 05:55

## 2024-02-06 RX ADMIN — TACROLIMUS 11 MILLIGRAM(S): 5 CAPSULE ORAL at 18:03

## 2024-02-06 RX ADMIN — HEPARIN SODIUM 5000 UNIT(S): 5000 INJECTION INTRAVENOUS; SUBCUTANEOUS at 13:17

## 2024-02-06 RX ADMIN — SODIUM CHLORIDE 75 MILLILITER(S): 9 INJECTION INTRAMUSCULAR; INTRAVENOUS; SUBCUTANEOUS at 12:16

## 2024-02-06 RX ADMIN — LIDOCAINE 1 PATCH: 4 CREAM TOPICAL at 12:16

## 2024-02-06 NOTE — PROGRESS NOTE ADULT - PROBLEM SELECTOR PLAN 3
Pt w/ history of ESRD s/p R-DDRT 2020, c/b HCV which patient said was treated  - Home regimen: Envarsus 11mg qd (goal 4-6), Cellcept 500mg BID, and prednisone 5mg qd for maintenance  - Doppler Renal Transplant wnl  - Transplant nephro consulted, appreciate recs  - c/w home immunosuppression regimen  - Check tacrolimus level 30-minutes before AM dose Pt w/ 3 day history of epistaxis, diagnosed with sinusitis at [facility], s/p x2 rhinorockets with one removed prior to discharge with plans to follow up with [facility] 02/05  - Hgb stable on admission  - Procalc 0.07  - s/p x1 vancomycin and cefepime  - s/p Unasyn (2/4 -2/6) while packing in place  - ENT removed rhinorocket 2/5 with cauterization, however pt still endorsing some bleeding, ENT to relook

## 2024-02-06 NOTE — PROGRESS NOTE ADULT - ASSESSMENT
53yoF PMHx of ESRD R-DDRT (2020) c/b HCV (treated per patient), chronic SVC syndrome s/p drain(?), HTN, coronary bypass 2015, presenting after being discharged from [facility] (2/1 - 2/3) for epistaxis and chest pain. She presented here for further workup of her epistaxis and chest pain. ENT was consulted for epistaxis and left nasal packing removal. Pt was seen at [facility] for epistaxis controlled with B/L nasal packings (RR). The right nasal packing was removed there prior to discharge last week. The left RR was removed here and left anterior nasal septum cauterized with silver nitrate. ENT was called again to evaluate pt for epistaxis yesterday. Exam revealed no active bleeding, but was small amount of nasopore and baci placed to prevent further bleeding.

## 2024-02-06 NOTE — PROGRESS NOTE ADULT - PROBLEM SELECTOR PLAN 4
Pt w/ a history of CABG in ~2015 with a stent placed, reportedly on home DAPT with ASA and Plavix  - c/w ASA, hold Plavix - will dc on ASA   - f/u PMD or OP Cards about needing to continue Plavix Pt w/ new onset chest pain that started when she developed epistaxis. [facility] 2019 showing clean coronaries.  - At OSH, ECG reportedly normal, TTE done (no records here)  - In the ED, troponins 9 and10, EKG no STEMI but ?T-waves  - Low-likelihood of ACS  - TTP left mid-axillary  - F/U OSH for records  - Repeat troponin + EKG in AM  - Chest X-ray AP/Lateral to evaluate for left-sided chest pain  - Lidocaine patch and acetaminophen for pain  TTE  EF 71% no regional wall abnormalities

## 2024-02-06 NOTE — PROGRESS NOTE ADULT - PROBLEM SELECTOR PLAN 5
Pt w/ a history of SVC syndrome in ~2005, reportedly with a drain placed  - f/u PMD about possible drain placement  - f/u PMD/Cards about needing to be on DAPT for SVC  - US H/N unremarkable   - CT chest, neck pending Pt w/ history of ESRD s/p R-DDRT 2020, c/b HCV which patient said was treated  - Home regimen: Envarsus 11mg qd (goal 4-6), Cellcept 500mg BID, and prednisone 5mg qd for maintenance  - Doppler Renal Transplant wnl  - Transplant nephro consulted, appreciate recs  - c/w home immunosuppression regimen  - Check tacrolimus level 30-minutes before AM dose

## 2024-02-06 NOTE — PROGRESS NOTE ADULT - ATTENDING COMMENTS
53yoF PMHx of ESRD R-DDRT (2020) c/b HCV (treated per patient), chronic SVC syndrome s/p drain(?), HTN, coronary bypass 2015, presenting after being discharged from [facility] (2/1 - 2/3) for epistaxis and chest pain and c/o left sided face and neck pain with swelling.    Epistaxis  - pt still having nose bleeding from the right nostril --> will get ENT reevaluation  - Hgb seems to be stable. Hgb trend: 10.0 <-- , 9.9 <-- , 10.5 <--   - c/w afrin prn    Face/Neck swelling  - improving today  - CT chest - Vessels along the anterior chest wall are  presumably secondary to venous narrowing/occlusion, however, the vessel  patency was not established secondary to the lack of intravenous contrast. holding off on IV contrast do to her SAM and h/o renal transplant    SAM in a Renal transplant  - Cr is improving with IV hydration  - Creatinine Trend: 1.21<--, 1.33<--, 0.97<--, 0.92<--    Chest pain  -TTE was done due to non specific T wave inversion on the lateral leads , but with no wall motion abnormalities, normal LV fxn, no valvular issues, normal diastolic fxn  -Trops negative x2. Troponin Trend:  <--10,  <--9    Renal Transplant  - c/w immunosuppressants.

## 2024-02-06 NOTE — PROGRESS NOTE ADULT - PROBLEM SELECTOR PLAN 6
- DVT: SubQ Heparin  - Diet: Renal Diet  - Code: Full Code  - PT: Ordered  - [facility]acy[facility] on [address] [place] [address]  - Dispo: Pending clinical course Pt w/ a history of CABG in ~2015 with a stent placed, reportedly on home DAPT with ASA and Plavix  - c/w ASA, hold Plavix - will dc on ASA   - f/u PMD or OP Cards about needing to continue Plavix

## 2024-02-06 NOTE — PROGRESS NOTE ADULT - PROBLEM SELECTOR PLAN 1
- Strict blood pressure control.  - Nasal saline, 2 sprays to both nares 4 times a day  - Nasal saline to B/L nares  - Avoid nasal trauma; no nose rubbing, blowing. Sneeze with mouth open and pinching nares.  - Avoid bending with head blow the waist.    -No heavy lifting.

## 2024-02-06 NOTE — PROGRESS NOTE ADULT - PROBLEM SELECTOR PLAN 1
Pt w/ 3 day history of epistaxis, diagnosed with sinusitis at [facility], s/p x2 rhinorockets with one removed prior to discharge with plans to follow up with [facility] 02/05  - Hgb stable on admission  - Procalc 0.07  - s/p x1 vancomycin and cefepime  - s/p Unasyn (2/4 -2/6) while packing in place  - ENT removed rhinorocket 2/5 with cauterization Pt w/ a history of SVC syndrome in ~2005, reportedly with a drain placed  - f/u PMD about possible drain placement  - f/u PMD/Cards about needing to be on DAPT for SVC  - US H/N unremarkable   - CT chest, neck shows graft from R subclavian to SVC, however unable to assess patency w/o contrast. given swelling is improving, can CTM for now with outpatient vascular followup for further imaging.

## 2024-02-06 NOTE — PROGRESS NOTE ADULT - ASSESSMENT
53yoF PMHx of ESRD R-DDRT (2020) c/b HCV (treated per patient), chronic SVC syndrome s/p drain(?), HTN, coronary bypass 2015, presenting after being discharged from [facility] (2/1 - 2/3) for epistaxis and chest pain. She presented here for further workup of her epistaxis and chest pain.   Nephrology consulted for renal trasnplant    s/p DDRT @ [facility] 08/14/2020  Outpatient nephrologist is Dr. Lake / Dr Streeter   Continue home dose Immunosuppression meds    mg BID, Prednisone 5 mg qd, Envarsus 11 mg   please Check Tacrolimus 30 min prior to morning dose  Kidney function worsening on 2/5  ---s/p IVF  Pending labs today  Monitor BMP, u/o    HTN:   BP optimal   Monitor    Hyponatremia  improved  monitor     Proteinuria  Repeat UA  if persistent -->Defer to transplant nephrology for outpt work up .     Hypomagnesemia  Repelte Mg sul as needed  Monitor     Hypercalcemia  s/p IVF   if does not improve check PTH, VItamin D

## 2024-02-06 NOTE — PROGRESS NOTE ADULT - ASSESSMENT
Pt is a 52 y/o F w/ a PMHx of ESRD s/p R-DDRT (2020), chronic SVC syndrome, HTN, CABG (2015), with recent hospitalization at [facility] for epistaxis and CP, who is presenting with continued chest pain, now w L facial swelling c/f SVC syndrome

## 2024-02-06 NOTE — PROGRESS NOTE ADULT - SUBJECTIVE AND OBJECTIVE BOX
ESTEFANIA CORDERO  53y  MRN: [id number]    Patient is a 53y old  Female who presents with a chief complaint of Chest pain and nose bleed (05 Feb 2024 15:35)      Subjective: no events ON. Denies fever, CP, SOB, abn pain, N/V, dysuria. Tolerating diet.      MEDICATIONS  (STANDING):  ampicillin/sulbactam  IVPB 3 Gram(s) IV Intermittent every 6 hours  aspirin enteric coated 81 milliGRAM(s) Oral daily  atorvastatin 20 milliGRAM(s) Oral at bedtime  heparin   Injectable 5000 Unit(s) SubCutaneous every 8 hours  lidocaine   4% Patch 1 Patch Transdermal daily  mycophenolate mofetil 500 milliGRAM(s) Oral two times a day  pantoprazole    Tablet 40 milliGRAM(s) Oral before breakfast  predniSONE   Tablet 5 milliGRAM(s) Oral daily  sodium chloride 0.9%. 1000 milliLiter(s) (75 mL/Hr) IV Continuous <Continuous>  tacrolimus ER Tablet (ENVARSUS XR) 11 milliGRAM(s) Oral daily    MEDICATIONS  (PRN):  acetaminophen     Tablet .. 650 milliGRAM(s) Oral every 6 hours PRN Temp greater or equal to 38C (100.4F), Mild Pain (1 - 3)  sodium chloride 0.65% Nasal 1 Spray(s) Both Nostrils four times a day PRN Nasal Congestion      Objective:    Vitals: Vital Signs Last 24 Hrs  T(C): 37 (02-06-24 @ 04:42), Max: 37.2 (02-05-24 @ 21:24)  T(F): 98.6 (02-06-24 @ 04:42), Max: 99 (02-05-24 @ 21:24)  HR: 77 (02-06-24 @ 04:42) (77 - 85)  BP: 109/67 (02-06-24 @ 04:42) (109/67 - 136/79)  BP(mean): --  RR: 18 (02-06-24 @ 04:42) (18 - 18)  SpO2: 97% (02-06-24 @ 04:42) (92% - 98%)            I&O's Summary      PHYSICAL EXAM:  GENERAL: NAD  HEAD:  Atraumatic, Normocephalic  EYES: EOMI, conjunctiva and sclera clear  CHEST/LUNG: Clear to auscultation bilaterally; No rales, rhonchi, wheezing, or rubs  HEART: Regular rate and rhythm; No murmurs, rubs, or gallops  ABDOMEN: Soft, Nontender, Nondistended;   SKIN: No rashes or lesions  NERVOUS SYSTEM:  Alert & Oriented X3, no focal deficits    LABS:  02-05    137  |  102  |  16  ----------------------------<  110<H>  3.9   |  23  |  1.33<H>  02-04    136  |  100  |  10  ----------------------------<  103<H>  3.9   |  24  |  0.97  02-03    134<L>  |  98  |  13  ----------------------------<  113<H>  5.0   |  23  |  0.92    Ca    10.6<H>      05 Feb 2024 07:08  Ca    10.0      04 Feb 2024 05:22  Ca    10.2      03 Feb 2024 16:19  Phos  2.7     02-05  Mg     1.5     02-05    TPro  6.3  /  Alb  3.6  /  TBili  0.6  /  DBili  x   /  AST  13  /  ALT  13  /  AlkPhos  52  02-05  TPro  6.5  /  Alb  3.7  /  TBili  1.0  /  DBili  x   /  AST  10  /  ALT  13  /  AlkPhos  60  02-04  TPro  7.2  /  Alb  4.0  /  TBili  0.9  /  DBili  x   /  AST  39  /  ALT  19  /  AlkPhos  60  02-03                    Urinalysis Basic - ( 05 Feb 2024 07:08 )    Color: x / Appearance: x / SG: x / pH: x  Gluc: 110 mg/dL / Ketone: x  / Bili: x / Urobili: x   Blood: x / Protein: x / Nitrite: x   Leuk Esterase: x / RBC: x / WBC x   Sq Epi: x / Non Sq Epi: x / Bacteria: x                              9.9    5.62  )-----------( 229      ( 05 Feb 2024 07:08 )             33.1                         10.5   9.54  )-----------( 216      ( 04 Feb 2024 05:22 )             34.4                         11.1   10.65 )-----------( 240      ( 03 Feb 2024 16:19 )             37.1     CAPILLARY BLOOD GLUCOSE          RADIOLOGY & ADDITIONAL TESTS:    Imaging Personally Reviewed:  [x ] YES  [ ] NO    Consultants involved in case:   Consultant(s) Notes Reviewed:  [ x] YES  [ ] NO:   Care Discussed with Consultants/Other Providers [x ] YES  [ ] NO         ESTEFANIA CORDERO  53y  MRN: [id number]    Patient is a 53y old  Female who presents with a chief complaint of Chest pain and nose bleed (05 Feb 2024 15:35)      Subjective: no events ON. Denies fever, CP, SOB, abn pain, N/V, dysuria. Tolerating diet. States swelling has gone down, less pain.       MEDICATIONS  (STANDING):  ampicillin/sulbactam  IVPB 3 Gram(s) IV Intermittent every 6 hours  aspirin enteric coated 81 milliGRAM(s) Oral daily  atorvastatin 20 milliGRAM(s) Oral at bedtime  heparin   Injectable 5000 Unit(s) SubCutaneous every 8 hours  lidocaine   4% Patch 1 Patch Transdermal daily  mycophenolate mofetil 500 milliGRAM(s) Oral two times a day  pantoprazole    Tablet 40 milliGRAM(s) Oral before breakfast  predniSONE   Tablet 5 milliGRAM(s) Oral daily  sodium chloride 0.9%. 1000 milliLiter(s) (75 mL/Hr) IV Continuous <Continuous>  tacrolimus ER Tablet (ENVARSUS XR) 11 milliGRAM(s) Oral daily    MEDICATIONS  (PRN):  acetaminophen     Tablet .. 650 milliGRAM(s) Oral every 6 hours PRN Temp greater or equal to 38C (100.4F), Mild Pain (1 - 3)  sodium chloride 0.65% Nasal 1 Spray(s) Both Nostrils four times a day PRN Nasal Congestion      Objective:    Vitals: Vital Signs Last 24 Hrs  T(C): 37 (02-06-24 @ 04:42), Max: 37.2 (02-05-24 @ 21:24)  T(F): 98.6 (02-06-24 @ 04:42), Max: 99 (02-05-24 @ 21:24)  HR: 77 (02-06-24 @ 04:42) (77 - 85)  BP: 109/67 (02-06-24 @ 04:42) (109/67 - 136/79)  BP(mean): --  RR: 18 (02-06-24 @ 04:42) (18 - 18)  SpO2: 97% (02-06-24 @ 04:42) (92% - 98%)            I&O's Summary      PHYSICAL EXAM:  GENERAL: NAD  HEAD:  Atraumatic, Normocephalic. Swelling less than yday, less painful to palpation   EYES: EOMI, conjunctiva and sclera clear  CHEST/LUNG: Clear to auscultation bilaterally; No rales, rhonchi, wheezing, or rubs  HEART: Regular rate and rhythm; No murmurs, rubs, or gallops  ABDOMEN: Soft, Nontender, Nondistended;   SKIN: No rashes or lesions  NERVOUS SYSTEM:  Alert & Oriented X3, no focal deficits    LABS:  02-05    137  |  102  |  16  ----------------------------<  110<H>  3.9   |  23  |  1.33<H>  02-04    136  |  100  |  10  ----------------------------<  103<H>  3.9   |  24  |  0.97  02-03    134<L>  |  98  |  13  ----------------------------<  113<H>  5.0   |  23  |  0.92    Ca    10.6<H>      05 Feb 2024 07:08  Ca    10.0      04 Feb 2024 05:22  Ca    10.2      03 Feb 2024 16:19  Phos  2.7     02-05  Mg     1.5     02-05    TPro  6.3  /  Alb  3.6  /  TBili  0.6  /  DBili  x   /  AST  13  /  ALT  13  /  AlkPhos  52  02-05  TPro  6.5  /  Alb  3.7  /  TBili  1.0  /  DBili  x   /  AST  10  /  ALT  13  /  AlkPhos  60  02-04  TPro  7.2  /  Alb  4.0  /  TBili  0.9  /  DBili  x   /  AST  39  /  ALT  19  /  AlkPhos  60  02-03                    Urinalysis Basic - ( 05 Feb 2024 07:08 )    Color: x / Appearance: x / SG: x / pH: x  Gluc: 110 mg/dL / Ketone: x  / Bili: x / Urobili: x   Blood: x / Protein: x / Nitrite: x   Leuk Esterase: x / RBC: x / WBC x   Sq Epi: x / Non Sq Epi: x / Bacteria: x                              9.9    5.62  )-----------( 229      ( 05 Feb 2024 07:08 )             33.1                         10.5   9.54  )-----------( 216      ( 04 Feb 2024 05:22 )             34.4                         11.1   10.65 )-----------( 240      ( 03 Feb 2024 16:19 )             37.1     CAPILLARY BLOOD GLUCOSE          RADIOLOGY & ADDITIONAL TESTS:    Imaging Personally Reviewed:  [x ] YES  [ ] NO    Consultants involved in case:   Consultant(s) Notes Reviewed:  [ x] YES  [ ] NO:   Care Discussed with Consultants/Other Providers [x ] YES  [ ] NO

## 2024-02-06 NOTE — PROGRESS NOTE ADULT - SUBJECTIVE AND OBJECTIVE BOX
[facility]   MD MEL DOBBS MD RUORU WONG, PA    TEL:  FROM 9 AM to 5 PM ---OFFICE: [phone]  AVAILABLE ON TEAMS    FROM 5 PM - 9 AM PLEASE CALL ANSWERING SERVICE: [phone]    RENAL FOLLOW UP NOTE--Date of Service 02-06-24 @ 09:32  --------------------------------------------------------------------------------  HPI:      Pt seen and examined at bedside.       PAST HISTORY  --------------------------------------------------------------------------------  No significant changes to PMH, PSH, FHx, SHx, unless otherwise noted    ALLERGIES & MEDICATIONS  --------------------------------------------------------------------------------  Allergies    ibuprofen/morphine (Unknown)  latex (Swelling)  lactose (Hives)  ibuprofen (Hives)  morphine (Anaphylaxis)  contrast media (iodine-based) (Urticaria)  shellfish (Urticaria)  morphine (Urticaria)  penicillin (Anaphylaxis)    Intolerances      Standing Inpatient Medications  aspirin enteric coated 81 milliGRAM(s) Oral daily  atorvastatin 20 milliGRAM(s) Oral at bedtime  heparin   Injectable 5000 Unit(s) SubCutaneous every 8 hours  lidocaine   4% Patch 1 Patch Transdermal daily  mycophenolate mofetil 500 milliGRAM(s) Oral two times a day  pantoprazole    Tablet 40 milliGRAM(s) Oral before breakfast  predniSONE   Tablet 5 milliGRAM(s) Oral daily  sodium chloride 0.9%. 1000 milliLiter(s) IV Continuous <Continuous>  tacrolimus ER Tablet (ENVARSUS XR) 11 milliGRAM(s) Oral daily    PRN Inpatient Medications  acetaminophen     Tablet .. 650 milliGRAM(s) Oral every 6 hours PRN  sodium chloride 0.65% Nasal 1 Spray(s) Both Nostrils four times a day PRN      REVIEW OF SYSTEMS  --------------------------------------------------------------------------------  General: no fever  MSK: no edema     VITALS/PHYSICAL EXAM  --------------------------------------------------------------------------------  T(C): 37 (02-06-24 @ 04:42), Max: 37.2 (02-05-24 @ 21:24)  HR: 77 (02-06-24 @ 04:42) (77 - 85)  BP: 109/67 (02-06-24 @ 04:42) (109/67 - 136/79)  RR: 18 (02-06-24 @ 04:42) (18 - 18)  SpO2: 97% (02-06-24 @ 04:42) (92% - 98%)  Wt(kg): --        Physical Exam:  	Gen: NAD  	HEENT: MMM  	Pulm: CTA B/L  	CV: S1S2  	Abd: Soft, +BS  	Ext: No LE edema B/L                      Neuro: Awake   	Skin: Warm and Dry   	Vascular access: NO HD catheter            no  zelda  LABS/STUDIES  --------------------------------------------------------------------------------              9.9    5.62  >-----------<  229      [02-05-24 @ 07:08]              33.1     137  |  102  |  16  ----------------------------<  110      [02-05-24 @ 07:08]  3.9   |  23  |  1.33        Ca     10.6     [02-05-24 @ 07:08]      Mg     1.5     [02-05-24 @ 07:08]      Phos  2.7     [02-05-24 @ 07:08]    TPro  6.3  /  Alb  3.6  /  TBili  0.6  /  DBili  x   /  AST  13  /  ALT  13  /  AlkPhos  52  [02-05-24 @ 07:08]          Creatinine Trend:  SCr 1.33 [02-05 @ 07:08]  SCr 0.97 [02-04 @ 05:22]  SCr 0.92 [02-03 @ 16:19]    Urinalysis - [02-05-24 @ 07:08]      Color  / Appearance  / SG  / pH       Gluc 110 / Ketone   / Bili  / Urobili        Blood  / Protein  / Leuk Est  / Nitrite       RBC  / WBC  / Hyaline  / Gran  / Sq Epi  / Non Sq Epi  / Bacteria       Iron 17, TIBC 262, %sat 6      [07-19-23 @ 07:51]  Ferritin 142      [07-19-23 @ 07:51]  PTH -- (Ca 11.2)      [07-03-23 @ 15:05]   170  Vitamin D (25OH) 31.8      [07-03-23 @ 15:05]

## 2024-02-06 NOTE — PROGRESS NOTE ADULT - PROBLEM SELECTOR PLAN 7
- DVT: SubQ Heparin  - Diet: Renal Diet  - Code: Full Code  - PT: Ordered  - [facility]acy: [facility] on [address] [place] [address]  - Dispo: Pending clinical course

## 2024-02-06 NOTE — PROGRESS NOTE ADULT - PROBLEM SELECTOR PLAN 2
Pt w/ new onset chest pain that started when she developed epistaxis. [facility] 2019 showing clean coronaries.  - At OSH, ECG reportedly normal, TTE done (no records here)  - In the ED, troponins 9 and10, EKG no STEMI but ?T-waves  - Low-likelihood of ACS  - TTP left mid-axillary  - F/U OSH for records  - Repeat troponin + EKG in AM  - Chest X-ray AP/Lateral to evaluate for left-sided chest pain  - Lidocaine patch and acetaminophen for pain  TTE  EF 71% no regional wall abnormalities Cr bumped to 1.33  nephro rec NS 75cc/hr - reordered for 2/6 as wasn't given on 2/5

## 2024-02-06 NOTE — PROGRESS NOTE ADULT - SUBJECTIVE AND OBJECTIVE BOX
ENT ISSUE/POD: recurrent epistaxis    HPI: 53yoF PMHx of ESRD R-DDRT (2020) c/b HCV (treated per patient), chronic SVC syndrome s/p drain(?), HTN, coronary bypass 2015, presenting after being discharged from [facility] (2/1 - 2/3) for epistaxis and chest pain. She presented here for further workup of her epistaxis and chest pain. ENT was consulted for epistaxis and left nasal packing removal. Pt was seen at [facility] for epistaxis controlled with B/L nasal packings (RR). The right nasal packing was removed there prior to discharge last week. The left RR was removed here and left anterior nasal septum cauterized with silver nitrate. ENT was called again to evaluate pt for epistaxis. Pt admits to cough and post nasal drip, but denies nasal trauma.    PAST MEDICAL & SURGICAL HISTORY:  HTN - Hypertension      Clotted Renal Dialysis AV Graft      Infection of AV Graft for Dialysis      Fibroid Tumor      CKD (chronic kidney disease) stage V requiring chronic dialysis      Chronic kidney disease, unspecified      History of Hysterectomy  for benign disease      AV fistula  B/L - failed      H/O extremity bypass graft  H/O RUE bypass and creation of right brachial graft      Kidney transplant recipient      H/O kidney transplant        Allergies    ibuprofen/morphine (Unknown)  latex (Swelling)  lactose (Hives)  ibuprofen (Hives)  morphine (Anaphylaxis)  contrast media (iodine-based) (Urticaria)  shellfish (Urticaria)  morphine (Urticaria)  penicillin (Anaphylaxis)    Intolerances      MEDICATIONS  (STANDING):  amLODIPine   Tablet 5 milliGRAM(s) Oral daily  aspirin enteric coated 81 milliGRAM(s) Oral daily  atorvastatin 20 milliGRAM(s) Oral at bedtime  heparin   Injectable 5000 Unit(s) SubCutaneous every 8 hours  influenza   Vaccine 0.5 milliLiter(s) IntraMuscular once  lidocaine   4% Patch 1 Patch Transdermal daily  mycophenolate mofetil 500 milliGRAM(s) Oral two times a day  pantoprazole    Tablet 40 milliGRAM(s) Oral before breakfast  potassium phosphate / sodium phosphate Powder (PHOS-NaK) 1 Packet(s) Oral every 6 hours  predniSONE   Tablet 5 milliGRAM(s) Oral daily  sodium chloride 0.9%. 1000 milliLiter(s) (75 mL/Hr) IV Continuous <Continuous>  tacrolimus ER Tablet (ENVARSUS XR) 11 milliGRAM(s) Oral daily    MEDICATIONS  (PRN):  acetaminophen     Tablet .. 650 milliGRAM(s) Oral every 6 hours PRN Temp greater or equal to 38C (100.4F), Mild Pain (1 - 3)  sodium chloride 0.65% Nasal 1 Spray(s) Both Nostrils four times a day PRN Nasal Congestion      Social History: see consult    Family history: see consult    ROS:   ENT: all negative except as noted in HPI   Pulm: denies SOB, cough, hemoptysis  Neuro: denies numbness/tingling, loss of sensation  Endo: denies heat/cold intolerance, excessive sweating      Vital Signs Last 24 Hrs  T(C): 36.7 (07 Feb 2024 10:46), Max: 36.8 (07 Feb 2024 05:34)  T(F): 98.1 (07 Feb 2024 10:46), Max: 98.2 (07 Feb 2024 05:34)  HR: 73 (07 Feb 2024 10:46) (65 - 75)  BP: 146/78 (07 Feb 2024 10:46) (111/72 - 146/78)  BP(mean): --  RR: 18 (07 Feb 2024 10:46) (18 - 18)  SpO2: 98% (07 Feb 2024 10:46) (97% - 99%)    Parameters below as of 07 Feb 2024 10:46  Patient On (Oxygen Delivery Method): room air                              10.2   4.40  )-----------( 239      ( 07 Feb 2024 06:43 )             34.0    02-07    137  |  104  |  14  ----------------------------<  88  3.8   |  23  |  1.12    Ca    10.0      07 Feb 2024 06:47  Phos  2.3     02-07  Mg     1.6     02-07         PHYSICAL EXAM:  Gen: NAD  Skin: No rashes, bruises, or lesions  Head: Normocephalic, Atraumatic  Face: no edema, erythema, or fluctuance. Parotid glands soft without mass  Eyes: no scleral injection  Nose: L. nare: No active bleeding, a small amount of nasopore and baci placed to prevent further bleeding. R. nare dry patent  Mouth: No Stridor / Drooling / Trismus.  Mucosa moist, tongue/uvula midline, oropharynx clear  Neck: Flat, supple, no lymphadenopathy, trachea midline, no masses  Lymphatic: No lymphadenopathy  Resp: breathing easily, no stridor  Neuro: facial nerve intact, no facial droop

## 2024-02-07 LAB
ANION GAP SERPL CALC-SCNC: 10 MMOL/L — SIGNIFICANT CHANGE UP (ref 5–17)
BUN SERPL-MCNC: 14 MG/DL — SIGNIFICANT CHANGE UP (ref 7–23)
CALCIUM SERPL-MCNC: 10 MG/DL — SIGNIFICANT CHANGE UP (ref 8.4–10.5)
CHLORIDE SERPL-SCNC: 104 MMOL/L — SIGNIFICANT CHANGE UP (ref 96–108)
CO2 SERPL-SCNC: 23 MMOL/L — SIGNIFICANT CHANGE UP (ref 22–31)
CREAT SERPL-MCNC: 1.12 MG/DL — SIGNIFICANT CHANGE UP (ref 0.5–1.3)
EGFR: 60 ML/MIN/1.73M2 — SIGNIFICANT CHANGE UP
GLUCOSE SERPL-MCNC: 88 MG/DL — SIGNIFICANT CHANGE UP (ref 70–99)
HCT VFR BLD CALC: 34 % — LOW (ref 34.5–45)
HGB BLD-MCNC: 10.2 G/DL — LOW (ref 11.5–15.5)
MAGNESIUM SERPL-MCNC: 1.6 MG/DL — SIGNIFICANT CHANGE UP (ref 1.6–2.6)
MCHC RBC-ENTMCNC: 23.8 PG — LOW (ref 27–34)
MCHC RBC-ENTMCNC: 30 GM/DL — LOW (ref 32–36)
MCV RBC AUTO: 79.3 FL — LOW (ref 80–100)
NRBC # BLD: 0 /100 WBCS — SIGNIFICANT CHANGE UP (ref 0–0)
PHOSPHATE SERPL-MCNC: 2.3 MG/DL — LOW (ref 2.5–4.5)
PLATELET # BLD AUTO: 239 K/UL — SIGNIFICANT CHANGE UP (ref 150–400)
POTASSIUM SERPL-MCNC: 3.8 MMOL/L — SIGNIFICANT CHANGE UP (ref 3.5–5.3)
POTASSIUM SERPL-SCNC: 3.8 MMOL/L — SIGNIFICANT CHANGE UP (ref 3.5–5.3)
RBC # BLD: 4.29 M/UL — SIGNIFICANT CHANGE UP (ref 3.8–5.2)
RBC # FLD: 18.3 % — HIGH (ref 10.3–14.5)
SODIUM SERPL-SCNC: 137 MMOL/L — SIGNIFICANT CHANGE UP (ref 135–145)
TACROLIMUS SERPL-MCNC: 10.8 NG/ML — SIGNIFICANT CHANGE UP
TACROLIMUS SERPL-MCNC: 3 NG/ML — SIGNIFICANT CHANGE UP
WBC # BLD: 4.4 K/UL — SIGNIFICANT CHANGE UP (ref 3.8–10.5)
WBC # FLD AUTO: 4.4 K/UL — SIGNIFICANT CHANGE UP (ref 3.8–10.5)

## 2024-02-07 PROCEDURE — 99232 SBSQ HOSP IP/OBS MODERATE 35: CPT | Mod: FS

## 2024-02-07 PROCEDURE — 99232 SBSQ HOSP IP/OBS MODERATE 35: CPT | Mod: GC

## 2024-02-07 RX ORDER — AMLODIPINE BESYLATE 2.5 MG/1
5 TABLET ORAL DAILY
Refills: 0 | Status: DISCONTINUED | OUTPATIENT
Start: 2024-02-07 | End: 2024-02-08

## 2024-02-07 RX ORDER — SODIUM,POTASSIUM PHOSPHATES 278-250MG
1 POWDER IN PACKET (EA) ORAL EVERY 6 HOURS
Refills: 0 | Status: COMPLETED | OUTPATIENT
Start: 2024-02-07 | End: 2024-02-08

## 2024-02-07 RX ORDER — INFLUENZA VIRUS VACCINE 15; 15; 15; 15 UG/.5ML; UG/.5ML; UG/.5ML; UG/.5ML
0.5 SUSPENSION INTRAMUSCULAR ONCE
Refills: 0 | Status: DISCONTINUED | OUTPATIENT
Start: 2024-02-07 | End: 2024-02-08

## 2024-02-07 RX ADMIN — Medication 81 MILLIGRAM(S): at 13:35

## 2024-02-07 RX ADMIN — HEPARIN SODIUM 5000 UNIT(S): 5000 INJECTION INTRAVENOUS; SUBCUTANEOUS at 05:51

## 2024-02-07 RX ADMIN — HEPARIN SODIUM 5000 UNIT(S): 5000 INJECTION INTRAVENOUS; SUBCUTANEOUS at 21:38

## 2024-02-07 RX ADMIN — MYCOPHENOLATE MOFETIL 500 MILLIGRAM(S): 250 CAPSULE ORAL at 05:51

## 2024-02-07 RX ADMIN — TACROLIMUS 11 MILLIGRAM(S): 5 CAPSULE ORAL at 17:51

## 2024-02-07 RX ADMIN — Medication 5 MILLIGRAM(S): at 05:51

## 2024-02-07 RX ADMIN — HEPARIN SODIUM 5000 UNIT(S): 5000 INJECTION INTRAVENOUS; SUBCUTANEOUS at 13:36

## 2024-02-07 RX ADMIN — ATORVASTATIN CALCIUM 20 MILLIGRAM(S): 80 TABLET, FILM COATED ORAL at 21:38

## 2024-02-07 RX ADMIN — MYCOPHENOLATE MOFETIL 500 MILLIGRAM(S): 250 CAPSULE ORAL at 17:50

## 2024-02-07 RX ADMIN — Medication 1 PACKET(S): at 17:51

## 2024-02-07 RX ADMIN — LIDOCAINE 1 PATCH: 4 CREAM TOPICAL at 19:30

## 2024-02-07 RX ADMIN — PANTOPRAZOLE SODIUM 40 MILLIGRAM(S): 20 TABLET, DELAYED RELEASE ORAL at 05:52

## 2024-02-07 RX ADMIN — LIDOCAINE 1 PATCH: 4 CREAM TOPICAL at 13:36

## 2024-02-07 NOTE — PATIENT PROFILE ADULT - FALL HARM RISK - HARM RISK INTERVENTIONS
Assistance with ambulation/Assistance OOB with selected safe patient handling equipment/Communicate Risk of Fall with Harm to all staff/Discuss with provider need for PT consult/Monitor gait and stability/Provide patient with walking aids - walker, cane, crutches/Reinforce activity limits and safety measures with patient and family/Sit up slowly, dangle for a short time, stand at bedside before walking/Tailored Fall Risk Interventions/Visual Cue: Yellow wristband and red socks/Bed in lowest position, wheels locked, appropriate side rails in place/Call bell, personal items and telephone in reach/Instruct patient to call for assistance before getting out of bed or chair/Non-slip footwear when patient is out of bed/[place] to call system/Physically safe environment - no spills, clutter or unnecessary equipment/Purposeful Proactive Rounding/Room/bathroom lighting operational, light cord in reach

## 2024-02-07 NOTE — PROGRESS NOTE ADULT - ASSESSMENT
Pt is a 52 y/o F w/ a PMHx of ESRD s/p R-DDRT (2020), chronic SVC syndrome, HTN, CABG (2015), with recent hospitalization at [facility] for epistaxis and CP, who is presenting with continued chest pain, now w L facial swelling c/f SVC syndrome

## 2024-02-07 NOTE — PROGRESS NOTE ADULT - PROBLEM SELECTOR PLAN 7
- DVT: SubQ Heparin  - Diet: Renal Diet  - Code: Full Code  - PT: Ordered  - [facility]acy: [facility] on [address] [place] [address]  - Dispo: Pending clinical course

## 2024-02-07 NOTE — PROGRESS NOTE ADULT - ASSESSMENT
53yoF PMHx of ESRD R-DDRT (2020) c/b HCV (treated per patient), chronic SVC syndrome s/p drain(?), HTN, coronary bypass 2015, presenting after being discharged from [facility] (2/1 - 2/3) for epistaxis and chest pain. She presented here for further workup of her epistaxis and chest pain.   Nephrology consulted for renal trasnplant    s/p DDRT @ [facility] 08/14/2020  Outpatient nephrologist is Dr. Lake / Dr Streeter   Continue home dose Immunosuppression meds    mg BID, Prednisone 5 mg qd, Envarsus 11 mg   please Check Tacrolimus 30 min prior to morning dose. Unclear if accurate  Kidney function improved with IVF   Monitor BMP, u/o    HTN:   BP optimal   Monitor    Hyponatremia  improved  monitor     Proteinuria  Repeat UA  if persistent -->Defer to transplant nephrology for outpt work up .     Hypomagnesemia  Repelte Mg sul as needed  Monitor     Hypercalcemia  s/p IVF   improved

## 2024-02-07 NOTE — PROGRESS NOTE ADULT - ATTENDING COMMENTS
53yoF PMHx of ESRD R-DDRT (2020) c/b HCV (treated per patient), chronic SVC syndrome s/p drain(?), HTN, coronary bypass 2015, presenting after being discharged from [facility] (2/1 - 2/3) for epistaxis and chest pain and c/o left sided face and neck pain with swelling.    Epistaxis  - although improving pt still having nose bleeding --> will get ENT reevaluation  - Hgb is stable. Hgb trend: 10.2 <-- , 10.0 <-- , 9.9 <--   - c/w afrin prn  - c/w nasal packing    Face/Neck swelling  - improved. pt denies any face pain  - CT chest - Vessels along the anterior chest wall are  presumably secondary to venous narrowing/occlusion, however, the vessel  patency was not established secondary to the lack of intravenous contrast. holding off on IV contrast do to her SAM and h/o renal transplant    SAM in a Renal transplant  - Cr is improving with IV hydration  - Creatinine Trend: 1.12<--, 1.21<--, 1.33<--, 0.97<--, 0.92<--    Chest pain  -TTE was done due to non specific T wave inversion on the lateral leads , but with no wall motion abnormalities, normal LV fxn, no valvular issues, normal diastolic fxn  -Trops negative x2. Troponin Trend:  <--10,  <--9    Renal Transplant  - c/w immunosuppressants.    dispo pending final ent recs

## 2024-02-07 NOTE — PROGRESS NOTE ADULT - PROBLEM SELECTOR PLAN 6
Pt w/ a history of CABG in ~2015 with a stent placed, reportedly on home DAPT with ASA and Plavix  - c/w ASA, hold Plavix - will dc on ASA   - f/u PMD or OP Cards about needing to continue Plavix

## 2024-02-07 NOTE — PROGRESS NOTE ADULT - SUBJECTIVE AND OBJECTIVE BOX
ESTEFANIA CORDERO  53y  MRN: [id number]    Patient is a 53y old  Female who presents with a chief complaint of Chest pain and nose bleed (07 Feb 2024 12:20)      Subjective: no events ON. Denies fever, CP, SOB, abn pain, N/V, dysuria. Tolerating diet.  ENT put packing yday that came out, pt said both nostrils started bleeding afterwards. Endorsed less painful swelling today    MEDICATIONS  (STANDING):  aspirin enteric coated 81 milliGRAM(s) Oral daily  atorvastatin 20 milliGRAM(s) Oral at bedtime  heparin   Injectable 5000 Unit(s) SubCutaneous every 8 hours  influenza   Vaccine 0.5 milliLiter(s) IntraMuscular once  lidocaine   4% Patch 1 Patch Transdermal daily  mycophenolate mofetil 500 milliGRAM(s) Oral two times a day  pantoprazole    Tablet 40 milliGRAM(s) Oral before breakfast  potassium phosphate / sodium phosphate Powder (PHOS-NaK) 1 Packet(s) Oral every 6 hours  predniSONE   Tablet 5 milliGRAM(s) Oral daily  sodium chloride 0.9%. 1000 milliLiter(s) (75 mL/Hr) IV Continuous <Continuous>  tacrolimus ER Tablet (ENVARSUS XR) 11 milliGRAM(s) Oral daily    MEDICATIONS  (PRN):  acetaminophen     Tablet .. 650 milliGRAM(s) Oral every 6 hours PRN Temp greater or equal to 38C (100.4F), Mild Pain (1 - 3)  sodium chloride 0.65% Nasal 1 Spray(s) Both Nostrils four times a day PRN Nasal Congestion      Objective:    Vitals: Vital Signs Last 24 Hrs  T(C): 36.7 (02-07-24 @ 10:46), Max: 36.8 (02-07-24 @ 05:34)  T(F): 98.1 (02-07-24 @ 10:46), Max: 98.2 (02-07-24 @ 05:34)  HR: 73 (02-07-24 @ 10:46) (65 - 75)  BP: 146/78 (02-07-24 @ 10:46) (111/72 - 146/78)  BP(mean): --  RR: 18 (02-07-24 @ 10:46) (18 - 18)  SpO2: 98% (02-07-24 @ 10:46) (97% - 99%)            I&O's Summary      PHYSICAL EXAM:  GENERAL: NAD  HEAD:  Atraumatic, Normocephalic. less swollen over L face, no TTP   EYES: EOMI, conjunctiva and sclera clear. packing in place   CHEST/LUNG: Clear to auscultation bilaterally; No rales, rhonchi, wheezing, or rubs  HEART: Regular rate and rhythm; No murmurs, rubs, or gallops  ABDOMEN: Soft, Nontender, Nondistended;   SKIN: No rashes or lesions  NERVOUS SYSTEM:  Alert & Oriented X3, no focal deficits    LABS:  02-07    137  |  104  |  14  ----------------------------<  88  3.8   |  23  |  1.12  02-06    138  |  103  |  16  ----------------------------<  151<H>  4.6   |  23  |  1.21  02-05    137  |  102  |  16  ----------------------------<  110<H>  3.9   |  23  |  1.33<H>    Ca    10.0      07 Feb 2024 06:47  Ca    10.0      06 Feb 2024 12:52  Ca    10.6<H>      05 Feb 2024 07:08  Phos  2.3     02-07  Mg     1.6     02-07    TPro  6.3  /  Alb  3.6  /  TBili  0.6  /  DBili  x   /  AST  13  /  ALT  13  /  AlkPhos  52  02-05                    Urinalysis Basic - ( 07 Feb 2024 06:47 )    Color: x / Appearance: x / SG: x / pH: x  Gluc: 88 mg/dL / Ketone: x  / Bili: x / Urobili: x   Blood: x / Protein: x / Nitrite: x   Leuk Esterase: x / RBC: x / WBC x   Sq Epi: x / Non Sq Epi: x / Bacteria: x                              10.2   4.40  )-----------( 239      ( 07 Feb 2024 06:43 )             34.0                         10.0   3.52  )-----------( 215      ( 06 Feb 2024 14:52 )             33.7                         9.9    5.62  )-----------( 229      ( 05 Feb 2024 07:08 )             33.1     CAPILLARY BLOOD GLUCOSE          RADIOLOGY & ADDITIONAL TESTS:    Imaging Personally Reviewed:  [x ] YES  [ ] NO    Consultants involved in case:   Consultant(s) Notes Reviewed:  [ x] YES  [ ] NO:   Care Discussed with Consultants/Other Providers [x ] YES  [ ] NO

## 2024-02-07 NOTE — PROGRESS NOTE ADULT - PROBLEM SELECTOR PLAN 3
Pt w/ 3 day history of epistaxis, diagnosed with sinusitis at [facility], s/p x2 rhinorockets with one removed prior to discharge with plans to follow up with [facility] 02/05  - Hgb stable on admission  - Procalc 0.07  - s/p x1 vancomycin and cefepime  - s/p Unasyn (2/4 -2/6) while packing in place  - ENT removed rhinorocket 2/5 with cauterization, however pt still endorsing some bleeding, ENT to relook 2/7 given rebleeding, will f/u discharge recs

## 2024-02-07 NOTE — PROGRESS NOTE ADULT - SUBJECTIVE AND OBJECTIVE BOX
[facility]   MD MEL DOBBS MD RUORU WONG, PA    TEL:  FROM 9 AM to 5 PM ---OFFICE: [phone]  AVAILABLE ON TEAMS    FROM 5 PM - 9 AM PLEASE CALL ANSWERING SERVICE: [phone]    RENAL FOLLOW UP NOTE--Date of Service 02-07-24 @ 12:21  --------------------------------------------------------------------------------  HPI:      Pt seen and examined at bedside.   Vanita SOB, chest pain     PAST HISTORY  --------------------------------------------------------------------------------  No significant changes to PMH, PSH, FHx, SHx, unless otherwise noted    ALLERGIES & MEDICATIONS  --------------------------------------------------------------------------------  Allergies    ibuprofen/morphine (Unknown)  latex (Swelling)  lactose (Hives)  ibuprofen (Hives)  morphine (Anaphylaxis)  contrast media (iodine-based) (Urticaria)  shellfish (Urticaria)  morphine (Urticaria)  penicillin (Anaphylaxis)    Intolerances      Standing Inpatient Medications  aspirin enteric coated 81 milliGRAM(s) Oral daily  atorvastatin 20 milliGRAM(s) Oral at bedtime  heparin   Injectable 5000 Unit(s) SubCutaneous every 8 hours  influenza   Vaccine 0.5 milliLiter(s) IntraMuscular once  lidocaine   4% Patch 1 Patch Transdermal daily  mycophenolate mofetil 500 milliGRAM(s) Oral two times a day  pantoprazole    Tablet 40 milliGRAM(s) Oral before breakfast  potassium phosphate / sodium phosphate Powder (PHOS-NaK) 1 Packet(s) Oral every 6 hours  predniSONE   Tablet 5 milliGRAM(s) Oral daily  sodium chloride 0.9%. 1000 milliLiter(s) IV Continuous <Continuous>  tacrolimus ER Tablet (ENVARSUS XR) 11 milliGRAM(s) Oral daily    PRN Inpatient Medications  acetaminophen     Tablet .. 650 milliGRAM(s) Oral every 6 hours PRN  sodium chloride 0.65% Nasal 1 Spray(s) Both Nostrils four times a day PRN      REVIEW OF SYSTEMS  --------------------------------------------------------------------------------  General: no fever  CVS: no chest pain  MSK: no edema     VITALS/PHYSICAL EXAM  --------------------------------------------------------------------------------  T(C): 36.7 (02-07-24 @ 10:46), Max: 36.8 (02-07-24 @ 05:34)  HR: 73 (02-07-24 @ 10:46) (65 - 75)  BP: 146/78 (02-07-24 @ 10:46) (111/72 - 146/78)  RR: 18 (02-07-24 @ 10:46) (18 - 18)  SpO2: 98% (02-07-24 @ 10:46) (97% - 99%)  Wt(kg): --        Physical Exam:  	Gen: NAD  	HEENT: MMM  	Pulm: CTA B/L  	CV: S1S2  	Abd: Soft, +BS  	Ext: No LE edema B/L                      Neuro: Awake   	Skin: Warm and Dry   	Vascular access: NO HD catheter             no ndiaye  LABS/STUDIES  --------------------------------------------------------------------------------              10.2   4.40  >-----------<  239      [02-07-24 @ 06:43]              34.0     137  |  104  |  14  ----------------------------<  88      [02-07-24 @ 06:47]  3.8   |  23  |  1.12        Ca     10.0     [02-07-24 @ 06:47]      Mg     1.6     [02-07-24 @ 06:47]      Phos  2.3     [02-07-24 @ 06:47]            Creatinine Trend:  SCr 1.12 [02-07 @ 06:47]  SCr 1.21 [02-06 @ 12:52]  SCr 1.33 [02-05 @ 07:08]  SCr 0.97 [02-04 @ 05:22]  SCr 0.92 [02-03 @ 16:19]    Urinalysis - [02-07-24 @ 06:47]      Color  / Appearance  / SG  / pH       Gluc 88 / Ketone   / Bili  / Urobili        Blood  / Protein  / Leuk Est  / Nitrite       RBC  / WBC  / Hyaline  / Gran  / Sq Epi  / Non Sq Epi  / Bacteria       Iron 17, TIBC 262, %sat 6      [07-19-23 @ 07:51]  Ferritin 142      [07-19-23 @ 07:51]  PTH -- (Ca 11.2)      [07-03-23 @ 15:05]   170  Vitamin D (25OH) 31.8      [07-03-23 @ 15:05]

## 2024-02-07 NOTE — PROGRESS NOTE ADULT - PROBLEM SELECTOR PLAN 1
Pt w/ a history of SVC syndrome in ~2005, reportedly with a drain placed  - f/u PMD about possible drain placement  - f/u PMD/Cards about needing to be on DAPT for SVC  - US H/N unremarkable   - CT chest, neck shows graft from R subclavian to SVC, however unable to assess patency w/o contrast. given swelling is improving, can CTM for now with outpatient vascular followup for further imaging.

## 2024-02-07 NOTE — PROGRESS NOTE ADULT - SUBJECTIVE AND OBJECTIVE BOX
ENT ISSUE/POD: recurrent epistaxis    HPI: 53yoF PMHx of ESRD R-DDRT (2020) c/b HCV (treated per patient), chronic SVC syndrome s/p drain(?), HTN, coronary bypass 2015, presenting after being discharged from [facility] (2/1 - 2/3) for epistaxis and chest pain, b/l rapid rhinos were placed and right packing was removed prior to discharge. She presented here for further workup of her epistaxis and chest pain. ENT was consulted for epistaxis and left nasal packing removal. Left rapid rhino was removed, left anterior nasal septum cauterized with silver nitrate. ENT was called again yesterday to evaluate pt for epistaxis.           PAST MEDICAL & SURGICAL HISTORY:  HTN - Hypertension      Clotted Renal Dialysis AV Graft      Infection of AV Graft for Dialysis      Fibroid Tumor      CKD (chronic kidney disease) stage V requiring chronic dialysis      Chronic kidney disease, unspecified      History of Hysterectomy  for benign disease      AV fistula  B/L - failed      H/O extremity bypass graft  H/O RUE bypass and creation of right brachial graft      Kidney transplant recipient      H/O kidney transplant        Allergies    ibuprofen/morphine (Unknown)  latex (Swelling)  lactose (Hives)  ibuprofen (Hives)  morphine (Anaphylaxis)  contrast media (iodine-based) (Urticaria)  shellfish (Urticaria)  morphine (Urticaria)  penicillin (Anaphylaxis)    Intolerances      MEDICATIONS  (STANDING):  amLODIPine   Tablet 5 milliGRAM(s) Oral daily  aspirin enteric coated 81 milliGRAM(s) Oral daily  atorvastatin 20 milliGRAM(s) Oral at bedtime  heparin   Injectable 5000 Unit(s) SubCutaneous every 8 hours  influenza   Vaccine 0.5 milliLiter(s) IntraMuscular once  lidocaine   4% Patch 1 Patch Transdermal daily  mycophenolate mofetil 500 milliGRAM(s) Oral two times a day  pantoprazole    Tablet 40 milliGRAM(s) Oral before breakfast  potassium phosphate / sodium phosphate Powder (PHOS-NaK) 1 Packet(s) Oral every 6 hours  predniSONE   Tablet 5 milliGRAM(s) Oral daily  sodium chloride 0.9%. 1000 milliLiter(s) (75 mL/Hr) IV Continuous <Continuous>  tacrolimus ER Tablet (ENVARSUS XR) 11 milliGRAM(s) Oral daily    MEDICATIONS  (PRN):  acetaminophen     Tablet .. 650 milliGRAM(s) Oral every 6 hours PRN Temp greater or equal to 38C (100.4F), Mild Pain (1 - 3)  sodium chloride 0.65% Nasal 1 Spray(s) Both Nostrils four times a day PRN Nasal Congestion      Social History: see consult    Family history: see consult    ROS:   ENT: all negative except as noted in HPI   Pulm: denies SOB, cough, hemoptysis  Neuro: denies numbness/tingling, loss of sensation  Endo: denies heat/cold intolerance, excessive sweating      Vital Signs Last 24 Hrs  T(C): 36.7 (07 Feb 2024 10:46), Max: 36.8 (07 Feb 2024 05:34)  T(F): 98.1 (07 Feb 2024 10:46), Max: 98.2 (07 Feb 2024 05:34)  HR: 73 (07 Feb 2024 10:46) (65 - 75)  BP: 146/78 (07 Feb 2024 10:46) (111/72 - 146/78)  BP(mean): --  RR: 18 (07 Feb 2024 10:46) (18 - 18)  SpO2: 98% (07 Feb 2024 10:46) (97% - 99%)    Parameters below as of 07 Feb 2024 10:46  Patient On (Oxygen Delivery Method): room air                              10.2   4.40  )-----------( 239      ( 07 Feb 2024 06:43 )             34.0    02-07    137  |  104  |  14  ----------------------------<  88  3.8   |  23  |  1.12    Ca    10.0      07 Feb 2024 06:47  Phos  2.3     02-07  Mg     1.6     02-07         PHYSICAL EXAM:  Gen: NAD  Skin: No rashes, bruises, or lesions  Head: Normocephalic, Atraumatic  Face: no edema, erythema, or fluctuance. Parotid glands soft without mass  Eyes: no scleral injection  Ears: Right - ear canal clear, TM intact without effusion or erythema. No evidence of any fluid drainage. No mastoid tenderness, erythema, or ear bulging            Left - ear canal clear, TM intact without effusion or erythema. No evidence of any fluid drainage. No mastoid tenderness, erythema, or ear bulging  Nose: Nares bilaterally patent, no discharge  Mouth: No Stridor / Drooling / Trismus.  Mucosa moist, tongue/uvula midline, oropharynx clear  Neck: Flat, supple, no lymphadenopathy, trachea midline, no masses  Lymphatic: No lymphadenopathy  Resp: breathing easily, no stridor  Neuro: facial nerve intact, no facial droop         ENT ISSUE/POD: recurrent epistaxis    HPI: 53yoF PMHx of ESRD R-DDRT (2020) c/b HCV (treated per patient), chronic SVC syndrome s/p drain(?), HTN, coronary bypass 2015, presenting after being discharged from [facility] (2/1 - 2/3) for epistaxis and chest pain, b/l rapid rhinos were placed and right packing was removed prior to discharge. She presented here for further workup of her epistaxis and chest pain. ENT was consulted for epistaxis and left nasal packing removal. Left rapid rhino was removed, left anterior nasal septum cauterized with silver nitrate. ENT was called again yesterday to evaluate pt for epistaxis, nasopore dissolvable packing was placed in left nare. ENT called again today for recurrent epistaxis. Pt states overnight she sneezed and nasopore came out. She states she had mild oozing earlier today, which has now resolved. She states she does not want any further intervention at this time.     PAST MEDICAL & SURGICAL HISTORY:  HTN - Hypertension      Clotted Renal Dialysis AV Graft      Infection of AV Graft for Dialysis      Fibroid Tumor      CKD (chronic kidney disease) stage V requiring chronic dialysis      Chronic kidney disease, unspecified      History of Hysterectomy  for benign disease      AV fistula  B/L - failed      H/O extremity bypass graft  H/O RUE bypass and creation of right brachial graft      Kidney transplant recipient      H/O kidney transplant        Allergies    ibuprofen/morphine (Unknown)  latex (Swelling)  lactose (Hives)  ibuprofen (Hives)  morphine (Anaphylaxis)  contrast media (iodine-based) (Urticaria)  shellfish (Urticaria)  morphine (Urticaria)  penicillin (Anaphylaxis)    Intolerances      MEDICATIONS  (STANDING):  amLODIPine   Tablet 5 milliGRAM(s) Oral daily  aspirin enteric coated 81 milliGRAM(s) Oral daily  atorvastatin 20 milliGRAM(s) Oral at bedtime  heparin   Injectable 5000 Unit(s) SubCutaneous every 8 hours  influenza   Vaccine 0.5 milliLiter(s) IntraMuscular once  lidocaine   4% Patch 1 Patch Transdermal daily  mycophenolate mofetil 500 milliGRAM(s) Oral two times a day  pantoprazole    Tablet 40 milliGRAM(s) Oral before breakfast  potassium phosphate / sodium phosphate Powder (PHOS-NaK) 1 Packet(s) Oral every 6 hours  predniSONE   Tablet 5 milliGRAM(s) Oral daily  sodium chloride 0.9%. 1000 milliLiter(s) (75 mL/Hr) IV Continuous <Continuous>  tacrolimus ER Tablet (ENVARSUS XR) 11 milliGRAM(s) Oral daily    MEDICATIONS  (PRN):  acetaminophen     Tablet .. 650 milliGRAM(s) Oral every 6 hours PRN Temp greater or equal to 38C (100.4F), Mild Pain (1 - 3)  sodium chloride 0.65% Nasal 1 Spray(s) Both Nostrils four times a day PRN Nasal Congestion      Social History: see consult    Family history: see consult    ROS:   ENT: all negative except as noted in HPI   Pulm: denies SOB, cough, hemoptysis  Neuro: denies numbness/tingling, loss of sensation  Endo: denies heat/cold intolerance, excessive sweating      Vital Signs Last 24 Hrs  T(C): 36.7 (07 Feb 2024 10:46), Max: 36.8 (07 Feb 2024 05:34)  T(F): 98.1 (07 Feb 2024 10:46), Max: 98.2 (07 Feb 2024 05:34)  HR: 73 (07 Feb 2024 10:46) (65 - 75)  BP: 146/78 (07 Feb 2024 10:46) (111/72 - 146/78)  BP(mean): --  RR: 18 (07 Feb 2024 10:46) (18 - 18)  SpO2: 98% (07 Feb 2024 10:46) (97% - 99%)    Parameters below as of 07 Feb 2024 10:46  Patient On (Oxygen Delivery Method): room air                              10.2   4.40  )-----------( 239      ( 07 Feb 2024 06:43 )             34.0    02-07    137  |  104  |  14  ----------------------------<  88  3.8   |  23  |  1.12    Ca    10.0      07 Feb 2024 06:47  Phos  2.3     02-07  Mg     1.6     02-07         PHYSICAL EXAM:  Gen: NAD  Skin: No rashes, bruises, or lesions  Head: Normocephalic, Atraumatic  Face: no edema, erythema, or fluctuance. Parotid glands soft without mass  Eyes: no scleral injection  Nose: Dry blood in left nare. Nares bilaterally patent, no discharge or active bleeding noted.   Mouth: No Stridor / Drooling / Trismus.  Mucosa moist, tongue/uvula midline, oropharynx clear  Neck: Flat, supple, no lymphadenopathy, trachea midline, no masses  Lymphatic: No lymphadenopathy  Resp: breathing easily, no stridor  Neuro: facial nerve intact, no facial droop

## 2024-02-07 NOTE — PROGRESS NOTE ADULT - ASSESSMENT
54yo female PMHx of ESRD R-DDRT (2020) c/b HCV (treated per patient), chronic SVC syndrome s/p drain(?), HTN, coronary bypass 2015, presenting after being discharged from [facility] (2/1 - 2/3) for epistaxis and chest pain, b/l rapid rhinos were placed and right packing was removed prior to discharge. She presented here for further workup of her epistaxis and chest pain. ENT was consulted for epistaxis and left nasal packing removal. Left rapid rhino was removed, left anterior nasal septum cauterized with silver nitrate. ENT was called again yesterday to evaluate pt for epistaxis, nasopore dissolvable packing was placed in left nare. ENT called again today for recurrent epistaxis. Pt states overnight she sneezed and nasopore came out. She states she had mild oozing earlier today, which has now resolved. Pt is currently on ASA and subQ heparin. She states she does not want any further intervention at this time. On exam, dry blood noted in left nare, no active bleeding. H/H trending up.

## 2024-02-07 NOTE — PROGRESS NOTE ADULT - PROBLEM SELECTOR PLAN 4
Pt w/ new onset chest pain that started when she developed epistaxis. [facility] 2019 showing clean coronaries.  - At OSH, ECG reportedly normal, TTE done (no records here)  - In the ED, troponins 9 and10, EKG no STEMI but ?T-waves  - Low-likelihood of ACS  - TTP left mid-axillary  - F/U OSH for records  - Repeat troponin + EKG in AM  - Chest X-ray AP/Lateral to evaluate for left-sided chest pain  - Lidocaine patch and acetaminophen for pain  TTE  EF 71% no regional wall abnormalities

## 2024-02-08 ENCOUNTER — TRANSCRIPTION ENCOUNTER (OUTPATIENT)
Age: 54
End: 2024-02-08

## 2024-02-08 VITALS
DIASTOLIC BLOOD PRESSURE: 72 MMHG | SYSTOLIC BLOOD PRESSURE: 122 MMHG | RESPIRATION RATE: 18 BRPM | HEART RATE: 82 BPM | OXYGEN SATURATION: 100 % | TEMPERATURE: 98 F

## 2024-02-08 LAB
ANION GAP SERPL CALC-SCNC: 10 MMOL/L — SIGNIFICANT CHANGE UP (ref 5–17)
BUN SERPL-MCNC: 17 MG/DL — SIGNIFICANT CHANGE UP (ref 7–23)
CALCIUM SERPL-MCNC: 10.2 MG/DL — SIGNIFICANT CHANGE UP (ref 8.4–10.5)
CHLORIDE SERPL-SCNC: 103 MMOL/L — SIGNIFICANT CHANGE UP (ref 96–108)
CO2 SERPL-SCNC: 23 MMOL/L — SIGNIFICANT CHANGE UP (ref 22–31)
CREAT SERPL-MCNC: 1.05 MG/DL — SIGNIFICANT CHANGE UP (ref 0.5–1.3)
CULTURE RESULTS: SIGNIFICANT CHANGE UP
CULTURE RESULTS: SIGNIFICANT CHANGE UP
EGFR: 64 ML/MIN/1.73M2 — SIGNIFICANT CHANGE UP
GLUCOSE SERPL-MCNC: 92 MG/DL — SIGNIFICANT CHANGE UP (ref 70–99)
HCT VFR BLD CALC: 35.9 % — SIGNIFICANT CHANGE UP (ref 34.5–45)
HGB BLD-MCNC: 10.5 G/DL — LOW (ref 11.5–15.5)
MAGNESIUM SERPL-MCNC: 1.4 MG/DL — LOW (ref 1.6–2.6)
MCHC RBC-ENTMCNC: 23.3 PG — LOW (ref 27–34)
MCHC RBC-ENTMCNC: 29.2 GM/DL — LOW (ref 32–36)
MCV RBC AUTO: 79.6 FL — LOW (ref 80–100)
NRBC # BLD: 0 /100 WBCS — SIGNIFICANT CHANGE UP (ref 0–0)
PHOSPHATE SERPL-MCNC: 3.2 MG/DL — SIGNIFICANT CHANGE UP (ref 2.5–4.5)
PLATELET # BLD AUTO: 266 K/UL — SIGNIFICANT CHANGE UP (ref 150–400)
POTASSIUM SERPL-MCNC: 3.9 MMOL/L — SIGNIFICANT CHANGE UP (ref 3.5–5.3)
POTASSIUM SERPL-SCNC: 3.9 MMOL/L — SIGNIFICANT CHANGE UP (ref 3.5–5.3)
RBC # BLD: 4.51 M/UL — SIGNIFICANT CHANGE UP (ref 3.8–5.2)
RBC # FLD: 18.1 % — HIGH (ref 10.3–14.5)
SODIUM SERPL-SCNC: 136 MMOL/L — SIGNIFICANT CHANGE UP (ref 135–145)
SPECIMEN SOURCE: SIGNIFICANT CHANGE UP
SPECIMEN SOURCE: SIGNIFICANT CHANGE UP
TACROLIMUS SERPL-MCNC: 6.8 NG/ML — SIGNIFICANT CHANGE UP
WBC # BLD: 3.39 K/UL — LOW (ref 3.8–10.5)
WBC # FLD AUTO: 3.39 K/UL — LOW (ref 3.8–10.5)

## 2024-02-08 PROCEDURE — 84145 PROCALCITONIN (PCT): CPT

## 2024-02-08 PROCEDURE — 82435 ASSAY OF BLOOD CHLORIDE: CPT

## 2024-02-08 PROCEDURE — 71045 X-RAY EXAM CHEST 1 VIEW: CPT

## 2024-02-08 PROCEDURE — 87086 URINE CULTURE/COLONY COUNT: CPT

## 2024-02-08 PROCEDURE — 82947 ASSAY GLUCOSE BLOOD QUANT: CPT

## 2024-02-08 PROCEDURE — 99239 HOSP IP/OBS DSCHRG MGMT >30: CPT

## 2024-02-08 PROCEDURE — 96375 TX/PRO/DX INJ NEW DRUG ADDON: CPT

## 2024-02-08 PROCEDURE — 84484 ASSAY OF TROPONIN QUANT: CPT

## 2024-02-08 PROCEDURE — 80180 DRUG SCRN QUAN MYCOPHENOLATE: CPT

## 2024-02-08 PROCEDURE — 96365 THER/PROPH/DIAG IV INF INIT: CPT

## 2024-02-08 PROCEDURE — 87040 BLOOD CULTURE FOR BACTERIA: CPT

## 2024-02-08 PROCEDURE — 81001 URINALYSIS AUTO W/SCOPE: CPT

## 2024-02-08 PROCEDURE — 76776 US EXAM K TRANSPL W/DOPPLER: CPT

## 2024-02-08 PROCEDURE — 82553 CREATINE MB FRACTION: CPT

## 2024-02-08 PROCEDURE — 70490 CT SOFT TISSUE NECK W/O DYE: CPT

## 2024-02-08 PROCEDURE — 97161 PT EVAL LOW COMPLEX 20 MIN: CPT

## 2024-02-08 PROCEDURE — 85025 COMPLETE CBC W/AUTO DIFF WBC: CPT

## 2024-02-08 PROCEDURE — 83880 ASSAY OF NATRIURETIC PEPTIDE: CPT

## 2024-02-08 PROCEDURE — 82803 BLOOD GASES ANY COMBINATION: CPT

## 2024-02-08 PROCEDURE — 93005 ELECTROCARDIOGRAM TRACING: CPT

## 2024-02-08 PROCEDURE — 71046 X-RAY EXAM CHEST 2 VIEWS: CPT

## 2024-02-08 PROCEDURE — 71250 CT THORAX DX C-: CPT

## 2024-02-08 PROCEDURE — 80048 BASIC METABOLIC PNL TOTAL CA: CPT

## 2024-02-08 PROCEDURE — 93306 TTE W/DOPPLER COMPLETE: CPT

## 2024-02-08 PROCEDURE — 76536 US EXAM OF HEAD AND NECK: CPT

## 2024-02-08 PROCEDURE — 99285 EMERGENCY DEPT VISIT HI MDM: CPT | Mod: 25

## 2024-02-08 PROCEDURE — 83690 ASSAY OF LIPASE: CPT

## 2024-02-08 PROCEDURE — 87521 HEPATITIS C PROBE&RVRS TRNSC: CPT

## 2024-02-08 PROCEDURE — 82330 ASSAY OF CALCIUM: CPT

## 2024-02-08 PROCEDURE — 80053 COMPREHEN METABOLIC PANEL: CPT

## 2024-02-08 PROCEDURE — 85610 PROTHROMBIN TIME: CPT

## 2024-02-08 PROCEDURE — 36415 COLL VENOUS BLD VENIPUNCTURE: CPT

## 2024-02-08 PROCEDURE — 85018 HEMOGLOBIN: CPT

## 2024-02-08 PROCEDURE — 84100 ASSAY OF PHOSPHORUS: CPT

## 2024-02-08 PROCEDURE — 83735 ASSAY OF MAGNESIUM: CPT

## 2024-02-08 PROCEDURE — 82550 ASSAY OF CK (CPK): CPT

## 2024-02-08 PROCEDURE — 80197 ASSAY OF TACROLIMUS: CPT

## 2024-02-08 PROCEDURE — 85730 THROMBOPLASTIN TIME PARTIAL: CPT

## 2024-02-08 PROCEDURE — 84295 ASSAY OF SERUM SODIUM: CPT

## 2024-02-08 PROCEDURE — 87637 SARSCOV2&INF A&B&RSV AMP PRB: CPT

## 2024-02-08 PROCEDURE — 83605 ASSAY OF LACTIC ACID: CPT

## 2024-02-08 PROCEDURE — 85014 HEMATOCRIT: CPT

## 2024-02-08 PROCEDURE — 84132 ASSAY OF SERUM POTASSIUM: CPT

## 2024-02-08 PROCEDURE — 85027 COMPLETE CBC AUTOMATED: CPT

## 2024-02-08 RX ORDER — CLOPIDOGREL BISULFATE 75 MG/1
1 TABLET, FILM COATED ORAL
Qty: 0 | Refills: 0 | DISCHARGE

## 2024-02-08 RX ORDER — AMLODIPINE BESYLATE 2.5 MG/1
1 TABLET ORAL
Qty: 30 | Refills: 0
Start: 2024-02-08 | End: 2024-03-08

## 2024-02-08 RX ORDER — AMLODIPINE BESYLATE 2.5 MG/1
1 TABLET ORAL
Qty: 0 | Refills: 0 | DISCHARGE
Start: 2024-02-08

## 2024-02-08 RX ORDER — SODIUM CHLORIDE 0.65 %
2 AEROSOL, SPRAY (ML) NASAL
Qty: 1 | Refills: 2
Start: 2024-02-08 | End: 2024-05-07

## 2024-02-08 RX ORDER — MAGNESIUM SULFATE 500 MG/ML
2 VIAL (ML) INJECTION ONCE
Refills: 0 | Status: COMPLETED | OUTPATIENT
Start: 2024-02-08 | End: 2024-02-08

## 2024-02-08 RX ADMIN — Medication 25 GRAM(S): at 11:23

## 2024-02-08 RX ADMIN — LIDOCAINE 1 PATCH: 4 CREAM TOPICAL at 11:20

## 2024-02-08 RX ADMIN — LIDOCAINE 1 PATCH: 4 CREAM TOPICAL at 01:00

## 2024-02-08 RX ADMIN — PANTOPRAZOLE SODIUM 40 MILLIGRAM(S): 20 TABLET, DELAYED RELEASE ORAL at 05:26

## 2024-02-08 RX ADMIN — Medication 1 PACKET(S): at 02:49

## 2024-02-08 RX ADMIN — AMLODIPINE BESYLATE 5 MILLIGRAM(S): 2.5 TABLET ORAL at 05:27

## 2024-02-08 RX ADMIN — Medication 5 MILLIGRAM(S): at 05:27

## 2024-02-08 RX ADMIN — MYCOPHENOLATE MOFETIL 500 MILLIGRAM(S): 250 CAPSULE ORAL at 05:26

## 2024-02-08 RX ADMIN — Medication 81 MILLIGRAM(S): at 11:21

## 2024-02-08 RX ADMIN — HEPARIN SODIUM 5000 UNIT(S): 5000 INJECTION INTRAVENOUS; SUBCUTANEOUS at 05:27

## 2024-02-08 NOTE — PROGRESS NOTE ADULT - ASSESSMENT
Pt is a 54 y/o F w/ a PMHx of ESRD s/p R-DDRT (2020), chronic SVC syndrome, HTN, CABG (2015), with recent hospitalization at [facility] for epistaxis and CP, who is presenting with continued chest pain, now w L facial swelling c/f SVC syndrome

## 2024-02-08 NOTE — DISCHARGE NOTE NURSING/CASE MANAGEMENT/SOCIAL WORK - NSDCPEFALRISK_GEN_ALL_CORE
For information on Fall & Injury Prevention, visit: [email or web address] OR  [email or web address] OR  https://www.cdc.gov/steadi/patient.html

## 2024-02-08 NOTE — PROGRESS NOTE ADULT - ASSESSMENT
53yoF PMHx of ESRD R-DDRT (2020) c/b HCV (treated per patient), chronic SVC syndrome s/p drain(?), HTN, coronary bypass 2015, presenting after being discharged from [facility] (2/1 - 2/3) for epistaxis and chest pain. She presented here for further workup of her epistaxis and chest pain.   Nephrology consulted for renal trasnplant    s/p DDRT @ [facility] 08/14/2020  Outpatient nephrologist is Dr. Lake / Dr Streeter   Continue home dose Immunosuppression meds    mg BID, Prednisone 5 mg qd, Envarsus 11 mg   please Check Tacrolimus 30 min prior to morning dose. Last level at goal ( 4-7)  Kidney function improved with IVF   Monitor BMP, u/o    HTN:   BP optimal   Monitor    Hyponatremia  improved  monitor     Proteinuria  Repeat UA  if persistent -->Defer to transplant nephrology for outpt work up .     Hypomagnesemia  Repelte Mg sul today  Monitor     Hypercalcemia  s/p IVF   improved

## 2024-02-08 NOTE — PROGRESS NOTE ADULT - PROBLEM SELECTOR PLAN 7
- DVT: SubQ Heparin  - Diet: Renal Diet  - Code: Full Code  - PT: Ordered  - Pharmacy: [facility] [address] [place] [address]  - Dispo: Pending clinical course - DVT: SubQ Heparin  - Diet: Renal Diet  - Code: Full Code  - PT: Ordered  - Pharmacy: [facility] on [address] [place] [address]  - Dispo: home today no needs

## 2024-02-08 NOTE — PROGRESS NOTE ADULT - PROVIDER SPECIALTY LIST ADULT
ENT
Internal Medicine
Nephrology
Transplant Nephrology
ENT
Internal Medicine

## 2024-02-08 NOTE — DISCHARGE NOTE NURSING/CASE MANAGEMENT/SOCIAL WORK - PATIENT PORTAL LINK FT
You can access the FollowMyHealth Patient Portal offered by [facility] by registering at the following website: [email or web address]. By joining [facility]’s FollowMyHealth portal, you will also be able to view your health information using other applications (apps) compatible with our system.

## 2024-02-08 NOTE — DISCHARGE NOTE NURSING/CASE MANAGEMENT/SOCIAL WORK - NSDCFUADDAPPT_GEN_ALL_CORE_FT
APPTS ARE READY TO BE MADE: [X] YES    Best Family or Patient Contact (if needed):    Additional Information about above appointments (if needed):    1: PCP (Dr. Negron), within 2 weeks  2: Transplant Nephrology (Dr. Lake), within 2 weeks  3: ENT ([facility] or patient's own ENT provider), within 2 weeks  4: Cardiology, within 2 weeks    Other comments or requests:

## 2024-02-08 NOTE — PROGRESS NOTE ADULT - PROBLEM SELECTOR PLAN 4
Pt w/ new onset chest pain that started when she developed epistaxis. [facility] 2019 showing clean coronaries.  - At OSH, ECG reportedly normal, TTE done (no records here)  - In the ED, troponins 9 and10, EKG no STEMI but ?T-waves  - Low-likelihood of ACS  - TTP left mid-axillary  - F/U OSH for records  - Repeat troponin + EKG in AM  - Chest X-ray AP/Lateral to evaluate for left-sided chest pain  - Lidocaine patch and acetaminophen for pain  TTE  EF 71% no regional wall abnormalities

## 2024-02-08 NOTE — PROGRESS NOTE ADULT - ATTENDING COMMENTS
53yoF PMHx of ESRD R-DDRT (2020) c/b HCV (treated per patient), chronic SVC syndrome s/p drain(?), HTN, coronary bypass 2015, presenting after being discharged from [facility] (2/1 - 2/3) for epistaxis and chest pain and c/o left sided face and neck pain with swelling.    Epistaxis  - bleeding has finally stopped  - Hgb is stable. Hgb trend: 10.5 <-- , 10.2 <-- , 10.0 <--   - c/w afrin prn  - c/w nasal packing prn      Face/Neck swelling  - improved. pt denies any face pain  - CT chest - Vessels along the anterior chest wall are  presumably secondary to venous narrowing/occlusion, however, the vessel  patency was not established secondary to the lack of intravenous contrast. holding off on IV contrast do to her SAM and h/o renal transplant    SAM in a Renal transplant  - Cr is improving with IV hydration  - Creatinine Trend: 1.05<--, 1.12<--, 1.21<--, 1.33<--, 0.97<--, 0.92<--    Chest pain  -TTE was done due to non specific T wave inversion on the lateral leads , but with no wall motion abnormalities, normal LV fxn, no valvular issues, normal diastolic fxn  -Trops negative x2. Troponin Trend:  <--10,  <--9    Renal Transplant  - c/w immunosuppressants.    stable for discharge with outpt ENT follow up  Discharge time spent: 32 min

## 2024-02-08 NOTE — PROGRESS NOTE ADULT - PROBLEM SELECTOR PLAN 3
Pt w/ 3 day history of epistaxis, diagnosed with sinusitis at [facility], s/p x2 rhinorockets with one removed prior to discharge with plans to follow up with [place] [facility] 02/05  - Hgb stable on admission  - Procalc 0.07  - s/p x1 vancomycin and cefepime  - s/p Unasyn (2/4 -2/6) while packing in place  - ENT removed rhinorocket 2/5 with cauterization, however pt still endorsing some bleeding, ENT rec saline spray, bleeding resolved

## 2024-02-08 NOTE — PROGRESS NOTE ADULT - SUBJECTIVE AND OBJECTIVE BOX
ESTEFANIA CORDERO  53y  MRN: [id number]    Patient is a 53y old  Female who presents with a chief complaint of Chest pain and nose bleed (07 Feb 2024 17:06)      Subjective: no events ON. Denies fever, CP, SOB, abn pain, N/V, dysuria. Tolerating diet.      MEDICATIONS  (STANDING):  amLODIPine   Tablet 5 milliGRAM(s) Oral daily  aspirin enteric coated 81 milliGRAM(s) Oral daily  atorvastatin 20 milliGRAM(s) Oral at bedtime  heparin   Injectable 5000 Unit(s) SubCutaneous every 8 hours  influenza   Vaccine 0.5 milliLiter(s) IntraMuscular once  lidocaine   4% Patch 1 Patch Transdermal daily  mycophenolate mofetil 500 milliGRAM(s) Oral two times a day  pantoprazole    Tablet 40 milliGRAM(s) Oral before breakfast  predniSONE   Tablet 5 milliGRAM(s) Oral daily  sodium chloride 0.9%. 1000 milliLiter(s) (75 mL/Hr) IV Continuous <Continuous>  tacrolimus ER Tablet (ENVARSUS XR) 11 milliGRAM(s) Oral daily    MEDICATIONS  (PRN):  acetaminophen     Tablet .. 650 milliGRAM(s) Oral every 6 hours PRN Temp greater or equal to 38C (100.4F), Mild Pain (1 - 3)  sodium chloride 0.65% Nasal 1 Spray(s) Both Nostrils four times a day PRN Nasal Congestion      Objective:    Vitals: Vital Signs Last 24 Hrs  T(C): 36.7 (02-08-24 @ 05:12), Max: 36.9 (02-07-24 @ 21:24)  T(F): 98.1 (02-08-24 @ 05:12), Max: 98.4 (02-07-24 @ 21:24)  HR: 70 (02-08-24 @ 05:12) (68 - 78)  BP: 123/73 (02-08-24 @ 05:12) (110/73 - 146/78)  BP(mean): --  RR: 18 (02-08-24 @ 05:12) (18 - 18)  SpO2: 96% (02-08-24 @ 05:12) (96% - 98%)            I&O's Summary      PHYSICAL EXAM:  GENERAL: NAD  HEAD:  Atraumatic, Normocephalic  EYES: EOMI, conjunctiva and sclera clear  CHEST/LUNG: Clear to auscultation bilaterally; No rales, rhonchi, wheezing, or rubs  HEART: Regular rate and rhythm; No murmurs, rubs, or gallops  ABDOMEN: Soft, Nontender, Nondistended;   SKIN: No rashes or lesions  NERVOUS SYSTEM:  Alert & Oriented X3, no focal deficits    LABS:  02-07    137  |  104  |  14  ----------------------------<  88  3.8   |  23  |  1.12  02-06    138  |  103  |  16  ----------------------------<  151<H>  4.6   |  23  |  1.21    Ca    10.0      07 Feb 2024 06:47  Ca    10.0      06 Feb 2024 12:52  Phos  2.3     02-07  Mg     1.6     02-07                      Urinalysis Basic - ( 07 Feb 2024 06:47 )    Color: x / Appearance: x / SG: x / pH: x  Gluc: 88 mg/dL / Ketone: x  / Bili: x / Urobili: x   Blood: x / Protein: x / Nitrite: x   Leuk Esterase: x / RBC: x / WBC x   Sq Epi: x / Non Sq Epi: x / Bacteria: x                              10.2   4.40  )-----------( 239      ( 07 Feb 2024 06:43 )             34.0                         10.0   3.52  )-----------( 215      ( 06 Feb 2024 14:52 )             33.7     CAPILLARY BLOOD GLUCOSE          RADIOLOGY & ADDITIONAL TESTS:    Imaging Personally Reviewed:  [x ] YES  [ ] NO    Consultants involved in case:   Consultant(s) Notes Reviewed:  [ x] YES  [ ] NO:   Care Discussed with Consultants/Other Providers [x ] YES  [ ] NO         ESTEFANIA CORDERO  53y  MRN: [id number]    Patient is a 53y old  Female who presents with a chief complaint of Chest pain and nose bleed (07 Feb 2024 17:06)      Subjective: no events ON. Denies fever, CP, SOB, abn pain, N/V, dysuria. Tolerating diet.  No bleeding ON    MEDICATIONS  (STANDING):  amLODIPine   Tablet 5 milliGRAM(s) Oral daily  aspirin enteric coated 81 milliGRAM(s) Oral daily  atorvastatin 20 milliGRAM(s) Oral at bedtime  heparin   Injectable 5000 Unit(s) SubCutaneous every 8 hours  influenza   Vaccine 0.5 milliLiter(s) IntraMuscular once  lidocaine   4% Patch 1 Patch Transdermal daily  mycophenolate mofetil 500 milliGRAM(s) Oral two times a day  pantoprazole    Tablet 40 milliGRAM(s) Oral before breakfast  predniSONE   Tablet 5 milliGRAM(s) Oral daily  sodium chloride 0.9%. 1000 milliLiter(s) (75 mL/Hr) IV Continuous <Continuous>  tacrolimus ER Tablet (ENVARSUS XR) 11 milliGRAM(s) Oral daily    MEDICATIONS  (PRN):  acetaminophen     Tablet .. 650 milliGRAM(s) Oral every 6 hours PRN Temp greater or equal to 38C (100.4F), Mild Pain (1 - 3)  sodium chloride 0.65% Nasal 1 Spray(s) Both Nostrils four times a day PRN Nasal Congestion      Objective:    Vitals: Vital Signs Last 24 Hrs  T(C): 36.7 (02-08-24 @ 05:12), Max: 36.9 (02-07-24 @ 21:24)  T(F): 98.1 (02-08-24 @ 05:12), Max: 98.4 (02-07-24 @ 21:24)  HR: 70 (02-08-24 @ 05:12) (68 - 78)  BP: 123/73 (02-08-24 @ 05:12) (110/73 - 146/78)  BP(mean): --  RR: 18 (02-08-24 @ 05:12) (18 - 18)  SpO2: 96% (02-08-24 @ 05:12) (96% - 98%)            I&O's Summary      PHYSICAL EXAM:  GENERAL: NAD  HEAD:  Atraumatic, Normocephalic. facial swelling mostly resolved  EYES: EOMI, conjunctiva and sclera clear  CHEST/LUNG: Clear to auscultation bilaterally; No rales, rhonchi, wheezing, or rubs  HEART: Regular rate and rhythm; No murmurs, rubs, or gallops  ABDOMEN: Soft, Nontender, Nondistended;   SKIN: No rashes or lesions  NERVOUS SYSTEM:  Alert & Oriented X3, no focal deficits    LABS:  02-07    137  |  104  |  14  ----------------------------<  88  3.8   |  23  |  1.12  02-06    138  |  103  |  16  ----------------------------<  151<H>  4.6   |  23  |  1.21    Ca    10.0      07 Feb 2024 06:47  Ca    10.0      06 Feb 2024 12:52  Phos  2.3     02-07  Mg     1.6     02-07                      Urinalysis Basic - ( 07 Feb 2024 06:47 )    Color: x / Appearance: x / SG: x / pH: x  Gluc: 88 mg/dL / Ketone: x  / Bili: x / Urobili: x   Blood: x / Protein: x / Nitrite: x   Leuk Esterase: x / RBC: x / WBC x   Sq Epi: x / Non Sq Epi: x / Bacteria: x                              10.2   4.40  )-----------( 239      ( 07 Feb 2024 06:43 )             34.0                         10.0   3.52  )-----------( 215      ( 06 Feb 2024 14:52 )             33.7     CAPILLARY BLOOD GLUCOSE          RADIOLOGY & ADDITIONAL TESTS:    Imaging Personally Reviewed:  [x ] YES  [ ] NO    Consultants involved in case:   Consultant(s) Notes Reviewed:  [ x] YES  [ ] NO:   Care Discussed with Consultants/Other Providers [x ] YES  [ ] NO

## 2024-02-08 NOTE — PROGRESS NOTE ADULT - REASON FOR ADMISSION
Chest pain and nose bleed

## 2024-02-08 NOTE — PROGRESS NOTE ADULT - SUBJECTIVE AND OBJECTIVE BOX
[facility]   MD MEL DOBBS MD RUORU WONG, PA    TEL:  FROM 9 AM to 5 PM ---OFFICE: [phone]  AVAILABLE ON TEAMS    FROM 5 PM - 9 AM PLEASE CALL ANSWERING SERVICE: [phone]    RENAL FOLLOW UP NOTE--Date of Service 02-08-24 @ 10:21  --------------------------------------------------------------------------------  HPI:      Pt seen and examined at bedside.   Vanita SOB, chest pain     PAST HISTORY  --------------------------------------------------------------------------------  No significant changes to PMH, PSH, FHx, SHx, unless otherwise noted    ALLERGIES & MEDICATIONS  --------------------------------------------------------------------------------  Allergies    ibuprofen/morphine (Unknown)  latex (Swelling)  lactose (Hives)  ibuprofen (Hives)  morphine (Anaphylaxis)  contrast media (iodine-based) (Urticaria)  shellfish (Urticaria)  morphine (Urticaria)  penicillin (Anaphylaxis)    Intolerances      Standing Inpatient Medications  amLODIPine   Tablet 5 milliGRAM(s) Oral daily  aspirin enteric coated 81 milliGRAM(s) Oral daily  atorvastatin 20 milliGRAM(s) Oral at bedtime  heparin   Injectable 5000 Unit(s) SubCutaneous every 8 hours  influenza   Vaccine 0.5 milliLiter(s) IntraMuscular once  lidocaine   4% Patch 1 Patch Transdermal daily  magnesium sulfate  IVPB 2 Gram(s) IV Intermittent once  mycophenolate mofetil 500 milliGRAM(s) Oral two times a day  pantoprazole    Tablet 40 milliGRAM(s) Oral before breakfast  predniSONE   Tablet 5 milliGRAM(s) Oral daily  sodium chloride 0.9%. 1000 milliLiter(s) IV Continuous <Continuous>  tacrolimus ER Tablet (ENVARSUS XR) 11 milliGRAM(s) Oral daily    PRN Inpatient Medications  acetaminophen     Tablet .. 650 milliGRAM(s) Oral every 6 hours PRN  sodium chloride 0.65% Nasal 1 Spray(s) Both Nostrils four times a day PRN      REVIEW OF SYSTEMS  --------------------------------------------------------------------------------  General: no fever  CVS: no chest pain  MSK: no edema     VITALS/PHYSICAL EXAM  --------------------------------------------------------------------------------  T(C): 36.7 (02-08-24 @ 05:12), Max: 36.9 (02-07-24 @ 21:24)  HR: 70 (02-08-24 @ 05:12) (68 - 78)  BP: 123/73 (02-08-24 @ 05:12) (110/73 - 146/78)  RR: 18 (02-08-24 @ 05:12) (18 - 18)  SpO2: 96% (02-08-24 @ 05:12) (96% - 98%)  Wt(kg): --        Physical Exam:  	Gen: NAD  	HEENT: MMM  	Pulm: CTA B/L  	CV: S1S2  	Abd: Soft, +BS  	Ext: No LE edema B/L                      Neuro: Awake   	Skin: Warm and Dry   	Vascular access: NO HD catheter            no  ndiaye  LABS/STUDIES  --------------------------------------------------------------------------------              10.5   3.39  >-----------<  266      [02-08-24 @ 06:59]              35.9     136  |  103  |  17  ----------------------------<  92      [02-08-24 @ 06:57]  3.9   |  23  |  1.05        Ca     10.2     [02-08-24 @ 06:57]      Mg     1.4     [02-08-24 @ 06:57]      Phos  3.2     [02-08-24 @ 06:57]            Creatinine Trend:  SCr 1.05 [02-08 @ 06:57]  SCr 1.12 [02-07 @ 06:47]  SCr 1.21 [02-06 @ 12:52]  SCr 1.33 [02-05 @ 07:08]  SCr 0.97 [02-04 @ 05:22]    Urinalysis - [02-08-24 @ 06:57]      Color  / Appearance  / SG  / pH       Gluc 92 / Ketone   / Bili  / Urobili        Blood  / Protein  / Leuk Est  / Nitrite       RBC  / WBC  / Hyaline  / Gran  / Sq Epi  / Non Sq Epi  / Bacteria       Iron 17, TIBC 262, %sat 6      [07-19-23 @ 07:51]  Ferritin 142      [07-19-23 @ 07:51]  PTH -- (Ca 11.2)      [07-03-23 @ 15:05]   170  Vitamin D (25OH) 31.8      [07-03-23 @ 15:05]

## 2024-02-13 ENCOUNTER — APPOINTMENT (OUTPATIENT)
Dept: NEPHROLOGY | Facility: CLINIC | Age: 54
End: 2024-02-13
Payer: MEDICARE

## 2024-02-13 PROCEDURE — 99214 OFFICE O/P EST MOD 30 MIN: CPT

## 2024-02-13 NOTE — ASSESSMENT
[FreeTextEntry1] : Kidney Transplant recipient, functioning allograft, noted last creatinine normal. Labs done today. Will follow. Immunosuppression- Reviewed. On Tac/ mg/dose and prednisone Epistaxis: Resolved. Off Plavix BK Viremia  ;   follow up BKV PCR.   Proteinuria: will follow HTN: Good Control. Home measurements 120's. Advised to take amlodipine consistently. Hyperlipidemia: She is on statin - rosuvastatin. On Plavix for prior surgical bypass for svc syndrome while on dialysis- continue Also advised eye check, She has had Derm check done Erythrocytosis: On f./u with Dr. Oliva,     Plan: Continue current medications additional changes- none made today  Labs and follow up visit to be scheduled as discussed. She has completed vaccination, Pfizer including booster  Discussed Renal Preservation strategies including achieving optimal weight through calory restriction and regular exercise, daily exercise, sleep hygiene, optimized control of  blood pressure, lipids, avoidance of NSAIDs, Low Na and Low animal protein diet and medication adherence. Primary nephrologist- Jw Streeter- She has been advised to follow up with Dr. Streeter. She has seen him.  Follow up at transplant center every 3 months if labs later this week.

## 2024-02-13 NOTE — HISTORY OF PRESENT ILLNESS
[FreeTextEntry1] : This visit is provided by telehealth using real time 2 way audio visual technology. This patient is located at home and the provider is located at the [facility] medical office at [address] [place] [address]. Patient and family member participated in this visit. Verbal consent for this visit was given by patient/accompanying family member Total duration of this visit which included conversation, lab review, medication review and clinical assessment and advice lasted approximately 25-30 minutes.   She is on Plavix for prior surgical bypass for svc syndrome started while she was on dialysis. This has been stopped after she had hospitalization for epistaxis.   Currently:  Patient feels well. Now working for [employer] at [employer]. Not back at work yet She lives with her  and her mother  Reviewed labs from August 3, stable creatinine, Tac level therapeutic. BK was detected.   On Envarsus 11, Cellcept 500/500 and pred 5/d.   Currently:  Patient feels well Reviewed for acute symptoms-currently has none. Currently visiting [place] visiting  family.   On Telehealth visit: Patient appears comfortable No distress, not dyspneic Conscious, alert, oriented X 3 Speech: Normal Other observations as noted No ankle swelling Reviewed last lab data  Patient will repeat lab work including urinalysis as outpatient.

## 2024-02-15 NOTE — DISCHARGE NOTE NURSING/CASE MANAGEMENT/SOCIAL WORK - BRAND OF SECOND COVID-19 BOOSTER
Patient reviewed on 2/15/2024.  Okay to proceed at [facility]. The following pre-procedure instructions and arrival time have been reviewed with patient via phone and sent to patient portal for review.  Patient verbalized an understanding.  Pt to be accompanied by wife-Celine day of procedure as responsible .     Dear Eliu     Below you will find basic pre-procedure instructions in preparation for your procedure on 2/16/24 with Dr. Calvo.  Arrival time is at 6:15 am.     - Nothing to eat or drink after midnight the night before your procedure until after your procedure, except AM meds with small sips of water.     - HOLD all Diabetic meds AM of surgery  - HOLD all Insulin AM of surgery  - HOLD all Fluid pills AM of surgery  - HOLD all non-insulin shots until after surgery (Ozempic, Mounjaro, Trulicity, Victoza, Byetta, Wegovy and Adlyxin) (up to 7 days prior)  - HOLD all vitamins and herbal meds AM of surgery   - TAKE all B/P meds, EXCEPT those that contain a fluid pill  - USE inhalers as needed and bring AM of surgery  - USE eye drops as directed  -TAKE blood thinner meds AM of surgery unless otherwise instructed     - Shower and wash face with dial soap for 3 mins PM prior and AM of surgery  - No powder, lotions, creams, oils, gels, ointments, makeup,  or jewelry    - Wear comfortable clothing (button up shirt)     (Patient is required to have a responsible ride to transport home, ride may not leave while patient is in surgery)     -- [facility], 2nd floor Surgery Center, located   @ [address] [place] [address]  2nd Floor Registration        If you have any questions or concerns please feel free to contact your surgeon's office.                 SUDHIR Walker  [facility]  Pre-Admit - Anesthesia Dept      
[employer]

## 2024-02-16 ENCOUNTER — APPOINTMENT (OUTPATIENT)
Dept: TRANSPLANT | Facility: CLINIC | Age: 54
End: 2024-02-16

## 2024-02-21 ENCOUNTER — APPOINTMENT (OUTPATIENT)
Dept: TRANSPLANT | Facility: CLINIC | Age: 54
End: 2024-02-21

## 2024-02-22 LAB
ALBUMIN SERPL ELPH-MCNC: 4.3 G/DL
ALP BLD-CCNC: 68 U/L
ALT SERPL-CCNC: 12 U/L
ANION GAP SERPL CALC-SCNC: 14 MMOL/L
APPEARANCE: CLEAR
AST SERPL-CCNC: 12 U/L
BACTERIA: NEGATIVE /HPF
BILIRUB SERPL-MCNC: 0.8 MG/DL
BILIRUBIN URINE: NEGATIVE
BLOOD URINE: NEGATIVE
BUN SERPL-MCNC: 19 MG/DL
CALCIUM OXALATE CRYSTALS: PRESENT
CALCIUM SERPL-MCNC: 10.1 MG/DL
CAST: 0 /LPF
CHLORIDE SERPL-SCNC: 103 MMOL/L
CMV DNA SPEC QL NAA+PROBE: NOT DETECTED IU/ML
CMVPCR LOG: NOT DETECTED LOG10IU/ML
CO2 SERPL-SCNC: 22 MMOL/L
COLOR: YELLOW
CREAT SERPL-MCNC: 1.05 MG/DL
CREAT SPEC-SCNC: 109 MG/DL
CREAT/PROT UR: 0.2 RATIO
EGFR: 64 ML/MIN/1.73M2
EPITHELIAL CELLS: 0 /HPF
GLUCOSE QUALITATIVE U: NEGATIVE MG/DL
GLUCOSE SERPL-MCNC: 94 MG/DL
HCT VFR BLD CALC: 35.6 %
HGB BLD-MCNC: 10.4 G/DL
KETONES URINE: NEGATIVE MG/DL
LDH SERPL-CCNC: 218 U/L
LEUKOCYTE ESTERASE URINE: NEGATIVE
MAGNESIUM SERPL-MCNC: 1.5 MG/DL
MCHC RBC-ENTMCNC: 23.2 PG
MCHC RBC-ENTMCNC: 29.2 GM/DL
MCV RBC AUTO: 79.3 FL
MICROSCOPIC-UA: NORMAL
NITRITE URINE: NEGATIVE
PH URINE: 7
PHOSPHATE SERPL-MCNC: 3.4 MG/DL
PLATELET # BLD AUTO: 355 K/UL
POTASSIUM SERPL-SCNC: 4.3 MMOL/L
PROT SERPL-MCNC: 6.8 G/DL
PROT UR-MCNC: 22 MG/DL
PROTEIN URINE: NORMAL MG/DL
RBC # BLD: 4.49 M/UL
RBC # FLD: 18.9 %
RED BLOOD CELLS URINE: 1 /HPF
REVIEW: NORMAL
SODIUM SERPL-SCNC: 140 MMOL/L
SPECIFIC GRAVITY URINE: 1.01
TACROLIMUS SERPL-MCNC: 4.9 NG/ML
URATE SERPL-MCNC: 6.2 MG/DL
UROBILINOGEN URINE: 0.2 MG/DL
WBC # FLD AUTO: 5.58 K/UL
WHITE BLOOD CELLS URINE: 0 /HPF

## 2024-02-23 LAB — BKV DNA SPEC QL NAA+PROBE: 61 IU/ML

## 2024-02-26 ENCOUNTER — APPOINTMENT (OUTPATIENT)
Dept: CARDIOLOGY | Facility: CLINIC | Age: 54
End: 2024-02-26
Payer: MEDICARE

## 2024-02-26 VITALS
HEART RATE: 82 BPM | BODY MASS INDEX: 29.1 KG/M2 | SYSTOLIC BLOOD PRESSURE: 120 MMHG | WEIGHT: 149 LBS | OXYGEN SATURATION: 97 % | DIASTOLIC BLOOD PRESSURE: 71 MMHG

## 2024-02-26 DIAGNOSIS — N18.6 END STAGE RENAL DISEASE: ICD-10-CM

## 2024-02-26 DIAGNOSIS — I10 ESSENTIAL (PRIMARY) HYPERTENSION: ICD-10-CM

## 2024-02-26 DIAGNOSIS — Z99.2 END STAGE RENAL DISEASE: ICD-10-CM

## 2024-02-26 DIAGNOSIS — E78.5 HYPERLIPIDEMIA, UNSPECIFIED: ICD-10-CM

## 2024-02-26 PROCEDURE — G2211 COMPLEX E/M VISIT ADD ON: CPT

## 2024-02-26 PROCEDURE — 93000 ELECTROCARDIOGRAM COMPLETE: CPT

## 2024-02-26 PROCEDURE — 99205 OFFICE O/P NEW HI 60 MIN: CPT

## 2024-02-26 RX ORDER — TRETINOIN 0.25 MG/G
0.03 CREAM TOPICAL
Qty: 1 | Refills: 4 | Status: DISCONTINUED | COMMUNITY
Start: 2021-08-26 | End: 2024-02-26

## 2024-02-26 RX ORDER — CLOPIDOGREL BISULFATE 75 MG/1
75 TABLET, FILM COATED ORAL
Qty: 90 | Refills: 3 | Status: DISCONTINUED | COMMUNITY
Start: 2020-08-17 | End: 2024-02-26

## 2024-02-26 RX ORDER — BENZOYL PEROXIDE 100 MG/ML
10 LIQUID TOPICAL DAILY
Qty: 1 | Refills: 6 | Status: DISCONTINUED | COMMUNITY
Start: 2021-08-26 | End: 2024-02-26

## 2024-03-14 ENCOUNTER — APPOINTMENT (OUTPATIENT)
Dept: OTOLARYNGOLOGY | Facility: CLINIC | Age: 54
End: 2024-03-14
Payer: MEDICARE

## 2024-03-14 VITALS
SYSTOLIC BLOOD PRESSURE: 128 MMHG | HEIGHT: 60 IN | WEIGHT: 149 LBS | DIASTOLIC BLOOD PRESSURE: 83 MMHG | HEART RATE: 71 BPM | TEMPERATURE: 97.7 F | BODY MASS INDEX: 29.25 KG/M2

## 2024-03-14 DIAGNOSIS — J32.9 CHRONIC SINUSITIS, UNSPECIFIED: ICD-10-CM

## 2024-03-14 DIAGNOSIS — R09.81 NASAL CONGESTION: ICD-10-CM

## 2024-03-14 DIAGNOSIS — R04.0 EPISTAXIS: ICD-10-CM

## 2024-03-14 PROCEDURE — 99204 OFFICE O/P NEW MOD 45 MIN: CPT | Mod: 25

## 2024-03-14 PROCEDURE — 31231 NASAL ENDOSCOPY DX: CPT

## 2024-03-14 NOTE — HISTORY OF PRESENT ILLNESS
[de-identified] : 53 year old female presents after a bad left sided nosebleed in February. Went to ER at [facility], they placed rhionrockets in both nostrils. Denies nosebleeds since ER visit but has been getting lots of stuff out of her nose, bloody and yellow. They  kidney transplant patient, Takes prednisone consistently for kidney. Left nostril has more stuff coming out then right. States nose smells like medication. Was given unasyn in hospital mid feb.   Patient has been getting sinus infections since moving to [place]. Has gotten 4 sinus infections in last 12 months. Using netipot with saline daily which helps. Using flonase daily. Gets pressure at forehead and cheek constantly, even when does not have infection. Has allergies to flowers, and dust. Took antibiotics last month for sinusitis,

## 2024-03-14 NOTE — CONSULT LETTER
[FreeTextEntry1] : Dear Dr. OMARI NUNEZ  I had the pleasure of evaluating your patient ESTEFANIA CORDERO, thank you for allowing us to participate in their care. please see full note detailing our visit below. If you have any questions, please do not hesitate to call me and I would be happy to discuss further.   Kapil Tracy M.D. Attending Physician,   Department of Otolaryngology - Head and Neck Surgery [facility]  Office: [phone] Fax: [phone]

## 2024-03-14 NOTE — END OF VISIT
[FreeTextEntry3] : I personally saw and examined the patient in detail. I spoke to GIUSEPPE Jackson regarding the assessment and plan of care.  I preformed the procedures and I reviewed the above assessment and plan of care, and agree. I have made changes in changes in the body of the note where appropriate.

## 2024-03-14 NOTE — ASSESSMENT
[FreeTextEntry1] : 53 year old female presents with left sided epistaxis. On exam, large mucous and crusting obstructing most of left nostril. The patient was debrided bilaterally with rigid suction as a result of nasal crusting. Breathing much improved after detriment. With some spotting on left possibly from rhinrocket  - continue moisturization regiment  - patient was instructed what to do in the case of another bleed   Also with sinus issues. Symptoms refractory with abx.   Discussed options: 1) continue conservative management with nasal sprays and washes 2) office sinus procedure  - will order CT scan of sinuses, will call to discuss results  - continue Flonase. A topical steroid reduce mucosal swelling, illustrated appropriate use and how to reduce the risk of bleeding - Nasal irrigation and showed how to use it to maximize effectiveness - clearance from nephrologist / transplant doctor as well as anticoagulation    - Risks benefits and alternatives of endoscopic sinus surgery with possible image guidance possible septoplasty bilateral inferior turbinate reduction discussed with patient at length. Risks discussed include but were not limited to bleeding, infection, persistent symptoms, scarring, injury to the skull base and brain and CSF leak, injury to orbit and eye, crusting, septal hematoma, septal perforation results in whistling, empty nose syndrome, crusting and bleeding as well as continued nasal obstruction etc. were discussed. For polyps, discussed very high recurrence rate that require future surveillance, maintenance and likelihood of need for further procedures. Also discussed option of office balloon/hybrid balloon dilation and office sinus surgery - similar risk profile, will not address septum/ turbs and takes a generally more conservative approach with quicker recovery, however higher possibility for need for future intervention.

## 2024-03-20 ENCOUNTER — APPOINTMENT (OUTPATIENT)
Dept: CT IMAGING | Facility: CLINIC | Age: 54
End: 2024-03-20
Payer: MEDICARE

## 2024-03-20 PROCEDURE — 70486 CT MAXILLOFACIAL W/O DYE: CPT

## 2024-03-25 ENCOUNTER — NON-APPOINTMENT (OUTPATIENT)
Age: 54
End: 2024-03-25

## 2024-03-28 ENCOUNTER — APPOINTMENT (OUTPATIENT)
Dept: OTOLARYNGOLOGY | Facility: CLINIC | Age: 54
End: 2024-03-28

## 2024-04-08 ENCOUNTER — APPOINTMENT (OUTPATIENT)
Dept: NEPHROLOGY | Facility: CLINIC | Age: 54
End: 2024-04-08
Payer: MEDICARE

## 2024-04-08 ENCOUNTER — NON-APPOINTMENT (OUTPATIENT)
Age: 54
End: 2024-04-08

## 2024-04-08 VITALS
TEMPERATURE: 98 F | WEIGHT: 154 LBS | OXYGEN SATURATION: 100 % | BODY MASS INDEX: 30.23 KG/M2 | HEIGHT: 60 IN | HEART RATE: 68 BPM | RESPIRATION RATE: 14 BRPM | SYSTOLIC BLOOD PRESSURE: 149 MMHG | DIASTOLIC BLOOD PRESSURE: 84 MMHG

## 2024-04-08 DIAGNOSIS — B34.8 OTHER VIRAL INFECTIONS OF UNSPECIFIED SITE: ICD-10-CM

## 2024-04-08 DIAGNOSIS — Z79.60 LONG TERM (CURRENT) USE OF UNSPECIFIED IMMUNOMODULATORS AND IMMUNOSUPPRESSANTS: ICD-10-CM

## 2024-04-08 PROCEDURE — 99214 OFFICE O/P EST MOD 30 MIN: CPT

## 2024-04-08 RX ORDER — CHLORHEXIDINE GLUCONATE 4 %
325 (65 FE) LIQUID (ML) TOPICAL
Qty: 60 | Refills: 2 | Status: DISCONTINUED | COMMUNITY
End: 2024-04-08

## 2024-04-08 RX ORDER — SENNOSIDES 8.6 MG TABLETS 8.6 MG/1
8.6 TABLET ORAL
Qty: 180 | Refills: 3 | Status: DISCONTINUED | COMMUNITY
Start: 2022-12-15 | End: 2024-04-08

## 2024-04-08 NOTE — HISTORY OF PRESENT ILLNESS
[FreeTextEntry1] : She is not on Plavix but on aspirin. This has been stopped after she had hospitalization for epistaxis.   Currently:  Patient feels well. 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 No episodes of epistaxis of late. Has seen ENT surgeon, planned for procedure on April 30th.  Reviewed labs from prior visit.  On Envarsus 11, Cellcept 500/500 and pred 5/d.   Currently:  Patient feels well, reports working full time. Reviewed for acute symptoms-currently has none.

## 2024-04-08 NOTE — END OF VISIT
Spoke with patients wife, Karey Vo, on HPI. Patients BP this am was 83/47 with HR of of 63. Oxygen had gone down to 89% - now running 90-94. All patient has been doing is sleeping the last three days. Gets up to eat and goes back to bed. Wife states he is \"freezing cold\". Wife states it was discussed, in the past, adding another wire to device. Please advise.
Spoke with patients wife, Liban Myers, on HPI. Advised for patient to hold Entresto tonight and send in a remote transmission  Advised to contact pcp regarding BP. Liban Myers advised understanding and will advise patient.
[Time Spent: ___ minutes] : I have spent [unfilled] minutes of time on the encounter.

## 2024-04-10 RX ORDER — OMEGA-3/DHA/EPA/FISH OIL 300-1000MG
400 CAPSULE ORAL
Qty: 120 | Refills: 3 | Status: ACTIVE | COMMUNITY
Start: 2023-08-03 | End: 1900-01-01

## 2024-04-11 DIAGNOSIS — Z94.0 KIDNEY TRANSPLANT STATUS: ICD-10-CM

## 2024-04-11 LAB
25(OH)D3 SERPL-MCNC: 33.5 NG/ML
ALBUMIN SERPL ELPH-MCNC: 4.7 G/DL
ALP BLD-CCNC: 69 U/L
ALT SERPL-CCNC: 10 U/L
ANION GAP SERPL CALC-SCNC: 11 MMOL/L
APPEARANCE: CLEAR
AST SERPL-CCNC: 11 U/L
BACTERIA: NEGATIVE /HPF
BILIRUB SERPL-MCNC: 1.1 MG/DL
BILIRUBIN URINE: NEGATIVE
BKV DNA SPEC QL NAA+PROBE: 48 IU/ML
BLOOD URINE: NEGATIVE
BUN SERPL-MCNC: 20 MG/DL
CALCIUM SERPL-MCNC: 10.5 MG/DL
CALCIUM SERPL-MCNC: 10.5 MG/DL
CAST: 1 /LPF
CHLORIDE SERPL-SCNC: 103 MMOL/L
CHOLEST SERPL-MCNC: 193 MG/DL
CMV DNA SPEC QL NAA+PROBE: NOT DETECTED IU/ML
CMVPCR LOG: NOT DETECTED LOG10IU/ML
CO2 SERPL-SCNC: 25 MMOL/L
COLOR: YELLOW
CREAT SERPL-MCNC: 1.11 MG/DL
CREAT SPEC-SCNC: 132 MG/DL
CREAT/PROT UR: 0.2 RATIO
EGFR: 60 ML/MIN/1.73M2
EPITHELIAL CELLS: 0 /HPF
ESTIMATED AVERAGE GLUCOSE: 140 MG/DL
GLUCOSE QUALITATIVE U: NEGATIVE MG/DL
GLUCOSE SERPL-MCNC: 94 MG/DL
HBA1C MFR BLD HPLC: 6.5 %
HCT VFR BLD CALC: 39.1 %
HDLC SERPL-MCNC: 113 MG/DL
HGB BLD-MCNC: 11.5 G/DL
KETONES URINE: NEGATIVE MG/DL
LDH SERPL-CCNC: 241 U/L
LDLC SERPL CALC-MCNC: 70 MG/DL
LEUKOCYTE ESTERASE URINE: NEGATIVE
MAGNESIUM SERPL-MCNC: 1.4 MG/DL
MCHC RBC-ENTMCNC: 23.1 PG
MCHC RBC-ENTMCNC: 29.4 GM/DL
MCV RBC AUTO: 78.7 FL
MICROSCOPIC-UA: NORMAL
NITRITE URINE: NEGATIVE
NONHDLC SERPL-MCNC: 80 MG/DL
PARATHYROID HORMONE INTACT: 261 PG/ML
PH URINE: 7
PHOSPHATE SERPL-MCNC: 3 MG/DL
PLATELET # BLD AUTO: 319 K/UL
POTASSIUM SERPL-SCNC: 4 MMOL/L
PROT SERPL-MCNC: 7 G/DL
PROT UR-MCNC: 29 MG/DL
PROTEIN URINE: 30 MG/DL
RBC # BLD: 4.97 M/UL
RBC # FLD: 19.3 %
RED BLOOD CELLS URINE: 1 /HPF
SODIUM SERPL-SCNC: 139 MMOL/L
SPECIFIC GRAVITY URINE: 1.01
TACROLIMUS SERPL-MCNC: 7.9 NG/ML
TRIGL SERPL-MCNC: 50 MG/DL
URATE SERPL-MCNC: 6.6 MG/DL
UROBILINOGEN URINE: 0.2 MG/DL
WBC # FLD AUTO: 6.47 K/UL
WHITE BLOOD CELLS URINE: 6 /HPF

## 2024-04-11 RX ORDER — REPAGLINIDE 1 MG/1
1 TABLET ORAL 3 TIMES DAILY
Qty: 90 | Refills: 3 | Status: ACTIVE | COMMUNITY
Start: 2024-04-11 | End: 1900-01-01

## 2024-04-13 PROBLEM — E78.5 HYPERLIPEMIA: Status: ACTIVE | Noted: 2022-01-25

## 2024-04-13 PROBLEM — N18.6 END STAGE RENAL DISEASE ON DIALYSIS: Status: ACTIVE | Noted: 2019-06-11

## 2024-04-13 NOTE — DISCUSSION/SUMMARY
[FreeTextEntry1] : HTN- stable  HLD- controlled  Kidney- unchanged status  Get ECHO for baseline LV function  Followup in 2 mths  Time spent revieiwng history, MD notes, hospital records, med rec, exam, pt discussion and note writing [EKG obtained to assist in diagnosis and management of assessed problem(s)] : EKG obtained to assist in diagnosis and management of assessed problem(s)

## 2024-04-13 NOTE — HISTORY OF PRESENT ILLNESS
[FreeTextEntry1] : 53 year old female with HTN, HLD, kidney transplant for cardiology followup.  She currently denies CP, SOB or h/A

## 2024-04-30 ENCOUNTER — APPOINTMENT (OUTPATIENT)
Dept: OTOLARYNGOLOGY | Facility: CLINIC | Age: 54
End: 2024-04-30

## 2024-05-01 ENCOUNTER — NON-APPOINTMENT (OUTPATIENT)
Age: 54
End: 2024-05-01

## 2024-05-09 ENCOUNTER — APPOINTMENT (OUTPATIENT)
Dept: OTOLARYNGOLOGY | Facility: CLINIC | Age: 54
End: 2024-05-09

## 2024-05-19 NOTE — PROCEDURE NOTE - IR ESTIMATED BLOOD LOSS
Group Topic: BH Check-in/Symptom Rating    Date: 5/19/2024  Start Time: 0930  End Time: 0945  Facilitators: Destiny Lara    Focus: BH Check-in/Symptom Rating  Number in attendance: 8    Method: Group  Attendance: Present  Participation: Active  Patient Response:  Tired  Mood: Irritable  Affect: Type: Irritable   Range: Full (normal)   Congruency: Congruent   Stability: Stable  Behavior/Socialization: Appropriate to group  Thought Process: [place] thinking  Task Performance: Follows directions  Patient Evaluation: Independent - full participation     None

## 2024-07-16 NOTE — ED ADULT TRIAGE NOTE - PAIN: PRESENCE, MLM
Patient tolerating crackers/fluids. Smiles and gives thumbs up that pain level is satisfactory. No nausea present. Reviewed discharge instructions with aid of , wife, daughter also in room. Ice pack refreshed. Prescription retrieved from pharmacy by daughter. All verbalize understanding and deny and questions or concerns. Patient taken to lobby via wheelchair, in no apparent distress.    complains of pain/discomfort

## 2024-07-21 ENCOUNTER — EMERGENCY (EMERGENCY)
Facility: HOSPITAL | Age: 54
LOS: 1 days | Discharge: HOME | End: 2024-07-21
Attending: STUDENT IN AN ORGANIZED HEALTH CARE EDUCATION/TRAINING PROGRAM
Payer: MEDICARE

## 2024-07-21 VITALS
HEIGHT: 63 IN | DIASTOLIC BLOOD PRESSURE: 82 MMHG | OXYGEN SATURATION: 98 % | TEMPERATURE: 100 F | SYSTOLIC BLOOD PRESSURE: 138 MMHG | HEART RATE: 78 BPM | WEIGHT: 149.91 LBS | RESPIRATION RATE: 19 BRPM

## 2024-07-21 VITALS
RESPIRATION RATE: 18 BRPM | OXYGEN SATURATION: 100 % | SYSTOLIC BLOOD PRESSURE: 111 MMHG | HEART RATE: 69 BPM | DIASTOLIC BLOOD PRESSURE: 65 MMHG | TEMPERATURE: 98 F

## 2024-07-21 DIAGNOSIS — Z94.0 KIDNEY TRANSPLANT STATUS: Chronic | ICD-10-CM

## 2024-07-21 DIAGNOSIS — Z95.828 PRESENCE OF OTHER VASCULAR IMPLANTS AND GRAFTS: Chronic | ICD-10-CM

## 2024-07-21 DIAGNOSIS — I77.0 ARTERIOVENOUS FISTULA, ACQUIRED: Chronic | ICD-10-CM

## 2024-07-21 LAB
ALBUMIN SERPL ELPH-MCNC: 4.7 G/DL — SIGNIFICANT CHANGE UP (ref 3.3–5)
ALP SERPL-CCNC: 79 U/L — SIGNIFICANT CHANGE UP (ref 40–120)
ALT FLD-CCNC: 23 U/L — SIGNIFICANT CHANGE UP (ref 10–45)
ANION GAP SERPL CALC-SCNC: 14 MMOL/L — SIGNIFICANT CHANGE UP (ref 5–17)
APPEARANCE UR: ABNORMAL
AST SERPL-CCNC: 18 U/L — SIGNIFICANT CHANGE UP (ref 10–40)
BACTERIA # UR AUTO: ABNORMAL /HPF
BASOPHILS # BLD AUTO: 0.03 K/UL — SIGNIFICANT CHANGE UP (ref 0–0.2)
BASOPHILS NFR BLD AUTO: 0.3 % — SIGNIFICANT CHANGE UP (ref 0–2)
BILIRUB SERPL-MCNC: 0.9 MG/DL — SIGNIFICANT CHANGE UP (ref 0.2–1.2)
BILIRUB UR-MCNC: NEGATIVE — SIGNIFICANT CHANGE UP
BUN SERPL-MCNC: 17 MG/DL — SIGNIFICANT CHANGE UP (ref 7–23)
CALCIUM SERPL-MCNC: 11 MG/DL — HIGH (ref 8.4–10.5)
CAST: 4 /LPF — SIGNIFICANT CHANGE UP (ref 0–4)
CHLORIDE SERPL-SCNC: 102 MMOL/L — SIGNIFICANT CHANGE UP (ref 96–108)
CO2 SERPL-SCNC: 22 MMOL/L — SIGNIFICANT CHANGE UP (ref 22–31)
COLOR SPEC: YELLOW — SIGNIFICANT CHANGE UP
CREAT SERPL-MCNC: 0.97 MG/DL — SIGNIFICANT CHANGE UP (ref 0.5–1.3)
DIFF PNL FLD: ABNORMAL
EGFR: 70 ML/MIN/1.73M2 — SIGNIFICANT CHANGE UP
EOSINOPHIL # BLD AUTO: 0.33 K/UL — SIGNIFICANT CHANGE UP (ref 0–0.5)
EOSINOPHIL NFR BLD AUTO: 3.5 % — SIGNIFICANT CHANGE UP (ref 0–6)
GLUCOSE SERPL-MCNC: 92 MG/DL — SIGNIFICANT CHANGE UP (ref 70–99)
GLUCOSE UR QL: NEGATIVE MG/DL — SIGNIFICANT CHANGE UP
HCT VFR BLD CALC: 41.3 % — SIGNIFICANT CHANGE UP (ref 34.5–45)
HGB BLD-MCNC: 12.3 G/DL — SIGNIFICANT CHANGE UP (ref 11.5–15.5)
IMM GRANULOCYTES NFR BLD AUTO: 0.4 % — SIGNIFICANT CHANGE UP (ref 0–0.9)
KETONES UR-MCNC: NEGATIVE MG/DL — SIGNIFICANT CHANGE UP
LEUKOCYTE ESTERASE UR-ACNC: ABNORMAL
LYMPHOCYTES # BLD AUTO: 1.35 K/UL — SIGNIFICANT CHANGE UP (ref 1–3.3)
LYMPHOCYTES # BLD AUTO: 14.3 % — SIGNIFICANT CHANGE UP (ref 13–44)
MCHC RBC-ENTMCNC: 23.7 PG — LOW (ref 27–34)
MCHC RBC-ENTMCNC: 29.8 GM/DL — LOW (ref 32–36)
MCV RBC AUTO: 79.6 FL — LOW (ref 80–100)
MONOCYTES # BLD AUTO: 1.05 K/UL — HIGH (ref 0–0.9)
MONOCYTES NFR BLD AUTO: 11.2 % — SIGNIFICANT CHANGE UP (ref 2–14)
NEUTROPHILS # BLD AUTO: 6.61 K/UL — SIGNIFICANT CHANGE UP (ref 1.8–7.4)
NEUTROPHILS NFR BLD AUTO: 70.3 % — SIGNIFICANT CHANGE UP (ref 43–77)
NITRITE UR-MCNC: NEGATIVE — SIGNIFICANT CHANGE UP
NRBC # BLD: 0 /100 WBCS — SIGNIFICANT CHANGE UP (ref 0–0)
PH UR: 7.5 — SIGNIFICANT CHANGE UP (ref 5–8)
PLATELET # BLD AUTO: 261 K/UL — SIGNIFICANT CHANGE UP (ref 150–400)
POTASSIUM SERPL-MCNC: 4 MMOL/L — SIGNIFICANT CHANGE UP (ref 3.5–5.3)
POTASSIUM SERPL-SCNC: 4 MMOL/L — SIGNIFICANT CHANGE UP (ref 3.5–5.3)
PROT SERPL-MCNC: 7.7 G/DL — SIGNIFICANT CHANGE UP (ref 6–8.3)
PROT UR-MCNC: 30 MG/DL
RBC # BLD: 5.19 M/UL — SIGNIFICANT CHANGE UP (ref 3.8–5.2)
RBC # FLD: 18.7 % — HIGH (ref 10.3–14.5)
RBC CASTS # UR COMP ASSIST: 1 /HPF — SIGNIFICANT CHANGE UP (ref 0–4)
SODIUM SERPL-SCNC: 138 MMOL/L — SIGNIFICANT CHANGE UP (ref 135–145)
SP GR SPEC: 1.01 — SIGNIFICANT CHANGE UP (ref 1–1.03)
SQUAMOUS # UR AUTO: 0 /HPF — SIGNIFICANT CHANGE UP (ref 0–5)
TACROLIMUS SERPL-MCNC: 5.3 NG/ML — SIGNIFICANT CHANGE UP
UROBILINOGEN FLD QL: 0.2 MG/DL — SIGNIFICANT CHANGE UP (ref 0.2–1)
WBC # BLD: 9.41 K/UL — SIGNIFICANT CHANGE UP (ref 3.8–10.5)
WBC # FLD AUTO: 9.41 K/UL — SIGNIFICANT CHANGE UP (ref 3.8–10.5)
WBC UR QL: 515 /HPF — HIGH (ref 0–5)

## 2024-07-21 PROCEDURE — 87086 URINE CULTURE/COLONY COUNT: CPT

## 2024-07-21 PROCEDURE — 81001 URINALYSIS AUTO W/SCOPE: CPT

## 2024-07-21 PROCEDURE — 96375 TX/PRO/DX INJ NEW DRUG ADDON: CPT

## 2024-07-21 PROCEDURE — 87077 CULTURE AEROBIC IDENTIFY: CPT

## 2024-07-21 PROCEDURE — 80197 ASSAY OF TACROLIMUS: CPT

## 2024-07-21 PROCEDURE — 96374 THER/PROPH/DIAG INJ IV PUSH: CPT

## 2024-07-21 PROCEDURE — 87040 BLOOD CULTURE FOR BACTERIA: CPT

## 2024-07-21 PROCEDURE — 80180 DRUG SCRN QUAN MYCOPHENOLATE: CPT

## 2024-07-21 PROCEDURE — 85025 COMPLETE CBC W/AUTO DIFF WBC: CPT

## 2024-07-21 PROCEDURE — 87186 SC STD MICRODIL/AGAR DIL: CPT

## 2024-07-21 PROCEDURE — 80053 COMPREHEN METABOLIC PANEL: CPT

## 2024-07-21 PROCEDURE — 99285 EMERGENCY DEPT VISIT HI MDM: CPT | Mod: GC

## 2024-07-21 PROCEDURE — 99284 EMERGENCY DEPT VISIT MOD MDM: CPT | Mod: 25

## 2024-07-21 RX ORDER — CEFEPIME 1 G/1
2000 INJECTION, POWDER, FOR SOLUTION INTRAMUSCULAR; INTRAVENOUS ONCE
Refills: 0 | Status: COMPLETED | OUTPATIENT
Start: 2024-07-21 | End: 2024-07-21

## 2024-07-21 RX ORDER — ACETAMINOPHEN 325 MG
1000 TABLET ORAL ONCE
Refills: 0 | Status: COMPLETED | OUTPATIENT
Start: 2024-07-21 | End: 2024-07-21

## 2024-07-21 RX ORDER — CEFPODOXIME PROXETIL 50 MG/5 ML
1 SUSPENSION, RECONSTITUTED, ORAL (ML) ORAL
Qty: 20 | Refills: 0
Start: 2024-07-21 | End: 2024-07-30

## 2024-07-21 RX ORDER — SODIUM CHLORIDE 0.9 % (FLUSH) 0.9 %
500 SYRINGE (ML) INJECTION ONCE
Refills: 0 | Status: COMPLETED | OUTPATIENT
Start: 2024-07-21 | End: 2024-07-21

## 2024-07-21 RX ADMIN — Medication 400 MILLIGRAM(S): at 09:39

## 2024-07-21 RX ADMIN — Medication 500 MILLILITER(S): at 09:39

## 2024-07-21 RX ADMIN — CEFEPIME 100 MILLIGRAM(S): 1 INJECTION, POWDER, FOR SOLUTION INTRAMUSCULAR; INTRAVENOUS at 10:49

## 2024-07-21 NOTE — ED PROVIDER NOTE - NSFOLLOWUPINSTRUCTIONS_ED_ALL_ED_FT
You were seen in the ER today and diagnosed with a UTI.    Your blood work was stable. You were given an antibiotic through the IV called cefepime. You were discharged with a prescription for an antibiotic called cefpodoxime 200mg.  Please take this medication 2 times per day for the next 10 days.    If a change needs to be made to the antibiotic based on your urine culture results, someone will call you to prescribe a new antibiotic.    Follow-up with your primary care doctor and your nephrology team as scheduled.    Return to the ER if you develop new or worsening symptoms including fevers, chills, nausea or vomiting, or if you are otherwise concerned.

## 2024-07-21 NOTE — ED PROVIDER NOTE - PHYSICAL EXAMINATION
GENERAL: Awake, alert, well appearing in NAD  HEAD: NC/AT, neck supple, moist mucous membranes  EYES: PERRL, EOM grossly intact, sclera anicteric  LUNGS: normal WOB on RA, CTAB, no wheezes or crackles   CARDIAC: RRR, no m/r/g  ABDOMEN: Soft, minimally tender over R mid abd, non distended, no rebound, no guarding  BACK: No midline spinal tenderness, no CVA tenderness  EXT: No edema, no calf tenderness, no deformities.  NEURO: A&Ox3. Moving all extremities.  SKIN: Warm and dry. No rash.  PSYCH: Normal affect.

## 2024-07-21 NOTE — CHART NOTE - NSCHARTNOTEFT_GEN_A_CORE
CHIEF COMPLAINT:    SUBJECTIVE:     REVIEW OF SYSTEMS:    CONSTITUTIONAL: (  )  weakness,  (  ) fevers or chills  EYES/ENT: (  )visual changes;     NECK: (  ) pain or stiffness  RESPIRATORY:   (  )cough, wheezing, hemoptysis;  (  ) shortness of breath  CARDIOVASCULAR:  (  )chest pain or palpitations  GASTROINTESTINAL:   (  )abdominal or epigastric pain.  (  ) nausea, vomiting, or hematemesis;   (   ) diarrhea or constipation.   GENITOURINARY:   (    ) dysuria, frequency or hematuria  NEUROLOGICAL:  (   ) numbness or weakness   All other review of systems is negative unless indicated above    Vital Signs Last 24 Hrs  T(C): 37.9 (21 Jul 2024 08:46), Max: 37.9 (21 Jul 2024 08:46)  T(F): 100.2 (21 Jul 2024 08:46), Max: 100.2 (21 Jul 2024 08:46)  HR: 72 (21 Jul 2024 09:12) (72 - 78)  BP: 127/84 (21 Jul 2024 09:12) (127/84 - 138/82)  BP(mean): --  RR: 16 (21 Jul 2024 09:12) (16 - 19)  SpO2: 100% (21 Jul 2024 09:12) (98% - 100%)    Parameters below as of 21 Jul 2024 09:12  Patient On (Oxygen Delivery Method): room air        I&O's Summary      CAPILLARY BLOOD GLUCOSE          PHYSICAL EXAM:    Constitutional:  (   ) NAD,   (   )awake and alert  HEENT: PERR, EOMI,    Neck: Soft and supple, No LAD, No JVD  Respiratory:  (    Breath sounds are clear bilaterally,    (   ) wheezing, rales or rhonchi  Cardiovascular:     (   )S1 and S2, regular rate and rhythm, no Murmurs, gallops or rubs  Gastrointestinal:  (   )Bowel Sounds present, soft,   (  )nontender, nondistended,    Extremities:    (  ) peripheral edema  Vascular: 2+ peripheral pulses  Neurological:    (    )A/O x 3,   (  ) focal deficits  Musculoskeletal:    (   )  normal strength b/l upper  (     ) normal  lower extremities  Skin: No rashes    MEDICATIONS:  MEDICATIONS  (STANDING):  cefepime   IVPB 2000 milliGRAM(s) IV Intermittent once      LABS: All Labs Reviewed:                        12.3   9.41  )-----------( 261      ( 21 Jul 2024 09:21 )             41.3     07-21    138  |  102  |  17  ----------------------------<  92  4.0   |  22  |  0.97    Ca    11.0<H>      21 Jul 2024 09:21    TPro  7.7  /  Alb  4.7  /  TBili  0.9  /  DBili  x   /  AST  18  /  ALT  23  /  AlkPhos  79  07-21          Blood Culture:   Urine Culture      RADIOLOGY/EKG:    ASSESSMENT AND PLAN:    DVT PPX:    ADVANCED DIRECTIVE:    DISPOSITION: CHIEF COMPLAINT:patient was seen in the ER multiple phone call was made from patient to me from 7/19/2024 regarding her UTI she was requesting antibiotic I offered her to drop her urine culture and I cannot start an antibiotic this morning she called that she has fever and back pain and UTI symptoms recommended to come to the ER.  She was seen in the ER started antibiotic  IV  condition improved no leukocytosis discussed with patient and ER team that she can be discharged back and now we will follow her culture as outpatient  · Chief Complaint: The patient is a 54y Female complaining of urinary symptoms.  · HPI Objective Statement: 54Y F PMH ESRD s/p R-DDRT (2020) c/b HCV (treated per patient), HTN, presenting with dysuria. Patient reports 3 days of dysuria and frequency. Called PMD who wanted her to come to office but she was unable to follow-up during clinic hours due to her job. Having mild R sided flank pain. Denies associated fevers, n/v, hematuria, chest pain or SOB, leg swelling. Reports compliance with immunosuppressant meds.     PAST MEDICAL/SURGICAL/FAMILY/SOCIAL HISTORY:   Past Medical, Past Surgical, and Family History:  PAST MEDICAL HISTORY:  Chronic kidney disease, unspecified     CKD (chronic kidney disease) stage V requiring chronic dialysis     Clotted Renal Dialysis AV Graft     Fibroid Tumor     HTN - Hypertension     Infection of AV Graft for Dialysis.     PAST SURGICAL HISTORY:  AV fistula B/L - failed    H/O extremity bypass graft H/O RUE bypass and creation of right brachial graft    H/O kidney transplant     History of Hysterectomy for benign disease    Kidney transplant recipient.      GASTROINTESTINAL:   (  )abdominal or epigastric pain.  (  ) nausea, vomiting, or hematemesis;   (   ) diarrhea or constipation.   GENITOURINARY:   (    ) dysuria, frequency or hematuria  NEUROLOGICAL:  (   ) numbness or weakness   All other review of systems is negative unless indicated above    Vital Signs Last 24 Hrs  T(C): 37.9 (21 Jul 2024 08:46), Max: 37.9 (21 Jul 2024 08:46)  T(F): 100.2 (21 Jul 2024 08:46), Max: 100.2 (21 Jul 2024 08:46)  HR: 72 (21 Jul 2024 09:12) (72 - 78)  BP: 127/84 (21 Jul 2024 09:12) (127/84 - 138/82)  BP(mean): --  RR: 16 (21 Jul 2024 09:12) (16 - 19)  SpO2: 100% (21 Jul 2024 09:12) (98% - 100%)    Parameters below as of 21 Jul 2024 09:12  Patient On (Oxygen Delivery Method): room air        I&O's Summary      CAPILLARY BLOOD GLUCOSE          PHYSICAL EXAM:  HEAD: NC/AT, neck supple, moist mucous membranes  	EYES: PERRL, EOM grossly intact, sclera anicteric  	LUNGS: normal WOB on RA, CTAB, no wheezes or crackles   	CARDIAC: RRR, no m/r/g  	ABDOMEN: Soft, minimally tender over R mid abd, non distended, no rebound, no guarding  	BACK: No midline spinal tenderness, no CVA tenderness  	EXT: No edema, no calf tenderness, no deformities.  	NEURO: A&Ox3. Moving all extremities.  	SKIN: Warm and dry. No rash.       MEDICATIONS:  MEDICATIONS  (STANDING):  cefepime   IVPB 2000 milliGRAM(s) IV Intermittent once      LABS: All Labs Reviewed:                        12.3   9.41  )-----------( 261      ( 21 Jul 2024 09:21 )             41.3     07-21    138  |  102  |  17  ----------------------------<  92  4.0   |  22  |  0.97    Ca    11.0<H>      21 Jul 2024 09:21    TPro  7.7  /  Alb  4.7  /  TBili  0.9  /  DBili  x   /  AST  18  /  ALT  23  /  AlkPhos  79  07-21          Blood Culture:   Urine Culture      RADIOLOGY/EKG:    ASSESSMENT AND PLAN:  UTI can be discharged home with Vantin 200 mg follow-up in the office in a.m. and will follow her culture   Hypertension amLODIPine 5 mg oral tablet: 1 tab(s) orally once a day  · 	sodium chloride 0.65% nasal spray: 2 puff(s) nasal 4 times a day as needed for  dry nasal passages  · hyperlipidemia	atorvastatin 20 mg oral tablet: 1 tab(s) orally once a day (at bedtime)  · CAD	clopidogrel 75 mg oral tablet: 1 tab(s) orally once a day Please do not start taking until 2/10/24  · CKD/renal transplant	mycophenolate mofetil 500 mg oral tablet: 1 tab(s) orally 2 times a day  · 	Envarsus XR 1 mg oral tablet, extended release: 11 milligram(s) orally once a day  · 	predniSONE 5 mg oral tablet: 1 tab(s) orally once a day  · 	Aspirin Enteric Coated 81 mg oral delayed release tablet: 1 tab(s) orally once a day  · 	cinacalcet 30 mg oral tablet: 1 tab(s) orally once a day  · GERD	pantoprazole 40 mg oral del     ADVANCED DIRECTIVE:    DISPOSITION: home

## 2024-07-21 NOTE — ED ADULT TRIAGE NOTE - CHIEF COMPLAINT QUOTE
hx of kidney transplant x 4 years ago- has had urinary symptoms sine Friday- burning on urination and frequency. denies hematuria.

## 2024-07-21 NOTE — ED ADULT NURSE NOTE - OBJECTIVE STATEMENT
55 y/o female with PMH of HTN, CKD, Kidney Transplant arrives to the ER complaining of urinary symptoms.  Pt reports developing burning on urination, frequency since Friday. Pt reports not being able to see her PCP on Friday because her work schedule, however was advised to come to the ED for further evaluation if the symptoms persisted.  Pt denies SOB, chest pain, dizziness, N/V/D, urinary symptoms, fevers, chills.  On assessment pt is well appearing, A&Ox4, speaking coherently, airway is patent, breathing spontaneously and unlabored. Skin is dry, warm. Right brachial graft noted. Abdomen is soft, no distended, no tender. Full ROM in all extremities.  Patient undressed and placed into gown, side rails up with bed locked and in lowest position for safety. call bell within reach. [place] provided. Comfort and safety provided.

## 2024-07-21 NOTE — ED PROVIDER NOTE - ATTENDING CONTRIBUTION TO CARE
Quality 265: Biopsy Follow-Up: Biopsy results reviewed, communicated, tracked, and documented
Detail Level: Generalized
Urmila Arita DO. EM Attending Physician.    54-year-old female with history of end-stage renal status post renal transplant 2020, hypertension presents to the ER with 3 days of dysuria and urinary frequency.  Denies fever, chills, nausea, vomiting.  Has some right flank pain which she attributes to musculoskeletal strain.  Denies falls or trauma.    Arrived afebrile rectal temp 100.2, hemodynamically stable, nontoxic-appearing.    Exam: Patient is nontoxic, head atraumatic, eyes clear, heart rate regular, lungs clear to auscultation bilaterally, abdomen soft nontender nondistended, no rebound tenderness or guarding, right CVA tenderness, left CVA nontender.  No rashes or lesions.  Skin clean and dry.  No lower extremity edema cyanosis or clubbing.    Differential includes but not limited to UTI, hematologic or electrolyte abnormalities, low clinical suspicion for sepsis however patient is immunocompromised so we will obtain cultures, discussed with renal transplant and reassess.    Attending Contribution to Care:  I performed a history and physical exam of the patient and discussed their management with the resident and/or advanced care provider. I reviewed the resident and/or advanced care provider's note and agree with the documented findings and plan of care. I was present and available for all procedures.

## 2024-07-21 NOTE — ED PROVIDER NOTE - PATIENT PORTAL LINK FT
You can access the FollowMyHealth Patient Portal offered by [facility] by registering at the following website: [email or web address]. By joining [employer]s FollowMyHealth portal, you will also be able to view your health information using other applications (apps) compatible with our system.

## 2024-07-21 NOTE — ED PROVIDER NOTE - PROGRESS NOTE DETAILS
Kim Olivia MD PGY-3: UA positive, will treat with cefepime based on prior culture results and dc on PO cefpodoxime. Spoke with transplant team who will coordinate follow up of UCx with patient. Also spoke with PMD Dr. Negron who saw patient in ED today and is agreeable with plan for dc. Return and follow up precautions were discussed and patient discharged in stable condition.

## 2024-07-21 NOTE — ED PROVIDER NOTE - CLINICAL SUMMARY MEDICAL DECISION MAKING FREE TEXT BOX
54Y F PMH ESRD R-DDRT (2020) c/b HCV (treated per patient), HTN, here with 3 days of dysuria and frequency. Well appearing with low grade oral temp 99.6F. Abd soft, nontender. Plan check labs including tacrolimus and mycophenolate levels, UA/UC, fluids, antibiotics as needed pending UA results. Will discuss with PMD/nephro prior to dispo.

## 2024-07-24 LAB
MYCOPHENOLATE SERPL-MCNC: 1 UG/ML — SIGNIFICANT CHANGE UP (ref 1–3.5)
MYCOPHENOLIC ACID GLUCURONIDE: 12 UG/ML — LOW (ref 15–125)

## 2024-07-25 LAB
-  AMOXICILLIN/CLAVULANIC ACID: SIGNIFICANT CHANGE UP
-  AMPICILLIN/SULBACTAM: SIGNIFICANT CHANGE UP
-  AMPICILLIN: SIGNIFICANT CHANGE UP
-  AZTREONAM: SIGNIFICANT CHANGE UP
-  CEFAZOLIN: SIGNIFICANT CHANGE UP
-  CEFEPIME: SIGNIFICANT CHANGE UP
-  CEFOXITIN: SIGNIFICANT CHANGE UP
-  CEFTRIAXONE: SIGNIFICANT CHANGE UP
-  CEFUROXIME: SIGNIFICANT CHANGE UP
-  CIPROFLOXACIN: SIGNIFICANT CHANGE UP
-  ERTAPENEM: SIGNIFICANT CHANGE UP
-  GENTAMICIN: SIGNIFICANT CHANGE UP
-  LEVOFLOXACIN: SIGNIFICANT CHANGE UP
-  MEROPENEM: SIGNIFICANT CHANGE UP
-  NITROFURANTOIN: SIGNIFICANT CHANGE UP
-  PIPERACILLIN/TAZOBACTAM: SIGNIFICANT CHANGE UP
-  TOBRAMYCIN: SIGNIFICANT CHANGE UP
-  TRIMETHOPRIM/SULFAMETHOXAZOLE: SIGNIFICANT CHANGE UP
CULTURE RESULTS: ABNORMAL
METHOD TYPE: SIGNIFICANT CHANGE UP
ORGANISM # SPEC MICROSCOPIC CNT: ABNORMAL
ORGANISM # SPEC MICROSCOPIC CNT: ABNORMAL
SPECIMEN SOURCE: SIGNIFICANT CHANGE UP

## 2024-08-18 ENCOUNTER — RX RENEWAL (OUTPATIENT)
Age: 54
End: 2024-08-18

## 2024-09-06 ENCOUNTER — RX RENEWAL (OUTPATIENT)
Age: 54
End: 2024-09-06

## 2024-09-12 NOTE — PROGRESS NOTE ADULT - ASSESSMENT
hide 52 yo F with a ESRD s/p HepC DDRT in 2020, chronic SVC syndrome s/p L cephalic-iliac vein bypass on DAPT, s/p thrombectomy of L iliac vein and graft 2012, Hx BK viremia, HTN presenting to ED with 1 day of fever 103', chill, nausea and vomiting associated with dysuria, low abd pain with urination and right flank pain.   She denies recent travel, sick contact, or recent procedure. Nephrology consulted for s/p DDRT.    A/P      SAM s/p DDRT @ [facility] 08/14/2020  Outpatient nephrologist is Dr. Lake / Dr Streeter   Continue home dose Immunosuppression meds   sCr slightly worsening today  FENa < 1%, suggestive of pre- renal   Bladder scan 7/17 ~ 142 cc   On PE Suprapubic tenderness/ distention noted- -> Recommend to repeat bladder scan today  s/p NS + 75 mEq Sodium Bicarb @ 75 cc/hr  x 24 hrs   Pt with episode of vomiting yesterday. States she has not been drinking/eating much. On clear liquid diet.   Switch IVF to NS @ 100cc/hr x 24 hrs   CT A/P no contrast 6/16 --> Partially distended bladder   Pt on Ceftriaxone for + UTI. Would recommend daily CBC with diff to assess for Eosinophilia    mg BID, Prednisone 5 mg qd, Envarsus 11 mg   FK level 7/19--> 6.6  ( May be inaccurate as pt unsure if level was collected 30 min prior to dose.)   please Check Tacrolimus 30 min prior to morning dose. Reinforced with NP and RN to pass along to night staff.   Monitor BMP, u/o    HTN:   BP optimal   Monitor    Acidosis  Non AG  Sec to GI loss   improving   s/p IV bicarb   Monitor     Proteinuria/ Hematuria  In setting of UTI   Repeat UA after Abx

## 2024-09-18 DIAGNOSIS — Z94.0 KIDNEY TRANSPLANT STATUS: ICD-10-CM

## 2024-09-19 ENCOUNTER — RX RENEWAL (OUTPATIENT)
Age: 54
End: 2024-09-19

## 2025-01-13 NOTE — PROGRESS NOTE ADULT - PROBLEM SELECTOR PROBLEM 3
Hypertension, unspecified type
PAST MEDICAL HISTORY:  Hepatitis C virus infection

## 2025-01-15 NOTE — ED ADULT NURSE REASSESSMENT NOTE - NS ED NURSE REASSESS COMMENT FT1
Follow up with your cardiologist in 1-2 weeks to discuss about ICD.  Discuss about switching lisinopril to Entresto also.     Follow-up with primary care physician in 1-2 weeks.    Call MD or come to emergency room for any fever chills chest pain shortness of breath or any new symptoms   Report received from JEANNE Charles. Pt awake and lying on side. Pt denies pain and discomfort at the moment

## 2025-02-19 DIAGNOSIS — Z94.0 KIDNEY TRANSPLANT STATUS: ICD-10-CM

## 2025-03-03 NOTE — ED PROVIDER NOTE - OBJECTIVE STATEMENT
54Y F PMH ESRD s/p R-DDRT (2020) c/b HCV (treated per patient), HTN, presenting with dysuria. Patient reports 3 days of dysuria and frequency. Called PMD who wanted her to come to office but she was unable to follow-up during clinic hours due to her job. Having mild R sided flank pain. Denies associated fevers, n/v, hematuria, chest pain or SOB, leg swelling. Reports compliance with immunosuppressant meds. 03-Mar-2025 08:17

## 2025-03-06 ENCOUNTER — APPOINTMENT (OUTPATIENT)
Dept: TRANSPLANT | Facility: CLINIC | Age: 55
End: 2025-03-06

## 2025-03-07 LAB
ALBUMIN SERPL ELPH-MCNC: 4.5 G/DL
ALP BLD-CCNC: 76 U/L
ALT SERPL-CCNC: 11 U/L
ANION GAP SERPL CALC-SCNC: 16 MMOL/L
APPEARANCE: CLEAR
AST SERPL-CCNC: 11 U/L
BACTERIA: NEGATIVE /HPF
BILIRUB SERPL-MCNC: 0.9 MG/DL
BILIRUBIN URINE: NEGATIVE
BLOOD URINE: NEGATIVE
BUN SERPL-MCNC: 24 MG/DL
CALCIUM OXALATE CRYSTALS: PRESENT
CALCIUM SERPL-MCNC: 9.9 MG/DL
CAST: 0 /LPF
CHLORIDE SERPL-SCNC: 107 MMOL/L
CMV DNA SPEC QL NAA+PROBE: NOT DETECTED IU/ML
CMVPCR LOG: NOT DETECTED LOG10IU/ML
CO2 SERPL-SCNC: 21 MMOL/L
COLOR: YELLOW
CREAT SERPL-MCNC: 1.02 MG/DL
CREAT SPEC-SCNC: 94 MG/DL
CREAT/PROT UR: 0.7 RATIO
EGFRCR SERPLBLD CKD-EPI 2021: 66 ML/MIN/1.73M2
EPITHELIAL CELLS: 1 /HPF
GLUCOSE QUALITATIVE U: NEGATIVE MG/DL
GLUCOSE SERPL-MCNC: 86 MG/DL
HCT VFR BLD CALC: 40.4 %
HGB BLD-MCNC: 11.8 G/DL
KETONES URINE: NEGATIVE MG/DL
LDH SERPL-CCNC: 249 U/L
LEUKOCYTE ESTERASE URINE: NEGATIVE
MAGNESIUM SERPL-MCNC: 1.4 MG/DL
MCHC RBC-ENTMCNC: 23.2 PG
MCHC RBC-ENTMCNC: 29.2 G/DL
MCV RBC AUTO: 79.5 FL
MICROSCOPIC-UA: NORMAL
NITRITE URINE: NEGATIVE
PH URINE: 7
PHOSPHATE SERPL-MCNC: 2.9 MG/DL
PLATELET # BLD AUTO: 249 K/UL
POTASSIUM SERPL-SCNC: 3.8 MMOL/L
PROT SERPL-MCNC: 6.5 G/DL
PROT UR-MCNC: 70 MG/DL
PROTEIN URINE: 100 MG/DL
RBC # BLD: 5.08 M/UL
RBC # FLD: 19.6 %
RED BLOOD CELLS URINE: 1 /HPF
REVIEW: NORMAL
SODIUM SERPL-SCNC: 144 MMOL/L
SPECIFIC GRAVITY URINE: 1.01
TACROLIMUS SERPL-MCNC: 7.9 NG/ML
URATE SERPL-MCNC: 6.5 MG/DL
UROBILINOGEN URINE: 0.2 MG/DL
WBC # FLD AUTO: 6.32 K/UL
WHITE BLOOD CELLS URINE: 0 /HPF

## 2025-03-11 ENCOUNTER — APPOINTMENT (OUTPATIENT)
Dept: NEPHROLOGY | Facility: CLINIC | Age: 55
End: 2025-03-11
Payer: MEDICARE

## 2025-03-11 VITALS
HEART RATE: 53 BPM | RESPIRATION RATE: 14 BRPM | TEMPERATURE: 98 F | OXYGEN SATURATION: 97 % | WEIGHT: 168 LBS | BODY MASS INDEX: 32.98 KG/M2 | HEIGHT: 60 IN | DIASTOLIC BLOOD PRESSURE: 91 MMHG | SYSTOLIC BLOOD PRESSURE: 164 MMHG

## 2025-03-11 VITALS — DIASTOLIC BLOOD PRESSURE: 78 MMHG | SYSTOLIC BLOOD PRESSURE: 120 MMHG

## 2025-03-11 DIAGNOSIS — Z94.0 KIDNEY TRANSPLANT STATUS: ICD-10-CM

## 2025-03-11 DIAGNOSIS — I10 ESSENTIAL (PRIMARY) HYPERTENSION: ICD-10-CM

## 2025-03-11 DIAGNOSIS — Z79.60 LONG TERM (CURRENT) USE OF UNSPECIFIED IMMUNOMODULATORS AND IMMUNOSUPPRESSANTS: ICD-10-CM

## 2025-03-11 LAB — BKV DNA SPEC QL NAA+PROBE: NOT DETECTED IU/ML

## 2025-03-11 PROCEDURE — 99214 OFFICE O/P EST MOD 30 MIN: CPT

## 2025-03-18 ENCOUNTER — RX RENEWAL (OUTPATIENT)
Age: 55
End: 2025-03-18

## 2025-03-18 NOTE — PATIENT PROFILE ADULT - DATE OF LAST VACCINATION
----- Message from Tasha sent at 3/18/2025 10:36 AM CDT -----  Contact: mother  Type:  Needs Medical AdviceWho Called: mother/Jason the patient rather a call back or a response via [facility]? [facility] ([place] Call Back Number:  [phone] Additional Information: current formula is out of stock. Has questions on a replacement. Please call   01-Oct-2021

## 2025-03-30 NOTE — ED PROVIDER NOTE - DISPOSITION TYPE
TRANSFER [Consultation] : a consultation visit [FreeTextEntry1] : gluteal cleft and bilateral buttocks

## 2025-04-14 ENCOUNTER — NON-APPOINTMENT (OUTPATIENT)
Age: 55
End: 2025-04-14

## 2025-04-14 ENCOUNTER — INPATIENT (INPATIENT)
Facility: HOSPITAL | Age: 55
LOS: 3 days | Discharge: HOME | DRG: 378 | End: 2025-04-18
Attending: INTERNAL MEDICINE | Admitting: INTERNAL MEDICINE
Payer: MEDICARE

## 2025-04-14 VITALS
RESPIRATION RATE: 18 BRPM | OXYGEN SATURATION: 98 % | SYSTOLIC BLOOD PRESSURE: 117 MMHG | HEART RATE: 76 BPM | WEIGHT: 162.04 LBS | TEMPERATURE: 98 F | HEIGHT: 64 IN | DIASTOLIC BLOOD PRESSURE: 71 MMHG

## 2025-04-14 DIAGNOSIS — Z94.0 KIDNEY TRANSPLANT STATUS: Chronic | ICD-10-CM

## 2025-04-14 DIAGNOSIS — I77.0 ARTERIOVENOUS FISTULA, ACQUIRED: Chronic | ICD-10-CM

## 2025-04-14 DIAGNOSIS — Z95.828 PRESENCE OF OTHER VASCULAR IMPLANTS AND GRAFTS: Chronic | ICD-10-CM

## 2025-04-14 DIAGNOSIS — K92.0 HEMATEMESIS: ICD-10-CM

## 2025-04-14 LAB
ALBUMIN SERPL ELPH-MCNC: 4.2 G/DL — SIGNIFICANT CHANGE UP (ref 3.3–5)
ALP SERPL-CCNC: 68 U/L — SIGNIFICANT CHANGE UP (ref 40–120)
ALT FLD-CCNC: 16 U/L — SIGNIFICANT CHANGE UP (ref 10–45)
AMMONIA BLD-MCNC: 22 UMOL/L — SIGNIFICANT CHANGE UP (ref 11–55)
ANION GAP SERPL CALC-SCNC: 14 MMOL/L — SIGNIFICANT CHANGE UP (ref 5–17)
APPEARANCE UR: CLEAR — SIGNIFICANT CHANGE UP
APTT BLD: 27.7 SEC — SIGNIFICANT CHANGE UP (ref 24.5–35.6)
AST SERPL-CCNC: 16 U/L — SIGNIFICANT CHANGE UP (ref 10–40)
BACTERIA # UR AUTO: NEGATIVE /HPF — SIGNIFICANT CHANGE UP
BASOPHILS # BLD AUTO: 0.03 K/UL — SIGNIFICANT CHANGE UP (ref 0–0.2)
BASOPHILS NFR BLD AUTO: 0.4 % — SIGNIFICANT CHANGE UP (ref 0–2)
BILIRUB SERPL-MCNC: 0.7 MG/DL — SIGNIFICANT CHANGE UP (ref 0.2–1.2)
BILIRUB UR-MCNC: NEGATIVE — SIGNIFICANT CHANGE UP
BLD GP AB SCN SERPL QL: NEGATIVE — SIGNIFICANT CHANGE UP
BUN SERPL-MCNC: 28 MG/DL — HIGH (ref 7–23)
CALCIUM SERPL-MCNC: 10.4 MG/DL — SIGNIFICANT CHANGE UP (ref 8.4–10.5)
CAST: 2 /LPF — SIGNIFICANT CHANGE UP (ref 0–4)
CHLORIDE SERPL-SCNC: 104 MMOL/L — SIGNIFICANT CHANGE UP (ref 96–108)
CO2 SERPL-SCNC: 22 MMOL/L — SIGNIFICANT CHANGE UP (ref 22–31)
COLOR SPEC: SIGNIFICANT CHANGE UP
CREAT SERPL-MCNC: 1.09 MG/DL — SIGNIFICANT CHANGE UP (ref 0.5–1.3)
DIFF PNL FLD: NEGATIVE — SIGNIFICANT CHANGE UP
EGFR: 61 ML/MIN/1.73M2 — SIGNIFICANT CHANGE UP
EGFR: 61 ML/MIN/1.73M2 — SIGNIFICANT CHANGE UP
EOSINOPHIL # BLD AUTO: 0.24 K/UL — SIGNIFICANT CHANGE UP (ref 0–0.5)
EOSINOPHIL NFR BLD AUTO: 3.4 % — SIGNIFICANT CHANGE UP (ref 0–6)
GLUCOSE SERPL-MCNC: 202 MG/DL — HIGH (ref 70–99)
GLUCOSE UR QL: NEGATIVE MG/DL — SIGNIFICANT CHANGE UP
HCT VFR BLD CALC: 33.4 % — LOW (ref 34.5–45)
HGB BLD-MCNC: 10.1 G/DL — LOW (ref 11.5–15.5)
IMM GRANULOCYTES NFR BLD AUTO: 0.3 % — SIGNIFICANT CHANGE UP (ref 0–0.9)
INR BLD: 0.96 RATIO — SIGNIFICANT CHANGE UP (ref 0.85–1.16)
KETONES UR-MCNC: ABNORMAL MG/DL
LEUKOCYTE ESTERASE UR-ACNC: NEGATIVE — SIGNIFICANT CHANGE UP
LIDOCAIN IGE QN: 153 U/L — HIGH (ref 7–60)
LYMPHOCYTES # BLD AUTO: 0.88 K/UL — LOW (ref 1–3.3)
LYMPHOCYTES # BLD AUTO: 12.6 % — LOW (ref 13–44)
MCHC RBC-ENTMCNC: 24.4 PG — LOW (ref 27–34)
MCHC RBC-ENTMCNC: 30.2 G/DL — LOW (ref 32–36)
MCV RBC AUTO: 80.7 FL — SIGNIFICANT CHANGE UP (ref 80–100)
MONOCYTES # BLD AUTO: 0.44 K/UL — SIGNIFICANT CHANGE UP (ref 0–0.9)
MONOCYTES NFR BLD AUTO: 6.3 % — SIGNIFICANT CHANGE UP (ref 2–14)
NEUTROPHILS # BLD AUTO: 5.39 K/UL — SIGNIFICANT CHANGE UP (ref 1.8–7.4)
NEUTROPHILS NFR BLD AUTO: 77 % — SIGNIFICANT CHANGE UP (ref 43–77)
NITRITE UR-MCNC: NEGATIVE — SIGNIFICANT CHANGE UP
NRBC BLD AUTO-RTO: 0 /100 WBCS — SIGNIFICANT CHANGE UP (ref 0–0)
PH UR: 6 — SIGNIFICANT CHANGE UP (ref 5–8)
PLATELET # BLD AUTO: 205 K/UL — SIGNIFICANT CHANGE UP (ref 150–400)
POTASSIUM SERPL-MCNC: 3.8 MMOL/L — SIGNIFICANT CHANGE UP (ref 3.5–5.3)
POTASSIUM SERPL-SCNC: 3.8 MMOL/L — SIGNIFICANT CHANGE UP (ref 3.5–5.3)
PROT SERPL-MCNC: 6.8 G/DL — SIGNIFICANT CHANGE UP (ref 6–8.3)
PROT UR-MCNC: 300 MG/DL
PROTHROM AB SERPL-ACNC: 10.9 SEC — SIGNIFICANT CHANGE UP (ref 9.9–13.4)
RBC # BLD: 4.14 M/UL — SIGNIFICANT CHANGE UP (ref 3.8–5.2)
RBC # FLD: 19.4 % — HIGH (ref 10.3–14.5)
RBC CASTS # UR COMP ASSIST: 1 /HPF — SIGNIFICANT CHANGE UP (ref 0–4)
RH IG SCN BLD-IMP: POSITIVE — SIGNIFICANT CHANGE UP
SODIUM SERPL-SCNC: 140 MMOL/L — SIGNIFICANT CHANGE UP (ref 135–145)
SP GR SPEC: 1.03 — SIGNIFICANT CHANGE UP (ref 1–1.03)
SQUAMOUS # UR AUTO: 1 /HPF — SIGNIFICANT CHANGE UP (ref 0–5)
UROBILINOGEN FLD QL: 0.2 MG/DL — SIGNIFICANT CHANGE UP (ref 0.2–1)
WBC # BLD: 7 K/UL — SIGNIFICANT CHANGE UP (ref 3.8–10.5)
WBC # FLD AUTO: 7 K/UL — SIGNIFICANT CHANGE UP (ref 3.8–10.5)
WBC UR QL: 1 /HPF — SIGNIFICANT CHANGE UP (ref 0–5)

## 2025-04-14 PROCEDURE — 99223 1ST HOSP IP/OBS HIGH 75: CPT

## 2025-04-14 PROCEDURE — 93010 ELECTROCARDIOGRAM REPORT: CPT

## 2025-04-14 PROCEDURE — 74176 CT ABD & PELVIS W/O CONTRAST: CPT | Mod: 26

## 2025-04-14 PROCEDURE — 99285 EMERGENCY DEPT VISIT HI MDM: CPT | Mod: FS

## 2025-04-14 RX ORDER — ACETAMINOPHEN 500 MG/5ML
1000 LIQUID (ML) ORAL ONCE
Refills: 0 | Status: COMPLETED | OUTPATIENT
Start: 2025-04-14 | End: 2025-04-14

## 2025-04-14 RX ORDER — OCTREOTIDE ACETATE 500 UG/ML
100 INJECTION, SOLUTION INTRAVENOUS; SUBCUTANEOUS ONCE
Refills: 0 | Status: COMPLETED | OUTPATIENT
Start: 2025-04-14 | End: 2025-04-14

## 2025-04-14 RX ADMIN — Medication 400 MILLIGRAM(S): at 20:19

## 2025-04-14 RX ADMIN — OCTREOTIDE ACETATE 100 MICROGRAM(S): 500 INJECTION, SOLUTION INTRAVENOUS; SUBCUTANEOUS at 20:43

## 2025-04-14 RX ADMIN — Medication 40 MILLIGRAM(S): at 20:43

## 2025-04-14 NOTE — H&P ADULT - ASSESSMENT
54y F PMH HTN, HLD, ESRD s/p DD-renal transplant 2020 c/b HCV (treated per patient), chronic SVC syndrome s/p L cephalic-iliac vein bypass on DAPT, s/p thrombectomy of L iliac vein and graft 2012 coronary bypass 2015, p/w coffee ground emesis x 3 days a/f eval of GIB

## 2025-04-14 NOTE — ED PROVIDER NOTE - RAPID ASSESSMENT
55-year-old female with history of CKD s/p renal transplant 2020 here for evaluation of coffee-ground emesis and black stools starting 3 days ago, endorsing left-sided abdominal pain.  Patient is on aspirin daily, also takes prednisone as part of her rejection regimen.  Denies any fevers or chills, has been feeling lightheaded today.  Patient is well-appearing on interview, hemodynamically stable.    Patient was rapidly assessed via a telemedicine and/or role of Quick Triage PA; a limited history, physical exam and assessment was performed. The patient will be seen and further evaluated in the main emergency department. The remainder of care and evaluation will be conducted by the primary emergency medicine team. Receiving team will follow up on labs, imaging and serially reassess patient as indicated. All further decisions regarding patient care, evaluation and disposition are at the discretion of the receiving primary emergency department team. -Ximoy Murillo PA-C

## 2025-04-14 NOTE — ED ADULT TRIAGE NOTE - CHIEF COMPLAINT QUOTE
Patient c/o vomiting, dark stools, lightheaded x 3 days. Takes aspirin daily. Hx kidney transplant 5 years ago.

## 2025-04-14 NOTE — H&P ADULT - NSHPLABSRESULTS_GEN_ALL_CORE
10.1   7.00  )-----------( 205      ( 14 Apr 2025 18:49 )             33.4       04-14    140  |  104  |  28[H]  ----------------------------<  202[H]  3.8   |  22  |  1.09    Ca    10.4      14 Apr 2025 18:49    TPro  6.8  /  Alb  4.2  /  TBili  0.7  /  DBili  x   /  AST  16  /  ALT  16  /  AlkPhos  68  04-14    Lipase: 153 U/L (04.14.25 @ 18:49)  Ammonia, Serum: 22 umol/L (04.14.25 @ 20:57)    Urinalysis with Rflx Culture (04.14.25 @ 20:34)    Urine Appearance: Clear    Color: Dark Yellow    Specific Gravity: 1.026    pH Urine: 6.0    Protein, Urine: 300 mg/dL    Glucose Qualitative, Urine: Negative mg/dL    Ketone - Urine: Trace mg/dL    Blood, Urine: Negative    Bilirubin: Negative    Urobilinogen: 0.2 mg/dL    Leukocyte Esterase Concentration: Negative    Nitrite: Negative      - - - - - - - - - - - - - - - - - - - - - - - - - - - - - - - - - - - - - - - - - - - - - - - - - - - -       EKG PERSONALLY REVIEWED:  NSR 67 bpm     IMAGES PERSONALLY REVIEWED:     < from: CT Abdomen and Pelvis No Cont (04.14.25 @ 19:18) >  IMPRESSION:  Limited noncontrast study. Etiology of given clinical symptoms is not   clearly elucidated. Correlate with postcontrast imaging as clinically   feasible.    Redemonstrated extensive anterior abdominal wall superficial venous   varicosities extending from bilateral inguinal regions to the partially   imaged lower chest. Coarse calcification noted of the infrarenal IVC   likely in the setting of chronic central venous thrombosis.    Dense bones reflective of renal osteodystrophy.

## 2025-04-14 NOTE — ED PROVIDER NOTE - PROGRESS NOTE DETAILS
CT a/p limited by lack of contrast. Unable to elucidate cause of current symptoms. Appendix wnl. UA neg for acute infection, sig for proteinuria. Blood work sig for elevated lipase - pt with continued epigastric TTP, and BUN inc from baseline of 17. Hgb > 7, pt does not need a transfusion at this time. Ammonia pending. GI emailed. DENISSE placed to request admission. Pt aware of plan. CT a/p limited by lack of contrast. Unable to elucidate cause of current symptoms. Appendix wnl. UA neg for acute infection, sig for proteinuria. Blood work sig for elevated lipase - pt with continued epigastric TTP, and BUN inc from baseline of 17. Hgb > 7, pt does not need a transfusion at this time. Ammonia wnl, pt more awake and alert than initial presentation, talking to sister over the phone. GI emailed. DENISSE placed to request admission. Pt aware of plan. Labs and imaging results reviewed with pt at bedside. All questions answered. Pt aware of and in agreement with plan to admit for further GI work up. Patient case discussed with hospitalist merlin Morin to admit.  Patient is stable and ready for admission.

## 2025-04-14 NOTE — ED CLERICAL - NS ED CLERK NOTE PRE-ARRIVAL INFORMATION; ADDITIONAL PRE-ARRIVAL INFORMATION
CC/Reason For referral: coffee ground emesis, kidney tx 8/2020  Preferred Consultant(if applicable): contact Dr. Mariano kidney inpt transplant team  Who admits for you (if needed): n./a  Do you have documents you would like to fax over? no  Would you still like to speak to an ED attending? yes

## 2025-04-14 NOTE — H&P ADULT - PROBLEM SELECTOR PLAN 1
Presents for c/o melena and coffee ground emesis   - Hemodynamically stable, Hg 10.1  - CT A/P:  Redemonstrated extensive anterior abdominal wall superficial venous   varicosities extending from b/L inguinal regions to the partially imaged lower chest.  Coarse calcification noted of the infrarenal IVC likely ISO chronic central venous thrombosis   - ED: Octreotide, Protonix   - C/w IV protonix 40mg q12  - Trend cbc, transfuse to keep Hg >8 ( hx/o cardiac dz)  - Hold AC/ AP  i/s/o GIB  - GI consult (emailed)     ->>NPO for possible endoscopic eval    ->>If becomes hemodynamically unstable w/profuse bleeding, consult MICU, stat CTA,          inform on call GI fellow and consider IR consult

## 2025-04-14 NOTE — ED ADULT NURSE NOTE - NSFALLRISKASMT_ED_ALL_ED_DT
14-Apr-2025 18:54 Helical Rim Advancement Flap Text: The defect edges were debeveled with a #15 blade scalpel.  Given the location of the defect and the proximity to free margins (helical rim) a double helical rim advancement flap was deemed most appropriate.  Using a sterile surgical marker, the appropriate advancement flaps were drawn incorporating the defect and placing the expected incisions between the helical rim and antihelix where possible.  The area thus outlined was incised through and through with a #15 scalpel blade.  With a skin hook and iris scissors, the flaps were gently and sharply undermined and freed up.

## 2025-04-14 NOTE — ED PROVIDER NOTE - ATTENDING CONTRIBUTION TO CARE
54-year-old female with history of hypertension prior GI bleeds from upper and lower and CKD ESRD status post renal transplant in 2020 on prednisone aspirin and mycophenolate and tacrolimus has been compliant presenting with several days of coffee-ground emesis and hematochezia patient reports abdominal pain that is left-sided and achy and feels mildly short of breath however otherwise no fevers no change in amount of urine output but had poor appetite denies any cough sore throat runny nose  On physical examination patient is well-appearing but somnolent nontoxic neuro intact in detail clear lungs S1-S2 soft nontender abdomen right lower abdominal pelvic transplanted kidney nontender on exam no peripheral edema  Differential is broad and includes infectious etiology although this is versus less likely to be renal function  Labs are reassuring creatinine at baseline however given the history of prior endoscopies indicating nonbleeding ulcers as well as esophageal varices had octreotide and Protonix IV and will have her admitted after CT reads will send a notification to GI team

## 2025-04-14 NOTE — ED PROVIDER NOTE - OBJECTIVE STATEMENT
55 yo F with PMHx HTN, HLD, CKD c/p renal transplant 2020 presenting with coffee ground emesis x 3 days. Endorses L lower abd pain and lightheadedness. Pt takes daily aspirin and prednisone. Has had similar symptoms in the past. Has had an endoscopy, doesn't remember when. Hx of ulcers. PSHx of fibroid removal, still has appendix and kidney. Denies fever, d/c, dysuria, throat pain. 55 yo F with PMHx HTN, HLD, CKD c/p renal transplant 2020 presenting with coffee ground emesis x 3 days. Endorses L lower abd pain and lightheadedness. Endorses dark stool. Pt takes daily aspirin and prednisone. Has had similar symptoms in the past. Most recent endoscopy 07/2023. Showed ulcers and esophageal varices. PSHx of fibroid removal, still has appendix and kidney. Denies fever, d/c, dysuria, throat pain.

## 2025-04-14 NOTE — H&P ADULT - HISTORY OF PRESENT ILLNESS
54y PMH HTN, HLD, ESRD s/p DD-renal transplant 2020 c/b HCV (treated per patient), chronic SVC syndrome s/p L cephalic-iliac vein bypass on DAPT, s/p thrombectomy of L iliac vein and graft 2012 coronary bypass 2015, p/w coffee ground emesis x 3 days. Endorses L lower abd pain and lightheadedness. Endorses dark stool. Pt takes daily aspirin and prednisone. Has had similar symptoms in the past. Most recent endoscopy 07/2023 showed ulcers and esophageal varices.     ROS: Denies HA, CP, SOB, palpitation, fever, cough, chills, recent travel, sick contact, change in urinary habits   A 10-system ROS was performed and is negative except as noted above and/or in the HPI.

## 2025-04-14 NOTE — ED PROVIDER NOTE - CLINICAL SUMMARY MEDICAL DECISION MAKING FREE TEXT BOX
55 yo F with PMHx HTN, HLD, CKD s/p renal transplant presenting with coffee ground emesis x 3 days. On daily aspirin and prednisone. Hx of ulcers.   DDx includes but is not limited to PUD, esophageal varices, appendicitis  Work up: basic labs, lipase, coags, T&S, CT A/P, UA, EKG  Tx: ofirmev  Plan: if work up wnl will consult transplant team for recs. If cleared by team will DC for outpatient follow up 55 yo F with PMHx HTN, HLD, CKD s/p renal transplant presenting with coffee ground emesis x 3 days. On daily aspirin and prednisone. Hx of ulcers.   DDx includes but is not limited to PUD, esophageal varices, appendicitis  Work up: basic labs, lipase, ammonia, coags, T&S, CT A/P, UA, EKG  Tx: ofirmev, octreotide, protonix  Plan: if work up wnl will consult transplant team for recs. If cleared by team will DC for outpatient follow up 55 yo F with PMHx HTN, HLD, CKD s/p renal transplant presenting with coffee ground emesis x 3 days. On daily aspirin and prednisone. Hx of ulcers.   DDx includes but is not limited to PUD, esophageal varices, appendicitis  Work up: basic labs, lipase, ammonia, coags, T&S, CT A/P, UA, EKG  Tx: ofirmev, octreotide, protonix  Plan: if work up wnl will likely admit for next day GI evaluation and possible endoscopy. Will email GI.

## 2025-04-14 NOTE — H&P ADULT - NSHPPHYSICALEXAM_GEN_ALL_CORE
T(C): 36.3 (04-15-25 @ 01:38), Max: 36.8 (04-14-25 @ 16:31)  HR: 66 (04-15-25 @ 01:38) (66 - 77)  BP: 118/71 (04-15-25 @ 01:38) (113/68 - 118/71)  RR: 18 (04-15-25 @ 01:38) (18 - 18)  SpO2: 98% (04-15-25 @ 01:38) (93% - 98%)    CONSTITUTIONAL: Well groomed, no apparent distress  EYES: PERRLA , EOMI  ENMT: MMM. Normal dentition  RESP: No respiratory distress, no use of accessory muscles; CTA b/l  CV: +S1S2, RRR, no peripheral edema  GI: Soft, NTND, no RGR  MSK: spontaneously moves all extremities   SKIN: No rashes or ulcers noted  NEURO:  No focal deficits  PSYCH: A+O x 3

## 2025-04-14 NOTE — ED ADULT TRIAGE NOTE - SOURCE OF INFORMATION
Patient Eucrisa Counseling: Patient may experience a mild burning sensation during topical application. Eucrisa is not approved in children less than 2 years of age.

## 2025-04-14 NOTE — H&P ADULT - PROBLEM SELECTOR PLAN 2
- Hx/o ESRD s/p DD-renal transplant 2020  - Cr 1.02 ( about baseline)  - IVF prn   - C/w Prednisone   - Trend labs, monitor renal function   - Avoid nephrotoxic meds, renally dose meds  - Transplant nephro consult to be called in AM

## 2025-04-14 NOTE — ED PROVIDER NOTE - PHYSICAL EXAMINATION
Const: Awake, alert, no acute distress.  Appears well.  Moving comfortably on stretcher.  HEENT: NC/AT.  Moist mucous membranes.  Eyes: Extraocular movements intact b/l.  No scleral icterus.  Neck: Full ROM without pain. Supple. No crepitus  Cardiac: Regular rate and regular rhythm. S1 S2 present.  Resp: No evidence of respiratory distress.  No stridor or wheeze.  Good air entry b/l, breath sounds clear to auscultation b/l.  Abd: Non-distended. Soft, RLQ abd pain  Skin: Normal coloration.  No rashes, abrasions or lacerations.  Neuro: Awake, alert & oriented x 3.  Moves all extremities symmetrically.  No obvious focal deficits. Const: Arises to voice. Somnolent, had to rub leg multiple times to wake up during eval. Appears fatigued. No acute distress.    HEENT: NC/AT.  Moist mucous membranes.  Eyes: Extraocular movements intact b/l.  No scleral icterus.  Neck: Full ROM without pain. Supple. No crepitus  Cardiac: Regular rate and regular rhythm. S1 S2 present.  Resp: No evidence of respiratory distress.  No stridor or wheeze.  Good air entry b/l, breath sounds clear to auscultation b/l.  Abd: Non-distended. Soft, RLQ abd pain  Skin: Normal coloration.  No rashes, abrasions or lacerations.  Neuro: Awake, alert & oriented x 3.  Moves all extremities symmetrically.  No obvious focal deficits. Const: Arises to voice. Somnolent, had to rub leg multiple times to wake up during eval. Appears fatigued. No acute distress.    HEENT: NC/AT.  Moist mucous membranes.  Eyes: Extraocular movements intact b/l.  No scleral icterus.  Neck: Full ROM without pain. Supple. No crepitus  Cardiac: Regular rate and regular rhythm. S1 S2 present.  Resp: No evidence of respiratory distress.  No stridor or wheeze.  Good air entry b/l, breath sounds clear to auscultation b/l.  Abd: Non-distended. Soft, RLQ and epigastric TTP  Skin: Normal coloration.  No rashes, abrasions or lacerations.  Neuro: Awake, alert & oriented x 3.  Moves all extremities symmetrically.  No obvious focal deficits. Const: Arises to voice. Somnolent, had to rub leg multiple times to wake up during eval. Appears fatigued. No acute distress.    HEENT: NC/AT.  Moist mucous membranes.  Eyes: Extraocular movements intact b/l.  No scleral icterus.  Neck: Full ROM without pain. Supple. No crepitus  Cardiac: Regular rate and regular rhythm. S1 S2 present.  Resp: No evidence of respiratory distress.  No stridor or wheeze.  Good air entry b/l, breath sounds clear to auscultation b/l.  Abd: Non-distended. Soft, RLQ and epigastric TTP  Skin: Normal coloration.  No rashes, abrasions or lacerations.  Neuro: arises to voice, oriented.  Moves all extremities symmetrically.  No obvious focal deficits.

## 2025-04-14 NOTE — ED ADULT NURSE NOTE - OBJECTIVE STATEMENT
55 y/o female with PMH of  CKD s/p Kidney transplant arrives to the ER complaining of vomiting.  Pt reports having vomiting, having dark stools, and feeling lightheaded x 3 days. Pt endorses left-sided abdominal pain.  Reports taking daily  baby aspirin. On assessment pt is well appearing, A&Ox4, speaking coherently, airway is patent, breathing spontaneously and unlabored. Skin is dry, warm. Safety and comfort measures provided to keep patient safe. Bed placed in lowest position and side rails raised. Pt oriented to call bell system.  IV site assessed and remains WDL.

## 2025-04-15 DIAGNOSIS — K92.2 GASTROINTESTINAL HEMORRHAGE, UNSPECIFIED: ICD-10-CM

## 2025-04-15 DIAGNOSIS — I10 ESSENTIAL (PRIMARY) HYPERTENSION: ICD-10-CM

## 2025-04-15 LAB
ANION GAP SERPL CALC-SCNC: 11 MMOL/L — SIGNIFICANT CHANGE UP (ref 5–17)
BUN SERPL-MCNC: 20 MG/DL — SIGNIFICANT CHANGE UP (ref 7–23)
CALCIUM SERPL-MCNC: 9.8 MG/DL — SIGNIFICANT CHANGE UP (ref 8.4–10.5)
CHLORIDE SERPL-SCNC: 106 MMOL/L — SIGNIFICANT CHANGE UP (ref 96–108)
CO2 SERPL-SCNC: 25 MMOL/L — SIGNIFICANT CHANGE UP (ref 22–31)
CREAT SERPL-MCNC: 0.95 MG/DL — SIGNIFICANT CHANGE UP (ref 0.5–1.3)
EGFR: 72 ML/MIN/1.73M2 — SIGNIFICANT CHANGE UP
EGFR: 72 ML/MIN/1.73M2 — SIGNIFICANT CHANGE UP
GLUCOSE SERPL-MCNC: 138 MG/DL — HIGH (ref 70–99)
HCT VFR BLD CALC: 29.3 % — LOW (ref 34.5–45)
HGB BLD-MCNC: 9 G/DL — LOW (ref 11.5–15.5)
INR BLD: 0.96 RATIO — SIGNIFICANT CHANGE UP (ref 0.85–1.16)
MCHC RBC-ENTMCNC: 24.6 PG — LOW (ref 27–34)
MCHC RBC-ENTMCNC: 30.7 G/DL — LOW (ref 32–36)
MCV RBC AUTO: 80.1 FL — SIGNIFICANT CHANGE UP (ref 80–100)
NRBC BLD AUTO-RTO: 0 /100 WBCS — SIGNIFICANT CHANGE UP (ref 0–0)
PLATELET # BLD AUTO: 198 K/UL — SIGNIFICANT CHANGE UP (ref 150–400)
POTASSIUM SERPL-MCNC: 3.6 MMOL/L — SIGNIFICANT CHANGE UP (ref 3.5–5.3)
POTASSIUM SERPL-SCNC: 3.6 MMOL/L — SIGNIFICANT CHANGE UP (ref 3.5–5.3)
PROTHROM AB SERPL-ACNC: 10.9 SEC — SIGNIFICANT CHANGE UP (ref 9.9–13.4)
RBC # BLD: 3.66 M/UL — LOW (ref 3.8–5.2)
RBC # FLD: 19.1 % — HIGH (ref 10.3–14.5)
SODIUM SERPL-SCNC: 142 MMOL/L — SIGNIFICANT CHANGE UP (ref 135–145)
WBC # BLD: 6 K/UL — SIGNIFICANT CHANGE UP (ref 3.8–10.5)
WBC # FLD AUTO: 6 K/UL — SIGNIFICANT CHANGE UP (ref 3.8–10.5)

## 2025-04-15 PROCEDURE — 99233 SBSQ HOSP IP/OBS HIGH 50: CPT | Mod: GC

## 2025-04-15 PROCEDURE — 43239 EGD BIOPSY SINGLE/MULTIPLE: CPT | Mod: GC

## 2025-04-15 RX ORDER — MYCOPHENOLATE MOFETIL 500 MG/1
500 TABLET, FILM COATED ORAL
Refills: 0 | Status: DISCONTINUED | OUTPATIENT
Start: 2025-04-15 | End: 2025-04-15

## 2025-04-15 RX ORDER — ONDANSETRON HCL/PF 4 MG/2 ML
4 VIAL (ML) INJECTION EVERY 8 HOURS
Refills: 0 | Status: DISCONTINUED | OUTPATIENT
Start: 2025-04-15 | End: 2025-04-16

## 2025-04-15 RX ORDER — ATORVASTATIN CALCIUM 80 MG/1
20 TABLET, FILM COATED ORAL AT BEDTIME
Refills: 0 | Status: DISCONTINUED | OUTPATIENT
Start: 2025-04-15 | End: 2025-04-18

## 2025-04-15 RX ORDER — MAGNESIUM, ALUMINUM HYDROXIDE 200-200 MG
30 TABLET,CHEWABLE ORAL EVERY 4 HOURS
Refills: 0 | Status: DISCONTINUED | OUTPATIENT
Start: 2025-04-15 | End: 2025-04-16

## 2025-04-15 RX ORDER — MAGNESIUM OXIDE 400 MG
2 TABLET ORAL
Refills: 0 | DISCHARGE

## 2025-04-15 RX ORDER — ACETAMINOPHEN 500 MG/5ML
650 LIQUID (ML) ORAL EVERY 6 HOURS
Refills: 0 | Status: DISCONTINUED | OUTPATIENT
Start: 2025-04-15 | End: 2025-04-18

## 2025-04-15 RX ORDER — MAGNESIUM OXIDE 400 MG
400 TABLET ORAL
Refills: 0 | Status: DISCONTINUED | OUTPATIENT
Start: 2025-04-15 | End: 2025-04-18

## 2025-04-15 RX ORDER — TACROLIMUS 0.5 MG/1
11 CAPSULE ORAL DAILY
Refills: 0 | Status: DISCONTINUED | OUTPATIENT
Start: 2025-04-15 | End: 2025-04-17

## 2025-04-15 RX ORDER — PREDNISONE 20 MG/1
5 TABLET ORAL DAILY
Refills: 0 | Status: DISCONTINUED | OUTPATIENT
Start: 2025-04-15 | End: 2025-04-18

## 2025-04-15 RX ORDER — CINACALCET 30 MG/1
30 TABLET, FILM COATED ORAL DAILY
Refills: 0 | Status: DISCONTINUED | OUTPATIENT
Start: 2025-04-15 | End: 2025-04-18

## 2025-04-15 RX ORDER — MELATONIN 5 MG
3 TABLET ORAL AT BEDTIME
Refills: 0 | Status: DISCONTINUED | OUTPATIENT
Start: 2025-04-15 | End: 2025-04-16

## 2025-04-15 RX ADMIN — Medication 40 MILLIGRAM(S): at 18:31

## 2025-04-15 RX ADMIN — PREDNISONE 5 MILLIGRAM(S): 20 TABLET ORAL at 05:38

## 2025-04-15 RX ADMIN — Medication 400 MILLIGRAM(S): at 10:58

## 2025-04-15 RX ADMIN — Medication 75 MILLILITER(S): at 05:38

## 2025-04-15 RX ADMIN — ATORVASTATIN CALCIUM 20 MILLIGRAM(S): 80 TABLET, FILM COATED ORAL at 22:35

## 2025-04-15 RX ADMIN — MYCOPHENOLATE MOFETIL 500 MILLIGRAM(S): 500 TABLET, FILM COATED ORAL at 05:38

## 2025-04-15 RX ADMIN — Medication 40 MILLIGRAM(S): at 05:38

## 2025-04-15 RX ADMIN — TACROLIMUS 11 MILLIGRAM(S): 0.5 CAPSULE ORAL at 10:56

## 2025-04-15 NOTE — CONSULT NOTE ADULT - ASSESSMENT
54F PMH HTN, HLD, ESRD s/p DDRT 2020 c/b HCV (treated per patient), chronic SVC syndrome s/p L cephalic-iliac vein bypass on ASA, p/w coffee ground emesis, melena x 3 days and left lower abd pain. Transplant nephrology consulted for management of kidney transplant recipient.    1. Kidney Transplant recipient:  Patient with DDRT in 2020  Underlying cause of ESRD likely HTN  Transplant complicated by HCV and treated with epclusa  H/o BK viremia - resolved  patient with Scr 1.09 which is close to baseline.   BUN elevated at 28 likely 2/2 GI bleed.   UA positive for 300 mg/dl proteins.   CT AP showed Atrophic bilateral native kidneys without hydronephrosis. Nonobstructive renal stones of the native left kidney. Right lower quadrant transplant kidney without hydronephrosis or peritransplant collection.    Recommend: Hemodynamic support per primary team. Monitor labs and UOP. Avoid any potential nephrotoxins (NSAIDs/ACEi/ARBs) when possible. Dose medications as per eGFR/HD.     2. Immunosuppression:  Induction - simulect  Current Immunosuppression regimen: Env 11,  mg BID, Pred 5 qd  Cont current Immunosuppression regimen  Continue to monitor tacro levels daily - 30 mins before the morning dose.     3. GI bleed:  Hgb stable at 10.1  Hemodynamically stable.  Patient had multiple past hospitalizations with epistaxis and DAPT was switched to ASA.   H/o GI bleed in the past with most recent endoscopy 07/2023 showing ulcers and esophageal varices.  GI consult  PPI prophylaxis    4 HTN:   BP in the 120s/70s  Hold AntiHTn meds    If you have any questions, please feel free to contact me  Will Prieto MD  Nephrology Fellow  Page [phone] / [employer] Teams (Preferred); Please PAGE for urgent consults only.  (After 5pm or on weekends please page the on-call fellow)           54F PMH HTN, HLD, ESRD s/p DDRT 2020 c/b HCV (treated per patient), chronic SVC syndrome s/p L cephalic-iliac vein bypass on ASA, p/w coffee ground emesis, melena x 3 days and left lower abd pain. Transplant nephrology consulted for management of kidney transplant recipient.    1. Kidney Transplant recipient:  Patient with DDRT in 2020  Underlying cause of ESRD likely HTN  Transplant complicated by HCV and treated with epclusa  H/o BK viremia - resolved  patient with Scr 1.09 which is close to baseline.   BUN elevated at 28 likely 2/2 GI bleed.   UA positive for 300 mg/dl proteins.   CT AP showed Atrophic bilateral native kidneys without hydronephrosis. Nonobstructive renal stones of the native left kidney. Right lower quadrant transplant kidney without hydronephrosis or peritransplant collection.    Recommend: Hemodynamic support per primary team. Monitor labs and UOP. Avoid any potential nephrotoxins (NSAIDs/ACEi/ARBs) when possible. Dose medications as per eGFR/HD.     2. Immunosuppression:  Induction - simulect  Current Immunosuppression regimen: Env 11,  mg BID, Pred 5 qd  Cont Env and pred  Hold MMF in the setting of GI bleed  Continue to monitor tacro levels daily - 30 mins before the morning dose.     3. GI bleed:  Hgb stable at 10.1  Hemodynamically stable.  Patient had multiple past hospitalizations with epistaxis and DAPT was switched to ASA.   H/o GI bleed in the past with most recent endoscopy 07/2023 showing ulcers and esophageal varices.  GI consult  PPI prophylaxis    4 HTN:   BP in the 120s/70s  Hold AntiHTn meds    If you have any questions, please feel free to contact me  Will Prieto MD  Nephrology Fellow  Page [phone] / [employer] Teams (Preferred); Please PAGE for urgent consults only.  (After 5pm or on weekends please page the on-call fellow)

## 2025-04-15 NOTE — CONSULT NOTE ADULT - SUBJECTIVE AND OBJECTIVE BOX
HPI:    Allergies:  morphine (Anaphylaxis)  latex (Swelling)  lactose (Hives)  shellfish (Urticaria)  contrast media (iodine-based) (Urticaria)  morphine (Urticaria)  penicillin (Anaphylaxis)  ibuprofen (Hives)  ibuprofen/morphine (Unknown)      Home Medications:    Hospital Medications:  acetaminophen     Tablet .. 650 milliGRAM(s) Oral every 6 hours PRN  aluminum hydroxide/magnesium hydroxide/simethicone Suspension 30 milliLiter(s) Oral every 4 hours PRN  atorvastatin 20 milliGRAM(s) Oral at bedtime  cinacalcet 30 milliGRAM(s) Oral daily  magnesium oxide 400 milliGRAM(s) Oral two times a day with meals  melatonin 3 milliGRAM(s) Oral at bedtime PRN  mycophenolate mofetil 500 milliGRAM(s) Oral two times a day  ondansetron Injectable 4 milliGRAM(s) IV Push every 8 hours PRN  pantoprazole  Injectable 40 milliGRAM(s) IV Push every 12 hours  predniSONE   Tablet 5 milliGRAM(s) Oral daily  sodium chloride 0.9%. 1000 milliLiter(s) IV Continuous <Continuous>  tacrolimus ER Tablet (ENVARSUS XR) 11 milliGRAM(s) Oral daily      PMHX/PSHX:  ESRD on Dialysis    HTN - Hypertension    Clotted Renal Dialysis AV Graft    Infection of AV Graft for Dialysis    Clotted Renal Dialysis AV Graft    Infection of AV Graft for Dialysis    Fibroid Tumor    CKD (chronic kidney disease) stage V requiring chronic dialysis    Chronic kidney disease, unspecified    History of Hysterectomy    AV fistula    H/O extremity bypass graft    Kidney transplant recipient    H/O kidney transplant        Family history:  No pertinent family history in first degree relatives    No pertinent family history in first degree relatives    FH: HTN (hypertension) (Father, Mother)        Denies family history of colon cancer/polyps, stomach cancer/polyps, pancreatic cancer/masses, liver cancer/disease, ovarian cancer and endometrial cancer.    Social History:   Tob: Denies  EtOH: Denies  Illicit Drugs: Denies    ROS:     General:  No wt loss, fevers, chills, night sweats, fatigue  Eyes:  Good vision, no reported pain  ENT:  No sore throat, pain, runny nose, dysphagia  CV:  No pain, palpitations, hypo/hypertension  Pulm:  No dyspnea, cough, tachypnea, wheezing  GI:  see HPI  :  No pain, bleeding, incontinence, nocturia  Muscle:  No pain, weakness  Neuro:  No weakness, tingling, memory problems  Psych:  No fatigue, insomnia, mood problems, depression  Endocrine:  No polyuria, polydipsia, cold/heat intolerance  Heme:  No petechiae, ecchymosis, easy bruisability  Skin:  No rash, tattoos, scars, edema    PHYSICAL EXAM:     GENERAL:  No acute distress  HEENT:  NCAT, no scleral icterus   CHEST:  no respiratory distress  HEART:  Regular rate and rhythm  ABDOMEN:  Soft, non-tender, non-distended, normoactive bowel sounds,  no masses  EXTREMITIES: No edema  SKIN:  No rash/erythema/ecchymoses/petechiae/wounds/abscess/warm/dry  NEURO:  Alert and oriented x 3, no asterixis    Vital Signs:  Vital Signs Last 24 Hrs  T(C): 36.4 (15 Apr 2025 09:13), Max: 36.8 (14 Apr 2025 16:31)  T(F): 97.6 (15 Apr 2025 09:13), Max: 98.2 (14 Apr 2025 16:31)  HR: 75 (15 Apr 2025 09:13) (66 - 77)  BP: 110/72 (15 Apr 2025 09:13) (110/72 - 127/75)  BP(mean): --  RR: 18 (15 Apr 2025 09:13) (18 - 18)  SpO2: 97% (15 Apr 2025 09:13) (93% - 98%)    Parameters below as of 15 Apr 2025 09:13  Patient On (Oxygen Delivery Method): room air      Daily Height in cm: 162.56 (14 Apr 2025 16:31)    Daily     LABS:                        9.0    6.00  )-----------( 198      ( 15 Apr 2025 06:53 )             29.3     Mean Cell Volume: 80.1 fl (04-15-25 @ 06:53)    04-15    142  |  106  |  20  ----------------------------<  138[H]  3.6   |  25  |  0.95    Ca    9.8      15 Apr 2025 06:53    TPro  6.8  /  Alb  4.2  /  TBili  0.7  /  DBili  x   /  AST  16  /  ALT  16  /  AlkPhos  68  04-14    LIVER FUNCTIONS - ( 14 Apr 2025 18:49 )  Alb: 4.2 g/dL / Pro: 6.8 g/dL / ALK PHOS: 68 U/L / ALT: 16 U/L / AST: 16 U/L / GGT: x           PT/INR - ( 15 Apr 2025 06:53 )   PT: 10.9 sec;   INR: 0.96 ratio         PTT - ( 14 Apr 2025 18:49 )  PTT:27.7 sec  Urinalysis Basic - ( 15 Apr 2025 06:53 )    Color: x / Appearance: x / SG: x / pH: x  Gluc: 138 mg/dL / Ketone: x  / Bili: x / Urobili: x   Blood: x / Protein: x / Nitrite: x   Leuk Esterase: x / RBC: x / WBC x   Sq Epi: x / Non Sq Epi: x / Bacteria: x      Amylase Serum--      Lipase serum--       [id number]  Amylase Serum--      Lipase [id number]       Ammonia--                          9.0    6.00  )-----------( 198      ( 15 Apr 2025 06:53 )             29.3                         10.1   7.00  )-----------( 205      ( 14 Apr 2025 18:49 )             33.4       Imaging:             HPI:      Ms. Jacobo is a 54 yrs old female w/ hx of HTN, HLD, ESRD s/p DD-renal transplant 2020 c/b HCV (treated per patient), chronic SVC syndrome s/p L cephalic-iliac vein bypass on (previously on Plavix, but currently only on ASA), s/p thrombectomy of L iliac vein and graft 2012 coronary bypass 2015, p/w coffee ground emesis and black stools x 3 days. Pt. reported her first episode started on Saturday, where she had 3 episodes of CGE and then 2 episodes on Sunday. She also had black stools, last episode was yesterday. She complained of LLQ pain but now resolved. Currently, she has no nausea or vomiting. No fevers, chills, dysphagia or bloody stools. Pt. had similar symptoms before, last one in 2023. She was seen here, had EGD and colonoscopy in 2023 which showed esophageal varices and also gastric ulcers. Colonoscopy only showed hemorrhoids. On admission, VSS, labs showed hgb 9. GI consulted for black stools and CGE.     Allergies:  morphine (Anaphylaxis)  latex (Swelling)  lactose (Hives)  shellfish (Urticaria)  contrast media (iodine-based) (Urticaria)  morphine (Urticaria)  penicillin (Anaphylaxis)  ibuprofen (Hives)  ibuprofen/morphine (Unknown)      Home Medications:  · 	amLODIPine 5 mg oral tablet: Last Dose Taken:  , 1 tab(s) orally once a day  · 	sodium chloride 0.65% nasal spray: Last Dose Taken:  , 2 puff(s) nasal 4 times a day as needed for  dry nasal passages  · 	atorvastatin 20 mg oral tablet: Last Dose Taken:  , 1 tab(s) orally once a day (at bedtime)  · 	predniSONE 5 mg oral tablet: Last Dose Taken:  , 1 tab(s) orally once a day  · 	Aspirin Enteric Coated 81 mg oral delayed release tablet: Last Dose Taken:  , 1 tab(s) orally once a day  · 	magnesium oxide 200 mg oral tablet: 2 tab(s) orally 2 times a day  · 	mycophenolate mofetil 500 mg oral tablet: Last Dose Taken:  , 1 tab(s) orally 2 times a day  · 	cinacalcet 30 mg oral tablet: Last Dose Taken:  , 1 tab(s) orally once a day  · 	pantoprazole 40 mg oral delayed release tablet: Last Dose Taken:  , 1 tab(s) orally once a day  · 	Envarsus XR 1 mg oral tablet, extended release: Last Dose Taken:  , 11 milligram(s) orally once a day    Hospital Medications:  acetaminophen     Tablet .. 650 milliGRAM(s) Oral every 6 hours PRN  aluminum hydroxide/magnesium hydroxide/simethicone Suspension 30 milliLiter(s) Oral every 4 hours PRN  atorvastatin 20 milliGRAM(s) Oral at bedtime  cinacalcet 30 milliGRAM(s) Oral daily  magnesium oxide 400 milliGRAM(s) Oral two times a day with meals  melatonin 3 milliGRAM(s) Oral at bedtime PRN  mycophenolate mofetil 500 milliGRAM(s) Oral two times a day  ondansetron Injectable 4 milliGRAM(s) IV Push every 8 hours PRN  pantoprazole  Injectable 40 milliGRAM(s) IV Push every 12 hours  predniSONE   Tablet 5 milliGRAM(s) Oral daily  sodium chloride 0.9%. 1000 milliLiter(s) IV Continuous <Continuous>  tacrolimus ER Tablet (ENVARSUS XR) 11 milliGRAM(s) Oral daily      PMHX/PSHX:  ESRD on Dialysis    HTN - Hypertension    Clotted Renal Dialysis AV Graft    Infection of AV Graft for Dialysis    Clotted Renal Dialysis AV Graft    Infection of AV Graft for Dialysis    Fibroid Tumor    CKD (chronic kidney disease) stage V requiring chronic dialysis    Chronic kidney disease, unspecified    History of Hysterectomy    AV fistula    H/O extremity bypass graft    Kidney transplant recipient    H/O kidney transplant      Family history:  No pertinent family history in first degree relatives    No pertinent family history in first degree relatives    FH: HTN (hypertension) (Father, Mother)      Social History:   Tob: Denies  EtOH: Denies  Illicit Drugs: Denies    ROS:     General:  No wt loss, fevers, chills, night sweats, fatigue  Eyes:  Good vision, no reported pain  ENT:  No sore throat, pain, runny nose, dysphagia  CV:  No pain, palpitations, hypo/hypertension  Pulm:  No dyspnea, cough, tachypnea, wheezing  GI:  see HPI  :  No pain, bleeding, incontinence, nocturia  Muscle:  No pain, weakness  Neuro:  No weakness, tingling, memory problems  Psych:  No fatigue, insomnia, mood problems, depression  Endocrine:  No polyuria, polydipsia, cold/heat intolerance  Heme:  No petechiae, ecchymosis, easy bruisability  Skin:  No rash, tattoos, scars, edema    PHYSICAL EXAM:     GENERAL:  No acute distress, appears well, sitting on the bed.   HEENT:  NCAT, no scleral icterus   CHEST:  no respiratory distress  HEART:  Regular rate and rhythm  ABDOMEN:  Soft, non-tender, non-distended,   RECTUM: has non thrombosed non tender external hemorrhoid, black stool seen, no bright red blood.   EXTREMITIES: No LE edema b/l  SKIN:  No rash/erythema/ecchymoses/petechiae/wounds/abscess/warm/dry  NEURO:  Alert and oriented x 3, no tremors.     Vital Signs:  Vital Signs Last 24 Hrs  T(C): 36.4 (15 Apr 2025 09:13), Max: 36.8 (14 Apr 2025 16:31)  T(F): 97.6 (15 Apr 2025 09:13), Max: 98.2 (14 Apr 2025 16:31)  HR: 75 (15 Apr 2025 09:13) (66 - 77)  BP: 110/72 (15 Apr 2025 09:13) (110/72 - 127/75)  BP(mean): --  RR: 18 (15 Apr 2025 09:13) (18 - 18)  SpO2: 97% (15 Apr 2025 09:13) (93% - 98%)    Parameters below as of 15 Apr 2025 09:13  Patient On (Oxygen Delivery Method): room air      Daily Height in cm: 162.56 (14 Apr 2025 16:31)    Daily     LABS:                        9.0    6.00  )-----------( 198      ( 15 Apr 2025 06:53 )             29.3     Mean Cell Volume: 80.1 fl (04-15-25 @ 06:53)    04-15    142  |  106  |  20  ----------------------------<  138[H]  3.6   |  25  |  0.95    Ca    9.8      15 Apr 2025 06:53    TPro  6.8  /  Alb  4.2  /  TBili  0.7  /  DBili  x   /  AST  16  /  ALT  16  /  AlkPhos  68  04-14    LIVER FUNCTIONS - ( 14 Apr 2025 18:49 )  Alb: 4.2 g/dL / Pro: 6.8 g/dL / ALK PHOS: 68 U/L / ALT: 16 U/L / AST: 16 U/L / GGT: x           PT/INR - ( 15 Apr 2025 06:53 )   PT: 10.9 sec;   INR: 0.96 ratio         PTT - ( 14 Apr 2025 18:49 )  PTT:27.7 sec  Urinalysis Basic - ( 15 Apr 2025 06:53 )    Color: x / Appearance: x / SG: x / pH: x  Gluc: 138 mg/dL / Ketone: x  / Bili: x / Urobili: x   Blood: x / Protein: x / Nitrite: x   Leuk Esterase: x / RBC: x / WBC x   Sq Epi: x / Non Sq Epi: x / Bacteria: x      Amylase Serum--      Lipase serum--       [id number]  Amylase Serum--      Lipase [id number]       Ammonia--                          9.0    6.00  )-----------( 198      ( 15 Apr 2025 06:53 )             29.3                         10.1   7.00  )-----------( 205      ( 14 Apr 2025 18:49 )             33.4       Imaging:      EGD and colonoscopy in 2023                                                                                                          Findings:       Grade II varices were found in the upper third of the esophagus and in the middle third of        the esophagus.       A 3 cm hiatal hernia was present.       Few non-bleeding superficial gastric ulcers with a clean ulcer base (Chidi Class III) were        found in the gastric antrum. The largest lesion was 6 mm in largest dimension. Biopsies were       taken with a cold forceps for histology.       The duodenal bulb and second portion of the duodenum were normal.    Findings:       Non-bleeding external hemorrhoids were found during perianal exam. The hemorrhoids were large.       The colon (entire examined portion) appeared normal. Brown liquid stool in the entire colon.       The terminal ileum appeared normal.                                     EGD in 2022    Findings:       Suspected Grade II varices were found in the middle third of the esophagus and in the lower        third of the esophagus with no recent stigmata of recent bleeding. Varcies did not appear        downstream in nature.       LA Grade A (one or more mucosal breaks less than 5 mm, not extending between tops of 2        mucosal folds) esophagitis with no bleeding was found at the gastroesophageal junction.       The exam of the esophagus was otherwise normal.       Many non-bleeding cratered gastric ulcers with a clean ulcer base (Chidi Class III) were        found in the gastric antrum. The largest lesion was ten mm by twelve mm in largest dimension.        Once non/bleeding cratered ulcer ([place] Class IIC) was seen in the antrum.       Diffuse moderate mucosal changes characterized by erythema and erosion were found in the        entire examined stomach.       The exam of the stomach was otherwise normal.       The firstportion of the duodenum and second portion of the duodenum were normal.       Otherwise normal endoscopy with no active bleeding. Decision made not to band varices given        no stigmata of bleeding on varcies and suspected source of GI Bleeding warren ulcers at        pylorus.

## 2025-04-15 NOTE — CONSULT NOTE ADULT - ASSESSMENT
Impression:   54 yrs old female w/ hx of HTN, HLD, ESRD s/p DD-renal transplant 2020 c/b HCV (treated per patient), chronic SVC syndrome s/p L cephalic-iliac vein bypass on (previously on Plavix, but currently only on ASA), s/p thrombectomy of L iliac vein and graft 2012 coronary bypass 2015, p/w coffee ground emesis and black stools x 3 days.     #CGE  #Melena  - reported started 3 days ago, baseline hgb around 10, now decreased to 9, no blood transfusion this admission. Patient is on ASA and prednisone 5 mg daily, no PPI use. Black stool on LISSET. Reported that she does not see gastroenterologist or vascular surgery on a regular basis.   - Patient had 2 EGDs which showed esophageal varices (from chronic SVC syndrome), and also gastric ulcers.   - Colonoscopy in 2023 showed hemorrhoids.   - Differentials likely related to gastric ulcers, less concerned for a variceal bleed at this time, others include dieulafoy lesion, AVMs, etc.     Recommendations:   - Plan for EGD today.   - Keep her NPO for now.   - Place her on IV PPI BID.  - No contraindications for ASA use.   - CBC Q12 hours and transfuse if hgb < 7 or hgb < 8 if they have underlying CAD.   - Rest of the care per primary team.     Discussed the case with Dr. Barton.     GI will continue to follow.     All recommendations are tentative until note is attested by an attending.     Matt Horan, PGY-6  Gastroenterology/Hepatology Fellow  Available on Microsoft Teams  [phone] (Short Range Pager)  [phone] (Long Range Pager)    After 5pm, please contact the on-call GI fellow.

## 2025-04-15 NOTE — CONSULT NOTE ADULT - ASSESSMENT
54y PMH HTN, HLD, ESRD s/p DD-renal transplant 2020 c/b HCV (treated per patient), chronic SVC syndrome s/p L cephalic-iliac vein bypass on DAPT, s/p thrombectomy of L iliac vein and graft 2012 coronary bypass 2015, p/w coffee ground emesis x 3 days. Endorses L lower abd pain and lightheadedness. Endorses dark stool. Pt takes daily aspirin and prednisone. Has had similar symptoms in the past. Most recent endoscopy 07/2023 showed ulcers and esophageal varices. Nephrology consulted for DDRT    A/P  ESRD s/p DDRT in 2020  previously ESRD on HD following nephro Dr. Streeter   DDRT in 2020   Scr 0.95, stable   CTAP shows atrophic bilateral native kidneys without hydronephrosis; nonobstructive renal stones of the L kidney  Immunosuppressants as per transplant   Currently on envarsus 11 mg daily, prednisone 5 mg qd; holding MMF in the setting of GIB  Monitor tacro levels 30 mins before AM dose  Renally dose medications   Avoid nephrotoxins   Monitor UO and bmp     HTN  BP fluctuating  BP meds on hold  Monitor     Anemia  secondary to GIB   Hgb stable   s/p endoscopy today  GI following  Monitor hgb

## 2025-04-15 NOTE — PRE PROCEDURE NOTE - PRE PROCEDURE EVALUATION
Attending Physician: Dr Barton                            Procedure: EGD    Indication for Procedure: GIB   ________________________________________________________  PAST MEDICAL & SURGICAL HISTORY:  HTN - Hypertension      Clotted Renal Dialysis AV Graft      Infection of AV Graft for Dialysis      Fibroid Tumor      CKD (chronic kidney disease) stage V requiring chronic dialysis      Chronic kidney disease, unspecified      History of Hysterectomy  for benign disease      AV fistula  B/L - failed      H/O extremity bypass graft  H/O RUE bypass and creation of right brachial graft      Kidney transplant recipient      H/O kidney transplant        ALLERGIES:  morphine (Anaphylaxis)  latex (Swelling)  lactose (Hives)  shellfish (Urticaria)  contrast media (iodine-based) (Urticaria)  morphine (Urticaria)  penicillin (Anaphylaxis)  ibuprofen (Hives)  ibuprofen/morphine (Unknown)    HOME MEDICATIONS:  Aspirin Enteric Coated 81 mg oral delayed release tablet: 1 tab(s) orally once a day  cinacalcet 30 mg oral tablet: 1 tab(s) orally once a day  Envarsus XR 1 mg oral tablet, extended release: 11 milligram(s) orally once a day  magnesium oxide 200 mg oral tablet: 2 tab(s) orally 2 times a day  mycophenolate mofetil 500 mg oral tablet: 1 tab(s) orally 2 times a day  pantoprazole 40 mg oral delayed release tablet: 1 tab(s) orally once a day  predniSONE 5 mg oral tablet: 1 tab(s) orally once a day    AICD/PPM: [ ] yes   [X ] no    PERTINENT LAB DATA:                        9.0    6.00  )-----------( 198      ( 15 Apr 2025 06:53 )             29.3     04-15    142  |  106  |  20  ----------------------------<  138[H]  3.6   |  25  |  0.95    Ca    9.8      15 Apr 2025 06:53    TPro  6.8  /  Alb  4.2  /  TBili  0.7  /  DBili  x   /  AST  16  /  ALT  16  /  AlkPhos  68  04-14    PT/INR - ( 15 Apr 2025 06:53 )   PT: 10.9 sec;   INR: 0.96 ratio         PTT - ( 14 Apr 2025 18:49 )  PTT:27.7 sec            PHYSICAL EXAMINATION:    Height (cm): 162.6  Weight (kg): 73.5  BMI (kg/m2): 27.8  BSA (m2): 1.79T(C): 36.4  HR: 75  BP: 110/72  RR: 18  SpO2: 97%    Constitutional: NAD    Neck:  No JVD  Respiratory: normal effort   Cardiovascular: rrr  Extremities: No peripheral edema  Neurological: A/O x 3, no focal deficits        COMMENTS:    The patient is a suitable candidate for the planned procedure unless box checked [ ]  No, explain:

## 2025-04-15 NOTE — CONSULT NOTE ADULT - TIME BILLING
Face-to-face encounter, review of extensive medical records in this and prior charts, laboratory findings, radiographic findings, review of immunosuppression, review of medication regimen for comorbidities; documentation as noted above and discussion of diagnostic impressions and plan with the patient and team.

## 2025-04-15 NOTE — CONSULT NOTE ADULT - SUBJECTIVE AND OBJECTIVE BOX
[facility]   MD MEL DOBBS MD SAKIL BHUYAN, MD MARIA SANTIAGO, NP        TEL:  OFFICE: [phone]  From 5pm-7am answering service [phone]    --- INITIAL RENAL CONSULT NOTE ---date of service 04-15-25 @ 15:27    HPI:  54y PMH HTN, HLD, ESRD s/p DD-renal transplant 2020 c/b HCV (treated per patient), chronic SVC syndrome s/p L cephalic-iliac vein bypass on DAPT, s/p thrombectomy of L iliac vein and graft 2012 coronary bypass 2015, p/w coffee ground emesis x 3 days. Endorses L lower abd pain and lightheadedness. Endorses dark stool. Pt takes daily aspirin and prednisone. Has had similar symptoms in the past. Most recent endoscopy 07/2023 showed ulcers and esophageal varices.           Allergies:  morphine (Anaphylaxis)  latex (Swelling)  lactose (Hives)  shellfish (Urticaria)  contrast media (iodine-based) (Urticaria)  morphine (Urticaria)  penicillin (Anaphylaxis)  ibuprofen (Hives)  ibuprofen/morphine (Unknown)      PAST MEDICAL & SURGICAL HISTORY:  HTN - Hypertension      Clotted Renal Dialysis AV Graft      Infection of AV Graft for Dialysis      Fibroid Tumor      CKD (chronic kidney disease) stage V requiring chronic dialysis      Chronic kidney disease, unspecified      History of Hysterectomy  for benign disease      AV fistula  B/L - failed      H/O extremity bypass graft  H/O RUE bypass and creation of right brachial graft      Kidney transplant recipient      H/O kidney transplant          Home Medications Reviewed    Hospital Medications:   MEDICATIONS  (STANDING):  atorvastatin 20 milliGRAM(s) Oral at bedtime  cinacalcet 30 milliGRAM(s) Oral daily  magnesium oxide 400 milliGRAM(s) Oral two times a day with meals  pantoprazole  Injectable 40 milliGRAM(s) IV Push every 12 hours  predniSONE   Tablet 5 milliGRAM(s) Oral daily  sodium chloride 0.9%. 1000 milliLiter(s) (75 mL/Hr) IV Continuous <Continuous>  tacrolimus ER Tablet (ENVARSUS XR) 11 milliGRAM(s) Oral daily      SOCIAL HISTORY:  Denies ETOh, Smoking,     FAMILY HISTORY:  FH: HTN (hypertension) (Father, Mother)        REVIEW OF SYSTEMS:  CONSTITUTIONAL: as per hpi   EYES/ENT: No visual changes;  No vertigo or throat pain   NECK: No pain or stiffness  RESPIRATORY: No cough, wheezing, hemoptysis; No shortness of breath  CARDIOVASCULAR: No chest pain or palpitations.  GASTROINTESTINAL: as per hpi   GENITOURINARY: No dysuria, frequency, foamy urine, urinary urgency, incontinence or hematuria  NEUROLOGICAL: No numbness or weakness  SKIN: No itching, burning, rashes, or lesions   VASCULAR: No bilateral lower extremity edema.   All other review of systems is negative unless indicated above.    VITALS:  T(F): 97.6 (04-15-25 @ 14:49), Max: 98.2 (04-14-25 @ 16:31)  HR: 69 (04-15-25 @ 14:49)  BP: 144/86 (04-15-25 @ 14:49)  RR: 18 (04-15-25 @ 14:49)  SpO2: 99% (04-15-25 @ 14:49)  Wt(kg): --    Height (cm): 162.6 (04-15 @ 12:16)  Weight (kg): 73.5 (04-15 @ 12:16)  BMI (kg/m2): 27.8 (04-15 @ 12:16)  BSA (m2): 1.79 (04-15 @ 12:16)    PHYSICAL EXAM:  General: NAD  HEENT: anicteric sclera, oropharynx clear, MMM  Neck: No JVD  Respiratory: CTAB, no wheezes, rales or rhonchi  Cardiovascular: S1, S2, RRR  Gastrointestinal: BS+, soft, NT/ND  Extremities: No cyanosis or clubbing. No peripheral edema  Neurological: A/O x 3, no focal deficits  Psychiatric: Normal mood, normal affect  : No CVA tenderness. No ndiaye.   Skin: No rashes  Vascular Access: AVF    LABS:  04-15    142  |  106  |  20  ----------------------------<  138[H]  3.6   |  25  |  0.95    Ca    9.8      15 Apr 2025 06:53    TPro  6.8  /  Alb  4.2  /  TBili  0.7  /  DBili      /  AST  16  /  ALT  16  /  AlkPhos  68  04-14    Creatinine Trend: 0.95 <--, 1.09 <--                        9.0    6.00  )-----------( 198      ( 15 Apr 2025 06:53 )             29.3     Urine Studies:  Urinalysis Basic - ( 15 Apr 2025 06:53 )    Color:  / Appearance:  / SG:  / pH:   Gluc: 138 mg/dL / Ketone:   / Bili:  / Urobili:    Blood:  / Protein:  / Nitrite:    Leuk Esterase:  / RBC:  / WBC    Sq Epi:  / Non Sq Epi:  / Bacteria:           RADIOLOGY & ADDITIONAL STUDIES:

## 2025-04-15 NOTE — PROGRESS NOTE ADULT - SUBJECTIVE AND OBJECTIVE BOX
CHIEF COMPLAINT:  Reason for Admission: coffee ground emesis  History of Present Illness:   54y PMH HTN, HLD, ESRD s/p DD-renal transplant 2020 c/b HCV (treated per patient), chronic SVC syndrome s/p L cephalic-iliac vein bypass on DAPT, s/p thrombectomy of L iliac vein and graft 2012 coronary bypass 2015, p/w coffee ground emesis x 3 days. Endorses L lower abd pain and lightheadedness. Endorses dark stool. Pt takes daily aspirin and prednisone. Has had similar symptoms in the past. Most recent endoscopy 07/2023 showed ulcers and esophageal varices.     ROS: Denies HA, CP, SOB, palpitation, fever, cough, chills, recent travel, sick contact, change in urinary habits   A 10-system ROS was performed and is negative except as noted above and/or in the HPI.    SUBJECTIVE:     REVIEW OF SYSTEMS:    CONSTITUTIONAL: (  )  weakness,  (  ) fevers or chills  EYES/ENT: (  )visual changes;     NECK: (  ) pain or stiffness  RESPIRATORY:   (  )cough, wheezing, hemoptysis;  (  ) shortness of breath  CARDIOVASCULAR:  (  )chest pain or palpitations  GASTROINTESTINAL:   (  )abdominal or epigastric pain.  (  ) nausea, vomiting, or hematemesis;   (   ) diarrhea or constipation.   GENITOURINARY:   (    ) dysuria, frequency or hematuria  NEUROLOGICAL:  (   ) numbness or weakness   All other review of systems is negative unless indicated above    Vital Signs Last 24 Hrs  T(C): 36.8 (15 Apr 2025 05:00), Max: 36.8 (14 Apr 2025 16:31)  T(F): 98.2 (15 Apr 2025 05:00), Max: 98.2 (14 Apr 2025 16:31)  HR: 76 (15 Apr 2025 05:00) (66 - 77)  BP: 127/75 (15 Apr 2025 05:00) (113/68 - 127/75)  BP(mean): --  RR: 18 (15 Apr 2025 05:00) (18 - 18)  SpO2: 98% (15 Apr 2025 05:00) (93% - 98%)    Parameters below as of 15 Apr 2025 05:00  Patient On (Oxygen Delivery Method): room air        I&O's Summary      CAPILLARY BLOOD GLUCOSE          PHYSICAL EXAM:      CONSTITUTIONAL: Well groomed, no apparent distress  EYES: PERRLA , EOMI  ENMT: MMM. Normal dentition  RESP: No respiratory distress, no use of accessory muscles; CTA b/l  CV: +S1S2, RRR, no peripheral edema  GI: Soft, NTND, no RGR  MSK: spontaneously moves all extremities   SKIN: No rashes or ulcers noted  NEURO:  No focal deficits  PSYCH: A+O x 3      MEDICATIONS:  MEDICATIONS  (STANDING):  atorvastatin 20 milliGRAM(s) Oral at bedtime  cinacalcet 30 milliGRAM(s) Oral daily  magnesium oxide 400 milliGRAM(s) Oral two times a day with meals  mycophenolate mofetil 500 milliGRAM(s) Oral two times a day  pantoprazole  Injectable 40 milliGRAM(s) IV Push every 12 hours  predniSONE   Tablet 5 milliGRAM(s) Oral daily  sodium chloride 0.9%. 1000 milliLiter(s) (75 mL/Hr) IV Continuous <Continuous>  tacrolimus ER Tablet (ENVARSUS XR) 11 milliGRAM(s) Oral daily      LABS: All Labs Reviewed:                        9.0    6.00  )-----------( 198      ( 15 Apr 2025 06:53 )             29.3     04-15    142  |  106  |  20  ----------------------------<  138[H]  3.6   |  25  |  0.95    Ca    9.8      15 Apr 2025 06:53    TPro  6.8  /  Alb  4.2  /  TBili  0.7  /  DBili  x   /  AST  16  /  ALT  16  /  AlkPhos  68  04-14    PT/INR - ( 15 Apr 2025 06:53 )   PT: 10.9 sec;   INR: 0.96 ratio      10.1  7.00  )-----------( 205      ( 14 Apr 2025 18:49 )             33.4   PTT - ( 14 Apr 2025 18:49 )  PTT:27.7 sec      Blood Culture:   Urine Culture      RADIOLOGY/EKG:    ASSESSMENT AND PLAN:  54y F PMH HTN, HLD, ESRD s/p DD-renal transplant 2020 c/b HCV (treated per patient), chronic SVC syndrome s/p L cephalic-iliac vein bypass on DAPT, s/p thrombectomy of L iliac vein and graft 2012 coronary bypass 2015, p/w coffee ground emesis x 3 days a/f eval of GIB       Problem/Plan - 1:  ·  Problem: GIB (gastrointestinal bleeding).   ·  Plan: Presents for c/o melena and coffee ground emesis   - Hemodynamically stable, Hg 10.1  - CT A/P:  Redemonstrated extensive anterior abdominal wall superficial venous   varicosities extending from b/L inguinal regions to the partially imaged lower chest.  Coarse calcification noted of the infrarenal IVC likely ISO chronic central venous thrombosis   - ED: Octreotide, Protonix   - C/w IV protonix 40mg q12  - Trend cbc, transfuse to keep Hg >8 ( hx/o cardiac dz)  - Hold AC/ AP  i/s/o GIB  - GI consult (emailed)     ->>NPO for possible endoscopic eval    ->>If becomes hemodynamically unstable w/profuse bleeding, consult MICU, stat CTA,          inform on call GI fellow and consider IR consult.    Problem/Plan - 2:  ·  Problem: CKD (chronic kidney disease).   ·  Plan: - Hx/o ESRD s/p DD-renal transplant 2020  - Cr 1.02 ( about baseline)  - IVF prn   - C/w Prednisone   - Trend labs, monitor renal function   - Avoid nephrotoxic meds, renally dose meds  - Transplant nephro consult to be called in AM.    Problem/Plan - 3:  ·  Problem: HTN (hypertension).   ·  Plan: - Antihypertensive held iso GIB.        DISPOSITION: CHIEF COMPLAINT:patient was seen GI team at the bedside she is in the ER  Reason for Admission: coffee ground emesis  History of Present Illness:   54y PMH HTN, HLD, ESRD s/p DD-renal transplant 2020 c/b HCV (treated per patient), chronic SVC syndrome s/p L cephalic-iliac vein bypass on DAPT, s/p thrombectomy of L iliac vein and graft 2012 coronary bypass 2015, p/w coffee ground emesis x 3 days. Endorses L lower abd pain and lightheadedness. Endorses dark stool. Pt takes daily aspirin and prednisone. Has had similar symptoms in the past. Most recent endoscopy 07/2023 showed ulcers and esophageal varices.     ROS: Denies HA, CP, SOB, palpitation, fever, cough, chills, recent travel, sick contact, change in urinary habits   A 10-system ROS was performed and is negative except as noted above and/or in the HPI.    SUBJECTIVE:     REVIEW OF SYSTEMS:awake and verbal only epigastric discomfort    CONSTITUTIONAL: (  )  weakness,  (  ) fevers or chills  EYES/ENT: (  )visual changes;     NECK: (  ) pain or stiffness  RESPIRATORY:   (  )cough, wheezing, hemoptysis;  (  ) shortness of breath  CARDIOVASCULAR:  (  )chest pain or palpitations  GASTROINTESTINAL:   (  )abdominal or epigastric pain.  (  ) nausea, vomiting, or hematemesis;   (   ) diarrhea or constipation.   GENITOURINARY:   (    ) dysuria, frequency or hematuria  NEUROLOGICAL:  (   ) numbness or weakness   All other review of systems is negative unless indicated above    Vital Signs Last 24 Hrs  T(C): 36.8 (15 Apr 2025 05:00), Max: 36.8 (14 Apr 2025 16:31)  T(F): 98.2 (15 Apr 2025 05:00), Max: 98.2 (14 Apr 2025 16:31)  HR: 76 (15 Apr 2025 05:00) (66 - 77)  BP: 127/75 (15 Apr 2025 05:00) (113/68 - 127/75)  BP(mean): --  RR: 18 (15 Apr 2025 05:00) (18 - 18)  SpO2: 98% (15 Apr 2025 05:00) (93% - 98%)    Parameters below as of 15 Apr 2025 05:00  Patient On (Oxygen Delivery Method): room air        I&O's Summary      CAPILLARY BLOOD GLUCOSE          PHYSICAL EXAM:      CONSTITUTIONAL: Well groomed, no apparent distress  EYES: PERRLA , EOMI  ENMT: MMM. Normal dentition  RESP: No respiratory distress, no use of accessory muscles; CTA b/l  CV: +S1S2, RRR, no peripheral edema  GI: Soft, NTND, no RGR  MSK: spontaneously moves all extremities   SKIN: No rashes or ulcers noted  NEURO:  No focal deficits  PSYCH: A+O x 3      MEDICATIONS:  MEDICATIONS  (STANDING):  atorvastatin 20 milliGRAM(s) Oral at bedtime  cinacalcet 30 milliGRAM(s) Oral daily  magnesium oxide 400 milliGRAM(s) Oral two times a day with meals  mycophenolate mofetil 500 milliGRAM(s) Oral two times a day  pantoprazole  Injectable 40 milliGRAM(s) IV Push every 12 hours  predniSONE   Tablet 5 milliGRAM(s) Oral daily  sodium chloride 0.9%. 1000 milliLiter(s) (75 mL/Hr) IV Continuous <Continuous>  tacrolimus ER Tablet (ENVARSUS XR) 11 milliGRAM(s) Oral daily      LABS: All Labs Reviewed:                        9.0    6.00  )-----------( 198      ( 15 Apr 2025 06:53 )             29.3     04-15    142  |  106  |  20  ----------------------------<  138[H]  3.6   |  25  |  0.95    Ca    9.8      15 Apr 2025 06:53    TPro  6.8  /  Alb  4.2  /  TBili  0.7  /  DBili  x   /  AST  16  /  ALT  16  /  AlkPhos  68  04-14    PT/INR - ( 15 Apr 2025 06:53 )   PT: 10.9 sec;   INR: 0.96 ratio      10.1  7.00  )-----------( 205      ( 14 Apr 2025 18:49 )             33.4   PTT - ( 14 Apr 2025 18:49 )  PTT:27.7 sec      Blood Culture:   Urine Culture      RADIOLOGY/EKG:    ASSESSMENT AND PLAN:  54y F PMH HTN, HLD, ESRD s/p DD-renal transplant 2020 c/b HCV (treated per patient), chronic SVC syndrome s/p L cephalic-iliac vein bypass on DAPT, s/p thrombectomy of L iliac vein and graft 2012 coronary bypass 2015, p/w coffee ground emesis x 3 days a/f eval of GIB       Problem/Plan - 1:  ·  Problem: GIB (gastrointestinal bleeding).   ·  Plan: Presents for c/o melena and coffee ground emesis   - Hemodynamically stable, Hg 10.1  - CT A/P:  Redemonstrated extensive anterior abdominal wall superficial venous   varicosities extending from b/L inguinal regions to the partially imaged lower chest.  Coarse calcification noted of the infrarenal IVC likely ISO chronic central venous thrombosis   - ED: Octreotide, Protonix   - C/w IV protonix 40mg q12  - Trend cbc, transfuse to keep Hg >8 ( hx/o cardiac dz)  - Hold AC/ AP  i/s/o GIB  - GI consult (emailed)     ->>NPO for possible endoscopic eval    ->>If becomes hemodynamically unstable w/profuse bleeding, consult MICU, stat CTA,          inform on call GI fellow and consider IR consult.    Problem/Plan - 2:  ·  Problem: CKD (chronic kidney disease).   ·  Plan: - Hx/o ESRD s/p DD-renal transplant 2020  - Cr 1.02 ( about baseline)  - IVF prn   - C/w Prednisone   - Trend labs, monitor renal function   - Avoid nephrotoxic meds, renally dose meds  - Transplant nephro consult to be called in AM.    Problem/Plan - 3:  ·  Problem: HTN (hypertension).   ·  Plan: - Antihypertensive held iso GIB.        DISPOSITION:home after endoscopy result may need transfusion before discharge discussed with ACP

## 2025-04-15 NOTE — PRE PROCEDURE NOTE - DAY OF PROCEDURE ATTESTATION
I have personally seen, examined, and participated in the care of this patient. negative - no pain, no limited range of motion

## 2025-04-15 NOTE — CONSULT NOTE ADULT - SUBJECTIVE AND OBJECTIVE BOX
[facility] DIVISION OF KIDNEY DISEASES AND HYPERTENSION -- INITIAL CONSULT NOTE  --------------------------------------------------------------------------------    HPI: 54F PMH HTN, HLD, ESRD s/p DDRT 2020 c/b HCV (treated per patient), chronic SVC syndrome s/p L cephalic-iliac vein bypass on ASA, p/w coffee ground emesis, melena x 3 days and left lower abd pain. Transplant nephrology consulted for management of kidney transplant recipient.    Transplant hx:  Patient with DDRT in 2020  Underlying cause of ESRD likely HTN  Transplant complicated by HCV and treated with epclusa  H/o BK viremia - resolved  Immunosuppression - Induction - simulect  Current Immunosuppression regimen: Env 11,  mg BID, Pred 5 qd    On review of [facility], patient with Scr 1.09 which is close to baseline. BUN elevated at 28 likely 2/2 GI bleed. UA positive for 300 mg/dl proteins. Patient had multiple past hospitalizations with epistaxis and DAPT was switched to ASA. H/o GI bleed in the past with most recent endoscopy 07/2023 showing ulcers and esophageal varices. CT AP showed Atrophic bilateral native kidneys without hydronephrosis. Nonobstructive renal stones of the native left kidney. Right lower quadrant transplant kidney without hydronephrosis or peritransplant collection.    Patient seen and examined earlier today in the ED. Hemodynamically stable. Denies any headaches, nausea, vomiting, fevers/chills, chest pain, palpitations, SOB, abdominal pain, and leg swelling.     PAST HISTORY  --------------------------------------------------------------------------------  PAST MEDICAL & SURGICAL HISTORY:  HTN - Hypertension  Clotted Renal Dialysis AV Graft  Infection of AV Graft for Dialysis  Fibroid Tumor  CKD (chronic kidney disease) stage V requiring chronic dialysis  Chronic kidney disease, unspecified  History of Hysterectomy  for benign disease  AV fistula  B/L - failed  H/O extremity bypass graft  H/O RUE bypass and creation of right brachial graft  Kidney transplant recipient  H/O kidney transplant    FAMILY HISTORY:  FH: HTN (hypertension) (Father, Mother)    Social History:    ALLERGIES & MEDICATIONS  --------------------------------------------------------------------------------  Allergies    morphine (Anaphylaxis)  latex (Swelling)  lactose (Hives)  shellfish (Urticaria)  contrast media (iodine-based) (Urticaria)  morphine (Urticaria)  penicillin (Anaphylaxis)  ibuprofen (Hives)  ibuprofen/morphine (Unknown)    Intolerances    Standing Inpatient Medications  atorvastatin 20 milliGRAM(s) Oral at bedtime  cinacalcet 30 milliGRAM(s) Oral daily  magnesium oxide 400 milliGRAM(s) Oral two times a day with meals  mycophenolate mofetil 500 milliGRAM(s) Oral two times a day  pantoprazole  Injectable 40 milliGRAM(s) IV Push every 12 hours  predniSONE   Tablet 5 milliGRAM(s) Oral daily  sodium chloride 0.9%. 1000 milliLiter(s) IV Continuous <Continuous>  tacrolimus ER Tablet (ENVARSUS XR) 11 milliGRAM(s) Oral daily    PRN Inpatient Medications  acetaminophen     Tablet .. 650 milliGRAM(s) Oral every 6 hours PRN  aluminum hydroxide/magnesium hydroxide/simethicone Suspension 30 milliLiter(s) Oral every 4 hours PRN  melatonin 3 milliGRAM(s) Oral at bedtime PRN  ondansetron Injectable 4 milliGRAM(s) IV Push every 8 hours PRN    REVIEW OF SYSTEMS  --------------------------------------------------------------------------------  Gen: +fatigue, No fevers/chills  Skin: No rashes  Head/Eyes/Ears/Mouth: No headache; Normal hearing; Normal vision   Respiratory: No dyspnea, cough  CV: No chest pain, PND, orthopnea  GI: No abdominal pain, diarrhea, constipation, nausea, vomiting  : No increased frequency, dysuria, hematuria, nocturia  MSK: No edema  Neuro: No dizziness/lightheadedness  Heme: melena+  Psych: No significant nervousness, anxiety, stress, depression    Rest of the ROS as stated in HPI    VITALS/PHYSICAL EXAM  --------------------------------------------------------------------------------  T(C): 36.8 (04-15-25 @ 05:00), Max: 36.8 (04-14-25 @ 16:31)  HR: 76 (04-15-25 @ 05:00) (66 - 77)  BP: 127/75 (04-15-25 @ 05:00) (113/68 - 127/75)  RR: 18 (04-15-25 @ 05:00) (18 - 18)  SpO2: 98% (04-15-25 @ 05:00) (93% - 98%)  Wt(kg): --  Height (cm): 162.6 (04-14-25 @ 16:31)  Weight (kg): 73.5 (04-14-25 @ 16:31)  BMI (kg/m2): 27.8 (04-14-25 @ 16:31)  BSA (m2): 1.79 (04-14-25 @ 16:31)    Physical Exam:  Gen: NAD, able to speak in full sentences   HEENT: PERRL, MMM   Pulm: CTA B/L, no crackles   CV: RRR, S1S2+  Abd: +BS, soft  Transplant: No tenderness, swelling  : No suprapubic tenderness  MSK: no edema   Psych: Normal affect and mood  Skin: Warm    LABS/STUDIES  --------------------------------------------------------------------------------              10.1   7.00  >-----------<  205      [04-14-25 @ 18:49]              33.4     140  |  104  |  28  ----------------------------<  202      [04-14-25 @ 18:49]  3.8   |  22  |  1.09        Ca     10.4     [04-14-25 @ 18:49]    TPro  6.8  /  Alb  4.2  /  TBili  0.7  /  DBili  x   /  AST  16  /  ALT  16  /  AlkPhos  68  [04-14-25 @ 18:49]    PT/INR: PT 10.9 , INR 0.96       [04-14-25 @ 18:49]  PTT: 27.7       [04-14-25 @ 18:49]    Creatinine Trend:  SCr 1.09 [04-14 @ 18:49]    Urinalysis - [04-14-25 @ 20:34]      Color Dark Yellow / Appearance Clear / SG 1.026 / pH 6.0      Gluc Negative / Ketone Trace  / Bili Negative / Urobili 0.2       Blood Negative / Protein 300 / Leuk Est Negative / Nitrite Negative      RBC 1 / WBC 1 / Hyaline  / Gran  / Sq Epi  / Non Sq Epi 1 / Bacteria Negative    HBsAb Reactive      [05-31-22 @ 14:03]  HBsAg Nonreact      [05-31-22 @ 14:03]  HBcAb Reactive      [05-31-22 @ 14:03]  HCV 0.06, Nonreact      [05-31-22 @ 14:03]    KAYLA: titer Negative, pattern --      [05-31-22 @ 14:03]  Free Light Chains: kappa 1.63, lambda 1.09, ratio = 1.50      [05-31 @ 14:03]    Tacrolimus  Cyclosporine  Sirolimus  Mycophenolate  BK PCR  CMV PCR  Parvo PCR  EBV PCR

## 2025-04-15 NOTE — PATIENT PROFILE ADULT - FUNCTIONAL ASSESSMENT - DAILY ACTIVITY 5.
Headache started Monday night, hx of migraine, last one in June. Taken prescribed medications without relief.  Developed some upper chest pain around 3am this morning.    4 = No assist / stand by assistance

## 2025-04-16 DIAGNOSIS — I87.1 COMPRESSION OF VEIN: ICD-10-CM

## 2025-04-16 DIAGNOSIS — Z29.9 ENCOUNTER FOR PROPHYLACTIC MEASURES, UNSPECIFIED: ICD-10-CM

## 2025-04-16 LAB
ANION GAP SERPL CALC-SCNC: 10 MMOL/L — SIGNIFICANT CHANGE UP (ref 5–17)
BUN SERPL-MCNC: 17 MG/DL — SIGNIFICANT CHANGE UP (ref 7–23)
CALCIUM SERPL-MCNC: 9.9 MG/DL — SIGNIFICANT CHANGE UP (ref 8.4–10.5)
CHLORIDE SERPL-SCNC: 109 MMOL/L — HIGH (ref 96–108)
CO2 SERPL-SCNC: 23 MMOL/L — SIGNIFICANT CHANGE UP (ref 22–31)
CREAT SERPL-MCNC: 0.98 MG/DL — SIGNIFICANT CHANGE UP (ref 0.5–1.3)
EGFR: 69 ML/MIN/1.73M2 — SIGNIFICANT CHANGE UP
EGFR: 69 ML/MIN/1.73M2 — SIGNIFICANT CHANGE UP
GLUCOSE SERPL-MCNC: 112 MG/DL — HIGH (ref 70–99)
HCT VFR BLD CALC: 30.5 % — LOW (ref 34.5–45)
HCT VFR BLD CALC: 32.5 % — LOW (ref 34.5–45)
HGB BLD-MCNC: 9.1 G/DL — LOW (ref 11.5–15.5)
HGB BLD-MCNC: 9.7 G/DL — LOW (ref 11.5–15.5)
MCHC RBC-ENTMCNC: 24 PG — LOW (ref 27–34)
MCHC RBC-ENTMCNC: 24.1 PG — LOW (ref 27–34)
MCHC RBC-ENTMCNC: 29.8 G/DL — LOW (ref 32–36)
MCHC RBC-ENTMCNC: 29.8 G/DL — LOW (ref 32–36)
MCV RBC AUTO: 80.4 FL — SIGNIFICANT CHANGE UP (ref 80–100)
MCV RBC AUTO: 80.9 FL — SIGNIFICANT CHANGE UP (ref 80–100)
NRBC BLD AUTO-RTO: 0 /100 WBCS — SIGNIFICANT CHANGE UP (ref 0–0)
NRBC BLD AUTO-RTO: 0 /100 WBCS — SIGNIFICANT CHANGE UP (ref 0–0)
PLATELET # BLD AUTO: 216 K/UL — SIGNIFICANT CHANGE UP (ref 150–400)
PLATELET # BLD AUTO: 246 K/UL — SIGNIFICANT CHANGE UP (ref 150–400)
POTASSIUM SERPL-MCNC: 3.4 MMOL/L — LOW (ref 3.5–5.3)
POTASSIUM SERPL-SCNC: 3.4 MMOL/L — LOW (ref 3.5–5.3)
RBC # BLD: 3.77 M/UL — LOW (ref 3.8–5.2)
RBC # BLD: 4.04 M/UL — SIGNIFICANT CHANGE UP (ref 3.8–5.2)
RBC # FLD: 18.6 % — HIGH (ref 10.3–14.5)
RBC # FLD: 18.9 % — HIGH (ref 10.3–14.5)
SODIUM SERPL-SCNC: 142 MMOL/L — SIGNIFICANT CHANGE UP (ref 135–145)
TACROLIMUS SERPL-MCNC: 7.7 NG/ML — SIGNIFICANT CHANGE UP
WBC # BLD: 5.88 K/UL — SIGNIFICANT CHANGE UP (ref 3.8–10.5)
WBC # BLD: 7.54 K/UL — SIGNIFICANT CHANGE UP (ref 3.8–10.5)
WBC # FLD AUTO: 5.88 K/UL — SIGNIFICANT CHANGE UP (ref 3.8–10.5)
WBC # FLD AUTO: 7.54 K/UL — SIGNIFICANT CHANGE UP (ref 3.8–10.5)

## 2025-04-16 PROCEDURE — 99222 1ST HOSP IP/OBS MODERATE 55: CPT | Mod: FS

## 2025-04-16 PROCEDURE — 99232 SBSQ HOSP IP/OBS MODERATE 35: CPT | Mod: GC

## 2025-04-16 RX ADMIN — Medication 40 MILLIEQUIVALENT(S): at 08:37

## 2025-04-16 RX ADMIN — Medication 400 MILLIGRAM(S): at 08:37

## 2025-04-16 RX ADMIN — ATORVASTATIN CALCIUM 20 MILLIGRAM(S): 80 TABLET, FILM COATED ORAL at 21:05

## 2025-04-16 RX ADMIN — CINACALCET 30 MILLIGRAM(S): 30 TABLET, FILM COATED ORAL at 11:52

## 2025-04-16 RX ADMIN — Medication 40 MILLIGRAM(S): at 17:55

## 2025-04-16 RX ADMIN — Medication 400 MILLIGRAM(S): at 17:55

## 2025-04-16 RX ADMIN — TACROLIMUS 11 MILLIGRAM(S): 0.5 CAPSULE ORAL at 08:37

## 2025-04-16 RX ADMIN — Medication 40 MILLIGRAM(S): at 06:22

## 2025-04-16 RX ADMIN — PREDNISONE 5 MILLIGRAM(S): 20 TABLET ORAL at 06:21

## 2025-04-16 NOTE — PROGRESS NOTE ADULT - PROBLEM SELECTOR PLAN 1
Presents for c/o melena and coffee ground emesis   - Hemodynamically stable, Hg 10.1  - CT A/P:  Redemonstrated extensive anterior abdominal wall superficial venous   varicosities extending from b/L inguinal regions to the partially imaged lower chest.  Coarse calcification noted of the infrarenal IVC likely ISO chronic central venous thrombosis   - ED: Octreotide, Protonix   - C/w IV protonix 40mg q12  - Trend cbc, transfuse to keep Hg >8 ( hx/o cardiac dz)  - Hold AC/ AP  i/s/o GIB  - GI consult (emailed)     ->>NPO for possible endoscopic eval    ->>If becomes hemodynamically unstable w/profuse bleeding, consult MICU, stat CTA,          inform on call GI fellow and consider IR consult Presents for c/o melena and coffee ground emesis   - Hemodynamically stable, Hg 10.1  - CT A/P:  Redemonstrated extensive anterior abdominal wall superficial venous   varicosities extending from b/L inguinal regions to the partially imaged lower chest.  Coarse calcification noted of the infrarenal IVC likely ISO chronic central venous thrombosis   - ED: Octreotide, Protonix   - C/w IV protonix 40mg q12  - Trend cbc, transfuse to keep Hg >8 ( hx/o cardiac dz)  - Hold AC/ AP  i/s/o GIB  - GI consult (emailed)   Advance diet  Pending Endoscopy results

## 2025-04-16 NOTE — PROGRESS NOTE ADULT - SUBJECTIVE AND OBJECTIVE BOX
PROGRESS NOTE:   Authored by: Erna Monk MD   PGY-1       Patient is a 54y old  Female who presents with a chief complaint of coffee ground emesis (16 Apr 2025 11:04)      SUBJECTIVE / OVERNIGHT EVENTS:  2 melanotic bowel movements overnight. BP WNL.  Patient continues to endorse melena without any other symptoms. The patient NAD and VSS. She also states that for the past month she has been noticing facial plethora and vascular congestion at times and that her pcp told her to follow up with vascular surgery which she was not able to do. ROS cp, gu, ID perspective negative.       MEDICATIONS  (STANDING):  atorvastatin 20 milliGRAM(s) Oral at bedtime  cinacalcet 30 milliGRAM(s) Oral daily  magnesium oxide 400 milliGRAM(s) Oral two times a day with meals  pantoprazole  Injectable 40 milliGRAM(s) IV Push every 12 hours  predniSONE   Tablet 5 milliGRAM(s) Oral daily  tacrolimus ER Tablet (ENVARSUS XR) 11 milliGRAM(s) Oral daily    MEDICATIONS  (PRN):  acetaminophen     Tablet .. 650 milliGRAM(s) Oral every 6 hours PRN Temp greater or equal to 38C (100.4F), Mild Pain (1 - 3)      CAPILLARY BLOOD GLUCOSE        I&O's Summary      PHYSICAL EXAM:  Vital Signs Last 24 Hrs  T(C): 36.3 (16 Apr 2025 04:12), Max: 36.6 (15 Apr 2025 12:05)  T(F): 97.3 (16 Apr 2025 04:12), Max: 97.8 (15 Apr 2025 12:05)  HR: 75 (16 Apr 2025 04:12) (67 - 77)  BP: 136/78 (16 Apr 2025 04:12) (136/78 - 179/84)  BP(mean): --  RR: 18 (16 Apr 2025 04:12) (16 - 23)  SpO2: 99% (16 Apr 2025 04:12) (93% - 100%)    Parameters below as of 16 Apr 2025 04:12  Patient On (Oxygen Delivery Method): room air        CONSTITUTIONAL: NAD, facial plethora, no venous congestion in upper extremities  RESPIRATORY: Normal respiratory effort; lungs are clear to auscultation bilaterally  CARDIOVASCULAR: Regular rate and rhythm, normal S1 and S2, no murmur/rub/gallop; No lower extremity edema; Peripheral pulses are 2+ bilaterally  ABDOMEN: Nontender to palpation, normoactive bowel sounds, no rebound/guarding; No hepatosplenomegaly  MUSCLOSKELETAL: no clubbing or cyanosis of digits; no joint swelling or tenderness to palpation  PSYCH: A+O to person, place, and time; affect appropriate  Skin: bypass site tense in the abdomen but CDI    LABS:                        9.1    5.88  )-----------( 216      ( 16 Apr 2025 06:58 )             30.5     04-16    142  |  109[H]  |  17  ----------------------------<  112[H]  3.4[L]   |  23  |  0.98    Ca    9.9      16 Apr 2025 06:58    TPro  6.8  /  Alb  4.2  /  TBili  0.7  /  DBili  x   /  AST  16  /  ALT  16  /  AlkPhos  68  04-14    PT/INR - ( 15 Apr 2025 06:53 )   PT: 10.9 sec;   INR: 0.96 ratio         PTT - ( 14 Apr 2025 18:49 )  PTT:27.7 sec        MICRO:  Urinalysis Basic - ( 16 Apr 2025 06:58 )    Color: x / Appearance: x / SG: x / pH: x  Gluc: 112 mg/dL / Ketone: x  / Bili: x / Urobili: x   Blood: x / Protein: x / Nitrite: x   Leuk Esterase: x / RBC: x / WBC x   Sq Epi: x / Non Sq Epi: x / Bacteria: x        Urinalysis with Rflx Culture (collected 14 Apr 2025 20:34)        IMAGING:  Pending EGD results

## 2025-04-16 NOTE — PROGRESS NOTE ADULT - ASSESSMENT
54F PMH HTN, HLD, ESRD s/p DDRT 2020 c/b HCV (treated per patient), chronic SVC syndrome s/p L cephalic-iliac vein bypass on ASA, p/w coffee ground emesis, melena x 3 days and left lower abd pain. Transplant nephrology consulted for management of kidney transplant recipient.    1. Kidney Transplant recipient:  Patient with DDRT in 2020  Underlying cause of ESRD likely HTN  Transplant complicated by HCV and treated with epclusa  H/o BK viremia - resolved  Current Scr is 0.9.   UA positive for 300 mg/dl proteins.   CT AP showed Atrophic bilateral native kidneys without hydronephrosis. Nonobstructive renal stones of the native left kidney. Right lower quadrant transplant kidney without hydronephrosis or peritransplant collection.    Recommend: Hemodynamic support per primary team. Monitor labs and UOP. Avoid any potential nephrotoxins (NSAIDs/ACEi/ARBs) when possible. Dose medications as per eGFR/HD.     2. Immunosuppression:  Induction - simulect  Current Immunosuppression regimen: Env 11,  mg BID, Pred 5 qd  Cont Env and pred  Hold MMF in the setting of GI bleed, will consider switching to Azathioprine  Continue to monitor tacro levels daily - 30 mins before the morning dose.     3. GI bleed:  Hgb currently at 9.1  Hemodynamically stable.  Patient had multiple past hospitalizations with epistaxis and DAPT was switched to ASA.   H/o GI bleed in the past with most recent endoscopy 07/2023 showing ulcers and esophageal varices.  EGD showed non-bleeding ulcers. Plan for colonoscopy.   GI consult  PPI prophylaxis    4 HTN:   Consider resuming home BP meds    Please call if you have any questions  Mikhail Kessler, PGY4  Nephrology Fellow  [phone]/Teams preferred  (After 5PM or weekends, reach out to fellow on call)

## 2025-04-16 NOTE — PROGRESS NOTE ADULT - SUBJECTIVE AND OBJECTIVE BOX
[facility]    Dr. Otis Lala     Office [phone] (9 am to 5 pm)  Service: [phone] (5pm to 9am)  Also Available on TEAMS      RENAL PROGRESS NOTE: DATE OF SERVICE 04-16-25 @ 11:04    Patient is a 54y old  Female who presents with a chief complaint of coffee ground emesis (16 Apr 2025 10:52)      Patient seen and examined at bedside. No chest pain/sob    VITALS:  T(F): 97.3 (04-16-25 @ 04:12), Max: 97.8 (04-15-25 @ 12:05)  HR: 75 (04-16-25 @ 04:12)  BP: 136/78 (04-16-25 @ 04:12)  RR: 18 (04-16-25 @ 04:12)  SpO2: 99% (04-16-25 @ 04:12)  Wt(kg): --    Height (cm): 162.6 (04-15 @ 12:16)  Weight (kg): 73.5 (04-15 @ 12:16)  BMI (kg/m2): 27.8 (04-15 @ 12:16)  BSA (m2): 1.79 (04-15 @ 12:16)    PHYSICAL EXAM:  Constitutional: NAD  Neck: No JVD  Respiratory: CTAB, no wheezes, rales or rhonchi  Cardiovascular: S1, S2, RRR  Gastrointestinal: BS+, soft, NT/ND  Extremities: No peripheral edema    Hospital Medications:   MEDICATIONS  (STANDING):  atorvastatin 20 milliGRAM(s) Oral at bedtime  cinacalcet 30 milliGRAM(s) Oral daily  magnesium oxide 400 milliGRAM(s) Oral two times a day with meals  pantoprazole  Injectable 40 milliGRAM(s) IV Push every 12 hours  predniSONE   Tablet 5 milliGRAM(s) Oral daily  tacrolimus ER Tablet (ENVARSUS XR) 11 milliGRAM(s) Oral daily    Tacrolimus (), Serum: 7.7 ng/mL (04-16 @ 06:58)    LABS:  04-16    142  |  109[H]  |  17  ----------------------------<  112[H]  3.4[L]   |  23  |  0.98    Ca    9.9      16 Apr 2025 06:58    TPro  6.8  /  Alb  4.2  /  TBili  0.7  /  DBili      /  AST  16  /  ALT  16  /  AlkPhos  68  04-14    Creatinine Trend: 0.98 <--, 0.95 <--, 1.09 <--                                9.1    5.88  )-----------( 216      ( 16 Apr 2025 06:58 )             30.5     Urine Studies:  Urinalysis - [04-16-25 @ 06:58]      Color  / Appearance  / SG  / pH       Gluc 112 / Ketone   / Bili  / Urobili        Blood  / Protein  / Leuk Est  / Nitrite       RBC  / WBC  / Hyaline  / Gran  / Sq Epi  / Non Sq Epi  / Bacteria             RADIOLOGY & ADDITIONAL STUDIES:

## 2025-04-16 NOTE — CONSULT NOTE ADULT - ATTENDING COMMENTS
54F admitted w cofee ground emesis and dark stools.   She has a hx of esophageal varices 2/2 SVC syndrome and PUD on prior EGDs.   Hgb 9.0     Recommendations   - IV PPI BID   - trend h/h  - monitor bowel movements   - keep NPO   - plan for EGD today, further recs to follow post procedure   GI to follow, please call questions
agree with above  complex venous intervention in the past  know occluded bypass
Patient called overnight to transplant line to inform us that she was having coffee ground emesis- ongoing since Saturday 4/12 morning with black tarry stool. Patient known to have h/o ulcers and varices in the past. Labs and hemodynamics stable. No hematochezia or hematemesis.  now s/p EGD with GI and found to have no active bleeding, but with ulcers and varices once again.    -DDRt 2020/HCV s/p epclusa, h/o BK viremia- Cr stable at baseline  -IS- con't envarsus, and pred 5mg daily, holding MMF given GI issues  -GIB- await further GI f/u now that non bleeding ulcers and varices  -HTN- BP meds on hold given bleeding and EGD- will reevaluate

## 2025-04-16 NOTE — PROGRESS NOTE ADULT - PROBLEM SELECTOR PLAN 2
- Hx/o ESRD s/p DD-renal transplant 2020  - Cr 1.02 ( about baseline)  - IVF prn   - C/w Prednisone   - Trend labs, monitor renal function   - Avoid nephrotoxic meds, renally dose meds  - Transplant nephro consult to be called in AM - Hx/o ESRD s/p DD-renal transplant 2020  - Cr 1.02 ( about baseline)  - IVF prn   - C/w Prednisone   - c/w tacro  - Trend labs, monitor renal function   - Avoid nephrotoxic meds, renally dose meds  - Transplant nephro consult to be called in AM  - Continue to monitor tacro levels Am prior to dose

## 2025-04-16 NOTE — PROGRESS NOTE ADULT - PROBLEM SELECTOR PLAN 3
- Antihypertensive held iso GIB Hx of SVC syndrome leading to esophageal varices as patient doesn't have portal htn now s/p bypass cephalic/ iliac   - found on egd in 2023 with no signs of stigmata  - patient states that she has been having recurrent symptoms of facial and chest plethora for the past month but was not able to make a vascular surgery appointment   - patient HD but with upper GIB s/p endoscopy still pending results - could be  ulcers vs varices    Plan:  Vascular surgery consult   Continue IV PPI   Continue  to monitor H&H  Can possibly restart aspirin once GI clears

## 2025-04-16 NOTE — PROGRESS NOTE ADULT - SUBJECTIVE AND OBJECTIVE BOX
CHIEF COMPLAINT:    SUBJECTIVE:     REVIEW OF SYSTEMS:    CONSTITUTIONAL: (  )  weakness,  (  ) fevers or chills  EYES/ENT: (  )visual changes;     NECK: (  ) pain or stiffness  RESPIRATORY:   (  )cough, wheezing, hemoptysis;  (  ) shortness of breath  CARDIOVASCULAR:  (  )chest pain or palpitations  GASTROINTESTINAL:   (  )abdominal or epigastric pain.  (  ) nausea, vomiting, or hematemesis;   (   ) diarrhea or constipation.   GENITOURINARY:   (    ) dysuria, frequency or hematuria  NEUROLOGICAL:  (   ) numbness or weakness   All other review of systems is negative unless indicated above    Vital Signs Last 24 Hrs  T(C): 36.3 (16 Apr 2025 04:12), Max: 36.6 (15 Apr 2025 12:05)  T(F): 97.3 (16 Apr 2025 04:12), Max: 97.8 (15 Apr 2025 12:05)  HR: 75 (16 Apr 2025 04:12) (67 - 77)  BP: 136/78 (16 Apr 2025 04:12) (136/78 - 179/84)  BP(mean): --  RR: 18 (16 Apr 2025 04:12) (16 - 23)  SpO2: 99% (16 Apr 2025 04:12) (93% - 100%)    Parameters below as of 16 Apr 2025 04:12  Patient On (Oxygen Delivery Method): room air        I&O's Summary      CAPILLARY BLOOD GLUCOSE          PHYSICAL EXAM:    Constitutional:  (   ) NAD,   (   )awake and alert  HEENT: PERR, EOMI,    Neck: Soft and supple, No LAD, No JVD  Respiratory:  (    Breath sounds are clear bilaterally,    (   ) wheezing, rales or rhonchi  Cardiovascular:     (   )S1 and S2, regular rate and rhythm, no Murmurs, gallops or rubs  Gastrointestinal:  (   )Bowel Sounds present, soft,   (  )nontender, nondistended,    Extremities:    (  ) peripheral edema  Vascular: 2+ peripheral pulses  Neurological:    (    )A/O x 3,   (  ) focal deficits  Musculoskeletal:    (   )  normal strength b/l upper  (     ) normal  lower extremities  Skin: No rashes    MEDICATIONS:  MEDICATIONS  (STANDING):  atorvastatin 20 milliGRAM(s) Oral at bedtime  cinacalcet 30 milliGRAM(s) Oral daily  magnesium oxide 400 milliGRAM(s) Oral two times a day with meals  pantoprazole  Injectable 40 milliGRAM(s) IV Push every 12 hours  predniSONE   Tablet 5 milliGRAM(s) Oral daily  tacrolimus ER Tablet (ENVARSUS XR) 11 milliGRAM(s) Oral daily      LABS: All Labs Reviewed:                        9.1    5.88  )-----------( 216      ( 16 Apr 2025 06:58 )             30.5     04-16    142  |  109[H]  |  17  ----------------------------<  112[H]  3.4[L]   |  23  |  0.98    Ca    9.9      16 Apr 2025 06:58    TPro  6.8  /  Alb  4.2  /  TBili  0.7  /  DBili  x   /  AST  16  /  ALT  16  /  AlkPhos  68  04-14    PT/INR - ( 15 Apr 2025 06:53 )   PT: 10.9 sec;   INR: 0.96 ratio         PTT - ( 14 Apr 2025 18:49 )  PTT:27.7 sec      Blood Culture:   Urine Culture      RADIOLOGY/EKG:    ASSESSMENT AND PLAN:    DVT PPX:    ADVANCED DIRECTIVE:    DISPOSITION: CHIEF COMPLAINT: patient feeling much better GI follow-up noted discussed with medical team regarding her colonoscopy to be done in a.m. and monitor her CBC and transfuse as needed  2 melanotic bowel movements overnight. BP WNL.  Patient continues to endorse melena without any other symptoms. The patient NAD and VSS. She also states that for the past month she has been noticing facial plethora and vascular congestion at times and that her pcp told her to follow up with vascular surgery which she was not able to do   SUBJECTIVE:     REVIEW OF SYSTEMS: without complaint    CONSTITUTIONAL: (  )  weakness,  (  ) fevers or chills  EYES/ENT: (  )visual changes;     NECK: (  ) pain or stiffness  RESPIRATORY:   (  )cough, wheezing, hemoptysis;  (  ) shortness of breath  CARDIOVASCULAR:  (  )chest pain or palpitations  GASTROINTESTINAL:   (  )abdominal or epigastric pain.  (  ) nausea, vomiting, or hematemesis;   (   ) diarrhea or constipation.   GENITOURINARY:   (    ) dysuria, frequency or hematuria  NEUROLOGICAL:  (   ) numbness or weakness   All other review of systems is negative unless indicated above    Vital Signs Last 24 Hrs  T(C): 36.3 (16 Apr 2025 04:12), Max: 36.6 (15 Apr 2025 12:05)  T(F): 97.3 (16 Apr 2025 04:12), Max: 97.8 (15 Apr 2025 12:05)  HR: 75 (16 Apr 2025 04:12) (67 - 77)  BP: 136/78 (16 Apr 2025 04:12) (136/78 - 179/84)  BP(mean): --  RR: 18 (16 Apr 2025 04:12) (16 - 23)  SpO2: 99% (16 Apr 2025 04:12) (93% - 100%)    Parameters below as of 16 Apr 2025 04:12  Patient On (Oxygen Delivery Method): room air        I&O's Summary      CAPILLARY BLOOD GLUCOSE          PHYSICAL EXAM:  CONSTITUTIONAL: NAD, facial plethora, no venous congestion in upper extremities  RESPIRATORY: Normal respiratory effort; lungs are clear to auscultation bilaterally  CARDIOVASCULAR: Regular rate and rhythm, normal S1 and S2,     ABDOMEN: Nontender to palpation, normoactive bowel sounds, no rebound   MUSCLOSKELETAL: no clubbing or cyanosis of digits; no joint swelling or tenderness to palpation  PSYCH: A+O to person, place, and time; affect appropriate  Skin: bypass site tense in the abdomen but CDI       MEDICATIONS:  MEDICATIONS  (STANDING):  atorvastatin 20 milliGRAM(s) Oral at bedtime  cinacalcet 30 milliGRAM(s) Oral daily  magnesium oxide 400 milliGRAM(s) Oral two times a day with meals  pantoprazole  Injectable 40 milliGRAM(s) IV Push every 12 hours  predniSONE   Tablet 5 milliGRAM(s) Oral daily  tacrolimus ER Tablet (ENVARSUS XR) 11 milliGRAM(s) Oral daily      LABS: All Labs Reviewed:                        9.1    5.88  )-----------( 216      ( 16 Apr 2025 06:58 )             30.5     04-16    142  |  109[H]  |  17  ----------------------------<  112[H]  3.4[L]   |  23  |  0.98    Ca    9.9      16 Apr 2025 06:58    TPro  6.8  /  Alb  4.2  /  TBili  0.7  /  DBili  x   /  AST  16  /  ALT  16  /  AlkPhos  68  04-14    PT/INR - ( 15 Apr 2025 06:53 )   PT: 10.9 sec;   INR: 0.96 ratio         PTT - ( 14 Apr 2025 18:49 )  PTT:27.7 sec      Blood Culture:   Urine Culture      RADIOLOGY/EKG:    ASSESSMENT AND PLAN:  54y F PMH HTN, HLD, ESRD s/p DD-renal transplant 2020 c/b HCV (treated per patient), chronic SVC syndrome s/p L cephalic-iliac vein bypass on DAPT, s/p thrombectomy of L iliac vein and graft 2012 coronary bypass 2015, p/w coffee ground emesis x 3 days a/f eval of GIB       Nutritional Assessment:  · Nutritional Assessment  Advance to regular diet    Problem/Plan - 1:  ·  Problem: GIB (gastrointestinal bleeding).   ·  Plan: Presents for c/o melena and coffee ground emesis   - Hemodynamically stable, Hg 10.1  - CT A/P:  Redemonstrated extensive anterior abdominal wall superficial venous   varicosities extending from b/L inguinal regions to the partially imaged lower chest.  Coarse calcification noted of the infrarenal IVC likely ISO chronic central venous thrombosis   - ED: Octreotide, Protonix   - C/w IV protonix 40mg q12  - Trend cbc, transfuse to keep Hg >8 ( hx/o cardiac dz)  - Hold AC/ AP  i/s/o GIB  - GI consult (emailed)   Advance diet  Pending Endoscopy results.    Problem/Plan - 2:  ·  Problem: CKD (chronic kidney disease).   ·  Plan: - Hx/o ESRD s/p DD-renal transplant 2020  - Cr 1.02 ( about baseline)  - IVF prn   - C/w Prednisone   - c/w tacro  - Trend labs, monitor renal function   - Avoid nephrotoxic meds, renally dose meds  - Transplant nephro consult to be called in AM  - Continue to monitor tacro levels Am prior to dose.    Problem/Plan - 3:  ·  Problem: SVC syndrome.  ·  Plan: Hx of SVC syndrome leading to esophageal varices as patient doesn't have portal htn now s/p bypass cephalic/ iliac   - found on egd in 2023 with no signs of stigmata  - patient states that she has been having recurrent symptoms of facial and chest plethora for the past month but was not able to make a vascular surgery appointment   - patient HD but with upper GIB s/p endoscopy still pending results - could be  ulcers vs varices    Plan:  Vascular surgery consult   Continue IV PPI   Continue  to monitor H&H  Can possibly restart aspirin once GI clears.    Problem/Plan - 4:  ·  Problem: HTN (hypertension).   ·  Plan: - Antihypertensive held iso GIB.    Problem/Plan - 5:  ·  Problem: Prophylactic measure.   ·  Plan: DVT: SCD iso GIB  Diet: regular Diet      DVT PPX:    ADVANCED DIRECTIVE:  Code: Full Code.  DISPOSITION: home after colonoscopy is normal and monitor her CBC discussed with medical team

## 2025-04-16 NOTE — PROGRESS NOTE ADULT - ASSESSMENT
54y PMH HTN, HLD, ESRD s/p DD-renal transplant 2020 c/b HCV (treated per patient), chronic SVC syndrome s/p L cephalic-iliac vein bypass on DAPT, s/p thrombectomy of L iliac vein and graft 2012 coronary bypass 2015, p/w coffee ground emesis x 3 days. Endorses L lower abd pain and lightheadedness. Endorses dark stool. Pt takes daily aspirin and prednisone. Has had similar symptoms in the past. Most recent endoscopy 07/2023 showed ulcers and esophageal varices. Nephrology consulted for DDRT    A/P  ESRD s/p DDRT in 2020  previously ESRD on HD following nephro Dr. Streeter   DDRT in 2020   Scr 0.95, stable   CTAP shows atrophic bilateral native kidneys without hydronephrosis; nonobstructive renal stones of the L kidney  Immunosuppressants as per transplant   Currently on envarsus 11 mg daily, prednisone 5 mg qd; holding MMF in the setting of GIB  Tac level reviewed 7.1 acceptable, monitor daily tac 30 min prior to AM dose  Renally dose medications   Avoid nephrotoxins   Monitor UO and bmp     Hypokalemia  3.4  Recommend replete as needed  NPO for colonoscopy today    HTN  BP fluctuating  BP meds on hold  Monitor     Anemia  secondary to GIB   Hgb stable   s/p endoscopy  Planned for colonsocopy  GI following  Monitor hgb

## 2025-04-16 NOTE — PROGRESS NOTE ADULT - ASSESSMENT
Impression:   54 yrs old female w/ hx of HTN, HLD, ESRD s/p DD-renal transplant 2020 c/b HCV (treated per patient), chronic SVC syndrome s/p L cephalic-iliac vein bypass on (previously on Plavix, but currently only on ASA), s/p thrombectomy of L iliac vein and graft 2012 coronary bypass 2015, p/w coffee ground emesis and black stools x 3 days.     #CGE  #Melena  - reported started 3 days ago, baseline hgb around 10, now decreased to 9, no blood transfusion this admission. Patient is on ASA and prednisone 5 mg daily, no PPI use. Black stool on LISSET. Reported that she does not see gastroenterologist or vascular surgery on a regular basis.   - Patient had 2 EGDs which showed esophageal varices (from chronic SVC syndrome), and also gastric ulcers.   - Colonoscopy in 2023 showed hemorrhoids.   - Completed EGD on 4/14 - which showed well healed gastric ulcers which is likely the source. She has continued black stools with stable hgb levels.     Recommendations:   - Will hold off on further endoscopic interventions for now.   - If she continues to bleed with decreased hgb levels, we can consider colonoscopy on Friday.   - regular diet for now.   - Place her on IV PPI BID.  - No contraindications for ASA use.   - CBC Q12 hours and transfuse if hgb < 7 or hgb < 8 if they have underlying CAD.   - Rest of the care per primary team.     Discussed the case with Dr. Barton.     GI will continue to follow.     All recommendations are tentative until note is attested by an attending.     Matt Horan, PGY-6  Gastroenterology/Hepatology Fellow  Available on [employer] Teams  [phone] (Short Range Pager)  [phone] (Long Range Pager)    After 5pm, please contact the on-call GI fellow.

## 2025-04-16 NOTE — PROGRESS NOTE ADULT - SUBJECTIVE AND OBJECTIVE BOX
Gastroenterology/Hepatology Progress Note      Interval Events:   - Patient reported she had 2 large black stools, tarry, and soft. No bright red blood per rectum.   - No fevers, chills, vomiting, and nausea.     Allergies:  morphine (Anaphylaxis)  latex (Swelling)  lactose (Hives)  shellfish (Urticaria)  contrast media (iodine-based) (Urticaria)  morphine (Urticaria)  penicillin (Anaphylaxis)  ibuprofen (Hives)  ibuprofen/morphine (Unknown)      Hospital Medications:  acetaminophen     Tablet .. 650 milliGRAM(s) Oral every 6 hours PRN  atorvastatin 20 milliGRAM(s) Oral at bedtime  cinacalcet 30 milliGRAM(s) Oral daily  magnesium oxide 400 milliGRAM(s) Oral two times a day with meals  pantoprazole  Injectable 40 milliGRAM(s) IV Push every 12 hours  predniSONE   Tablet 5 milliGRAM(s) Oral daily  tacrolimus ER Tablet (ENVARSUS XR) 11 milliGRAM(s) Oral daily      ROS: 14 point ROS negative unless otherwise state in subjective    PHYSICAL EXAM:   Vital Signs:  Vital Signs Last 24 Hrs  T(C): 36.3 (2025 04:12), Max: 36.6 (15 Apr 2025 12:05)  T(F): 97.3 (2025 04:12), Max: 97.8 (15 Apr 2025 12:05)  HR: 75 (2025 04:12) (67 - 77)  BP: 136/78 (2025 04:12) (136/78 - 179/84)  BP(mean): --  RR: 18 (2025 04:12) (16 - 23)  SpO2: 99% (2025 04:12) (93% - 100%)    Parameters below as of 2025 04:12  Patient On (Oxygen Delivery Method): room air      Daily Height in cm: 162.56 (15 Apr 2025 12:16)    Daily Weight in k.2 (2025 09:39)    PHYSICAL EXAM:     GENERAL:  No acute distress, appears well, sitting on the bed.   HEENT:  NCAT, no scleral icterus   CHEST:  no respiratory distress  HEART:  Regular rate and rhythm  ABDOMEN:  Soft, non-tender, non-distended,   EXTREMITIES: No LE edema b/l  SKIN:  No rash/erythema/ecchymoses/petechiae/wounds/abscess/warm/dry  NEURO:  Alert and oriented x 3, no tremors.     LABS:                        9.1    5.88  )-----------( 216      ( 2025 06:58 )             30.5     Mean Cell Volume: 80.9 fl (25 @ 06:58)        142  |  109[H]  |  17  ----------------------------<  112[H]  3.4[L]   |  23  |  0.98    Ca    9.9      2025 06:58    TPro  6.8  /  Alb  4.2  /  TBili  0.7  /  DBili  x   /  AST  16  /  ALT  16  /  AlkPhos  68      LIVER FUNCTIONS - ( 2025 18:49 )  Alb: 4.2 g/dL / Pro: 6.8 g/dL / ALK PHOS: 68 U/L / ALT: 16 U/L / AST: 16 U/L / GGT: x           PT/INR - ( 15 Apr 2025 06:53 )   PT: 10.9 sec;   INR: 0.96 ratio         PTT - ( 2025 18:49 )  PTT:27.7 sec  Urinalysis Basic - ( 2025 06:58 )    Color: x / Appearance: x / SG: x / pH: x  Gluc: 112 mg/dL / Ketone: x  / Bili: x / Urobili: x   Blood: x / Protein: x / Nitrite: x   Leuk Esterase: x / RBC: x / WBC x   Sq Epi: x / Non Sq Epi: x / Bacteria: x            Imaging:    EGD     EGD and colonoscopy in                                                                                                           Findings:       Grade II varices were found in the upper third of the esophagus and in the middle third of        the esophagus.       A 3 cm hiatal hernia was present.       Few non-bleeding superficial gastric ulcers with a clean ulcer base (Chidi Class III) were        found in the gastric antrum. The largest lesion was 6 mm in largest dimension. Biopsies were       taken with a cold forceps for histology.       The duodenal bulb and second portion of the duodenum were normal.    Findings:       Non-bleeding external hemorrhoids were found during perianal exam. The hemorrhoids were large.       The colon (entire examined portion) appeared normal. Brown liquid stool in the entire colon.       The terminal ileum appeared normal.                                     EGD in     Findings:       Suspected Grade II varices were found in the middle third of the esophagus and in the lower        third of the esophagus with no recent stigmata of recent bleeding. Varcies did not appear        downstream in nature.       LA Grade A (one or more mucosal breaks less than 5 mm, not extending between tops of 2        mucosal folds) esophagitis with no bleeding was found at the gastroesophageal junction.       The exam of the esophagus was otherwise normal.       Many non-bleeding cratered gastric ulcers with a clean ulcer base (Chidi Class III) were        found in the gastric antrum. The largest lesion was ten mm by twelve mm in largest dimension.        Once non/bleeding cratered ulcer ([place] Class IIC) was seen in the antrum.       Diffuse moderate mucosal changes characterized by erythema and erosion were found in the        entire examined stomach.       The exam of the stomach was otherwise normal.       The firstportion of the duodenum and second portion of the duodenum were normal.       Otherwise normal endoscopy with no active bleeding. Decision made not to band varices given        no stigmata of bleeding on varcies and suspected source of GI Bleeding warren ulcers at        pylorus.           Gastroenterology/Hepatology Progress Note      Interval Events:   - Patient reported she had 2 large black stools, tarry, and soft. No bright red blood per rectum.   - No fevers, chills, vomiting, and nausea.     Allergies:  morphine (Anaphylaxis)  latex (Swelling)  lactose (Hives)  shellfish (Urticaria)  contrast media (iodine-based) (Urticaria)  morphine (Urticaria)  penicillin (Anaphylaxis)  ibuprofen (Hives)  ibuprofen/morphine (Unknown)      Hospital Medications:  acetaminophen     Tablet .. 650 milliGRAM(s) Oral every 6 hours PRN  atorvastatin 20 milliGRAM(s) Oral at bedtime  cinacalcet 30 milliGRAM(s) Oral daily  magnesium oxide 400 milliGRAM(s) Oral two times a day with meals  pantoprazole  Injectable 40 milliGRAM(s) IV Push every 12 hours  predniSONE   Tablet 5 milliGRAM(s) Oral daily  tacrolimus ER Tablet (ENVARSUS XR) 11 milliGRAM(s) Oral daily      ROS: 14 point ROS negative unless otherwise state in subjective    PHYSICAL EXAM:   Vital Signs:  Vital Signs Last 24 Hrs  T(C): 36.3 (2025 04:12), Max: 36.6 (15 Apr 2025 12:05)  T(F): 97.3 (2025 04:12), Max: 97.8 (15 Apr 2025 12:05)  HR: 75 (2025 04:12) (67 - 77)  BP: 136/78 (2025 04:12) (136/78 - 179/84)  BP(mean): --  RR: 18 (2025 04:12) (16 - 23)  SpO2: 99% (2025 04:12) (93% - 100%)    Parameters below as of 2025 04:12  Patient On (Oxygen Delivery Method): room air      Daily Height in cm: 162.56 (15 Apr 2025 12:16)    Daily Weight in k.2 (2025 09:39)    PHYSICAL EXAM:     GENERAL:  No acute distress, appears well, sitting on the bed.   HEENT:  NCAT, no scleral icterus   CHEST:  no respiratory distress  HEART:  Regular rate and rhythm  ABDOMEN:  Soft, non-tender, non-distended,   EXTREMITIES: No LE edema b/l  SKIN:  No rash/erythema/ecchymoses/petechiae/wounds/abscess/warm/dry  NEURO:  Alert and oriented x 3, no tremors.     LABS:                        9.1    5.88  )-----------( 216      ( 2025 06:58 )             30.5     Mean Cell Volume: 80.9 fl (25 @ 06:58)        142  |  109[H]  |  17  ----------------------------<  112[H]  3.4[L]   |  23  |  0.98    Ca    9.9      2025 06:58    TPro  6.8  /  Alb  4.2  /  TBili  0.7  /  DBili  x   /  AST  16  /  ALT  16  /  AlkPhos  68      LIVER FUNCTIONS - ( 2025 18:49 )  Alb: 4.2 g/dL / Pro: 6.8 g/dL / ALK PHOS: 68 U/L / ALT: 16 U/L / AST: 16 U/L / GGT: x           PT/INR - ( 15 Apr 2025 06:53 )   PT: 10.9 sec;   INR: 0.96 ratio         PTT - ( 2025 18:49 )  PTT:27.7 sec  Urinalysis Basic - ( 2025 06:58 )    Color: x / Appearance: x / SG: x / pH: x  Gluc: 112 mg/dL / Ketone: x  / Bili: x / Urobili: x   Blood: x / Protein: x / Nitrite: x   Leuk Esterase: x / RBC: x / WBC x   Sq Epi: x / Non Sq Epi: x / Bacteria: x            Imaging:    EGD on     Showed clean based gastric ulcers, otherwise normal.       EGD and colonoscopy in                                                                                                           Findings:       Grade II varices were found in the upper third of the esophagus and in the middle third of        the esophagus.       A 3 cm hiatal hernia was present.       Few non-bleeding superficial gastric ulcers with a clean ulcer base (Chidi Class III) were        found in the gastric antrum. The largest lesion was 6 mm in largest dimension. Biopsies were       taken with a cold forceps for histology.       The duodenal bulb and second portion of the duodenum were normal.    Findings:       Non-bleeding external hemorrhoids were found during perianal exam. The hemorrhoids were large.       The colon (entire examined portion) appeared normal. Brown liquid stool in the entire colon.       The terminal ileum appeared normal.                                     EGD in     Findings:       Suspected Grade II varices were found in the middle third of the esophagus and in the lower        third of the esophagus with no recent stigmata of recent bleeding. Varcies did not appear        downstream in nature.       LA Grade A (one or more mucosal breaks less than 5 mm, not extending between tops of 2        mucosal folds) esophagitis with no bleeding was found at the gastroesophageal junction.       The exam of the esophagus was otherwise normal.       Many non-bleeding cratered gastric ulcers with a clean ulcer base (Chidi Class III) were        found in the gastric antrum. The largest lesion was ten mm by twelve mm in largest dimension.        Once non/bleeding cratered ulcer ([place] Class IIC) was seen in the antrum.       Diffuse moderate mucosal changes characterized by erythema and erosion were found in the        entire examined stomach.       The exam of the stomach was otherwise normal.       The firstportion of the duodenum and second portion of the duodenum were normal.       Otherwise normal endoscopy with no active bleeding. Decision made not to band varices given        no stigmata of bleeding on varcies and suspected source of GI Bleeding warren ulcers at        pylorus.

## 2025-04-16 NOTE — PROGRESS NOTE ADULT - PROBLEM SELECTOR PROBLEM 3
[General Appearance - Well Developed] : well developed [Normal Appearance] : normal appearance [Well Groomed] : well groomed [General Appearance - Well Nourished] : well nourished HTN (hypertension) [No Deformities] : no deformities [General Appearance - In No Acute Distress] : no acute distress [Normal Conjunctiva] : the conjunctiva exhibited no abnormalities [Eyelids - No Xanthelasma] : the eyelids demonstrated no xanthelasmas [Normal Oral Mucosa] : normal oral mucosa [No Oral Pallor] : no oral pallor [No Oral Cyanosis] : no oral cyanosis [Normal Jugular Venous A Waves Present] : normal jugular venous A waves present [Normal Jugular Venous V Waves Present] : normal jugular venous V waves present [No Jugular Venous Johnson A Waves] : no jugular venous johnson A waves [Respiration, Rhythm And Depth] : normal respiratory rhythm and effort [Exaggerated Use Of Accessory Muscles For Inspiration] : no accessory muscle use [Auscultation Breath Sounds / Voice Sounds] : lungs were clear to auscultation bilaterally [Heart Rate And Rhythm] : heart rate and rhythm were normal [Heart Sounds] : normal S1 and S2 SVC syndrome [Murmurs] : no murmurs present [Abdomen Soft] : soft [Abdomen Tenderness] : non-tender [Abdomen Mass (___ Cm)] : no abdominal mass palpated [Abnormal Walk] : normal gait [Gait - Sufficient For Exercise Testing] : the gait was sufficient for exercise testing [Nail Clubbing] : no clubbing of the fingernails [Cyanosis, Localized] : no localized cyanosis [Petechial Hemorrhages (___cm)] : no petechial hemorrhages [Skin Color & Pigmentation] : normal skin color and pigmentation [] : no rash [No Venous Stasis] : no venous stasis [Skin Lesions] : no skin lesions [No Skin Ulcers] : no skin ulcer [No Xanthoma] : no  xanthoma was observed [FreeTextEntry1] : Sensory intact. Motor five over five throughout. Cranial nerves intact.

## 2025-04-16 NOTE — CONSULT NOTE ADULT - SUBJECTIVE AND OBJECTIVE BOX
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 S/p upper endoscopy with GI yesterday with gastric ulcers, non-bleeding, noted that melena likely from the ulcers. Patient does not currently report facial swelling or upper extremity swelling.    ***      PAST MEDICAL & SURGICAL HISTORY:  HTN - Hypertension      Clotted Renal Dialysis AV Graft      Infection of AV Graft for Dialysis      Fibroid Tumor      CKD (chronic kidney disease) stage V requiring chronic dialysis      Chronic kidney disease, unspecified      History of Hysterectomy  for benign disease      AV fistula  B/L - failed      H/O extremity bypass graft  H/O RUE bypass and creation of right brachial graft      Kidney transplant recipient      H/O kidney transplant        FAMILY HISTORY:  FH: HTN (hypertension) (Father, Mother)    [] Family history not pertinent as reviewed with the patient and family    SOCIAL HISTORY:  ***    ALLERGIES: morphine (Anaphylaxis)  latex (Swelling)  lactose (Hives)  shellfish (Urticaria)  contrast media (iodine-based) (Urticaria)  morphine (Urticaria)  penicillin (Anaphylaxis)  ibuprofen (Hives)  ibuprofen/morphine (Unknown)      HOME MEDICATIONS: ***    CURRENT MEDICATIONS  MEDICATIONS (STANDING): atorvastatin 20 milliGRAM(s) Oral at bedtime  cinacalcet 30 milliGRAM(s) Oral daily  magnesium oxide 400 milliGRAM(s) Oral two times a day with meals  pantoprazole  Injectable 40 milliGRAM(s) IV Push every 12 hours  predniSONE   Tablet 5 milliGRAM(s) Oral daily  tacrolimus ER Tablet (ENVARSUS XR) 11 milliGRAM(s) Oral daily    MEDICATIONS (PRN):acetaminophen     Tablet .. 650 milliGRAM(s) Oral every 6 hours PRN Temp greater or equal to 38C (100.4F), Mild Pain (1 - 3)    --------------------------------------------------------------------------------------------    Vitals:   T(C): 36.4 (04-16-25 @ 11:47), Max: 36.4 (04-16-25 @ 11:47)  HR: 74 (04-16-25 @ 11:47) (74 - 75)  BP: 134/78 (04-16-25 @ 11:47) (134/78 - 136/78)  RR: 18 (04-16-25 @ 11:47) (18 - 18)  SpO2: 100% (04-16-25 @ 11:47) (99% - 100%)  CAPILLARY BLOOD GLUCOSE          04-16 @ 07:01  -  04-16 @ 15:06  --------------------------------------------------------  IN:    Oral Fluid: 480 mL  Total IN: 480 mL    OUT:  Total OUT: 0 mL    Total NET: 480 mL        Height (cm): 162.6 (04-15 @ 12:16)  Weight (kg): 73.5 (04-15 @ 12:16)  BMI (kg/m2): 27.8 (04-15 @ 12:16)  BSA (m2): 1.79 (04-15 @ 12:16)      PHYSICAL EXAM:  General: NAD, Lying in bed comfortably  Neuro: Alert and answering questions appropriately   HEENT: Grossly normal  Cardio: No peripheral edema  Resp: Good effort, no signs of respiratory distress  Vascular: All 4 extremities warm; RUE hero graft palpable thrill, tunneled PTFE graft palpable in L flank  Musculoskeletal: All 4 extremities moving spontaneously, no limitations  --------------------------------------------------------------------------------------------    LABS  CBC (04-16 @ 06:58)                              9.1[L]                         5.88    )----------------(  216        --    % Neutrophils, --    % Lymphocytes, ANC: --                                  30.5[L]  CBC (04-15 @ 06:53)                              9.0[L]                         6.00    )----------------(  198        --    % Neutrophils, --    % Lymphocytes, ANC: --                                  29.3[L]    BMP (04-16 @ 06:58)             142     |  109[H]  |  17    		Ca++ --      Ca 9.9                ---------------------------------( 112[H]		Mg --                 3.4[L]  |  23      |  0.98  			Ph --      BMP (04-15 @ 06:53)             142     |  106     |  20    		Ca++ --      Ca 9.8                ---------------------------------( 138[H]		Mg --                 3.6     |  25      |  0.95  			Ph --          Coags (04-15 @ 06:53)  aPTT -- / INR 0.96 / PT 10.9          --------------------------------------------------------------------------------------------    MICROBIOLOGY  Urinalysis (04-16 @ 06:58):     Color:  / Appearance:  / SG:  / pH:  / Gluc: 112[H] / Ketones:  / Bili:  / Urobili:  / Protein : / Nitrites:  / Leuk.Est:  / RBC:  / WBC:  / Sq Epi:  / Non Sq Epi:  / Bacteria          --------------------------------------------------------------------------------------------    IMAGING      --------------------------------------------------------------------------------------------

## 2025-04-16 NOTE — CONSULT NOTE ADULT - ASSESSMENT
54M PMH HTN, HLD, ESRD s/p DD-renal transplant 2020 c/b HCV (treated per patient), SVC syndrome s/p L cephalic-iliac vein bypass with tunneled PTFE with occlusion, s/p Hero graft on RUE, who initially presented with coffee ground emesis and melena. S/p upper endoscopy with GI yesterday with gastric ulcers, non-bleeding, noted that melena likely from the ulcers. Vascular Surgery consulted for concern for occluded L cephalic-iliac vein bypass due to esophageal varices seen on upper endoscopy.    RECS:  - The L cephalic-iliac vein bypass is known to be occluded, which is why patient underwent hero graft on RUE  - No vascular surgery intervention indicated  - Patient should follow-up with Dr. Castellanos or Dr. Singh outpatient    Discussed with vascular surgery senior    Vascular Surgery [phone]

## 2025-04-16 NOTE — PROGRESS NOTE ADULT - SUBJECTIVE AND OBJECTIVE BOX
[facility] DIVISION OF KIDNEY DISEASES AND HYPERTENSION -- FOLLOW UP NOTE  --------------------------------------------------------------------------------  Chief Complaint:    24 hour events/subjective:        PAST HISTORY  --------------------------------------------------------------------------------  No significant changes to PMH, PSH, FHx, SHx, unless otherwise noted    ALLERGIES & MEDICATIONS  --------------------------------------------------------------------------------  Allergies    morphine (Anaphylaxis)  latex (Swelling)  lactose (Hives)  shellfish (Urticaria)  contrast media (iodine-based) (Urticaria)  morphine (Urticaria)  penicillin (Anaphylaxis)  ibuprofen (Hives)  ibuprofen/morphine (Unknown)    Intolerances      Standing Inpatient Medications  atorvastatin 20 milliGRAM(s) Oral at bedtime  cinacalcet 30 milliGRAM(s) Oral daily  magnesium oxide 400 milliGRAM(s) Oral two times a day with meals  pantoprazole  Injectable 40 milliGRAM(s) IV Push every 12 hours  predniSONE   Tablet 5 milliGRAM(s) Oral daily  tacrolimus ER Tablet (ENVARSUS XR) 11 milliGRAM(s) Oral daily    PRN Inpatient Medications  acetaminophen     Tablet .. 650 milliGRAM(s) Oral every 6 hours PRN      REVIEW OF SYSTEMS  --------------------------------------------------------------------------------  Gen: No fevers/chills  Respiratory: No dyspnea, cough,   CV: No chest pain, PND, orthopnea  GI: No abdominal pain, diarrhea, constipation, nausea, vomiting  Transplant: No pain  : No increased frequency, dysuria, hematuria   MSK: No edema  Neuro: No dizziness/lightheadedness    All other systems were reviewed and are negative, except as noted.    VITALS/PHYSICAL EXAM  --------------------------------------------------------------------------------  T(C): 36.4 (04-16-25 @ 11:47), Max: 36.4 (04-16-25 @ 11:47)  HR: 74 (04-16-25 @ 11:47) (74 - 75)  BP: 134/78 (04-16-25 @ 11:47) (134/78 - 136/78)  RR: 18 (04-16-25 @ 11:47) (18 - 18)  SpO2: 100% (04-16-25 @ 11:47) (99% - 100%)  Wt(kg): --  Height (cm): 162.6 (04-15-25 @ 12:16)  Weight (kg): 73.5 (04-15-25 @ 12:16)  BMI (kg/m2): 27.8 (04-15-25 @ 12:16)  BSA (m2): 1.79 (04-15-25 @ 12:16)      04-16-25 @ 07:01  -  04-16-25 @ 15:47  --------------------------------------------------------  IN: 480 mL / OUT: 0 mL / NET: 480 mL      Physical Exam:  	Gen: NAD, able to speak in full sentences   	HEENT: PERRL, MMM   	Pulm: CTA B/L, no crackles   	CV: RRR, S1S2+  	Abd: +BS, soft          Transplant: No tenderness, swelling  	: No suprapubic tenderness  	MSK: no edema   	Psych: Normal affect and mood  	Skin: Warm          Access:    LABS/STUDIES  --------------------------------------------------------------------------------              9.1    5.88  >-----------<  216      [04-16-25 @ 06:58]              30.5     142  |  109  |  17  ----------------------------<  112      [04-16-25 @ 06:58]  3.4   |  23  |  0.98        Ca     9.9     [04-16-25 @ 06:58]    TPro  6.8  /  Alb  4.2  /  TBili  0.7  /  DBili  x   /  AST  16  /  ALT  16  /  AlkPhos  68  [04-14-25 @ 18:49]    PT/INR: PT 10.9 , INR 0.96       [04-15-25 @ 06:53]  PTT: 27.7       [04-14-25 @ 18:49]      Creatinine Trend:  SCr 0.98 [04-16 @ 06:58]  SCr 0.95 [04-15 @ 06:53]  SCr 1.09 [04-14 @ 18:49]    Tacrolimus (), Serum: 7.7 ng/mL (04-16 @ 06:58)            Urinalysis - [04-16-25 @ 06:58]      Color  / Appearance  / SG  / pH       Gluc 112 / Ketone   / Bili  / Urobili        Blood  / Protein  / Leuk Est  / Nitrite       RBC  / WBC  / Hyaline  / Gran  / Sq Epi  / Non Sq Epi  / Bacteria

## 2025-04-17 ENCOUNTER — TRANSCRIPTION ENCOUNTER (OUTPATIENT)
Age: 55
End: 2025-04-17

## 2025-04-17 LAB
ALBUMIN SERPL ELPH-MCNC: 3.8 G/DL — SIGNIFICANT CHANGE UP (ref 3.3–5)
ALP SERPL-CCNC: 59 U/L — SIGNIFICANT CHANGE UP (ref 40–120)
ALT FLD-CCNC: 13 U/L — SIGNIFICANT CHANGE UP (ref 10–45)
ANION GAP SERPL CALC-SCNC: 13 MMOL/L — SIGNIFICANT CHANGE UP (ref 5–17)
APTT BLD: 27.7 SEC — SIGNIFICANT CHANGE UP (ref 24.5–35.6)
AST SERPL-CCNC: 10 U/L — SIGNIFICANT CHANGE UP (ref 10–40)
BASOPHILS # BLD AUTO: 0.02 K/UL — SIGNIFICANT CHANGE UP (ref 0–0.2)
BASOPHILS NFR BLD AUTO: 0.4 % — SIGNIFICANT CHANGE UP (ref 0–2)
BILIRUB SERPL-MCNC: 1 MG/DL — SIGNIFICANT CHANGE UP (ref 0.2–1.2)
BUN SERPL-MCNC: 11 MG/DL — SIGNIFICANT CHANGE UP (ref 7–23)
CALCIUM SERPL-MCNC: 9.8 MG/DL — SIGNIFICANT CHANGE UP (ref 8.4–10.5)
CHLORIDE SERPL-SCNC: 105 MMOL/L — SIGNIFICANT CHANGE UP (ref 96–108)
CO2 SERPL-SCNC: 22 MMOL/L — SIGNIFICANT CHANGE UP (ref 22–31)
CREAT SERPL-MCNC: 0.92 MG/DL — SIGNIFICANT CHANGE UP (ref 0.5–1.3)
EGFR: 75 ML/MIN/1.73M2 — SIGNIFICANT CHANGE UP
EGFR: 75 ML/MIN/1.73M2 — SIGNIFICANT CHANGE UP
EOSINOPHIL # BLD AUTO: 0.15 K/UL — SIGNIFICANT CHANGE UP (ref 0–0.5)
EOSINOPHIL NFR BLD AUTO: 3 % — SIGNIFICANT CHANGE UP (ref 0–6)
GLUCOSE SERPL-MCNC: 105 MG/DL — HIGH (ref 70–99)
HCT VFR BLD CALC: 32.4 % — LOW (ref 34.5–45)
HGB BLD-MCNC: 9.4 G/DL — LOW (ref 11.5–15.5)
IMM GRANULOCYTES NFR BLD AUTO: 0.4 % — SIGNIFICANT CHANGE UP (ref 0–0.9)
INR BLD: 0.95 RATIO — SIGNIFICANT CHANGE UP (ref 0.85–1.16)
LYMPHOCYTES # BLD AUTO: 1.04 K/UL — SIGNIFICANT CHANGE UP (ref 1–3.3)
LYMPHOCYTES # BLD AUTO: 20.5 % — SIGNIFICANT CHANGE UP (ref 13–44)
MAGNESIUM SERPL-MCNC: 1.4 MG/DL — LOW (ref 1.6–2.6)
MCHC RBC-ENTMCNC: 23.6 PG — LOW (ref 27–34)
MCHC RBC-ENTMCNC: 29 G/DL — LOW (ref 32–36)
MCV RBC AUTO: 81.4 FL — SIGNIFICANT CHANGE UP (ref 80–100)
MONOCYTES # BLD AUTO: 0.48 K/UL — SIGNIFICANT CHANGE UP (ref 0–0.9)
MONOCYTES NFR BLD AUTO: 9.5 % — SIGNIFICANT CHANGE UP (ref 2–14)
NEUTROPHILS # BLD AUTO: 3.36 K/UL — SIGNIFICANT CHANGE UP (ref 1.8–7.4)
NEUTROPHILS NFR BLD AUTO: 66.2 % — SIGNIFICANT CHANGE UP (ref 43–77)
NRBC BLD AUTO-RTO: 0 /100 WBCS — SIGNIFICANT CHANGE UP (ref 0–0)
PHOSPHATE SERPL-MCNC: 2.6 MG/DL — SIGNIFICANT CHANGE UP (ref 2.5–4.5)
PLATELET # BLD AUTO: 220 K/UL — SIGNIFICANT CHANGE UP (ref 150–400)
POTASSIUM SERPL-MCNC: 3.6 MMOL/L — SIGNIFICANT CHANGE UP (ref 3.5–5.3)
POTASSIUM SERPL-SCNC: 3.6 MMOL/L — SIGNIFICANT CHANGE UP (ref 3.5–5.3)
PROT SERPL-MCNC: 6.1 G/DL — SIGNIFICANT CHANGE UP (ref 6–8.3)
PROTHROM AB SERPL-ACNC: 10.9 SEC — SIGNIFICANT CHANGE UP (ref 9.9–13.4)
RBC # BLD: 3.98 M/UL — SIGNIFICANT CHANGE UP (ref 3.8–5.2)
RBC # FLD: 18.7 % — HIGH (ref 10.3–14.5)
SODIUM SERPL-SCNC: 140 MMOL/L — SIGNIFICANT CHANGE UP (ref 135–145)
TACROLIMUS SERPL-MCNC: 9.4 NG/ML — SIGNIFICANT CHANGE UP
WBC # BLD: 5.07 K/UL — SIGNIFICANT CHANGE UP (ref 3.8–10.5)
WBC # FLD AUTO: 5.07 K/UL — SIGNIFICANT CHANGE UP (ref 3.8–10.5)

## 2025-04-17 PROCEDURE — 99232 SBSQ HOSP IP/OBS MODERATE 35: CPT | Mod: GC

## 2025-04-17 RX ORDER — TACROLIMUS 0.5 MG/1
10 CAPSULE ORAL DAILY
Refills: 0 | Status: DISCONTINUED | OUTPATIENT
Start: 2025-04-18 | End: 2025-04-18

## 2025-04-17 RX ORDER — MAGNESIUM SULFATE 500 MG/ML
2 SYRINGE (ML) INJECTION ONCE
Refills: 0 | Status: COMPLETED | OUTPATIENT
Start: 2025-04-17 | End: 2025-04-17

## 2025-04-17 RX ORDER — AZATHIOPRINE 50 MG/1
150 TABLET ORAL ONCE
Refills: 0 | Status: COMPLETED | OUTPATIENT
Start: 2025-04-17 | End: 2025-04-17

## 2025-04-17 RX ADMIN — CINACALCET 30 MILLIGRAM(S): 30 TABLET, FILM COATED ORAL at 11:36

## 2025-04-17 RX ADMIN — Medication 25 GRAM(S): at 10:59

## 2025-04-17 RX ADMIN — Medication 400 MILLIGRAM(S): at 18:34

## 2025-04-17 RX ADMIN — Medication 40 MILLIGRAM(S): at 05:31

## 2025-04-17 RX ADMIN — Medication 40 MILLIGRAM(S): at 18:34

## 2025-04-17 RX ADMIN — Medication 400 MILLIGRAM(S): at 09:37

## 2025-04-17 RX ADMIN — PREDNISONE 5 MILLIGRAM(S): 20 TABLET ORAL at 05:31

## 2025-04-17 RX ADMIN — TACROLIMUS 11 MILLIGRAM(S): 0.5 CAPSULE ORAL at 09:37

## 2025-04-17 RX ADMIN — ATORVASTATIN CALCIUM 20 MILLIGRAM(S): 80 TABLET, FILM COATED ORAL at 22:28

## 2025-04-17 RX ADMIN — AZATHIOPRINE 150 MILLIGRAM(S): 50 TABLET ORAL at 18:34

## 2025-04-17 NOTE — PROGRESS NOTE ADULT - ASSESSMENT
Impression:   54 yrs old female w/ hx of HTN, HLD, ESRD s/p DD-renal transplant 2020 c/b HCV (treated per patient), chronic SVC syndrome s/p L cephalic-iliac vein bypass on (previously on Plavix, but currently only on ASA), s/p thrombectomy of L iliac vein and graft 2012 coronary bypass 2015, p/w coffee ground emesis and black stools x 3 days.     #CGE  #Melena  - reported started 3 days ago, baseline hgb around 10, now decreased to 9, no blood transfusion this admission. Patient is on ASA and prednisone 5 mg daily, no PPI use. Black stool on LISSET. Reported that she does not see gastroenterologist or vascular surgery on a regular basis.   - Patient had 2 EGDs which showed esophageal varices (from chronic SVC syndrome), and also gastric ulcers.   - Colonoscopy in 2023 showed hemorrhoids.   - Completed EGD on 4/14 - which showed well healed gastric ulcers which is likely the source. She has brown stools now w/ stable hgb levels.     Recommendations:   - No further endoscopic interventions.   - regular diet for now.   - Can transition to PO PPI BID for 2 weeks and then transition to PO PPI daily.   - No contraindications for ASA use.   - Recommend o/p repeat EGD in 8 weeks to assess for healing of gastric ulcers.  - Patient should be on PO PPI daily as both ASA and prednisone increase risk of ulceration.    - Rest of the care per primary team.     Discussed the case with Dr. Barton.     GI will sign off. Thank you for the consult and please call us back if you have any questions.     All recommendations are tentative until note is attested by an attending.     Matt Horan, PGY-6  Gastroenterology/Hepatology Fellow  Available on Microsoft Teams  [phone] (Short Range Pager)  [phone] (Long Range Pager)    After 5pm, please contact the on-call GI fellow.

## 2025-04-17 NOTE — PROGRESS NOTE ADULT - PROBLEM SELECTOR PLAN 1
Presents for c/o melena and coffee ground emesis   - Hemodynamically stable, Hg 9.4 4/17  - CT A/P:  Redemonstrated extensive anterior abdominal wall superficial venous   varicosities extending from b/L inguinal regions to the partially imaged lower chest.  Coarse calcification noted of the infrarenal IVC likely ISO chronic central venous thrombosis   - ED: Octreotide, Protonix     Plan:   - C/w IV protonix 40mg q12  - Trend cbc q12, transfuse to keep Hg >8 ( hx/o cardiac dz)  - Hold AC/ AP  i/s/o GIB  - GI consulted, now s/p EGD with small healing gastric ulcers  - Monitor H/H and for additional melena; GI considering colonoscopy 4/18 if melena continues

## 2025-04-17 NOTE — PROGRESS NOTE ADULT - SUBJECTIVE AND OBJECTIVE BOX
CHIEF COMPLAINT:    SUBJECTIVE:     REVIEW OF SYSTEMS:    CONSTITUTIONAL: (  )  weakness,  (  ) fevers or chills  EYES/ENT: (  )visual changes;     NECK: (  ) pain or stiffness  RESPIRATORY:   (  )cough, wheezing, hemoptysis;  (  ) shortness of breath  CARDIOVASCULAR:  (  )chest pain or palpitations  GASTROINTESTINAL:   (  )abdominal or epigastric pain.  (  ) nausea, vomiting, or hematemesis;   (   ) diarrhea or constipation.   GENITOURINARY:   (    ) dysuria, frequency or hematuria  NEUROLOGICAL:  (   ) numbness or weakness   All other review of systems is negative unless indicated above    Vital Signs Last 24 Hrs  T(C): 36.9 (17 Apr 2025 05:00), Max: 36.9 (16 Apr 2025 20:56)  T(F): 98.5 (17 Apr 2025 05:00), Max: 98.5 (17 Apr 2025 05:00)  HR: 68 (17 Apr 2025 05:00) (68 - 74)  BP: 142/83 (17 Apr 2025 05:00) (105/67 - 142/83)  BP(mean): --  RR: 18 (17 Apr 2025 05:00) (18 - 18)  SpO2: 98% (17 Apr 2025 05:00) (98% - 100%)    Parameters below as of 17 Apr 2025 05:00  Patient On (Oxygen Delivery Method): room air        I&O's Summary    16 Apr 2025 07:01  -  17 Apr 2025 07:00  --------------------------------------------------------  IN: 480 mL / OUT: 0 mL / NET: 480 mL        CAPILLARY BLOOD GLUCOSE          PHYSICAL EXAM:    Constitutional:  (   ) NAD,   (   )awake and alert  HEENT: PERR, EOMI,    Neck: Soft and supple, No LAD, No JVD  Respiratory:  (    Breath sounds are clear bilaterally,    (   ) wheezing, rales or rhonchi  Cardiovascular:     (   )S1 and S2, regular rate and rhythm, no Murmurs, gallops or rubs  Gastrointestinal:  (   )Bowel Sounds present, soft,   (  )nontender, nondistended,    Extremities:    (  ) peripheral edema  Vascular: 2+ peripheral pulses  Neurological:    (    )A/O x 3,   (  ) focal deficits  Musculoskeletal:    (   )  normal strength b/l upper  (     ) normal  lower extremities  Skin: No rashes    MEDICATIONS:  MEDICATIONS  (STANDING):  atorvastatin 20 milliGRAM(s) Oral at bedtime  cinacalcet 30 milliGRAM(s) Oral daily  magnesium oxide 400 milliGRAM(s) Oral two times a day with meals  magnesium sulfate  IVPB 2 Gram(s) IV Intermittent once  pantoprazole  Injectable 40 milliGRAM(s) IV Push every 12 hours  predniSONE   Tablet 5 milliGRAM(s) Oral daily  tacrolimus ER Tablet (ENVARSUS XR) 11 milliGRAM(s) Oral daily      LABS: All Labs Reviewed:                        9.4    5.07  )-----------( 220      ( 17 Apr 2025 07:12 )             32.4     04-17    140  |  105  |  11  ----------------------------<  105[H]  3.6   |  22  |  0.92    Ca    9.8      17 Apr 2025 07:11  Phos  2.6     04-17  Mg     1.4     04-17    TPro  6.1  /  Alb  3.8  /  TBili  1.0  /  DBili  x   /  AST  10  /  ALT  13  /  AlkPhos  59  04-17    PT/INR - ( 17 Apr 2025 07:12 )   PT: 10.9 sec;   INR: 0.95 ratio         PTT - ( 17 Apr 2025 07:12 )  PTT:27.7 sec      Blood Culture:   Urine Culture      RADIOLOGY/EKG:    ASSESSMENT AND PLAN:    DVT PPX:    ADVANCED DIRECTIVE:    DISPOSITION: CHIEF COMPLAINT:  Patient condition remained stable without any further symptoms waiting for colonoscopy to be done discussed with the results of upper endoscopy result of her CBC which is stable  SUBJECTIVE:     REVIEW OF SYSTEMS:    CONSTITUTIONAL: (  )  weakness,  (  ) fevers or chills  EYES/ENT: (  )visual changes;     NECK: (  ) pain or stiffness  RESPIRATORY:   (  )cough, wheezing, hemoptysis;  (  ) shortness of breath  CARDIOVASCULAR:  (  )chest pain or palpitations  GASTROINTESTINAL:   (  )abdominal or epigastric pain.  (  ) nausea, vomiting, or hematemesis;   (   ) diarrhea or constipation.   GENITOURINARY:   (    ) dysuria, frequency or hematuria  NEUROLOGICAL:  (   ) numbness or weakness   All other review of systems is negative unless indicated above    Vital Signs Last 24 Hrs  T(C): 36.9 (17 Apr 2025 05:00), Max: 36.9 (16 Apr 2025 20:56)  T(F): 98.5 (17 Apr 2025 05:00), Max: 98.5 (17 Apr 2025 05:00)  HR: 68 (17 Apr 2025 05:00) (68 - 74)  BP: 142/83 (17 Apr 2025 05:00) (105/67 - 142/83)  BP(mean): --  RR: 18 (17 Apr 2025 05:00) (18 - 18)  SpO2: 98% (17 Apr 2025 05:00) (98% - 100%)    Parameters below as of 17 Apr 2025 05:00  Patient On (Oxygen Delivery Method): room air        I&O's Summary    16 Apr 2025 07:01  -  17 Apr 2025 07:00  --------------------------------------------------------  IN: 480 mL / OUT: 0 mL / NET: 480 mL        CAPILLARY BLOOD GLUCOSE          PHYSICAL EXAM:  CONSTITUTIONAL: NAD, facial plethora, no venous congestion in upper extremities  RESPIRATORY: Normal respiratory effort; lungs are clear to auscultation bilaterally  CARDIOVASCULAR: Regular rate and rhythm, normal S1 and S2,     ABDOMEN: Nontender to palpation, normoactive bowel sounds, no rebound   MUSCLOSKELETAL: no clubbing or cyanosis of digits; no joint swelling or tenderness to palpation  PSYCH: A+O to person, place, and time; affect appropriate  Skin: bypass site tense in the abdomen but CDI       MEDICATIONS:  MEDICATIONS  (STANDING):  atorvastatin 20 milliGRAM(s) Oral at bedtime  cinacalcet 30 milliGRAM(s) Oral daily  magnesium oxide 400 milliGRAM(s) Oral two times a day with meals  magnesium sulfate  IVPB 2 Gram(s) IV Intermittent once  pantoprazole  Injectable 40 milliGRAM(s) IV Push every 12 hours  predniSONE   Tablet 5 milliGRAM(s) Oral daily  tacrolimus ER Tablet (ENVARSUS XR) 11 milliGRAM(s) Oral daily      LABS: All Labs Reviewed:                        9.4    5.07  )-----------( 220      ( 17 Apr 2025 07:12 )             32.4     04-17    140  |  105  |  11  ----------------------------<  105[H]  3.6   |  22  |  0.92    Ca    9.8      17 Apr 2025 07:11  Phos  2.6     04-17  Mg     1.4     04-17    TPro  6.1  /  Alb  3.8  /  TBili  1.0  /  DBili  x   /  AST  10  /  ALT  13  /  AlkPhos  59  04-17    PT/INR - ( 17 Apr 2025 07:12 )   PT: 10.9 sec;   INR: 0.95 ratio         PTT - ( 17 Apr 2025 07:12 )  PTT:27.7 sec      Blood Culture:   Urine Culture      RADIOLOGY/EKG:    ASSESSMENT AND PLAN:  54y F PMH HTN, HLD, ESRD s/p DD-renal transplant 2020 c/b HCV (treated per patient), chronic SVC syndrome s/p L cephalic-iliac vein bypass on DAPT, s/p thrombectomy of L iliac vein and graft 2012 coronary bypass 2015, p/w coffee ground emesis x 3 days a/f eval of GIB       Nutritional Assessment:  · Nutritional Assessment  Advance to regular diet    Problem/Plan - 1:  ·  Problem: GIB (gastrointestinal bleeding).   ·  Plan: Presents for c/o melena and coffee ground emesis   - Hemodynamically stable, Hg 10.1  - CT A/P:  Redemonstrated extensive anterior abdominal wall superficial venous   varicosities extending from b/L inguinal regions to the partially imaged lower chest.  Coarse calcification noted of the infrarenal IVC likely ISO chronic central venous thrombosis   - ED: Octreotide, Protonix   - C/w IV protonix 40mg q12  - Trend cbc, transfuse to keep Hg >8 ( hx/o cardiac dz)  - Hold AC/ AP  i/s/o GIB  - GI consult (emailed)   Advance diet  Pending Endoscopy results.    Problem/Plan - 2:  ·  Problem: CKD (chronic kidney disease).   ·  Plan: - Hx/o ESRD s/p DD-renal transplant 2020  - Cr 1.02 ( about baseline)  - IVF prn   - C/w Prednisone   - c/w tacro  - Trend labs, monitor renal function   - Avoid nephrotoxic meds, renally dose meds  - Transplant nephro consult to be called in AM  - Continue to monitor tacro levels Am prior to dose.    Problem/Plan - 3:  ·  Problem: SVC syndrome.  ·  Plan: Hx of SVC syndrome leading to esophageal varices as patient doesn't have portal htn now s/p bypass cephalic/ iliac   - found on egd in 2023 with no signs of stigmata  - patient states that she has been having recurrent symptoms of facial and chest plethora for the past month but was not able to make a vascular surgery appointment   - patient HD but with upper GIB s/p endoscopy still pending results - could be  ulcers vs varices    Plan:  Vascular surgery consult   Continue IV PPI   Continue  to monitor H&H  Can possibly restart aspirin once GI clears.    Problem/Plan - 4:  ·  Problem: HTN (hypertension).   ·  Plan: - Antihypertensive held iso GIB.    Problem/Plan - 5:  ·  Problem: Prophylactic measure.   ·  Plan: DVT: SCD iso GIB  Diet: regular Diet      DVT PPX:    ADVANCED DIRECTIVE:  Code: Full Code.  DISPOSITION: home after colonoscopy

## 2025-04-17 NOTE — PROGRESS NOTE ADULT - PROBLEM SELECTOR PLAN 3
Hx of SVC syndrome leading to esophageal varices as patient doesn't have portal htn now s/p bypass cephalic/ iliac   - found on egd in 2023 with no signs of stigmata  - patient states that she has been having recurrent symptoms of facial and chest plethora for the past month but was not able to make a vascular surgery appointment   - patient HD but with upper GIB s/p endoscopy still pending results - could be  ulcers vs varices    Plan:  Vascular surgery consult --> outpatient f/u  Continue IV PPI   Restart ASA on d/c

## 2025-04-17 NOTE — PROGRESS NOTE ADULT - SUBJECTIVE AND OBJECTIVE BOX
oJyce Day MD  PGY2  Preferred contact via [employer] Teams      Patient is a 54y old  Female who presents with a chief complaint of coffee ground emesis (16 Apr 2025 15:47)      SUBJECTIVE / OVERNIGHT EVENTS:    MEDICATIONS  (STANDING):  atorvastatin 20 milliGRAM(s) Oral at bedtime  cinacalcet 30 milliGRAM(s) Oral daily  magnesium oxide 400 milliGRAM(s) Oral two times a day with meals  magnesium sulfate  IVPB 2 Gram(s) IV Intermittent once  pantoprazole  Injectable 40 milliGRAM(s) IV Push every 12 hours  predniSONE   Tablet 5 milliGRAM(s) Oral daily  tacrolimus ER Tablet (ENVARSUS XR) 11 milliGRAM(s) Oral daily    MEDICATIONS  (PRN):  acetaminophen     Tablet .. 650 milliGRAM(s) Oral every 6 hours PRN Temp greater or equal to 38C (100.4F), Mild Pain (1 - 3)      CAPILLARY BLOOD GLUCOSE      I&O's Summary    16 Apr 2025 07:01  -  17 Apr 2025 07:00  --------------------------------------------------------  IN: 480 mL / OUT: 0 mL / NET: 480 mL      PHYSICAL EXAM:    VITALS:   T(C): 36.9 (04-17-25 @ 05:00), Max: 36.9 (04-16-25 @ 20:56)  HR: 68 (04-17-25 @ 05:00) (68 - 74)  BP: 142/83 (04-17-25 @ 05:00) (105/67 - 142/83)  RR: 18 (04-17-25 @ 05:00) (18 - 18)  SpO2: 98% (04-17-25 @ 05:00) (98% - 100%)    GENERAL: NAD, lying in bed comfortably  HEAD:  Atraumatic, normocephalic  EYES: EOMI, PERRLA, conjunctiva and sclera clear  ENT: Moist mucous membranes  NECK: Supple, no JVD  HEART: Regular rate and rhythm, no murmurs, rubs, or gallops  LUNGS: Unlabored respirations.  Clear to auscultation bilaterally, no crackles, wheezing, or rhonchi  ABDOMEN: Soft, nontender, nondistended, +BS  EXTREMITIES: 2+ peripheral pulses bilaterally. No clubbing, cyanosis, or edema  NERVOUS SYSTEM:  A&Ox3, no focal deficits   SKIN: No rashes or lesions      LABS:                        9.4    5.07  )-----------( 220      ( 17 Apr 2025 07:12 )             32.4      04-17    140  |  105  |  11  ----------------------------<  105[H]  3.6   |  22  |  0.92    Ca    9.8      17 Apr 2025 07:11  Phos  2.6     04-17  Mg     1.4     04-17    TPro  6.1  /  Alb  3.8  /  TBili  1.0  /  DBili  x   /  AST  10  /  ALT  13  /  AlkPhos  59  04-17    PT/INR - ( 17 Apr 2025 07:12 )   PT: 10.9 sec;   INR: 0.95 ratio    PTT - ( 17 Apr 2025 07:12 )  PTT:27.7 sec      Urinalysis Basic - ( 17 Apr 2025 07:11 )    Color: x / Appearance: x / SG: x / pH: x  Gluc: 105 mg/dL / Ketone: x  / Bili: x / Urobili: x   Blood: x / Protein: x / Nitrite: x   Leuk Esterase: x / RBC: x / WBC x   Sq Epi: x / Non Sq Epi: x / Bacteria: x        RADIOLOGY & ADDITIONAL TESTS:    < from: Upper Endoscopy (04.15.25 @ 11:56) >  Impression:          - Grade II esophageal varices.                       - Gastroesophageal flap valve classified as Hill Grade II (fold present,                        opens with respiration).                       - Non-bleeding gastric ulcer.                       - Normal first portion of the duodenum and second portion of the duodenum.                       - No specimens collected.                       - Melena likely due to gastric ulcers. There was no active bleeding seen or                        recent stigmata of bleeding.    < end of copied text >  < from: CT Abdomen and Pelvis No Cont (04.14.25 @ 19:18) >  IMPRESSION:    Limited noncontrast study. Etiology of givenclinical symptoms is not   clearly elucidated. Correlate with postcontrast imaging as clinically   feasible.    Redemonstrated extensive anterior abdominal wall superficial venous   varicosities extending from bilateral inguinal regions to the partially   imaged lower chest. Coarse calcification noted of the infrarenal IVC   likely in the setting of chronic central venous thrombosis.    Dense bones reflective of renal osteodystrophy.    < end of copied text >

## 2025-04-17 NOTE — PROGRESS NOTE ADULT - SUBJECTIVE AND OBJECTIVE BOX
Gastroenterology/Hepatology Progress Note      Interval Events:     - No acute events overnight.   - Patient has brown stools.   - No nausea or vomiting .  - No fevers or chills.     Allergies:  morphine (Anaphylaxis)  latex (Swelling)  lactose (Hives)  shellfish (Urticaria)  contrast media (iodine-based) (Urticaria)  morphine (Urticaria)  penicillin (Anaphylaxis)  ibuprofen (Hives)  ibuprofen/morphine (Unknown)      Hospital Medications:  acetaminophen     Tablet .. 650 milliGRAM(s) Oral every 6 hours PRN  atorvastatin 20 milliGRAM(s) Oral at bedtime  cinacalcet 30 milliGRAM(s) Oral daily  magnesium oxide 400 milliGRAM(s) Oral two times a day with meals  pantoprazole  Injectable 40 milliGRAM(s) IV Push every 12 hours  predniSONE   Tablet 5 milliGRAM(s) Oral daily      ROS: 14 point ROS negative unless otherwise state in subjective    PHYSICAL EXAM:   Vital Signs:  Vital Signs Last 24 Hrs  T(C): 36.8 (17 Apr 2025 11:38), Max: 36.9 (16 Apr 2025 20:56)  T(F): 98.2 (17 Apr 2025 11:38), Max: 98.5 (17 Apr 2025 05:00)  HR: 73 (17 Apr 2025 11:38) (68 - 73)  BP: 146/89 (17 Apr 2025 11:38) (105/67 - 146/89)  BP(mean): --  RR: 18 (17 Apr 2025 11:38) (18 - 18)  SpO2: 99% (17 Apr 2025 11:38) (98% - 99%)    Parameters below as of 17 Apr 2025 11:38  Patient On (Oxygen Delivery Method): room air      Daily     Daily     PHYSICAL EXAM:     GENERAL:  No acute distress, appears well   HEENT:  NCAT, no scleral icterus   CHEST:  no respiratory distress  HEART:  Regular rate and rhythm  ABDOMEN:  Soft, non-tender, non-distended,   EXTREMITIES: No LE edema b/l  SKIN:  No rash/erythema/ecchymoses/petechiae/wounds/abscess/warm/dry  NEURO:  Alert and oriented x 3, no tremors.     LABS:                        9.4    5.07  )-----------( 220      ( 17 Apr 2025 07:12 )             32.4     Mean Cell Volume: 81.4 fl (04-17-25 @ 07:12)    04-17    140  |  105  |  11  ----------------------------<  105[H]  3.6   |  22  |  0.92    Ca    9.8      17 Apr 2025 07:11  Phos  2.6     04-17  Mg     1.4     04-17    TPro  6.1  /  Alb  3.8  /  TBili  1.0  /  DBili  x   /  AST  10  /  ALT  13  /  AlkPhos  59  04-17    LIVER FUNCTIONS - ( 17 Apr 2025 07:11 )  Alb: 3.8 g/dL / Pro: 6.1 g/dL / ALK PHOS: 59 U/L / ALT: 13 U/L / AST: 10 U/L / GGT: x           PT/INR - ( 17 Apr 2025 07:12 )   PT: 10.9 sec;   INR: 0.95 ratio         PTT - ( 17 Apr 2025 07:12 )  PTT:27.7 sec  Urinalysis Basic - ( 17 Apr 2025 07:11 )    Color: x / Appearance: x / SG: x / pH: x  Gluc: 105 mg/dL / Ketone: x  / Bili: x / Urobili: x   Blood: x / Protein: x / Nitrite: x   Leuk Esterase: x / RBC: x / WBC x   Sq Epi: x / Non Sq Epi: x / Bacteria: x            Imaging:        EGD on 4/14    Showed clean based gastric ulcers, otherwise normal.       EGD and colonoscopy in 2023                                                                                                          Findings:       Grade II varices were found in the upper third of the esophagus and in the middle third of        the esophagus.       A 3 cm hiatal hernia was present.       Few non-bleeding superficial gastric ulcers with a clean ulcer base (Chidi Class III) were        found in the gastric antrum. The largest lesion was 6 mm in largest dimension. Biopsies were       taken with a cold forceps for histology.       The duodenal bulb and second portion of the duodenum were normal.    Findings:       Non-bleeding external hemorrhoids were found during perianal exam. The hemorrhoids were large.       The colon (entire examined portion) appeared normal. Brown liquid stool in the entire colon.       The terminal ileum appeared normal.                                     EGD in 2022    Findings:       Suspected Grade II varices were found in the middle third of the esophagus and in the lower        third of the esophagus with no recent stigmata of recent bleeding. Varcies did not appear        downstream in nature.       LA Grade A (one or more mucosal breaks less than 5 mm, not extending between tops of 2        mucosal folds) esophagitis with no bleeding was found at the gastroesophageal junction.       The exam of the esophagus was otherwise normal.       Many non-bleeding cratered gastric ulcers with a clean ulcer base (Chidi Class III) were        found in the gastric antrum. The largest lesion was ten mm by twelve mm in largest dimension.        Once non/bleeding cratered ulcer ([place] Class IIC) was seen in the antrum.       Diffuse moderate mucosal changes characterized by erythema and erosion were found in the        entire examined stomach.       The exam of the stomach was otherwise normal.       The firstportion of the duodenum and second portion of the duodenum were normal.       Otherwise normal endoscopy with no active bleeding. Decision made not to band varices given        no stigmata of bleeding on varcies and suspected source of GI Bleeding warren ulcers at        pylorus.

## 2025-04-17 NOTE — PROGRESS NOTE ADULT - TIME BILLING
Face-to-face encounter, review of extensive medical records in this and prior charts, laboratory findings, radiographic findings, review of immunosuppression, review of medication regimen for comorbidities; documentation as noted above and discussion of diagnostic impressions and plan with the patient and team.
Face-to-face encounter, review of extensive medical records in this and prior charts, laboratory findings, radiographic findings, review of immunosuppression, review of medication regimen for comorbidities; documentation as noted above and discussion of diagnostic impressions and plan with the patient and team.

## 2025-04-17 NOTE — DISCHARGE NOTE PROVIDER - NSDCFUADDAPPT_GEN_ALL_CORE_FT
APPTS ARE READY TO BE MADE: [ ] YES    Best Family or Patient Contact (if needed):    Additional Information about above appointments (if needed):    1: PCP  2: GI  3: Transplant Nephro     Other comments or requests:

## 2025-04-17 NOTE — PROGRESS NOTE ADULT - ASSESSMENT
54F PMH HTN, HLD, ESRD s/p DDRT 2020 c/b HCV (treated per patient), chronic SVC syndrome s/p L cephalic-iliac vein bypass on ASA, p/w coffee ground emesis, melena x 3 days and left lower abd pain. Transplant nephrology consulted for management of kidney transplant recipient.    1. Kidney Transplant recipient:  Patient with DDRT in 2020  Underlying cause of ESRD likely HTN  Transplant complicated by HCV and treated with epclusa  H/o BK viremia - resolved  Current Scr is 0.9.   UA positive for 300 mg/dl proteins.   CT AP showed Atrophic bilateral native kidneys without hydronephrosis. Nonobstructive renal stones of the native left kidney. Right lower quadrant transplant kidney without hydronephrosis or peritransplant collection.    Continue with hemodynamic support per primary team. Monitor labs and UOP. Avoid any potential nephrotoxins (NSAIDs/ACEi/ARBs) when possible. Dose medications as per eGFR/HD.     2. Immunosuppression:  Induction - simulect  Current Immunosuppression regimen: Env 11, Pred 5 qd  Cont Env and pred  Hold MMF in the setting of GI bleed  Switch to Aza 150mg qd, first dose today  FK level 9.4 today  Decrease FK dose to 10mg in AM  Recheck FK level in AM  Continue to monitor tacro levels daily - 30 mins before the morning dose.     3. GI bleed:  Hgb currently at 9.4  Hemodynamically stable.  Patient had multiple past hospitalizations with epistaxis and DAPT was switched to ASA.   H/o GI bleed in the past with most recent endoscopy 07/2023 showing ulcers and esophageal varices.  EGD showed non-bleeding ulcers. Plan for colonoscopy.   GI consult  PPI prophylaxis    4 HTN:   Consider resuming home BP meds    **Recommendations preliminary until attending attestation**  If you have any questions, please feel free to contact me  Jose Roberto Manning  Nephrology Fellow  Page [phone] / [employer] Teams (Preferred)  (After 4pm or on weekends please page the on-call fellow)

## 2025-04-17 NOTE — DISCHARGE NOTE PROVIDER - NSDCCPCAREPLAN_GEN_ALL_CORE_FT
PRINCIPAL DISCHARGE DIAGNOSIS  Diagnosis: GIB (gastrointestinal bleeding)  Assessment and Plan of Treatment: You were brought into the hospital due to a G.I. bleed. Your aspirin and steroids were thought to be causing ulcers in your stomach. A endoscopy was done and confirmed findings of gastric ulcers that could've been the source of your bleeding. You were started on a anti-acid medication called pantoprazole and you can take One tablet twice a day. Please follow up with the gastroenterologist in four weeks. If you have any bleeding from the mouth or anus, please come back to the hospital.     PRINCIPAL DISCHARGE DIAGNOSIS  Diagnosis: GIB (gastrointestinal bleeding)  Assessment and Plan of Treatment: You were brought into the hospital due to a G.I. bleed. Your aspirin and steroids were thought to be causing ulcers in your stomach. A endoscopy was done and confirmed findings of gastric ulcers that could've been the source of your bleeding. You were started on a anti-acid medication called pantoprazole and you can take One tablet twice a day. Please discontinue the MMF, continue azithioprine 100mg QD, continue prednisone, and continue tacrolimus ____ daily.   Please follow up with the gastroenterologist in four weeks. If you have any bleeding from the mouth or anus, please come back to the hospital.     PRINCIPAL DISCHARGE DIAGNOSIS  Diagnosis: GIB (gastrointestinal bleeding)  Assessment and Plan of Treatment: You were brought into the hospital due to a G.I. bleed. Your aspirin and steroids were thought to be causing ulcers in your stomach. A endoscopy was done and confirmed findings of gastric ulcers that could've been the source of your bleeding. You were started on a anti-acid medication called pantoprazole and you can take One tablet twice a day. Please discontinue the MMF, continue azithioprine 100mg QD, continue prednisone, and continue tacrolimus 10mg daily. Follow up with your pcp in one week to check your hemoglobin and with the transplant nephrology team to follow up with your tacro level.   Please follow up with the gastroenterologist in four weeks. If you have any bleeding from the mouth or anus, please come back to the hospital.     PRINCIPAL DISCHARGE DIAGNOSIS  Diagnosis: GIB (gastrointestinal bleeding)  Assessment and Plan of Treatment: You were brought into the hospital due to a G.I. bleed. Your aspirin and steroids were thought to be causing ulcers in your stomach. A endoscopy was done and confirmed findings of gastric ulcers that could've been the source of your bleeding. You were started on a anti-acid medication called pantoprazole and you can take One tablet twice a day. Please restart MMF 500mg twice a day, continue prednisone, and continue tacrolimus 10mg daily. Follow up with your pcp in one week to check your hemoglobin and with the transplant nephrology team to follow up with your tacro level. Please follow up with the gastroenterologist in four weeks. If you have any bleeding from the mouth or anus, please come back to the hospital.

## 2025-04-17 NOTE — DISCHARGE NOTE PROVIDER - HOSPITAL COURSE
HPI:  54y PMH HTN, HLD, ESRD s/p DD-renal transplant 2020 c/b HCV (treated per patient), chronic SVC syndrome s/p L cephalic-iliac vein bypass on DAPT, s/p thrombectomy of L iliac vein and graft 2012 coronary bypass 2015, p/w coffee ground emesis x 3 days. Endorses L lower abd pain and lightheadedness. Endorses dark stool. Pt takes daily aspirin and prednisone. Has had similar symptoms in the past. Most recent endoscopy 07/2023 showed ulcers and esophageal varices.     ROS: Denies HA, CP, SOB, palpitation, fever, cough, chills, recent travel, sick contact, change in urinary habits   A 10-system ROS was performed and is negative except as noted above and/or in the HPI.   (14 Apr 2025 23:58)    Hospital Course:      Important Medication Changes and Reason:    Active or Pending Issues Requiring Follow-up:    Advanced Directives:   [ ] Full code  [ ] DNR  [ ] Hospice    Discharge Diagnoses:         HPI:  54y PMH HTN, HLD, ESRD s/p DD-renal transplant 2020 c/b HCV (treated per patient), chronic SVC syndrome s/p L cephalic-iliac vein bypass on DAPT, s/p thrombectomy of L iliac vein and graft 2012 coronary bypass 2015, p/w coffee ground emesis x 3 days. Endorses L lower abd pain and lightheadedness. Endorses dark stool. Pt takes daily aspirin and prednisone. Has had similar symptoms in the past. Most recent endoscopy 07/2023 showed ulcers and esophageal varices.     ROS: Denies HA, CP, SOB, palpitation, fever, cough, chills, recent travel, sick contact, change in urinary habits   A 10-system ROS was performed and is negative except as noted above and/or in the HPI.   (14 Apr 2025 23:58)    Hospital Course:  Patient was worked up for possible upper GIB iso aspirin and steroid use. The patient's aspirin, steroids, and mff were intially held. PPI was started and patients H&H were monitored while pending EGD. Nephrology and transplant nephro recommended modifications to the patients azithioprine and tacrolimus with daily tacrolimus level checks. After the EGD which showed  grade II non bleeding varices and gastric ulcers which were the likely source of the bleed. GI recommended PPI BID for 4 weeks and outpatient follow up. Aspirin and steroids were restarted after egd and continued with ppi course.       On day of discharge, patient is clinically stable with no new exam findings or acute symptoms compared to prior. The patient was seen by the attending physician on the date of discharge and deemed stable and acceptable for discharge. The patient's chronic medical conditions were treated accordingly per the patient's home medication regimen. The patient's medication reconciliation (with changes made to chronic medications), follow up appointments, discharge orders, instructions, and significant lab and diagnostic studies are as noted.    Important Medication Changes and Reason:  PPI BID    Active or Pending Issues Requiring Follow-up:  PCP  GI  Transplant Nephro    Advanced Directives:   [X] Full code  [ ] DNR  [ ] Hospice    Discharge Diagnoses:  Bleeding Gastric Ulcer         HPI:  54y PMH HTN, HLD, ESRD s/p DD-renal transplant 2020 c/b HCV (treated per patient), chronic SVC syndrome s/p L cephalic-iliac vein bypass on DAPT, s/p thrombectomy of L iliac vein and graft 2012 coronary bypass 2015, p/w coffee ground emesis x 3 days. Endorses L lower abd pain and lightheadedness. Endorses dark stool. Pt takes daily aspirin and prednisone. Has had similar symptoms in the past. Most recent endoscopy 07/2023 showed ulcers and esophageal varices.     ROS: Denies HA, CP, SOB, palpitation, fever, cough, chills, recent travel, sick contact, change in urinary habits   A 10-system ROS was performed and is negative except as noted above and/or in the HPI.   (14 Apr 2025 23:58)    Hospital Course:  Patient was worked up for possible upper GIB iso aspirin and steroid use. The patient's aspirin, steroids, and mff were intially held. PPI was started and patients H&H were monitored while pending EGD. Nephrology and transplant nephro recommended modifications to the patients azithioprine and tacrolimus with daily tacrolimus level checks. After the EGD which showed  grade II non bleeding varices and gastric ulcers which were the likely source of the bleed. GI recommended PPI BID for 4 weeks and outpatient follow up. Aspirin and steroids were restarted after egd and continued with ppi course. MMF replaced with azathioprine 100mg QD, steroids continue, tacro changed to .......      On day of discharge, patient is clinically stable with no new exam findings or acute symptoms compared to prior. The patient was seen by the attending physician on the date of discharge and deemed stable and acceptable for discharge. The patient's chronic medical conditions were treated accordingly per the patient's home medication regimen. The patient's medication reconciliation (with changes made to chronic medications), follow up appointments, discharge orders, instructions, and significant lab and diagnostic studies are as noted.    Important Medication Changes and Reason:  PPI BID for 4 weeks  MMF discontinued   Azathioprine 100mg QD    Active or Pending Issues Requiring Follow-up:  PCP  GI  Transplant Nephro    Advanced Directives:   [X] Full code  [ ] DNR  [ ] Hospice    Discharge Diagnoses:  Bleeding Gastric Ulcer         HPI:  54y PMH HTN, HLD, ESRD s/p DD-renal transplant 2020 c/b HCV (treated per patient), chronic SVC syndrome s/p L cephalic-iliac vein bypass on DAPT, s/p thrombectomy of L iliac vein and graft 2012 coronary bypass 2015, p/w coffee ground emesis x 3 days. Endorses L lower abd pain and lightheadedness. Endorses dark stool. Pt takes daily aspirin and prednisone. Has had similar symptoms in the past. Most recent endoscopy 07/2023 showed ulcers and esophageal varices.     ROS: Denies HA, CP, SOB, palpitation, fever, cough, chills, recent travel, sick contact, change in urinary habits   A 10-system ROS was performed and is negative except as noted above and/or in the HPI.   (14 Apr 2025 23:58)    Hospital Course:  Patient was worked up for possible upper GIB iso aspirin and steroid use. The patient's aspirin, steroids, and mff were intially held. PPI was started and patients H&H were monitored while pending EGD. Nephrology and transplant nephro recommended modifications to the patients azithioprine and tacrolimus with daily tacrolimus level checks. After the EGD which showed  grade II non bleeding varices and gastric ulcers which were the likely source of the bleed. GI recommended PPI BID for 4 weeks and outpatient follow up. Aspirin and steroids were restarted after egd and continued with ppi course. MMF replaced with azathioprine 100mg QD, steroids continue, tacro changed to 10mg daily. The patient will follow up with her pcp in 1 week to check her cbc and 1 week follow up with transplant nephrology to check her tacro level.       On day of discharge, patient is clinically stable with no new exam findings or acute symptoms compared to prior. The patient was seen by the attending physician on the date of discharge and deemed stable and acceptable for discharge. The patient's chronic medical conditions were treated accordingly per the patient's home medication regimen. The patient's medication reconciliation (with changes made to chronic medications), follow up appointments, discharge orders, instructions, and significant lab and diagnostic studies are as noted.    Important Medication Changes and Reason:  PPI BID for 4 weeks  MMF discontinued   Azathioprine 100mg QD  Tacro 10mg QD    Active or Pending Issues Requiring Follow-up:  PCP  GI  Transplant Nephro    Advanced Directives:   [X] Full code  [ ] DNR  [ ] Hospice    Discharge Diagnoses:  Bleeding Gastric Ulcer         HPI:  54y PMH HTN, HLD, ESRD s/p DD-renal transplant 2020 c/b HCV (treated per patient), chronic SVC syndrome s/p L cephalic-iliac vein bypass on DAPT, s/p thrombectomy of L iliac vein and graft 2012 coronary bypass 2015, p/w coffee ground emesis x 3 days. Endorses L lower abd pain and lightheadedness. Endorses dark stool. Pt takes daily aspirin and prednisone. Has had similar symptoms in the past. Most recent endoscopy 07/2023 showed ulcers and esophageal varices.     ROS: Denies HA, CP, SOB, palpitation, fever, cough, chills, recent travel, sick contact, change in urinary habits   A 10-system ROS was performed and is negative except as noted above and/or in the HPI.   (14 Apr 2025 23:58)    Hospital Course:  Patient was worked up for possible upper GIB iso aspirin and steroid use. The patient's aspirin, steroids, and mff were initially held. PPI was started and patients H&H were monitored while pending EGD. Nephrology and transplant nephro recommended modifications to the patients azithioprine and tacrolimus with daily tacrolimus level checks. After the EGD which showed  grade II non bleeding varices and gastric ulcers which were the likely source of the bleed. GI recommended PPI BID for 4 weeks and outpatient follow up. Aspirin and steroids were restarted after egd and continued with ppi course. Continue MMF, steroids continue, tacro changed to 10mg daily. The patient will follow up with her pcp in 1 week to check her cbc and 1 week follow up with transplant nephrology to check her tacro level.       On day of discharge, patient is clinically stable with no new exam findings or acute symptoms compared to prior. The patient was seen by the attending physician on the date of discharge and deemed stable and acceptable for discharge. The patient's chronic medical conditions were treated accordingly per the patient's home medication regimen. The patient's medication reconciliation (with changes made to chronic medications), follow up appointments, discharge orders, instructions, and significant lab and diagnostic studies are as noted.    Important Medication Changes and Reason:  PPI BID for 4 weeks  MMF Continue  Tacro 10mg QD    Active or Pending Issues Requiring Follow-up:  PCP  GI  Transplant Nephro    Advanced Directives:   [X] Full code  [ ] DNR  [ ] Hospice    Discharge Diagnoses:  Bleeding Gastric Ulcer

## 2025-04-17 NOTE — PROGRESS NOTE ADULT - SUBJECTIVE AND OBJECTIVE BOX
[facility]    Dr. Otis Lala     Office [phone] (9 am to 5 pm)  Service: [phone] (5pm to 9am)  Also Available on TEAMS      RENAL PROGRESS NOTE: DATE OF SERVICE 04-17-25 @ 11:51    Patient is a 54y old  Female who presents with a chief complaint of coffee ground emesis (17 Apr 2025 08:10)      Patient seen and examined at bedside. No chest pain/sob    VITALS:  T(F): 98.2 (04-17-25 @ 11:38), Max: 98.5 (04-17-25 @ 05:00)  HR: 73 (04-17-25 @ 11:38)  BP: 146/89 (04-17-25 @ 11:38)  RR: 18 (04-17-25 @ 11:38)  SpO2: 99% (04-17-25 @ 11:38)  Wt(kg): --    04-16 @ 07:01  -  04-17 @ 07:00  --------------------------------------------------------  IN: 480 mL / OUT: 0 mL / NET: 480 mL          PHYSICAL EXAM:  Constitutional: NAD  Neck: No JVD  Respiratory: CTAB, no wheezes, rales or rhonchi  Cardiovascular: S1, S2, RRR  Gastrointestinal: BS+, soft, NT/ND  Extremities: No peripheral edema    Hospital Medications:   MEDICATIONS  (STANDING):  atorvastatin 20 milliGRAM(s) Oral at bedtime  cinacalcet 30 milliGRAM(s) Oral daily  magnesium oxide 400 milliGRAM(s) Oral two times a day with meals  pantoprazole  Injectable 40 milliGRAM(s) IV Push every 12 hours  predniSONE   Tablet 5 milliGRAM(s) Oral daily  tacrolimus ER Tablet (ENVARSUS XR) 11 milliGRAM(s) Oral daily    Tacrolimus (), Serum: 9.4 ng/mL (04-17 @ 07:12)    LABS:  04-17    140  |  105  |  11  ----------------------------<  105[H]  3.6   |  22  |  0.92    Ca    9.8      17 Apr 2025 07:11  Phos  2.6     04-17  Mg     1.4     04-17    TPro  6.1  /  Alb  3.8  /  TBili  1.0  /  DBili      /  AST  10  /  ALT  13  /  AlkPhos  59  04-17    Creatinine Trend: 0.92 <--, 0.98 <--, 0.95 <--, 1.09 <--    Albumin: 3.8 g/dL (04-17 @ 07:11)  Phosphorus: 2.6 mg/dL (04-17 @ 07:11)                              9.4    5.07  )-----------( 220      ( 17 Apr 2025 07:12 )             32.4     Urine Studies:  Urinalysis - [04-17-25 @ 07:11]      Color  / Appearance  / SG  / pH       Gluc 105 / Ketone   / Bili  / Urobili        Blood  / Protein  / Leuk Est  / Nitrite       RBC  / WBC  / Hyaline  / Gran  / Sq Epi  / Non Sq Epi  / Bacteria             RADIOLOGY & ADDITIONAL STUDIES:

## 2025-04-17 NOTE — PROGRESS NOTE ADULT - SUBJECTIVE AND OBJECTIVE BOX
[facility] DIVISION OF KIDNEY DISEASES AND HYPERTENSION -- FOLLOW UP NOTE  --------------------------------------------------------------------------------  24 hour events/subjective: Pt. seen and examined, resting comfortably in bed. No complaints.    PAST HISTORY  --------------------------------------------------------------------------------  No significant changes to PMH, PSH, FHx, SHx, unless otherwise noted    ALLERGIES & MEDICATIONS  --------------------------------------------------------------------------------  Allergies    morphine (Anaphylaxis)  latex (Swelling)  lactose (Hives)  shellfish (Urticaria)  contrast media (iodine-based) (Urticaria)  morphine (Urticaria)  penicillin (Anaphylaxis)  ibuprofen (Hives)  ibuprofen/morphine (Unknown)    Intolerances    Standing Inpatient Medications  atorvastatin 20 milliGRAM(s) Oral at bedtime  cinacalcet 30 milliGRAM(s) Oral daily  magnesium oxide 400 milliGRAM(s) Oral two times a day with meals  pantoprazole  Injectable 40 milliGRAM(s) IV Push every 12 hours  predniSONE   Tablet 5 milliGRAM(s) Oral daily    PRN Inpatient Medications  acetaminophen     Tablet .. 650 milliGRAM(s) Oral every 6 hours PRN    REVIEW OF SYSTEMS  --------------------------------------------------------------------------------  Gen: No fevers/chills  Respiratory: No dyspnea   CV: No chest pain  GI: No abdominal pain  Transplant: No pain  : No increased frequency   MSK: No edema  Neuro: No dizziness/lightheadedness    All other systems were reviewed and are negative, except as noted.    VITALS/PHYSICAL EXAM  --------------------------------------------------------------------------------  T(C): 36.8 (04-17-25 @ 11:38), Max: 36.9 (04-16-25 @ 20:56)  HR: 73 (04-17-25 @ 11:38) (68 - 73)  BP: 146/89 (04-17-25 @ 11:38) (105/67 - 146/89)  RR: 18 (04-17-25 @ 11:38) (18 - 18)  SpO2: 99% (04-17-25 @ 11:38) (98% - 99%)  Wt(kg): --    04-16-25 @ 07:01  -  04-17-25 @ 07:00  --------------------------------------------------------  IN: 480 mL / OUT: 0 mL / NET: 480 mL    Physical Exam:  Gen: NAD, able to speak in full sentences   HEENT: PERRL, MMM   Pulm: CTA B/L, no crackles   CV: RRR, S1S2+  Abd: +BS, soft  Transplant: No tenderness, swelling  : No suprapubic tenderness  MSK: no edema   Psych: Normal affect and mood  Skin: Warm            LABS/STUDIES  --------------------------------------------------------------------------------              9.4    5.07  >-----------<  220      [04-17-25 @ 07:12]              32.4     140  |  105  |  11  ----------------------------<  105      [04-17-25 @ 07:11]  3.6   |  22  |  0.92        Ca     9.8     [04-17-25 @ 07:11]      Mg     1.4     [04-17-25 @ 07:11]      Phos  2.6     [04-17-25 @ 07:11]    TPro  6.1  /  Alb  3.8  /  TBili  1.0  /  DBili  x   /  AST  10  /  ALT  13  /  AlkPhos  59  [04-17-25 @ 07:11]    PT/INR: PT 10.9 , INR 0.95       [04-17-25 @ 07:12]  PTT: 27.7       [04-17-25 @ 07:12]    Creatinine Trend:  SCr 0.92 [04-17 @ 07:11]  SCr 0.98 [04-16 @ 06:58]  SCr 0.95 [04-15 @ 06:53]  SCr 1.09 [04-14 @ 18:49]    Tacrolimus (), Serum: 9.4 ng/mL (04-17 @ 07:12)  Tacrolimus (), Serum: 7.7 ng/mL (04-16 @ 06:58)

## 2025-04-17 NOTE — DISCHARGE NOTE PROVIDER - NSDCCPTREATMENT_GEN_ALL_CORE_FT
PRINCIPAL PROCEDURE  Procedure: UGI endoscopy  Findings and Treatment: Findings:       Grade II varices were f  < end of copied text >  ound in the entire esophagus. They were small in size. The varices        were prominent inthe upper third of the esophagus, appeared to be downhill varices. There        was no evidence of white nipple or red whale sign.       The gastroesophageal flap valve was visualized endoscopically and classified as Hill Grade II        (fold present, opens with respiration).       One non-bleeding superficial gastric ulcer was found in the gastric antrum. The lesion was 2        mm in largest dimension. Another small ulcer less than 1 mm in the pre pyloric region. There        were few gastric erosions in the fundus and the antrum.       The exam of the stomach was otherwise normal.       The first portion of the duodenum and second portion of the duodenum were normal.                                Impression:          - Grade II esophageal varices.                       - Gastroesophageal flap valve classified as Hill Grade II (fold present,                        opens with respiration).                       - Non-bleeding gastric ulcer.                       - Normal first portion of the duodenum and second portion of the duodenum.                       - No specimens collected.                       - Melena likely due to gastric ulcers. There was no active bleeding seen or                        recent stigmata of bleeding.< from: Upper Endoscopy (04.15.25 @ 11:56) >

## 2025-04-17 NOTE — DISCHARGE NOTE PROVIDER - CARE PROVIDER_API CALL
Elin Negron [place]  Internal Medicine  [id number] [place] [id number]  Phone: [phone]  Fax: [phone]  Follow Up Time: 1 week

## 2025-04-17 NOTE — DISCHARGE NOTE PROVIDER - NSFOLLOWUPCLINICS_GEN_ALL_ED_FT
Gastroenterology at [facility]  Gastroenterology  [address] [place] [id number]  Phone: [phone]  Fax:   Follow Up Time: 1 month    A Family Medicine Doctor  Family Medicine  .  [place]   Phone:   Fax:   Follow Up Time: 1 week    Pediatric Nephrology & Kidney Transplant  Pediatric Nephrology & Kidney Transplant  [facility], [address], [address]  [place] [address]  Phone: [phone]  Fax: [phone]  Follow Up Time: 1 week

## 2025-04-17 NOTE — PROGRESS NOTE ADULT - PROBLEM SELECTOR PLAN 2
- Hx/o ESRD s/p DD-renal transplant 2020  - Cr 1.02 ( about baseline)  - IVF prn   - C/w Prednisone   - c/w tacrolimus   - Transplant nephro consulted, recommending holding MMF and considering switch to Azathioprine prior to d/c   - Continue to monitor tacro levels Am prior to dose

## 2025-04-17 NOTE — PROGRESS NOTE ADULT - ASSESSMENT
54y PMH HTN, HLD, ESRD s/p DD-renal transplant 2020 c/b HCV (treated per patient), chronic SVC syndrome s/p L cephalic-iliac vein bypass on DAPT, s/p thrombectomy of L iliac vein and graft 2012 coronary bypass 2015, p/w coffee ground emesis x 3 days. Endorses L lower abd pain and lightheadedness. Endorses dark stool. Pt takes daily aspirin and prednisone. Has had similar symptoms in the past. Most recent endoscopy 07/2023 showed ulcers and esophageal varices. Nephrology consulted for DDRT    A/P  ESRD s/p DDRT in 2020  previously ESRD on HD following nephro Dr. Streeter   DDRT in 2020   Scr 0.95, stable   CTAP shows atrophic bilateral native kidneys without hydronephrosis; nonobstructive renal stones of the L kidney  Immunosuppressants as per transplant   Home regimen includes envarsus 11 mg daily, prednisone 5 mg qd; holding MMF in the setting of GIB  Tac level reviewed 9.1 Reduce envarsus to 10 mg po daily  Monitor daily tac 30 min prior to AM dose  Renally dose medications   Avoid nephrotoxins   Monitor UO and bmp     Hypokalemia  Better  Recommend replete as needed  Planned for colonoscopy tomm?    HTN  BP fluctuating  BP meds on hold  Monitor     Anemia  secondary to GIB   Hgb stable   s/p endoscopy  Planned for colonsocopy  GI following  Monitor hgb

## 2025-04-17 NOTE — PROGRESS NOTE ADULT - ASSESSMENT
54y F PMH HTN, HLD, ESRD s/p DD-renal transplant 2020 c/b HCV (treated per patient), chronic SVC syndrome s/p L cephalic-iliac vein bypass on DAPT, s/p thrombectomy of L iliac vein and graft 2012 coronary bypass 2015, p/w coffee ground emesis x 3 days a/f eval of GIB, s/p EGD with evidence of small healing ulcers, with stable H/H.

## 2025-04-17 NOTE — DISCHARGE NOTE PROVIDER - NSDCMRMEDTOKEN_GEN_ALL_CORE_FT
amLODIPine 5 mg oral tablet: 1 tab(s) orally once a day  Aspirin Enteric Coated 81 mg oral delayed release tablet: 1 tab(s) orally once a day  atorvastatin 20 mg oral tablet: 1 tab(s) orally once a day (at bedtime)  cinacalcet 30 mg oral tablet: 1 tab(s) orally once a day  Envarsus XR 1 mg oral tablet, extended release: 11 milligram(s) orally once a day  magnesium oxide 200 mg oral tablet: 2 tab(s) orally 2 times a day  mycophenolate mofetil 500 mg oral tablet: 1 tab(s) orally 2 times a day  pantoprazole 40 mg oral delayed release tablet: 1 tab(s) orally once a day  predniSONE 5 mg oral tablet: 1 tab(s) orally once a day  sodium chloride 0.65% nasal spray: 2 puff(s) nasal 4 times a day as needed for  dry nasal passages   amLODIPine 5 mg oral tablet: 1 tab(s) orally once a day  Aspirin Enteric Coated 81 mg oral delayed release tablet: 1 tab(s) orally once a day  atorvastatin 20 mg oral tablet: 1 tab(s) orally once a day (at bedtime)  azaTHIOprine 100 mg oral tablet: 1 tab(s) orally once a day Please take one pill per day instead of MMF  cinacalcet 30 mg oral tablet: 1 tab(s) orally once a day  Envarsus XR 1 mg oral tablet, extended release: 11 milligram(s) orally once a day  magnesium oxide 200 mg oral tablet: 2 tab(s) orally 2 times a day  pantoprazole 40 mg oral delayed release tablet: 1 tab(s) orally 2 times a day Please take 2 times a day for the next 4 weeks  predniSONE 5 mg oral tablet: 1 tab(s) orally once a day  sodium chloride 0.65% nasal spray: 2 puff(s) nasal 4 times a day as needed for  dry nasal passages   amLODIPine 5 mg oral tablet: 1 tab(s) orally once a day  Aspirin Enteric Coated 81 mg oral delayed release tablet: 1 tab(s) orally once a day  atorvastatin 20 mg oral tablet: 1 tab(s) orally once a day (at bedtime)  azaTHIOprine 100 mg oral tablet: 1 tab(s) orally once a day Please take one pill per day instead of MMF  cinacalcet 30 mg oral tablet: 1 tab(s) orally once a day  magnesium oxide 200 mg oral tablet: 2 tab(s) orally 2 times a day  pantoprazole 40 mg oral delayed release tablet: 1 tab(s) orally 2 times a day Please take 2 times a day for the next 4 weeks  predniSONE 5 mg oral tablet: 1 tab(s) orally once a day  sodium chloride 0.65% nasal spray: 2 puff(s) nasal 4 times a day as needed for  dry nasal passages  tacrolimus 1 mg oral tablet, extended release: 10 tab(s) orally once a day   amLODIPine 5 mg oral tablet: 1 tab(s) orally once a day  Aspirin Enteric Coated 81 mg oral delayed release tablet: 1 tab(s) orally once a day  atorvastatin 20 mg oral tablet: 1 tab(s) orally once a day (at bedtime)  cinacalcet 30 mg oral tablet: 1 tab(s) orally once a day  magnesium oxide 200 mg oral tablet: 2 tab(s) orally 2 times a day  mycophenolate mofetil 500 mg oral tablet: 1 tab(s) orally 2 times a day  pantoprazole 40 mg oral delayed release tablet: 1 tab(s) orally 2 times a day Please take 2 times a day for the next 4 weeks  predniSONE 5 mg oral tablet: 1 tab(s) orally once a day  sodium chloride 0.65% nasal spray: 2 puff(s) nasal 4 times a day as needed for  dry nasal passages  tacrolimus 1 mg oral tablet, extended release: 10 tab(s) orally once a day

## 2025-04-18 ENCOUNTER — TRANSCRIPTION ENCOUNTER (OUTPATIENT)
Age: 55
End: 2025-04-18

## 2025-04-18 VITALS
TEMPERATURE: 97 F | RESPIRATION RATE: 18 BRPM | SYSTOLIC BLOOD PRESSURE: 115 MMHG | HEART RATE: 71 BPM | OXYGEN SATURATION: 97 % | DIASTOLIC BLOOD PRESSURE: 70 MMHG

## 2025-04-18 DIAGNOSIS — Z94.0 KIDNEY TRANSPLANT STATUS: ICD-10-CM

## 2025-04-18 LAB — TACROLIMUS SERPL-MCNC: 9.9 NG/ML — SIGNIFICANT CHANGE UP

## 2025-04-18 PROCEDURE — 82140 ASSAY OF AMMONIA: CPT

## 2025-04-18 PROCEDURE — 83735 ASSAY OF MAGNESIUM: CPT

## 2025-04-18 PROCEDURE — 85025 COMPLETE CBC W/AUTO DIFF WBC: CPT

## 2025-04-18 PROCEDURE — 74176 CT ABD & PELVIS W/O CONTRAST: CPT | Mod: MC

## 2025-04-18 PROCEDURE — 85027 COMPLETE CBC AUTOMATED: CPT

## 2025-04-18 PROCEDURE — 80048 BASIC METABOLIC PNL TOTAL CA: CPT

## 2025-04-18 PROCEDURE — 96374 THER/PROPH/DIAG INJ IV PUSH: CPT

## 2025-04-18 PROCEDURE — 86900 BLOOD TYPING SEROLOGIC ABO: CPT

## 2025-04-18 PROCEDURE — 84100 ASSAY OF PHOSPHORUS: CPT

## 2025-04-18 PROCEDURE — 86901 BLOOD TYPING SEROLOGIC RH(D): CPT

## 2025-04-18 PROCEDURE — 36415 COLL VENOUS BLD VENIPUNCTURE: CPT

## 2025-04-18 PROCEDURE — 99285 EMERGENCY DEPT VISIT HI MDM: CPT

## 2025-04-18 PROCEDURE — 85610 PROTHROMBIN TIME: CPT

## 2025-04-18 PROCEDURE — 80053 COMPREHEN METABOLIC PANEL: CPT

## 2025-04-18 PROCEDURE — 83690 ASSAY OF LIPASE: CPT

## 2025-04-18 PROCEDURE — 86850 RBC ANTIBODY SCREEN: CPT

## 2025-04-18 PROCEDURE — 96375 TX/PRO/DX INJ NEW DRUG ADDON: CPT

## 2025-04-18 PROCEDURE — 93005 ELECTROCARDIOGRAM TRACING: CPT

## 2025-04-18 PROCEDURE — 81001 URINALYSIS AUTO W/SCOPE: CPT

## 2025-04-18 PROCEDURE — 80197 ASSAY OF TACROLIMUS: CPT

## 2025-04-18 PROCEDURE — 85730 THROMBOPLASTIN TIME PARTIAL: CPT

## 2025-04-18 RX ORDER — TACROLIMUS 0.5 MG/1
10 CAPSULE ORAL
Qty: 300 | Refills: 1
Start: 2025-04-18 | End: 2025-06-16

## 2025-04-18 RX ORDER — AZATHIOPRINE 100 MG/1
100 TABLET ORAL DAILY
Qty: 30 | Refills: 11 | Status: DISCONTINUED | COMMUNITY
Start: 2025-04-18 | End: 2025-04-18

## 2025-04-18 RX ORDER — MYCOPHENOLATE MOFETIL 500 MG/1
1 TABLET, FILM COATED ORAL
Qty: 60 | Refills: 1
Start: 2025-04-18 | End: 2025-06-16

## 2025-04-18 RX ORDER — AZATHIOPRINE 50 MG/1
1 TABLET ORAL
Qty: 30 | Refills: 1
Start: 2025-04-18 | End: 2025-06-16

## 2025-04-18 RX ORDER — AZATHIOPRINE 50 1/1
50 TABLET ORAL
Qty: 60 | Refills: 3 | Status: ACTIVE | COMMUNITY
Start: 2025-04-18 | End: 1900-01-01

## 2025-04-18 RX ADMIN — PREDNISONE 5 MILLIGRAM(S): 20 TABLET ORAL at 05:44

## 2025-04-18 RX ADMIN — CINACALCET 30 MILLIGRAM(S): 30 TABLET, FILM COATED ORAL at 11:18

## 2025-04-18 RX ADMIN — TACROLIMUS 10 MILLIGRAM(S): 0.5 CAPSULE ORAL at 08:39

## 2025-04-18 RX ADMIN — Medication 400 MILLIGRAM(S): at 08:39

## 2025-04-18 RX ADMIN — Medication 40 MILLIGRAM(S): at 05:43

## 2025-04-18 NOTE — PROGRESS NOTE ADULT - PROBLEM SELECTOR PLAN 3
Hx of SVC syndrome leading to esophageal varices as patient doesn't have portal htn now s/p bypass cephalic/ iliac   - found on egd in 2023 with no signs of stigmata  - patient states that she has been having recurrent symptoms of facial and chest plethora for the past month but was not able to make a vascular surgery appointment   - patient HD but with upper GIB s/p endoscopy still pending results - could be  ulcers vs varices    Plan:  Vascular surgery consult --> outpatient f/u  Continue IV PPI   Restart ASA on d/c Hx of SVC syndrome leading to esophageal varices as patient doesn't have portal htn now s/p bypass cephalic/ iliac   - found on egd in 2023 with no signs of stigmata  - patient states that she has been having recurrent symptoms of facial and chest plethora for the past month but was not able to make a vascular surgery appointment   - patient HD but with upper GIB s/p endoscopy still pending results - could be  ulcers vs varices    Plan:  Vascular surgery consult --> outpatient f/u   d/c 40mg PO BID for 4 weeks  Restart ASA on d/c

## 2025-04-18 NOTE — PROGRESS NOTE ADULT - PROVIDER SPECIALTY LIST ADULT
Internal Medicine
Nephrology
Gastroenterology
Internal Medicine
Internal Medicine
Nephrology
Nephrology
Transplant Nephrology
Gastroenterology
Transplant Nephrology
Internal Medicine

## 2025-04-18 NOTE — PROGRESS NOTE ADULT - SUBJECTIVE AND OBJECTIVE BOX
PROGRESS NOTE:   Authored by: Erna Monk MD   PGY-1       Patient is a 54y old  Female who presents with a chief complaint of coffee ground emesis (18 Apr 2025 07:17)      SUBJECTIVE / OVERNIGHT EVENTS:  No acute events overnight.   Patient NAD and VSS. Does not endorse any more melena or coffee ground emesis. Has had normal bowel movements since yesterday without any signs of bleeding. ROS cp, gi, gu, ID perspective negative.     MEDICATIONS  (STANDING):  atorvastatin 20 milliGRAM(s) Oral at bedtime  cinacalcet 30 milliGRAM(s) Oral daily  magnesium oxide 400 milliGRAM(s) Oral two times a day with meals  pantoprazole  Injectable 40 milliGRAM(s) IV Push every 12 hours  predniSONE   Tablet 5 milliGRAM(s) Oral daily  tacrolimus ER Tablet (ENVARSUS XR) 10 milliGRAM(s) Oral daily    MEDICATIONS  (PRN):  acetaminophen     Tablet .. 650 milliGRAM(s) Oral every 6 hours PRN Temp greater or equal to 38C (100.4F), Mild Pain (1 - 3)      CAPILLARY BLOOD GLUCOSE        I&O's Summary    17 Apr 2025 07:01  -  18 Apr 2025 07:00  --------------------------------------------------------  IN: 50 mL / OUT: 0 mL / NET: 50 mL        PHYSICAL EXAM:  Vital Signs Last 24 Hrs  T(C): 36.6 (18 Apr 2025 06:10), Max: 37 (17 Apr 2025 20:26)  T(F): 97.9 (18 Apr 2025 06:10), Max: 98.6 (17 Apr 2025 20:26)  HR: 69 (18 Apr 2025 06:10) (69 - 75)  BP: 131/82 (18 Apr 2025 06:10) (114/60 - 146/89)  BP(mean): --  RR: 18 (18 Apr 2025 06:10) (18 - 18)  SpO2: 98% (18 Apr 2025 06:10) (96% - 99%)    Parameters below as of 18 Apr 2025 06:10  Patient On (Oxygen Delivery Method): room air        CONSTITUTIONAL: NAD, well-developed  RESPIRATORY: Normal respiratory effort; lungs are clear to auscultation bilaterally  CARDIOVASCULAR: Regular rate and rhythm, normal S1 and S2, no murmur/rub/gallop; No lower extremity edema; Peripheral pulses are 2+ bilaterally  ABDOMEN: Nontender to palpation, normoactive bowel sounds, no rebound/guarding; No hepatosplenomegaly  MUSCLOSKELETAL: no clubbing or cyanosis of digits; no joint swelling or tenderness to palpation  PSYCH: A+O to person, place, and time; affect appropriate    LABS:                        9.4    5.07  )-----------( 220      ( 17 Apr 2025 07:12 )             32.4     04-17    140  |  105  |  11  ----------------------------<  105[H]  3.6   |  22  |  0.92    Ca    9.8      17 Apr 2025 07:11  Phos  2.6     04-17  Mg     1.4     04-17    TPro  6.1  /  Alb  3.8  /  TBili  1.0  /  DBili  x   /  AST  10  /  ALT  13  /  AlkPhos  59  04-17    PT/INR - ( 17 Apr 2025 07:12 )   PT: 10.9 sec;   INR: 0.95 ratio         PTT - ( 17 Apr 2025 07:12 )  PTT:27.7 sec        MICRO:  Urinalysis Basic - ( 17 Apr 2025 07:11 )    Color: x / Appearance: x / SG: x / pH: x  Gluc: 105 mg/dL / Ketone: x  / Bili: x / Urobili: x   Blood: x / Protein: x / Nitrite: x   Leuk Esterase: x / RBC: x / WBC x   Sq Epi: x / Non Sq Epi: x / Bacteria: x          IMAGING:  No new imaging

## 2025-04-18 NOTE — PROGRESS NOTE ADULT - ASSESSMENT
54y F PMH HTN, HLD, ESRD s/p DD-renal transplant 2020 c/b HCV (treated per patient), chronic SVC syndrome s/p L cephalic-iliac vein bypass on DAPT, s/p thrombectomy of L iliac vein and graft 2012 coronary bypass 2015, p/w coffee ground emesis x 3 days a/f eval of GIB, s/p EGD with evidence of small healing ulcers, with stable H/H. 54y F PMH HTN, HLD, ESRD s/p DD-renal transplant 2020 c/b HCV (treated per patient), chronic SVC syndrome s/p L cephalic-iliac vein bypass on DAPT, s/p thrombectomy of L iliac vein and graft 2012 coronary bypass 2015, p/w coffee ground emesis x 3 days a/f eval of GIB, s/p EGD with evidence of small healing ulcers, with stable H/H. Now on PPI BID  and ready for discharge.

## 2025-04-18 NOTE — PROGRESS NOTE ADULT - PROBLEM SELECTOR PLAN 1
Presents for c/o melena and coffee ground emesis   - Hemodynamically stable, Hg 9.4 4/17  - CT A/P:  Redemonstrated extensive anterior abdominal wall superficial venous   varicosities extending from b/L inguinal regions to the partially imaged lower chest.  Coarse calcification noted of the infrarenal IVC likely ISO chronic central venous thrombosis   - ED: Octreotide, Protonix     Plan:   - C/w IV protonix 40mg q12  - Trend cbc q12, transfuse to keep Hg >8 ( hx/o cardiac dz)  - Hold AC/ AP  i/s/o GIB  - GI consulted, now s/p EGD with small healing gastric ulcers  - Monitor H/H and for additional melena; GI considering colonoscopy 4/18 if melena continues Presents for c/o melena and coffee ground emesis   - Hemodynamically stable, Hg 9.4 4/17  - CT A/P:  Redemonstrated extensive anterior abdominal wall superficial venous   varicosities extending from b/L inguinal regions to the partially imaged lower chest.  Coarse calcification noted of the infrarenal IVC likely ISO chronic central venous thrombosis   - ED: Octreotide, Protonix     Plan:   - C/w IV protonix 40mg q12 -> d/c 40mg PO BID for 4 weeks  - Trend cbc q12, transfuse to keep Hg >8 ( hx/o cardiac dz)  - Hold AC/ AP  i/s/o GIB  - GI consulted, now s/p EGD with small healing gastric ulcers  - Monitor H/H and for additional melena; GI considering colonoscopy 4/18 if melena continues

## 2025-04-18 NOTE — PROGRESS NOTE ADULT - PROBLEM SELECTOR PLAN 5
DVT: SCD iso GIB  Diet: DASH/TLC  Code: Full Code  PT: Independent DVT: SCD iso GIB  Diet: DASH/TLC  Code: Full Code  PT: Independent  D/C home today

## 2025-04-18 NOTE — PROGRESS NOTE ADULT - REASON FOR ADMISSION
coffee ground emesis

## 2025-04-18 NOTE — PROGRESS NOTE ADULT - ASSESSMENT
54y PMH HTN, HLD, ESRD s/p DD-renal transplant 2020 c/b HCV (treated per patient), chronic SVC syndrome s/p L cephalic-iliac vein bypass on DAPT, s/p thrombectomy of L iliac vein and graft 2012 coronary bypass 2015, p/w coffee ground emesis x 3 days. Endorses L lower abd pain and lightheadedness. Endorses dark stool. Pt takes daily aspirin and prednisone. Has had similar symptoms in the past. Most recent endoscopy 07/2023 showed ulcers and esophageal varices. Nephrology consulted for DDRT    A/P  ESRD s/p DDRT in 2020  previously ESRD on HD following nephro Dr. Streeter   DDRT in 2020   Scr 0.95, stable   CTAP shows atrophic bilateral native kidneys without hydronephrosis; nonobstructive renal stones of the L kidney  Immunosuppressants as per transplant   Home regimen includes envarsus 11 mg daily, prednisone 5 mg qd; holding MMF in the setting of GIB  Now on envarsus 10 mg daily  Monitor daily tac 30 min prior to AM dose  Renally dose medications   Avoid nephrotoxins   Monitor UO and bmp     Hypokalemia  Better  Recommend replete as needed  Planned for colonoscopy tomm?    HTN  BP fluctuating  BP meds on hold  Monitor     Anemia  secondary to GIB   Hgb stable   s/p endoscopy  Planned for colonsocopy  GI following  Monitor hgb

## 2025-04-18 NOTE — DISCHARGE NOTE NURSING/CASE MANAGEMENT/SOCIAL WORK - PATIENT PORTAL LINK FT
You can access the FollowMyHealth Patient Portal offered by [facility] by registering at the following website: [email or web address]. By joining [employer]’s FollowMyHealth portal, you will also be able to view your health information using other applications (apps) compatible with our system.

## 2025-04-18 NOTE — PROGRESS NOTE ADULT - SUBJECTIVE AND OBJECTIVE BOX
[facility]    Dr. Otis Lala     Office [phone] (9 am to 5 pm)  Service: [phone] (5pm to 9am)  Also Available on TEAMS      RENAL PROGRESS NOTE: DATE OF SERVICE 04-18-25 @ 10:22    Patient is a 54y old  Female who presents with a chief complaint of coffee ground emesis (18 Apr 2025 07:17)      Patient seen and examined at bedside. No chest pain/sob    VITALS:  T(F): 97.9 (04-18-25 @ 06:10), Max: 98.6 (04-17-25 @ 20:26)  HR: 69 (04-18-25 @ 06:10)  BP: 131/82 (04-18-25 @ 06:10)  RR: 18 (04-18-25 @ 06:10)  SpO2: 98% (04-18-25 @ 06:10)  Wt(kg): --    04-17 @ 07:01  -  04-18 @ 07:00  --------------------------------------------------------  IN: 50 mL / OUT: 0 mL / NET: 50 mL          PHYSICAL EXAM:  Constitutional: NAD  Neck: No JVD  Respiratory: CTAB, no wheezes, rales or rhonchi  Cardiovascular: S1, S2, RRR  Gastrointestinal: BS+, soft, NT/ND  Extremities: No peripheral edema    Hospital Medications:   MEDICATIONS  (STANDING):  atorvastatin 20 milliGRAM(s) Oral at bedtime  cinacalcet 30 milliGRAM(s) Oral daily  magnesium oxide 400 milliGRAM(s) Oral two times a day with meals  pantoprazole  Injectable 40 milliGRAM(s) IV Push every 12 hours  predniSONE   Tablet 5 milliGRAM(s) Oral daily  tacrolimus ER Tablet (ENVARSUS XR) 10 milliGRAM(s) Oral daily      LABS:  04-17    140  |  105  |  11  ----------------------------<  105[H]  3.6   |  22  |  0.92    Ca    9.8      17 Apr 2025 07:11  Phos  2.6     04-17  Mg     1.4     04-17    TPro  6.1  /  Alb  3.8  /  TBili  1.0  /  DBili      /  AST  10  /  ALT  13  /  AlkPhos  59  04-17    Creatinine Trend: 0.92 <--, 0.98 <--, 0.95 <--, 1.09 <--                                9.4    5.07  )-----------( 220      ( 17 Apr 2025 07:12 )             32.4     Urine Studies:  Urinalysis - [04-17-25 @ 07:11]      Color  / Appearance  / SG  / pH       Gluc 105 / Ketone   / Bili  / Urobili        Blood  / Protein  / Leuk Est  / Nitrite       RBC  / WBC  / Hyaline  / Gran  / Sq Epi  / Non Sq Epi  / Bacteria             RADIOLOGY & ADDITIONAL STUDIES:

## 2025-04-18 NOTE — DISCHARGE NOTE NURSING/CASE MANAGEMENT/SOCIAL WORK - FINANCIAL ASSISTANCE
[facility] provides services at a reduced cost to those who are determined to be eligible through [facility]’s financial assistance program. Information regarding [facility]’s financial assistance program can be found by going to [email or web address] or by calling [phone].

## 2025-04-18 NOTE — PROGRESS NOTE ADULT - ATTENDING COMMENTS
-DDRt 2020/HCV s/p epclusa, h/o BK viremia- Cr stable at baseline  -IS- con't envarsus, and pred 5mg daily, holding MMF given GI issues, tac goal 5-7- please check daily AM tac troughs  -GIB- still with melena and 1 coffee ground emesis- non bleeding ulcers and varices on EGD- now pending colonoscopy probably tomorrow  -HTN- BP meds on hold given GIB- BPs ok for now
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
54F admitted w coffee ground emesis and dark stools.   She has a hx of esophageal varices 2/2 SVC syndrome and PUD on prior EGDs.   s/p EGD  4/15/25 showign small gastric ulcers, nonbleeding esophageal varices (downhill varices).   Currently reporting brown stools  Hgb 9.4, stable       Recommendations   - trend h/h   - PPI BID   - monitor bowel movements  - diet as tolerated   - outpatient GI follow up for repeat EGD, colonoscopy   - rest of care per primary team   GI to sign off, please call w questions .
54F admitted w coffee ground emesis and dark stools.   She has a hx of esophageal varices 2/2 SVC syndrome and PUD on prior EGDs.   s/p EGD yesterday 4/15/25 showign small gastric ulcers, nonbleeding esophageal varices (downhill varices).   Currently reporting dark stools  Hgb 9.1, stable       Recommendations   - dark stools likely old blood transiting through. Continue to monitor bowel movements  - trend h/h   - IV PPI BID   - if downtrending hbg or persistent dark stools, will consider inpatient colonoscopy.   - rest of care per primary team   GI to follow, please call w questions
Medical attending patient was seen this morning at around 8:30 AM condition remained stable overnight discussed with the medical team no need for colonoscopy she will be continued on Protonix 40 mg twice a day and she may follow up in my office in the next 2 weeks to repeat her CBC she will return back to work on 4/21/2022 without restrictions

## 2025-04-18 NOTE — PROGRESS NOTE ADULT - PROBLEM SELECTOR PLAN 4
- Antihypertensive held iso GIB - Antihypertensive held iso GIB  - Can restart home HTN regimen with amlodipine

## 2025-05-08 DIAGNOSIS — Z94.0 KIDNEY TRANSPLANT STATUS: ICD-10-CM

## 2025-05-15 LAB
ALBUMIN SERPL ELPH-MCNC: 4.4 G/DL
ANION GAP SERPL CALC-SCNC: 14 MMOL/L
BUN SERPL-MCNC: 26 MG/DL
CALCIUM SERPL-MCNC: 9.9 MG/DL
CHLORIDE SERPL-SCNC: 103 MMOL/L
CO2 SERPL-SCNC: 23 MMOL/L
CREAT SERPL-MCNC: 1.21 MG/DL
EGFRCR SERPLBLD CKD-EPI 2021: 54 ML/MIN/1.73M2
GLUCOSE SERPL-MCNC: 132 MG/DL
PHOSPHATE SERPL-MCNC: 3.2 MG/DL
POTASSIUM SERPL-SCNC: 4.3 MMOL/L
SODIUM SERPL-SCNC: 140 MMOL/L

## 2025-06-04 NOTE — ED PROVIDER NOTE - NS ED MD EM SELECTION
Please call patient to schedule next prolia injection in 6 mths. Level 1.5 if patient has labs appt prior to injection (labs need to be within one month of injection.  If having labs and prolia in infusion then patient would need to be a level 2.5  Thank you Domenic
[id number] Exp Problem Focused - Mod. Complex

## 2025-06-25 ENCOUNTER — APPOINTMENT (OUTPATIENT)
Dept: NEPHROLOGY | Facility: CLINIC | Age: 55
End: 2025-06-25
Payer: MEDICARE

## 2025-06-25 VITALS
SYSTOLIC BLOOD PRESSURE: 118 MMHG | DIASTOLIC BLOOD PRESSURE: 72 MMHG | BODY MASS INDEX: 31.8 KG/M2 | OXYGEN SATURATION: 99 % | HEIGHT: 60 IN | TEMPERATURE: 98.7 F | HEART RATE: 68 BPM | RESPIRATION RATE: 14 BRPM | WEIGHT: 162 LBS

## 2025-06-25 LAB
ALBUMIN SERPL ELPH-MCNC: 4.3 G/DL
ALP BLD-CCNC: 77 U/L
ALT SERPL-CCNC: 35 U/L
ANION GAP SERPL CALC-SCNC: 15 MMOL/L
APPEARANCE: CLEAR
AST SERPL-CCNC: 20 U/L
BACTERIA: NEGATIVE /HPF
BILIRUB SERPL-MCNC: 0.6 MG/DL
BILIRUBIN URINE: NEGATIVE
BLOOD URINE: NEGATIVE
BUN SERPL-MCNC: 29 MG/DL
CALCIUM SERPL-MCNC: 10.7 MG/DL
CAST: 1 /LPF
CHLORIDE SERPL-SCNC: 103 MMOL/L
CO2 SERPL-SCNC: 24 MMOL/L
COLOR: YELLOW
CREAT SERPL-MCNC: 1.7 MG/DL
CREAT SPEC-SCNC: 96 MG/DL
CREAT/PROT UR: 0.6 RATIO
EGFRCR SERPLBLD CKD-EPI 2021: 36 ML/MIN/1.73M2
EPITHELIAL CELLS: 2 /HPF
GLUCOSE QUALITATIVE U: NEGATIVE MG/DL
GLUCOSE SERPL-MCNC: 115 MG/DL
HCT VFR BLD CALC: 33.7 %
HGB BLD-MCNC: 9.7 G/DL
KETONES URINE: NEGATIVE MG/DL
LDH SERPL-CCNC: 233 U/L
LEUKOCYTE ESTERASE URINE: ABNORMAL
MAGNESIUM SERPL-MCNC: 1.9 MG/DL
MCHC RBC-ENTMCNC: 21.9 PG
MCHC RBC-ENTMCNC: 28.8 G/DL
MCV RBC AUTO: 76.2 FL
MICROSCOPIC-UA: NORMAL
NITRITE URINE: NEGATIVE
PH URINE: 7
PHOSPHATE SERPL-MCNC: 3.4 MG/DL
PLATELET # BLD AUTO: 296 K/UL
POTASSIUM SERPL-SCNC: 4.6 MMOL/L
PROT SERPL-MCNC: 6.9 G/DL
PROT UR-MCNC: 55 MG/DL
PROTEIN URINE: 100 MG/DL
RBC # BLD: 4.42 M/UL
RBC # FLD: 19.1 %
RED BLOOD CELLS URINE: 0 /HPF
SODIUM SERPL-SCNC: 142 MMOL/L
SPECIFIC GRAVITY URINE: 1.01
TACROLIMUS SERPL-MCNC: 7.5 NG/ML
URATE SERPL-MCNC: 7.4 MG/DL
UROBILINOGEN URINE: 0.2 MG/DL
WBC # FLD AUTO: 6.51 K/UL
WHITE BLOOD CELLS URINE: 3 /HPF

## 2025-06-25 PROCEDURE — 99215 OFFICE O/P EST HI 40 MIN: CPT

## 2025-06-26 LAB
BKV DNA SPEC QL NAA+PROBE: NOT DETECTED IU/ML
CMV DNA SPEC QL NAA+PROBE: NOT DETECTED IU/ML
CMVPCR LOG: NOT DETECTED LOG10IU/ML

## 2025-07-21 ENCOUNTER — APPOINTMENT (OUTPATIENT)
Dept: GASTROENTEROLOGY | Facility: CLINIC | Age: 55
End: 2025-07-21
Payer: MEDICARE

## 2025-07-21 VITALS
DIASTOLIC BLOOD PRESSURE: 63 MMHG | OXYGEN SATURATION: 94 % | BODY MASS INDEX: 31.41 KG/M2 | WEIGHT: 160 LBS | TEMPERATURE: 96.8 F | HEIGHT: 60 IN | SYSTOLIC BLOOD PRESSURE: 96 MMHG | HEART RATE: 78 BPM

## 2025-07-21 DIAGNOSIS — D64.9 ANEMIA, UNSPECIFIED: ICD-10-CM

## 2025-07-21 DIAGNOSIS — R10.13 EPIGASTRIC PAIN: ICD-10-CM

## 2025-07-21 DIAGNOSIS — Z87.11 PERSONAL HISTORY OF PEPTIC ULCER DISEASE: ICD-10-CM

## 2025-07-21 DIAGNOSIS — K21.9 GASTRO-ESOPHAGEAL REFLUX DISEASE W/OUT ESOPHAGITIS: ICD-10-CM

## 2025-07-21 DIAGNOSIS — Z87.19 PERSONAL HISTORY OF OTHER DISEASES OF THE DIGESTIVE SYSTEM: ICD-10-CM

## 2025-07-21 PROCEDURE — 99214 OFFICE O/P EST MOD 30 MIN: CPT

## 2025-07-21 RX ORDER — SIMETHICONE 125 MG/1
125 TABLET, CHEWABLE ORAL
Qty: 120 | Refills: 2 | Status: ACTIVE | COMMUNITY
Start: 2025-07-21 | End: 1900-01-01

## 2025-07-21 RX ORDER — PANTOPRAZOLE 40 MG/1
40 TABLET, DELAYED RELEASE ORAL
Qty: 30 | Refills: 3 | Status: ACTIVE | COMMUNITY
Start: 2025-07-21 | End: 1900-01-01

## 2025-07-24 ENCOUNTER — RESULT REVIEW (OUTPATIENT)
Age: 55
End: 2025-07-24

## 2025-07-24 ENCOUNTER — APPOINTMENT (OUTPATIENT)
Dept: GASTROENTEROLOGY | Facility: HOSPITAL | Age: 55
End: 2025-07-24

## 2025-07-24 ENCOUNTER — OUTPATIENT (OUTPATIENT)
Dept: OUTPATIENT SERVICES | Facility: HOSPITAL | Age: 55
LOS: 1 days | End: 2025-07-24
Payer: MEDICARE

## 2025-07-24 ENCOUNTER — TRANSCRIPTION ENCOUNTER (OUTPATIENT)
Age: 55
End: 2025-07-24

## 2025-07-24 VITALS
SYSTOLIC BLOOD PRESSURE: 109 MMHG | RESPIRATION RATE: 16 BRPM | HEART RATE: 68 BPM | DIASTOLIC BLOOD PRESSURE: 64 MMHG | OXYGEN SATURATION: 99 % | WEIGHT: 160.94 LBS | HEIGHT: 63 IN | TEMPERATURE: 98 F

## 2025-07-24 VITALS
HEART RATE: 69 BPM | RESPIRATION RATE: 20 BRPM | DIASTOLIC BLOOD PRESSURE: 81 MMHG | SYSTOLIC BLOOD PRESSURE: 128 MMHG | OXYGEN SATURATION: 93 %

## 2025-07-24 DIAGNOSIS — I77.0 ARTERIOVENOUS FISTULA, ACQUIRED: Chronic | ICD-10-CM

## 2025-07-24 DIAGNOSIS — Z95.828 PRESENCE OF OTHER VASCULAR IMPLANTS AND GRAFTS: Chronic | ICD-10-CM

## 2025-07-24 DIAGNOSIS — Z94.0 KIDNEY TRANSPLANT STATUS: Chronic | ICD-10-CM

## 2025-07-24 DIAGNOSIS — Z87.11 PERSONAL HISTORY OF PEPTIC ULCER DISEASE: ICD-10-CM

## 2025-07-24 PROCEDURE — 88305 TISSUE EXAM BY PATHOLOGIST: CPT

## 2025-07-24 PROCEDURE — 43239 EGD BIOPSY SINGLE/MULTIPLE: CPT

## 2025-07-24 PROCEDURE — 88312 SPECIAL STAINS GROUP 1: CPT

## 2025-07-24 PROCEDURE — 88312 SPECIAL STAINS GROUP 1: CPT | Mod: 26

## 2025-07-24 PROCEDURE — 88305 TISSUE EXAM BY PATHOLOGIST: CPT | Mod: 26

## 2025-07-24 RX ADMIN — Medication 30 MILLILITER(S): at 10:03

## 2025-07-28 LAB — SURGICAL PATHOLOGY STUDY: SIGNIFICANT CHANGE UP

## 2025-08-25 ENCOUNTER — APPOINTMENT (OUTPATIENT)
Dept: GASTROENTEROLOGY | Facility: CLINIC | Age: 55
End: 2025-08-25
Payer: MEDICARE

## 2025-08-25 ENCOUNTER — RX RENEWAL (OUTPATIENT)
Age: 55
End: 2025-08-25

## 2025-08-25 VITALS
BODY MASS INDEX: 31.41 KG/M2 | HEART RATE: 67 BPM | TEMPERATURE: 97.1 F | SYSTOLIC BLOOD PRESSURE: 127 MMHG | WEIGHT: 160 LBS | DIASTOLIC BLOOD PRESSURE: 79 MMHG | HEIGHT: 60 IN | OXYGEN SATURATION: 96 %

## 2025-08-25 DIAGNOSIS — I85.00 ESOPHAGEAL VARICES W/OUT BLEEDING: ICD-10-CM

## 2025-08-25 DIAGNOSIS — Z87.11 PERSONAL HISTORY OF PEPTIC ULCER DISEASE: ICD-10-CM

## 2025-08-25 DIAGNOSIS — R11.2 NAUSEA WITH VOMITING, UNSPECIFIED: ICD-10-CM

## 2025-08-25 DIAGNOSIS — K29.30 CHRONIC SUPERFICIAL GASTRITIS W/OUT BLEEDING: ICD-10-CM

## 2025-08-25 DIAGNOSIS — R10.13 EPIGASTRIC PAIN: ICD-10-CM

## 2025-08-25 DIAGNOSIS — Z87.19 PERSONAL HISTORY OF OTHER DISEASES OF THE DIGESTIVE SYSTEM: ICD-10-CM

## 2025-08-25 DIAGNOSIS — K21.9 GASTRO-ESOPHAGEAL REFLUX DISEASE W/OUT ESOPHAGITIS: ICD-10-CM

## 2025-08-25 PROCEDURE — 99213 OFFICE O/P EST LOW 20 MIN: CPT

## 2025-09-04 ENCOUNTER — EMERGENCY (EMERGENCY)
Facility: HOSPITAL | Age: 55
LOS: 1 days | End: 2025-09-04
Attending: EMERGENCY MEDICINE
Payer: MEDICARE

## 2025-09-04 VITALS
HEIGHT: 63 IN | SYSTOLIC BLOOD PRESSURE: 141 MMHG | DIASTOLIC BLOOD PRESSURE: 87 MMHG | TEMPERATURE: 97 F | OXYGEN SATURATION: 95 % | WEIGHT: 158.07 LBS | HEART RATE: 74 BPM | RESPIRATION RATE: 18 BRPM

## 2025-09-04 DIAGNOSIS — Z94.0 KIDNEY TRANSPLANT STATUS: Chronic | ICD-10-CM

## 2025-09-04 DIAGNOSIS — Z95.828 PRESENCE OF OTHER VASCULAR IMPLANTS AND GRAFTS: Chronic | ICD-10-CM

## 2025-09-04 DIAGNOSIS — I77.0 ARTERIOVENOUS FISTULA, ACQUIRED: Chronic | ICD-10-CM

## 2025-09-04 LAB
ALBUMIN SERPL ELPH-MCNC: 4.2 G/DL — SIGNIFICANT CHANGE UP (ref 3.3–5)
ALP SERPL-CCNC: 67 U/L — SIGNIFICANT CHANGE UP (ref 40–120)
ALT FLD-CCNC: 17 U/L — SIGNIFICANT CHANGE UP (ref 10–45)
ANION GAP SERPL CALC-SCNC: 13 MMOL/L — SIGNIFICANT CHANGE UP (ref 5–17)
ANION GAP SERPL CALC-SCNC: 15 MMOL/L — SIGNIFICANT CHANGE UP (ref 5–17)
APPEARANCE UR: CLEAR — SIGNIFICANT CHANGE UP
AST SERPL-CCNC: 54 U/L — HIGH (ref 10–40)
BILIRUB SERPL-MCNC: 0.8 MG/DL — SIGNIFICANT CHANGE UP (ref 0.2–1.2)
BILIRUB UR-MCNC: NEGATIVE — SIGNIFICANT CHANGE UP
BUN SERPL-MCNC: 12 MG/DL — SIGNIFICANT CHANGE UP (ref 7–23)
BUN SERPL-MCNC: 14 MG/DL — SIGNIFICANT CHANGE UP (ref 7–23)
CALCIUM SERPL-MCNC: 10.4 MG/DL — SIGNIFICANT CHANGE UP (ref 8.4–10.5)
CALCIUM SERPL-MCNC: 10.6 MG/DL — HIGH (ref 8.4–10.5)
CHLORIDE SERPL-SCNC: 100 MMOL/L — SIGNIFICANT CHANGE UP (ref 96–108)
CHLORIDE SERPL-SCNC: 103 MMOL/L — SIGNIFICANT CHANGE UP (ref 96–108)
CO2 SERPL-SCNC: 21 MMOL/L — LOW (ref 22–31)
CO2 SERPL-SCNC: 22 MMOL/L — SIGNIFICANT CHANGE UP (ref 22–31)
COLOR SPEC: YELLOW — SIGNIFICANT CHANGE UP
CREAT SERPL-MCNC: 1.05 MG/DL — SIGNIFICANT CHANGE UP (ref 0.5–1.3)
CREAT SERPL-MCNC: 1.08 MG/DL — SIGNIFICANT CHANGE UP (ref 0.5–1.3)
DIFF PNL FLD: NEGATIVE — SIGNIFICANT CHANGE UP
EGFR: 61 ML/MIN/1.73M2 — SIGNIFICANT CHANGE UP
EGFR: 61 ML/MIN/1.73M2 — SIGNIFICANT CHANGE UP
EGFR: 64 ML/MIN/1.73M2 — SIGNIFICANT CHANGE UP
EGFR: 64 ML/MIN/1.73M2 — SIGNIFICANT CHANGE UP
GLUCOSE SERPL-MCNC: 105 MG/DL — HIGH (ref 70–99)
GLUCOSE SERPL-MCNC: 140 MG/DL — HIGH (ref 70–99)
GLUCOSE UR QL: NEGATIVE MG/DL — SIGNIFICANT CHANGE UP
HCT VFR BLD CALC: 44.7 % — SIGNIFICANT CHANGE UP (ref 34.5–45)
HGB BLD-MCNC: 13.7 G/DL — SIGNIFICANT CHANGE UP (ref 11.5–15.5)
KETONES UR QL: ABNORMAL MG/DL
LEUKOCYTE ESTERASE UR-ACNC: NEGATIVE — SIGNIFICANT CHANGE UP
MAGNESIUM SERPL-MCNC: 1.7 MG/DL — SIGNIFICANT CHANGE UP (ref 1.6–2.6)
MCHC RBC-ENTMCNC: 25.7 PG — LOW (ref 27–34)
MCHC RBC-ENTMCNC: 30.6 G/DL — LOW (ref 32–36)
MCV RBC AUTO: 83.9 FL — SIGNIFICANT CHANGE UP (ref 80–100)
NITRITE UR-MCNC: NEGATIVE — SIGNIFICANT CHANGE UP
NRBC # BLD AUTO: 0 K/UL — SIGNIFICANT CHANGE UP (ref 0–0)
NRBC # FLD: 0 K/UL — SIGNIFICANT CHANGE UP (ref 0–0)
PH UR: 8.5 (ref 5–8)
PHOSPHATE SERPL-MCNC: 2.6 MG/DL — SIGNIFICANT CHANGE UP (ref 2.5–4.5)
PLATELET # BLD AUTO: 193 K/UL — SIGNIFICANT CHANGE UP (ref 150–400)
PMV BLD: SIGNIFICANT CHANGE UP FL (ref 7–13)
POTASSIUM SERPL-MCNC: 5.3 MMOL/L — SIGNIFICANT CHANGE UP (ref 3.5–5.3)
POTASSIUM SERPL-MCNC: 5.5 MMOL/L — HIGH (ref 3.5–5.3)
POTASSIUM SERPL-SCNC: 5.3 MMOL/L — SIGNIFICANT CHANGE UP (ref 3.5–5.3)
POTASSIUM SERPL-SCNC: 5.5 MMOL/L — HIGH (ref 3.5–5.3)
PROT SERPL-MCNC: 7.7 G/DL — SIGNIFICANT CHANGE UP (ref 6–8.3)
PROT UR-MCNC: 300 MG/DL
RBC # BLD: 5.33 M/UL — HIGH (ref 3.8–5.2)
RBC # FLD: 25.9 % — HIGH (ref 10.3–14.5)
SODIUM SERPL-SCNC: 136 MMOL/L — SIGNIFICANT CHANGE UP (ref 135–145)
SODIUM SERPL-SCNC: 138 MMOL/L — SIGNIFICANT CHANGE UP (ref 135–145)
SP GR SPEC: 1.01 — SIGNIFICANT CHANGE UP (ref 1–1.03)
UROBILINOGEN FLD QL: 0.2 MG/DL — SIGNIFICANT CHANGE UP (ref 0.2–1)
WBC # BLD: 6.36 K/UL — SIGNIFICANT CHANGE UP (ref 3.8–10.5)
WBC # FLD AUTO: 6.36 K/UL — SIGNIFICANT CHANGE UP (ref 3.8–10.5)

## 2025-09-04 PROCEDURE — 93971 EXTREMITY STUDY: CPT | Mod: 26,RT

## 2025-09-04 PROCEDURE — 83735 ASSAY OF MAGNESIUM: CPT

## 2025-09-04 PROCEDURE — 81001 URINALYSIS AUTO W/SCOPE: CPT

## 2025-09-04 PROCEDURE — 80053 COMPREHEN METABOLIC PANEL: CPT

## 2025-09-04 PROCEDURE — 71046 X-RAY EXAM CHEST 2 VIEWS: CPT

## 2025-09-04 PROCEDURE — 80197 ASSAY OF TACROLIMUS: CPT

## 2025-09-04 PROCEDURE — 93990 DOPPLER FLOW TESTING: CPT | Mod: 26

## 2025-09-04 PROCEDURE — 93990 DOPPLER FLOW TESTING: CPT

## 2025-09-04 PROCEDURE — 99285 EMERGENCY DEPT VISIT HI MDM: CPT

## 2025-09-04 PROCEDURE — 85610 PROTHROMBIN TIME: CPT

## 2025-09-04 PROCEDURE — 93010 ELECTROCARDIOGRAM REPORT: CPT

## 2025-09-04 PROCEDURE — 93971 EXTREMITY STUDY: CPT

## 2025-09-04 PROCEDURE — 93005 ELECTROCARDIOGRAM TRACING: CPT

## 2025-09-04 PROCEDURE — 99223 1ST HOSP IP/OBS HIGH 75: CPT

## 2025-09-04 PROCEDURE — 85730 THROMBOPLASTIN TIME PARTIAL: CPT

## 2025-09-04 PROCEDURE — 99285 EMERGENCY DEPT VISIT HI MDM: CPT | Mod: 25

## 2025-09-04 PROCEDURE — 85025 COMPLETE CBC W/AUTO DIFF WBC: CPT

## 2025-09-04 PROCEDURE — 71046 X-RAY EXAM CHEST 2 VIEWS: CPT | Mod: 26

## 2025-09-04 PROCEDURE — 80048 BASIC METABOLIC PNL TOTAL CA: CPT

## 2025-09-04 PROCEDURE — 84100 ASSAY OF PHOSPHORUS: CPT

## 2025-09-05 VITALS
DIASTOLIC BLOOD PRESSURE: 88 MMHG | OXYGEN SATURATION: 97 % | RESPIRATION RATE: 18 BRPM | SYSTOLIC BLOOD PRESSURE: 127 MMHG | TEMPERATURE: 98 F | HEART RATE: 76 BPM

## 2025-09-05 DIAGNOSIS — M79.89 OTHER SPECIFIED SOFT TISSUE DISORDERS: ICD-10-CM

## 2025-09-05 LAB
ANISOCYTOSIS BLD QL: ABNORMAL
BACTERIA # UR AUTO: NEGATIVE /HPF — SIGNIFICANT CHANGE UP
BASOPHILS # BLD AUTO: 0.02 K/UL — SIGNIFICANT CHANGE UP (ref 0–0.2)
BASOPHILS # BLD MANUAL: 0 K/UL — SIGNIFICANT CHANGE UP (ref 0–0.2)
BASOPHILS NFR BLD AUTO: 0.3 % — SIGNIFICANT CHANGE UP (ref 0–2)
BASOPHILS NFR BLD MANUAL: 0 % — SIGNIFICANT CHANGE UP (ref 0–2)
BURR CELLS BLD QL SMEAR: SLIGHT — SIGNIFICANT CHANGE UP
CAST: 0 /LPF — SIGNIFICANT CHANGE UP (ref 0–4)
DACRYOCYTES BLD QL SMEAR: SLIGHT — SIGNIFICANT CHANGE UP
EOSINOPHIL # BLD AUTO: 0.11 K/UL — SIGNIFICANT CHANGE UP (ref 0–0.5)
EOSINOPHIL # BLD MANUAL: 0.06 K/UL — SIGNIFICANT CHANGE UP (ref 0–0.5)
EOSINOPHIL NFR BLD AUTO: 1.7 % — SIGNIFICANT CHANGE UP (ref 0–6)
EOSINOPHIL NFR BLD MANUAL: 0.9 % — SIGNIFICANT CHANGE UP (ref 0–6)
IMM GRANULOCYTES # BLD AUTO: 0.03 K/UL — SIGNIFICANT CHANGE UP (ref 0–0.07)
IMM GRANULOCYTES NFR BLD AUTO: 0.5 % — SIGNIFICANT CHANGE UP (ref 0–0.9)
LYMPHOCYTES # BLD AUTO: 0.73 K/UL — LOW (ref 1–3.3)
LYMPHOCYTES # BLD MANUAL: 0.61 K/UL — LOW (ref 1–3.3)
LYMPHOCYTES NFR BLD AUTO: 11.5 % — LOW (ref 13–44)
LYMPHOCYTES NFR BLD MANUAL: 9.6 % — LOW (ref 13–44)
MONOCYTES # BLD AUTO: 0.43 K/UL — SIGNIFICANT CHANGE UP (ref 0–0.9)
MONOCYTES # BLD MANUAL: 0.39 K/UL — SIGNIFICANT CHANGE UP (ref 0–0.9)
MONOCYTES NFR BLD AUTO: 6.8 % — SIGNIFICANT CHANGE UP (ref 2–14)
MONOCYTES NFR BLD MANUAL: 6.1 % — SIGNIFICANT CHANGE UP (ref 2–14)
NEUTROPHILS # BLD AUTO: 5.04 K/UL — SIGNIFICANT CHANGE UP (ref 1.8–7.4)
NEUTROPHILS # BLD MANUAL: 5.3 K/UL — SIGNIFICANT CHANGE UP (ref 1.8–7.4)
NEUTROPHILS NFR BLD AUTO: 79.2 % — HIGH (ref 43–77)
NEUTROPHILS NFR BLD MANUAL: 83.4 % — HIGH (ref 43–77)
OVALOCYTES BLD QL SMEAR: SLIGHT — SIGNIFICANT CHANGE UP
PLAT MORPH BLD: NORMAL — SIGNIFICANT CHANGE UP
POIKILOCYTOSIS BLD QL AUTO: SLIGHT — SIGNIFICANT CHANGE UP
POLYCHROMASIA BLD QL SMEAR: SLIGHT — SIGNIFICANT CHANGE UP
RBC BLD AUTO: ABNORMAL
RBC CASTS # UR COMP ASSIST: 2 /HPF — SIGNIFICANT CHANGE UP (ref 0–4)
SCHISTOCYTES BLD QL AUTO: SLIGHT — SIGNIFICANT CHANGE UP
SQUAMOUS # UR AUTO: 0 /HPF — SIGNIFICANT CHANGE UP (ref 0–5)
TACROLIMUS SERPL-MCNC: 16.5 NG/ML — SIGNIFICANT CHANGE UP
WBC UR QL: 1 /HPF — SIGNIFICANT CHANGE UP (ref 0–5)

## 2025-09-09 ENCOUNTER — APPOINTMENT (OUTPATIENT)
Dept: VASCULAR SURGERY | Facility: CLINIC | Age: 55
End: 2025-09-09

## 2025-09-09 ENCOUNTER — NON-APPOINTMENT (OUTPATIENT)
Age: 55
End: 2025-09-09

## 2025-09-09 VITALS
WEIGHT: 158 LBS | HEART RATE: 60 BPM | SYSTOLIC BLOOD PRESSURE: 132 MMHG | DIASTOLIC BLOOD PRESSURE: 76 MMHG | BODY MASS INDEX: 28 KG/M2 | TEMPERATURE: 98.3 F | HEIGHT: 63 IN

## 2025-09-09 DIAGNOSIS — I82.B11 ACUTE EMBOLISM AND THROMBOSIS OF RIGHT SUBCLAVIAN VEIN: ICD-10-CM

## 2025-09-09 DIAGNOSIS — T82.868A THROMBOSIS DUE VASCULAR PROSTHETIC DEVICES, IMPLANTS AND GRAFTS, INITIAL ENCOUNTER: ICD-10-CM

## 2025-09-09 RX ORDER — APIXABAN 5 MG/1
5 TABLET, FILM COATED ORAL
Qty: 180 | Refills: 3 | Status: ACTIVE | COMMUNITY
Start: 2025-09-09 | End: 1900-01-01

## 2025-09-15 ENCOUNTER — APPOINTMENT (OUTPATIENT)
Dept: ENDOCRINOLOGY | Facility: CLINIC | Age: 55
End: 2025-09-15

## (undated) DEVICE — FOLEY HOLDER STATLOCK 2 WAY ADULT

## (undated) DEVICE — CLAMP BX HOT RAD JAW 3

## (undated) DEVICE — SENSOR O2 FINGER ADULT

## (undated) DEVICE — MASK O2 PEDI-POM ELITE W/ MED-HI CONCEN W/ CO2 MONITOR MIC S

## (undated) DEVICE — BIOPSY FORCEP RADIAL JAW 4 STANDARD WITH NEEDLE

## (undated) DEVICE — BALLOON US ENDO

## (undated) DEVICE — CATH IV SAFE BC 20G X 1.16" (PINK)

## (undated) DEVICE — TUBING SUCTION CONN 6FT STERILE

## (undated) DEVICE — TUBING SUCTION 20FT

## (undated) DEVICE — SYR ALLIANCE II INFLATION 60ML

## (undated) DEVICE — PACK IV START WITH CHG

## (undated) DEVICE — SOLIDIFIER ISOLYZER 2000CC

## (undated) DEVICE — SYR LUER LOK 50CC

## (undated) DEVICE — SUCTION YANKAUER NO CONTROL VENT

## (undated) DEVICE — ELCTR GROUNDING PAD ADULT COVIDIEN

## (undated) DEVICE — IRRIGATOR BIO SHIELD

## (undated) DEVICE — KIT ENDO PROCEDURE CUST 10/BX

## (undated) DEVICE — TUBING IV SET GRAVITY 3Y 100" MACRO

## (undated) DEVICE — BITE BLOCK ADULT 20 X 27MM (GREEN)

## (undated) DEVICE — CATH IV SAFE BC 22G X 1" (BLUE)

## (undated) DEVICE — POLY TRAP ETRAP

## (undated) DEVICE — VALVE ENDO SURESEAL II 0-5FR

## (undated) DEVICE — SOL INJ NS 0.9% 500ML 2 PORT

## (undated) DEVICE — FORCEP RADIAL JAW 4 JUMBO 2.8MM 3.2MM 240CM ORANGE DISP

## (undated) DEVICE — VENODYNE/SCD SLEEVE CALF MEDIUM

## (undated) DEVICE — SOL INJ NS 0.9% 500ML 1-PORT

## (undated) DEVICE — TUBING CANNULA SALTER LABS NASAL ADULT 7FT

## (undated) DEVICE — FORMALIN CONTAINER MED

## (undated) DEVICE — FORCEP RADIAL JAW 4 W NDL 2.2MM 2.8MM 240CM ORANGE DISP

## (undated) DEVICE — BRUSH COLONOSCOPY CYTOLOGY